# Patient Record
Sex: MALE | Race: BLACK OR AFRICAN AMERICAN | NOT HISPANIC OR LATINO | Employment: UNEMPLOYED | URBAN - METROPOLITAN AREA
[De-identification: names, ages, dates, MRNs, and addresses within clinical notes are randomized per-mention and may not be internally consistent; named-entity substitution may affect disease eponyms.]

---
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
## 2021-05-08 ENCOUNTER — IMMUNIZATIONS (OUTPATIENT)
Dept: FAMILY MEDICINE CLINIC | Facility: HOSPITAL | Age: 21
End: 2021-05-08

## 2021-05-08 DIAGNOSIS — Z23 ENCOUNTER FOR IMMUNIZATION: Primary | ICD-10-CM

## 2021-05-08 PROCEDURE — 0001A SARS-COV-2 / COVID-19 MRNA VACCINE (PFIZER-BIONTECH) 30 MCG: CPT

## 2021-05-08 PROCEDURE — 91300 SARS-COV-2 / COVID-19 MRNA VACCINE (PFIZER-BIONTECH) 30 MCG: CPT

## 2021-05-29 ENCOUNTER — APPOINTMENT (OUTPATIENT)
Dept: FAMILY MEDICINE CLINIC | Facility: HOSPITAL | Age: 21
End: 2021-05-29

## 2021-05-29 DIAGNOSIS — Z23 ENCOUNTER FOR IMMUNIZATION: Primary | ICD-10-CM

## 2021-05-29 PROCEDURE — 0002A SARS-COV-2 / COVID-19 MRNA VACCINE (PFIZER-BIONTECH) 30 MCG: CPT

## 2021-05-29 PROCEDURE — 91300 SARS-COV-2 / COVID-19 MRNA VACCINE (PFIZER-BIONTECH) 30 MCG: CPT

## 2021-06-01 ENCOUNTER — HOSPITAL ENCOUNTER (EMERGENCY)
Facility: HOSPITAL | Age: 21
Discharge: HOME | End: 2021-06-02
Attending: EMERGENCY MEDICINE | Admitting: EMERGENCY MEDICINE
Payer: COMMERCIAL

## 2021-06-01 DIAGNOSIS — F10.920 ALCOHOLIC INTOXICATION WITHOUT COMPLICATION (HCC): Primary | ICD-10-CM

## 2021-06-01 PROCEDURE — 99284 EMERGENCY DEPT VISIT MOD MDM: CPT

## 2021-06-01 PROCEDURE — 99284 EMERGENCY DEPT VISIT MOD MDM: CPT | Performed by: EMERGENCY MEDICINE

## 2021-06-02 VITALS
BODY MASS INDEX: 27.47 KG/M2 | HEIGHT: 67 IN | SYSTOLIC BLOOD PRESSURE: 111 MMHG | WEIGHT: 175 LBS | DIASTOLIC BLOOD PRESSURE: 54 MMHG | OXYGEN SATURATION: 95 % | HEART RATE: 65 BPM | TEMPERATURE: 97.6 F | RESPIRATION RATE: 16 BRPM

## 2021-06-02 LAB
ETHANOL SERPL-MCNC: 213 MG/DL (ref 0–3)
GLUCOSE SERPL-MCNC: 112 MG/DL (ref 65–140)
GLUCOSE SERPL-MCNC: 82 MG/DL (ref 65–140)

## 2021-06-02 PROCEDURE — 82077 ASSAY SPEC XCP UR&BREATH IA: CPT | Performed by: EMERGENCY MEDICINE

## 2021-06-02 PROCEDURE — 82948 REAGENT STRIP/BLOOD GLUCOSE: CPT

## 2021-06-02 PROCEDURE — 36415 COLL VENOUS BLD VENIPUNCTURE: CPT | Performed by: EMERGENCY MEDICINE

## 2021-06-02 PROCEDURE — 96372 THER/PROPH/DIAG INJ SC/IM: CPT

## 2021-06-02 RX ORDER — LANOLIN ALCOHOL/MO/W.PET/CERES
100 CREAM (GRAM) TOPICAL ONCE
Status: DISCONTINUED | OUTPATIENT
Start: 2021-06-02 | End: 2021-06-02 | Stop reason: HOSPADM

## 2021-06-02 RX ORDER — FOLIC ACID 1 MG/1
1 TABLET ORAL DAILY
Status: DISCONTINUED | OUTPATIENT
Start: 2021-06-02 | End: 2021-06-02 | Stop reason: HOSPADM

## 2021-06-02 RX ORDER — LORAZEPAM 2 MG/ML
2 INJECTION INTRAMUSCULAR ONCE
Status: COMPLETED | OUTPATIENT
Start: 2021-06-02 | End: 2021-06-02

## 2021-06-02 RX ORDER — ZIPRASIDONE MESYLATE 20 MG/ML
20 INJECTION, POWDER, LYOPHILIZED, FOR SOLUTION INTRAMUSCULAR ONCE
Status: COMPLETED | OUTPATIENT
Start: 2021-06-02 | End: 2021-06-02

## 2021-06-02 RX ADMIN — ZIPRASIDONE MESYLATE 20 MG: 20 INJECTION, POWDER, LYOPHILIZED, FOR SOLUTION INTRAMUSCULAR at 00:21

## 2021-06-02 RX ADMIN — LORAZEPAM 2 MG: 2 INJECTION INTRAMUSCULAR; INTRAVENOUS at 00:21

## 2021-06-02 NOTE — ED RE-EVALUATION NOTE
Patient reassessed  Patient somnolent and difficult to arouse  Not in any distress  Blood sugar 82 mg/dL  Patient had received Geodon and Ativan overnight  Will check blood ethanol level  Appropriate disposition at this point to be determined       Rosario Roger DO  06/02/21 1154

## 2021-06-02 NOTE — ED PROVIDER NOTES
History  Chief Complaint   Patient presents with    Alcohol Intoxication     pt found sleeping outside of [facility]  21 y/o male with hx of schizophrenia presents to the ED via EMS after being found intoxicated and sleeping outside of a local [employer]  The patient has no current symptoms or complaints  He reports that he drinks daily ("2 bottles of gin") and he declines any assistance for substance use rehabilitation  He notes that he has auditory hallucinations at baseline that are unchanged  No SI or HI  Prior to Admission Medications   Prescriptions Last Dose Informant Patient Reported? Taking?   paliperidone palmitate (Abad Waynevivienne) 234 MG/1 5ML im injection   Yes No   Sig: Inject 234 mg into a muscle every 28 days      Facility-Administered Medications: None       Past Medical History:   Diagnosis Date    Asthma     Hearing impaired     Legally blind     Schizophrenia ([facility] [address] )        Past Surgical History:   Procedure Laterality Date    HERNIA REPAIR      TEAR DUCT SURGERY Bilateral     TONSILLECTOMY         Family History   Problem Relation Age of Onset    Schizophrenia Mother     Mental illness Mother     Abnormal EKG Sister     Mental illness Sister      I have reviewed and agree with the history as documented  E-Cigarette/Vaping     E-Cigarette/Vaping Substances     Social History     Tobacco Use    Smoking status: Current Every Day Smoker     Packs/day: 0 20     Types: Cigarettes    Smokeless tobacco: Never Used   Substance Use Topics    Alcohol use: Never     Frequency: Never    Drug use: Never        Review of Systems   Constitutional: Negative for chills and fever  Respiratory: Negative for cough and shortness of breath  Cardiovascular: Negative for chest pain and palpitations  Gastrointestinal: Negative for abdominal pain, diarrhea, nausea and vomiting  Musculoskeletal: Negative for back pain and neck pain     Neurological: Negative for dizziness, light-headedness and headaches  Psychiatric/Behavioral: Positive for hallucinations  Negative for suicidal ideas  All other systems reviewed and are negative  Physical Exam  ED Triage Vitals [06/01/21 2321]   Temperature Pulse Respirations Blood Pressure SpO2   97 6 °F (36 4 °C) 71 18 107/70 98 %      Temp Source Heart Rate Source Patient Position - Orthostatic VS BP Location FiO2 (%)   Oral Monitor Lying Right arm --      Pain Score       No Pain             Orthostatic Vital Signs  Vitals:    06/01/21 2321 06/02/21 0616 06/02/21 0754   BP: 107/70 110/65 111/54   Pulse: 71 71 65   Patient Position - Orthostatic VS: Lying Lying        Physical Exam  Vitals signs and nursing note reviewed  Constitutional:       General: He is not in acute distress  Appearance: Normal appearance  He is normal weight  Comments: Appears intoxicated but otherwise in no acute distress  HENT:      Head: Normocephalic and atraumatic  Right Ear: External ear normal       Left Ear: External ear normal       Nose: Nose normal       Mouth/Throat:      Mouth: Mucous membranes are moist       Pharynx: Oropharynx is clear  No oropharyngeal exudate  Eyes:      Extraocular Movements: Extraocular movements intact  Conjunctiva/sclera: Conjunctivae normal       Pupils: Pupils are equal, round, and reactive to light  Neck:      Musculoskeletal: Normal range of motion and neck supple  No muscular tenderness  Cardiovascular:      Rate and Rhythm: Normal rate and regular rhythm  Pulses: Normal pulses  Heart sounds: Normal heart sounds  Pulmonary:      Effort: Pulmonary effort is normal  No respiratory distress  Breath sounds: Normal breath sounds  No wheezing or rales  Chest:      Chest wall: No tenderness  Abdominal:      General: Abdomen is flat  Bowel sounds are normal  There is no distension  Palpations: Abdomen is soft  Tenderness: There is no abdominal tenderness   There is no right CVA tenderness, left CVA tenderness or guarding  Musculoskeletal: Normal range of motion  General: No swelling or tenderness  Skin:     General: Skin is warm and dry  Capillary Refill: Capillary refill takes less than 2 seconds  Neurological:      Mental Status: He is alert  Comments: Appears intoxicated, but otherwise alert and oriented to person, place, and time  Moving all extremities  No focal deficits  Psychiatric:      Comments: Endorses hallucinations at baseline  No current SI or HI  ED Medications  Medications   sterile water injection **ADS Override Pull** (0 mL  Hold 6/2/21 0041)   ziprasidone (GEODON) IM injection 20 mg (20 mg Intramuscular Given 6/2/21 0021)   LORazepam (ATIVAN) injection 2 mg (2 mg Intramuscular Given 6/2/21 0021)       Diagnostic Studies  Results Reviewed     Procedure Component Value Units Date/Time    Ethanol [[id number]]  (Abnormal) Collected: 06/02/21 0918    Lab Status: Final result Specimen: Blood from Arm, Left Updated: 06/02/21 0945     Ethanol Lvl 213 mg/dL     Fingerstick Glucose (POCT) [[id number]]  (Normal) Collected: 06/02/21 0749    Lab Status: Final result Updated: 06/02/21 0750     POC Glucose 82 mg/dl     Fingerstick Glucose (POCT) [[id number]]  (Normal) Collected: 06/02/21 0039    Lab Status: Final result Updated: 06/02/21 0040     POC Glucose 112 mg/dl                  No orders to display         Procedures  Procedures      ED Course  ED Course as of Jun 08 2346   Wed Jun 02, 2021   0348 POC Glucose: 112                                       MDM  Number of Diagnoses or Management Options  Alcoholic intoxication without complication [facility]):   Diagnosis management comments: 23 y/o male with hx of schizophrenia presents to the ED via EMS after being found intoxicated and sleeping outside of a local [employer]  The patient has no current symptoms or complaints   He reports that he drinks daily ("2 bottles of gin") and he declines any assistance for substance use rehabilitation  He notes that he has auditory hallucinations at baseline that are unchanged  No SI or HI  On examination he is afebrile, VSS, intoxicated but otherwise in no acute distress  No focal deficits  Blood glucose normal  While in the ER the patient was unable to obtain a sober ride for discharge, and during his wait he became restless and difficult to reorient  Patient was given IM Ativan and Geodon  Care for the patient was signed out to Dr Frandy Nails at change of shift and he was discharged when clinically sober  Disposition  Final diagnoses:   Alcoholic intoxication without complication ([address] )     Time reflects when diagnosis was documented in both MDM as applicable and the Disposition within this note     Time User Action Codes Description Comment    6/2/2021  6:56 AM Raf Baldwin Add [Q45 [id number]] Alcoholic intoxication without complication [facility])       ED Disposition     ED Disposition Condition Date/Time Comment    Discharge Stable Wed Jun 2, 2021  6:56 AM Gokul March discharge to home/self care              Follow-up Information     Follow up With Specialties Details Why Contact Info Additional 94 [facility] Internal Medicine Call  As needed [address] 160 [facility] [address]  [facility] [address], [address] [address], [place], [place], [address] [phone]    [employer]  Call  As needed [address] [facility] Emergency Department Emergency Medicine Go to  If symptoms worsen [address]  [address] [facility] [address] Emergency Department, [address], [address]          Discharge Medication List as of 6/2/2021  6:57 AM      CONTINUE these medications which have NOT CHANGED    Details   paliperidone palmitate (INVEGA SUSTENNA) 234 MG/1 5ML im injection Inject 234 mg into a muscle every 28 days, Historical Med           No discharge procedures on file  PDMP Review     None           ED Provider  Attending physically available and evaluated Viviane Giordano I managed the patient along with the ED Attending      Electronically Signed by         Alberto Quinonez MD  06/08/21 8467

## 2021-06-02 NOTE — ED ATTENDING ATTESTATION
6/1/2021  Edin RODRÍGUEZ MD, saw and evaluated the patient  I have discussed the patient with the resident/non-physician practitioner and agree with the resident's/non-physician practitioner's findings, Plan of Care, and MDM as documented in the resident's/non-physician practitioner's note, except where noted  All available labs and Radiology studies were reviewed  I was present for key portions of any procedure(s) performed by the resident/non-physician practitioner and I was immediately available to provide assistance  At this point I agree with the current assessment done in the Emergency Department  I have conducted an independent evaluation of this patient a history and physical is as follows:    ED Course      Emergency Department Note- Jesenia Conte 22 y o  male MRN: [id number]    Unit/Bed#: Z4HA Encounter: [id number]    Jesenia Conte is a 22 y o  male who presents with   Chief Complaint   Patient presents with    Alcohol Intoxication     pt found sleeping outside of [facility]  History of Present Illness   HPI:  Jesenia Conte is a 22 y o  male who presents for evaluation of:  Acute alcohol intoxication  Patient was found sleeping outside [facility] and brought in for evaluation  Patient denies other intoxicant use today  Patient states that he drinks 2 bottles of gin per day  Patient denies SI, HI  Review of Systems   Unable to perform ROS: Mental status change (secondary to alcohol)   All other systems reviewed and are negative        Historical Information   Past Medical History:   Diagnosis Date    Asthma     Hearing impaired     Legally blind     Schizophrenia ([facility] [address] )      Past Surgical History:   Procedure Laterality Date    HERNIA REPAIR      TEAR DUCT SURGERY Bilateral     TONSILLECTOMY       Social History   Social History     Substance and Sexual Activity   Alcohol Use Never    Frequency: Never     Social History     Substance and Sexual Activity   Drug Use Never Social History     Tobacco Use   Smoking Status Current Every Day Smoker    Packs/day: 0 20    Types: Cigarettes   Smokeless Tobacco Never Used     Family History: non-contributory    Meds/Allergies   all medications and allergies reviewed  No Known Allergies    Objective   First Vitals:   Blood Pressure: 107/70 (21)  Pulse: 71 (21)  Temperature: 97 6 °F (36 4 °C) (21)  Temp Source: Oral (21)  Respirations: 18 (21)  Height: 5' 7" (170 2 cm) (21)  Weight - Scale: 79 4 kg (175 lb) (21)  SpO2: 98 % (21)    Current Vitals:   Blood Pressure: 107/70 (21)  Pulse: 71 (21)  Temperature: 97 6 °F (36 4 °C) (21)  Temp Source: Oral (21)  Respirations: 18 (21)  Height: 5' 7" (170 2 cm) (21)  Weight - Scale: 79 4 kg (175 lb) (21)  SpO2: 98 % (21)    No intake or output data in the 24 hours ending 21 0000    Invasive Devices     None                 Physical Exam  Vitals signs and nursing note reviewed  Constitutional:       General: He is not in acute distress  Appearance: He is normal weight  HENT:      Head: Normocephalic and atraumatic  Cardiovascular:      Rate and Rhythm: Normal rate and regular rhythm  Pulmonary:      Effort: Pulmonary effort is normal  No respiratory distress  Musculoskeletal: Normal range of motion  General: No tenderness  Skin:     General: Skin is warm and dry  Capillary Refill: Capillary refill takes less than 2 seconds  Neurological:      General: No focal deficit present  Mental Status: He is easily aroused  Motor: No weakness  Coordination: Coordination normal    Psychiatric:      Comments: [address], thought content, judgment impaired by alcohol intoxication           Medical Decision Makin   Acute alcohol intoxication: observe till sober    No results found for this or any previous visit (from the past 36 hour(s))  No orders to display         Portions of the record may have been created with voice recognition software  Occasional wrong word or "sound a like" substitutions may have occurred due to the inherent limitations of voice recognition software  Read the chart carefully and recognize, using context, where substitutions have occurred            Critical Care Time  Procedures

## 2021-06-02 NOTE — ED NOTES
Pt eating police citation paperwork  Pt instructed not to eat this paper and that he will need that later   Pt still continues to eat paper     Clover Wilkes RN  06/02/21 4609

## 2021-06-20 ENCOUNTER — HOSPITAL ENCOUNTER (EMERGENCY)
Facility: HOSPITAL | Age: 21
Discharge: HOME | End: 2021-06-20
Attending: EMERGENCY MEDICINE
Payer: COMMERCIAL

## 2021-06-20 ENCOUNTER — APPOINTMENT (EMERGENCY)
Dept: RADIOLOGY | Facility: HOSPITAL | Age: 21
End: 2021-06-20
Payer: COMMERCIAL

## 2021-06-20 VITALS
OXYGEN SATURATION: 96 % | SYSTOLIC BLOOD PRESSURE: 140 MMHG | RESPIRATION RATE: 18 BRPM | HEART RATE: 102 BPM | TEMPERATURE: 98.8 F | DIASTOLIC BLOOD PRESSURE: 68 MMHG
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
## 2021-06-20 DIAGNOSIS — S93.409A ANKLE SPRAIN: ICD-10-CM

## 2021-06-20 PROCEDURE — 99284 EMERGENCY DEPT VISIT MOD MDM: CPT | Performed by: EMERGENCY MEDICINE

## 2021-06-20 PROCEDURE — 70486 CT MAXILLOFACIAL W/O DYE: CPT

## 2021-06-20 PROCEDURE — 73610 X-RAY EXAM OF ANKLE: CPT

## 2021-06-20 PROCEDURE — 99284 EMERGENCY DEPT VISIT MOD MDM: CPT
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
## 2021-06-29 ENCOUNTER — HOSPITAL ENCOUNTER (OUTPATIENT)
Dept: RADIOLOGY | Facility: HOSPITAL | Age: 21
Discharge: HOME | End: 2021-06-29
Payer: COMMERCIAL

## 2021-06-29 VITALS — BODY MASS INDEX: 28.35 KG/M2 | WEIGHT: 180.6 LBS | HEIGHT: 67 IN

## 2021-06-29 DIAGNOSIS — M25.572 ACUTE LEFT ANKLE PAIN: ICD-10-CM

## 2021-06-29 DIAGNOSIS — S93.402A SPRAIN OF LEFT ANKLE, UNSPECIFIED LIGAMENT, INITIAL ENCOUNTER: Primary | ICD-10-CM

## 2021-06-29 PROCEDURE — 73610 X-RAY EXAM OF ANKLE: CPT

## 2021-06-29 PROCEDURE — 99203 OFFICE O/P NEW LOW 30 MIN: CPT | Performed by: PHYSICIAN ASSISTANT

## 2021-06-29 PROCEDURE — 3008F BODY MASS INDEX DOCD: CPT | Performed by: PHYSICIAN ASSISTANT
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
## 2021-06-29 NOTE — PATIENT INSTRUCTIONS
Ice Pack Application   WHAT YOU NEED TO KNOW:   Ice can be used to decrease swelling and pain after an injury or surgery  Common injuries that may benefit from ice therapy are sprains, strains, and bruises  The use of ice is most effective in the first 1 to 3 days after an injury  DISCHARGE INSTRUCTIONS:   How to apply ice:   · Fill a bag with crushed ice about half full  Remove the air from the bag before you close it  You can also use a bag of frozen vegetables  · Wrap the ice pack in a cloth to protect your skin from frostbite or other injury  · Put the ice over the injured area for 20 to 30 minutes or as long as directed  · Check your skin after about 30 seconds for color changes or blistering  Remove the ice if you notice skin changes or you feel burning or numbness in the area  · Throw the ice pack away after use  · Apply ice to your injured area 4 times each day or as directed  Ask your healthcare provider how many days you should apply ice  Contact your healthcare provider if:   · You see blisters, whitening of your skin, or a bluish color to your skin after using ice  · You feel burning or numbness when using ice  · You have questions about the use of ice packs  © 2017 [facility] [facility] Information is for End User's use only and may not be sold, redistributed or otherwise used for commercial purposes  All illustrations and images included in CareNotes® are the copyrighted property of [employer]  or Kendall Esparza  The above information is an  only  It is not intended as medical advice for individual conditions or treatments  Talk to your doctor, nurse or pharmacist before following any medical regimen to see if it is safe and effective for you  Safe Use of NSAIDs   WHAT YOU NEED TO KNOW:   NSAIDs are medicines that are used to decrease pain, swelling, and fever  NSAIDs are available with or without a doctor's order   NSAIDs that you can buy without a doctor's order include aspirin, ibuprofen, and naproxen  DISCHARGE INSTRUCTIONS:   Return to the emergency department if:   · You have swelling around your mouth or trouble breathing  · You are breathing fast or you have a fast heartbeat  · You have nausea, vomiting, or abdominal pain  · You have blood in your vomit or bowel movements  · You have a seizure  Contact your healthcare provider if:   · You have a headache or become confused  · You develop hearing loss or ringing in your ears  · You develop itching, a rash, or hives  · You have swelling around your lower legs, feet, ankles, and hands  · You do not know how much NSAIDs to give to your child  · You have questions or concerns about your condition or care  How to give NSAIDs to your child safely:   · Read the directions on the label  Find out if the medicine is right for your child's age and how much to give to your child  The dose for your child's weight or age should be listed  Do not  give your child more than the recommended amount  · Use the measuring tool that came with the medicine  Do not  use another measuring tool, such as a kitchen spoon  Other measuring tools do not provide the right amount of medicine  How to take NSAIDs safely:   · Read the directions on the label to learn how much medicine you should take and often to take it  Do not take more than the recommended amount  · Talk to your healthcare provider if you need take NSAIDs for more than 30 days  The longer you take NSAIDs, the higher your risk of side effects will be  You may need to take other medicines to decrease your risk of side effects such as stomach bleeding  · Do not take an over-the-counter NSAIDs with prescription NSAIDs  The combined amount of NSAIDs may be too high  · Tell your healthcare provider about other medicines you take  Some medicines can increase the risk of side effects from NSAIDs   Your healthcare provider will tell you if it is okay to take NSAIDs and how to take them  Who should not take NSAIDs:  Certain people should avoid or limit NSAIDs  Do not  give NSAIDs to children under 10months of age without direction from your child's doctor  Do not give aspirin to children under 25years of age  Your child could develop Reye syndrome if he takes aspirin  Reye syndrome can cause life-threatening brain and liver damage  Check your child's medicine labels for aspirin, salicylates, or oil of wintergreen  Talk to your healthcare provider before you take NSAIDs if any of the following apply to you:  · You have reflux disease, a peptic ulcer, H pylori infection, or bleeding in your stomach or intestines  · You have a bleeding disorder, or you take blood-thinning medicine  · You are allergic to aspirin or other NSAIDs  · You have liver or kidney or disease  · You have high blood pressure or heart disease  · You have 3 or more alcoholic drinks each day  · You are pregnant  What you need to know about an NSAID overdose:  Certain health problems can occur if you take too much NSAID medicine at one time or over time  Problems include nausea, vomiting, and abdominal pain  You may develop gastritis, peptic ulcers, and stomach bleeding  You may also develop fluid retention, heart problems, and kidney problems  NSAIDs can worsen high blood pressure  You may become confused, or you may have a headache, hearing loss, or hallucinations  An overdose of aspirin may also cause rapid breathing, a rapid heartbeat, or seizures  What to do if you think you or your child took too much NSAID medicine:  Call the [facility] at 1-463.584.9542 immediately  © 2017 [address] [employer]  Information is for End User's use only and may not be sold, redistributed or otherwise used for commercial purposes   All illustrations and images included in CareNotes® are the copyrighted property of SOLA HERMOSILLO Marcella  or Kendall Esparza  The above information is an  only  It is not intended as medical advice for individual conditions or treatments  Talk to your doctor, nurse or pharmacist before following any medical regimen to see if it is safe and effective for you

## 2021-07-13 ENCOUNTER — HOSPITAL ENCOUNTER (EMERGENCY)
Facility: HOSPITAL | Age: 21
Discharge: HOME | End: 2021-07-14
Attending: EMERGENCY MEDICINE | Admitting: EMERGENCY MEDICINE
Payer: COMMERCIAL

## 2021-07-13 VITALS
DIASTOLIC BLOOD PRESSURE: 80 MMHG | SYSTOLIC BLOOD PRESSURE: 143 MMHG | OXYGEN SATURATION: 100 % | RESPIRATION RATE: 17 BRPM | TEMPERATURE: 98.1 F | HEART RATE: 94 BPM

## 2021-07-13 DIAGNOSIS — F69 BEHAVIOR PROBLEM, ADULT: Primary | ICD-10-CM

## 2021-07-13 PROCEDURE — 99283 EMERGENCY DEPT VISIT LOW MDM: CPT

## 2021-07-13 PROCEDURE — 99282 EMERGENCY DEPT VISIT SF MDM: CPT | Performed by: EMERGENCY MEDICINE

## 2021-07-13 PROCEDURE — 82075 ASSAY OF BREATH ETHANOL: CPT | Performed by: EMERGENCY MEDICINE

## 2021-07-14 LAB
ETHANOL EXG-MCNC: 0 MG/DL
SARS-COV-2 RNA RESP QL NAA+PROBE: NEGATIVE

## 2021-07-14 PROCEDURE — U0003 INFECTIOUS AGENT DETECTION BY NUCLEIC ACID (DNA OR RNA); SEVERE ACUTE RESPIRATORY SYNDROME CORONAVIRUS 2 (SARS-COV-2) (CORONAVIRUS DISEASE [COVID-19]), AMPLIFIED PROBE TECHNIQUE, MAKING USE OF HIGH THROUGHPUT TECHNOLOGIES AS DESCRIBED BY CMS-2020-01-R: HCPCS | Performed by: EMERGENCY MEDICINE
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
## 2021-07-14 NOTE — ED NOTES
Attempted to call patient a andrés  pt requesting to walk home at this time        Jessica Mirza RN  07/14/21 1802

## 2021-07-14 NOTE — ED NOTES
Crisis worker called grandma to see if patient can return home and grandma said as long as he is calm that he could but she is unable to pick him up  CW called [facility] and left them a message that patient was in the ED and they need to follow up with him today         [facility] #[phone]

## 2021-07-14 NOTE — ED NOTES
Pt unable to provide urine sample at this time, will try again later      Veronica Taylor RN  07/14/21 7908
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
## 2021-07-16 ENCOUNTER — HOSPITAL ENCOUNTER (EMERGENCY)
Facility: HOSPITAL | Age: 21
Discharge: HOME | End: 2021-07-17
Attending: EMERGENCY MEDICINE
Payer: COMMERCIAL

## 2021-07-16 DIAGNOSIS — F10.929 ALCOHOL INTOXICATION (HCC): ICD-10-CM

## 2021-07-16 DIAGNOSIS — S00.83XA CONTUSION OF FACE, INITIAL ENCOUNTER: ICD-10-CM
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
## 2021-07-16 PROCEDURE — 99281 EMR DPT VST MAYX REQ PHY/QHP: CPT | Performed by: EMERGENCY MEDICINE

## 2021-07-16 PROCEDURE — 99284 EMERGENCY DEPT VISIT MOD MDM: CPT

## 2021-07-17 VITALS
OXYGEN SATURATION: 92 % | HEIGHT: 64 IN | HEART RATE: 83 BPM | SYSTOLIC BLOOD PRESSURE: 128 MMHG | BODY MASS INDEX: 25.61 KG/M2 | WEIGHT: 150 LBS | TEMPERATURE: 98.4 F | DIASTOLIC BLOOD PRESSURE: 63 MMHG | RESPIRATION RATE: 20 BRPM

## 2021-07-17 NOTE — DISCHARGE INSTRUCTIONS
Return to the emergency department if you experience worsening of symptoms including severe headache, vision changes, numbness or tingling
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
## 2021-08-07 ENCOUNTER — HOSPITAL ENCOUNTER (EMERGENCY)
Facility: HOSPITAL | Age: 21
Discharge: HOME | End: 2021-08-07
Attending: EMERGENCY MEDICINE | Admitting: EMERGENCY MEDICINE
Payer: COMMERCIAL

## 2021-08-07 VITALS
OXYGEN SATURATION: 94 % | RESPIRATION RATE: 18 BRPM | WEIGHT: 150 LBS | BODY MASS INDEX: 25.61 KG/M2 | SYSTOLIC BLOOD PRESSURE: 123 MMHG | HEIGHT: 64 IN | DIASTOLIC BLOOD PRESSURE: 58 MMHG | HEART RATE: 86 BPM | TEMPERATURE: 97.9 F

## 2021-08-07 DIAGNOSIS — F10.929 ALCOHOL INTOXICATION (HCC): Primary | ICD-10-CM

## 2021-08-07 PROCEDURE — 99282 EMERGENCY DEPT VISIT SF MDM: CPT | Performed by: EMERGENCY MEDICINE

## 2021-08-07 PROCEDURE — 99284 EMERGENCY DEPT VISIT MOD MDM: CPT

## 2021-08-07 NOTE — ED PROVIDER NOTES
History  Chief Complaint   Patient presents with    Alcohol Intoxication     pt found sleeping outside of the entrance of a hotel  Albania Rahman is a [place] man with PMH of schizophrenia, alcohol use, blind, hard of hearing who presents with acute alcohol intoxication  EMS found him asleep in a hotel  Pt reports he has been drinking tonight, 3 to 4 40 ounce beers, and was walking around, approximately two miles, before passing out in front of a hotel  Denies any history of trauma, or other symptoms of SOB, chest pain, neck pain, nausea, vomiting, abdominal pain, weakness in his extremities  He reports he feels well  He is able to state where he is and where his home address is  Denies any thoughts of wanting to hurt himself or others  No other symptoms, no other complaints at this time  Prior to Admission Medications   Prescriptions Last Dose Informant Patient Reported? Taking?   paliperidone palmitate (Shilpi Cain) 234 MG/1 5ML im injection   Yes No   Sig: Inject 234 mg into a muscle every 28 days      Facility-Administered Medications: None       Past Medical History:   Diagnosis Date    Asthma     Hearing impaired     Legally blind     Schizophrenia ([facility] [address] )        Past Surgical History:   Procedure Laterality Date    HERNIA REPAIR      TEAR DUCT SURGERY Bilateral     TONSILLECTOMY         Family History   Problem Relation Age of Onset    Schizophrenia Mother     Mental illness Mother     Abnormal EKG Sister     Mental illness Sister      I have reviewed and agree with the history as documented  E-Cigarette/Vaping     E-Cigarette/Vaping Substances     Social History     Tobacco Use    Smoking status: Current Every Day Smoker     Packs/day: 0 20     Types: Cigarettes    Smokeless tobacco: Never Used   Substance Use Topics    Alcohol use: Never    Drug use: Never        Review of Systems   Constitutional: Negative for fever  HENT: Negative for sore throat      Eyes: Negative for visual disturbance  Respiratory: Negative for shortness of breath  Cardiovascular: Negative for chest pain  Gastrointestinal: Negative for abdominal pain, constipation, diarrhea, nausea and vomiting  Genitourinary: Negative for difficulty urinating  Musculoskeletal: Negative for myalgias  Skin: Negative for rash  Neurological: Negative for headaches  All other systems reviewed and are negative  Physical Exam  ED Triage Vitals [08/07/21 0358]   Temperature Pulse Respirations Blood Pressure SpO2   97 9 °F (36 6 °C) 86 18 123/58 94 %      Temp Source Heart Rate Source Patient Position - Orthostatic VS BP Location FiO2 (%)   Oral Monitor Lying Right arm --      Pain Score       --             Orthostatic Vital Signs  Vitals:    08/07/21 0358   BP: 123/58   Pulse: 86   Patient Position - Orthostatic VS: Lying       Physical Exam  Constitutional:       Appearance: He is well-developed and normal weight  Comments: Appears intoxicated, slurring words   HENT:      Head: Normocephalic and atraumatic  Eyes:      Extraocular Movements: Extraocular movements intact  Conjunctiva/sclera:      Right eye: Right conjunctiva is injected  Left eye: Left conjunctiva is injected  Pupils: Pupils are equal, round, and reactive to light  Cardiovascular:      Rate and Rhythm: Normal rate and regular rhythm  Heart sounds: Normal heart sounds  Pulmonary:      Effort: Pulmonary effort is normal       Breath sounds: Normal breath sounds  Abdominal:      General: Abdomen is flat  Bowel sounds are normal       Palpations: Abdomen is soft  Musculoskeletal:         General: Normal range of motion  Cervical back: Normal range of motion and neck supple  Skin:     General: Skin is warm and dry  Neurological:      General: No focal deficit present  Mental Status: He is alert  Mental status is at baseline           ED Medications  Medications - No data to display    Diagnostic Studies  Results Reviewed     None                 No orders to display         Procedures  Procedures      ED Course  ED Course as of Aug 07 1656   Sat Aug 07, 2021   8309 Pt still not clinically sober  Will sign out to the day team for further observation  CASSIUS Hodgson is a [place] man who presents with acute alcohol intoxication  Vitals stable, physical exam remarkable for acute intoxication  No evidence of trauma or other medical problem that might warrant further workup  Plan will be to monitor pt until he is clinically sober or able to get a sober ride home  Pt remained stable throughout his ED course and slept for most of it  He left the ED before a final sobriety assessment could be obtained  Left after provider exam       Disposition  Final diagnoses:   Alcohol intoxication ([address] )     Time reflects when diagnosis was documented in both MDM as applicable and the Disposition within this note     Time User Action Codes Description Comment    8/7/2021  6:46 AM She Webster Add [F10 929] Alcohol intoxication [facility])       ED Disposition     ED Disposition Condition Date/Time Comment    Left from Room after Provider Exam  Sat Aug 7, 2021  2:52 PM       Follow-up Information    None         Discharge Medication List as of 8/7/2021  3:13 PM      CONTINUE these medications which have NOT CHANGED    Details   paliperidone palmitate (INVEGA SUSTENNA) 234 MG/1 5ML im injection Inject 234 mg into a muscle every 28 days, Historical Med           No discharge procedures on file  PDMP Review     None           ED Provider  Attending physically available and evaluated Michael Napoles I managed the patient along with the ED Attending      Electronically Signed by         Leeann Lares MD  08/07/21 6769
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
## 2021-08-14 ENCOUNTER — APPOINTMENT (EMERGENCY)
Dept: RADIOLOGY | Facility: HOSPITAL | Age: 21
End: 2021-08-14
Payer: COMMERCIAL

## 2021-08-14 ENCOUNTER — HOSPITAL ENCOUNTER (EMERGENCY)
Facility: HOSPITAL | Age: 21
Discharge: HOME | End: 2021-08-15
Attending: EMERGENCY MEDICINE | Admitting: EMERGENCY MEDICINE
Payer: COMMERCIAL

## 2021-08-14 DIAGNOSIS — F10.920 ALCOHOLIC INTOXICATION WITHOUT COMPLICATION (HCC): Primary | ICD-10-CM

## 2021-08-14 LAB
ANION GAP SERPL CALCULATED.3IONS-SCNC: 5 MMOL/L (ref 4–13)
BASOPHILS # BLD AUTO: 0.11 THOUSANDS/ΜL (ref 0–0.1)
BASOPHILS NFR BLD AUTO: 1 % (ref 0–1)
BUN SERPL-MCNC: 12 MG/DL (ref 5–25)
CALCIUM SERPL-MCNC: 8.9 MG/DL (ref 8.3–10.1)
CHLORIDE SERPL-SCNC: 109 MMOL/L (ref 100–108)
CO2 SERPL-SCNC: 25 MMOL/L (ref 21–32)
CREAT SERPL-MCNC: 1.21 MG/DL (ref 0.6–1.3)
EOSINOPHIL # BLD AUTO: 0.08 THOUSAND/ΜL (ref 0–0.61)
EOSINOPHIL NFR BLD AUTO: 1 % (ref 0–6)
ERYTHROCYTE [DISTWIDTH] IN BLOOD BY AUTOMATED COUNT: 13.9 % (ref 11.6–15.1)
GFR SERPL CREATININE-BSD FRML MDRD: 99 ML/MIN/1.73SQ M
GLUCOSE SERPL-MCNC: 106 MG/DL (ref 65–140)
GLUCOSE SERPL-MCNC: 114 MG/DL (ref 65–140)
HCT VFR BLD AUTO: 48.2 % (ref 36.5–49.3)
HGB BLD-MCNC: 16.5 G/DL (ref 12–17)
IMM GRANULOCYTES # BLD AUTO: 0.11 THOUSAND/UL (ref 0–0.2)
IMM GRANULOCYTES NFR BLD AUTO: 1 % (ref 0–2)
LYMPHOCYTES # BLD AUTO: 3.39 THOUSANDS/ΜL (ref 0.6–4.47)
LYMPHOCYTES NFR BLD AUTO: 38 % (ref 14–44)
MCH RBC QN AUTO: 32.8 PG (ref 26.8–34.3)
MCHC RBC AUTO-ENTMCNC: 34.2 G/DL (ref 31.4–37.4)
MCV RBC AUTO: 96 FL (ref 82–98)
MONOCYTES # BLD AUTO: 0.75 THOUSAND/ΜL (ref 0.17–1.22)
MONOCYTES NFR BLD AUTO: 8 % (ref 4–12)
NEUTROPHILS # BLD AUTO: 4.6 THOUSANDS/ΜL (ref 1.85–7.62)
NEUTS SEG NFR BLD AUTO: 51 % (ref 43–75)
NRBC BLD AUTO-RTO: 0 /100 WBCS
PLATELET # BLD AUTO: 227 THOUSANDS/UL (ref 149–390)
PMV BLD AUTO: 10.9 FL (ref 8.9–12.7)
POTASSIUM SERPL-SCNC: 3.6 MMOL/L (ref 3.5–5.3)
RBC # BLD AUTO: 5.03 MILLION/UL (ref 3.88–5.62)
SODIUM SERPL-SCNC: 139 MMOL/L (ref 136–145)
WBC # BLD AUTO: 9.04 THOUSAND/UL (ref 4.31–10.16)

## 2021-08-14 PROCEDURE — 36415 COLL VENOUS BLD VENIPUNCTURE: CPT | Performed by: EMERGENCY MEDICINE

## 2021-08-14 PROCEDURE — 99285 EMERGENCY DEPT VISIT HI MDM: CPT

## 2021-08-14 PROCEDURE — 80048 BASIC METABOLIC PNL TOTAL CA: CPT | Performed by: EMERGENCY MEDICINE

## 2021-08-14 PROCEDURE — 70450 CT HEAD/BRAIN W/O DYE: CPT

## 2021-08-14 PROCEDURE — 85025 COMPLETE CBC W/AUTO DIFF WBC: CPT | Performed by: EMERGENCY MEDICINE

## 2021-08-14 PROCEDURE — 99282 EMERGENCY DEPT VISIT SF MDM: CPT | Performed by: EMERGENCY MEDICINE

## 2021-08-14 PROCEDURE — 82948 REAGENT STRIP/BLOOD GLUCOSE: CPT

## 2021-08-14 PROCEDURE — G1004 CDSM NDSC: HCPCS

## 2021-08-15 VITALS
TEMPERATURE: 97.8 F | HEART RATE: 70 BPM | RESPIRATION RATE: 16 BRPM | HEIGHT: 64 IN | BODY MASS INDEX: 25.59 KG/M2 | DIASTOLIC BLOOD PRESSURE: 56 MMHG | WEIGHT: 149.91 LBS | OXYGEN SATURATION: 94 % | SYSTOLIC BLOOD PRESSURE: 114 MMHG

## 2021-08-15 NOTE — ED PROVIDER NOTES
History  Chief Complaint   Patient presents with    Alcohol Intoxication     pt brought in by EMS for alcohol intoxication pt found drunk at [facility]  50-year-old male history of schizophrenia and alcohol abuse, presenting due to alcohol intoxication  Patient was brought in by EMS after being found intoxicated at a local [facility]  Patient is currently inebriated is unable to provide much information  Does admit to drinking alcohol and denies other drug use  Denies any current pain at this time  Unable to obtain further HPI due to altered mental status  Prior to Admission Medications   Prescriptions Last Dose Informant Patient Reported? Taking?   paliperidone palmitate (Travis Grater) 234 MG/1 5ML im injection   Yes No   Sig: Inject 234 mg into a muscle every 28 days      Facility-Administered Medications: None       Past Medical History:   Diagnosis Date    Asthma     Hearing impaired     Legally blind     Schizophrenia ([facility] [address] )        Past Surgical History:   Procedure Laterality Date    HERNIA REPAIR      TEAR DUCT SURGERY Bilateral     TONSILLECTOMY         Family History   Problem Relation Age of Onset    Schizophrenia Mother     Mental illness Mother     Abnormal EKG Sister     Mental illness Sister      I have reviewed and agree with the history as documented      E-Cigarette/Vaping     E-Cigarette/Vaping Substances     Social History     Tobacco Use    Smoking status: Current Every Day Smoker     Packs/day: 0 20     Types: Cigarettes    Smokeless tobacco: Never Used   Substance Use Topics    Alcohol use: Never    Drug use: Never        Review of Systems   Unable to perform ROS: Mental status change       Physical Exam  ED Triage Vitals [08/14/21 2020]   Temperature Pulse Respirations Blood Pressure SpO2   97 8 °F (36 6 °C) 90 18 116/53 99 %      Temp Source Heart Rate Source Patient Position - Orthostatic VS BP Location FiO2 (%)   Oral Monitor Lying Right arm --      Pain Score       --             Orthostatic Vital Signs  Vitals:    08/15/21 0030 08/15/21 0130 08/15/21 0330 08/15/21 0430   BP: 117/58 121/58 111/54 114/56   Pulse: 74 78 76 70   Patient Position - Orthostatic VS: Lying Lying Lying Lying       Physical Exam  Vitals and nursing note reviewed  Constitutional:       Appearance: He is well-developed  HENT:      Head: Normocephalic and atraumatic  Eyes:      Conjunctiva/sclera: Conjunctivae normal    Cardiovascular:      Rate and Rhythm: Normal rate and regular rhythm  Heart sounds: No murmur heard  Pulmonary:      Effort: Pulmonary effort is normal  No respiratory distress  Breath sounds: Normal breath sounds  Abdominal:      General: There is no distension  Palpations: Abdomen is soft  Musculoskeletal:      Cervical back: Neck supple  Skin:     General: Skin is warm and dry  Neurological:      Mental Status: He is alert  Motor: Motor function is intact  Psychiatric:         Speech: Speech is slurred        Comments: Intoxicated  Oriented x 4         ED Medications  Medications - No data to display    Diagnostic Studies  Results Reviewed     Procedure Component Value Units Date/Time    Basic metabolic panel [[id number]]  (Abnormal) Collected: 08/14/21 2152    Lab Status: Final result Specimen: Blood from Arm, Left Updated: 08/14/21 2225     Sodium 139 mmol/L      Potassium 3 6 mmol/L      Chloride 109 mmol/L      CO2 25 mmol/L      ANION GAP 5 mmol/L      BUN 12 mg/dL      Creatinine 1 21 mg/dL      Glucose 106 mg/dL      Calcium 8 9 mg/dL      eGFR 99 ml/min/1 73sq m     Narrative:      [place] guidelines for Chronic Kidney Disease (CKD):     Stage 1 with normal or high GFR (GFR > 90 mL/min/1 73 square meters)    Stage 2 Mild CKD (GFR = 60-89 mL/min/1 73 square meters)    Stage 3A Moderate CKD (GFR = 45-59 mL/min/1 73 square meters)    Stage 3B Moderate CKD (GFR = 30-44 mL/min/1 73 square meters)   Stage 4 Severe CKD (GFR = 15-29 mL/min/1 73 square meters)    Stage 5 End Stage CKD (GFR <15 mL/min/1 73 square meters)  Note: GFR calculation is accurate only with a steady state creatinine    CBC and differential [[id number]]  (Abnormal) Collected: 08/14/21 2152    Lab Status: Final result Specimen: Blood from Arm, Left Updated: 08/14/21 2209     WBC 9 04 Thousand/uL      RBC 5 03 Million/uL      Hemoglobin 16 5 g/dL      Hematocrit 48 2 %      MCV 96 fL      MCH 32 8 pg      MCHC 34 2 g/dL      RDW 13 9 %      MPV 10 9 fL      Platelets 772 Thousands/uL      nRBC 0 /100 WBCs      Neutrophils Relative 51 %      Immat GRANS % 1 %      Lymphocytes Relative 38 %      Monocytes Relative 8 %      Eosinophils Relative 1 %      Basophils Relative 1 %      Neutrophils Absolute 4 60 Thousands/µL      Immature Grans Absolute 0 11 Thousand/uL      Lymphocytes Absolute 3 39 Thousands/µL      Monocytes Absolute 0 75 Thousand/µL      Eosinophils Absolute 0 08 Thousand/µL      Basophils Absolute 0 11 Thousands/µL     Fingerstick Glucose (POCT) [[id number]]  (Normal) Collected: 08/14/21 2025    Lab Status: Final result Updated: 08/14/21 2026     POC Glucose 114 mg/dl                  CT head wo contrast   Final Result by Ian Pereira MD (08/14 2249)      No acute intracranial abnormality  Workstation performed: [id number]               Procedures  Procedures      ED Course         CRAFFT      Most Recent Value   SBIRT (13-23 yo)   In order to provide better care to our patients, we are screening all of our patients for alcohol and drug use  Would it be okay to ask you these screening questions? No Filed at: 08/14/2021 2024                                    MDM  Number of Diagnoses or Management Options  Alcoholic intoxication without complication [facility])  Diagnosis management comments: Head CT ordered due to altered mental status and inability to obtain complete HPI from patient  No acute findings   Patient appears extremely intoxicated  Patient was signed at this time with plans to reevaluate once clinically sober  Per notes, patient was discharged in stable condition  Disposition  Final diagnoses:   Alcoholic intoxication without complication ([address] )     Time reflects when diagnosis was documented in both MDM as applicable and the Disposition within this note     Time User Action Codes Description Comment    8/15/2021  5:53 AM Gearldine Goltz Add [[id number] [id number]] Alcoholic intoxication without complication [facility])       ED Disposition     ED Disposition Condition Date/Time Comment    Discharge Stable Sun Aug 15, 2021  5:53 AM Gokul Forbes discharge to home/self care  Follow-up Information     Follow up With Specialties Details Why Shaw Sarkar MD Internal Medicine Call  As needed [id number]  [phone]            Discharge Medication List as of 8/15/2021  5:54 AM      CONTINUE these medications which have NOT CHANGED    Details   paliperidone palmitate (INVEGA SUSTENNA) 234 MG/1 5ML im injection Inject 234 mg into a muscle every 28 days, Historical Med           No discharge procedures on file  PDMP Review     None           ED Provider  Attending physically available and evaluated Marion Judd I managed the patient along with the ED Attending      Electronically Signed by         Caridad Parry DO  08/15/21 6031

## 2021-08-15 NOTE — DISCHARGE INSTRUCTIONS
You have been seen for alcohol intoxication  Return to the emergency department if you develop worsening confusion, headaches, weakness, abdominal pain or any other symptoms of concern  Please follow up with your PCP as needed by calling the number provided  [facility] has a 24-hour hotline for all human-services related emergencies  Please call [phone] for assistance and referrals if you are struggling with a mental health crisis, abuse, neglect, suicidal thoughts, evictions, substance abuse, food insecurity or any other concerns  [facility] has hotline numbers for all human-services related emergencies  These phone numbers are provided below    Crisis Intervention: [phone]  Drug & Alcohol :[phone]  Early Intervention: [phone]  Information and Referral: [address] [place] [address] Street: [address] [place] [address] St: [address]: [phone]

## 2021-08-15 NOTE — ED ATTENDING ATTESTATION
8/14/2021  I, Ki Day, DO, saw and evaluated the patient  I have discussed the patient with the resident/non-physician practitioner and agree with the resident's/non-physician practitioner's findings, Plan of Care, and MDM as documented in the resident's/non-physician practitioner's note, except where noted  All available labs and Radiology studies were reviewed  I was present for key portions of any procedure(s) performed by the resident/non-physician practitioner and I was immediately available to provide assistance  At this point I agree with the current assessment done in the Emergency Department  I have conducted an independent evaluation of this patient a history and physical is as follows:    Patient is a 16-year-old male, brought in by EMS, reportedly found outside [facility], reportedly intoxicated  Right now on my assessment at 8:35 p m  the patient tells me that he drank about 3-4 alcoholic drinks today and smoked, wall tell me what he smoked  He says he is drunk  He said he has not fallen down, has no pain anywhere, says he lives with his grandmother  No fever, no chills, no numbness or tingling    General:  Patient is well-appearing  Head:  Atraumatic  Eyes:  Conjunctiva pink, Extraocular muscle intact, no periorbital ecchymosis, PERRL  ENT:  Mucous membranes are moist, no dental malocclusion, no craniofacial instability, no Bain signs  Neck:  Supple, no tenderness or step-offs or deformities  Cardiac:  S1-S2, without murmurs, no chest wall tenderness  Lungs:  Clear to auscultation bilaterally  Abdomen:  Soft, nontender, normal bowel sounds, no CVA tenderness, no tympany, no rigidity, no guarding  Extremities:  No bony tenderness to the bilateral bilateral humeral heads, humerus, elbows, radius, ulna, hands, hips, femurs, knees, tibia, fibula, feet  No pain with passive range of motion at the bilateral shoulders, elbows, wrists, hips, knees, or ankles    Back: No midline cervical, thoracic, lumbar, sacral tenderness, deformities, or step-offs  Neurologic:  Awake, patient is slurring his speech, no facial droop, his oriented to person and place and time, when he goes to try and stand he is somewhat unsteady on his feet  His strength is 5/5 in the bilateral arms and legs  Normal sensation throughout the whole body  Slight discoordination with rapid alternating movements and finger-to-nose testing  Skin:  Pink warm and dry  Psychiatric:  Alert, pleasant, cooperative      ED Course     CT head wo contrast   Final Result      No acute intracranial abnormality  Workstation performed: [id number]             Labs Reviewed   BASIC METABOLIC PANEL - Abnormal       Result Value Ref Range Status    Sodium 139  136 - 145 mmol/L Final    Potassium 3 6  3 5 - 5 3 mmol/L Final    Chloride 109 (*) 100 - 108 mmol/L Final    CO2 25  21 - 32 mmol/L Final    ANION GAP 5  4 - 13 mmol/L Final    BUN 12  5 - 25 mg/dL Final    Creatinine 1 21  0 60 - 1 30 mg/dL Final    Comment: Standardized to IDMS reference method    Glucose 106  65 - 140 mg/dL Final    Comment: If the patient is fasting, the ADA then defines impaired fasting glucose as > 100 mg/dL and diabetes as > or equal to 123 mg/dL  Specimen collection should occur prior to Sulfasalazine administration due to the potential for falsely depressed results  Specimen collection should occur prior to Sulfapyridine administration due to the potential for falsely elevated results      Calcium 8 9  8 3 - 10 1 mg/dL Final    eGFR 99  ml/min/1 73sq m Final    Narrative:     [place] guidelines for Chronic Kidney Disease (CKD):     Stage 1 with normal or high GFR (GFR > 90 mL/min/1 73 square meters)    Stage 2 Mild CKD (GFR = 60-89 mL/min/1 73 square meters)    Stage 3A Moderate CKD (GFR = 45-59 mL/min/1 73 square meters)    Stage 3B Moderate CKD (GFR = 30-44 mL/min/1 73 square meters)    Stage 4 Severe CKD (GFR = 15-29 mL/min/1 73 square meters)    Stage 5 End Stage CKD (GFR <15 mL/min/1 73 square meters)  Note: GFR calculation is accurate only with a steady state creatinine   CBC AND DIFFERENTIAL - Abnormal    WBC 9 04  4 31 - 10 16 Thousand/uL Final    RBC 5 03  3 88 - 5 62 Million/uL Final    Hemoglobin 16 5  12 0 - 17 0 g/dL Final    Hematocrit 48 2  36 5 - 49 3 % Final    MCV 96  82 - 98 fL Final    MCH 32 8  26 8 - 34 3 pg Final    MCHC 34 2  31 4 - 37 4 g/dL Final    RDW 13 9  11 6 - 15 1 % Final    MPV 10 9  8 9 - 12 7 fL Final    Platelets 267  952 - 390 Thousands/uL Final    nRBC 0  /100 WBCs Final    Neutrophils Relative 51  43 - 75 % Final    Immat GRANS % 1  0 - 2 % Final    Lymphocytes Relative 38  14 - 44 % Final    Monocytes Relative 8  4 - 12 % Final    Eosinophils Relative 1  0 - 6 % Final    Basophils Relative 1  0 - 1 % Final    Neutrophils Absolute 4 60  1 85 - 7 62 Thousands/µL Final    Immature Grans Absolute 0 11  0 00 - 0 20 Thousand/uL Final    Lymphocytes Absolute 3 39  0 60 - 4 47 Thousands/µL Final    Monocytes Absolute 0 75  0 17 - 1 22 Thousand/µL Final    Eosinophils Absolute 0 08  0 00 - 0 61 Thousand/µL Final    Basophils Absolute 0 11 (*) 0 00 - 0 10 Thousands/µL Final   POCT GLUCOSE - Normal    POC Glucose 114  65 - 140 mg/dl Final       On reassessment the patient seemed to be little more alert but still had evidence of clinical intoxication, still somewhat unsteady  Initial workup is unremarkable, suspect his symptomatology is based on his alcohol use    Plan is to allow the patient to improve clinically in the ED and discharge either with family member or on his own once he is clinically sober, Signed out to Dr Melendez Ast Time  Procedures

## 2021-09-22 ENCOUNTER — HOSPITAL ENCOUNTER (EMERGENCY)
Facility: HOSPITAL | Age: 21
Discharge: HOME | End: 2021-09-22
Attending: EMERGENCY MEDICINE
Payer: COMMERCIAL

## 2021-09-22 VITALS
OXYGEN SATURATION: 98 % | HEART RATE: 90 BPM | WEIGHT: 150 LBS | RESPIRATION RATE: 18 BRPM | TEMPERATURE: 97.8 F | SYSTOLIC BLOOD PRESSURE: 129 MMHG | BODY MASS INDEX: 25.61 KG/M2 | DIASTOLIC BLOOD PRESSURE: 68 MMHG | HEIGHT: 64 IN

## 2021-09-22 DIAGNOSIS — F10.929 ALCOHOL INTOXICATION (HCC): Primary | ICD-10-CM

## 2021-09-22 LAB — ETHANOL EXG-MCNC: 0.12 MG/DL

## 2021-09-22 PROCEDURE — 99282 EMERGENCY DEPT VISIT SF MDM: CPT | Performed by: EMERGENCY MEDICINE

## 2021-09-22 PROCEDURE — 99284 EMERGENCY DEPT VISIT MOD MDM: CPT

## 2021-09-22 PROCEDURE — 82075 ASSAY OF BREATH ETHANOL: CPT

## 2021-09-22 NOTE — DISCHARGE INSTRUCTIONS
You were seen for alcohol intoxication  You were medically cleared to go home  You stop cut back on your drinking as it can lead to multiple medical problems

## 2021-09-22 NOTE — ED ATTENDING ATTESTATION
9/22/2021  IBill MD, saw and evaluated the patient  I have discussed the patient with the resident/non-physician practitioner and agree with the resident's/non-physician practitioner's findings, Plan of Care, and MDM as documented in the resident's/non-physician practitioner's note, except where noted  All available labs and Radiology studies were reviewed  I was present for key portions of any procedure(s) performed by the resident/non-physician practitioner and I was immediately available to provide assistance  At this point I agree with the current assessment done in the Emergency Department  I have conducted an independent evaluation of this patient a history and physical is as follows:    ED Course     Emergency Department Note- Gokul Olson 22 y o  male MRN: [id number]    Unit/Bed#: [facility] Encounter: [id number]    Jim De Luna is a 22 y o  male who presents with   Chief Complaint   Patient presents with    Alcohol Intoxication     pt brought in by EMS pt reports being intoxicated and found with vomit and urine on himself         History of Present Illness   HPI:  Jim De Luna is a 22 y o  male who presents for evaluation of:  Brought in for evaluation after found publicly intoxicated vomiting  Patient denies complaints in the emergency department  He Notes he was drinking earlier in the evening  He is unable to provide further history secondary to acute alcohol intoxication      Review of Systems   Unable to perform ROS: Mental status change (Secondary to acute alcohol intoxication)       Historical Information   Past Medical History:   Diagnosis Date    Asthma     Hearing impaired     Legally blind     Schizophrenia ([facility] [address] )      Past Surgical History:   Procedure Laterality Date    HERNIA REPAIR      TEAR DUCT SURGERY Bilateral     TONSILLECTOMY       Social History   Social History     Substance and Sexual Activity   Alcohol Use Never     Social History     Substance and Sexual Activity   Drug Use Never     Social History     Tobacco Use   Smoking Status Current Every Day Smoker    Packs/day: 0 20    Types: Cigarettes   Smokeless Tobacco Never Used     Family History:   Family History   Problem Relation Age of Onset    Schizophrenia Mother     Mental illness Mother     Abnormal EKG Sister     Mental illness Sister        Meds/Allergies   PTA meds:   Prior to Admission Medications   Prescriptions Last Dose Informant Patient Reported? Taking?   paliperidone palmitate (Devi Beckwith) 234 MG/1 5ML im injection   Yes No   Sig: Inject 234 mg into a muscle every 28 days      Facility-Administered Medications: None     No Known Allergies    Objective   First Vitals:   Blood Pressure: 143/75 (09/22/21 0051)  Pulse: 92 (09/22/21 0051)  Temperature: 97 8 °F (36 6 °C) (09/22/21 0051)  Temp Source: Oral (09/22/21 0051)  Respirations: 18 (09/22/21 0051)  Height: 5' 4" (162 6 cm) (09/22/21 0051)  Weight - Scale: 68 kg (150 lb) (09/22/21 0051)  SpO2: 94 % (09/22/21 0051)    Current Vitals:   Blood Pressure: 143/75 (09/22/21 0051)  Pulse: 92 (09/22/21 0051)  Temperature: 97 8 °F (36 6 °C) (09/22/21 0051)  Temp Source: Oral (09/22/21 0051)  Respirations: 18 (09/22/21 0051)  Height: 5' 4" (162 6 cm) (09/22/21 0051)  Weight - Scale: 68 kg (150 lb) (09/22/21 0051)  SpO2: 94 % (09/22/21 0051)    No intake or output data in the 24 hours ending 09/22/21 0322    Invasive Devices     None                 Physical Exam  Vitals and nursing note reviewed  Constitutional:       General: He is sleeping  Appearance: He is obese  HENT:      Head: Normocephalic and atraumatic  Right Ear: External ear normal       Left Ear: External ear normal       Nose: Nose normal       Mouth/Throat:      Mouth: Mucous membranes are moist       Pharynx: Oropharynx is clear  Eyes:      Conjunctiva/sclera: Conjunctivae normal       Pupils: Pupils are equal, round, and reactive to light     Cardiovascular:      Rate and Rhythm: Normal rate and regular rhythm  Pulmonary:      Effort: Pulmonary effort is normal  No respiratory distress  Abdominal:      General: Bowel sounds are normal       Palpations: Abdomen is soft  Musculoskeletal:         General: No tenderness  Normal range of motion  Skin:     General: Skin is warm  Capillary Refill: Capillary refill takes less than 2 seconds  Neurological:      General: No focal deficit present  Mental Status: He is easily aroused  Motor: No weakness  Coordination: Coordination normal    Psychiatric:      Comments: Decision making capacity, thought content, and judgment are impaired secondary to alcohol intoxication           Medical Decision Makin  Acute alcohol intoxication:  Observe until sober  No results found for this or any previous visit (from the past 36 hour(s))  No orders to display         Portions of the record may have been created with voice recognition software  Occasional wrong word or "sound a like" substitutions may have occurred due to the inherent limitations of voice recognition software  Read the chart carefully and recognize, using context, where substitutions have occurred          Critical Care Time  Procedures

## 2021-09-22 NOTE — ED PROVIDER NOTES
HPI:    Patient ID: Veronia Gowers is a 78year old female presents for a gynecological exam, Pap smear. HPI  Patient reports that she has not had a Pap smear in several years, she is menopausal for more than 15 years. Last mammogram 4 years ago.   Need History  Chief Complaint   Patient presents with    Alcohol Intoxication     pt brought in by EMS pt reports being intoxicated and found with vomit and urine on himself     HPI  81-year-old male presents via EMS for alcohol intoxication  Patient was drunk in public and vomited on himself was brought in the emergency department  Patient denies any pain, nausea, vomiting diarrhea abdominal pain headache, back pain, chest pain, shortness of breath, fever, chills  Patient wishes to sleep  Prior to Admission Medications   Prescriptions Last Dose Informant Patient Reported? Taking?   paliperidone palmitate (Trip Tee) 234 MG/1 5ML im injection   Yes No   Sig: Inject 234 mg into a muscle every 28 days      Facility-Administered Medications: None       Past Medical History:   Diagnosis Date    Asthma     Hearing impaired     Legally blind     Schizophrenia ([facility] [address] )        Past Surgical History:   Procedure Laterality Date    HERNIA REPAIR      TEAR DUCT SURGERY Bilateral     TONSILLECTOMY         Family History   Problem Relation Age of Onset    Schizophrenia Mother     Mental illness Mother     Abnormal EKG Sister     Mental illness Sister      I have reviewed and agree with the history as documented  E-Cigarette/Vaping     E-Cigarette/Vaping Substances     Social History     Tobacco Use    Smoking status: Current Every Day Smoker     Packs/day: 0 20     Types: Cigarettes    Smokeless tobacco: Never Used   Substance Use Topics    Alcohol use: Never    Drug use: Never        Review of Systems   Constitutional: Negative for chills and fever  HENT: Negative for congestion, ear pain, rhinorrhea and sore throat  Eyes: Negative for pain  Respiratory: Negative for apnea, cough, choking, chest tightness, shortness of breath, wheezing and stridor  Cardiovascular: Negative for chest pain, palpitations and leg swelling     Gastrointestinal: Negative for abdominal pain, constipation, diarrhea, nausea and vomiting  Genitourinary: Negative for hematuria  Musculoskeletal: Negative for arthralgias and back pain  Skin: Negative for rash and wound  Neurological: Negative for dizziness  Psychiatric/Behavioral: Negative for agitation and hallucinations  All other systems reviewed and are negative  Physical Exam  ED Triage Vitals [09/22/21 0051]   Temperature Pulse Respirations Blood Pressure SpO2   97 8 °F (36 6 °C) 92 18 143/75 94 %      Temp Source Heart Rate Source Patient Position - Orthostatic VS BP Location FiO2 (%)   Oral Monitor Lying Right arm --      Pain Score       --             Orthostatic Vital Signs  Vitals:    09/22/21 0051 09/22/21 0931 09/22/21 1117   BP: 143/75 115/61 129/68   Pulse: 92 (!) 108 90   Patient Position - Orthostatic VS: Lying Lying Lying       Physical Exam  Vitals reviewed  Constitutional:       General: He is not in acute distress  Appearance: He is well-developed  Comments: Somnolent inebriated   HENT:      Head: Normocephalic and atraumatic  Right Ear: External ear normal       Left Ear: External ear normal       Nose: Nose normal       Mouth/Throat:      Mouth: Mucous membranes are moist    Eyes:      Extraocular Movements: Extraocular movements intact  Conjunctiva/sclera: Conjunctivae normal       Pupils: Pupils are equal, round, and reactive to light  Cardiovascular:      Rate and Rhythm: Normal rate and regular rhythm  Heart sounds: Normal heart sounds  Pulmonary:      Effort: Pulmonary effort is normal  No respiratory distress  Breath sounds: Normal breath sounds  No wheezing or rales  Abdominal:      Palpations: Abdomen is soft  Tenderness: There is no abdominal tenderness  Musculoskeletal:         General: Normal range of motion  Cervical back: Normal range of motion and neck supple  No rigidity  Skin:     General: Skin is warm  Neurological:      General: No focal deficit present        Mental Status: He is and well-nourished. No distress. Eyes: EOM are normal. Pupils are equal, round, and reactive to light. No scleral icterus. Neck: Normal range of motion. Neck supple. No JVD present. No thyromegaly present.    Cardiovascular: Normal rate, regular rhythm oriented to person, place, and time  ED Medications  Medications - No data to display    Diagnostic Studies  Results Reviewed     Procedure Component Value Units Date/Time    POCT alcohol breath test [[id number]]  (Normal) Resulted: 09/22/21 1152    Lab Status: Final result Updated: 09/22/21 1152     EXTBreath Alcohol 0 120                 No orders to display         Procedures  Procedures      ED Course                                       MDM  Number of Diagnoses or Management Options  81-year-old male intoxicated with alcohol  Patient does not have any complaints and is sleeping comfortably with an intact airway in the emergency department  Patient is signed out today team with plan to discharge once clinically sober  Disposition  Final diagnoses:   Alcohol intoxication ([address] )     Time reflects when diagnosis was documented in both MDM as applicable and the Disposition within this note     Time User Action Codes Description Comment    9/22/2021  7:59 AM Blanca Spring Add [F10 929] Alcohol intoxication [facility])       ED Disposition     ED Disposition Condition Date/Time Comment    Discharge Stable Wed Sep 22, 2021  7:59 AM Gokul Abrams discharge to home/self care  Follow-up Information     Follow up With Specialties Details Why Anselmo Martínez MD Internal Medicine Schedule an appointment as soon as possible for a visit in 1 week  [address]  [phone]            Discharge Medication List as of 9/22/2021  9:24 AM      CONTINUE these medications which have NOT CHANGED    Details   paliperidone palmitate (INVEGA SUSTENNA) 234 MG/1 5ML im injection Inject 234 mg into a muscle every 28 days, Historical Med           No discharge procedures on file  PDMP Review     None           ED Provider  Attending physically available and evaluated Princess Richardson I managed the patient along with the ED Attending      Electronically Signed by         Louise Sequeira [address], DO  09/23/21 9586 [facility], DO  09/23/21 2261

## 2021-09-29 ENCOUNTER — APPOINTMENT (OUTPATIENT)
Dept: LAB | Facility: HOSPITAL | Age: 21
End: 2021-09-29
Payer: COMMERCIAL

## 2021-09-29 DIAGNOSIS — B18.2 HEPATITIS C CARRIER (HCC): ICD-10-CM

## 2021-09-29 DIAGNOSIS — F25.9 SCHIZOAFFECTIVE DISORDER, UNSPECIFIED TYPE (HCC): ICD-10-CM

## 2021-09-29 DIAGNOSIS — E66.9 LIFELONG OBESITY: ICD-10-CM

## 2021-09-29 DIAGNOSIS — K59.00 CONSTIPATION, UNSPECIFIED CONSTIPATION TYPE: ICD-10-CM

## 2021-09-29 LAB
ALBUMIN SERPL BCP-MCNC: 3.3 G/DL (ref 3.5–5)
ALP SERPL-CCNC: 74 U/L (ref 46–116)
ALT SERPL W P-5'-P-CCNC: 39 U/L (ref 12–78)
ANION GAP SERPL CALCULATED.3IONS-SCNC: 3 MMOL/L (ref 4–13)
AST SERPL W P-5'-P-CCNC: 40 U/L (ref 5–45)
BASOPHILS # BLD AUTO: 0.11 THOUSANDS/ΜL (ref 0–0.1)
BASOPHILS NFR BLD AUTO: 1 % (ref 0–1)
BILIRUB SERPL-MCNC: 0.49 MG/DL (ref 0.2–1)
BUN SERPL-MCNC: 11 MG/DL (ref 5–25)
CALCIUM ALBUM COR SERPL-MCNC: 9.6 MG/DL (ref 8.3–10.1)
CALCIUM SERPL-MCNC: 9 MG/DL (ref 8.3–10.1)
CHLORIDE SERPL-SCNC: 109 MMOL/L (ref 100–108)
CHOLEST SERPL-MCNC: 191 MG/DL (ref 50–200)
CO2 SERPL-SCNC: 27 MMOL/L (ref 21–32)
CREAT SERPL-MCNC: 1.22 MG/DL (ref 0.6–1.3)
EOSINOPHIL # BLD AUTO: 0.27 THOUSAND/ΜL (ref 0–0.61)
EOSINOPHIL NFR BLD AUTO: 3 % (ref 0–6)
ERYTHROCYTE [DISTWIDTH] IN BLOOD BY AUTOMATED COUNT: 13.9 % (ref 11.6–15.1)
EST. AVERAGE GLUCOSE BLD GHB EST-MCNC: 105 MG/DL
GFR SERPL CREATININE-BSD FRML MDRD: 98 ML/MIN/1.73SQ M
GLUCOSE P FAST SERPL-MCNC: 95 MG/DL (ref 65–99)
HBA1C MFR BLD: 5.3 %
HCT VFR BLD AUTO: 49.4 % (ref 36.5–49.3)
HDLC SERPL-MCNC: 50 MG/DL
HGB BLD-MCNC: 16.2 G/DL (ref 12–17)
IMM GRANULOCYTES # BLD AUTO: 0.11 THOUSAND/UL (ref 0–0.2)
IMM GRANULOCYTES NFR BLD AUTO: 1 % (ref 0–2)
LDLC SERPL CALC-MCNC: 115 MG/DL (ref 0–100)
LYMPHOCYTES # BLD AUTO: 3.33 THOUSANDS/ΜL (ref 0.6–4.47)
LYMPHOCYTES NFR BLD AUTO: 37 % (ref 14–44)
MCH RBC QN AUTO: 32.2 PG (ref 26.8–34.3)
MCHC RBC AUTO-ENTMCNC: 32.8 G/DL (ref 31.4–37.4)
MCV RBC AUTO: 98 FL (ref 82–98)
MONOCYTES # BLD AUTO: 0.89 THOUSAND/ΜL (ref 0.17–1.22)
MONOCYTES NFR BLD AUTO: 10 % (ref 4–12)
NEUTROPHILS # BLD AUTO: 4.4 THOUSANDS/ΜL (ref 1.85–7.62)
NEUTS SEG NFR BLD AUTO: 48 % (ref 43–75)
NONHDLC SERPL-MCNC: 141 MG/DL
NRBC BLD AUTO-RTO: 0 /100 WBCS
PLATELET # BLD AUTO: 235 THOUSANDS/UL (ref 149–390)
PMV BLD AUTO: 11.5 FL (ref 8.9–12.7)
POTASSIUM SERPL-SCNC: 4.1 MMOL/L (ref 3.5–5.3)
PROT SERPL-MCNC: 7.5 G/DL (ref 6.4–8.2)
RBC # BLD AUTO: 5.03 MILLION/UL (ref 3.88–5.62)
SODIUM SERPL-SCNC: 139 MMOL/L (ref 136–145)
T4 SERPL-MCNC: 9.3 UG/DL (ref 4.7–13.3)
TRIGL SERPL-MCNC: 128 MG/DL
TSH SERPL DL<=0.05 MIU/L-ACNC: 1.34 UIU/ML (ref 0.46–3.98)
WBC # BLD AUTO: 9.11 THOUSAND/UL (ref 4.31–10.16)

## 2021-09-29 PROCEDURE — 80061 LIPID PANEL: CPT

## 2021-09-29 PROCEDURE — 36415 COLL VENOUS BLD VENIPUNCTURE: CPT

## 2021-09-29 PROCEDURE — 85025 COMPLETE CBC W/AUTO DIFF WBC: CPT

## 2021-09-29 PROCEDURE — 84443 ASSAY THYROID STIM HORMONE: CPT

## 2021-09-29 PROCEDURE — 83036 HEMOGLOBIN GLYCOSYLATED A1C: CPT

## 2021-09-29 PROCEDURE — 84436 ASSAY OF TOTAL THYROXINE: CPT

## 2021-09-29 PROCEDURE — 80053 COMPREHEN METABOLIC PANEL: CPT

## 2021-10-01 ENCOUNTER — APPOINTMENT (OUTPATIENT)
Dept: LAB | Facility: HOSPITAL | Age: 21
End: 2021-10-01
Payer: COMMERCIAL

## 2021-10-12 ENCOUNTER — HOSPITAL ENCOUNTER (EMERGENCY)
Facility: HOSPITAL | Age: 21
Discharge: HOME | End: 2021-10-12
Attending: EMERGENCY MEDICINE
Payer: COMMERCIAL

## 2021-10-12 VITALS
TEMPERATURE: 98 F | SYSTOLIC BLOOD PRESSURE: 125 MMHG | HEART RATE: 125 BPM | DIASTOLIC BLOOD PRESSURE: 70 MMHG | OXYGEN SATURATION: 99 % | RESPIRATION RATE: 18 BRPM

## 2021-10-12 DIAGNOSIS — F10.929 ALCOHOL INTOXICATION (HCC): Primary | ICD-10-CM

## 2021-10-12 PROCEDURE — 96361 HYDRATE IV INFUSION ADD-ON: CPT

## 2021-10-12 PROCEDURE — 96374 THER/PROPH/DIAG INJ IV PUSH: CPT

## 2021-10-12 PROCEDURE — 99284 EMERGENCY DEPT VISIT MOD MDM: CPT

## 2021-10-12 PROCEDURE — 99284 EMERGENCY DEPT VISIT MOD MDM: CPT | Performed by: EMERGENCY MEDICINE

## 2021-10-12 RX ORDER — ONDANSETRON 2 MG/ML
4 INJECTION INTRAMUSCULAR; INTRAVENOUS ONCE
Status: COMPLETED | OUTPATIENT
Start: 2021-10-12 | End: 2021-10-12

## 2021-10-12 RX ADMIN — ONDANSETRON 4 MG: 2 INJECTION INTRAMUSCULAR; INTRAVENOUS at 19:45

## 2021-10-12 RX ADMIN — SODIUM CHLORIDE 1000 ML: 0.9 INJECTION, SOLUTION INTRAVENOUS at 19:45

## 2021-10-21 ENCOUNTER — HOSPITAL ENCOUNTER (EMERGENCY)
Facility: HOSPITAL | Age: 21
Discharge: HOME | End: 2021-10-21
Attending: EMERGENCY MEDICINE | Admitting: EMERGENCY MEDICINE
Payer: COMMERCIAL

## 2021-10-21 VITALS
HEART RATE: 97 BPM | RESPIRATION RATE: 20 BRPM | SYSTOLIC BLOOD PRESSURE: 135 MMHG | TEMPERATURE: 97.9 F | OXYGEN SATURATION: 100 % | DIASTOLIC BLOOD PRESSURE: 92 MMHG

## 2021-10-21 DIAGNOSIS — F19.10 POLYSUBSTANCE ABUSE (HCC): Primary | ICD-10-CM

## 2021-10-21 LAB
AMPHETAMINES SERPL QL SCN: NEGATIVE
BARBITURATES UR QL: NEGATIVE
BENZODIAZ UR QL: NEGATIVE
COCAINE UR QL: NEGATIVE
FLUAV RNA RESP QL NAA+PROBE: NEGATIVE
FLUBV RNA RESP QL NAA+PROBE: NEGATIVE
METHADONE UR QL: NEGATIVE
OPIATES UR QL SCN: NEGATIVE
OXYCODONE+OXYMORPHONE UR QL SCN: POSITIVE
PCP UR QL: NEGATIVE
RSV RNA RESP QL NAA+PROBE: NEGATIVE
SARS-COV-2 RNA RESP QL NAA+PROBE: NEGATIVE
THC UR QL: NEGATIVE

## 2021-10-21 PROCEDURE — 99284 EMERGENCY DEPT VISIT MOD MDM: CPT

## 2021-10-21 PROCEDURE — 80307 DRUG TEST PRSMV CHEM ANLYZR: CPT

## 2021-10-21 PROCEDURE — 0241U HB NFCT DS VIR RESP RNA 4 TRGT: CPT

## 2021-10-21 PROCEDURE — 99282 EMERGENCY DEPT VISIT SF MDM: CPT | Performed by: EMERGENCY MEDICINE

## 2021-11-14 ENCOUNTER — HOSPITAL ENCOUNTER (EMERGENCY)
Facility: HOSPITAL | Age: 21
Discharge: HOME | End: 2021-11-14
Attending: EMERGENCY MEDICINE
Payer: COMMERCIAL

## 2021-11-14 VITALS
TEMPERATURE: 98.8 F | HEART RATE: 99 BPM | OXYGEN SATURATION: 95 % | DIASTOLIC BLOOD PRESSURE: 61 MMHG | SYSTOLIC BLOOD PRESSURE: 133 MMHG | RESPIRATION RATE: 18 BRPM

## 2021-11-14 DIAGNOSIS — F10.929 ALCOHOL INTOXICATION (HCC): Primary | ICD-10-CM

## 2021-11-14 PROCEDURE — 99284 EMERGENCY DEPT VISIT MOD MDM: CPT

## 2021-11-14 PROCEDURE — 99282 EMERGENCY DEPT VISIT SF MDM: CPT | Performed by: EMERGENCY MEDICINE

## 2021-11-15 PROCEDURE — 99284 EMERGENCY DEPT VISIT MOD MDM: CPT

## 2021-11-16 ENCOUNTER — HOSPITAL ENCOUNTER (EMERGENCY)
Facility: HOSPITAL | Age: 21
Discharge: HOME | End: 2021-11-16
Attending: EMERGENCY MEDICINE | Admitting: EMERGENCY MEDICINE
Payer: COMMERCIAL

## 2021-11-16 VITALS
OXYGEN SATURATION: 94 % | WEIGHT: 150 LBS | HEIGHT: 64 IN | HEART RATE: 107 BPM | TEMPERATURE: 97.4 F | BODY MASS INDEX: 25.61 KG/M2 | RESPIRATION RATE: 18 BRPM | SYSTOLIC BLOOD PRESSURE: 121 MMHG | DIASTOLIC BLOOD PRESSURE: 53 MMHG

## 2021-11-16 DIAGNOSIS — R45.850 HOMICIDAL IDEATION: ICD-10-CM

## 2021-11-16 DIAGNOSIS — R44.1 VISUAL HALLUCINATIONS: ICD-10-CM

## 2021-11-16 DIAGNOSIS — R44.0 AUDITORY HALLUCINATIONS: Primary | ICD-10-CM

## 2021-11-16 LAB
AMPHETAMINES SERPL QL SCN: NEGATIVE
BARBITURATES UR QL: NEGATIVE
BENZODIAZ UR QL: NEGATIVE
COCAINE UR QL: NEGATIVE
ETHANOL EXG-MCNC: 0.05 MG/DL
FLUAV RNA RESP QL NAA+PROBE: NEGATIVE
FLUBV RNA RESP QL NAA+PROBE: NEGATIVE
METHADONE UR QL: NEGATIVE
OPIATES UR QL SCN: NEGATIVE
OXYCODONE+OXYMORPHONE UR QL SCN: NEGATIVE
PCP UR QL: NEGATIVE
RSV RNA RESP QL NAA+PROBE: NEGATIVE
SARS-COV-2 RNA RESP QL NAA+PROBE: NEGATIVE
THC UR QL: NEGATIVE

## 2021-11-16 PROCEDURE — 80307 DRUG TEST PRSMV CHEM ANLYZR: CPT | Performed by: EMERGENCY MEDICINE

## 2021-11-16 PROCEDURE — 99285 EMERGENCY DEPT VISIT HI MDM: CPT

## 2021-11-16 PROCEDURE — 0241U HB NFCT DS VIR RESP RNA 4 TRGT: CPT | Performed by: EMERGENCY MEDICINE

## 2021-11-16 PROCEDURE — 99285 EMERGENCY DEPT VISIT HI MDM: CPT | Performed by: EMERGENCY MEDICINE

## 2021-11-16 PROCEDURE — 82075 ASSAY OF BREATH ETHANOL: CPT | Performed by: EMERGENCY MEDICINE

## 2021-11-16 PROCEDURE — 99244 OFF/OP CNSLTJ NEW/EST MOD 40: CPT | Performed by: PSYCHIATRY & NEUROLOGY

## 2021-11-17 ENCOUNTER — HOSPITAL ENCOUNTER (EMERGENCY)
Facility: HOSPITAL | Age: 21
Discharge: NURSING, REHAB OR LONG-TERM CARE | End: 2021-11-18
Attending: EMERGENCY MEDICINE
Payer: COMMERCIAL

## 2021-11-17 DIAGNOSIS — F20.89 OTHER SCHIZOPHRENIA (HCC): Primary | ICD-10-CM

## 2021-11-17 LAB
AMPHETAMINES SERPL QL SCN: NEGATIVE
BARBITURATES UR QL: NEGATIVE
BENZODIAZ UR QL: NEGATIVE
COCAINE UR QL: NEGATIVE
ETHANOL EXG-MCNC: 0 MG/DL
FLUAV RNA RESP QL NAA+PROBE: NEGATIVE
FLUBV RNA RESP QL NAA+PROBE: NEGATIVE
METHADONE UR QL: NEGATIVE
OPIATES UR QL SCN: NEGATIVE
OXYCODONE+OXYMORPHONE UR QL SCN: NEGATIVE
PCP UR QL: NEGATIVE
RSV RNA RESP QL NAA+PROBE: NEGATIVE
SARS-COV-2 RNA RESP QL NAA+PROBE: NEGATIVE
THC UR QL: NEGATIVE

## 2021-11-17 PROCEDURE — 80307 DRUG TEST PRSMV CHEM ANLYZR: CPT | Performed by: EMERGENCY MEDICINE

## 2021-11-17 PROCEDURE — 82075 ASSAY OF BREATH ETHANOL: CPT | Performed by: EMERGENCY MEDICINE

## 2021-11-17 PROCEDURE — 0241U HB NFCT DS VIR RESP RNA 4 TRGT: CPT | Performed by: EMERGENCY MEDICINE

## 2021-11-17 PROCEDURE — 99285 EMERGENCY DEPT VISIT HI MDM: CPT | Performed by: EMERGENCY MEDICINE

## 2021-11-18 VITALS
RESPIRATION RATE: 18 BRPM | DIASTOLIC BLOOD PRESSURE: 60 MMHG | OXYGEN SATURATION: 97 % | SYSTOLIC BLOOD PRESSURE: 134 MMHG | HEART RATE: 87 BPM | TEMPERATURE: 97.8 F

## 2021-11-18 PROCEDURE — 99244 OFF/OP CNSLTJ NEW/EST MOD 40: CPT | Performed by: PSYCHIATRY & NEUROLOGY

## 2022-01-03 ENCOUNTER — HOSPITAL ENCOUNTER (EMERGENCY)
Facility: HOSPITAL | Age: 22
Discharge: HOME | End: 2022-01-04
Attending: EMERGENCY MEDICINE
Payer: COMMERCIAL

## 2022-01-03 VITALS
TEMPERATURE: 98.7 F | RESPIRATION RATE: 16 BRPM | OXYGEN SATURATION: 97 % | DIASTOLIC BLOOD PRESSURE: 92 MMHG | HEIGHT: 64 IN | HEART RATE: 98 BPM | SYSTOLIC BLOOD PRESSURE: 173 MMHG | WEIGHT: 150 LBS | BODY MASS INDEX: 25.61 KG/M2

## 2022-01-03 DIAGNOSIS — F20.9 SCHIZOPHRENIA (HCC): ICD-10-CM

## 2022-01-03 DIAGNOSIS — Z11.52 ENCOUNTER FOR SCREENING FOR COVID-19: Primary | ICD-10-CM

## 2022-01-03 PROCEDURE — 0241U HB NFCT DS VIR RESP RNA 4 TRGT: CPT | Performed by: EMERGENCY MEDICINE

## 2022-01-03 PROCEDURE — 99282 EMERGENCY DEPT VISIT SF MDM: CPT

## 2022-01-03 PROCEDURE — 99282 EMERGENCY DEPT VISIT SF MDM: CPT | Performed by: EMERGENCY MEDICINE

## 2022-01-03 NOTE — Clinical Note
Edna Yancey was seen and treated in our emergency department on 1/3/2022  Diagnosis:     Gokul    He may return on this date: If you have any questions or concerns, please don't hesitate to call        Christella Skiff, MD    ______________________________           _______________          _______________  Hospital Representative                              Date                                Time

## 2022-01-04 LAB
FLUAV RNA RESP QL NAA+PROBE: NEGATIVE
FLUBV RNA RESP QL NAA+PROBE: NEGATIVE
RSV RNA RESP QL NAA+PROBE: NEGATIVE
SARS-COV-2 RNA RESP QL NAA+PROBE: NEGATIVE

## 2022-01-04 NOTE — ED ATTENDING ATTESTATION
1/3/2022  IStanislav MD, saw and evaluated the patient  I have discussed the patient with the resident/non-physician practitioner and agree with the resident's/non-physician practitioner's findings, Plan of Care, and MDM as documented in the resident's/non-physician practitioner's note, except where noted  All available labs and Radiology studies were reviewed  I was present for key portions of any procedure(s) performed by the resident/non-physician practitioner and I was immediately available to provide assistance  At this point I agree with the current assessment done in the Emergency Department  I have conducted an independent evaluation of this patient a history and physical is as follows:    ED Course     Emergency Department Note- Gokul Womack 22 y o  male MRN: [id number]    Unit/Bed#: RW 03 Encounter: [id number]    Valentin Vincent is a 22 y o  male who presents with   Chief Complaint   Patient presents with    Homeless     Pt reports they were kicked out of his grandmoms house   Labs Only     Pt c o diarrhea and would like to be tested for COVID         History of Present Illness   HPI:  Valentin Vincent is a 22 y o  male who presents for evaluation of:  Diarrhea, COVID contact, and homeless after being kicked out of his grandmother's house  The patient states that he was kicked out of his grandmother's house earlier this evening  He feels that he was exposed to people with COVID in his home environment  He has been having some diarrhea; he has had 3 episodes today  There has been no hematochezia  He denies any associated nausea and vomiting  The patient was drinking alcohol earlier this evening  He denies any thoughts of self-harm or harming others  He denies other ingestions to harm himself  Review of Systems   Constitutional: Negative for chills and fever  HENT: Negative for congestion and rhinorrhea  Respiratory: Negative for cough and shortness of breath  Cardiovascular: Negative for chest pain and palpitations  Gastrointestinal: Negative for nausea and vomiting  Psychiatric/Behavioral: Negative for confusion and hallucinations  All other systems reviewed and are negative  Historical Information   Past Medical History:   Diagnosis Date    Asthma     Hearing impaired     Legally blind     Schizophrenia ([facility] [address]      Past Surgical History:   Procedure Laterality Date    HERNIA REPAIR      TEAR DUCT SURGERY Bilateral     TONSILLECTOMY       Social History   Social History     Substance and Sexual Activity   Alcohol Use Yes     Social History     Substance and Sexual Activity   Drug Use Yes    Types: Cocaine, Marijuana, Methamphetamines     Social History     Tobacco Use   Smoking Status Current Every Day Smoker    Packs/day: 0 20    Types: Cigarettes   Smokeless Tobacco Never Used     Family History:   Family History   Problem Relation Age of Onset    Schizophrenia Mother     Mental illness Mother     Abnormal EKG Sister     Mental illness Sister        Meds/Allergies   PTA meds:   Prior to Admission Medications   Prescriptions Last Dose Informant Patient Reported?  Taking?   paliperidone palmitate (Mardesamantha Fabián) 234 MG/1 5ML im injection   Yes No   Sig: Inject 234 mg into a muscle every 28 days      Facility-Administered Medications: None     No Known Allergies    Objective   First Vitals:   Blood Pressure: (!) 173/92 (01/03/22 2212)  Pulse: 98 (01/03/22 2212)  Temperature: 98 7 °F (37 1 °C) (01/03/22 2212)  Respirations: 16 (01/03/22 2212)  Height: 5' 4" (162 6 cm) (01/03/22 2212)  Weight - Scale: 68 kg (150 lb) (01/03/22 2212)  SpO2: 97 % (01/03/22 2212)    Current Vitals:   Blood Pressure: (!) 173/92 (01/03/22 2212)  Pulse: 98 (01/03/22 2212)  Temperature: 98 7 °F (37 1 °C) (01/03/22 2212)  Respirations: 16 (01/03/22 2212)  Height: 5' 4" (162 6 cm) (01/03/22 2212)  Weight - Scale: 68 kg (150 lb) (01/03/22 2212)  SpO2: 97 % (01/03/22 2212)    No intake or output data in the 24 hours ending 22 0137    Invasive Devices  Report    None                 Physical Exam  Vitals and nursing note reviewed  Constitutional:       General: He is not in acute distress  Appearance: Normal appearance  He is well-developed  HENT:      Head: Normocephalic and atraumatic  Right Ear: External ear normal       Left Ear: External ear normal       Nose: Nose normal       Mouth/Throat:      Pharynx: No oropharyngeal exudate  Eyes:      Conjunctiva/sclera: Conjunctivae normal       Pupils: Pupils are equal, round, and reactive to light  Cardiovascular:      Rate and Rhythm: Normal rate and regular rhythm  Pulmonary:      Effort: Pulmonary effort is normal  No respiratory distress  Abdominal:      General: Abdomen is flat  There is no distension  Musculoskeletal:         General: No deformity  Normal range of motion  Cervical back: Normal range of motion and neck supple  Skin:     General: Skin is warm and dry  Capillary Refill: Capillary refill takes less than 2 seconds  Neurological:      General: No focal deficit present  Mental Status: He is alert and oriented to person, place, and time  Mental status is at baseline  Coordination: Coordination normal    Psychiatric:         Mood and Affect: Mood normal          Behavior: Behavior normal          Thought Content: Thought content normal          Judgment: Judgment normal            Medical Decision Makin  COVID contact:  Screen for COVID  2  Homeless: And to give information about shelters  No results found for this or any previous visit (from the past 36 hour(s))  No orders to display         Portions of the record may have been created with voice recognition software  Occasional wrong word or "sound a like" substitutions may have occurred due to the inherent limitations of voice recognition software    Read the chart carefully and recognize, using context, where substitutions have occurred          Critical Care Time  Procedures

## 2022-01-04 NOTE — DISCHARGE INSTRUCTIONS
Patient Instructions: Today you were seen in the emergency department for viral symptoms, possibly Covid-19  We examined you and determined that you would be able to be discharged  You should take ibuprofen and tylenol as needed for your fever/pain every 5-6 hours  Make sure you're using the appropriate dose for your weight  You can use honey for cough (spoonful of honey) every few hours  You should follow up with your primary care doctor  Please return to the emergency department if your symptoms get worse, you can no longer eat or drink, you have worsening shortness of breath or you have any other symptoms we discussed today prior to discharge  If you do test positive for Covid-19, you can buy a pulse ox from [facility] or [facility] or local pharmacy and check your oxygen  If you're short of breath and it's <90%, you should return to the ED  Nice to meet you! Best of luck with everything!

## 2022-01-04 NOTE — ED PROVIDER NOTES
HPI: Patient is a 22 y o  male who presents with 2 days of diarrhea and nausea which the patient describes at mild The patient has had contact with people with similar symptoms  The patient has not taken any medication  Patient also initially saying that he is homeless  Discussed this with patient and he says that he is able to go back to his grandmother's house  Patient did endorse drinking some alcohol tonight  He is currently alert and oriented x3  No Known Allergies    Past Medical History:   Diagnosis Date    Asthma     Hearing impaired     Legally blind     Schizophrenia ([facility] [address] )       Past Surgical History:   Procedure Laterality Date    HERNIA REPAIR      TEAR DUCT SURGERY Bilateral     TONSILLECTOMY       Social History     Tobacco Use    Smoking status: Current Every Day Smoker     Packs/day: 0 20     Types: Cigarettes    Smokeless tobacco: Never Used   Substance Use Topics    Alcohol use: Yes    Drug use: Yes     Types: Cocaine, Marijuana, Methamphetamines       Nursing notes reviewed  Physical Exam:  ED Triage Vitals [01/03/22 2212]   Temperature Pulse Respirations Blood Pressure SpO2   98 7 °F (37 1 °C) 98 16 (!) 173/92 97 %      Temp src Heart Rate Source Patient Position - Orthostatic VS BP Location FiO2 (%)   -- Monitor -- -- --      Pain Score       No Pain           ROS: Positive for nausea, diarrhea, the remainder of a 10 organ system ROS was otherwise unremarkable    General: awake, alert, no acute distress    Head: normocephalic, atraumatic    Eyes: no scleral icterus  Ears: external ears normal, hearing grossly intact  Nose: external exam grossly normal, negative nasal discharge  Neck: symmetric, No JVD noted, trachea midline  Pulmonary: no respiratory distress, no tachypnea noted  Cardiovascular: appears well perfused  Abdomen: no distention noted  Musculoskeletal: no deformities noted, tone normal  Neuro: grossly non-focal  Psych: mood and affect appropriate    The patient is stable and has a history and physical exam consistent with a viral illness  COVID19 testing has been performed  I considered the patient's other medical conditions as applicable/noted above in my medical decision making  The patient is stable upon discharge  The plan is for supportive care at home  The patient (and any family present) verbalized understanding of the discharge instructions and warnings that would necessitate return to the Emergency Department  All questions were answered prior to discharge  Medications - No data to display  Final diagnoses:   Encounter for screening for COVID-19   Schizophrenia [facility])     Time reflects when diagnosis was documented in both MDM as applicable and the Disposition within this note     Time User Action Codes Description Comment    1/4/2022 12:00 AM Danyell Luis Add [Z11 52] Encounter for screening for COVID-19     1/4/2022 12:00 AM Minh Palmer Add [F20 9] Schizophrenia [facility])       ED Disposition     ED Disposition Condition Date/Time Comment    Discharge Stable Tue Jan 4, 2022 12:00 AM Gokul Lundy discharge to home/self care  Follow-up Information     Follow up With Specialties Details Why Rhona Downing MD Internal Medicine Call   [address]  [phone]          Discharge Medication List as of 1/4/2022 12:01 AM      CONTINUE these medications which have NOT CHANGED    Details   paliperidone palmitate (INVEGA SUSTENNA) 234 MG/1 5ML im injection Inject 234 mg into a muscle every 28 days, Historical Med           No discharge procedures on file      Electronically Signed by       Arlon Spatz, MD  01/04/22 1796

## 2022-01-21 ENCOUNTER — HOSPITAL ENCOUNTER (EMERGENCY)
Facility: HOSPITAL | Age: 22
End: 2022-01-22
Attending: EMERGENCY MEDICINE | Admitting: EMERGENCY MEDICINE
Payer: COMMERCIAL

## 2022-01-21 DIAGNOSIS — Z72.89 ALCOHOL USE: ICD-10-CM

## 2022-01-21 DIAGNOSIS — F20.9 SCHIZOPHRENIA (HCC): Primary | ICD-10-CM

## 2022-01-21 LAB
AMPHETAMINES SERPL QL SCN: NEGATIVE
BARBITURATES UR QL: NEGATIVE
BENZODIAZ UR QL: NEGATIVE
BILIRUB UR QL STRIP: NEGATIVE
CLARITY UR: CLEAR
COCAINE UR QL: NEGATIVE
COLOR UR: YELLOW
ETHANOL EXG-MCNC: 0.04 MG/DL
FLUAV RNA RESP QL NAA+PROBE: NEGATIVE
FLUBV RNA RESP QL NAA+PROBE: NEGATIVE
GLUCOSE UR STRIP-MCNC: NEGATIVE MG/DL
HGB UR QL STRIP.AUTO: NEGATIVE
KETONES UR STRIP-MCNC: NEGATIVE MG/DL
LEUKOCYTE ESTERASE UR QL STRIP: NEGATIVE
METHADONE UR QL: NEGATIVE
NITRITE UR QL STRIP: NEGATIVE
OPIATES UR QL SCN: NEGATIVE
OXYCODONE+OXYMORPHONE UR QL SCN: NEGATIVE
PCP UR QL: NEGATIVE
PH UR STRIP.AUTO: 6 [PH] (ref 4.5–8)
PROT UR STRIP-MCNC: NEGATIVE MG/DL
RSV RNA RESP QL NAA+PROBE: NEGATIVE
SARS-COV-2 RNA RESP QL NAA+PROBE: NEGATIVE
SP GR UR STRIP.AUTO: 1.01 (ref 1–1.03)
THC UR QL: NEGATIVE
UROBILINOGEN UR QL STRIP.AUTO: 0.2 E.U./DL

## 2022-01-21 PROCEDURE — 82075 ASSAY OF BREATH ETHANOL: CPT

## 2022-01-21 PROCEDURE — 99285 EMERGENCY DEPT VISIT HI MDM: CPT

## 2022-01-21 PROCEDURE — 99285 EMERGENCY DEPT VISIT HI MDM: CPT | Performed by: EMERGENCY MEDICINE

## 2022-01-21 PROCEDURE — 0241U HB NFCT DS VIR RESP RNA 4 TRGT: CPT

## 2022-01-21 PROCEDURE — 81003 URINALYSIS AUTO W/O SCOPE: CPT

## 2022-01-21 PROCEDURE — 80307 DRUG TEST PRSMV CHEM ANLYZR: CPT

## 2022-01-21 RX ADMIN — NICOTINE 7 MG: 7 PATCH, EXTENDED RELEASE TRANSDERMAL at 23:15

## 2022-01-22 VITALS
SYSTOLIC BLOOD PRESSURE: 109 MMHG | TEMPERATURE: 97.9 F | HEART RATE: 99 BPM | DIASTOLIC BLOOD PRESSURE: 64 MMHG | RESPIRATION RATE: 18 BRPM | OXYGEN SATURATION: 99 %

## 2022-01-22 NOTE — ED PROVIDER NOTES
History  Chief Complaint   Patient presents with    Psychiatric Evaluation     "dorys been drinking and i had 3-4 40oz beers and im schizophrenic and drinking makes it worse i want to go to the mental hospital  vocies are telling me to hurt myself and other people but no plan"     Patient is a 22year old male with PMHx asthma, schizophrenia presenting to the ED for psychiatric evaluation  Patient states that he drank 3-4 40oz beers today and when he drinks, he does not take his schizophrenia medication  Per chart review, patient receives Rolene Magalys every 28 days  States he is now hearing voices "telling me to hurt people and myself " No active plan  Patient does not desire to hurt others  He reports he has hurt others in the past when he hears voices  Patient is requesting admission to psych unit  He currently is not suicidal  Patient states he drinks alcohol daily, has never had withdrawal seizures  States he also smoked marijuana today; denies other illicit drug use  Patient states he went to inpatient rehab a few months ago, but states "I never really stopped drinking " States his schizophrenia medications are prescribed by psychiatrist at [facility]  Patient denies any somatic complaints, including chest pain, SOB, congestion, nausea, vomiting, diarrhea, urinary complaints, anxiety, tremors, visual hallucinations, back pain, difficulty ambulating, dizziness, visual changes  Prior to Admission Medications   Prescriptions Last Dose Informant Patient Reported?  Taking?   paliperidone palmitate (Rolene Magalys) 234 MG/1 5ML im injection   Yes No   Sig: Inject 234 mg into a muscle every 28 days      Facility-Administered Medications: None       Past Medical History:   Diagnosis Date    Asthma     Hearing impaired     Legally blind     Schizophrenia ([facility] [address] )        Past Surgical History:   Procedure Laterality Date    HERNIA REPAIR      TEAR DUCT SURGERY Bilateral     TONSILLECTOMY Family History   Problem Relation Age of Onset    Schizophrenia Mother     Mental illness Mother     Abnormal EKG Sister     Mental illness Sister      I have reviewed and agree with the history as documented  E-Cigarette/Vaping     E-Cigarette/Vaping Substances     Social History     Tobacco Use    Smoking status: Current Every Day Smoker     Packs/day: 0 20     Types: Cigarettes    Smokeless tobacco: Never Used   Substance Use Topics    Alcohol use: Yes    Drug use: Yes     Types: Cocaine, Marijuana, Methamphetamines        Review of Systems   Constitutional: Negative for chills and fever  HENT: Negative for congestion, ear pain, postnasal drip and sore throat  Eyes: Negative for pain and visual disturbance  Respiratory: Negative for cough and shortness of breath  Cardiovascular: Negative for chest pain and palpitations  Gastrointestinal: Negative for abdominal pain, diarrhea, nausea and vomiting  Genitourinary: Negative for dysuria, flank pain, frequency and hematuria  Musculoskeletal: Negative for arthralgias and back pain  Skin: Negative for color change and rash  Neurological: Negative for dizziness, seizures, syncope, weakness, light-headedness and headaches  Psychiatric/Behavioral: Positive for hallucinations (auditiory)  Negative for self-injury and suicidal ideas  All other systems reviewed and are negative  Physical Exam  ED Triage Vitals [01/21/22 1929]   Temperature Pulse Respirations Blood Pressure SpO2   97 9 °F (36 6 °C) 105 18 135/86 98 %      Temp Source Heart Rate Source Patient Position - Orthostatic VS BP Location FiO2 (%)   Tympanic Monitor Sitting Left arm --      Pain Score       --             Orthostatic Vital Signs  Vitals:    01/21/22 1929 01/21/22 2328 01/22/22 1042 01/22/22 1235   BP: 135/86 115/56 110/68 109/64   Pulse: 105 90 72 99   Patient Position - Orthostatic VS: Sitting          Physical Exam  Vitals and nursing note reviewed  Constitutional:       General: He is not in acute distress  Appearance: He is well-developed  He is not toxic-appearing  HENT:      Head: Normocephalic and atraumatic  Right Ear: External ear normal       Left Ear: External ear normal       Nose: Nose normal  No congestion  Mouth/Throat:      Mouth: Mucous membranes are moist       Pharynx: Oropharynx is clear  Eyes:      Extraocular Movements: Extraocular movements intact  Conjunctiva/sclera: Conjunctivae normal       Pupils: Pupils are equal, round, and reactive to light  Cardiovascular:      Rate and Rhythm: Regular rhythm  Tachycardia present  Pulses: Normal pulses  Heart sounds: Normal heart sounds  No murmur heard  Pulmonary:      Effort: Pulmonary effort is normal  No respiratory distress  Breath sounds: Normal breath sounds  No wheezing, rhonchi or rales  Abdominal:      General: Abdomen is flat  Bowel sounds are normal       Palpations: Abdomen is soft  Tenderness: There is no abdominal tenderness  Musculoskeletal:         General: No swelling or tenderness  Cervical back: Neck supple  No tenderness  Skin:     General: Skin is warm and dry  Capillary Refill: Capillary refill takes less than 2 seconds  Findings: No erythema or rash  Neurological:      General: No focal deficit present  Mental Status: He is alert and oriented to person, place, and time  Cranial Nerves: No cranial nerve deficit or dysarthria  Sensory: Sensation is intact  Motor: No weakness, tremor or seizure activity  Coordination: Finger-Nose-Finger Test normal       Gait: Gait is intact           ED Medications  Medications - No data to display    Diagnostic Studies  Results Reviewed     Procedure Component Value Units Date/Time    Rapid drug screen, urine [[id number]]  (Normal) Resulted: 01/21/22 2250    Lab Status: Final result Specimen: Urine Updated: 01/21/22 2250     Amph/Meth UR Negative Barbiturate Ur Negative     Benzodiazepine Urine Negative     Cocaine Urine Negative     Methadone Urine Negative     Opiate Urine Negative     PCP Ur Negative     THC Urine Negative     Oxycodone Urine Negative    Narrative:      FOR MEDICAL PURPOSES ONLY  IF CONFIRMATION NEEDED PLEASE CONTACT THE LAB WITHIN 5 DAYS  Drug Screen Cutoff Levels:  AMPHETAMINE/METHAMPHETAMINES  1000 ng/mL  BARBITURATES     200 ng/mL  BENZODIAZEPINES     200 ng/mL  COCAINE      300 ng/mL  METHADONE      300 ng/mL  OPIATES      300 ng/mL  PHENCYCLIDINE     25 ng/mL  THC       50 ng/mL  OXYCODONE      100 ng/mL    COVID/FLU/RSV - 2 hour TAT [[id number]]  (Normal) Collected: 01/21/22 2147    Lab Status: Final result Specimen: Nares from Nose Updated: 01/21/22 2236     SARS-CoV-2 Negative     INFLUENZA A PCR Negative     INFLUENZA B PCR Negative     RSV PCR Negative    Narrative:      FOR PEDIATRIC PATIENTS - copy/paste COVID Guidelines URL to browser: [email or web address]  [employer]    SARS-CoV-2 assay is a Nucleic Acid Amplification assay intended for the  qualitative detection of nucleic acid from SARS-CoV-2 in nasopharyngeal  swabs  Results are for the presumptive identification of SARS-CoV-2 RNA  Positive results are indicative of infection with SARS-CoV-2, the virus  causing COVID-19, but do not rule out bacterial infection or co-infection  with other viruses  Laboratories within the [place] and its  territories are required to report all positive results to the appropriate  public health authorities  Negative results do not preclude SARS-CoV-2  infection and should not be used as the sole basis for treatment or other  patient management decisions  Negative results must be combined with  clinical observations, patient history, and epidemiological information  This test has not been FDA cleared or approved      This test has been authorized by FDA under an Emergency Use Authorization  (EUA)  This test is only authorized for the duration of time the  declaration that circumstances exist justifying the authorization of the  emergency use of an in vitro diagnostic tests for detection of SARS-CoV-2  virus and/or diagnosis of COVID-19 infection under section 564(b)(1) of  the Act, 21 U  S C  801VPB-9(V)(4), unless the authorization is terminated  or revoked sooner  The test has been validated but independent review by FDA  and CLIA is pending  Test performed using [employer] GeneXpert: This RT-PCR assay targets N2,  a region unique to SARS-CoV-2  A conserved region in the E-gene was chosen  for pan-Sarbecovirus detection which includes SARS-CoV-2  POCT alcohol breath test [[id number]]  (Normal) Resulted: 01/21/22 2147    Lab Status: Final result Updated: 01/21/22 2147     EXTBreath Alcohol 0 037    Urine Macroscopic, POC [[id number]] Collected: 01/21/22 2136    Lab Status: Final result Specimen: Urine Updated: 01/21/22 2138     Color, UA Yellow     Clarity, UA Clear     pH, UA 6 0     Leukocytes, UA Negative     Nitrite, UA Negative     Protein, UA Negative mg/dl      Glucose, UA Negative mg/dl      Ketones, UA Negative mg/dl      Urobilinogen, UA 0 2 E U /dl      Bilirubin, UA Negative     Blood, UA Negative     Specific Gravity, UA 1 010    Narrative:      CLINITEK RESULT                 No orders to display         Procedures  Procedures      ED Course  ED Course as of 01/22/22 1613   Fri Jan 21, 2022 2130 COVID test ordered  Crisis ED worker contacted  2236 Patient signed Mobile with ED crisis worker  Medically cleared for psychiatric placement  1:1 observation ordered  Patient given Nicotine patch  Care turned over to Dr Perez Nunez pending psychiatric placement      MDM    Disposition  Final diagnoses:   Schizophrenia ([facility] [address] )   Alcohol use     Time reflects when diagnosis was documented in both MDM as applicable and the Disposition within this note     Time User Action Codes Description Comment    1/21/2022 10:44 PM Tabatha Reynoso Add [F20 9] Schizophrenia ([facility] [address] )     1/21/2022 10:44 PM Tabatha Reynoso Add [Z72 89] Alcohol use       ED Disposition     ED Disposition Condition Date/Time Comment    Transfer to [facility] Jan 22, 2022  5:26 AM Gokul Avila should be transferred out to [facility] and has been medically cleared          MD Documentation      Most Recent Value   Patient Condition The patient has been stabilized such that within reasonable medical probability, no material deterioration of the patient condition or the condition of the unborn child(chilo) is likely to result from the transfer   Reason for Transfer Level of Care needed not available at this facility   Benefits of Transfer Specialized equipment and/or services available at the receiving facility (Include comment)________________________, Continuity of care   Risks of Transfer Potential for delay in receiving treatment, Potential deterioration of medical condition, Loss of IV, Increased discomfort during transfer, Possible worsening of condition or death during transfer   Accepting Physician Dr Stan Alegre Name, [place], [place]    (Name & Tel number) [facility]   Transported by (Company and Unit #) [employer]   Sending MD Dr Joni Gross   Provider Certification General risk, such as traffic hazards, adverse weather conditions, rough terrain or turbulence, possible failure of equipment (including vehicle or aircraft), or consequences of actions of persons outside the control of the transport personnel, Unanticipated needs of medical equipment and personnel during transport, Risk of worsening condition, The patient is stable for psychiatric transfer because they are medically stable, and is protected from harming him/herself or others during transport      RN Documentation      Most [address] [facility] Name, [place], [place]  (Name & Tel number) [facility]   Transported by [employer] and Unit #) [facility]      Follow-up Information    None         Discharge Medication List as of 1/22/2022 12:57 PM      CONTINUE these medications which have NOT CHANGED    Details   paliperidone palmitate (Laurel Boykin) 234 MG/1 5ML im injection Inject 234 mg into a muscle every 28 days, Historical Med           No discharge procedures on file  PDMP Review     None           ED Provider  Attending physically available and evaluated Summer Kasi RODRÍGUEZ managed the patient along with the ED Attending      Electronically Signed by         Sujata Jones DO  01/22/22 6059

## 2022-01-22 NOTE — EMTALA/ACUTE CARE TRANSFER
[facility] [id number]  Dept: [phone]      VIFGMV TRANSFER CONSENT    NAME Gokul Veronica 2000                              MRN [id number]    I have been informed of my rights regarding examination, treatment, and transfer   by Dr Glenys Garcia DO    Benefits: Specialized equipment and/or services available at the receiving facility (Include comment)________________________,Continuity of care    Risks: Potential for delay in receiving treatment,Potential deterioration of medical condition,Loss of IV,Increased discomfort during transfer,Possible worsening of condition or death during transfer      Consent for Transfer:  I acknowledge that my medical condition has been evaluated and explained to me by the emergency department physician or other qualified medical person and/or my attending physician, who has recommended that I be transferred to the service of  Accepting Physician: Dr Aubrey Hood at [address] [place] [address] Name, [address] : Morris Lindseys  The above potential benefits of such transfer, the potential risks associated with such transfer, and the probable risks of not being transferred have been explained to me, and I fully understand them  The doctor has explained that, in my case, the benefits of transfer outweigh the risks  I agree to be transferred  I authorize the performance of emergency medical procedures and treatments upon me in both transit and upon arrival at the receiving facility  Additionally, I authorize the release of any and all medical records to the receiving facility and request they be transported with me, if possible  I understand that the safest mode of transportation during a medical emergency is an ambulance and that the Hospital advocates the use of this mode of transport   Risks of traveling to the receiving facility by car, including absence of medical control, life sustaining equipment, such as oxygen, and medical personnel has been explained to me and I fully understand them  (MAKAYLA CORRECT BOX BELOW)  [  ]  I consent to the stated transfer and to be transported by ambulance/helicopter  [  ]  I consent to the stated transfer, but refuse transportation by ambulance and accept full responsibility for my transportation by car  I understand the risks of non-ambulance transfers and I exonerate the Hospital and its staff from any deterioration in my condition that results from this refusal     X___________________________________________    DATE  01/22/22  TIME________  Signature of patient or legally responsible individual signing on patient behalf           RELATIONSHIP TO PATIENT_________________________          Provider Certification    NAME Gokul Veronica 2000                              MRN [id number]    A medical screening exam was performed on the above named patient  Based on the examination:    Condition Necessitating Transfer The primary encounter diagnosis was Schizophrenia ([facility] [address] )  A diagnosis of Alcohol use was also pertinent to this visit      Patient Condition: The patient has been stabilized such that within reasonable medical probability, no material deterioration of the patient condition or the condition of the unborn child(chilo) is likely to result from the transfer    Reason for Transfer: Level of Care needed not available at this facility    Transfer Requirements: [address] [place] [address]   · Space available and qualified personnel available for treatment as acknowledged by Marquis Dinh  · Agreed to accept transfer and to provide appropriate medical treatment as acknowledged by       Dr Aubrey Hood  · Appropriate medical records of the examination and treatment of the patient are provided at the time of transfer   [address], [address] _______  · Transfer will be performed by qualified personnel from [facility]  and appropriate transfer equipment as required, including the use of necessary and appropriate life support measures  Provider Certification: I have examined the patient and explained the following risks and benefits of being transferred/refusing transfer to the patient/family:  General risk, such as traffic hazards, adverse weather conditions, rough terrain or turbulence, possible failure of equipment (including vehicle or aircraft), or consequences of actions of persons outside the control of the transport personnel,Unanticipated needs of medical equipment and personnel during transport,Risk of worsening condition,The patient is stable for psychiatric transfer because they are medically stable, and is protected from harming him/herself or others during transport      Based on these reasonable risks and benefits to the patient and/or the unborn child(chilo), and based upon the information available at the time of the patients examination, I certify that the medical benefits reasonably to be expected from the provision of appropriate medical treatments at another medical facility outweigh the increasing risks, if any, to the individuals medical condition, and in the case of labor to the unborn child, from effecting the transfer      X____________________________________________ DATE 01/22/22        TIME_______      ORIGINAL - SEND TO MEDICAL RECORDS   COPY - SEND WITH PATIENT DURING TRANSFER

## 2022-01-22 NOTE — ED ATTENDING ATTESTATION
1/21/2022  IDenilson DO, saw and evaluated the patient  I have discussed the patient with the resident/non-physician practitioner and agree with the resident's/non-physician practitioner's findings, Plan of Care, and MDM as documented in the resident's/non-physician practitioner's note, except where noted  All available labs and Radiology studies were reviewed  I was present for key portions of any procedure(s) performed by the resident/non-physician practitioner and I was immediately available to provide assistance  At this point I agree with the current assessment done in the Emergency Department  I have conducted an independent evaluation of this patient a history and physical is as follows:    21-year-old male presents with depression, hearing voices that are telling him to hurt others  Patient states he is taking his medication sometimes  Admits to drinking alcohol  On exam-no acute distress, heart mildly tachycardic, no respiratory distress    Plan-COVID swab, bat, UDS, crisis to evaluate    ED Course         Critical Care Time  Procedures

## 2022-01-22 NOTE — ED NOTES
Insurance Authorization for admission:   Phone call placed to [facility]   Phone number: [phone]    Spoke to Juan M   4 days approved  Level of care: [facility] 201  Review on 01/25/2022 with Fernie Gagnon # Accepting facility to call upon admission for authorization number        EVS (Eligibility Verification System) called - [phone]    Automated system indicates: [facility] Recipient ID [id number]    [id number] Authorization for Transportation:    Not set up at this time

## 2022-01-22 NOTE — ED NOTES
Pt is a 22 y o  male who was brought to the ED due to worsening auditory hallucinations  Patient states that he has been experiencing auditory command hallucinations that tell him to hurt other people  He reports the voices tell him to "cause a lot of pain" and sometimes he does think about acting on them  Patient states that he came in tonight because he was afraid that he was going to hurt someone on the streets if he got angry  Patient denies acting on these thoughts in the past  Patient denies current suicidal ideations or voices telling him to hurt himself  He does have a history of suicidal thoughts and attempt via overdose  Patient states he last attempted while in a rehab facility a couple of months ago  Patient denies active outpatient treatment  He reports getting kicked out of therapy earlier this week due to not showing up for appointments  He does report having medications, however, does not take them consistently  He reports last taking his medications several days ago  Patient states he was last inpatient a long time ago, however, his chart indicates he was most recently hospitalized at [facility] in November 2021  Patient denies visual hallucinations  Patient does admit to alcohol use almost daily  He reports drinking 3-4 40's per day, and his last sober day was Tuesday  Patient denies history of withdrawal seizures or DT's  Patient denies changes in appetite, but does feel he has been sleeping more lately  Patient cannot identify any recent stressors or triggers, but adds that he "doesn't know what they are yet"  Patient continues to express concern with his thoughts, and does not want to act on them and hurt anyone  Patient requesting inpatient treatment and signed a 201      Chief Complaint   Patient presents with    Psychiatric Evaluation     "dorys been drinking and i had 3-4 40oz beers and im schizophrenic and drinking makes it worse i want to go to the mental hospital  vocies are telling me to hurt myself and other people but no plan"     Intake Assessment completed, Safety risk Assessment completed    ALEJANDRA Cifuentes  01/21/22    [id number]

## 2022-01-22 NOTE — ED NOTES
Pt continues to rest; no distress noted; was given food and water per request     Nilda Durham RN  01/22/22 Sachi Acosta RN  01/22/22 2046

## 2022-01-22 NOTE — ED NOTES
Pants, two shirts, sweat shirt, two jackets, sneakers, cell phone   All placed into three patients belongings bag and stored in zone 5 med room cabinet (B)      Foster Mejia  01/21/22 2104

## 2022-01-22 NOTE — ED NOTES
Pt belongings removed, labeled, and secured in Zone 5/6 med room; pt cooperative and resting     Jodi Campoverdeole, SHERITA  01/22/22 8834

## 2022-01-22 NOTE — ED NOTES
Pt resting, no signs or symptoms of distress  Vitals deffered due to pt sleeping  RN will continue to monitor        Joleen Davis RN  01/22/22 0223

## 2022-01-22 NOTE — ED PROVIDER NOTES
Bed Search     [facility] [place]- Per Sydnee Reyes, no beds  [facility], no beds  [facility]- Per Aroldo Mckeon, can review, chart faxed at this time

## 2022-01-22 NOTE — ED NOTES
Pt ambulated to BR, no complaints at this time  1:1 at bedside       Inge Dela Cruz, RN  01/22/22 0618

## 2022-01-22 NOTE — ED NOTES
Patient is accepted at [facility]   Patient is accepted by Dr Edy Modi per [facility] is arranged with [facility]  Transportation is scheduled for [id number]  Patient may go to the floor after 0800        Nurse report is not required for this facility

## 2022-01-22 NOTE — ED NOTES
Extra blanket provided; michelle crackers given upon request     Penny Whiting RN  01/21/22 0607 Infliximab Counseling:  I discussed with the patient the risks of infliximab including but not limited to myelosuppression, immunosuppression, autoimmune hepatitis, demyelinating diseases, lymphoma, and serious infections.  The patient understands that monitoring is required including a PPD at baseline and must alert us or the primary physician if symptoms of infection or other concerning signs are noted.

## 2022-01-22 NOTE — ED NOTES
Pt sleeping, no signs or symptoms of distress   RN will continue to monitor,      [place], RN  01/22/22 9927

## 2022-03-15 ENCOUNTER — TELEPHONE (OUTPATIENT)
Dept: INTERNAL MEDICINE CLINIC | Facility: CLINIC | Age: 22
End: 2022-03-15

## 2022-03-15 NOTE — TELEPHONE ENCOUNTER
Please remove the patient from Dr Cristi Teresa services  He is in the care of [facility] psychiatric for schizophrenia     Thank you

## 2022-03-18 ENCOUNTER — HOSPITAL ENCOUNTER (INPATIENT)
Facility: HOSPITAL | Age: 22
LOS: 4 days | End: 2022-03-22
Attending: EMERGENCY MEDICINE | Admitting: EMERGENCY MEDICINE
Payer: COMMERCIAL

## 2022-03-18 DIAGNOSIS — J45.20 MILD INTERMITTENT ASTHMA, UNSPECIFIED WHETHER COMPLICATED: ICD-10-CM

## 2022-03-18 DIAGNOSIS — F20.89 OTHER SCHIZOPHRENIA (HCC): ICD-10-CM

## 2022-03-18 DIAGNOSIS — F10.10 ALCOHOL ABUSE: ICD-10-CM

## 2022-03-18 DIAGNOSIS — F19.10 POLYSUBSTANCE ABUSE (HCC): ICD-10-CM

## 2022-03-18 DIAGNOSIS — F10.239 ALCOHOL WITHDRAWAL (HCC): Primary | ICD-10-CM

## 2022-03-18 DIAGNOSIS — F20.9 SCHIZOPHRENIA (HCC): ICD-10-CM

## 2022-03-18 PROBLEM — F10.939 ALCOHOL WITHDRAWAL SYNDROME WITH COMPLICATION (HCC): Status: ACTIVE | Noted: 2022-03-18

## 2022-03-18 PROBLEM — F10.20 ALCOHOL USE DISORDER, SEVERE, DEPENDENCE (HCC): Status: ACTIVE | Noted: 2022-03-18

## 2022-03-18 PROBLEM — F17.200 TOBACCO USE DISORDER: Status: ACTIVE | Noted: 2022-03-18

## 2022-03-18 LAB
ALBUMIN SERPL BCP-MCNC: 4.4 G/DL (ref 3–5.2)
ALP SERPL-CCNC: 65 U/L (ref 43–122)
ALT SERPL W P-5'-P-CCNC: 42 U/L
ANION GAP SERPL CALCULATED.3IONS-SCNC: 7 MMOL/L (ref 5–14)
APAP SERPL-MCNC: <10 UG/ML (ref 10–20)
AST SERPL W P-5'-P-CCNC: 59 U/L (ref 17–59)
BASOPHILS # BLD AUTO: 0.1 THOUSANDS/ΜL (ref 0–0.1)
BASOPHILS NFR BLD AUTO: 1 % (ref 0–1)
BILIRUB SERPL-MCNC: 0.61 MG/DL
BUN SERPL-MCNC: 17 MG/DL (ref 5–25)
CALCIUM SERPL-MCNC: 9.9 MG/DL (ref 8.4–10.2)
CHLORIDE SERPL-SCNC: 102 MMOL/L (ref 97–108)
CO2 SERPL-SCNC: 31 MMOL/L (ref 22–30)
CREAT SERPL-MCNC: 1.43 MG/DL (ref 0.7–1.5)
EOSINOPHIL # BLD AUTO: 0.2 THOUSAND/ΜL (ref 0–0.4)
EOSINOPHIL NFR BLD AUTO: 1 % (ref 0–6)
ERYTHROCYTE [DISTWIDTH] IN BLOOD BY AUTOMATED COUNT: 14 %
ETHANOL SERPL-MCNC: <10 MG/DL (ref 0–10)
FLUAV RNA RESP QL NAA+PROBE: NEGATIVE
FLUBV RNA RESP QL NAA+PROBE: NEGATIVE
GFR SERPL CREATININE-BSD FRML MDRD: 69 ML/MIN/1.73SQ M
GLUCOSE SERPL-MCNC: 82 MG/DL (ref 70–99)
HCT VFR BLD AUTO: 45.2 % (ref 41–53)
HGB BLD-MCNC: 15.8 G/DL (ref 13.5–17.5)
LYMPHOCYTES # BLD AUTO: 4 THOUSANDS/ΜL (ref 0.5–4)
LYMPHOCYTES NFR BLD AUTO: 34 % (ref 25–45)
MAGNESIUM SERPL-MCNC: 1.8 MG/DL (ref 1.6–2.3)
MCH RBC QN AUTO: 32.6 PG (ref 26–34)
MCHC RBC AUTO-ENTMCNC: 34.9 G/DL (ref 31–36)
MCV RBC AUTO: 94 FL (ref 80–100)
MONOCYTES # BLD AUTO: 1.1 THOUSAND/ΜL (ref 0.2–0.9)
MONOCYTES NFR BLD AUTO: 10 % (ref 1–10)
NEUTROPHILS # BLD AUTO: 6.5 THOUSANDS/ΜL (ref 1.8–7.8)
NEUTS SEG NFR BLD AUTO: 55 % (ref 45–65)
PLATELET # BLD AUTO: 272 THOUSANDS/UL (ref 150–450)
PMV BLD AUTO: 9.5 FL (ref 8.9–12.7)
POTASSIUM SERPL-SCNC: 4.7 MMOL/L (ref 3.6–5)
PROT SERPL-MCNC: 7.9 G/DL (ref 5.9–8.4)
RBC # BLD AUTO: 4.83 MILLION/UL (ref 4.5–5.9)
RSV RNA RESP QL NAA+PROBE: NEGATIVE
SALICYLATES SERPL-MCNC: <1 MG/DL (ref 10–30)
SARS-COV-2 RNA RESP QL NAA+PROBE: NEGATIVE
SODIUM SERPL-SCNC: 140 MMOL/L (ref 137–147)
WBC # BLD AUTO: 12 THOUSAND/UL (ref 4.5–11)

## 2022-03-18 PROCEDURE — 99291 CRITICAL CARE FIRST HOUR: CPT | Performed by: EMERGENCY MEDICINE

## 2022-03-18 PROCEDURE — 82077 ASSAY SPEC XCP UR&BREATH IA: CPT | Performed by: EMERGENCY MEDICINE

## 2022-03-18 PROCEDURE — 99284 EMERGENCY DEPT VISIT MOD MDM: CPT

## 2022-03-18 PROCEDURE — 80143 DRUG ASSAY ACETAMINOPHEN: CPT | Performed by: EMERGENCY MEDICINE

## 2022-03-18 PROCEDURE — 99285 EMERGENCY DEPT VISIT HI MDM: CPT | Performed by: EMERGENCY MEDICINE

## 2022-03-18 PROCEDURE — 80053 COMPREHEN METABOLIC PANEL: CPT | Performed by: EMERGENCY MEDICINE

## 2022-03-18 PROCEDURE — 36415 COLL VENOUS BLD VENIPUNCTURE: CPT | Performed by: EMERGENCY MEDICINE

## 2022-03-18 PROCEDURE — 0241U HB NFCT DS VIR RESP RNA 4 TRGT: CPT | Performed by: EMERGENCY MEDICINE

## 2022-03-18 PROCEDURE — 80179 DRUG ASSAY SALICYLATE: CPT | Performed by: EMERGENCY MEDICINE

## 2022-03-18 PROCEDURE — 83735 ASSAY OF MAGNESIUM: CPT | Performed by: EMERGENCY MEDICINE

## 2022-03-18 PROCEDURE — 93005 ELECTROCARDIOGRAM TRACING: CPT

## 2022-03-18 PROCEDURE — 85025 COMPLETE CBC W/AUTO DIFF WBC: CPT | Performed by: EMERGENCY MEDICINE

## 2022-03-18 PROCEDURE — HZ2ZZZZ DETOXIFICATION SERVICES FOR SUBSTANCE ABUSE TREATMENT: ICD-10-PCS | Performed by: EMERGENCY MEDICINE

## 2022-03-18 RX ORDER — LANOLIN ALCOHOL/MO/W.PET/CERES
100 CREAM (GRAM) TOPICAL DAILY
Status: DISCONTINUED | OUTPATIENT
Start: 2022-03-19 | End: 2022-03-22 | Stop reason: HOSPADM

## 2022-03-18 RX ORDER — TRAZODONE HYDROCHLORIDE 50 MG/1
50 TABLET ORAL
Status: DISCONTINUED | OUTPATIENT
Start: 2022-03-18 | End: 2022-03-22 | Stop reason: HOSPADM

## 2022-03-18 RX ORDER — RISPERIDONE 2 MG/1
2 TABLET, FILM COATED ORAL DAILY
COMMUNITY

## 2022-03-18 RX ORDER — RISPERIDONE 2 MG/1
2 TABLET, FILM COATED ORAL DAILY
Status: DISCONTINUED | OUTPATIENT
Start: 2022-03-19 | End: 2022-03-22 | Stop reason: HOSPADM

## 2022-03-18 RX ORDER — SODIUM CHLORIDE 9 MG/ML
100 INJECTION, SOLUTION INTRAVENOUS CONTINUOUS
Status: DISCONTINUED | OUTPATIENT
Start: 2022-03-18 | End: 2022-03-19

## 2022-03-18 RX ORDER — FOLIC ACID 1 MG/1
1 TABLET ORAL DAILY
Status: DISCONTINUED | OUTPATIENT
Start: 2022-03-19 | End: 2022-03-22 | Stop reason: HOSPADM

## 2022-03-18 RX ORDER — NICOTINE 21 MG/24HR
21 PATCH, TRANSDERMAL 24 HOURS TRANSDERMAL DAILY
Status: DISCONTINUED | OUTPATIENT
Start: 2022-03-19 | End: 2022-03-22 | Stop reason: HOSPADM

## 2022-03-18 RX ORDER — ONDANSETRON 2 MG/ML
4 INJECTION INTRAMUSCULAR; INTRAVENOUS EVERY 6 HOURS PRN
Status: DISCONTINUED | OUTPATIENT
Start: 2022-03-18 | End: 2022-03-22 | Stop reason: HOSPADM

## 2022-03-18 RX ORDER — ACETAMINOPHEN 325 MG/1
650 TABLET ORAL EVERY 6 HOURS PRN
Status: DISCONTINUED | OUTPATIENT
Start: 2022-03-18 | End: 2022-03-22 | Stop reason: HOSPADM

## 2022-03-18 RX ADMIN — SODIUM CHLORIDE 100 ML/HR: 0.9 INJECTION, SOLUTION INTRAVENOUS at 22:53

## 2022-03-19 PROBLEM — J45.909 ASTHMA: Status: ACTIVE | Noted: 2022-03-19

## 2022-03-19 PROBLEM — D72.829 LEUKOCYTOSIS: Status: ACTIVE | Noted: 2022-03-19

## 2022-03-19 LAB
ALBUMIN SERPL BCP-MCNC: 3.4 G/DL (ref 3–5.2)
ALP SERPL-CCNC: 73 U/L (ref 43–122)
ALT SERPL W P-5'-P-CCNC: 33 U/L
AMPHETAMINES SERPL QL SCN: NEGATIVE
ANION GAP SERPL CALCULATED.3IONS-SCNC: 6 MMOL/L (ref 5–14)
AST SERPL W P-5'-P-CCNC: 42 U/L (ref 17–59)
BARBITURATES UR QL: NEGATIVE
BENZODIAZ UR QL: NEGATIVE
BILIRUB SERPL-MCNC: 0.41 MG/DL
BUN SERPL-MCNC: 14 MG/DL (ref 5–25)
CALCIUM ALBUM COR SERPL-MCNC: 8.8 MG/DL (ref 8.3–10.1)
CALCIUM SERPL-MCNC: 8.3 MG/DL (ref 8.4–10.2)
CHLORIDE SERPL-SCNC: 107 MMOL/L (ref 97–108)
CO2 SERPL-SCNC: 24 MMOL/L (ref 22–30)
COCAINE UR QL: NEGATIVE
CREAT SERPL-MCNC: 1.17 MG/DL (ref 0.7–1.5)
EOSINOPHIL # BLD AUTO: 0.23 THOUSAND/UL (ref 0–0.4)
EOSINOPHIL NFR BLD MANUAL: 2 % (ref 0–6)
ERYTHROCYTE [DISTWIDTH] IN BLOOD BY AUTOMATED COUNT: 14.1 %
GFR SERPL CREATININE-BSD FRML MDRD: 88 ML/MIN/1.73SQ M
GLUCOSE SERPL-MCNC: 116 MG/DL (ref 70–99)
HCT VFR BLD AUTO: 42.8 % (ref 41–53)
HGB BLD-MCNC: 14.7 G/DL (ref 13.5–17.5)
LYMPHOCYTES # BLD AUTO: 4.87 THOUSAND/UL (ref 0.5–4)
LYMPHOCYTES # BLD AUTO: 42 % (ref 25–45)
MAGNESIUM SERPL-MCNC: 1.8 MG/DL (ref 1.6–2.3)
MCH RBC QN AUTO: 32.2 PG (ref 26–34)
MCHC RBC AUTO-ENTMCNC: 34.4 G/DL (ref 31–36)
MCV RBC AUTO: 94 FL (ref 80–100)
METHADONE UR QL: NEGATIVE
MONOCYTES # BLD AUTO: 0.58 THOUSAND/UL (ref 0.2–0.9)
MONOCYTES NFR BLD AUTO: 5 % (ref 1–10)
NEUTS SEG # BLD: 5.68 THOUSAND/UL (ref 1.8–7.8)
NEUTS SEG NFR BLD AUTO: 49 %
OPIATES UR QL SCN: NEGATIVE
OXYCODONE+OXYMORPHONE UR QL SCN: NEGATIVE
PCP UR QL: NEGATIVE
PLATELET # BLD AUTO: 267 THOUSANDS/UL (ref 150–450)
PLATELET BLD QL SMEAR: ADEQUATE
PMV BLD AUTO: 9.9 FL (ref 8.9–12.7)
POTASSIUM SERPL-SCNC: 3.8 MMOL/L (ref 3.6–5)
PROT SERPL-MCNC: 6.4 G/DL (ref 5.9–8.4)
RBC # BLD AUTO: 4.57 MILLION/UL (ref 4.5–5.9)
RBC MORPH BLD: NORMAL
SODIUM SERPL-SCNC: 137 MMOL/L (ref 137–147)
THC UR QL: NEGATIVE
TOTAL CELLS COUNTED SPEC: 100
VARIANT LYMPHS # BLD AUTO: 2 % (ref 0–0)
WBC # BLD AUTO: 11.6 THOUSAND/UL (ref 4.5–11)

## 2022-03-19 PROCEDURE — 99232 SBSQ HOSP IP/OBS MODERATE 35: CPT | Performed by: EMERGENCY MEDICINE

## 2022-03-19 PROCEDURE — 83735 ASSAY OF MAGNESIUM: CPT

## 2022-03-19 PROCEDURE — 80053 COMPREHEN METABOLIC PANEL: CPT

## 2022-03-19 PROCEDURE — 85027 COMPLETE CBC AUTOMATED: CPT

## 2022-03-19 PROCEDURE — 85007 BL SMEAR W/DIFF WBC COUNT: CPT

## 2022-03-19 PROCEDURE — 80307 DRUG TEST PRSMV CHEM ANLYZR: CPT | Performed by: EMERGENCY MEDICINE

## 2022-03-19 RX ORDER — OLANZAPINE 10 MG/1
10 TABLET, ORALLY DISINTEGRATING ORAL
Status: DISCONTINUED | OUTPATIENT
Start: 2022-03-19 | End: 2022-03-21

## 2022-03-19 RX ORDER — ALBUTEROL SULFATE 90 UG/1
2 AEROSOL, METERED RESPIRATORY (INHALATION) EVERY 4 HOURS PRN
Status: DISCONTINUED | OUTPATIENT
Start: 2022-03-19 | End: 2022-03-22 | Stop reason: HOSPADM

## 2022-03-19 RX ADMIN — OLANZAPINE 10 MG: 10 TABLET, ORALLY DISINTEGRATING ORAL at 21:11

## 2022-03-19 RX ADMIN — NICOTINE POLACRILEX 2 MG: 2 GUM, CHEWING BUCCAL at 10:22

## 2022-03-19 RX ADMIN — FOLIC ACID 1 MG: 1 TABLET ORAL at 10:22

## 2022-03-19 RX ADMIN — RISPERIDONE 2 MG: 2 TABLET ORAL at 10:22

## 2022-03-19 RX ADMIN — ENOXAPARIN SODIUM 40 MG: 100 INJECTION SUBCUTANEOUS at 10:22

## 2022-03-19 RX ADMIN — SODIUM CHLORIDE 100 ML/HR: 0.9 INJECTION, SOLUTION INTRAVENOUS at 08:28

## 2022-03-19 RX ADMIN — THIAMINE HCL TAB 100 MG 100 MG: 100 TAB at 10:22

## 2022-03-19 RX ADMIN — NICOTINE POLACRILEX 2 MG: 2 GUM, CHEWING BUCCAL at 21:11

## 2022-03-19 RX ADMIN — MULTIPLE VITAMINS W/ MINERALS TAB 1 TABLET: TAB ORAL at 10:22

## 2022-03-19 NOTE — ASSESSMENT & PLAN NOTE
· Improving: WBC 12 00 -> 11 60 since on admission  · No current signs/symptoms of infection, VSS  · Monitoring CBC, VS

## 2022-03-19 NOTE — ED PROVIDER NOTES
History  Chief Complaint   Patient presents with    Detox Evaluation     Pt came to ER for detox evaluation from ETOH (3 40oz per day), cocaine x1 bag per day, meth, and THC  Pt reports last using the day before yesterday  Patient is a 28-year-old male here with his  coming in for detox evaluation  Patient does have a history of schizophrenia, being legally blind ending appeared as well as asthma  He is on Zyprexa for his psychoses  Patient states that he does abuse cocaine about 1 bag per day as well as methamphetamines and marijuana  However he has been drinking heavily over the past 2 years  He states that he drinks every day   at bedside confirms this  He states he drinks approximately 3-4, 40 oz per day  He states that sometimes he will drink liquor and last drank a bottle of liquor 2 weeks ago  He has not been sober per patient in 2 years  He has never had DTs or seizures  He has never been in for rehab or detox  He is agreeable for rehab    Patient has no physical complaints at this time      History provided by:  Patient and caregiver   used: No    Drug / Alcohol Assessment  Severity:  Moderate  Onset quality:  Gradual  Timing:  Constant  Progression:  Worsening  Chronicity:  Chronic  Associated symptoms: no abdominal pain, no chest pain, no cough, no ear pain, no fever, no rash, no shortness of breath, no sore throat and no vomiting        Prior to Admission Medications   Prescriptions Last Dose Informant Patient Reported?  Taking?   paliperidone palmitate (Shaw Major) 234 MG/1 5ML im injection   Yes No   Sig: Inject 234 mg into a muscle every 28 days      Facility-Administered Medications: None       Past Medical History:   Diagnosis Date    Asthma     Hearing impaired     Legally blind     Schizophrenia ([facility] [address] )        Past Surgical History:   Procedure Laterality Date    HERNIA REPAIR      TEAR DUCT SURGERY Bilateral     TONSILLECTOMY         Family History   Problem Relation Age of Onset    Schizophrenia Mother     Mental illness Mother     Abnormal EKG Sister     Mental illness Sister      I have reviewed and agree with the history as documented  E-Cigarette/Vaping     E-Cigarette/Vaping Substances     Social History     Tobacco Use    Smoking status: Current Every Day Smoker     Packs/day: 0 20     Types: Cigarettes    Smokeless tobacco: Never Used   Substance Use Topics    Alcohol use: Yes    Drug use: Yes     Types: Cocaine, Marijuana, Methamphetamines       Review of Systems   Constitutional: Negative  Negative for chills and fever  HENT: Negative  Negative for ear pain and sore throat  Eyes: Negative  Negative for pain and visual disturbance  Respiratory: Negative  Negative for cough and shortness of breath  Cardiovascular: Negative  Negative for chest pain and palpitations  Gastrointestinal: Negative  Negative for abdominal pain and vomiting  Genitourinary: Negative  Negative for dysuria and hematuria  Musculoskeletal: Negative  Negative for arthralgias and back pain  Skin: Negative  Negative for color change and rash  Neurological: Negative  Negative for seizures and syncope  Hematological: Negative  Psychiatric/Behavioral: Negative  All other systems reviewed and are negative  Physical Exam  Physical Exam  Vitals and nursing note reviewed  Constitutional:       Appearance: He is well-developed  HENT:      Head: Normocephalic and atraumatic  Mouth/Throat:      Mouth: Mucous membranes are moist    Eyes:      Extraocular Movements: Extraocular movements intact  Conjunctiva/sclera: Conjunctivae normal       Pupils: Pupils are equal, round, and reactive to light  Cardiovascular:      Rate and Rhythm: Normal rate and regular rhythm  Pulses:           Radial pulses are 2+ on the right side and 2+ on the left side        Heart sounds: No murmur heard       Pulmonary:      Effort: Pulmonary effort is normal  No respiratory distress  Breath sounds: Normal breath sounds  Abdominal:      Palpations: Abdomen is soft  Tenderness: There is no abdominal tenderness  Musculoskeletal:      Cervical back: Neck supple  Skin:     General: Skin is warm and dry  Capillary Refill: Capillary refill takes less than 2 seconds  Neurological:      General: No focal deficit present  Mental Status: He is alert and oriented to person, place, and time  Psychiatric:         Mood and Affect: Mood normal          Behavior: Behavior normal          Thought Content: Thought content normal          Judgment: Judgment normal          Vital Signs  ED Triage Vitals [03/18/22 2033]   Temperature Pulse Respirations Blood Pressure SpO2   98 9 °F (37 2 °C) 98 16 145/77 96 %      Temp Source Heart Rate Source Patient Position - Orthostatic VS BP Location FiO2 (%)   Oral Monitor -- -- --      Pain Score       --           Vitals:    03/18/22 2033   BP: 145/77   Pulse: 98         Visual Acuity      ED Medications  Medications - No data to display    Diagnostic Studies  Results Reviewed     Procedure Component Value Units Date/Time    COVID/FLU/RSV - 2 hour TAT [[id number]]  (Normal) Collected: 03/18/22 2047    Lab Status: Final result Specimen: Nares from Nose Updated: 03/18/22 2134     SARS-CoV-2 Negative     INFLUENZA A PCR Negative     INFLUENZA B PCR Negative     RSV PCR Negative    Narrative:      FOR PEDIATRIC PATIENTS - copy/paste COVID Guidelines URL to browser: [email or web address]  [email or web address]    SARS-CoV-2 assay is a Nucleic Acid Amplification assay intended for the  qualitative detection of nucleic acid from SARS-CoV-2 in nasopharyngeal  swabs  Results are for the presumptive identification of SARS-CoV-2 RNA      Positive results are indicative of infection with SARS-CoV-2, the virus  causing COVID-19, but do not rule out bacterial infection or co-infection  with other viruses  Laboratories within the [place] and its  territories are required to report all positive results to the appropriate  public health authorities  Negative results do not preclude SARS-CoV-2  infection and should not be used as the sole basis for treatment or other  patient management decisions  Negative results must be combined with  clinical observations, patient history, and epidemiological information  This test has not been FDA cleared or approved  This test has been authorized by FDA under an Emergency Use Authorization  (EUA)  This test is only authorized for the duration of time the  declaration that circumstances exist justifying the authorization of the  emergency use of an in vitro diagnostic tests for detection of SARS-CoV-2  virus and/or diagnosis of COVID-19 infection under section 564(b)(1) of  the Act, 21 U  S C  951AJZ-3(Y)(1), unless the authorization is terminated  or revoked sooner  The test has been validated but independent review by FDA  and CLIA is pending  Test performed using [employer] GeneXpert: This RT-PCR assay targets N2,  a region unique to SARS-CoV-2  A conserved region in the E-gene was chosen  for pan-Sarbecovirus detection which includes SARS-CoV-2  Ethanol [[id number]]  (Normal) Collected: 03/18/22 2047    Lab Status: Final result Specimen: Blood from Arm, Right Updated: 03/18/22 2111     Ethanol Lvl <10 mg/dL     Acetaminophen level-If concentration is detectable, please discuss with medical  on call   [[id number]]  (Abnormal) Collected: 03/18/22 2047    Lab Status: Final result Specimen: Blood from Arm, Right Updated: 03/18/22 2111     Acetaminophen Level <10 ug/mL     Magnesium [[id number]]  (Normal) Collected: 03/18/22 2047    Lab Status: Final result Specimen: Blood from Arm, Right Updated: 03/18/22 2111     Magnesium 1 8 mg/dL     Salicylate level [[id number]]  (Abnormal) Collected: 03/18/22 2047 Lab Status: Final result Specimen: Blood from Arm, Right Updated: 11/65/00 1331     Salicylate Lvl <9 6 mg/dL     Comprehensive metabolic panel [[id number]]  (Abnormal) Collected: 03/18/22 2047    Lab Status: Final result Specimen: Blood from Arm, Right Updated: 03/18/22 2110     Sodium 140 mmol/L      Potassium 4 7 mmol/L      Chloride 102 mmol/L      CO2 31 mmol/L      ANION GAP 7 mmol/L      BUN 17 mg/dL      Creatinine 1 43 mg/dL      Glucose 82 mg/dL      Calcium 9 9 mg/dL      AST 59 U/L      ALT 42 U/L      Alkaline Phosphatase 65 U/L      Total Protein 7 9 g/dL      Albumin 4 4 g/dL      Total Bilirubin 0 61 mg/dL      eGFR 69 ml/min/1 73sq m     Narrative:      [place] guidelines for Chronic Kidney Disease (CKD):     Stage 1 with normal or high GFR (GFR > 90 mL/min/1 73 square meters)    Stage 2 Mild CKD (GFR = 60-89 mL/min/1 73 square meters)    Stage 3A Moderate CKD (GFR = 45-59 mL/min/1 73 square meters)    Stage 3B Moderate CKD (GFR = 30-44 mL/min/1 73 square meters)    Stage 4 Severe CKD (GFR = 15-29 mL/min/1 73 square meters)    Stage 5 End Stage CKD (GFR <15 mL/min/1 73 square meters)  Note: GFR calculation is accurate only with a steady state creatinine    CBC and differential [[id number]]  (Abnormal) Collected: 03/18/22 2047    Lab Status: Final result Specimen: Blood from Arm, Right Updated: 03/18/22 2058     WBC 12 00 Thousand/uL      RBC 4 83 Million/uL      Hemoglobin 15 8 g/dL      Hematocrit 45 2 %      MCV 94 fL      MCH 32 6 pg      MCHC 34 9 g/dL      RDW 14 0 %      MPV 9 5 fL      Platelets 386 Thousands/uL      Neutrophils Relative 55 %      Lymphocytes Relative 34 %      Monocytes Relative 10 %      Eosinophils Relative 1 %      Basophils Relative 1 %      Neutrophils Absolute 6 50 Thousands/µL      Lymphocytes Absolute 4 00 Thousands/µL      Monocytes Absolute 1 10 Thousand/µL      Eosinophils Absolute 0 20 Thousand/µL      Basophils Absolute 0 10 Thousands/µL     Rapid drug screen, urine [[id number]]     Lab Status: No result Specimen: Urine     UA (URINE) with reflex to Scope [[id number]]     Lab Status: No result Specimen: Urine                  No orders to display              Procedures  Procedures         ED Course  ED Course as of 03/18/22 2136   Monique Hernandez Mar 18, 2022   2040 Patient is a 28-year-old male here with his  coming in today for detox from alcohol  He does also use methamphetamines, cocaine and marrow on a  Discussed with him that these 3 we do not admit for detox but can assist with alcohol  He has never had DTs or seizures  Will start medical workup and discussed with detox      Portions of the record may have been created with voice recognition software  Occasional wrong word or "sound a like" substitutions may have occurred due to the inherent limitations of voice recognition software  Read the chart carefully and recognize, using context, where substitutions have occurred  2059 In review of chart, patient does have history of schizophrenia and alcohol abuse  He seen multiple times at [place] emergency department                               SBIRT 22yo+      Most Recent Value   SBIRT (23 yo +)    In order to provide better care to our patients, we are screening all of our patients for alcohol and drug use  Would it be okay to ask you these screening questions? Unable to answer at this time Filed at: 03/18/2022 2052                    MDM  Number of Diagnoses or Management Options  Diagnosis management comments:     EKG INTERPRETATION 2105  RHYTHM:  Normal sinus rhythm at 73 beats per minute  AXIS:  Normal axis  INTERVALS:  TX interval measured 136 milliseconds  QRS COMPLEX:  QRS measured at 88 milliseconds  ST SEGMENT:  Nonspecific ST segment changes  Diffuse ST elevation consistent with early repolarization  QT INTERVAL:  QTC measured at 427 millisecond  COMPARED WITH PRIOR   Toño Zelaya   Interpretation by Deirdre Chavez DO Amount and/or Complexity of Data Reviewed  Clinical lab tests: ordered and reviewed  Tests in the medicine section of CPT®: ordered and reviewed  Obtain history from someone other than the patient: yes ( at bedside)  Review and summarize past medical records: yes        Disposition  Final diagnoses:   Alcohol withdrawal ([facility] )   Alcohol abuse   Polysubstance abuse ([facility] )   Schizophrenia ([facility] )     Time reflects when diagnosis was documented in both MDM as applicable and the Disposition within this note     Time User Action Codes Description Comment    3/18/2022  9:29 PM Arvid Norwood Add [F10 239] [facility] withdrawal ([facility] )     3/18/2022  9:29 PM Arvid Norwood Add [F10 10] Alcohol abuse     3/18/2022  9:29 PM Teresa Burdick Add [F19 10] Polysubstance abuse ([facility] )     3/18/2022  9:29 PM Del Burdick Add [F20 9] Schizophrenia [facility])       ED Disposition     ED Disposition Condition Date/Time Comment    Admit Stable Fri Mar 18, 2022  9:28 PM Case was discussed with Cheri Ji and the patient's admission status was agreed to be Admission Status: inpatient status to the service of Dr David Eisenberg   Follow-up Information    None         Patient's Medications   Discharge Prescriptions    No medications on file       No discharge procedures on file      PDMP Review     None          ED Provider  Electronically Signed by           Gregoria Worley DO  03/18/22 3333

## 2022-03-19 NOTE — ASSESSMENT & PLAN NOTE
· Pt endorses polysubstance abuse with cocaine, methamphetamine, and marijuana  · Reports occasional use of these substances, approximately once a month  · States he uses 1 bag of cocaine and 2 rocks of methamphetamine when he partakes in these substances  · Last use was about 1 week ago  · UDS  NEGATIVE   · Encourage cessation

## 2022-03-19 NOTE — ASSESSMENT & PLAN NOTE
· Pt with a h/o asthma  · No acute exacerbation  · John Paul diffuse inspiratory wheezes present on exam  · Continue monitoring  · Albuterol inhaler prn

## 2022-03-19 NOTE — ASSESSMENT & PLAN NOTE
· Pt reports he is legally blind  · Per chart review, history of visual impairment from early life, had multiple eye surgeries as a child, and able to see up close but not 20/20 vision  · Denies any new visual changes

## 2022-03-19 NOTE — NURSING NOTE
Received patient for admission to detox, via ED  Patient is a 22yr old male, admitted for h/o Etoh abuse d/o  Patient states he drinks about 3, 40 oz of beer daily with last drink today  Patient also admitted to using cocaine, meth, and thc 2 days ago  Patient has a PMH of being legally blind, hearing loss,( patient does have hearing aides that were left at home), schizophrenia,and asthma  Patient has a rt forearm 20g placed in the ED  Patient has NSS infusing @ 100ml per hr  Patient started on sews protocol, however has no complaints of withdrawal, pain or discomfort at this time  Patient oriented to room 515, call bell within reach   Patient on monitor in NSR   ,

## 2022-03-19 NOTE — ED NOTES
Pt  requesting phone call with updates: Yocasta Hill; cell [phone]; office [address] [facility], [address] [place] [address]  03/18/22 5920

## 2022-03-19 NOTE — ASSESSMENT & PLAN NOTE
· Pt with a history of chronic alcohol use  · Reports drinking 3 40-oz beers daily  Per the ED, pt will also occasionally drink liquor, last drank a bottle of liquor 2 weeks ago  · Last drink was at 12 PM yesterday (3/17/22)  · Denies h/o withdrawal seizures, but does endorse h/o 1 seizure that occurred 3 months ago  Pt reports he was outside with a family member when he fell to the ground and began convulsing  Pt denies any decrease in alcohol use prior to this seizure    · Endorses h/o inpatient rehabilitation, denies previous OP rehab  · Consult case management  · Pt expresses interest in entering IP rehab after discharge

## 2022-03-19 NOTE — ASSESSMENT & PLAN NOTE
· Pt with a history of chronic alcohol use  · Reports drinking 3 40-oz beers daily  Per the ED, pt will also occasionally drink liquor, last drank a bottle of liquor 2 weeks ago  · Last drink was at 12 PM yesterday (3/17/22)  · Denies h/o withdrawal seizures, but does endorse h/o 1 seizure that occurred 3 months ago  Pt reports he was outside with a family member when he fell to the ground and began convulsing  Pt denies any decrease in alcohol use prior to this seizure  · Endorses h/o inpatient rehabilitation, denies previous OP rehab  · Consult case management  · Pt will be discharged to IP rehab

## 2022-03-19 NOTE — ASSESSMENT & PLAN NOTE
· Pt with a h/o schizophrenia  · Reports he is on monthly [place] injections which he gets on the 28th of each month as well as Risperdal 2 mg PO daily  · Pt states he has also taken Lexapro in the past (dose unknown) but it does not help his mood  · Pending psych evaluation   · Continue home Risperdal and Zyprexa HS

## 2022-03-19 NOTE — ASSESSMENT & PLAN NOTE
· Mild leukocytosis with WBC 12 00 on admission  · No current signs/symptoms of infection, VSS  · Continue monitoring CBC, VS

## 2022-03-19 NOTE — DISCHARGE INSTR - OTHER ORDERS
Drug and Alcohol Resources in Psychiatric hospital    If you have health insurance, including medical assistance, there should be a phone number on your insurance card that you can call to find out how to access services  The card may say, For Elias Padilla Tiffanyfabián or For Drug and Alcohol Services or For Substance Abuse Services call the number provided  [facility]  [place] [address], [facility], [address]   [phone]  A  is available Monday through Friday from 8:00 am to 5:00 pm to provide you with assistance on accessing services for substance abuse  If you do not have health insurance and are in financial need, this office may also help you get the funding for the services that are necessary  [facility] provides funding to support three Recovery Centers in Psychiatric hospital  These centers offer a safe, sober environment to those in recovery  A variety of programming including 12-Step Meetings, [place]et[place], Life Skills Workshops, etc  is offered at each location  [address] [facility], [address] [place] [address]  [phone]  www  cleanslatebangor  org [address] on Main  [facility], [address] [facility]  [phone]  ilfgbb-km-ztsp    [facility] [address], [address] [facility]  [phone]   [address] [place] [address]  [phone]  www  [facility], [address] [place] [address]  [phone]  [facility]  Confidential free help, from public health agencies, to find substance use treatment and information  [phone]    Link for Zoom Codes for Virtual 12 step Meetings  Maylin khan uk  aspx  Alcoholics Anonymous  [facility]  [phone]    http://www thompson com/  Narcotics Anonymous  [phone]  https://SOLOMO365/    Mental health resources in [facility]    [facility], [address]  Phone: [phone]    Crisis Intervention number: 9-532-790-024-356-7370    Prevent suicide [place]: In Crisis?  Call    7-086-188-IUAH (7193)    National Suicide Prevention Lifeline: 6-447-201-672.289.3190    Angela Davenport 98:   [facility] [facility], [address] [place] [address] [address]  [address] [place] [address], [address] [facility]  [address]  [address], [address]  Anirudh [address]  [address], [address]  [place], 0 [facility]  [phone]

## 2022-03-19 NOTE — ASSESSMENT & PLAN NOTE
· Pt currently smokes 1 PPD of cigarettes  · Offer NRT with 21 mg/24 hr patch  · Pt also requesting nicotine gum - order placed for Nicorette Q2H PRN  · Encourage cessation

## 2022-03-19 NOTE — ASSESSMENT & PLAN NOTE
· Pt endorses polysubstance abuse with cocaine, methamphetamine, and marijuana  · Reports occasional use of these substances, approximately once a month  · States he uses 1 bag of cocaine and 2 rocks of methamphetamine when he partakes in these substances  · Last use was about 1 week ago  · Encourage cessation

## 2022-03-19 NOTE — ASSESSMENT & PLAN NOTE
· Pt currently smokes 1 PPD of cigarettes  · Offer NRT with 21 mg/24 hr patch  · Pt also requesting nicotine gum - received Nicorette Q2H PRN  · Encourage cessation

## 2022-03-19 NOTE — ASSESSMENT & PLAN NOTE
· Pt with a h/o schizophrenia  · Reports he is on monthly [place] injections which he gets on the 28th of each month as well as Risperdal 2 mg PO daily  · Denies current AVH  · Continue home Risperdal

## 2022-03-19 NOTE — ASSESSMENT & PLAN NOTE
· Pt with a h/o bilateral hearing loss  · Per chart review, has had hearing impairment since childhood and hearing loss in R > L ear  · Left his hearing aids at home  · Follows with [facility]

## 2022-03-19 NOTE — ASSESSMENT & PLAN NOTE
· Pt with a history of chronic alcohol use, reports drinking 3 40-oz beers daily x2 years  · Currently denies any signs/symptoms of alcohol withdrawal  · Discontinue SEWS protocol  · Patient has not developed evidence of alcohol withdrawal throughout this hospitalization, has not required any phenobarbital  · Received PO thiamine, folic acid, and MVI supplementation  · Patient is medically cleared for discharge to rehab

## 2022-03-19 NOTE — PROGRESS NOTES
PROGRESS NOTE  DEPARTMENT OF MEDICAL TOXICOLOGY  LEVEL 4 MEDICAL DETOX UNIT  Viola Lyons 25 y o  male MRN: [id number]  Unit/Bed#: 5T DETOX 515-01 Encounter: [id number]      Reason for Admission/Principal Problem: Alcohol use disorder with risk for withdrawal    Rounding Provider: Yovani Freedman MD  Attending Provider: Yovani Freedman MD   3/18/2022  8:32 PM         * Alcohol withdrawal syndrome with complication ([facility] )  Assessment & Plan  · Pt with a history of chronic alcohol use, reports drinking 3 40-oz beers daily x2 years  · Currently denies any signs/symptoms of alcohol withdrawal  · Initiate SEWS protocol  · Continue to monitor for development of evidence of alcohol withdrawal at this time  · PO thiamine, folic acid, and MVI supplementation    Alcohol use disorder, severe, dependence ([facility] )  Assessment & Plan  · Pt with a history of chronic alcohol use  · Reports drinking 3 40-oz beers daily  Per the ED, pt will also occasionally drink liquor, last drank a bottle of liquor 2 weeks ago  · Last drink was at 12 PM yesterday (3/17/22)  · Denies h/o withdrawal seizures, but does endorse h/o 1 seizure that occurred 3 months ago  Pt reports he was outside with a family member when he fell to the ground and began convulsing  Pt denies any decrease in alcohol use prior to this seizure    · Endorses h/o inpatient rehabilitation, denies previous OP rehab  · Consult case management  · Pt expresses interest in entering IP rehab after discharge      Asthma  Assessment & Plan  · No acute exacerbation  · Albuterol inhaler PRN    Leukocytosis  Assessment & Plan  · Mild leukocytosis with WBC 12 00 on admission  · No current signs/symptoms of infection, VSS  · Continue monitoring CBC, VS    Tobacco use disorder  Assessment & Plan  · Pt currently smokes 1 PPD of cigarettes  · Offer NRT with 21 mg/24 hr patch  · Pt also requesting nicotine gum - order placed for Nicorette Q2H PRN  · Encourage cessation    Polysubstance abuse [facility])  Assessment & Plan  · Pt endorses polysubstance abuse with cocaine, methamphetamine, and marijuana  · Reports occasional use of these substances, approximately once a month  · States he uses 1 bag of cocaine and 2 rocks of methamphetamine when he partakes in these substances  · Last use was about 1 week ago  · Encourage cessation    Hearing loss  Assessment & Plan  · Pt with a h/o bilateral hearing loss  · Per chart review, has had hearing impairment since childhood and hearing loss in R > L ear  · Left his hearing aids at home  · Follows with [facility]    Legally blind  Assessment & Plan  · Pt reports he is legally blind  · Per chart review, history of visual impairment from early life, had multiple eye surgeries as a child, and able to see up close but not 20/20 vision  · Denies any new visual changes    Other schizophrenia ([address] )  Assessment & Plan  · Pt with a h/o schizophrenia  · Reports he is on monthly [place] injections which he gets on the 28th of each month as well as Risperdal 2 mg PO daily  · Denies current AVH  · Continue home Risperdal        VTE Pharmacologic Prophylaxis:   Pharmacologic: Enoxaparin (Lovenox)  Mechanical VTE Prophylaxis in Place: yes    Code Status: Level 1 - Full Code    Patient Centered Rounds: I have performed bedside rounds with nursing staff today  Discussions with Specialists or Other Care Team Provider: Case management     Education and Discussions with Family / Patient: Discussed with patient    Time Spent for Care: 30 minutes  More than 50% of total time spent on counseling and coordination of care as described above      Current Length of Stay: 1 day(s)    Current Patient Status: Inpatient     Certification Statement: The patient will continue to require additional inpatient hospital stay due to Alcohol use disorder with risk of withdrawal Discharge Plan: Plan discharge to inpatient rehab, likely tomorrow            Subjective:   Patient overall feeling well this morning with no acute complaints  Tolerating PO  Objective:     Clinical Opiate Withdrawal Scale  Pulse: 87    SEWS Total Score: 0 (3/19/2022 11:00 AM)        Last 24 Hours Medication List:   Current Facility-Administered Medications   Medication Dose Route Frequency Provider Last Rate    acetaminophen  650 mg Oral Q6H PRN Etelvina Hernandez PA-C      albuterol  2 puff Inhalation Q4H PRN Thelbert Loser, PA-KALEE      enoxaparin  40 mg Subcutaneous Daily Aida Lopez [place], [place]      folic acid  1 mg Oral Daily Etelvinazhou Hernandez [place]      multivitamin-minerals  1 tablet Oral Daily Etelvinazhou Pagan Emm[place], [place]      nicotine  21 mg Transdermal Daily Etelvina Ej Emmons, [place]      nicotine polacrilex  2 mg Oral Q2H PRN Thelbert Levarr, PA-KALEE      ondansetron  4 mg Intravenous Q6H PRN Thelbert Loser, PA-KALEE      risperiDONE  2 mg Oral Daily Etelvina Ej Emmons, [place]      sodium chloride  100 mL/hr Intravenous Continuous Thelbert Loser, PA-C 100 mL/hr (22 0828)    thiamine  100 mg Oral Daily Etelvinazhou Pagan [facility], [place]      traZODone  50 mg Oral HS PRN Etelvina Hernandez PA-C           Vitals:   Temp (24hrs), Av 3 °F (36 8 °C), Min:97 8 °F (36 6 °C), Max:98 9 °F (37 2 °C)    Temp:  [97 8 °F (36 6 °C)-98 9 °F (37 2 °C)] 97 8 °F (36 6 °C)  HR:  [57-98] 87  Resp:  [16-18] 18  BP: (129-145)/(75-88) 130/76  SpO2:  [95 %-100 %] 100 %  Body mass index is 32 58 kg/m²  Input and Output Summary (last 24 hours): Intake/Output Summary (Last 24 hours) at 3/19/2022 1324  Last data filed at 3/19/2022 3014  Gross per 24 hour   Intake 1000 ml   Output --   Net 1000 ml       Physical Exam:   Physical Exam  Vitals reviewed  Constitutional:       General: He is not in acute distress  Appearance: Normal appearance  HENT:      Head: Normocephalic and atraumatic        Mouth/Throat:      Mouth: Mucous membranes are moist       Pharynx: Oropharynx is clear  Eyes:      Conjunctiva/sclera: Conjunctivae normal       Pupils: Pupils are equal, round, and reactive to light  Cardiovascular:      Rate and Rhythm: Normal rate and regular rhythm  Heart sounds: No murmur heard  No friction rub  No gallop  Pulmonary:      Effort: Pulmonary effort is normal  No respiratory distress  Breath sounds: Normal breath sounds  Abdominal:      General: There is no distension  Palpations: Abdomen is soft  Tenderness: There is no abdominal tenderness  Musculoskeletal:         General: No swelling  Cervical back: Neck supple  Right lower leg: No edema  Left lower leg: No edema  Skin:     General: Skin is warm and dry  Neurological:      General: No focal deficit present  Mental Status: He is alert and oriented to person, place, and time  Comments: No tremor   Psychiatric:         Mood and Affect: Mood normal          Behavior: Behavior normal          Thought Content: Thought content normal          Additional Data:     Labs:   Results from last 7 days   Lab Units 03/19/22 0429 03/18/22 2047 03/18/22 2047   WBC Thousand/uL 11 60*   < > 12 00*   HEMOGLOBIN g/dL 14 7   < > 15 8   HEMATOCRIT % 42 8   < > 45 2   PLATELETS Thousands/uL 267   < > 272   NEUTROS PCT %  --   --  55   LYMPHS PCT %  --   --  34   LYMPHO PCT % 42  --   --    MONOS PCT %  --   --  10   MONO PCT % 5  --   --    EOS PCT % 2  --  1    < > = values in this interval not displayed  Results from last 7 days   Lab Units 03/19/22 0429   SODIUM mmol/L 137   POTASSIUM mmol/L 3 8   CHLORIDE mmol/L 107   CO2 mmol/L 24   BUN mg/dL 14   CREATININE mg/dL 1 17   ANION GAP mmol/L 6   CALCIUM mg/dL 8 3*   ALBUMIN g/dL 3 4   TOTAL BILIRUBIN mg/dL 0 41   ALK PHOS U/L 73   ALT U/L 33   AST U/L 42   GLUCOSE RANDOM mg/dL 116*                          * I Have Reviewed All Lab Data Listed Above  * Additional Pertinent Lab Tests Reviewed:  All Labs For Current Hospital Admission Reviewed      Imaging Studies: I have personally reviewed pertinent reports  Recent Cultures (last 7 days): Today, Patient Was Seen By: Lorenzo Trevino MD    ** Please Note: Dictation voice to text software may have been used in the creation of this document   **

## 2022-03-19 NOTE — ASSESSMENT & PLAN NOTE
· Pt with a history of chronic alcohol use, reports drinking 3 40-oz beers daily x2 years  · Currently denies any signs/symptoms of alcohol withdrawal  · Serum alcohol level <10 in the ED  · Initiate SEWS protocol  · Continue to monitor for development of evidence of alcohol withdrawal at this time  · PO thiamine, folic acid, and MVI supplementation

## 2022-03-19 NOTE — PROGRESS NOTES
22 1501   Referral Data   Referral Source Patient   Referral Name Self   Referral Reason Drug/Alcohol [facility] [address]   Readmission Root Cause   30 Day Readmission No   Patient Information   Mental Status Alert   Primary Caregiver Self   Accompanied by/Relationship , [address] Immediate family   Legal Information   Legal Issues Pt denies   Health Care Proxy Appointed No   Activities of Daily Living Prior to Admission   Functional Status Independent   Assistive Device No device needed   Living Arrangement House;Lives with someone   Ambulation Independent   Access to Firearms   Access to Firearms No  (pt denies)   Income Information   Income Source SSI/SSD   [employer]   Means of Transport to Appts:   (walks)     Pt is a 25year old male who presented at [facility] with his  from [facility] requesting detox from alcohol  Pt's name, , address, phone number were verified by case management  Pt was informed of case management role and the purpose of completion of intake with case management  Pt was pleasant and cooperative throughout intake; pt reports polysubstance use as well as alcohol use however, pt may be a poor historian as his grandmother and  stated that pt does not use illicit substances and his UDS was negative for all substances  Pt reports last inpatient substance use treatment was in  at [facility]  Pt denies any history of dt's/withdrawal seizures  See Substance use addendum/detail for more information  AUDIT: No score  PAWSS: 1  UDS (+) for: Negative  Ethanol level in ED: No score     Pt has a diagnosis of schizophrenia; pt reports that he is connected with outpatient mental health services at [facility] where he sees a psychiatrist and has a   Pt reports that he is medication compliant   Pt denies SI/HI; pt denies [address] however, pt's nurse reported that she heard pt talking to himself  CM relayed this information to medical team  Pt denies access to firearms  Pt has asthma, hearing loss, and is legally blind  Pt's PCP is Dr Joey Xavier at [address] [place] [address] Internal Medicine  Pt verified his insurance as [employer] and preferred pharmacy [facility] [address] in [place]  Pt denies any current legal issues  Pt reports that he is on disability due to his diagnosis of schizophrenia  Pt reports that he completed high school  Pt reports that he walks as his main mode of transportation  Pt denies any  service  Pt reports that he lives with his family in a house and can return upon discharge  Pt signed MITALI for his grandmother, Inés De Santiago and she was contacted and informed of pt's admission  Pt's grandmother stated that she believes pt is fabricating his drug use in order to prevent him from being accepted to inpatient substance use treatment  Pt and CM completed relapse prevention plan  Pt and CM signed plan and pt received a copy of this plan  CM's clinical impression is that pt is ambivalent about his alcohol use and the negative consequences of his use  Pt indicates a desire to enter inpatient rehab at this time  Pt signed an MITALI for [employer]  CM spoke with [employer] who stated that they are pending acceptance:     "we had him reviewed for our facilities, but he was referred to a higher level of care related to history of seizures of unknown etiology (last seizure - 3 months ago), schizophrenia and passive SI (last time was yesterday), and visual hallucinations (homicidal visions) and auditory hallucinations (voices tell him to harm others) - last hallucination was 03/17/22 " CM relayed this information to medical team who placed psychiatry consult  Pt presents in the contemplation stage of change

## 2022-03-19 NOTE — H&P
[facility]  H&P- Gokul Shepherd 2000, 25 y o  male MRN: [id number]  Unit/Bed#: 5T DETOX 515-01 Encounter: [id number]  Primary Care Provider: Maritza Burk MD   Date and time admitted to hospital: 3/18/2022  8:32 PM  [address]   LEVEL 4 MEDICAL DETOX UNIT  Moriah Pinto 25 y o  male MRN: [id number]  Unit/Bed#: 5T DETOX 515-01 Encounter: [id number]      Reason for Admission/Principal Problem: Ethanol withdrawal, Ethanol use disorder  Admitting Provider: Michael Thomas PA-C  Attending Provider: Nan Patel*   3/18/2022  8:32 PM        * Alcohol withdrawal syndrome with complication ([facility] )  Assessment & Plan  · Pt with a history of chronic alcohol use, reports drinking 3 40-oz beers daily x2 years  · Currently denies any signs/symptoms of alcohol withdrawal  · Serum alcohol level <10 in the ED  · Initiate SEWS protocol  · Continue to monitor for development of evidence of alcohol withdrawal at this time  · PO thiamine, folic acid, and MVI supplementation    Alcohol use disorder, severe, dependence ([facility] )  Assessment & Plan  · Pt with a history of chronic alcohol use  · Reports drinking 3 40-oz beers daily  Per the ED, pt will also occasionally drink liquor, last drank a bottle of liquor 2 weeks ago  · Last drink was at 12 PM yesterday (3/17/22)  · Denies h/o withdrawal seizures, but does endorse h/o 1 seizure that occurred 3 months ago  Pt reports he was outside with a family member when he fell to the ground and began convulsing  Pt denies any decrease in alcohol use prior to this seizure    · Endorses h/o inpatient rehabilitation, denies previous OP rehab  · Consult case management  · Pt expresses interest in entering IP rehab after discharge      Polysubstance abuse ([facility] )  Assessment & Plan  · Pt endorses polysubstance abuse with cocaine, methamphetamine, and marijuana  · Reports occasional use of these substances, approximately once a month  · States he uses 1 bag of cocaine and 2 rocks of methamphetamine when he partakes in these substances  · Last use was about 1 week ago  · Encourage cessation    Other schizophrenia ([address] )  Assessment & Plan  · Pt with a h/o schizophrenia  · Reports he is on monthly [place] injections which he gets on the 28th of each month as well as Risperdal 2 mg PO daily  · Denies current AVH  · Continue home Risperdal    Leukocytosis  Assessment & Plan  · Mild leukocytosis with WBC 12 00 on admission  · No current signs/symptoms of infection, VSS  · Continue monitoring CBC, VS    Asthma  Assessment & Plan  · Pt with a h/o asthma  · No acute exacerbation  · John Paul diffuse inspiratory wheezes present on exam  · Continue monitoring  · Albuterol inhaler prn    Tobacco use disorder  Assessment & Plan  · Pt currently smokes 1 PPD of cigarettes  · Offer NRT with 21 mg/24 hr patch  · Pt also requesting nicotine gum - order placed for Nicorette Q2H PRN  · Encourage cessation    Hearing loss  Assessment & Plan  · Pt with a h/o bilateral hearing loss  · Per chart review, has had hearing impairment since childhood and hearing loss in R > L ear  · Left his hearing aids at home  · Follows with [facility]    Legally blind  Assessment & Plan  · Pt reports he is legally blind  · Per chart review, history of visual impairment from early life, had multiple eye surgeries as a child, and able to see up close but not 20/20 vision  · Denies any new visual changes               VTE Prophylaxis: Enoxaparin (Lovenox)  / sequential compression device   Code Status: Full code      Anticipated Length of Stay:  Patient will be admitted on an Inpatient basis with an anticipated length of stay of  At least 2  midnights     Justification for Hospital Stay: alcohol withdrawal, alcohol use disorder, polysubstance abuse    For any questions or concerns, please Tiger Text the advanced practitioner in the role of [facility]-DETOX-AP On Call      This patient qualifies for Level IV medically managed intensive inpatient services under the criteria set by the American Society of Addiction Medicine, including dimensions 1-3  The patient is in withdrawal (or is intoxicated with high risk of withdrawal), with severe and unstable medical and/or psychiatric (dual diagnosis) problems, requiring requires 24-hour medical and nursing care and the full resources of a [address] patient to medical detox unit and continue supportive care and stabilization of acute ethanol withdrawal per medical toxicology/detox treatment pathway  Monitor ethanol withdrawal severity via the Severity of Ethanol Withdrawal Scale (SEWS) Q4 hours and then hourly if/when SEWS > 6  Treat withdrawal per pathway and reassess Q30-60 minutes  Mild SEWS Score 1-6  Administer medications* (IV or PO; PO preferred):   If initial SEWS score: diazepam 10mg PO/IV x 1 AND phenobarbital 65 mg PO/IV x 1   If repeat SEWS score 1-6: phenobarbital 65 mg PO/IV q1 hour x 5 doses maximum   Reassessment:    SEWS q1 hour after each dose until SEWS 0 x 2 hours   VS q1 hours (until SEWS 0, then q4 hours)   Notify provider for bedside evaluation if 5-dose maximum is reached, RASS of -3 to -5, or SEWS score escalates to moderate or severe  Moderate SEWS Score 7-12  Administer medications* (IV):   If initial SEWS score: diazepam 10mg IV x 1 AND phenobarbital 260 mg IV x 1   If repeat SEWS score 7-12 or score escalated from mild: phenobarbital 130 mg IV q30 minutes x 5 doses maximum   Reassessment:   SEWS q30 minutes after each dose until SEWS < 7 (then hourly until SEWS 0 x 2 hours)   VS q30 minutes until SEWS < 7 (then hourly until SEWS 0, then q4 hours)   Notify provider for bedside evaluation if 5-dose maximum is reached, RASS of -3 to -5, or SEWS score escalates to severe     Severe SEWS Score ? 13  Administer medications* (IV):   If initial SEWS score: Diazepam 10 mg IV x 1 AND phenobarbital 650 mg IV piggyback x 1 over 15-30 minutes   If repeat SEWS score ? 13 or score escalated from mild or moderate: phenobarbital 130 mg IV q30 minutes x 5 doses maximum   Reassessment:   SEWS q30 minutes after each dose until SEWS < 7 (then hourly until SEWS 0 x 2 hours)    VS q30 minutes until SEWS < 7 (then hourly until SEWS 0, then q4 hours)   Notify provider for bedside evaluation if 5-dose maximum is reached or RASS of -3 to -5   *Hold medications and notify provider if CNS depression, respirations < 10/min, or RASS of -3 to -5  Medications to be administered adjunctively if more than 2 grams of phenobarbital is needed for stabilization of withdrawal; require attending approval     Dexmedetomidine infusion 0 1-1mcg/kg/hr IV infusion, titratable to reduced agitation (Goal: RASS -2)   Ketamine   o Acute agitated delirium: 1-2 mg/kg IV or 4-5 mg/kg IM  o Refractory withdrawal: 0 1-1mg/kg/hr IV infusion, titratable to reduced agitation (Goal: RASS -2)    Further evaluation, screening and treatment:  Evaluate complete metabolic panel, transaminases, INR, and lipase  Assess hepatic ultrasound for any sign of alcoholic liver disease or cirrhosis, and ultimately refer for further hepatic evaluation and care as/if indicated  Additional medications for ethanol associated malnutrition: Thiamine 100 mg IV daily, increase to 500 mg TID for signs/symptoms of Wernicke's Encephalopathy or Wernicke Korsakoff Syndrome   Folic acid 1 mg IV daily   Multivitamin PO daily      Will offer first monthly injection of Naltrexone 380 mg IM, once patient is stabilized, as it has been shown to assist in decreasing cravings for ethanol  Evaluate and treat for coexisting substance use, such as opioids and nicotine   Discuss risk factors for infectious disease, such as history of intravenous drug abuse, and offer hepatitis and HIV screening if indicated  Case management consultation to assist with coordination of subsequent treatment after discharge  Hx and PE limited by: none    HPI: Massimo Siddiqi is a 25y o  year old male with a history of AUD, polysubstance abuse, schizophrenia, asthma, heather hearing loss, and legal blindness who presents seeking detoxification from alcohol and multiple substances, namely cocaine, methamphetamine, and marijuana  Pt reports he has been drinking 3 40-oz beers daily for the past 2 years  He did not report drinking any other types of alcohol upon arrival to the unit; however, in the ED, he also endorsed occasional liquor use, stating he last drank a bottle of liquor 2 weeks ago  He reports his last drink was at 12 PM yesterday  He denies any current symptoms of alcohol withdrawal, including anxiety, N/V, diaphoresis, tremors, or AVH  He states that his last period of sobriety was about 3 months ago and lasted for a few weeks  He denies any previous history of withdrawal or alcohol withdrawal seizures  However, pt does state that he had a seizure about 3 months ago  Pt reports he was outside with a family member when he fell to the ground and began convulsing  He denies any decrease in alcohol use prior to this seizure  Patient also endorses polysubstance use with cocaine, marijuana, and methamphetamine  He states that he uses these substances about once monthly and that he will use 2 rocks of methamphetamine and a bag of cocaine  His last use of these substances was approximately 1 week ago  He denies any fevers, chills, CP, SOB, palpitations, abdominal pain, diarrhea, myalgias/arthralgias, gait ataxia, H/A, dizziness, parasthesias, or weakness  Pt endorses a h/o schizophrenia, hearing loss, and being legally blind  He reports receiving [place] injections monthly for his schizophrenia as well as taking Risperdal daily   Patient reports that he is also prescribed Lexapro daily but "doesn't like taking it" and does not know his dosage  Preferred alcoholic beverage(s): beer, liquor on occasion  Quantity and frequency of alcohol intake: 3 40-oz beers daily  Use of any ethanol substitutes (toxic alcohols): no  Date/Time of last alcohol intake: yesterday at 12 PM  Current signs and symptoms of ethanol withdrawal: no    SEWS Total Score: 0 (3/18/2022 11:00 PM)          Ethanol Withdrawal History  Previous ethanol withdrawal? no  Prior inpatient treatment for ethanol withdrawal? No; pt reports a history of inpatient rehabilitation for alcohol use but denied any history of detox or rehab in the ED  Prior outpatient treatment for ethanol withdrawal? no  History of seizures with prior ethanol withdrawal? no  Prior treatment with naltrexone (Vivitrol)? no  Current treatment with naltrexone (Vivitrol)? no  Other current treatment for ethanol use disorder? no  Co-existing substance use? Yes; cocaine, methamphetamine, and marijuana    Review of PDMP: no     Social History     Substance and Sexual Activity   Alcohol Use Yes     Social History     Substance and Sexual Activity   Drug Use Yes    Types: Cocaine, Marijuana, Methamphetamines     Social History     Tobacco Use   Smoking Status Current Every Day Smoker    Packs/day: 1 00    Years: 3 00    Pack years: 3 00    Types: Cigarettes   Smokeless Tobacco Never Used       Review of Systems   Constitutional: Negative for chills, diaphoresis and fever  HENT: Negative for congestion, rhinorrhea and sore throat  Eyes: Negative for visual disturbance  Legally blind   Respiratory: Negative for cough and shortness of breath  Cardiovascular: Negative for chest pain and palpitations  Gastrointestinal: Negative for abdominal pain, diarrhea, nausea and vomiting  Genitourinary: Negative for decreased urine volume and difficulty urinating  Musculoskeletal: Negative for arthralgias, gait problem and myalgias     Neurological: Positive for seizures (1 seizure 3 months ago, unrelated to alcohol w/d)  Negative for dizziness, tremors, weakness, numbness and headaches  Psychiatric/Behavioral: Negative for dysphoric mood and hallucinations  The patient is not nervous/anxious  Historical Information   Past Medical History:   Diagnosis Date    Asthma     Hearing impaired     Legally blind     Schizophrenia ([facility] )      Past Surgical History:   Procedure Laterality Date    HERNIA REPAIR      TEAR DUCT SURGERY Bilateral     TONSILLECTOMY       Family History   Problem Relation Age of Onset    Schizophrenia Mother     Mental illness Mother     Abnormal EKG Sister     Mental illness Sister      Social History   Marital Status: Single   Occupation: unemployed  Patient Pre-hospital Living Situation: lives with grandmother, uncles, and great-grandmother  Patient Pre-hospital Level of Mobility: independent  Patient Pre-hospital Diet Restrictions: none    No Known Allergies    Prior to Admission medications    Medication Sig Start Date End Date Taking?  Authorizing Provider   paliperidone palmitate (INVEGA SUSTENNA) 234 MG/1 5ML im injection Inject 234 mg into a muscle every 28 days   Yes Historical Provider, MD   risperiDONE (RisperDAL) 2 mg tablet Take 2 mg by mouth daily   Yes Historical Provider, MD       Current Facility-Administered Medications   Medication Dose Route Frequency    acetaminophen (TYLENOL) tablet 650 mg  650 mg Oral Q6H PRN    albuterol (PROVENTIL HFA,VENTOLIN HFA) inhaler 2 puff  2 puff Inhalation Q4H PRN    enoxaparin (LOVENOX) subcutaneous injection 40 mg  40 mg Subcutaneous Daily    folic acid (FOLVITE) tablet 1 mg  1 mg Oral Daily    multivitamin-minerals (CENTRUM) tablet 1 tablet  1 tablet Oral Daily    nicotine (NICODERM CQ) 21 mg/24 hr TD 24 hr patch 21 mg  21 mg Transdermal Daily    nicotine polacrilex (NICORETTE) gum 2 mg  2 mg Oral Q2H PRN    ondansetron (ZOFRAN) injection 4 mg  4 mg Intravenous Q6H PRN    risperiDONE (RisperDAL) tablet 2 mg  2 mg Oral Daily    sodium chloride 0 9 % infusion  100 mL/hr Intravenous Continuous    thiamine tablet 100 mg  100 mg Oral Daily    traZODone (DESYREL) tablet 50 mg  50 mg Oral HS PRN       Continuous Infusions:sodium chloride, 100 mL/hr, Last Rate: 100 mL/hr (03/18/22 2253)             Objective     No intake or output data in the 24 hours ending 03/19/22 0128    Invasive Devices:   Peripheral IV 03/18/22 Right Forearm (Active)   Site Assessment WDL 03/19/22 0109   Dressing Type Transparent;Securing device 03/18/22 2046   Line Status Blood return noted 03/19/22 0109   Dressing Status Clean;Dry; Intact 03/18/22 2046       Vitals   Vitals:    03/18/22 2033 03/18/22 2206 03/18/22 2300   BP: 145/77 145/75 129/88   TempSrc: Oral Temporal Temporal   Pulse: 98 78 69   Resp: 16 18 18   Temp: 98 9 °F (37 2 °C) 98 °F (36 7 °C) 98 3 °F (36 8 °C)       Physical Exam  Constitutional:       General: He is not in acute distress  Appearance: He is obese  He is not ill-appearing or diaphoretic  HENT:      Head: Normocephalic and atraumatic  Nose: Nose normal       Mouth/Throat:      Mouth: Mucous membranes are moist       Dentition: Abnormal dentition (see below)  Pharynx: No oropharyngeal exudate or posterior oropharyngeal erythema  Eyes:      General: Scleral icterus present  Extraocular Movements: Extraocular movements intact  Pupils: Pupils are equal, round, and reactive to light  Cardiovascular:      Rate and Rhythm: Normal rate and regular rhythm  Heart sounds: Normal heart sounds  No murmur heard  No friction rub  No gallop  Pulmonary:      Effort: Pulmonary effort is normal  No respiratory distress  Breath sounds: Wheezing (heather diffuse coarse inspiratory wheezes) present  No rhonchi or rales  Abdominal:      General: Bowel sounds are normal  There is no distension  Palpations: Abdomen is soft  Tenderness: There is no abdominal tenderness  Musculoskeletal:         General: No swelling  Cervical back: Neck supple  Right lower leg: No edema  Left lower leg: No edema  Skin:     General: Skin is warm and dry  Neurological:      General: No focal deficit present  Mental Status: He is alert and oriented to person, place, and time  Cranial Nerves: No dysarthria or facial asymmetry  Motor: No tremor  Coordination: Finger-Nose-Finger Test normal    Psychiatric:         Attention and Perception: Attention normal  He does not perceive auditory or visual hallucinations  Mood and Affect: Affect is flat  Speech: Speech normal          Behavior: Behavior is cooperative  Data:    EKG, Pathology, and Other Studies: I have personally reviewed pertinent reports  EKG: NSR at 73 bpm  Nonspecific ST segment changes    Diffuse ST elevation consistent with early repolarization  - Malachi Briones DO (ED attending)    Lab Results:  CBC ETOH     Lab Results   Component Value Date    WBC 12 00 (H) 03/18/2022    RBC 4 83 03/18/2022    HGB 15 8 03/18/2022    HCT 45 2 03/18/2022    MCV 94 03/18/2022    MCH 32 6 03/18/2022    MCHC 34 9 03/18/2022    RDW 14 0 03/18/2022     03/18/2022    MPV 9 5 03/18/2022      No results found for: LACTICACID   CMP UA         Component Value Date/Time    K 4 7 03/18/2022 2047     03/18/2022 2047    CO2 31 (H) 03/18/2022 2047    BUN 17 03/18/2022 2047    CREATININE 1 43 03/18/2022 2047         Component Value Date/Time    CALCIUM 9 9 03/18/2022 2047    ALKPHOS 65 03/18/2022 2047    AST 59 03/18/2022 2047    ALT 42 03/18/2022 2047      Lab Results   Component Value Date    CLARITYU Clear 01/21/2022    COLORU Yellow 01/21/2022    SPECGRAV 1 010 01/21/2022    PHUR 6 0 01/21/2022    GLUCOSEU Negative 01/21/2022    KETONESU Negative 01/21/2022    BLOODU Negative 01/21/2022    PROTEIN UA Negative 01/21/2022    NITRITE Negative 01/21/2022    BILIRUBINUR Negative 01/21/2022 UROBILINOGEN 0 2 01/21/2022    LEUKOCYTESUR Negative 01/21/2022        Liver Function Test: ASA     Lab Results   Component Value Date    TBILI 0 61 03/18/2022    ALKPHOS 65 03/18/2022    AST 59 03/18/2022    ALT 42 03/18/2022    TP 7 9 03/18/2022    ALB 4 4 03/18/2022      Lab Results   Component Value Date    SALICYLATE <8 1 (L) 88/01/1058      Troponin APAP     No results found for: TROPONINI   Lab Results   Component Value Date    ACTMNPHEN <10 (L) 03/18/2022      VBG HCG     No results found for: PHVEN, EEM8JZF, PO2VEN, SYD1EFB, BEVEN, O5LWVRPGZ, Q8TEAHK   No results found for: HCGQUANT   ABG Urine Drug Screen     No results found for: PHART, BLV2OIG, PO2ART, GVP7YLW, BEART, J1XGJOWCL, O2HGB, SOURC, KIMBERLY, VTAC, ACRATE, INSPIREDAIR, PEEP   Lab Results   Component Value Date    AMPMETHUR Negative 01/21/2022    BARBTUR Negative 01/21/2022    BDZUR Negative 01/21/2022    COCAINEUR Negative 01/21/2022    METHADONEUR Negative 01/21/2022    OPIATEUR Negative 01/21/2022    PCPUR Negative 01/21/2022    THCUR Negative 01/21/2022    OXYCODONEUR Negative 01/21/2022      Lactate INR     No results found for: LACTICACID   No results found for: INR   PTT Protime     No results found for: PTT     No results found for: PROTIME           Imaging Studies: not applicable      Counseling / Coordination of Care  Total floor / unit time spent today 50 minutes  Greater than 50% of total time was spent with the patient and / or family counseling and / or coordination of care         Minutes of critical care time 28  -Critical care time was exclusive of separately billable procedures and teaching time    -Critical care was necessary to treat or prevent imminent or life-threatening deterioration of the following condition: CNS failure/compromise, toxidrome (ethanol withdrawal),  toxidrome and withdrawal  -Critical care time was spent personally by me on the following activities as well as the above as per the course and rest of chart: obtaining history from patient/surrogate, development of a treatment plan, discussions with referring provider(s), evaluation of patient's response to the treatment, examination of the patient, performing treatments and interventions, re-evaluation of the patient's condition, review of old charts, ordering/interpreting laboratory studies, ordering/interpreting of radiographic studies  ** Please Note: This note has been constructed using a voice recognition system   **

## 2022-03-20 PROCEDURE — 99232 SBSQ HOSP IP/OBS MODERATE 35: CPT

## 2022-03-20 RX ORDER — HYDROXYZINE 50 MG/1
50 TABLET, FILM COATED ORAL EVERY 6 HOURS PRN
Status: DISCONTINUED | OUTPATIENT
Start: 2022-03-20 | End: 2022-03-22 | Stop reason: HOSPADM

## 2022-03-20 RX ADMIN — RISPERIDONE 2 MG: 2 TABLET ORAL at 13:16

## 2022-03-20 RX ADMIN — FOLIC ACID 1 MG: 1 TABLET ORAL at 13:16

## 2022-03-20 RX ADMIN — NICOTINE POLACRILEX 2 MG: 2 GUM, CHEWING BUCCAL at 19:04

## 2022-03-20 RX ADMIN — TRAZODONE HYDROCHLORIDE 50 MG: 50 TABLET ORAL at 22:01

## 2022-03-20 RX ADMIN — OLANZAPINE 10 MG: 10 TABLET, ORALLY DISINTEGRATING ORAL at 21:33

## 2022-03-20 RX ADMIN — HYDROXYZINE HYDROCHLORIDE 50 MG: 25 TABLET ORAL at 22:01

## 2022-03-20 RX ADMIN — MULTIPLE VITAMINS W/ MINERALS TAB 1 TABLET: TAB ORAL at 13:16

## 2022-03-20 RX ADMIN — THIAMINE HCL TAB 100 MG 100 MG: 100 TAB at 13:16

## 2022-03-20 NOTE — PLAN OF CARE
Problem: Potential for Falls  Goal: Patient will remain free of falls  Description: INTERVENTIONS:  - Educate patient/family on patient safety including physical limitations  - Instruct patient to call for assistance with activity   - Consult OT/PT to assist with strengthening/mobility   - Keep Call bell within reach  - Keep bed low and locked with side rails adjusted as appropriate  - Keep care items and personal belongings within reach  - Initiate and maintain comfort rounds  - Make Fall Risk Sign visible to staff  -- Apply yellow socks and bracelet for high fall risk patients  - Consider moving patient to room near nurses station  Outcome: Progressing     Problem: SAFETY ADULT  Goal: Patient will remain free of falls  Description: INTERVENTIONS:  - Educate patient/family on patient safety including physical limitations  - Instruct patient to call for assistance with activity   - Consult OT/PT to assist with strengthening/mobility   - Keep Call bell within reach  - Keep bed low and locked with side rails adjusted as appropriate  - Keep care items and personal belongings within reach  - Initiate and maintain comfort rounds  - Make Fall Risk Sign visible to staff  - Apply yellow socks and bracelet for high fall risk patients  - Consider moving patient to room near nurses station  Outcome: Progressing  Goal: Maintain or return to baseline ADL function  Description: INTERVENTIONS:  -  Assess patient's ability to carry out ADLs; assess patient's baseline for ADL function and identify physical deficits which impact ability to perform ADLs (bathing, care of mouth/teeth, toileting, grooming, dressing, etc )  - Assess/evaluate cause of self-care deficits   - Assess range of motion  - Assess patient's mobility; develop plan if impaired  - Assess patient's need for assistive devices and provide as appropriate  - Encourage maximum independence but intervene and supervise when necessary  - Involve family in performance of ADLs  - Assess for home care needs following discharge   - Consider OT consult to assist with ADL evaluation and planning for discharge  - Provide patient education as appropriate  Outcome: Progressing  Goal: Maintains/Returns to pre admission functional level  Description: INTERVENTIONS:  - Per form BMAT or MOVE assessment daily    - Set and communicate daily mobility goal to care team and patient/family/caregiver     - Collaborate with rehabilitation services on mobility goals if consulted  - Out of bed for toileting  - Record patient progress and toleration of activity level   Outcome: Progressing

## 2022-03-20 NOTE — PROGRESS NOTES
Progress Note - Medical Toxicology    Dacia Fletcher 25 y o  male MRN: [id number]  Unit/Bed#: 5T DETOX 511-01 Encounter: [id number]  MEDICAL DETOX UNIT, LEVEL 4  Department of Medical Toxicology  Reason for Admission/Principal Problem: alcohol withdrawal, alcohol use disorder   Rounding Provider: Etta Currie PA-C, Jose Guadalupe Su MD         * Alcohol withdrawal syndrome with complication [facility])  Assessment & Plan  · Pt with a history of chronic alcohol use, reports drinking 3 40-oz beers daily x2 years  · Currently denies any signs/symptoms of alcohol withdrawal  · Discontinue SEWS protocol  · Patient has not developed evidence of alcohol withdrawal throughout this hospitalization, has not required any phenobarbital  · Received PO thiamine, folic acid, and MVI supplementation  · Patient is medically cleared for discharge to rehab    Alcohol use disorder, severe, dependence ([facility] [address] )  Assessment & Plan  · Pt with a history of chronic alcohol use  · Reports drinking 3 40-oz beers daily  Per the ED, pt will also occasionally drink liquor, last drank a bottle of liquor 2 weeks ago  · Last drink was at 12 PM yesterday (3/17/22)  · Denies h/o withdrawal seizures, but does endorse h/o 1 seizure that occurred 3 months ago  Pt reports he was outside with a family member when he fell to the ground and began convulsing  Pt denies any decrease in alcohol use prior to this seizure  · Endorses h/o inpatient rehabilitation, denies previous OP rehab  · Consult case management  · Pt will be discharged to IP rehab        Asthma  Assessment & Plan  · No acute exacerbation  · Albuterol inhaler PRN    Leukocytosis  Assessment & Plan  · Improving: WBC 12 00 -> 11 60 since on admission  · No current signs/symptoms of infection, VSS  · Monitoring CBC, VS    Tobacco use disorder  Assessment & Plan  · Pt currently smokes 1 PPD of cigarettes  · Offer NRT with 21 mg/24 hr patch  · Pt also requesting nicotine gum - received Nicorette Q2H PRN  · Encourage cessation    Polysubstance abuse ([facility] [address] )  Assessment & Plan  · Pt endorses polysubstance abuse with cocaine, methamphetamine, and marijuana  · Reports occasional use of these substances, approximately once a month  · States he uses 1 bag of cocaine and 2 rocks of methamphetamine when he partakes in these substances  · Last use was about 1 week ago  · UDS  NEGATIVE   · Encourage cessation    Hearing loss  Assessment & Plan  · Pt with a h/o bilateral hearing loss  · Per chart review, has had hearing impairment since childhood and hearing loss in R > L ear  · Left his hearing aids at home  · Follows with [facility]    Legally blind  Assessment & Plan  · Pt reports he is legally blind  · Per chart review, history of visual impairment from early life, had multiple eye surgeries as a child, and able to see up close but not 20/20 vision  · Denies any new visual changes    Other schizophrenia ([facility] [address] )  Assessment & Plan  · Pt with a h/o schizophrenia  · Reports he is on monthly [place] injections which he gets on the 28th of each month as well as Risperdal 2 mg PO daily  · Pt states he has also taken Lexapro in the past (dose unknown) but it does not help his mood  · Pending psych evaluation   · Continue home Risperdal and Zyprexa HS             VTE Pharmacologic Prophylaxis:   Pharmacologic: Enoxaparin (Lovenox)  Mechanical VTE Prophylaxis in Place: no    Code Status: Level 1 - Full Code    Patient Centered Rounds: I have performed bedside rounds with nursing staff today  Discussions with Specialists or Other Care Team Provider: Psychiatry, Case Management   Education and Discussions with Family / Patient: I personally did not discuss this patient with family    Time Spent for Care: 30 minutes  More than 50% of total time spent on counseling and coordination of care as described above  Current Length of Stay: 2 day(s)    Current Patient Status: Inpatient     Certification Statement:  The patient will continue to require additional inpatient hospital stay due to pending psychiatric evaluation Discharge Plan: Anticipated discharge to inpatient rehab once patient is cleared by psychiatry       Subjective:   Patient offers no new complaints  Denies any active symptoms of withdrawal at this time  SEWS protocol is discontinued  Patient reports non-compliance with at home psychiatric medication  He is aware that he will be evaluated by psychiatry prior to discharge  Objective:     Clinical Opiate Withdrawal Scale  Pulse: 55    SEWS Total Score: 0 (3/19/2022 11:00 AM)        Last 24 Hours Medication List:   Current Facility-Administered Medications   Medication Dose Route Frequency Provider Last Rate    acetaminophen  650 mg Oral Q6H PRN [facility]      albuterol  2 puff Inhalation Q4H PRN [facility]      enoxaparin  40 mg Subcutaneous Daily [place]      folic acid  1 mg Oral Daily [place]      multivitamin-minerals  1 tablet Oral Daily [place]      nicotine  21 mg Transdermal Daily [place]      nicotine polacrilex  2 mg Oral Q2H PRN Allan Chen PA-C      OLANZapine  10 mg Oral HS Janet García MD      ondansetron  4 mg Intravenous Q6H PRN [facility]      risperiDONE  2 mg Oral Daily [place]      thiamine  100 mg Oral Daily [place]      traZODone  50 mg Oral HS PRN [facility]           Vitals:   Temp (24hrs), Av 4 °F (36 9 °C), Min:98 °F (36 7 °C), Max:99 1 °F (37 3 °C)    Temp:  [98 °F (36 7 °C)-99 1 °F (37 3 °C)] 99 1 °F (37 3 °C)  HR:  [55-59] 55  Resp:  [16-18] 18  BP: (119-134)/(64-73) 134/68  SpO2:  [98 %-99 %] 99 %  Body mass index is 32 58 kg/m²  Input and Output Summary (last 24 hours):     Intake/Output Summary (Last 24 hours) at 3/20/2022 8313  Last data filed at 3/19/2022 1729  Gross per 24 hour   Intake 480 ml   Output --   Net 480 ml       Physical Exam:   Physical Exam  Constitutional:       Appearance: He is not ill-appearing or diaphoretic  Cardiovascular:      Rate and Rhythm: Normal rate and regular rhythm  Heart sounds: Normal heart sounds  No murmur heard  Abdominal:      General: Bowel sounds are normal  There is no distension  Palpations: Abdomen is soft  Tenderness: There is no abdominal tenderness  Skin:     General: Skin is warm  Neurological:      Mental Status: He is oriented to person, place, and time  Psychiatric:         Mood and Affect: Mood is anxious  Additional Data:     Labs: keep all most recent labs as listed on admission templates   Results from last 7 days   Lab Units 03/19/22  0429 03/18/22 2047 03/18/22 2047   WBC Thousand/uL 11 60*   < > 12 00*   HEMOGLOBIN g/dL 14 7   < > 15 8   HEMATOCRIT % 42 8   < > 45 2   PLATELETS Thousands/uL 267   < > 272   NEUTROS PCT %  --   --  55   LYMPHS PCT %  --   --  34   LYMPHO PCT % 42  --   --    MONOS PCT %  --   --  10   MONO PCT % 5  --   --    EOS PCT % 2  --  1    < > = values in this interval not displayed  Results from last 7 days   Lab Units 03/19/22  0429   SODIUM mmol/L 137   POTASSIUM mmol/L 3 8   CHLORIDE mmol/L 107   CO2 mmol/L 24   BUN mg/dL 14   CREATININE mg/dL 1 17   ANION GAP mmol/L 6   CALCIUM mg/dL 8 3*   ALBUMIN g/dL 3 4   TOTAL BILIRUBIN mg/dL 0 41   ALK PHOS U/L 73   ALT U/L 33   AST U/L 42   GLUCOSE RANDOM mg/dL 116*                              * I Have Reviewed All Lab Data Listed Above  * Additional Pertinent Lab Tests Reviewed: Jenifer 66 Admission Reviewed      Imaging Studies: I have personally reviewed pertinent reports  Recent Cultures (last 7 days):           Today, Patient Was Seen By: Umu Lazar PA-C    ** Please Note: Dictation voice to text software may have been used in the creation of this document   **

## 2022-03-20 NOTE — DISCHARGE SUMMARY
[facility]  Discharge- Dacia Fletcher 2000, 25 y o  male MRN: [id number]  Unit/Bed#: 5T DETOX 511-01 Encounter: [id number]  Primary Care Provider: Georgette Orosco MD   Date and time admitted to hospital: 3/18/2022  8:32 PM    [facility], LEVEL 4  Department of Medical Toxicology  Reason for Admission/Principal Problem: alcohol withdrawal, alcohol use disorder  Admitting provider: JONO Barker MD   3/18/2022  8:32 PM       Discharging Physician / Practitioner: Etta Currie PA-C  PCP: Georgette Orosco MD  Admission Date:   Admission Orders (From admission, onward)     Ordered        03/18/22 2129  INPATIENT ADMISSION  Once                      Discharge Date: 03/22/22    Medical Problems             Resolved Problems  Date Reviewed: 6/29/2021          Resolved    Leukocytosis 3/22/2022     Resolved by  Etta Currie PA-C                * Alcohol withdrawal syndrome with complication [facility])  Assessment & Plan  · Pt with a history of chronic alcohol use, reports drinking 3 40-oz beers daily x2 years  · Currently denies any signs/symptoms of alcohol withdrawal  · Discontinue SEWS protocol  · Patient has not developed evidence of alcohol withdrawal throughout this hospitalization, has not required any phenobarbital  · Received PO thiamine, folic acid, and MVI supplementation  · Patient is medically cleared for inpatient psych hospitalization     Alcohol use disorder, severe, dependence ([facility] [address] )  Assessment & Plan  · Pt with a history of chronic alcohol use  · Reports drinking 3 40-oz beers daily  Per the ED, pt will also occasionally drink liquor, last drank a bottle of liquor 2 weeks ago  · Last drink was at 12 PM yesterday (3/17/22)  · Denies h/o withdrawal seizures, but does endorse h/o 1 seizure that occurred 3 months ago  Pt reports he was outside with a family member when he fell to the ground and began convulsing   Pt denies any decrease in alcohol use prior to this seizure  · Endorses h/o inpatient rehabilitation, denies previous OP rehab  · Consult case management  · Pt will be discharged to [facility] Inpatient Psych Hospitalization on a 201 commitment       Asthma  Assessment & Plan  · No acute exacerbation  · Albuterol inhaler PRN    Hearing loss  Assessment & Plan  · Pt with a h/o bilateral hearing loss  · Per chart review, has had hearing impairment since childhood and hearing loss in R > L ear  · Follows with [facility]     Other schizophrenia ([facility] [address] )  Assessment & Plan  · Pt with a h/o schizophrenia  · Reports he is on monthly [place] injections which he gets on the 28th of each month as well as Risperdal 2 mg PO daily  · Pt states he has also taken Lexapro in the past (dose unknown) but it does not help his mood  · Psych Consult complete  · Patient is endorsing visual and auditory hallucinations and appears to be internally responding to stimuli  Admits to Homicidal ideations with no intent towards any one specific   At the time patient is agreeable to 201 inpatient psych admission  · Medication Adjustment as per psychiatry   · Patient is medically stable for inpatient psych admission         Consultations During Hospital Stay:  · Case management  · Psychiatry    Procedures Performed:   · none    Significant Findings / Test Results:   · Leukocytosis - improved, no acute signs/sx of infection    Incidental Findings:   · none     Test Results Pending at Discharge (will require follow up):   · none     Outpatient Tests Requested:  · none    Complications:  none    Reason for Admission: alcohol withdrawal, alcohol use disorder    Hospital Course:     Stacy Bundy is a 25 y o  male patient with a history of alcohol use disorder, polysubstance abuse, schizophrenia, tobacco use disorder, b/l hearing loss, and is legally blind who originally presented to the hospital on 3/18/2022 seeking detoxification from alcohol, cocaine, methamphetamine, and marijuana  Patient reported drinking 3 40-oz beers daily for the past 2 years  He denied any symptoms of alcohol withdrawal upon initial presentation  [facility] and he did not develop any evidence of alcohol withdrawal, and he thus did not require any phenobarbital  Patient had a leukocytosis on CBC on admission, which improved from 12 -> 11 6 throughout his hospitalization  Patient did not experience any signs/symptoms of acute infection during this admission  He was evaluated by psychiatry with the purpose of being cleared for rehab placement and was found to be not safe for discharge d/t violent AVH, and patient signed a 201 for psychiatric admission  Per psychiatry recommendations, monthly Cyndi Small and risperidone 2 mg daily were continued, and they added risperdal 0 5 mg ODT Q4H PRN up to 3 doses daily  Patient will be discharged to inpatient psychiatry  Please see above list of diagnoses and related plan for additional information  Condition at Discharge: good     Discharge Day Visit / Exam:     Subjective:  Patient was seen and examined at bedside  Reports to having a "Good Day"  states improvement with recent psychiatric medication adjustments  Continues to deny any physical complaints of chest pain, abdominal pain, headache, or shortness of breath,    Vitals: Blood Pressure: 136/75 (03/22/22 0703)  Pulse: 60 (03/22/22 0703)  Temperature: 98 1 °F (36 7 °C) (03/22/22 0703)  Temp Source: Temporal (03/22/22 0703)  Respirations: 18 (03/22/22 0703)  Height: 5' 4" (162 6 cm) (03/18/22 2206)  Weight - Scale: 86 1 kg (189 lb 12 8 oz) (03/18/22 2206)  SpO2: 97 % (03/22/22 0703)  Exam:   Physical Exam  Constitutional:       General: He is not in acute distress  Appearance: He is not ill-appearing  Eyes:      Pupils: Pupils are equal, round, and reactive to light  Cardiovascular:      Rate and Rhythm: Normal rate and regular rhythm        Heart sounds: No murmur heard       Abdominal:      General: Abdomen is flat  Bowel sounds are normal  There is no distension  Palpations: Abdomen is soft  Tenderness: There is no abdominal tenderness  Musculoskeletal:         General: Normal range of motion  Skin:     General: Skin is warm and dry  Neurological:      Mental Status: He is alert and oriented to person, place, and time  Psychiatric:         Attention and Perception: He perceives auditory and visual hallucinations  Behavior: Behavior is cooperative  Discussion with Family: I personally did not discuss this patient with his family     Discharge instructions/Information to patient and family:   See after visit summary for information provided to patient and family  Provisions for Follow-Up Care:  See after visit summary for information related to follow-up care and any pertinent home health orders  Disposition:     Inpatient Psychiatry at [facility] to [facility]:   · Not Applicable to this Patient - Not Applicable to this Patient    Planned Readmission: none     Discharge Statement:  I spent 35 minutes discharging the patient  This time was spent on the day of discharge  I had direct contact with the patient on the day of discharge  Greater than 50% of the total time was spent examining patient, answering all patient questions, arranging and discussing plan of care with patient as well as directly providing post-discharge instructions  Additional time then spent on discharge activities  Discharge Medications:  See after visit summary for reconciled discharge medications provided to patient and family        ** Please Note: This note has been constructed using a voice recognition system **

## 2022-03-20 NOTE — UTILIZATION REVIEW
Initial Clinical Review    Admission: Date/Time/Statement:   Admission Orders (From admission, onward)     Ordered        03/18/22 2129  INPATIENT ADMISSION  Once                      Orders Placed This Encounter   Procedures    INPATIENT ADMISSION     Standing Status:   Standing     Number of Occurrences:   1     Order Specific Question:   Level of Care     Answer:   Med Surg [16]     Order Specific Question:   Estimated length of stay     Answer:   More than 2 Midnights     Order Specific Question:   Certification     Answer:   I certify that inpatient services are medically necessary for this patient for a duration of greater than two midnights  See H&P and MD Progress Notes for additional information about the patient's course of treatment  ED Arrival Information     Expected Arrival Acuity    - 3/18/2022 20:20 Urgent         Means of arrival Escorted by Service Admission type    [facility] Toxicology Urgent         Arrival complaint    Detox (multiple drugs)        Chief Complaint   Patient presents with    Detox Evaluation     Pt came to ER for detox evaluation from ETOH (3 40oz per day), cocaine x1 bag per day, meth, and THC  Pt reports last using the day before yesterday  Initial Presentation: 25year old male to the ED from home with complaints of wanting detox from ETOH, cocaine, meth, THC  Admitted to inpatient detox for  Alcohol withdrawal syndrome, alcohol use disorder, polysubstance abuse  H/O schizophrenia, legally blind  Has been drinking heavily for the past 2 years  Start on SEWs protocol  Wbcs elevated  No signs of infection  H/O asthma with wheezing breath sounds  Start IV thiamine, folic acid, MVI  Prescribed lexapro but does not take it  SEWs score currently 0  Date: 3/19  Day 2:   Denies w/d seizures, but had 1 seizure about 3 months prior  H/O IP rehab  Consult case management  SEW score 0   COntinue with monitoring for WD    Behavioral consult:Referral sent to inpatient rehab  ED Triage Vitals   Temperature Pulse Respirations Blood Pressure SpO2   03/18/22 2033 03/18/22 2033 03/18/22 2033 03/18/22 2033 03/18/22 2033   98 9 °F (37 2 °C) 98 16 145/77 96 %      Temp Source Heart Rate Source Patient Position - Orthostatic VS BP Location FiO2 (%)   03/18/22 2033 03/18/22 2033 03/19/22 0709 03/18/22 2206 --   Oral Monitor Lying Left arm       Pain Score       03/18/22 2206       No Pain          Wt Readings from Last 1 Encounters:   03/18/22 86 1 kg (189 lb 12 8 oz)     Additional Vital Signs:   Time Temp Pulse Resp BP MAP (mmHg) SpO2 O2 Device Patient Position - Orthostatic VS   03/19/22 2015 98 °F (36 7 °C) 59 16 131/73 92 98 % None (Room air) Lying   03/19/22 1500 97 9 °F (36 6 °C) 55 16 129/66 -- 100 % None (Room air) Lying   03/19/22 1100 -- 87 -- -- -- -- -- --   03/19/22 0709 97 8 °F (36 6 °C) 62 18 130/76 -- 100 % None (Room air) Lying   03/19/22 0300 -- 57 -- -- -- 95 % None (Room air) --   03/18/22 2300 98 3 °F (36 8 °C) 69 18 129/88 -- 98 % None (Room air) --   03/18/22 2206 98 °F (36 7 °C) 78 18 145/75 -- 99 % None (Room air) --   03/18/22 2033 98 9 °F (37 2 °C) 98 16 145/77 -- 96 % None (Room air) --         [address] Name 03/19/22 1100 03/19/22 0700 03/19/22 0300 03/18/22 2300 03/18/22 2210   Severity of Ethanol Withdrawal Scale (SEWS)   ANXIETY: Do you feel that something bad is about to happen to you right now? 0  -LL 0  -LL 0  -GD 0  -GD 0  -GD   NAUSEA and DRY HEAVES or VOMITING? 0  -LL 0  -LL 0  -GD 0  -GD 0  -GD   SWEATING: (includes moist palms, sweating now)? Score 0 or 2 0  -LL 0  -LL 0  -GD 0  -GD 0  -GD   TREMOR: with arms extended eyes closed? 0  -LL 0  -LL 0  -GD 0  -GD 0  -GD   AGITATION: Fidgety, restless, pacing?  0  -LL 0  -LL 0  -GD 0  -GD 0  -GD   DISORIENTATION: 0  -LL 0  -LL 0  -GD 0  -GD 0  -GD   HALLUCINATIONS: 0  -LL 0  -LL 0  -GD 0  -GD 0  -GD   VITAL SIGNS: ANY (Pulse >817, Diastolic BP >47, Temp >13 3) 0  -LL 0  -LL 0  -GD 0  -GD 0  -GD   SEWS Total Score 0  -LL 0  -LL 0  -GD 0  -GD 0  -GD   Parsons Agitation Sedation Scale (RASS)   Parsons Agitation Sedation Scale (RASS) -1  -LL -1  -LL 0  -GD 0  -GD 0  -GD       Pertinent Labs/Diagnostic Test Results:   No orders to display     Results from last 7 days   Lab Units 03/18/22 2047   SARS-COV-2  Negative     Results from last 7 days   Lab Units 03/19/22  0429 03/18/22 2047   WBC Thousand/uL 11 60* 12 00*   HEMOGLOBIN g/dL 14 7 15 8   HEMATOCRIT % 42 8 45 2   PLATELETS Thousands/uL 267 272   NEUTROS ABS Thousands/µL  --  6 50   TOTAL NEUT ABS Thousand/uL 5 68  --          Results from last 7 days   Lab Units 03/19/22 0429 03/18/22 2047   SODIUM mmol/L 137 140   POTASSIUM mmol/L 3 8 4 7   CHLORIDE mmol/L 107 102   CO2 mmol/L 24 31*   ANION GAP mmol/L 6 7   BUN mg/dL 14 17   CREATININE mg/dL 1 17 1 43   EGFR ml/min/1 73sq m 88 69   CALCIUM mg/dL 8 3* 9 9   MAGNESIUM mg/dL 1 8 1 8     Results from last 7 days   Lab Units 03/19/22 0429 03/18/22 2047   AST U/L 42 59   ALT U/L 33 42   ALK PHOS U/L 73 65   TOTAL PROTEIN g/dL 6 4 7 9   ALBUMIN g/dL 3 4 4 4   TOTAL BILIRUBIN mg/dL 0 41 0 61         Results from last 7 days   Lab Units 03/19/22 0429 03/18/22 2047   GLUCOSE RANDOM mg/dL 116* 82       Results from last 7 days   Lab Units 03/18/22 2047   INFLUENZA A PCR  Negative   INFLUENZA B PCR  Negative   RSV PCR  Negative         Results from last 7 days   Lab Units 03/19/22  0434   AMPH/METH  Negative   BARBITURATE UR  Negative   BENZODIAZEPINE UR  Negative   COCAINE UR  Negative   METHADONE URINE  Negative   OPIATE UR  Negative   PCP UR  Negative   THC UR  Negative     Results from last 7 days   Lab Units 03/18/22 2047   ETHANOL LVL mg/dL <10   ACETAMINOPHEN LVL ug/mL <45*   SALICYLATE LVL mg/dL <5 9*       Results from last 7 days   Lab Units 03/19/22  0429   TOTAL COUNTED  100             Past Medical History:   Diagnosis Date    Asthma     Hearing impaired     Legally blind     Schizophrenia ([place] [address] )          Admitting Diagnosis: Alcohol withdrawal ([place] [address] ) [F10 239]  Alcohol abuse [F10 10]  Schizophrenia ([place] [address] ) [F20 9]  Polysubstance abuse ([place] [address] ) [F19 10]  Admitted to substance misuse detoxification center [Z78 9]  Age/Sex: 25 y o  male  Admission Orders:  SCD  Up with assist  REgular diet  Scheduled Medications:  enoxaparin, 40 mg, Subcutaneous, Daily  folic acid, 1 mg, Oral, Daily  multivitamin-minerals, 1 tablet, Oral, Daily  nicotine, 21 mg, Transdermal, Daily  OLANZapine, 10 mg, Oral, HS  risperiDONE, 2 mg, Oral, Daily  thiamine, 100 mg, Oral, Daily      Continuous IV Infusions:     PRN Meds:  acetaminophen, 650 mg, Oral, Q6H PRN  albuterol, 2 puff, Inhalation, Q4H PRN  nicotine polacrilex, 2 mg, Oral, Q2H PRN  ondansetron, 4 mg, Intravenous, Q6H PRN  traZODone, 50 mg, Oral, HS PRN        IP CONSULT TO CASE MANAGEMENT  IP CONSULT TO PSYCHIATRY    Network Utilization Review Department  ATTENTION: Please call with any questions or concerns to [phone] and carefully listen to the prompts so that you are directed to the right person  All voicemails are confidential   Karen King all requests for admission clinical reviews, approved or denied determinations and any other requests to dedicated fax number below belonging to the campus where the patient is receiving treatment   List of dedicated fax numbers for the Facilities:  [address] DENIALS (Administrative/Medical Necessity) [phone]   [address] (Maternity/NICU/Pediatrics) [phone]   [address] [phone]   [address]  [address] [place] [id number] [facility] [phone] [address] [facility] [address] [facility] [phone]

## 2022-03-21 LAB
ATRIAL RATE: 73 BPM
P AXIS: 67 DEGREES
PR INTERVAL: 136 MS
QRS AXIS: 70 DEGREES
QRSD INTERVAL: 88 MS
QT INTERVAL: 388 MS
QTC INTERVAL: 427 MS
T WAVE AXIS: 53 DEGREES
VENTRICULAR RATE: 73 BPM

## 2022-03-21 PROCEDURE — 93010 ELECTROCARDIOGRAM REPORT: CPT

## 2022-03-21 PROCEDURE — 99232 SBSQ HOSP IP/OBS MODERATE 35: CPT | Performed by: EMERGENCY MEDICINE

## 2022-03-21 PROCEDURE — 99255 IP/OBS CONSLTJ NEW/EST HI 80: CPT | Performed by: STUDENT IN AN ORGANIZED HEALTH CARE EDUCATION/TRAINING PROGRAM

## 2022-03-21 RX ORDER — BENZTROPINE MESYLATE 0.5 MG/1
1 TABLET ORAL 2 TIMES DAILY
Status: DISCONTINUED | OUTPATIENT
Start: 2022-03-21 | End: 2022-03-22 | Stop reason: HOSPADM

## 2022-03-21 RX ORDER — FLUTICASONE PROPIONATE 50 MCG
1 SPRAY, SUSPENSION (ML) NASAL DAILY
Status: DISCONTINUED | OUTPATIENT
Start: 2022-03-21 | End: 2022-03-22 | Stop reason: HOSPADM

## 2022-03-21 RX ORDER — RISPERIDONE 0.5 MG/1
0.5 TABLET, ORALLY DISINTEGRATING ORAL
Status: DISCONTINUED | OUTPATIENT
Start: 2022-03-21 | End: 2022-03-22 | Stop reason: HOSPADM

## 2022-03-21 RX ADMIN — THIAMINE HCL TAB 100 MG 100 MG: 100 TAB at 08:24

## 2022-03-21 RX ADMIN — FOLIC ACID 1 MG: 1 TABLET ORAL at 08:24

## 2022-03-21 RX ADMIN — RISPERIDONE 0.5 MG: 0.5 TABLET, ORALLY DISINTEGRATING ORAL at 16:04

## 2022-03-21 RX ADMIN — RISPERIDONE 0.5 MG: 0.5 TABLET, ORALLY DISINTEGRATING ORAL at 20:34

## 2022-03-21 RX ADMIN — BENZTROPINE MESYLATE 1 MG: 0.5 TABLET ORAL at 17:19

## 2022-03-21 RX ADMIN — HYDROXYZINE HYDROCHLORIDE 50 MG: 25 TABLET ORAL at 17:20

## 2022-03-21 RX ADMIN — RISPERIDONE 2 MG: 2 TABLET ORAL at 08:24

## 2022-03-21 RX ADMIN — NICOTINE 21 MG: 21 PATCH, EXTENDED RELEASE TRANSDERMAL at 08:24

## 2022-03-21 RX ADMIN — MULTIPLE VITAMINS W/ MINERALS TAB 1 TABLET: TAB ORAL at 08:24

## 2022-03-21 RX ADMIN — TRAZODONE HYDROCHLORIDE 50 MG: 50 TABLET ORAL at 22:02

## 2022-03-21 RX ADMIN — FLUTICASONE PROPIONATE 1 SPRAY: 50 SPRAY, METERED NASAL at 17:20

## 2022-03-21 NOTE — ASSESSMENT & PLAN NOTE
· Pt with a h/o schizophrenia  · Reports he is on monthly Aristeo Chatters injections which he gets on the 28th of each month as well as Risperdal 2 mg PO daily  · Pt states he has also taken Lexapro in the past (dose unknown) but it does not help his mood  · Psych Consult complete  · Patient is endorsing visual and auditory hallucinations and appears to be internally responding to stimuli  Admits to Homicidal ideations with no intent towards any one specific   At the time patient is agreeable to 201 inpatient psych admission  · Medication Adjustment as per psychiatry   · Patient is medically stable for inpatient psych admission

## 2022-03-21 NOTE — ASSESSMENT & PLAN NOTE
· Pt with a history of chronic alcohol use, reports drinking 3 40-oz beers daily x2 years  · Currently denies any signs/symptoms of alcohol withdrawal  · Discontinue SEWS protocol  · Patient has not developed evidence of alcohol withdrawal throughout this hospitalization, has not required any phenobarbital  · Received PO thiamine, folic acid, and MVI supplementation  · Patient is medically cleared for inpatient psych hospitalization

## 2022-03-21 NOTE — CASE MANAGEMENT
Cm consulted with psych PA who indicated pt would be signing a 201 for further treatment due to HI  Cm provided this 201  Cm then received a call from [address] [place] [address], pt's community cm at [address] [place] [address]  Cm provided an update and Tia requested to visit with pt  Cm consulted with nurse manager, Tabitha Taylor, who indicated that this would be acceptable as long as pt was in agreement  Cm met with pt and pt stated he was in agreement with a visit from [address] [place] [address]
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
## 2022-03-21 NOTE — PLAN OF CARE
Problem: Potential for Falls  Goal: Patient will remain free of falls  Description: INTERVENTIONS:  - Educate patient/family on patient safety including physical limitations  - Instruct patient to call for assistance with activity   - Consult OT/PT to assist with strengthening/mobility   - Keep Call bell within reach  - Keep bed low and locked with side rails adjusted as appropriate  - Keep care items and personal belongings within reach  - Initiate and maintain comfort rounds  - Make Fall Risk Sign visible to staff  - Apply yellow socks and bracelet for high fall risk patients  - Consider moving patient to room near nurses station  Outcome: Progressing     Problem: SAFETY ADULT  Goal: Patient will remain free of falls  Description: INTERVENTIONS:  - Educate patient/family on patient safety including physical limitations  - Instruct patient to call for assistance with activity   - Consult OT/PT to assist with strengthening/mobility   - Keep Call bell within reach  - Keep bed low and locked with side rails adjusted as appropriate  - Keep care items and personal belongings within reach  - Initiate and maintain comfort rounds  - Make Fall Risk Sign visible to staff  - Apply yellow socks and bracelet for high fall risk patients  - Consider moving patient to room near nurses station  Outcome: Progressing  Goal: Maintain or return to baseline ADL function  Description: INTERVENTIONS:  -  Assess patient's ability to carry out ADLs; assess patient's baseline for ADL function and identify physical deficits which impact ability to perform ADLs (bathing, care of mouth/teeth, toileting, grooming, dressing, etc )  - Assess/evaluate cause of self-care deficits   - Assess range of motion  - Assess patient's mobility; develop plan if impaired  - Assess patient's need for assistive devices and provide as appropriate  - Encourage maximum independence but intervene and supervise when necessary  - Involve family in performance of ADLs  - Assess for home care needs following discharge   - Consider OT consult to assist with ADL evaluation and planning for discharge  - Provide patient education as appropriate  Outcome: Progressing  Goal: Maintains/Returns to pre admission functional level  Description: INTERVENTIONS:  - Perform BMAT or MOVE assessment daily    - Set and communicate daily mobility goal to care team and patient/family/caregiver     - Collaborate with rehabilitation services on mobility goals if consulted  - Out of bed for toileting  - Record patient progress and toleration of activity level   Outcome: Progressing     Problem: DISCHARGE PLANNING  Goal: Discharge to home or other facility with appropriate resources  Description: INTERVENTIONS:  - Identify barriers to discharge w/patient and caregiver  - Arrange for needed discharge resources and transportation as appropriate  - Identify discharge learning needs (meds, wound care, etc )  - Arrange for interpretive services to assist at discharge as needed  - Refer to Case Management Department for coordinating discharge planning if the patient needs post-hospital services based on physician/advanced practitioner order or complex needs related to functional status, cognitive ability, or social support system  Outcome: Progressing

## 2022-03-21 NOTE — UTILIZATION REVIEW
Inpatient Admission Authorization Request   NOTIFICATION OF INPATIENT ADMISSION/INPATIENT AUTHORIZATION REQUEST   SERVICING FACILITY:   [address] [place] [address]  [address] , [address]  Tax ID: [id number]  NPI: [id number]  Place of Service: Inpatient [facility]  [address]  Place of Service Code: 24     ATTENDING PROVIDER:  Attending Name and NPI#: Anastacia Amezcua Md [[id number]]  Address: [address] , [address]  Phone: [phone]     UTILIZATION REVIEW CONTACT:  Dallas Vinson, Utilization   Network Utilization Review Department  Phone: [phone]  Fax [phone]  Email: [email or web address]     PHYSICIAN ADVISORY SERVICES:  FOR [id number] REVIEW - MEDICAL NECESSITY DENIAL  Phone: [phone]  Fax: [phone]  Email: [email or web address]     TYPE OF REQUEST:  Inpatient Status     ADMISSION INFORMATION:  ADMISSION DATE/TIME: 3/18/22  9:29 PM  PATIENT DIAGNOSIS CODE/DESCRIPTION:  Alcohol withdrawal ([facility] [address] ) [F10 239]  Alcohol abuse [F10 10]  Schizophrenia ([facility] [address] ) [F20 9]  Polysubstance abuse ([facility] [address] ) [F19 10]  Admitted to substance misuse detoxification center [Z78 9]  DISCHARGE DATE/TIME: No discharge date for patient encounter  IMPORTANT INFORMATION:  Please contact the [facility] directly with any questions or concerns regarding this request  Department voicemails are confidential     Send requests for admission clinical reviews, concurrent reviews, approvals, and administrative denials due to lack of clinical to fax [phone]

## 2022-03-21 NOTE — PLAN OF CARE
Problem: Potential for Falls  Goal: Patient will remain free of falls  Description: INTERVENTIONS:  - Educate patient/family on patient safety including physical limitations  - Instruct patient to call for assistance with activity   - Consult OT/PT to assist with strengthening/mobility   - Keep Call bell within reach  - Keep bed low and locked with side rails adjusted as appropriate  - Keep care items and personal belongings within reach  - Initiate and maintain comfort rounds  - Make Fall Risk Sign visible to staff  - Offer Toileting every 2 Hours, in advance of need  - Apply yellow socks and bracelet for high fall risk patients  - Consider moving patient to room near nurses station  Outcome: Progressing     Problem: SAFETY ADULT  Goal: Patient will remain free of falls  Description: INTERVENTIONS:  - Educate patient/family on patient safety including physical limitations  - Instruct patient to call for assistance with activity   - Consult OT/PT to assist with strengthening/mobility   - Keep Call bell within reach  - Keep bed low and locked with side rails adjusted as appropriate  - Keep care items and personal belongings within reach  - Initiate and maintain comfort rounds  - Make Fall Risk Sign visible to staff  - Offer Toileting every 2 Hours, in advance of need  - Apply yellow socks and bracelet for high fall risk patients  - Consider moving patient to room near nurses station  Outcome: Progressing  Goal: Maintain or return to baseline ADL function  Description: INTERVENTIONS:  -  Assess patient's ability to carry out ADLs; assess patient's baseline for ADL function and identify physical deficits which impact ability to perform ADLs (bathing, care of mouth/teeth, toileting, grooming, dressing, etc )  - Assess/evaluate cause of self-care deficits   - Assess range of motion  - Assess patient's mobility; develop plan if impaired  - Assess patient's need for assistive devices and provide as appropriate  - Encourage maximum independence but intervene and supervise when necessary  - Involve family in performance of ADLs  - Assess for home care needs following discharge   - Consider OT consult to assist with ADL evaluation and planning for discharge  - Provide patient education as appropriate  Outcome: Progressing  Goal: Maintains/Returns to pre admission functional level  Description: INTERVENTIONS:  - Perform BMAT or MOVE assessment daily    - Set and communicate daily mobility goal to care team and patient/family/caregiver  - Collaborate with rehabilitation services on mobility goals if consulted  - Perform Range of Motion 3 times a day  - Reposition patient every 2 hours    - Dangle patient 3 times a day  - Stand patient 3 times a day  - Ambulate patient 3 times a day  - Out of bed to chair 3 times a day   - Out of bed for meals 3 times a day  - Out of bed for toileting  - Record patient progress and toleration of activity level   Outcome: Progressing     Problem: DISCHARGE PLANNING  Goal: Discharge to home or other facility with appropriate resources  Description: INTERVENTIONS:  - Identify barriers to discharge w/patient and caregiver  - Arrange for needed discharge resources and transportation as appropriate  - Identify discharge learning needs (meds, wound care, etc )  - Arrange for interpretive services to assist at discharge as needed  - Refer to Case Management Department for coordinating discharge planning if the patient needs post-hospital services based on physician/advanced practitioner order or complex needs related to functional status, cognitive ability, or social support system  Outcome: Progressing

## 2022-03-21 NOTE — PROGRESS NOTES
Progress Note - Medical Toxicology    Cesia Mar 25 y o  male MRN: [id number]  Unit/Bed#: 5T DETOX 511-01 Encounter: [id number]  MEDICAL DETOX UNIT, LEVEL 4  Department of Medical Toxicology  Reason for Admission/Principal Problem: alcohol withdrawal, alcohol use disorder   Rounding Provider: Shari Monroe PA-C, Yan Obando MD         * Alcohol withdrawal syndrome with complication [facility])  Assessment & Plan  · Pt with a history of chronic alcohol use, reports drinking 3 40-oz beers daily x2 years  · Currently denies any signs/symptoms of alcohol withdrawal  · Discontinue SEWS protocol  · Patient has not developed evidence of alcohol withdrawal throughout this hospitalization, has not required any phenobarbital  · Received PO thiamine, folic acid, and MVI supplementation  · Patient is medically cleared for inpatient psych hospitalization     Alcohol use disorder, severe, dependence ([facility] [address] )  Assessment & Plan  · Pt with a history of chronic alcohol use  · Reports drinking 3 40-oz beers daily  Per the ED, pt will also occasionally drink liquor, last drank a bottle of liquor 2 weeks ago  · Last drink was at 12 PM yesterday (3/17/22)  · Denies h/o withdrawal seizures, but does endorse h/o 1 seizure that occurred 3 months ago  Pt reports he was outside with a family member when he fell to the ground and began convulsing  Pt denies any decrease in alcohol use prior to this seizure  · Endorses h/o inpatient rehabilitation, denies previous OP rehab  · Consult case management  · Pt will be discharged to IP rehab        Asthma  Assessment & Plan  · No acute exacerbation  · Albuterol inhaler PRN    Leukocytosis  Assessment & Plan  · Improving: WBC 12 00 -> 11 60 since on admission  · No current signs/symptoms of infection, VSS  · Monitoring CBC, VS    Tobacco use disorder  Assessment & Plan  · Pt currently smokes 1 PPD of cigarettes  · Offer NRT with 21 mg/24 hr patch  · Pt also requesting nicotine gum - received Nicorette Q2H PRN  · Encourage cessation    Polysubstance abuse ([facility] [address] )  Assessment & Plan  · Pt endorses polysubstance abuse with cocaine, methamphetamine, and marijuana  · Reports occasional use of these substances, approximately once a month  · States he uses 1 bag of cocaine and 2 rocks of methamphetamine when he partakes in these substances  · Last use was about 1 week ago  · UDS  NEGATIVE   · Encourage cessation    Hearing loss  Assessment & Plan  · Pt with a h/o bilateral hearing loss  · Per chart review, has had hearing impairment since childhood and hearing loss in R > L ear  · Left his hearing aids at home  · Follows with [facility]     Legally blind  Assessment & Plan  · Pt reports he is legally blind  · Per chart review, history of visual impairment from early life, had multiple eye surgeries as a child, and able to see up close but not 20/20 vision  · Denies any new visual changes    Other schizophrenia ([facility] [address] )  Assessment & Plan  · Pt with a h/o schizophrenia  · Reports he is on monthly [place] injections which he gets on the 28th of each month as well as Risperdal 2 mg PO daily  · Pt states he has also taken Lexapro in the past (dose unknown) but it does not help his mood  · Psych Consult complete  · Patient is endorsing visual and auditory hallucinations and appears to be internally responding to stimuli  Admits to Homicidal ideations with no intent towards any one specific  At the time patient is agreeable to 201 inpatient psych admission  · Medication Adjustment as per psychiatry   · Patient is medically stable for inpatient psych admission               VTE Pharmacologic Prophylaxis:   Pharmacologic: Enoxaparin (Lovenox)  Mechanical VTE Prophylaxis in Place: yes    Code Status: Level 1 - Full Code    Patient Centered Rounds: I have performed bedside rounds with nursing staff today      Discussions with Specialists or Other Care Team Provider: Psychiatry, Case Management    Education and Discussions with Family / Patient: I personally did not discuss this patient with family    Time Spent for Care: 30 minutes  More than 50% of total time spent on counseling and coordination of care as described above  Current Length of Stay: 3 day(s)    Current Patient Status: Inpatient     Certification Statement: The patient will continue to require additional inpatient hospital stay due to pending psychiatric placement  Discharge Plan: Discharge to inpatient psych once placement is obtained       Subjective:   Patient was seen and examined  Denies any acute overnight events  States that he is having a "good-day"  Patient continues to respond to internal stimuli  Patient denies any chest pain, abdominal pain, or shortness of breath       Objective:     Clinical Opiate Withdrawal Scale  Pulse: (!) 52    SEWS Total Score: 0 (3/19/2022 11:00 AM)        Last 24 Hours Medication List:   Current Facility-Administered Medications   Medication Dose Route Frequency Provider Last Rate    acetaminophen  650 mg Oral Q6H PRN ALEXANDER Pressley-KALEE      albuterol  2 puff Inhalation Q4H PRN Etelvinazhou Sahni PA-C      benztropine  1 mg Oral BID [facility]ung      enoxaparin  40 mg Subcutaneous Daily Etelvinazhou Fall First, PA-KALEE      fluticasone  1 spray Each Nare Daily [place], [place]      folic acid  1 mg Oral Daily Etelvinazhou Fall FirstJONO      hydrOXYzine HCL  50 mg Oral Q6H PRN Etelvinazhou Fall FirstJONO      multivitamin-minerals  1 tablet Oral Daily Etelvina [place], [place]      nicotine  21 mg Transdermal Daily Etelvina [place], [place]      nicotine polacrilex  2 mg Oral Q2H PRN Manny Emanuel PA-C      ondansetron  4 mg Intravenous Q6H PRN Manny Emanuel PA-C      risperiDONE  0 5 mg Oral Q4H PRN Max 3/day [facility] JONO Chung      risperiDONE  2 mg Oral Daily Etelvinazhou Sahni PA-C      thiamine  100 mg Oral Daily Etelvina Presley Henley PA-C      traZODone  50 mg Oral HS PRN Etelvina Presley Henley PA-C           Vitals:   Temp (24hrs), Av 8 °F (37 1 °C), Min:98 6 °F (37 °C), Max:98 9 °F (37 2 °C)    Temp:  [98 6 °F (37 °C)-98 9 °F (37 2 °C)] 98 6 °F (37 °C)  HR:  [52-60] 52  Resp:  [18] 18  BP: (109-128)/(61-72) 109/61  SpO2:  [97 %-99 %] 97 %  Body mass index is 32 58 kg/m²  Input and Output Summary (last 24 hours): Intake/Output Summary (Last 24 hours) at 3/21/2022 1634  Last data filed at 3/21/2022 0700  Gross per 24 hour   Intake 298 ml   Output --   Net 298 ml       Physical Exam:   Physical Exam  Constitutional:       Appearance: He is not diaphoretic  Eyes:      Pupils: Pupils are equal, round, and reactive to light  Cardiovascular:      Rate and Rhythm: Normal rate and regular rhythm  Heart sounds: No murmur heard  Pulmonary:      Effort: No respiratory distress  Breath sounds: Normal breath sounds  No wheezing  Abdominal:      General: There is no distension  Palpations: Abdomen is soft  Neurological:      Mental Status: He is oriented to person, place, and time  Psychiatric:         Mood and Affect: Mood is anxious  Additional Data:     Labs: keep all most recent labs as listed on admission templates   Results from last 7 days   Lab Units 22   WBC Thousand/uL 11 60*   < > 12 00*   HEMOGLOBIN g/dL 14 7   < > 15 8   HEMATOCRIT % 42 8   < > 45 2   PLATELETS Thousands/uL 267   < > 272   NEUTROS PCT %  --   --  55   LYMPHS PCT %  --   --  34   LYMPHO PCT % 42  --   --    MONOS PCT %  --   --  10   MONO PCT % 5  --   --    EOS PCT % 2  --  1    < > = values in this interval not displayed        Results from last 7 days   Lab Units 22  0429   SODIUM mmol/L 137   POTASSIUM mmol/L 3 8   CHLORIDE mmol/L 107   CO2 mmol/L 24   BUN mg/dL 14   CREATININE mg/dL 1 17   ANION GAP mmol/L 6   CALCIUM mg/dL 8 3*   ALBUMIN g/dL 3 4   TOTAL BILIRUBIN mg/dL 0 41   ALK PHOS U/L 73   ALT U/L 33   AST U/L 42   GLUCOSE RANDOM mg/dL 116*                              * I Have Reviewed All Lab Data Listed Above  * Additional Pertinent Lab Tests Reviewed: Jenifer 66 Admission Reviewed      Imaging Studies: I have personally reviewed pertinent reports  Recent Cultures (last 7 days): Today, Patient Was Seen By: Mariella Reilly PA-C    ** Please Note: Dictation voice to text software may have been used in the creation of this document   **

## 2022-03-21 NOTE — CASE MANAGEMENT
Pt community cm, Tia from [facility] arrived on the unit  Pt and [address] [place] [address] meeting in conference room

## 2022-03-22 VITALS
TEMPERATURE: 98.1 F | OXYGEN SATURATION: 97 % | RESPIRATION RATE: 18 BRPM | SYSTOLIC BLOOD PRESSURE: 136 MMHG | WEIGHT: 189.8 LBS | HEIGHT: 64 IN | HEART RATE: 60 BPM | BODY MASS INDEX: 32.4 KG/M2 | DIASTOLIC BLOOD PRESSURE: 75 MMHG

## 2022-03-22 PROBLEM — D72.829 LEUKOCYTOSIS: Status: RESOLVED | Noted: 2022-03-19 | Resolved: 2022-03-22

## 2022-03-22 PROCEDURE — 99239 HOSP IP/OBS DSCHRG MGMT >30: CPT

## 2022-03-22 RX ORDER — FLUTICASONE PROPIONATE 50 MCG
1 SPRAY, SUSPENSION (ML) NASAL DAILY
Refills: 0
Start: 2022-03-23

## 2022-03-22 RX ORDER — ALBUTEROL SULFATE 90 UG/1
2 AEROSOL, METERED RESPIRATORY (INHALATION) EVERY 4 HOURS PRN
Qty: 8 G | Refills: 0 | Status: SHIPPED | OUTPATIENT
Start: 2022-03-22

## 2022-03-22 RX ORDER — RISPERIDONE 0.5 MG/1
0.5 TABLET, ORALLY DISINTEGRATING ORAL DAILY
Refills: 0
Start: 2022-03-22 | End: 2022-06-30

## 2022-03-22 RX ADMIN — HYDROXYZINE HYDROCHLORIDE 50 MG: 25 TABLET ORAL at 01:20

## 2022-03-22 RX ADMIN — NICOTINE 21 MG: 21 PATCH, EXTENDED RELEASE TRANSDERMAL at 08:03

## 2022-03-22 RX ADMIN — FOLIC ACID 1 MG: 1 TABLET ORAL at 08:01

## 2022-03-22 RX ADMIN — RISPERIDONE 0.5 MG: 0.5 TABLET, ORALLY DISINTEGRATING ORAL at 02:56

## 2022-03-22 RX ADMIN — RISPERIDONE 2 MG: 2 TABLET ORAL at 08:01

## 2022-03-22 RX ADMIN — BENZTROPINE MESYLATE 1 MG: 0.5 TABLET ORAL at 08:02

## 2022-03-22 RX ADMIN — MULTIPLE VITAMINS W/ MINERALS TAB 1 TABLET: TAB ORAL at 08:01

## 2022-03-22 RX ADMIN — THIAMINE HCL TAB 100 MG 100 MG: 100 TAB at 08:01

## 2022-03-22 NOTE — ASSESSMENT & PLAN NOTE
· Pt with a history of chronic alcohol use  · Reports drinking 3 40-oz beers daily  Per the ED, pt will also occasionally drink liquor, last drank a bottle of liquor 2 weeks ago  · Last drink was at 12 PM yesterday (3/17/22)  · Denies h/o withdrawal seizures, but does endorse h/o 1 seizure that occurred 3 months ago  Pt reports he was outside with a family member when he fell to the ground and began convulsing  Pt denies any decrease in alcohol use prior to this seizure    · Endorses h/o inpatient rehabilitation, denies previous OP rehab  · Consult case management  · Pt will be discharged to [facility], Inpatient Psych Hospitalization on a 201 commitment

## 2022-03-22 NOTE — CASE MANAGEMENT
Cm informed by [facility] crisis intake/referral that most likely no bed available today  Cm contacted [facility] clinic and referral was faxed for dual admission

## 2022-03-22 NOTE — ASSESSMENT & PLAN NOTE
· Pt with a h/o schizophrenia  · Reports he is on monthly [place] injections which he gets on the 28th of each month as well as Risperdal 2 mg PO daily  · Pt states he has also taken Lexapro in the past (dose unknown) but it does not help his mood  · Psych Consult complete  · Patient is endorsing visual and auditory hallucinations and appears to be internally responding to stimuli  Admits to Homicidal ideations with no intent towards any one specific   At the time patient is agreeable to 201 inpatient psych admission  · Medication Adjustment as per psychiatry   · Patient is medically stable for inpatient psych admission

## 2022-03-22 NOTE — CASE MANAGEMENT
Case Management Discharge Planning Note    Patient name Belle Ocampo  Location 5T DETOX 511/5T DETOX 51* MRN [id number]  : 2000 Date 3/22/2022       Current Admission Date: 3/18/2022  Current Admission Diagnosis:Alcohol withdrawal syndrome with complication [facility])   Patient Active Problem List    Diagnosis Date Noted    Asthma 2022    Alcohol withdrawal syndrome with complication ([facility] )     Alcohol use disorder, severe, dependence ([facility] ) 2022    Polysubstance abuse ([facility] ) 2022    Tobacco use disorder 2022    Other schizophrenia ([place] [address] ) 2019    Legally blind 2019    Hearing loss 2019      LOS (days): 4  Geometric Mean LOS (GMLOS) (days):   Days to GMLOS:     OBJECTIVE:  Risk of Unplanned Readmission Score: 24         Current admission status: Inpatient   [facility] Pharmacy:   [id number] [address] [facility]  [address] [address]  Phone: [phone] Fax: [phone]    Primary Care Provider: Duran Locke MD    Primary Insurance: Gertrudis Primrose  Secondary Insurance:     DISCHARGE DETAILS: Cm contacted by Ed at [facility] clinic indicating that pt had been accepted for dual unit  Cm contacted pt's grandmother, Radha Brower, and pt's ACT , Trent Putnam, that pt would be transferred to this inpatient [facility] treatment today  CM contacted [facility] and completed demographics with Maycol Pérez and clinical review with Tianna Petit  Pt was approved for 3 days with last covered day 3/24/2022 for inpatient dual behavioral health treatment  Cm then contacted Ed at [facility] and arranged transportation  Cm then contacted ED at [facility] and informed of the prior auth and transportation details  Pt will be discharged today with [facility] transport at 1600hr to [facility] behavioral health on a voluntary commitment for continued dual diagnosis treatment      Discharge planning discussed with[de-identified] patient                      Contacts  Patient Contacts: Raina Klinefelter- ACT   Relationship to Patient[de-identified] Treatment Provider  Contact Method: Phone  Phone Number: [phone]  Reason/Outcome: Continuity of [address]              Other Referral/Resources/Interventions Provided:  Referrals Provided[de-identified] [facility] Burgos Hotline,Psychiatrist (inpatient substance use treatment)  Post Acute Placement to[de-identified] Inpatient Behavioral Health    Would you like to participate in our [address] service program?  : No - Declined          Transport at Discharge :  Other (Comment) (CTS)  Dispatcher Contacted: Yes  Number/Name of Dispatcher: ED [facility]  Transported by [employer] and Unit #): [facility]           Transfer Mode: Self  Accompanied by: Alone           Family notified[de-identified] Yury Wesley (grandmother)

## 2022-03-22 NOTE — ASSESSMENT & PLAN NOTE
· Pt with a h/o bilateral hearing loss  · Per chart review, has had hearing impairment since childhood and hearing loss in R > L ear  · Follows with [facility]

## 2022-03-22 NOTE — DISCHARGE INSTRUCTIONS
Abuse of Alcohol   WHAT YOU NEED TO KNOW:   Alcohol abuse means you drink more than the recommended daily or weekly limits  You may be drinking alcohol regularly or drinking large amounts in a short period of time (binge drinking)  You continue to drink even when it causes legal, work, or relationship problems  DISCHARGE INSTRUCTIONS:   Call your local emergency number (911 in the [address]), or have someone call if:   · You have sudden chest pain or trouble breathing  · You want to harm yourself or others  · You have a seizure  Seek care immediately if:   · You cannot stop vomiting or you vomit blood  Call your doctor if:   · You have hallucinations (you see or hear things that are not real)  · You have questions or concerns about your condition or care  Medicines:   · Vitamin supplements  may be given to treat low vitamin levels  Alcohol can make it hard for your body to absorb enough vitamins such as B1  Vitamin supplements may also be given to prevent alcohol-related brain damage  · Take your medicine as directed  Contact your healthcare provider if you think your medicine is not helping or if you have side effects  Tell him or her if you are allergic to any medicine  Keep a list of the medicines, vitamins, and herbs you take  Include the amounts, and when and why you take them  Bring the list or the pill bottles to follow-up visits  Carry your medicine list with you in case of an emergency  Health problems alcohol abuse can cause:   · Cancer in your liver, pancreas, stomach, colon, kidney, or breast    · Stroke or a heart attack    · Liver, kidney, or lung disease    · Blackouts, memory loss, brain damage, or dementia    · Diabetes, immune system problems, or thiamine (vitamin B1) deficiency    · Problems for you and your baby if you drink while pregnant    Recommended alcohol limits:  One drink is defined as 12 oz of beer, 5 oz of wine, or 1 5 oz of liquor such as whiskey    · Men 21 to 59 years  should limit alcohol to 2 drinks a day  Do not have more than 4 drinks in 1 day or more than 14 in 1 week  · All women, and men 72 or older  should limit alcohol to 1 drink in a day  Do not have more than 3 drinks in 1 day or more than 7 in 1 week  Do not drink alcohol if you are pregnant  Manage alcohol use:   · Work with healthcare providers on goals to drink less  This can help prevent health problems  For example, you may start by planning your weekly alcohol use  It will be easier to have fewer drinks if you plan ahead  · Have food when you drink alcohol  Food will prevent alcohol from getting into your system too quickly  Eat before you have your first alcohol drink  · Time your drinks carefully  Have no more than 1 drink in an hour  Have a liquid such as water, coffee, or a soft drink between alcohol drinks  · Do not drive if you have had alcohol  Plan ahead so you have a safe ride home  Make sure someone who has not been drinking can help you get home safely  Plan to use a taxi or other ride service, if needed  · Do not drink alcohol if you are taking medicine  Alcohol is dangerous when you combine it with certain medicines, such as acetaminophen or blood pressure medicine  Talk to your healthcare provider about all the medicines you currently take  Follow up with your doctor as directed:  Write down your questions so you remember to ask them during your visits  For support and more information:   · Alcoholics Anonymous  Web Address: http://www mcnally info/    · Substance Abuse and [address] , [address] [place]  Web Address: https://[employer]elt[employer]/    © [address] [facility] 2022 Information is for End User's use only and may not be sold, redistributed or otherwise used for commercial purposes   All illustrations and images included in CareNotes® are the copyrighted property of A D A M , Inc  or Vinayak Adame  The above information is an  only  It is not intended as medical advice for individual conditions or treatments  Talk to your doctor, nurse or pharmacist before following any medical regimen to see if it is safe and effective for you

## 2022-03-23 NOTE — UTILIZATION REVIEW
Notification of Discharge   This is a Notification of Discharge from our facility [address]  Please be advised that this patient has been discharge from our facility  Below you will find the admission and discharge date and time including the patients disposition  UTILIZATION REVIEW CONTACT:  Ze Shaikh MA  Utilization   Network Utilization Review Department  Phone: [phone] x carefully listen to the prompts  All voicemails are confidential   Email: [email or web address]     PHYSICIAN ADVISORY SERVICES:  FOR [id number] REVIEW - MEDICAL NECESSITY DENIAL  Phone: [phone]  Fax: [phone]  Email: [email or web address]     PRESENTATION DATE: 3/18/2022  8:32 PM  OBERVATION ADMISSION DATE:   INPATIENT ADMISSION DATE: 3/18/22  9:29 PM   DISCHARGE DATE: 3/22/2022  4:22 PM  DISPOSITION: Non [facility]/Distinct Psych Non [facility]/Distinct Psych      IMPORTANT INFORMATION:  Send all requests for admission clinical reviews, approved or denied determinations and any other requests to dedicated fax number below belonging to the campus where the patient is receiving treatment   List of dedicated fax numbers:  [address] DENIALS (Administrative/Medical Necessity) [phone]   [address]  (Maternity/NICU/Pediatrics) [phone]   Redd Batres [phone]   [address] [facility] [phone]   [address] [place] [address] [phone]   [address] [phone]   [facility]  [phone]   [address] [facility]  [address] [facility] [address] [facility] [phone]

## 2022-06-08 NOTE — TELEPHONE ENCOUNTER
06/08/22 12:15 PM     Thank you for your request  Your request has been received, reviewed, and the patient chart updated  The PCP has successfully been removed with a patient attribution note  This message will now be completed      Thank you  Jose M Butler

## 2022-06-16 ENCOUNTER — APPOINTMENT (OUTPATIENT)
Dept: LAB | Facility: HOSPITAL | Age: 22
End: 2022-06-16
Payer: COMMERCIAL

## 2022-06-16 ENCOUNTER — TRANSCRIBE ORDERS (OUTPATIENT)
Dept: LAB | Facility: HOSPITAL | Age: 22
End: 2022-06-16

## 2022-06-16 DIAGNOSIS — J30.2 SEASONAL ALLERGIC REACTION: ICD-10-CM

## 2022-06-16 DIAGNOSIS — Z71.89 OTHER PARENT-CHILD PROBLEMS: ICD-10-CM

## 2022-06-16 DIAGNOSIS — Z62.820 OTHER PARENT-CHILD PROBLEMS: ICD-10-CM

## 2022-06-16 DIAGNOSIS — F20.9 SUBCHRONIC SCHIZOPHRENIA (HCC): ICD-10-CM

## 2022-06-16 DIAGNOSIS — E56.9 MULTIPLE VITAMIN DEFICIENCY DISEASE: ICD-10-CM

## 2022-06-16 DIAGNOSIS — Z79.899 ENCOUNTER FOR LONG-TERM (CURRENT) USE OF OTHER MEDICATIONS: ICD-10-CM

## 2022-06-16 DIAGNOSIS — Z79.899 ENCOUNTER FOR LONG-TERM (CURRENT) USE OF OTHER MEDICATIONS: Primary | ICD-10-CM

## 2022-06-16 LAB
ALBUMIN SERPL BCP-MCNC: 4 G/DL (ref 3.5–5)
ALP SERPL-CCNC: 74 U/L (ref 46–116)
ALT SERPL W P-5'-P-CCNC: 47 U/L (ref 12–78)
ANION GAP SERPL CALCULATED.3IONS-SCNC: 0 MMOL/L (ref 4–13)
AST SERPL W P-5'-P-CCNC: 52 U/L (ref 5–45)
BASOPHILS # BLD AUTO: 0.1 THOUSANDS/ΜL (ref 0–0.1)
BASOPHILS NFR BLD AUTO: 1 % (ref 0–1)
BILIRUB SERPL-MCNC: 1.27 MG/DL (ref 0.2–1)
BUN SERPL-MCNC: 13 MG/DL (ref 5–25)
CALCIUM SERPL-MCNC: 9.6 MG/DL (ref 8.3–10.1)
CHLORIDE SERPL-SCNC: 104 MMOL/L (ref 100–108)
CHOLEST SERPL-MCNC: 179 MG/DL
CO2 SERPL-SCNC: 33 MMOL/L (ref 21–32)
CREAT SERPL-MCNC: 1.55 MG/DL (ref 0.6–1.3)
EOSINOPHIL # BLD AUTO: 0.23 THOUSAND/ΜL (ref 0–0.61)
EOSINOPHIL NFR BLD AUTO: 3 % (ref 0–6)
ERYTHROCYTE [DISTWIDTH] IN BLOOD BY AUTOMATED COUNT: 14.9 % (ref 11.6–15.1)
EST. AVERAGE GLUCOSE BLD GHB EST-MCNC: 103 MG/DL
GFR SERPL CREATININE-BSD FRML MDRD: 63 ML/MIN/1.73SQ M
GLUCOSE P FAST SERPL-MCNC: 96 MG/DL (ref 65–99)
HBA1C MFR BLD: 5.2 %
HCT VFR BLD AUTO: 54.5 % (ref 36.5–49.3)
HDLC SERPL-MCNC: 64 MG/DL
HGB BLD-MCNC: 18 G/DL (ref 12–17)
IMM GRANULOCYTES # BLD AUTO: 0.07 THOUSAND/UL (ref 0–0.2)
IMM GRANULOCYTES NFR BLD AUTO: 1 % (ref 0–2)
LDLC SERPL CALC-MCNC: 93 MG/DL (ref 0–100)
LYMPHOCYTES # BLD AUTO: 3.75 THOUSANDS/ΜL (ref 0.6–4.47)
LYMPHOCYTES NFR BLD AUTO: 44 % (ref 14–44)
MCH RBC QN AUTO: 32.2 PG (ref 26.8–34.3)
MCHC RBC AUTO-ENTMCNC: 33 G/DL (ref 31.4–37.4)
MCV RBC AUTO: 98 FL (ref 82–98)
MONOCYTES # BLD AUTO: 0.95 THOUSAND/ΜL (ref 0.17–1.22)
MONOCYTES NFR BLD AUTO: 11 % (ref 4–12)
NEUTROPHILS # BLD AUTO: 3.38 THOUSANDS/ΜL (ref 1.85–7.62)
NEUTS SEG NFR BLD AUTO: 40 % (ref 43–75)
NONHDLC SERPL-MCNC: 115 MG/DL
NRBC BLD AUTO-RTO: 0 /100 WBCS
PLATELET # BLD AUTO: 255 THOUSANDS/UL (ref 149–390)
PMV BLD AUTO: 11.3 FL (ref 8.9–12.7)
POTASSIUM SERPL-SCNC: 4.3 MMOL/L (ref 3.5–5.3)
PROT SERPL-MCNC: 8.5 G/DL (ref 6.4–8.2)
RBC # BLD AUTO: 5.59 MILLION/UL (ref 3.88–5.62)
SODIUM SERPL-SCNC: 137 MMOL/L (ref 136–145)
T4 FREE SERPL-MCNC: 1.04 NG/DL (ref 0.76–1.46)
TRIGL SERPL-MCNC: 109 MG/DL
TSH SERPL DL<=0.05 MIU/L-ACNC: 1.37 UIU/ML (ref 0.45–4.5)
WBC # BLD AUTO: 8.48 THOUSAND/UL (ref 4.31–10.16)

## 2022-06-16 PROCEDURE — 80053 COMPREHEN METABOLIC PANEL: CPT

## 2022-06-16 PROCEDURE — 36415 COLL VENOUS BLD VENIPUNCTURE: CPT

## 2022-06-16 PROCEDURE — 80061 LIPID PANEL: CPT

## 2022-06-16 PROCEDURE — 84443 ASSAY THYROID STIM HORMONE: CPT

## 2022-06-16 PROCEDURE — 83036 HEMOGLOBIN GLYCOSYLATED A1C: CPT

## 2022-06-16 PROCEDURE — 84439 ASSAY OF FREE THYROXINE: CPT

## 2022-06-16 PROCEDURE — 85025 COMPLETE CBC W/AUTO DIFF WBC: CPT

## 2022-06-25 ENCOUNTER — HOSPITAL ENCOUNTER (EMERGENCY)
Facility: HOSPITAL | Age: 22
End: 2022-06-26
Attending: EMERGENCY MEDICINE | Admitting: EMERGENCY MEDICINE
Payer: COMMERCIAL

## 2022-06-25 DIAGNOSIS — F19.10 POLYSUBSTANCE ABUSE (HCC): ICD-10-CM

## 2022-06-25 DIAGNOSIS — F20.89 OTHER SCHIZOPHRENIA (HCC): ICD-10-CM

## 2022-06-25 DIAGNOSIS — F10.10 ALCOHOL ABUSE: Primary | ICD-10-CM

## 2022-06-25 PROCEDURE — 99284 EMERGENCY DEPT VISIT MOD MDM: CPT | Performed by: EMERGENCY MEDICINE

## 2022-06-25 PROCEDURE — 99284 EMERGENCY DEPT VISIT MOD MDM: CPT

## 2022-06-26 ENCOUNTER — APPOINTMENT (INPATIENT)
Dept: RADIOLOGY | Facility: HOSPITAL | Age: 22
End: 2022-06-26
Payer: COMMERCIAL

## 2022-06-26 ENCOUNTER — HOSPITAL ENCOUNTER (INPATIENT)
Facility: HOSPITAL | Age: 22
LOS: 4 days | End: 2022-06-30
Attending: EMERGENCY MEDICINE | Admitting: EMERGENCY MEDICINE
Payer: COMMERCIAL

## 2022-06-26 VITALS
SYSTOLIC BLOOD PRESSURE: 104 MMHG | RESPIRATION RATE: 16 BRPM | OXYGEN SATURATION: 93 % | DIASTOLIC BLOOD PRESSURE: 53 MMHG | HEART RATE: 72 BPM | TEMPERATURE: 98.1 F

## 2022-06-26 DIAGNOSIS — F20.89 OTHER SCHIZOPHRENIA (HCC): ICD-10-CM

## 2022-06-26 DIAGNOSIS — F10.939 ALCOHOL WITHDRAWAL SYNDROME WITH COMPLICATION (HCC): Primary | ICD-10-CM

## 2022-06-26 DIAGNOSIS — F10.20 ALCOHOL USE DISORDER, SEVERE, DEPENDENCE (HCC): ICD-10-CM

## 2022-06-26 LAB
ALBUMIN SERPL BCP-MCNC: 3.9 G/DL (ref 3–5.2)
ALP SERPL-CCNC: 60 U/L (ref 43–122)
ALT SERPL W P-5'-P-CCNC: 30 U/L
AMPHETAMINES SERPL QL SCN: NEGATIVE
ANION GAP SERPL CALCULATED.3IONS-SCNC: 10 MMOL/L (ref 5–14)
AST SERPL W P-5'-P-CCNC: 42 U/L (ref 17–59)
ATRIAL RATE: 61 BPM
BARBITURATES UR QL: NEGATIVE
BASOPHILS # BLD AUTO: 0.09 THOUSANDS/ΜL (ref 0–0.1)
BASOPHILS NFR BLD AUTO: 1 % (ref 0–1)
BENZODIAZ UR QL: NEGATIVE
BILIRUB SERPL-MCNC: 0.25 MG/DL
BUN SERPL-MCNC: 8 MG/DL (ref 5–25)
CALCIUM SERPL-MCNC: 8.8 MG/DL (ref 8.4–10.2)
CHLORIDE SERPL-SCNC: 108 MMOL/L (ref 97–108)
CO2 SERPL-SCNC: 23 MMOL/L (ref 22–30)
COCAINE UR QL: NEGATIVE
CREAT SERPL-MCNC: 1.28 MG/DL (ref 0.7–1.5)
EOSINOPHIL # BLD AUTO: 0.2 THOUSAND/ΜL (ref 0–0.61)
EOSINOPHIL NFR BLD AUTO: 2 % (ref 0–6)
ERYTHROCYTE [DISTWIDTH] IN BLOOD BY AUTOMATED COUNT: 14.1 % (ref 11.6–15.1)
ETHANOL EXG-MCNC: ABNORMAL MG/DL
FLUAV RNA RESP QL NAA+PROBE: NEGATIVE
FLUBV RNA RESP QL NAA+PROBE: NEGATIVE
GFR SERPL CREATININE-BSD FRML MDRD: 79 ML/MIN/1.73SQ M
GLUCOSE SERPL-MCNC: 102 MG/DL (ref 70–99)
HCT VFR BLD AUTO: 47.3 % (ref 36.5–49.3)
HGB BLD-MCNC: 16 G/DL (ref 12–17)
IMM GRANULOCYTES # BLD AUTO: 0.05 THOUSAND/UL (ref 0–0.2)
IMM GRANULOCYTES NFR BLD AUTO: 0 % (ref 0–2)
LYMPHOCYTES # BLD AUTO: 5.4 THOUSANDS/ΜL (ref 0.6–4.47)
LYMPHOCYTES NFR BLD AUTO: 48 % (ref 14–44)
MAGNESIUM SERPL-MCNC: 1.8 MG/DL (ref 1.6–2.3)
MCH RBC QN AUTO: 32.5 PG (ref 26.8–34.3)
MCHC RBC AUTO-ENTMCNC: 33.8 G/DL (ref 31.4–37.4)
MCV RBC AUTO: 96 FL (ref 82–98)
METHADONE UR QL: NEGATIVE
MONOCYTES # BLD AUTO: 0.79 THOUSAND/ΜL (ref 0.17–1.22)
MONOCYTES NFR BLD AUTO: 7 % (ref 4–12)
NEUTROPHILS # BLD AUTO: 4.78 THOUSANDS/ΜL (ref 1.85–7.62)
NEUTS SEG NFR BLD AUTO: 42 % (ref 43–75)
NRBC BLD AUTO-RTO: 0 /100 WBCS
OPIATES UR QL SCN: NEGATIVE
OXYCODONE+OXYMORPHONE UR QL SCN: NEGATIVE
P AXIS: 41 DEGREES
PCP UR QL: NEGATIVE
PLATELET # BLD AUTO: 266 THOUSANDS/UL (ref 149–390)
PMV BLD AUTO: 11.4 FL (ref 8.9–12.7)
POTASSIUM SERPL-SCNC: 3.2 MMOL/L (ref 3.6–5)
PR INTERVAL: 170 MS
PROT SERPL-MCNC: 7.1 G/DL (ref 5.9–8.4)
QRS AXIS: 66 DEGREES
QRSD INTERVAL: 84 MS
QT INTERVAL: 440 MS
QTC INTERVAL: 442 MS
RBC # BLD AUTO: 4.93 MILLION/UL (ref 3.88–5.62)
RSV RNA RESP QL NAA+PROBE: NEGATIVE
SARS-COV-2 RNA RESP QL NAA+PROBE: NEGATIVE
SODIUM SERPL-SCNC: 141 MMOL/L (ref 137–147)
T WAVE AXIS: 62 DEGREES
THC UR QL: NEGATIVE
VENTRICULAR RATE: 61 BPM
WBC # BLD AUTO: 11.31 THOUSAND/UL (ref 4.31–10.16)

## 2022-06-26 PROCEDURE — 80053 COMPREHEN METABOLIC PANEL: CPT | Performed by: PHYSICIAN ASSISTANT

## 2022-06-26 PROCEDURE — 36415 COLL VENOUS BLD VENIPUNCTURE: CPT | Performed by: EMERGENCY MEDICINE

## 2022-06-26 PROCEDURE — 80307 DRUG TEST PRSMV CHEM ANLYZR: CPT | Performed by: EMERGENCY MEDICINE

## 2022-06-26 PROCEDURE — HZ2ZZZZ DETOXIFICATION SERVICES FOR SUBSTANCE ABUSE TREATMENT: ICD-10-PCS | Performed by: EMERGENCY MEDICINE

## 2022-06-26 PROCEDURE — 93005 ELECTROCARDIOGRAM TRACING: CPT

## 2022-06-26 PROCEDURE — 93010 ELECTROCARDIOGRAM REPORT: CPT | Performed by: INTERNAL MEDICINE

## 2022-06-26 PROCEDURE — 85025 COMPLETE CBC W/AUTO DIFF WBC: CPT | Performed by: EMERGENCY MEDICINE

## 2022-06-26 PROCEDURE — 71046 X-RAY EXAM CHEST 2 VIEWS: CPT

## 2022-06-26 PROCEDURE — 83735 ASSAY OF MAGNESIUM: CPT | Performed by: PHYSICIAN ASSISTANT

## 2022-06-26 PROCEDURE — 99291 CRITICAL CARE FIRST HOUR: CPT | Performed by: EMERGENCY MEDICINE

## 2022-06-26 PROCEDURE — 0241U HB NFCT DS VIR RESP RNA 4 TRGT: CPT | Performed by: EMERGENCY MEDICINE

## 2022-06-26 PROCEDURE — 82075 ASSAY OF BREATH ETHANOL: CPT | Performed by: EMERGENCY MEDICINE

## 2022-06-26 RX ORDER — ACETAMINOPHEN 325 MG/1
650 TABLET ORAL EVERY 6 HOURS PRN
Status: DISCONTINUED | OUTPATIENT
Start: 2022-06-26 | End: 2022-06-30 | Stop reason: HOSPADM

## 2022-06-26 RX ORDER — NICOTINE 21 MG/24HR
1 PATCH, TRANSDERMAL 24 HOURS TRANSDERMAL DAILY
Status: DISCONTINUED | OUTPATIENT
Start: 2022-06-26 | End: 2022-06-30 | Stop reason: HOSPADM

## 2022-06-26 RX ORDER — RISPERIDONE 2 MG/1
2 TABLET, FILM COATED ORAL DAILY
Status: DISCONTINUED | OUTPATIENT
Start: 2022-06-26 | End: 2022-06-30 | Stop reason: HOSPADM

## 2022-06-26 RX ORDER — ALBUTEROL SULFATE 90 UG/1
2 AEROSOL, METERED RESPIRATORY (INHALATION) EVERY 4 HOURS PRN
Status: DISCONTINUED | OUTPATIENT
Start: 2022-06-26 | End: 2022-06-30 | Stop reason: HOSPADM

## 2022-06-26 RX ORDER — SODIUM CHLORIDE 9 MG/ML
125 INJECTION, SOLUTION INTRAVENOUS CONTINUOUS
Status: DISCONTINUED | OUTPATIENT
Start: 2022-06-26 | End: 2022-06-27

## 2022-06-26 RX ORDER — POTASSIUM CHLORIDE 14.9 MG/ML
20 INJECTION INTRAVENOUS ONCE
Status: COMPLETED | OUTPATIENT
Start: 2022-06-26 | End: 2022-06-26

## 2022-06-26 RX ORDER — FLUTICASONE PROPIONATE 50 MCG
1 SPRAY, SUSPENSION (ML) NASAL DAILY
Status: DISCONTINUED | OUTPATIENT
Start: 2022-06-26 | End: 2022-06-30 | Stop reason: HOSPADM

## 2022-06-26 RX ORDER — ENOXAPARIN SODIUM 100 MG/ML
40 INJECTION SUBCUTANEOUS DAILY
Status: DISCONTINUED | OUTPATIENT
Start: 2022-06-26 | End: 2022-06-30 | Stop reason: HOSPADM

## 2022-06-26 RX ORDER — NICOTINE 21 MG/24HR
14 PATCH, TRANSDERMAL 24 HOURS TRANSDERMAL ONCE
Status: DISCONTINUED | OUTPATIENT
Start: 2022-06-26 | End: 2022-06-26 | Stop reason: HOSPADM

## 2022-06-26 RX ORDER — ONDANSETRON 2 MG/ML
4 INJECTION INTRAMUSCULAR; INTRAVENOUS EVERY 6 HOURS PRN
Status: DISCONTINUED | OUTPATIENT
Start: 2022-06-26 | End: 2022-06-30 | Stop reason: HOSPADM

## 2022-06-26 RX ORDER — POTASSIUM CHLORIDE 20 MEQ/1
40 TABLET, EXTENDED RELEASE ORAL ONCE
Status: COMPLETED | OUTPATIENT
Start: 2022-06-26 | End: 2022-06-26

## 2022-06-26 RX ORDER — TRAZODONE HYDROCHLORIDE 50 MG/1
50 TABLET ORAL
Status: DISCONTINUED | OUTPATIENT
Start: 2022-06-26 | End: 2022-06-30 | Stop reason: HOSPADM

## 2022-06-26 RX ADMIN — SODIUM CHLORIDE 125 ML/HR: 0.9 INJECTION, SOLUTION INTRAVENOUS at 12:48

## 2022-06-26 RX ADMIN — RISPERIDONE 2 MG: 2 TABLET ORAL at 11:05

## 2022-06-26 RX ADMIN — POTASSIUM CHLORIDE 40 MEQ: 20 TABLET, EXTENDED RELEASE ORAL at 11:05

## 2022-06-26 RX ADMIN — FOLIC ACID: 5 INJECTION, SOLUTION INTRAMUSCULAR; INTRAVENOUS; SUBCUTANEOUS at 11:07

## 2022-06-26 RX ADMIN — FLUTICASONE PROPIONATE 1 SPRAY: 50 SPRAY, METERED NASAL at 11:07

## 2022-06-26 RX ADMIN — ENOXAPARIN SODIUM 40 MG: 100 INJECTION SUBCUTANEOUS at 11:07

## 2022-06-26 RX ADMIN — POTASSIUM CHLORIDE 20 MEQ: 14.9 INJECTION, SOLUTION INTRAVENOUS at 08:00

## 2022-06-26 RX ADMIN — NICOTINE 14 MG: 14 PATCH, EXTENDED RELEASE TRANSDERMAL at 00:44

## 2022-06-26 RX ADMIN — MULTIPLE VITAMINS W/ MINERALS TAB 1 TABLET: TAB ORAL at 11:05

## 2022-06-26 RX ADMIN — SODIUM CHLORIDE 125 ML/HR: 0.9 INJECTION, SOLUTION INTRAVENOUS at 21:25

## 2022-06-26 RX ADMIN — SODIUM CHLORIDE 125 ML/HR: 0.9 INJECTION, SOLUTION INTRAVENOUS at 04:09

## 2022-06-26 RX ADMIN — NICOTINE 1 PATCH: 21 PATCH, EXTENDED RELEASE TRANSDERMAL at 11:05

## 2022-06-26 NOTE — ASSESSMENT & PLAN NOTE
· Pt with a reported history of chronic alcohol use, no h/o withdrawal seizures  · Breath alcohol level 0 083 (6/26/2022 0022)  · SEWS protocol with symptom-triggered phenobarbital followed for medically-assisted alcohol withdrawal  · Mild withdrawal- did not require any phenobarbital   · Currently denies symptoms of acute withdrawal- appears objectively stable (VSS, no tremors)  · Withdrawal resolved

## 2022-06-26 NOTE — PLAN OF CARE
Problem: PAIN - ADULT  Goal: Verbalizes/displays adequate comfort level or baseline comfort level  Description: Interventions:  - Encourage patient to monitor pain and request assistance  - Assess pain using appropriate pain scale  - Administer analgesics based on type and severity of pain and evaluate response  - Implement non-pharmacological measures as appropriate and evaluate response  - Consider cultural and social influences on pain and pain management  - Notify physician/advanced practitioner if interventions unsuccessful or patient reports new pain  Outcome: Progressing     Problem: SAFETY ADULT  Goal: Patient will remain free of falls  Description: INTERVENTIONS:  - Educate patient/family on patient safety including physical limitations  - Instruct patient to call for assistance with activity   - Consult OT/PT to assist with strengthening/mobility   - Keep Call bell within reach  - Keep bed low and locked with side rails adjusted as appropriate  - Keep care items and personal belongings within reach  - Initiate and maintain comfort rounds  - Make Fall Risk Sign visible to staff    - Apply yellow socks and bracelet for high fall risk patients  - Consider moving patient to room near nurses station  Outcome: Progressing  Goal: Maintain or return to baseline ADL function  Description: INTERVENTIONS:  -  Assess patient's ability to carry out ADLs; assess patient's baseline for ADL function and identify physical deficits which impact ability to perform ADLs (bathing, care of mouth/teeth, toileting, grooming, dressing, etc )  - Assess/evaluate cause of self-care deficits   - Assess range of motion  - Assess patient's mobility; develop plan if impaired  - Assess patient's need for assistive devices and provide as appropriate  - Encourage maximum independence but intervene and supervise when necessary  - Involve family in performance of ADLs  - Assess for home care needs following discharge   - Consider OT consult to assist with ADL evaluation and planning for discharge  - Provide patient education as appropriate  Outcome: Progressing

## 2022-06-26 NOTE — NURSING NOTE
Pt arrived with no personal belongings  Pt states that he had "a whole bunch of stuff" with him at [facility]  RN called [facility] to track down belongings  RN spoke with Atiya Akhtar RN at [facility] and he states that they do have his belongings, Marimar Strickland left them behind  Atiya Akhtar states that he did contact [facility] to transport pt's belongings, but they do not have availability to do that at this time  Atiya Akhtar states that there is no  service at this time  This information will be given in shift handoff report to oncoming RN

## 2022-06-26 NOTE — ED NOTES
Patient aware urine sample is needed and specimen cup provided      Gina Campbell, SHERITA  06/26/22 1243

## 2022-06-26 NOTE — PROGRESS NOTES
22 1437   Referral Data   Referral Source Patient   Referral Name Self   Referral Reason Drug/Alcohol [facility]   Readmission Root Cause   30 Day Readmission No   Patient Information   Mental Status Alert   Primary Caregiver Self   Accompanied by/Relationship Self   Support System Immediate family   Legal Information   Legal Issues Pt denies   Activities of Daily Living Prior to Admission   Functional Status Independent   Assistive Device No device needed   Living Arrangement House;Lives with someone   Ambulation Independent   Access to Firearms   Access to Firearms No  (pt denies)   [address] SSI/SSD  ($800/month)   Scarecrow Visual Effects of Transport to Appts: [employer]     Pt is a 25year old male who presented at [facility] ED requesting detox from alcohol, cocaine, and marijuana; pt was previously admitted to detox unit in 2022 and was discharged to [facility]  Pt's name, , address, phone number were verified by case management  Pt was informed of case management role and the purpose of completion of intake with case management  Pt was pleasant and cooperative throughout intake; pt reports polysubstance use as well as alcohol use  Pt reports that he is drinking 3 40oz drinks daily since leaving inpatient behavioral health unit at [facility] in 2022  Pt reports first use at age 17 and last use on 2022 of 3 40oz beers  Pt reports longest period of abstinence from alcohol is 1 month  Pt reports that he smokes "one blunt" of cocaine daily; pt reports first use at age 26 and last use 2022  Pt reports that he smokes "one blunt" of marijuana daily  Pt reports first use at age 17 and last use 2022  Pt denies any other illicit substance use  Pt denies any current withdrawal symptoms; pt denies any history of dt's/withdrawal seizures   Pt reports various inpatient and outpatient substance use treatment in the past  Pt denies any current engagement in outpatient substance use treatment  Pt reports family history of addiction but did not specify who       AUDIT: 40  PAWSS: 6  UDS (+) for: Negative  Ethanol level in ED: 0 083      Pt has a diagnosis of schizophrenia; pt reports that he is connected with outpatient mental health services at [facility] where he sees a psychiatrist and has a    Pt declined to sign MITALI for his ACT team  Pt reports that he is medication compliant  Pt denies SI; pt reports HI towards his grandmother and mother  Pt reports plan to use his fist/elbows to "hurt them"  Pt reports AH/VH; pt states "the voices are telling me to hurt and kill and do violent things"  CM relayed this to medical team  Pt denies access to firearms; denies history of abuse  Pt has asthma, hearing loss, and is legally blind  Pt's PCP is Dr Dee Ellis at [address]  Pt declined to sign MITALI for PCP  Pt reports that he is unsure what insurance provider he is covered under but pt verified his behavioral health coverage as [facility] and signed MITALI; pt verified preferred pharmacy [facility] [address] [facility]       Pt denies any current legal issues  Pt reports that he is on disability due to his diagnosis of schizophrenia; pt states that he receives $800/month  Pt reports that he completed high school  Pt reports that he walks as his main mode of transportation  Pt denies any  service  Pt reports that he lives with his family in a house and can return upon discharge  Pt declined to sign ROIs for supportive contacts; he states "I don't want no one to know that I'm here"       Pt and CM completed relapse prevention plan   Pt and CM signed plan and pt received a copy of this plan  CM's clinical impression is that pt may benefit from entering dual diagnosis treatment in order to address psychiatric needs as well as polysubstance use  Pt indicates a desire to enter inpatient rehab upon discharge; pt signed MITALI for [employer] and [employer]  Pt presents in the contemplation stage of change

## 2022-06-26 NOTE — DISCHARGE INSTR - OTHER ORDERS
Drug and Alcohol Resources in [facility]    If you have health insurance, including medical assistance, there should be a phone number on your insurance card that you can call to find out how to access services  The card may say, For Elias Padilla Tiffanyfabián or For Drug and Alcohol Services or For Substance Abuse Services call the number provided  [facility]  [facility], [facility], [address]   [phone]  A  is available Monday through Friday from 8:00 am to 5:00 pm to provide you with assistance on accessing services for substance abuse  If you do not have health insurance and are in financial need, this office may also help you get the funding for the services that are necessary  [facility] provides funding to support three Recovery Centers in [facility]  These centers offer a safe, sober environment to those in recovery  A variety of programming including 12-Step Meetings, Reece Siegel, Life Skills Workshops, etc  is offered at each location  [address] [place] [address]  [phone]  www  cleanslatebangor  org [address] on [address]  Gaetano Sharp, [address] [facility]  [phone]  zxfvdk-mw-lpzj  Pravin  [address]dust[address] [facility], [facility]  [phone]   [address] [place] [address]  [phone]  www  [facility], [address] [place] [address]  [phone]  [facility] [place] [address]  Confidential free help, from public health agencies, to find substance use treatment and information  [phone]    Link for Zoom Codes for Virtual 12 step Meetings  Maylin khan uk  aspx  Alcoholics Anonymous  [facility]  [phone]    http://www thompson com/  Narcotics Anonymous  902.460.5497  https://[employer]/    Mental health resources in [facility]    [address] [facility], [address]  Phone: [phone]    Crisis Intervention number: 3-945-223-966-350-6019    Prevent suicide PA: In Crisis?  Call    0-161-080-YLOK (9711)    National Suicide Prevention Lifeline: 2-301.246.1152    Angela Davenport 98:   [facility], [address] [place] [address] [address]  [address] [place] [address], [facility]  [address], [address]  [address]  [address] [facility] , [address] [facility], [address]  Anirudh [address]  [address], [address] [facility] [address]  [facility], [address] [phone]

## 2022-06-26 NOTE — NURSING NOTE
Belongings received from [facility] and inventoried  Unable to have pt sign as he is sleeping hard at this time and does not wish to be woken up
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
## 2022-06-26 NOTE — ED ATTENDING ATTESTATION
6/25/2022  I saw and evaluated the patient  I have discussed the patient with the resident physician and agree with the resident's findings, assessment and plan as documented in the resident physician's note, unless otherwise documented below  All available laboratory and imaging studies were reviewed by myself  I was present for key portions of any procedure(s) performed by the resident and I was immediately available to provide assistance  I agree with the current assessment done in the Emergency Department  I have conducted an independent evaluation of this patient  Emergency Department Note- Nelsonpipe Villanueva Course 25 y o  male MRN: [id number]    Unit/Bed#: ED 27 Encounter: [id number]    Chief Complaint   Patient presents with    Detox Evaluation     Pt presents ambulatory requesting rehab for alcohol, cocaine and marijuana abuse  Denies SI, reports HI       Anastasio Severance is a 25 y o  male with PMH of schizophrenia, asthma, legally blind, presenting with request for assistance with substance abuse as well as with homicidal ideation  Patient reports that he has been using marijuana, cocaine, as well as alcohol  He drinks 3-440 oz beers per day  Last drink was a rum and Coke before coming to the emergency department  He reports that he wants help with his alcohol use  He has no history of alcohol withdrawal such as seizures  He does have visual and auditory hallucinations all the time  No thoughts of hurting himself, however, he has had thoughts of harm against his family members  No chest pain  Reports modest cough, but denies needing to use his inhaler  No nausea or vomiting      REVIEW OF SYSTEMS    Constitutional: denies fevers  HENT: no rhinorrhea, no sore throat  Cardiac: no chest pain  Respiratory:  Cough, no shortness of breath  GI: no abdominal pain, nausea, vomiting, or diarrhea  : no burning with urination  Heme/Onc: no easy bruising  Endocrine: no diabetes  Neuro: no focal weakness or numbness, no headaches    Ten systems reviewed and negative unless otherwise noted in HPI and above    PAST MEDICAL HISTORY  Past Medical History:   Diagnosis Date    Asthma     Hearing impaired     Legally blind     Schizophrenia ([facility] [address] )        SURGICAL HISTORY  Past Surgical History:   Procedure Laterality Date    HERNIA REPAIR      TEAR DUCT SURGERY Bilateral     TONSILLECTOMY         FAMILY HISTORY  Family History   Problem Relation Age of Onset    Schizophrenia Mother     Mental illness Mother     Abnormal EKG Sister     Mental illness Sister         CURRENT MEDICATIONS  No current facility-administered medications on file prior to encounter       Current Outpatient Medications on File Prior to Encounter   Medication Sig    albuterol (PROVENTIL HFA,VENTOLIN HFA) 90 mcg/act inhaler Inhale 2 puffs every 4 (four) hours as needed for wheezing or shortness of breath    fluticasone (FLONASE) 50 mcg/act nasal spray 1 spray into each nostril daily    paliperidone palmitate (INVEGA SUSTENNA) 234 MG/1 5ML im injection Inject 234 mg into a muscle every 28 days    risperiDONE (RisperDAL M-TAB) 0 5 mg disintegrating tablet Take 1 tablet (0 5 mg total) by mouth daily    risperiDONE (RisperDAL) 2 mg tablet Take 2 mg by mouth daily       ALLERGIES  No Known Allergies    SOCIAL HISTORY  Social History     Socioeconomic History    Marital status: Single     Spouse name: None    Number of children: None    Years of education: None    Highest education level: None   Occupational History    None   Tobacco Use    Smoking status: Current Every Day Smoker     Packs/day: 1 00     Years: 3 00     Pack years: 3 00     Types: Cigarettes    Smokeless tobacco: Never Used   Substance and Sexual Activity    Alcohol use: Yes     Comment: "a lot"    Drug use: Yes     Types: Cocaine, Marijuana, Methamphetamines    Sexual activity: None   Other Topics Concern    None   Social History Narrative    None     Social Determinants of Health     Financial Resource Strain: Not on file   Food Insecurity: Not on file   Transportation Needs: Not on file   Physical Activity: Not on file   Stress: Not on file   Social Connections: Not on file   Intimate Partner Violence: Not on file   Housing Stability: Not on file       PHYSICAL EXAM  /75   Pulse 94   Temp 98 1 °F (36 7 °C) (Temporal)   Resp 18   SpO2 95%   Vital signs and nursing notes reviewed    CONSTITUTIONAL: male appearing stated age resting in bed, in no acute distress  HEENT: atraumatic, normocephalic  Sclera anicteric, conjunctiva are not injected  Syndrome faces  Moist oral mucosa  CARDIOVASCULAR/CHEST: RRR, no M/R/G    PULMONARY:  Frequent cough  Breathing comfortably on RA  Breath sounds are equal and clear to auscultation  ABDOMEN: non-distended  BS present, normoactive  Non-tender  MSK: moves all extremities, no deformities, no peripheral edema, no calf asymmetry  NEURO: Awake, alert, and oriented x 3  Face symmetric  Moves all extremities spontaneously  No focal neurologic deficits  SKIN: Warm, appears well-perfused  MENTAL STATUS: Normal affect, pleasant, personable, denies SI  Answers questions appropriately  DIAGNOSTIC STUDIES  Results Reviewed     Procedure Component Value Units Date/Time    Rapid drug screen, urine [[id number]]  (Normal) Collected: 06/26/22 0044    Lab Status: Final result Specimen: Urine, Other Updated: 06/26/22 0230     Amph/Meth UR Negative     Barbiturate Ur Negative     Benzodiazepine Urine Negative     Cocaine Urine Negative     Methadone Urine Negative     Opiate Urine Negative     PCP Ur Negative     THC Urine Negative     Oxycodone Urine Negative    Narrative:      FOR MEDICAL PURPOSES ONLY  IF CONFIRMATION NEEDED PLEASE CONTACT THE LAB WITHIN 5 DAYS      Drug Screen Cutoff Levels:  AMPHETAMINE/METHAMPHETAMINES  1000 ng/mL  BARBITURATES     200 ng/mL  BENZODIAZEPINES     200 ng/mL  COCAINE      300 ng/mL  METHADONE      300 ng/mL  OPIATES      300 ng/mL  PHENCYCLIDINE     25 ng/mL  THC       50 ng/mL  OXYCODONE      100 ng/mL    COVID/FLU/RSV - 2 hour TAT [[id number]]  (Normal) Collected: 06/26/22 0044    Lab Status: Final result Specimen: Nares from Nasopharyngeal Swab Updated: 06/26/22 0152     SARS-CoV-2 Negative     INFLUENZA A PCR Negative     INFLUENZA B PCR Negative     RSV PCR Negative    Narrative:      FOR PEDIATRIC PATIENTS - copy/paste COVID Guidelines URL to browser: [email or web address]  [employer]    SARS-CoV-2 assay is a Nucleic Acid Amplification assay intended for the  qualitative detection of nucleic acid from SARS-CoV-2 in nasopharyngeal  swabs  Results are for the presumptive identification of SARS-CoV-2 RNA  Positive results are indicative of infection with SARS-CoV-2, the virus  causing COVID-19, but do not rule out bacterial infection or co-infection  with other viruses  Laboratories within the [place] and its  territories are required to report all positive results to the appropriate  public health authorities  Negative results do not preclude SARS-CoV-2  infection and should not be used as the sole basis for treatment or other  patient management decisions  Negative results must be combined with  clinical observations, patient history, and epidemiological information  This test has not been FDA cleared or approved  This test has been authorized by FDA under an Emergency Use Authorization  (EUA)  This test is only authorized for the duration of time the  declaration that circumstances exist justifying the authorization of the  emergency use of an in vitro diagnostic tests for detection of SARS-CoV-2  virus and/or diagnosis of COVID-19 infection under section 564(b)(1) of  the Act, 21 U  S C  052WMR-9(P)(1), unless the authorization is terminated  or revoked sooner   The test has been validated but independent review by FDA  and CLIA is pending  Test performed using [employer] GeneXpert: This RT-PCR assay targets N2,  a region unique to SARS-CoV-2  A conserved region in the E-gene was chosen  for pan-Sarbecovirus detection which includes SARS-CoV-2  CBC and differential [[id number]]  (Abnormal) Collected: 06/26/22 0112    Lab Status: Final result Specimen: Blood from Arm, Right Updated: 06/26/22 0122     WBC 11 31 Thousand/uL      RBC 4 93 Million/uL      Hemoglobin 16 0 g/dL      Hematocrit 47 3 %      MCV 96 fL      MCH 32 5 pg      MCHC 33 8 g/dL      RDW 14 1 %      MPV 11 4 fL      Platelets 637 Thousands/uL      nRBC 0 /100 WBCs      Neutrophils Relative 42 %      Immat GRANS % 0 %      Lymphocytes Relative 48 %      Monocytes Relative 7 %      Eosinophils Relative 2 %      Basophils Relative 1 %      Neutrophils Absolute 4 78 Thousands/µL      Immature Grans Absolute 0 05 Thousand/uL      Lymphocytes Absolute 5 40 Thousands/µL      Monocytes Absolute 0 79 Thousand/µL      Eosinophils Absolute 0 20 Thousand/µL      Basophils Absolute 0 09 Thousands/µL     POCT alcohol breath test [[id number]]  (Abnormal) Resulted: 06/26/22 0022    Lab Status: Final result Updated: 06/26/22 0022     EXTBreath Alcohol   083%          No orders to display       PROCEDURES  Procedures            ED COURSE  Medications - No data to display    71-year-old male presenting with request for help with substance abuse, particularly alcohol abuse, as well as with homicidal thoughts, with request to sign in to the hospital   Vital signs reviewed, afebrile and within normal limits  Medical record reviewed, patient had a similar admission in March, when he was initially admitted to our Medical T[facility]ology detox unit  Case discussed with [facility] detox and patient is accepted for admission there  Patient's labs today reveal negative UDS, negative COVID-19, influenza, and RSV, and CBC that is with mild leukocytosis but otherwise unremarkable    At present, patient's point of care alcohol is 0 83  Patient is agreeable to transfer to the detox unit  He will likely require psychiatric hospitalization after that given expressed thoughts of harm against family members who reside with him at home      CLINICAL IMPRESSION  Final diagnoses:   Alcohol abuse   [facility]   Other schizophrenia ([facility] )       Discharge Medication List as of 6/26/2022  2:29 AM      CONTINUE these medications which have NOT CHANGED    Details   albuterol (PROVENTIL HFA,VENTOLIN HFA) 90 mcg/act inhaler Inhale 2 puffs every 4 (four) hours as needed for wheezing or shortness of breath, Starting Tue 3/22/2022, Normal      fluticasone (FLONASE) 50 mcg/act nasal spray 1 spray into each nostril daily, Starting Wed 3/23/2022, No Print      paliperidone palmitate (INVEGA) 234 MG/1 5ML im injection Inject 234 mg into a muscle every 28 days, Historical Med      risperiDONE (RisperDAL M-TAB) 0 5 mg disintegrating tablet Take 1 tablet (0 5 mg total) by mouth daily, Starting Tue 3/22/2022, No Print      risperiDONE (RisperDAL) 2 mg tablet Take 2 mg by mouth daily, Historical Med

## 2022-06-26 NOTE — ASSESSMENT & PLAN NOTE
· Endorses chronic h/o alcohol use with daily consumption of 3 40-oz beer for the past 2 yrs  · Was recently discharged from [facility] detox facility 3 months ago and sent to [facility] for 1 month  · Reports relapsed immediately upon discharge from [address] [place] [address] last alcohol consumption was around 11am 06/25/2022  · Breath alcohol level 0 083 (6/26/2022 0022)  · Currently denies any Alcohol withdrawal symptoms  · Withdrawal managed as above   ·  consulted for disposition planning  · Will continue Thiamine, Folic and MVI supplementation

## 2022-06-26 NOTE — ED NOTES
YVONNE to transport patient to [address] [place] [address] at 0230, report to be called to [phone]       Annmarie Do RN  06/26/22 0200

## 2022-06-26 NOTE — ASSESSMENT & PLAN NOTE
· Pt with a h/o bilateral hearing loss  · Per chart review, has had hearing impairment since childhood and hearing loss in R > L ear  · Follows with [facility] - recommend ongoing outpatient follow-up

## 2022-06-26 NOTE — EMTALA/ACUTE CARE TRANSFER
[address] [facility] [address]  Dept: [phone]      [id number] TRANSFER CONSENT    NAME Gokul Huang Human 2000                              MRN [id number]    I have been informed of my rights regarding examination, treatment, and transfer   by Dr Brooke Ramirez MD    Benefits: Specialized equipment and/or services available at the receiving facility (Include comment)________________________    Risks: Increased discomfort during transfer      Transfer Request   I acknowledge that my medical condition has been evaluated and explained to me by the emergency department physician or other qualified medical person and/or my attending physician who has recommended and offered to me further medical examination and treatment  I understand the Hospital's obligation with respect to the treatment and stabilization of my emergency medical condition  I nevertheless request to be transferred  I release the Hospital, the doctor, and any other persons caring for me from all responsibility or liability for any injury or ill effects that may result from my transfer and agree to accept all responsibility for the consequences of my choice to transfer, rather than receive stabilizing treatment at the Hospital  I understand that because the transfer is my request, my insurance may not provide reimbursement for the services  The Hospital will assist and direct me and my family in how to make arrangements for transfer, but the hospital is not liable for any fees charged by the transport service  In spite of this understanding, I refuse to consent to further medical examination and treatment which has been offered to me, and request transfer to  Annette Guajardo Name, [address] : JACEK GARRISON  [facility] CARE [place]  I authorize the performance of emergency medical procedures and treatments upon me in both transit and upon arrival at the receiving facility  Additionally, I authorize the release of any and all medical records to the receiving facility and request they be transported with me, if possible  I authorize the performance of emergency medical procedures and treatments upon me in both transit and upon arrival at the receiving facility  Additionally, I authorize the release of any and all medical records to the receiving facility and request they be transported with me, if possible  I understand that the safest mode of transportation during a medical emergency is an ambulance and that the Hospital advocates the use of this mode of transport  Risks of traveling to the receiving facility by car, including absence of medical control, life sustaining equipment, such as oxygen, and medical personnel has been explained to me and I fully understand them  (MAKAYLA CORRECT BOX BELOW)  [  ]  I consent to the stated transfer and to be transported by ambulance/helicopter  [  ]  I consent to the stated transfer, but refuse transportation by ambulance and accept full responsibility for my transportation by car  I understand the risks of non-ambulance transfers and I exonerate the Hospital and its staff from any deterioration in my condition that results from this refusal     X___________________________________________    DATE  06/26/22  TIME________  Signature of patient or legally responsible individual signing on patient behalf           RELATIONSHIP TO PATIENT_________________________          Provider Certification    NAME Gokul Saunders [facility] 2000                              MRN [id number]    A medical screening exam was performed on the above named patient  Based on the examination:    Condition Necessitating Transfer The primary encounter diagnosis was Alcohol abuse  A diagnosis of Polysubstance abuse ([facility] [address] ) was also pertinent to this visit      Patient Condition: The patient has been stabilized such that within reasonable medical probability, no material deterioration of the patient condition or the condition of the unborn child(chilo) is likely to result from the transfer    Reason for Transfer: Level of Care needed not available at this facility    Transfer Requirements: Facility [facility]   · Space available and qualified personnel available for treatment as acknowledged by    · Agreed to accept transfer and to provide appropriate medical treatment as acknowledged by       Katiuska Hoffman  · Appropriate medical records of the examination and treatment of the patient are provided at the time of transfer   [address] [facility], [address] _______  · Transfer will be performed by qualified personnel from    and appropriate transfer equipment as required, including the use of necessary and appropriate life support measures  Provider Certification: I have examined the patient and explained the following risks and benefits of being transferred/refusing transfer to the patient/family:  General risk, such as traffic hazards, adverse weather conditions, rough terrain or turbulence, possible failure of equipment (including vehicle or aircraft), or consequences of actions of persons outside the control of the transport personnel      Based on these reasonable risks and benefits to the patient and/or the unborn child(chilo), and based upon the information available at the time of the patients examination, I certify that the medical benefits reasonably to be expected from the provision of appropriate medical treatments at another medical facility outweigh the increasing risks, if any, to the individuals medical condition, and in the case of labor to the unborn child, from effecting the transfer      X____________________________________________ DATE 06/26/22        TIME_______      ORIGINAL - SEND TO MEDICAL RECORDS   COPY - SEND WITH PATIENT DURING TRANSFER

## 2022-06-26 NOTE — QUICK NOTE
Informed by CM that patient disclosed to her homicidal ideations toward his mother and grandmother along with command AHs and VHs ("violent")  Patient denies SI, thoughts of self-harm, and able to contract for safety in hospital setting  Will initiate continual 1:1 observation for safety

## 2022-06-26 NOTE — H&P
HISTORY & PHYSICAL EXAM  DEPARTMENT OF MEDICAL TOXICOLOGY  LEVEL 4 MEDICAL DETOX UNIT  Gokul Jesus 25 y o  male MRN: [id number]  Unit/Bed#: [address] [place] [address] [id number] Encounter: [id number]      Reason for Admission/Principal Problem: Ethanol withdrawal, Ethanol use disorder  Admitting Provider: Reji Arrington PA-C  Attending Provider: Arlene Zhao DO   6/26/2022  2:46 AM        Alcohol withdrawal syndrome with complication ([place] [address] )  Assessment & Plan  · Pt with a history of chronic alcohol use, reports drinking 3-4 40-oz beers daily x 2 years  · Currently denies any signs/symptoms of alcohol withdrawal  · Serum alcohol level <10 in the ED  · Initiate SEWS protocol for alcohol withdrawal  ? Continue to monitor for development of alcohol withdrawal symptoms  ?  Pt currently sleeping comfortably in no distress        Alcohol use disorder, severe, dependence (HCC)  Assessment & Plan  · Chronic h/o alcohol use disorder with daily consumption of 3-4 40 oz beer for the past 2 yrs  · Was recently discharged from [facility] detox facility 3 months ago and sent to [facility] for 1 months  · Admits relapsed immediately upon discharge from [address] [place] [address] last alcohol consumption was around 11am 06/25/2022  · Breath alcohol level 0 083  · Currently denies any Alcohol withdrawal symptoms  · Interested in inpatient rehabilitation  ·  consulted for disposition planning  · Will continue Thiamine, Folic and MVI supplementation    Other schizophrenia ([facility] )  Assessment & Plan  · Pt with a h/o schizophrenia with persistent visual/auditory hallucination and HI but no SI  · Admits HI towards family members; stable and consistent since last admission 3 months ago  · Reports he is on monthly [place] injections which he gets on the 28th of each month as well as Risperdal 2 mg po daily  · Unknown last dose of Invega; follows up with [address] [place] [address]  · Will continue Risperdal 2 mg Po daily  · Psych consult placed and awaiting recommendation      Asthma  Assessment & Plan  · Stable with no exacerbation  · Exam positive for expiratory wheezing, stable since last admission  · CXR ordered: No acute findings; COVID negative  · Will continue prn Albuterol and Flonase  Tobacco use disorder  Assessment & Plan  · Pt currently smokes 1-2 PPD of cigarettes  · Offer NRT with 21 mg/24 hr patch  · Encourage cessation      Legally blind  Assessment & Plan  · Chronic; stable  · Able to function independently; will continue to monitor    Hearing loss  Assessment & Plan  · Pt with a h/o bilateral hearing loss  · Per chart review, has had hearing impairment since childhood and hearing loss in R > L ear  · Follows with [facility]          VTE Prophylaxis: Enoxaparin (Lovenox)  / sequential compression device   Code Status: Full      Anticipated Length of Stay:  Patient will be admitted on an Inpatient basis with an anticipated length of stay of  2  midnights  Justification for Hospital Stay: Alcohol withdrawal symptoms    For any questions or concerns, please Tiger Text the advanced practitioner in the role of [facility]-AP On Call      This patient qualifies for Level IV medically managed intensive inpatient services under the criteria set by the American Society of Addiction Medicine, including dimensions 1-3  The patient is in withdrawal (or is intoxicated with high risk of withdrawal), with severe and unstable medical and/or psychiatric (dual diagnosis) problems, requiring requires 24-hour medical and nursing care and the full resources of a [address] patient to medical detox unit and continue supportive care and stabilization of acute ethanol withdrawal per medical toxicology/detox treatment pathway  Monitor ethanol withdrawal severity via the Severity of Ethanol Withdrawal Scale (SEWS) Q4 hours and then hourly if/when SEWS > 6   Treat withdrawal per pathway and reassess Q30-60 minutes  Mild SEWS Score 1-6  Administer medications* (IV or PO; PO preferred):   If initial SEWS score: diazepam 10mg PO/IV x 1 AND phenobarbital 65 mg PO/IV x 1   If repeat SEWS score 1-6: phenobarbital 65 mg PO/IV q1 hour x 5 doses maximum   Reassessment:    SEWS q1 hour after each dose until SEWS 0 x 2 hours   VS q1 hours (until SEWS 0, then q4 hours)   Notify provider for bedside evaluation if 5-dose maximum is reached, RASS of -3 to -5, or SEWS score escalates to moderate or severe  Moderate SEWS Score 7-12  Administer medications* (IV):   If initial SEWS score: diazepam 10mg IV x 1 AND phenobarbital 260 mg IV x 1   If repeat SEWS score 7-12 or score escalated from mild: phenobarbital 130 mg IV q30 minutes x 5 doses maximum   Reassessment:   SEWS q30 minutes after each dose until SEWS < 7 (then hourly until SEWS 0 x 2 hours)   VS q30 minutes until SEWS < 7 (then hourly until SEWS 0, then q4 hours)   Notify provider for bedside evaluation if 5-dose maximum is reached, RASS of -3 to -5, or SEWS score escalates to severe  Severe SEWS Score ? 13  Administer medications* (IV):   If initial SEWS score: Diazepam 10 mg IV x 1 AND phenobarbital 650 mg IV piggyback x 1 over 15-30 minutes   If repeat SEWS score ? 13 or score escalated from mild or moderate: phenobarbital 130 mg IV q30 minutes x 5 doses maximum   Reassessment:   SEWS q30 minutes after each dose until SEWS < 7 (then hourly until SEWS 0 x 2 hours)    VS q30 minutes until SEWS < 7 (then hourly until SEWS 0, then q4 hours)   Notify provider for bedside evaluation if 5-dose maximum is reached or RASS of -3 to -5   *Hold medications and notify provider if CNS depression, respirations < 10/min, or RASS of -3 to -5           Medications to be administered adjunctively if more than 2 grams of phenobarbital is needed for stabilization of withdrawal; require attending approval     Dexmedetomidine infusion 0 1-1mcg/kg/hr IV infusion, titratable to reduced agitation (Goal: RASS -2)   Ketamine   o Acute agitated delirium: 1-2 mg/kg IV or 4-5 mg/kg IM  o Refractory withdrawal: 0 1-1mg/kg/hr IV infusion, titratable to reduced agitation (Goal: RASS -2)    Further evaluation, screening and treatment:  Evaluate complete metabolic panel, transaminases, INR, and lipase  Assess hepatic ultrasound for any sign of alcoholic liver disease or cirrhosis, and ultimately refer for further hepatic evaluation and care as/if indicated  Additional medications for ethanol associated malnutrition: Thiamine 100 mg IV daily, increase to 500 mg TID for signs/symptoms of Wernicke's Encephalopathy or Wernicke Korsakoff Syndrome   Folic acid 1 mg IV daily   Multivitamin PO daily      Will offer first monthly injection of Naltrexone 380 mg IM, once patient is stabilized, as it has been shown to assist in decreasing cravings for ethanol  Evaluate and treat for coexisting substance use, such as opioids and nicotine  Discuss risk factors for infectious disease, such as history of intravenous drug abuse, and offer hepatitis and HIV screening if indicated  Case management consultation to assist with coordination of subsequent treatment after discharge  Hx and PE limited by: None    HPI: Jose C Payne is a 25y o  year old male  with PMH of schizophrenia, asthma, alcohol use disorder, legally blind with hearing loss who initially presented to [address] [place] [address] requesting assistance with substance abuse as well as with homicidal ideation  Patient reports that he has been using marijuana, cocaine, as well as alcohol  He drinks 3-440 oz beers per day for the last 2 yrs; last drank around 11am 06/25/2022  Last drink was a rum and Coke before coming to the emergency department  Last cocaine and marijuana use was several days ago but no recently  He reports that he wants help with his alcohol use   Pt was recently discharged from [facility] detox facility to inpatient rehab at [facility] 3 months ago before being discharged 1 months later  Pt admits relapsed immediately upon discharge  Upon arrival to the detox facility, patient continues to deny any alcohol withdrawal symptoms including seizures, nausea, abdominal pain or any other compliant on review of systems  No prior h/o withdrawal seizures or DT  Continues to endorse visual and auditory hallucinations which is persistent and chronic with thoughts of harming his family members  Denies any concrete plans  Preferred alcoholic beverage(s): Beer  Quantity and frequency of alcohol intake: 3-4 40 oz  Use of any ethanol substitutes (toxic alcohols): no  Date/Time of last alcohol intake: 11 am 06/25/2022  Current signs and symptoms of ethanol withdrawal: other none    SEWS Total Score: 0 (6/26/2022  3:10 AM)    Ethanol Withdrawal History  Previous ethanol withdrawal? yes  Prior inpatient treatment for ethanol withdrawal? yes  Prior outpatient treatment for ethanol withdrawal? yes  History of seizures with prior ethanol withdrawal? no  Prior treatment with naltrexone (Vivitrol)? no  Current treatment with naltrexone (Vivitrol)? no  Other current treatment for ethanol use disorder? no  Co-existing substance use? yes    Review of PDMP: no     Social History     Substance and Sexual Activity   Alcohol Use Yes    Comment: (3) 40 oz  beers per day     Social History     Substance and Sexual Activity   Drug Use Yes    Types: Cocaine, Marijuana, Methamphetamines     Social History     Tobacco Use   Smoking Status Current Every Day Smoker    Packs/day: 1 00    Years: 3 00    Pack years: 3 00    Types: Cigarettes   Smokeless Tobacco Never Used       Review of Systems   Constitutional: Negative for chills and fever  HENT: Negative for ear pain and sore throat  Eyes: Negative for pain and visual disturbance  Respiratory: Positive for wheezing   Negative for cough, chest tightness and shortness of breath  Cardiovascular: Negative for chest pain, palpitations and leg swelling  Gastrointestinal: Negative for abdominal pain and vomiting  Genitourinary: Negative for dysuria and hematuria  Musculoskeletal: Negative for arthralgias and back pain  Skin: Negative for color change and rash  Neurological: Negative for seizures and syncope  Psychiatric/Behavioral: Positive for hallucinations  Negative for agitation, behavioral problems, self-injury, sleep disturbance and suicidal ideas  The patient is not nervous/anxious  All other systems reviewed and are negative  Historical Information   Past Medical History:   Diagnosis Date    Asthma     Hearing impaired     Legally blind     Schizophrenia ([facility] )      Past Surgical History:   Procedure Laterality Date    HERNIA REPAIR      TEAR DUCT SURGERY Bilateral     TONSILLECTOMY       Family History   Problem Relation Age of Onset    Schizophrenia Mother     Mental illness Mother     Abnormal EKG Sister     Mental illness Sister      Social History   Marital Status: Single   Occupation: unemployed  Patient Pre-hospital Living Situation: Family  Patient Pre-hospital Level of Mobility: independent mobility  Patient Pre-hospital Diet Restrictions: none    No Known Allergies    Prior to Admission medications    Medication Sig Start Date End Date Taking?  Authorizing Provider   albuterol (PROVENTIL HFA,VENTOLIN HFA) 90 mcg/act inhaler Inhale 2 puffs every 4 (four) hours as needed for wheezing or shortness of breath 3/22/22   Eugenia Kang PA-C   fluticasone [facility]) 50 mcg/act nasal spray 1 spray into each nostril daily 3/23/22   Eugenia Kang PA-C   paliperidone palmitate (INVEGA) 234 MG/1 5ML im injection Inject 234 mg into a muscle every 28 days    Historical Provider, MD   risperiDONE (RisperDAL M-TAB) 0 5 mg disintegrating tablet Take 1 tablet (0 5 mg total) by mouth daily 3/22/22   Eugenia Kang PA-C   risperiDONE (RisperDAL) 2 mg tablet Take 2 mg by mouth daily    Historical Provider, MD       Current Facility-Administered Medications   Medication Dose Route Frequency    acetaminophen (TYLENOL) tablet 650 mg  650 mg Oral Q6H PRN    albuterol (PROVENTIL HFA,VENTOLIN HFA) inhaler 2 puff  2 puff Inhalation Q4H PRN    enoxaparin (LOVENOX) subcutaneous injection 40 mg  40 mg Subcutaneous Daily    fluticasone (FLONASE) 50 mcg/act nasal spray 1 spray  1 spray Each Nare Daily    folic acid 1 mg, thiamine (VITAMIN B1) 100 mg in sodium chloride 0 9 % 100 mL IV piggyback   Intravenous Daily    multivitamin-minerals (CENTRUM) tablet 1 tablet  1 tablet Oral Daily    nicotine (NICODERM CQ) 21 mg/24 hr TD 24 hr patch 1 patch  1 patch Transdermal Daily    ondansetron (ZOFRAN) injection 4 mg  4 mg Intravenous Q6H PRN    risperiDONE (RisperDAL) tablet 2 mg  2 mg Oral Daily    sodium chloride 0 9 % infusion  125 mL/hr Intravenous Continuous    traZODone (DESYREL) tablet 50 mg  50 mg Oral HS PRN       Continuous Infusions:sodium chloride, 125 mL/hr, Last Rate: 125 mL/hr (06/26/22 0409)             Objective     No intake or output data in the 24 hours ending 06/26/22 0512    Invasive Devices:   Peripheral IV 06/26/22 Right Antecubital (Active)   Site Assessment WDL; Clean;Dry; Intact 06/26/22 0314   Dressing Type Transparent 06/26/22 0314   Line Status Flushed;Saline locked 06/26/22 0314   Dressing Status Clean;Dry; Intact 06/26/22 0314       Vitals   Vitals:    06/26/22 0256   BP: 107/58   TempSrc: Temporal   Pulse: 61   Resp: 16   Patient Position - Orthostatic VS: Lying   Temp: 97 7 °F (36 5 °C)       Physical Exam  Constitutional:       General: He is not in acute distress  Appearance: Normal appearance  He is not ill-appearing, toxic-appearing or diaphoretic  HENT:      Head: Normocephalic and atraumatic  Nose: Nose normal  No congestion or rhinorrhea        Mouth/Throat:      Pharynx: No oropharyngeal exudate or posterior oropharyngeal erythema  Eyes:      General: No scleral icterus  Right eye: No discharge  Extraocular Movements: Extraocular movements intact  Conjunctiva/sclera: Conjunctivae normal       Pupils: Pupils are equal, round, and reactive to light  Neck:      Vascular: No carotid bruit  Cardiovascular:      Rate and Rhythm: Normal rate and regular rhythm  Heart sounds: No murmur heard  Pulmonary:      Effort: Pulmonary effort is normal  No respiratory distress  Breath sounds: No stridor  Wheezing present  No rhonchi  Abdominal:      General: Abdomen is flat  There is no distension  Tenderness: There is no abdominal tenderness  Musculoskeletal:         General: No swelling, tenderness, deformity or signs of injury  Normal range of motion  Cervical back: Normal range of motion and neck supple  No rigidity or tenderness  Right lower leg: No edema  Left lower leg: No edema  Lymphadenopathy:      Cervical: No cervical adenopathy  Skin:     Capillary Refill: Capillary refill takes less than 2 seconds  Coloration: Skin is not jaundiced  Findings: No bruising  Neurological:      Mental Status: He is oriented to person, place, and time  GCS: GCS eye subscore is 4  GCS verbal subscore is 5  GCS motor subscore is 6  Sensory: Sensation is intact  Motor: Motor function is intact  Coordination: Coordination is intact  Gait: Gait is intact  Psychiatric:         Attention and Perception: Attention normal          Mood and Affect: Mood normal          Speech: Speech normal          Thought Content: Thought content includes homicidal ideation  Thought content does not include suicidal ideation  Thought content does not include homicidal or suicidal plan  Data:    EKG, Pathology, and Other Studies: I have personally reviewed pertinent reports  EKG: (06/26/2022; 0409) NSR, nonspecific ST and T wave abnormality, Vent  Rate 61 bpm, SC interval 170 ms, QRS duration 84 ms  QT//442 ms;  No Ischemic changes    Lab Results:  CBC ETOH     Lab Results   Component Value Date    WBC 11 31 (H) 06/26/2022    RBC 4 93 06/26/2022    HGB 16 0 06/26/2022    HCT 47 3 06/26/2022    MCV 96 06/26/2022    MCH 32 5 06/26/2022    MCHC 33 8 06/26/2022    RDW 14 1 06/26/2022     06/26/2022    MPV 11 4 06/26/2022      No results found for: LACTICACID   CMP UA         Component Value Date/Time    K 4 3 06/16/2022 0856     06/16/2022 0856    CO2 33 (H) 06/16/2022 0856    BUN 13 06/16/2022 0856    CREATININE 1 55 (H) 06/16/2022 0856         Component Value Date/Time    CALCIUM 9 6 06/16/2022 0856    ALKPHOS 74 06/16/2022 0856    AST 52 (H) 06/16/2022 0856    ALT 47 06/16/2022 0856      Lab Results   Component Value Date    CLARITYU Clear 01/21/2022    COLORU Yellow 01/21/2022    SPECGRAV 1 010 01/21/2022    PHUR 6 0 01/21/2022    GLUCOSEU Negative 01/21/2022    KETONESU Negative 01/21/2022    BLOODU Negative 01/21/2022    PROTEIN UA Negative 01/21/2022    NITRITE Negative 01/21/2022    BILIRUBINUR Negative 01/21/2022    UROBILINOGEN 0 2 01/21/2022    LEUKOCYTESUR Negative 01/21/2022        Liver Function Test: ASA     Lab Results   Component Value Date    TBILI 1 27 (H) 06/16/2022    ALKPHOS 74 06/16/2022    AST 52 (H) 06/16/2022    ALT 47 06/16/2022    TP 8 5 (H) 06/16/2022    ALB 4 0 06/16/2022      Lab Results   Component Value Date    SALICYLATE <6 7 (L) 64/57/0653      Troponin APAP     No results found for: TROPONINI   Lab Results   Component Value Date    ACTMNPHEN <10 (L) 03/18/2022      VBG HCG     No results found for: PHVEN, YXW2FQQ, PO2VEN, QVE0BWM, BEVEN, I0HOAIBXS, K3XIRQZ   No results found for: HCGQUANT   ABG Urine Drug Screen     No results found for: PHART, ILI7SEU, PO2ART, YPT4PLB, BEART, O3QOOZMBN, O2HGB, SOURC, KIMBERLY, VTAC, ACRATE, INSPIREDAIR, PEEP   Lab Results   Component Value Date    AMPMETHUR Negative 06/26/2022 BARBTUR Negative 06/26/2022    BDZUR Negative 06/26/2022    COCAINEUR Negative 06/26/2022    METHADONEUR Negative 06/26/2022    OPIATEUR Negative 06/26/2022    PCPUR Negative 06/26/2022    THCUR Negative 06/26/2022    OXYCODONEUR Negative 06/26/2022      Lactate INR     No results found for: LACTICACID   No results found for: INR   PTT Protime     No results found for: PTT     No results found for: PROTIME           Imaging Studies: I have personally reviewed pertinent reports  Counseling / Coordination of Care  Total floor / unit time spent today 35 minutes  Greater than 50% of total time was spent with the patient and / or family counseling and / or coordination of care  Minutes of critical care time 28  -Critical care time was exclusive of separately billable procedures and teaching time    -Critical care was necessary to treat or prevent imminent or life-threatening deterioration of the following condition: CNS failure/compromise, toxidrome (ethanol withdrawal),  withdrawal  -Critical care time was spent personally by me on the following activities as well as the above as per the course and rest of chart: obtaining history from patient/surrogate, development of a treatment plan, discussions with referring provider(s), evaluation of patient's response to the treatment, examination of the patient, performing treatments and interventions, re-evaluation of the patient's condition, review of old charts, ordering/interpreting laboratory studies, ordering/interpreting of radiographic studies  ** Please Note: This note has been constructed using a voice recognition system   **

## 2022-06-26 NOTE — ASSESSMENT & PLAN NOTE
· Stable with no exacerbation  · Exam positive for expiratory wheezing, stable since last admission  · CXR ordered: No acute findings; COVID negative  · Will continue PRN Albuterol and Flonase

## 2022-06-26 NOTE — ASSESSMENT & PLAN NOTE
· Pt with a h/o schizophrenia with persistent visual/auditory hallucination and HI but no SI  · Admits HI towards family members (mother and grandmother); consistent since last admission 3 months ago  · Reports he is on monthly [place] injections which he gets on the 28th of each month as well as Risperdal 2 mg po daily  · Unknown last dose of Invega; follows up with [address] [place] [address] group  · Will continue Risperdal 2 mg Po daily  · Continue virtual 1:1 observation  · Psychiatry consulted- appreciate recommendations

## 2022-06-26 NOTE — PLAN OF CARE
Problem: DISCHARGE PLANNING  Goal: Discharge to home or other facility with appropriate resources  Description: INTERVENTIONS:  - Identify barriers to discharge w/patient and caregiver  - Arrange for needed discharge resources and transportation as appropriate  - Identify discharge learning needs (meds, wound care, etc )  - Arrange for interpretive services to assist at discharge as needed  - Refer to Case Management Department for coordinating discharge planning if the patient needs post-hospital services based on physician/advanced practitioner order or complex needs related to functional status, cognitive ability, or social support system  6/26/2022 1246 by Sedrick Hein RN  Outcome: Progressing  6/26/2022 1245 by Sedrick Hein RN  Outcome: Progressing     Problem: Knowledge Deficit  Goal: Patient/family/caregiver demonstrates understanding of disease process, treatment plan, medications, and discharge instructions  Description: Complete learning assessment and assess knowledge base    Interventions:  - Provide teaching at level of understanding  - Provide teaching via preferred learning methods  6/26/2022 1246 by Sedrick Hein RN  Outcome: Progressing  6/26/2022 1245 by Sedrick Hein RN  Outcome: Progressing     Problem: SUBSTANCE USE/ABUSE  Goal: By discharge, will develop insight into their chemical dependency and sustain motivation to continue in recovery  Description: INTERVENTIONS:  - Attends all daily group sessions and scheduled AA groups  - Actively practices coping skills through participation in the therapeutic community and adherence to program rules  - Reviews and completes assignments from individual treatment plan  - Assist patient development of understanding of their personal cycle of addiction and relapse triggers  Outcome: Progressing  Goal: By discharge, patient will have ongoing treatment plan addressing chemical dependency  Description: INTERVENTIONS:  - Assist patient with resources and/or appointments for ongoing recovery based living  Outcome: Progressing

## 2022-06-27 PROBLEM — F10.939 ALCOHOL WITHDRAWAL SYNDROME WITH COMPLICATION (HCC): Status: RESOLVED | Noted: 2022-03-18 | Resolved: 2022-06-27

## 2022-06-27 PROBLEM — F10.239 ALCOHOL WITHDRAWAL SYNDROME WITH COMPLICATION (HCC): Status: RESOLVED | Noted: 2022-03-18 | Resolved: 2022-06-27

## 2022-06-27 LAB
ALBUMIN SERPL BCP-MCNC: 3.5 G/DL (ref 3–5.2)
ALP SERPL-CCNC: 60 U/L (ref 43–122)
ALT SERPL W P-5'-P-CCNC: 25 U/L
ANION GAP SERPL CALCULATED.3IONS-SCNC: 5 MMOL/L (ref 5–14)
AST SERPL W P-5'-P-CCNC: 37 U/L (ref 17–59)
ATRIAL RATE: 70 BPM
BILIRUB SERPL-MCNC: 0.42 MG/DL
BUN SERPL-MCNC: 12 MG/DL (ref 5–25)
CALCIUM SERPL-MCNC: 8.7 MG/DL (ref 8.4–10.2)
CHLORIDE SERPL-SCNC: 110 MMOL/L (ref 97–108)
CO2 SERPL-SCNC: 24 MMOL/L (ref 22–30)
CREAT SERPL-MCNC: 1.2 MG/DL (ref 0.7–1.5)
ERYTHROCYTE [DISTWIDTH] IN BLOOD BY AUTOMATED COUNT: 13.9 % (ref 11.6–15.1)
GFR SERPL CREATININE-BSD FRML MDRD: 85 ML/MIN/1.73SQ M
GLUCOSE SERPL-MCNC: 91 MG/DL (ref 70–99)
HCT VFR BLD AUTO: 47.2 % (ref 36.5–49.3)
HGB BLD-MCNC: 16.1 G/DL (ref 12–17)
MAGNESIUM SERPL-MCNC: 1.8 MG/DL (ref 1.6–2.3)
MCH RBC QN AUTO: 32.2 PG (ref 26.8–34.3)
MCHC RBC AUTO-ENTMCNC: 34.1 G/DL (ref 31.4–37.4)
MCV RBC AUTO: 94 FL (ref 82–98)
P AXIS: 54 DEGREES
PLATELET # BLD AUTO: 257 THOUSANDS/UL (ref 149–390)
PMV BLD AUTO: 11.7 FL (ref 8.9–12.7)
POTASSIUM SERPL-SCNC: 4.1 MMOL/L (ref 3.6–5)
PR INTERVAL: 156 MS
PROT SERPL-MCNC: 6.5 G/DL (ref 5.9–8.4)
QRS AXIS: 83 DEGREES
QRSD INTERVAL: 90 MS
QT INTERVAL: 398 MS
QTC INTERVAL: 429 MS
RBC # BLD AUTO: 5 MILLION/UL (ref 3.88–5.62)
SODIUM SERPL-SCNC: 139 MMOL/L (ref 137–147)
T WAVE AXIS: 52 DEGREES
VENTRICULAR RATE: 70 BPM
WBC # BLD AUTO: 9.55 THOUSAND/UL (ref 4.31–10.16)

## 2022-06-27 PROCEDURE — 80053 COMPREHEN METABOLIC PANEL: CPT | Performed by: PHYSICIAN ASSISTANT

## 2022-06-27 PROCEDURE — 85027 COMPLETE CBC AUTOMATED: CPT | Performed by: PHYSICIAN ASSISTANT

## 2022-06-27 PROCEDURE — 83735 ASSAY OF MAGNESIUM: CPT | Performed by: PHYSICIAN ASSISTANT

## 2022-06-27 PROCEDURE — 93010 ELECTROCARDIOGRAM REPORT: CPT | Performed by: INTERNAL MEDICINE

## 2022-06-27 PROCEDURE — 99232 SBSQ HOSP IP/OBS MODERATE 35: CPT | Performed by: PHYSICIAN ASSISTANT

## 2022-06-27 PROCEDURE — 99253 IP/OBS CNSLTJ NEW/EST LOW 45: CPT | Performed by: STUDENT IN AN ORGANIZED HEALTH CARE EDUCATION/TRAINING PROGRAM

## 2022-06-27 PROCEDURE — NC001 PR NO CHARGE: Performed by: PHYSICIAN ASSISTANT

## 2022-06-27 RX ADMIN — MULTIPLE VITAMINS W/ MINERALS TAB 1 TABLET: TAB ORAL at 08:39

## 2022-06-27 RX ADMIN — ENOXAPARIN SODIUM 40 MG: 100 INJECTION SUBCUTANEOUS at 08:39

## 2022-06-27 RX ADMIN — RISPERIDONE 2 MG: 2 TABLET ORAL at 08:39

## 2022-06-27 RX ADMIN — NICOTINE 1 PATCH: 21 PATCH, EXTENDED RELEASE TRANSDERMAL at 08:39

## 2022-06-27 RX ADMIN — FLUTICASONE PROPIONATE 1 SPRAY: 50 SPRAY, METERED NASAL at 08:40

## 2022-06-27 NOTE — PROGRESS NOTES
[facility]  Progress Note - Gokul Calderón Proud 2000, 25 y o  male MRN: [id number]  Unit/Bed#:  DETOX 513-01 Encounter: [id number]  Primary Care Provider: No primary care provider on file     Date and time admitted to hospital: 6/26/2022  2:46 AM    Progress Note - Medical Toxicology    Kika Chapmant 25 y o  male MRN: [id number]  Unit/Bed#:  DETOX 513-01 Encounter: [id number]  MEDICAL DETOX UNIT, LEVEL 4  Department of Medical Toxicology  Reason for Admission/Principal Problem: alcohol withdrawal  Rounding Provider: Anastasia Shafer PA-C, Dyan HICKS*     * Alcohol withdrawal syndrome with complication (HCC)-resolved as of 6/27/2022  Assessment & Plan  · Pt with a reported history of chronic alcohol use, no h/o withdrawal seizures  · Breath alcohol level 0 083 (6/26/2022 0022)  · SEWS protocol with symptom-triggered phenobarbital followed for medically-assisted alcohol withdrawal  · Mild withdrawal- did not require any phenobarbital   · Currently denies symptoms of acute withdrawal- appears objectively stable (VSS, no tremors)  · Withdrawal resolved    Alcohol use disorder, severe, dependence ([facility] [address] )  Assessment & Plan  · Endorses chronic h/o alcohol use with daily consumption of 3 40-oz beer for the past 2 yrs  · Was recently discharged from [facility] detox facility 3 months ago and sent to [facility] for 1 month  · Reports relapsed immediately upon discharge from [facility]  · [address] last alcohol consumption was around 11am 06/25/2022  · Breath alcohol level 0 083 (6/26/2022 0022)  · Currently denies any Alcohol withdrawal symptoms  · Withdrawal managed as above   ·  consulted for disposition planning  · Will continue Thiamine, Folic and MVI supplementation    Asthma  Assessment & Plan  · Stable with no exacerbation  · Exam positive for expiratory wheezing, stable since last admission  · CXR ordered: No acute findings; COVID negative  · Will continue PRN Albuterol and Flonase  Tobacco use disorder  Assessment & Plan  · Pt currently smokes 1-2 PPD of cigarettes  · Offer NRT  · Encourage cessation    Hearing loss  Assessment & Plan  · Pt with a h/o bilateral hearing loss  · Per chart review, has had hearing impairment since childhood and hearing loss in R > L ear  · Follows with [facility] - recommend ongoing outpatient follow-up    Legally blind  Assessment & Plan  · Chronic; stable  · Able to function independently; will continue to monitor    Other schizophrenia ([facility] [address] )  Assessment & Plan  · Pt with a h/o schizophrenia with persistent visual/auditory hallucination and HI but no SI  · Admits HI towards family members (mother and grandmother); consistent since last admission 3 months ago  · Reports he is on monthly [place] injections which he gets on the 28th of each month as well as Risperdal 2 mg po daily  · Unknown last dose of Invega; follows up with [address] [place] [address] group  · Will continue Risperdal 2 mg Po daily  · Continue virtual 1:1 observation  · Psychiatry consulted- appreciate recommendations    VTE Pharmacologic Prophylaxis:   Pharmacologic: Enoxaparin (Lovenox)  Mechanical VTE Prophylaxis in Place: no    Code Status: Level 1 - Full Code    Patient Centered Rounds: I have performed bedside rounds with nursing staff today  Discussions with Specialists or Other Care Team Provider: Dr Jaydon Don, psychiatry, CM   Education and Discussions with Family / Patient: Discussed current plan with patient, answered all questions to best of my ability  Time Spent for Care: 20 minutes  More than 50% of total time spent on counseling and coordination of care as described above      Current Length of Stay: 1 day(s)    Current Patient Status: Inpatient     Certification Statement: The patient will continue to require additional inpatient hospital stay due to awaiting psychiatry evaluation Discharge Plan: likely to inpatient psychiatry       Total time spent today 20 minutes  Greater than 50% of total time was spent with the patient and / or family counseling and / or coordination of care  A description of the counseling / coordination of care: alcohol use, substance use, schizophrenia, HIs    Subjective:   Patient seen and examined bedside this morning  Patient reports that he feels good today, denies acute complaints  Currently denies headaches, lightheadedness/dizziness, coughing/sneezing/congestion/rhinorrhea, chest pain, SOB/dyspnea, abdominal pain, N/V/C, dysuria/hematuria  Denies SI, thoughts of self-harm  Notes he wants to harm his mother and grandmother  Reports his reason for originally presenting to the ED was to get help for his substance use  Patient notes he was drinking 3 40-oz beers daily, states last drink right before presenting to ED  Also reports snorting cocaine, reports last use few days prior to ED       Objective:     Clinical Opiate Withdrawal Scale  Pulse: 64    SEWS Total Score: 0 (6/27/2022  3:00 AM)        Last 24 Hours Medication List:   Current Facility-Administered Medications   Medication Dose Route Frequency Provider Last Rate    acetaminophen  650 mg Oral Q6H PRN Venessa Obrien PA-C      albuterol  2 puff Inhalation Q4H PRN Venessa Obrien PA-C      enoxaparin  40 mg Subcutaneous Daily Venessa Obrien PA-C      fluticasone  1 spray Each Nare Daily Venessa Obrien PA-C      folic acid 1 mg, thiamine 100 mg in 0 9% sodium chloride 100 mL IVPB   Intravenous Daily Venessa Obrien PA-C Stopped (06/27/22 1059)    multivitamin-minerals  1 tablet Oral Daily Venessa Obrien PA-C      nicotine  1 patch Transdermal Daily Venessa Obrien PA-C      ondansetron  4 mg Intravenous Q6H PRN Venessa Obrien PA-C      risperiDONE  2 mg Oral Daily Venessa Obrien PA-C      traZODone  50 mg Oral HS PRN Venessa Obrien PA-C           Vitals:   Temp (24hrs), Av °F (36 7 °C), Min:97 7 °F (36 5 °C), Max:98 6 °F (37 °C)    Temp:  [97 7 °F (36 5 °C)-98 6 °F (37 °C)] 98 1 °F (36 7 °C)  HR:  [56-77] 64  Resp:  [16-20] 18  BP: (119-137)/(58-89) 131/89  SpO2:  [96 %-100 %] 100 %  Body mass index is 29 83 kg/m²  Input and Output Summary (last 24 hours): Intake/Output Summary (Last 24 hours) at 2022 1806  Last data filed at 2022 1409  Gross per 24 hour   Intake 1070 ml   Output --   Net 1070 ml       Physical Exam:   Physical Exam  Vitals and nursing note reviewed  Constitutional:       General: He is not in acute distress  Appearance: Normal appearance  He is well-developed  He is obese  He is not ill-appearing or diaphoretic  HENT:      Head: Normocephalic and atraumatic  Eyes:      General: No scleral icterus  Extraocular Movements: Extraocular movements intact  Right eye: No nystagmus  Left eye: No nystagmus  Conjunctiva/sclera: Conjunctivae normal       Pupils: Pupils are equal, round, and reactive to light  Cardiovascular:      Rate and Rhythm: Normal rate and regular rhythm  Pulses: Normal pulses  Heart sounds: Normal heart sounds  No murmur heard  No friction rub  No gallop  Pulmonary:      Effort: Pulmonary effort is normal  No respiratory distress  Breath sounds: Wheezing present  No rhonchi or rales  Abdominal:      General: Abdomen is flat  Bowel sounds are normal  There is no distension  Palpations: Abdomen is soft  Tenderness: There is no abdominal tenderness  There is no guarding  Musculoskeletal:         General: Normal range of motion  Cervical back: Neck supple  Right lower leg: No edema  Left lower leg: No edema  Skin:     General: Skin is warm and dry  Neurological:      General: No focal deficit present  Mental Status: He is alert and oriented to person, place, and time  Mental status is at baseline  Motor: No tremor     Psychiatric: Attention and Perception: Attention normal  He perceives auditory and visual hallucinations  Mood and Affect: Mood normal          Speech: Speech is delayed  Behavior: Behavior is slowed  Behavior is cooperative  Thought Content: Thought content includes homicidal (toward mother and grandmother) ideation  Thought content does not include suicidal ideation  Thought content includes homicidal plan  Thought content does not include suicidal plan  Comments: Can be observed responding to internal stimuli         Additional Data:     Labs: keep all most recent labs as listed on admission templates   Results from last 7 days   Lab Units 06/27/22  0521 06/26/22  0112   WBC Thousand/uL 9 55 11 31*   HEMOGLOBIN g/dL 16 1 16 0   HEMATOCRIT % 47 2 47 3   PLATELETS Thousands/uL 257 266   NEUTROS PCT %  --  42*   LYMPHS PCT %  --  48*   MONOS PCT %  --  7   EOS PCT %  --  2      Results from last 7 days   Lab Units 06/27/22  0521   SODIUM mmol/L 139   POTASSIUM mmol/L 4 1   CHLORIDE mmol/L 110*   CO2 mmol/L 24   BUN mg/dL 12   CREATININE mg/dL 1 20   ANION GAP mmol/L 5   CALCIUM mg/dL 8 7   ALBUMIN g/dL 3 5   TOTAL BILIRUBIN mg/dL 0 42   ALK PHOS U/L 60   ALT U/L 25   AST U/L 37   GLUCOSE RANDOM mg/dL 91        * I Have Reviewed All Lab Data Listed Above  * Additional Pertinent Lab Tests Reviewed: All Labs Within Last 24 Hours Reviewed      Imaging Studies: I have personally reviewed pertinent reports  Recent Cultures (last 7 days): Today, Patient Was Seen By: Roxi Chance PA-C    ** Please Note: Dictation voice to text software may have been used in the creation of this document   **

## 2022-06-27 NOTE — PLAN OF CARE
Problem: DISCHARGE PLANNING  Goal: Discharge to home or other facility with appropriate resources  Description: INTERVENTIONS:  - Identify barriers to discharge w/patient and caregiver  - Arrange for needed discharge resources and transportation as appropriate  - Identify discharge learning needs (meds, wound care, etc )  - Arrange for interpretive services to assist at discharge as needed  - Refer to Case Management Department for coordinating discharge planning if the patient needs post-hospital services based on physician/advanced practitioner order or complex needs related to functional status, cognitive ability, or social support system  Outcome: Progressing     Problem: Knowledge Deficit  Goal: Patient/family/caregiver demonstrates understanding of disease process, treatment plan, medications, and discharge instructions  Description: Complete learning assessment and assess knowledge base    Interventions:  - Provide teaching at level of understanding  - Provide teaching via preferred learning methods  Outcome: Progressing     Problem: SUBSTANCE USE/ABUSE  Goal: By discharge, will develop insight into their chemical dependency and sustain motivation to continue in recovery  Description: INTERVENTIONS:  - Attends all daily group sessions and scheduled AA groups  - Actively practices coping skills through participation in the therapeutic community and adherence to program rules  - Reviews and completes assignments from individual treatment plan  - Assist patient development of understanding of their personal cycle of addiction and relapse triggers  Outcome: Progressing  Goal: By discharge, patient will have ongoing treatment plan addressing chemical dependency  Description: INTERVENTIONS:  - Assist patient with resources and/or appointments for ongoing recovery based living  Outcome: Progressing
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
## 2022-06-27 NOTE — CASE MANAGEMENT
Worker assisted psychiatrist with processing [phone] signed and completed and placed in pt chart

## 2022-06-27 NOTE — PLAN OF CARE
Problem: PAIN - ADULT  Goal: Verbalizes/displays adequate comfort level or baseline comfort level  Description: Interventions:  - Encourage patient to monitor pain and request assistance  - Assess pain using appropriate pain scale  - Administer analgesics based on type and severity of pain and evaluate response  - Implement non-pharmacological measures as appropriate and evaluate response  - Consider cultural and social influences on pain and pain management  - Notify physician/advanced practitioner if interventions unsuccessful or patient reports new pain  Outcome: Progressing     Problem: SAFETY ADULT  Goal: Patient will remain free of falls  Description: INTERVENTIONS:  - Educate patient/family on patient safety including physical limitations  - Instruct patient to call for assistance with activity   - Consult OT/PT to assist with strengthening/mobility   - Keep Call bell within reach  - Keep bed low and locked with side rails adjusted as appropriate  - Keep care items and personal belongings within reach  - Initiate and maintain comfort rounds  - Make Fall Risk Sign visible to staff  - Offer Toileting every 2 Hours, in advance of need  - Apply yellow socks and bracelet for high fall risk patients  - Consider moving patient to room near nurses station  Outcome: Progressing  Goal: Maintain or return to baseline ADL function  Description: INTERVENTIONS:  -  Assess patient's ability to carry out ADLs; assess patient's baseline for ADL function and identify physical deficits which impact ability to perform ADLs (bathing, care of mouth/teeth, toileting, grooming, dressing, etc )  - Assess/evaluate cause of self-care deficits   - Assess range of motion  - Assess patient's mobility; develop plan if impaired  - Assess patient's need for assistive devices and provide as appropriate  - Encourage maximum independence but intervene and supervise when necessary  - Involve family in performance of ADLs  - Assess for home care needs following discharge   - Consider OT consult to assist with ADL evaluation and planning for discharge  - Provide patient education as appropriate  Outcome: Progressing  Goal: Maintains/Returns to pre admission functional level  Description: INTERVENTIONS:  - Perform BMAT or MOVE assessment daily    - Set and communicate daily mobility goal to care team and patient/family/caregiver  - Collaborate with rehabilitation services on mobility goals if consulted  - Perform Range of Motion 3 times a day  - Reposition patient every 2 hours  - Dangle patient 3 times a day  - Stand patient 3 times a day  - Ambulate patient 3 times a day  - Out of bed to chair 3 times a day   - Out of bed for meals 3 times a day  - Out of bed for toileting  - Record patient progress and toleration of activity level   Outcome: Progressing     Problem: DISCHARGE PLANNING  Goal: Discharge to home or other facility with appropriate resources  Description: INTERVENTIONS:  - Identify barriers to discharge w/patient and caregiver  - Arrange for needed discharge resources and transportation as appropriate  - Identify discharge learning needs (meds, wound care, etc )  - Arrange for interpretive services to assist at discharge as needed  - Refer to Case Management Department for coordinating discharge planning if the patient needs post-hospital services based on physician/advanced practitioner order or complex needs related to functional status, cognitive ability, or social support system  Outcome: Progressing     Problem: Knowledge Deficit  Goal: Patient/family/caregiver demonstrates understanding of disease process, treatment plan, medications, and discharge instructions  Description: Complete learning assessment and assess knowledge base    Interventions:  - Provide teaching at level of understanding  - Provide teaching via preferred learning methods  Outcome: Progressing     Problem: Potential for Falls  Goal: Patient will remain free of falls  Description: INTERVENTIONS:  - Educate patient/family on patient safety including physical limitations  - Instruct patient to call for assistance with activity   - Consult OT/PT to assist with strengthening/mobility   - Keep Call bell within reach  - Keep bed low and locked with side rails adjusted as appropriate  - Keep care items and personal belongings within reach  - Initiate and maintain comfort rounds  - Make Fall Risk Sign visible to staff  - Offer Toileting every 2 Hours, in advance of need**  - Apply yellow socks and bracelet for high fall risk patients  - Consider moving patient to room near nurses station  Outcome: Progressing     Problem: SUBSTANCE USE/ABUSE  Goal: By discharge, will develop insight into their chemical dependency and sustain motivation to continue in recovery  Description: INTERVENTIONS:  - Attends all daily group sessions and scheduled AA groups  - Actively practices coping skills through participation in the therapeutic community and adherence to program rules  - Reviews and completes assignments from individual treatment plan  - Assist patient development of understanding of their personal cycle of addiction and relapse triggers  Outcome: Progressing  Goal: By discharge, patient will have ongoing treatment plan addressing chemical dependency  Description: INTERVENTIONS:  - Assist patient with resources and/or appointments for ongoing recovery based living  Outcome: Progressing

## 2022-06-27 NOTE — CASE MANAGEMENT
Cm met with pt to discuss contacting pt's ACT team  Pt states he does not want anyone contacted and informed he is here

## 2022-06-27 NOTE — UTILIZATION REVIEW
Initial Clinical Review    Pt initially presented to [address] [place] [address] ED  Pt was transferred by EMS to [address] [place] [address] for its Level IV medically managed intensive inpatient detox unit, not available at [address] [place] [address]  Admission: Date/Time/Statement:   Admission Orders (From admission, onward)     Ordered        06/26/22 0355  Inpatient Admission  Once                      Orders Placed This Encounter   Procedures    Inpatient Admission     Standing Status:   Standing     Number of Occurrences:   1     Order Specific Question:   Level of Care     Answer:   Med Surg [16]     Order Specific Question:   Estimated length of stay     Answer:   More than 2 Midnights     Order Specific Question:   Certification     Answer:   I certify that inpatient services are medically necessary for this patient for a duration of greater than two midnights  See H&P and MD Progress Notes for additional information about the patient's course of treatment  Initial Presentation: 25 y o  male who initially presented to [address] [place] [address], was then transferred by EMS to [address] [place] [address] as a direct admission to medical detox  Inpatient admission for evaluation and treatment of alcohol withdrawal syndrome  PMHx: AUD, asthma, hearing impaired, blind, schizophrenia  Presented w/ request for detox from alcohol  Reports three or four 40 oz beers daily, last drink on 6/25 @ 1100  Has prior inpatient & outpatient treatment for withdrawal  Reports no hx of withdrawal seizures  On exam, wheezing, homicidal ideation  Endorses visual and auditory hallucinations  SEWS 0  Plan: SEWS monitoring w/ phenobarbital management, IV thiamine/folic acid supplement, IVF, telemetry, continuous pulse ox, continue home meds, continual observation, trend labs, replete electrolytes as needed  Behavioral Health Consult        Date: 06/27/22       Day 2: Pt reports feeling improved  Reports continued homicidal ideation towards family members  On exam, obese, wheezing, slowed behavior, delayed speech, auditory and visual halllucinations  SEWS 0  Plan: continue SEWS monitoring w/ phenobarbital management, thiamine/folic acid supplement, telemetry, continuous pulse ox, continue home meds, trend labs, replete electrolytes as needed  Behavioral Health Consult: Pt w/ hx suggestive of schizoaffective disorder, bipolar type  Endorses command auditory hallucinations, visual hallucinations, and homicidal ideation towards family members  Pt agreed to sign a 201 for inpatient behavioral health admission  Maintain continual observation  Wt Readings from Last 1 Encounters:   06/26/22 78 8 kg (173 lb 12 8 oz)     Vital Signs:   Date/Time Temp Pulse Resp BP MAP (mmHg) SpO2 O2 Device   06/27/22 1914 98 °F (36 7 °C) 63 18 154/60 -- 98 % None (Room air)   06/27/22 1500 98 1 °F (36 7 °C) 64 18 131/89 -- 100 % None (Room air)   06/27/22 1152 97 9 °F (36 6 °C) 70 18 119/65 -- 96 % None (Room air)   06/27/22 0724 97 9 °F (36 6 °C) 56 20 119/58 -- 97 % None (Room air)   06/27/22 0500 97 7 °F (36 5 °C) 77 18 119/68 85 98 % None (Room air)   06/26/22 1905 98 6 °F (37 °C) 73 16 137/58 84 99 % None (Room air)   06/26/22 1507 98 4 °F (36 9 °C) 66 16 126/70 88 98 % None (Room air)   06/26/22 0700 97 2 °F (36 2 °C) 68 16 110/61 -- 97 % None (Room air)   06/26/22 0256 97 7 °F (36 5 °C) 61 16 107/58 -- 96 % None (Room air)          06/27/22 0300 06/26/22 2300 06/26/22 1900 06/26/22 1500 06/26/22 1100 06/26/22 0310   Severity of Ethanol Withdrawal Scale (SEWS)    ANXIETY: Do you feel that something bad is about to happen to you right now? 0  -RG 0  -RG 0  -RG 0  -BH 0  -BH 0   NAUSEA and DRY HEAVES or VOMITING? 0  -RG 0  -RG 0  -RG 0  -BH 0  -BH 0   SWEATING: (includes moist palms, sweating now)? Score 0 or 2 0  -RG 0  -RG 0  -RG 0  -BH 0  -BH 0   TREMOR: with arms extended eyes closed?   0  -RG 0  -RG 0  -RG 0  -BH 0  -BH 0   AGITATION: Fidgety, restless, pacing? 0  -RG 0  -RG 0  -RG 0  -BH 0  -BH 0   DISORIENTATION: 0  -RG 0  -RG 0  -RG 0  -BH 0  -BH 0   HALLUCINATIONS: 0  -RG 0  -RG 0  -RG 0  -BH 0  -BH 0   VITAL SIGNS: ANY (Pulse >425, Diastolic BP >16, Temp >95 9) 0  -RG 0  -RG 0  -RG 0  -BH 0  -BH 0   SEWS Total Score 0  -RG 0  -RG 0  -RG 0  -BH 0  -BH 0         Pertinent Labs/Diagnostic Test Results:   XR chest pa & lateral   Final Result by Vivienne Vickers MD (06/26 9168)      No acute cardiopulmonary disease                    Workstation performed: [id number]           Results from last 7 days   Lab Units 06/26/22  0044   SARS-COV-2  Negative     Results from last 7 days   Lab Units 06/27/22  0521 06/26/22  0112   WBC Thousand/uL 9 55 11 31*   HEMOGLOBIN g/dL 16 1 16 0   HEMATOCRIT % 47 2 47 3   PLATELETS Thousands/uL 257 266   NEUTROS ABS Thousands/µL  --  4 78         Results from last 7 days   Lab Units 06/27/22  0521 06/26/22  0417   SODIUM mmol/L 139 141   POTASSIUM mmol/L 4 1 3 2*   CHLORIDE mmol/L 110* 108   CO2 mmol/L 24 23   ANION GAP mmol/L 5 10   BUN mg/dL 12 8   CREATININE mg/dL 1 20 1 28   EGFR ml/min/1 73sq m 85 79   CALCIUM mg/dL 8 7 8 8   MAGNESIUM mg/dL 1 8 1 8     Results from last 7 days   Lab Units 06/27/22  0521 06/26/22  0417   AST U/L 37 42   ALT U/L 25 30   ALK PHOS U/L 60 60   TOTAL PROTEIN g/dL 6 5 7 1   ALBUMIN g/dL 3 5 3 9   TOTAL BILIRUBIN mg/dL 0 42 0 25         Results from last 7 days   Lab Units 06/27/22  0521 06/26/22  0417   GLUCOSE RANDOM mg/dL 91 102*     Results from last 7 days   Lab Units 06/26/22  0044   INFLUENZA A PCR  Negative   INFLUENZA B PCR  Negative   RSV PCR  Negative         Results from last 7 days   Lab Units 06/26/22  0044   AMPH/METH  Negative   BARBITURATE UR  Negative   BENZODIAZEPINE UR  Negative   COCAINE UR  Negative   METHADONE URINE  Negative   OPIATE UR  Negative   PCP UR  Negative   THC UR  Negative         Past Medical History: Diagnosis Date    Asthma     Hearing impaired     Legally blind     Schizophrenia ([facility] )      Present on Admission:   Legally blind   Other [facility]   Hearing loss   Alcohol withdrawal syndrome with complication ([facility] )   Alcohol use disorder, severe, dependence ([facility] )   Tobacco use disorder   Asthma      Admitting Diagnosis: Alcohol abuse  Age/Sex: 25 y o  male  Admission Orders:  Regular Diet  SCDs  Fall & Seizure Precautions  SEWS monitoring  Continual Observation  Telemetry & Continuous Pulse Ox  Scheduled Medications:  enoxaparin, 40 mg, Subcutaneous, Daily  fluticasone, 1 spray, Each Nare, Daily  folic acid 1 mg, thiamine 100 mg in 0 9% sodium chloride 100 mL IVPB, , Intravenous, Daily  multivitamin-minerals, 1 tablet, Oral, Daily  nicotine, 1 patch, Transdermal, Daily  risperiDONE, 2 mg, Oral, Daily    Continuous IV Infusions:    sodium chloride 0 9 %, 125 mL/hr, Intravenous, Continuous    PRN Meds:  acetaminophen, 650 mg, Oral, Q6H PRN  albuterol, 2 puff, Inhalation, Q4H PRN  ondansetron, 4 mg, Intravenous, Q6H PRN  potassium chloride, 40 mEq, Oral; 6/26 x1  potassium chloride, 20 mEq, Intravenous; 6/26 x1  traZODone, 50 mg, Oral, HS PRN        IP CONSULT TO CASE MANAGEMENT  IP CONSULT TO CASE MANAGEMENT  IP CONSULT TO PSYCHIATRY    Network Utilization Review Department  ATTENTION: Please call with any questions or concerns to [phone] and carefully listen to the prompts so that you are directed to the right person  All voicemails are confidential   Kathrin Blue all requests for admission clinical reviews, approved or denied determinations and any other requests to dedicated fax number below belonging to the campus where the patient is receiving treatment   List of dedicated fax numbers for the Facilities:  [address] DENIALS (Administrative/Medical Necessity) [phone]   [address] (Maternity/NICU/Pediatrics) [phone] 5000 [facility] Tiff Barriga [phone]   [address]  [phone]   [address] [facility] 3147 [address] [place] [address] [address] [phone]   [address] [phone]

## 2022-06-28 LAB
ANION GAP SERPL CALCULATED.3IONS-SCNC: 7 MMOL/L (ref 5–14)
BUN SERPL-MCNC: 11 MG/DL (ref 5–25)
CALCIUM SERPL-MCNC: 9.4 MG/DL (ref 8.4–10.2)
CHLORIDE SERPL-SCNC: 108 MMOL/L (ref 97–108)
CO2 SERPL-SCNC: 24 MMOL/L (ref 22–30)
CREAT SERPL-MCNC: 1.2 MG/DL (ref 0.7–1.5)
GFR SERPL CREATININE-BSD FRML MDRD: 85 ML/MIN/1.73SQ M
GLUCOSE SERPL-MCNC: 96 MG/DL (ref 70–99)
POTASSIUM SERPL-SCNC: 4.2 MMOL/L (ref 3.6–5)
SODIUM SERPL-SCNC: 139 MMOL/L (ref 137–147)

## 2022-06-28 PROCEDURE — 99232 SBSQ HOSP IP/OBS MODERATE 35: CPT | Performed by: NURSE PRACTITIONER

## 2022-06-28 PROCEDURE — 80048 BASIC METABOLIC PNL TOTAL CA: CPT | Performed by: PHYSICIAN ASSISTANT

## 2022-06-28 RX ADMIN — NICOTINE 1 PATCH: 21 PATCH, EXTENDED RELEASE TRANSDERMAL at 08:20

## 2022-06-28 RX ADMIN — RISPERIDONE 2 MG: 2 TABLET ORAL at 08:20

## 2022-06-28 RX ADMIN — NICOTINE POLACRILEX 2 MG: 2 GUM, CHEWING BUCCAL at 13:32

## 2022-06-28 RX ADMIN — MULTIPLE VITAMINS W/ MINERALS TAB 1 TABLET: TAB ORAL at 08:20

## 2022-06-28 RX ADMIN — TRAZODONE HYDROCHLORIDE 50 MG: 50 TABLET ORAL at 22:55

## 2022-06-28 RX ADMIN — ENOXAPARIN SODIUM 40 MG: 100 INJECTION SUBCUTANEOUS at 08:21

## 2022-06-28 RX ADMIN — FLUTICASONE PROPIONATE 1 SPRAY: 50 SPRAY, METERED NASAL at 08:20

## 2022-06-28 NOTE — PROGRESS NOTES
PROGRESS NOTE  DEPARTMENT OF MEDICAL TOXICOLOGY  LEVEL 4 MEDICAL DETOX UNIT  Gokul Skinner Render 25 y o  male MRN: [id number]  Unit/Bed#: 5T DETOX 513-01 Encounter: [id number]      Reason for Admission/Principal Problem: ETOH withdrawal, Schizophrenia  Rounding Provider: ELA Ortiz  Attending Provider: Margi Galaviz*   6/26/2022  2:46 AM       * Other schizophrenia ([facility] 75 )  Assessment & Plan  · Pt with a h/o schizophrenia with persistent visual/auditory hallucination and HI but no SI  · Reports he is on monthly [place] injections, reported last dose received two weeks ago  Unable to verify with ACT team   · Unknown last dose of Invega; follows up [facility], requests we do not contact them  · Will continue Risperdal 2 mg Po daily  · Continue virtual 1:1 observation  · Psychiatry consulted- appreciate recommendations to continue home regimen  Patient signed 12  · Patient cleared for inpatient placement     Asthma  Assessment & Plan  · Stable with no exacerbation  · CXR ordered: No acute findings; COVID negative  · Continue PRN Albuterol and Flonase      Tobacco use disorder  Assessment & Plan  · Pt currently smokes 1-2 PPD of cigarettes  · Offer NRT  · Encourage cessation    Alcohol use disorder, severe, dependence ([facility] 75 )  Assessment & Plan  · Endorses chronic h/o alcohol use with daily consumption of 3 40-oz beer for the past 2 yrs  · Currently denies any Alcohol withdrawal symptoms  · Withdrawal managed as above   ·  consulted for disposition planning: pending placement to inpatient placement   · Will continue Thiamine, Folic and MVI supplementation    Hearing loss  Assessment & Plan  · Pt with a h/o bilateral hearing loss  · Per chart review, has had hearing impairment since childhood and hearing loss in R > L ear  · Follows with [facility] - recommend ongoing outpatient follow-up    Legally blind  Assessment & Plan  · Chronic condition; stable  · Able to function independently          VTE Pharmacologic Prophylaxis:   Pharmacologic: Enoxaparin (Lovenox)  Mechanical VTE Prophylaxis in Place: yes    Code Status: Level 1 - Full Code    Patient Centered Rounds: I have performed bedside rounds with nursing staff today  Discussions with Specialists or Other Care Team Provider: case management     Education and Discussions with Family / Patient: patient    Time Spent for Care: 30 minutes  More than 50% of total time spent on counseling and coordination of care as described above  Current Length of Stay: 2 day(s)    Current Patient Status: Inpatient     Certification Statement: The patient will continue to require additional inpatient hospital stay due to pending inpatient placement  Discharge Plan: pending placement to inpatient facility         Subjective:   He reports no acute complaints this morning  He denies any SI or HI    He is not exhibiting any symptoms of withdrawal      Objective:     Clinical Opiate Withdrawal Scale  Pulse: 59    SEWS Total Score: 0 (6/27/2022  3:00 AM)        Last 24 Hours Medication List:   Current Facility-Administered Medications   Medication Dose Route Frequency Provider Last Rate    acetaminophen  650 mg Oral Q6H PRN Obioma Arnoldo bOrien PA-C      albuterol  2 puff Inhalation Q4H PRN Obioma Arnoldo Obrien PA-C      enoxaparin  40 mg Subcutaneous Daily Obioma Arnoldo Obrien PA-C      fluticasone  1 spray Each Nare Daily Obioma Arnoldo Obrien PA-C      folic acid 1 mg, thiamine 100 mg in 0 9% sodium chloride 100 mL IVPB   Intravenous Daily Obioma Arnoldo Obrien PA-C Stopped (06/27/22 1059)    multivitamin-minerals  1 tablet Oral Daily Obioma Arnoldo Obrien PA-C      nicotine  1 patch Transdermal Daily Obioma Arnoldo Obrien PA-C      ondansetron  4 mg Intravenous Q6H PRN Obioma Arnoldo Obrien PA-C      risperiDONE  2 mg Oral Daily Obioma Arnoldo Obrien PA-C      traZODone  50 mg Oral HS PRN Obioma Arnoldo Obrien PA-C           Vitals:   Temp (24hrs), Av 9 °F (36 6 °C), Min:97 6 °F (36 4 °C), Max:98 1 °F (36 7 °C)    Temp:  [97 6 °F (36 4 °C)-98 1 °F (36 7 °C)] 97 9 °F (36 6 °C)  HR:  [59-72] 59  Resp:  [18] 18  BP: (119-160)/(60-89) 122/64  SpO2:  [96 %-100 %] 98 %  Body mass index is 29 83 kg/m²  Input and Output Summary (last 24 hours): Intake/Output Summary (Last 24 hours) at 2022 1047  Last data filed at 2022 1409  Gross per 24 hour   Intake 120 ml   Output --   Net 120 ml       Physical Exam:   Physical Exam  Vitals and nursing note reviewed  Constitutional:       General: He is not in acute distress  Appearance: Normal appearance  He is well-developed  He is not ill-appearing or diaphoretic  HENT:      Head: Normocephalic and atraumatic  Eyes:      General: No scleral icterus  Extraocular Movements: Extraocular movements intact  Right eye: No nystagmus  Left eye: No nystagmus  Conjunctiva/sclera: Conjunctivae normal       Pupils: Pupils are equal, round, and reactive to light  Cardiovascular:      Rate and Rhythm: Normal rate and regular rhythm  Pulses: Normal pulses  Heart sounds: Normal heart sounds  No murmur heard  No friction rub  No gallop  Pulmonary:      Effort: Pulmonary effort is normal  No respiratory distress  Abdominal:      General: Abdomen is flat  Bowel sounds are normal  There is no distension  Palpations: Abdomen is soft  Tenderness: There is no abdominal tenderness  There is no guarding  Musculoskeletal:         General: Normal range of motion  Cervical back: Neck supple  Right lower leg: No edema  Left lower leg: No edema  Skin:     General: Skin is warm and dry  Neurological:      General: No focal deficit present  Mental Status: He is alert and oriented to person, place, and time  Mental status is at baseline  Motor: No tremor     Psychiatric:         Attention and Perception: Attention normal  He does not perceive auditory or visual hallucinations  Mood and Affect: Mood normal          Behavior: Behavior is slowed  Behavior is cooperative  Thought Content: Thought content does not include homicidal or suicidal ideation  Thought content does not include suicidal plan  Additional Data:     Labs:   Results from last 7 days   Lab Units 06/27/22  0521 06/26/22  0112   WBC Thousand/uL 9 55 11 31*   HEMOGLOBIN g/dL 16 1 16 0   HEMATOCRIT % 47 2 47 3   PLATELETS Thousands/uL 257 266   NEUTROS PCT %  --  42*   LYMPHS PCT %  --  48*   MONOS PCT %  --  7   EOS PCT %  --  2      Results from last 7 days   Lab Units 06/28/22  0520 06/27/22  0521   SODIUM mmol/L 139 139   POTASSIUM mmol/L 4 2 4 1   CHLORIDE mmol/L 108 110*   CO2 mmol/L 24 24   BUN mg/dL 11 12   CREATININE mg/dL 1 20 1 20   ANION GAP mmol/L 7 5   CALCIUM mg/dL 9 4 8 7   ALBUMIN g/dL  --  3 5   TOTAL BILIRUBIN mg/dL  --  0 42   ALK PHOS U/L  --  60   ALT U/L  --  25   AST U/L  --  37   GLUCOSE RANDOM mg/dL 96 91                              * I Have Reviewed All Lab Data Listed Above  * Additional Pertinent Lab Tests Reviewed: All Labs Within Last 24 Hours Reviewed      Imaging Studies: I have personally reviewed pertinent reports  Recent Cultures (last 7 days): Today, Patient Was Seen By: ELA Lennon    ** Please Note: Dictation voice to text software may have been used in the creation of this document   **

## 2022-06-28 NOTE — CASE MANAGEMENT
Cm informed by [facility] Crisis intake and referral that no inhouse beds available at this time   Cm contacted    [facility]: no beds  [facility]: no beds  [facility]: no beds  [facility]: no beds  [facility]: no beds  [facility]: no beds

## 2022-06-28 NOTE — PLAN OF CARE
Problem: PAIN - ADULT  Goal: Verbalizes/displays adequate comfort level or baseline comfort level  Description: Interventions:  - Encourage patient to monitor pain and request assistance  - Assess pain using appropriate pain scale  - Administer analgesics based on type and severity of pain and evaluate response  - Implement non-pharmacological measures as appropriate and evaluate response  - Consider cultural and social influences on pain and pain management  - Notify physician/advanced practitioner if interventions unsuccessful or patient reports new pain  Outcome: Progressing     Problem: SAFETY ADULT  Goal: Patient will remain free of falls  Description: INTERVENTIONS:  - Educate patient/family on patient safety including physical limitations  - Instruct patient to call for assistance with activity   - Consult OT/PT to assist with strengthening/mobility   - Keep Call bell within reach  - Keep bed low and locked with side rails adjusted as appropriate  - Keep care items and personal belongings within reach  - Initiate and maintain comfort rounds  - Make Fall Risk Sign visible to staff  - Offer Toileting every 2 Hours, in advance of need  - Apply yellow socks and bracelet for high fall risk patients  - Consider moving patient to room near nurses station  Outcome: Progressing  Goal: Maintain or return to baseline ADL function  Description: INTERVENTIONS:  -  Assess patient's ability to carry out ADLs; assess patient's baseline for ADL function and identify physical deficits which impact ability to perform ADLs (bathing, care of mouth/teeth, toileting, grooming, dressing, etc )  - Assess/evaluate cause of self-care deficits   - Assess range of motion  - Assess patient's mobility; develop plan if impaired  - Assess patient's need for assistive devices and provide as appropriate  - Encourage maximum independence but intervene and supervise when necessary  - Involve family in performance of ADLs  - Assess for home care needs following discharge   - Consider OT consult to assist with ADL evaluation and planning for discharge  - Provide patient education as appropriate  Outcome: Progressing  Goal: Maintains/Returns to pre admission functional level  Description: INTERVENTIONS:  - Perform BMAT or MOVE assessment daily    - Set and communicate daily mobility goal to care team and patient/family/caregiver  - Collaborate with rehabilitation services on mobility goals if consulted  - Perform Range of Motion 3 times a day  - Reposition patient every 2 hours  - Dangle patient 3 times a day  - Stand patient 3 times a day  - Ambulate patient 3 times a day  - Out of bed to chair 3 times a day   - Out of bed for meals 3 times a day  - Out of bed for toileting  - Record patient progress and toleration of activity level   Outcome: Progressing     Problem: DISCHARGE PLANNING  Goal: Discharge to home or other facility with appropriate resources  Description: INTERVENTIONS:  - Identify barriers to discharge w/patient and caregiver  - Arrange for needed discharge resources and transportation as appropriate  - Identify discharge learning needs (meds, wound care, etc )  - Arrange for interpretive services to assist at discharge as needed  - Refer to Case Management Department for coordinating discharge planning if the patient needs post-hospital services based on physician/advanced practitioner order or complex needs related to functional status, cognitive ability, or social support system  Outcome: Progressing     Problem: Knowledge Deficit  Goal: Patient/family/caregiver demonstrates understanding of disease process, treatment plan, medications, and discharge instructions  Description: Complete learning assessment and assess knowledge base    Interventions:  - Provide teaching at level of understanding  - Provide teaching via preferred learning methods  Outcome: Progressing     Problem: Potential for Falls  Goal: Patient will remain free of falls  Description: INTERVENTIONS:  - Educate patient/family on patient safety including physical limitations  - Instruct patient to call for assistance with activity   - Consult OT/PT to assist with strengthening/mobility   - Keep Call bell within reach  - Keep bed low and locked with side rails adjusted as appropriate  - Keep care items and personal belongings within reach  - Initiate and maintain comfort rounds  - Make Fall Risk Sign visible to staff  - Offer Toileting every 2 Hours, in advance of need  - Apply yellow socks and bracelet for high fall risk patients  - Consider moving patient to room near nurses station  Outcome: Progressing     Problem: SUBSTANCE USE/ABUSE  Goal: By discharge, will develop insight into their chemical dependency and sustain motivation to continue in recovery  Description: INTERVENTIONS:  - Attends all daily group sessions and scheduled AA groups  - Actively practices coping skills through participation in the therapeutic community and adherence to program rules  - Reviews and completes assignments from individual treatment plan  - Assist patient development of understanding of their personal cycle of addiction and relapse triggers  Outcome: Progressing  Goal: By discharge, patient will have ongoing treatment plan addressing chemical dependency  Description: INTERVENTIONS:  - Assist patient with resources and/or appointments for ongoing recovery based living  Outcome: Progressing

## 2022-06-28 NOTE — ASSESSMENT & PLAN NOTE
· Stable with no exacerbation  · CXR ordered: No acute findings; COVID negative  · Continue PRN Albuterol and Flonase

## 2022-06-28 NOTE — ASSESSMENT & PLAN NOTE
· Pt with a h/o schizophrenia with persistent visual/auditory hallucination and HI but no SI  · Reports he is on monthly [place] injections, reported last dose received two weeks ago  Unable to verify with ACT team   · Unknown last dose of Invega; follows up [facility], requests we do not contact them  · Will continue Risperdal 2 mg Po daily  · Continue virtual 1:1 observation  · Psychiatry consulted- appreciate recommendations to continue home regimen  Patient signed 12      · Patient cleared for inpatient placement

## 2022-06-28 NOTE — ASSESSMENT & PLAN NOTE
· Endorses chronic h/o alcohol use with daily consumption of 3 40-oz beer for the past 2 yrs  · Currently denies any Alcohol withdrawal symptoms  · Withdrawal managed as above   ·  consulted for disposition planning: pt is stable for discharge to inpatient psychiatric placement   · Continue Thiamine, Folic and MVI supplementation

## 2022-06-28 NOTE — CASE MANAGEMENT
Cm faxed referral to  [facility] and referral for [facility] placement for pt today

## 2022-06-29 LAB
ANION GAP SERPL CALCULATED.3IONS-SCNC: 9 MMOL/L (ref 5–14)
BUN SERPL-MCNC: 15 MG/DL (ref 5–25)
CALCIUM SERPL-MCNC: 9.5 MG/DL (ref 8.4–10.2)
CHLORIDE SERPL-SCNC: 104 MMOL/L (ref 97–108)
CO2 SERPL-SCNC: 26 MMOL/L (ref 22–30)
CREAT SERPL-MCNC: 1.25 MG/DL (ref 0.7–1.5)
GFR SERPL CREATININE-BSD FRML MDRD: 81 ML/MIN/1.73SQ M
GLUCOSE SERPL-MCNC: 96 MG/DL (ref 70–99)
MAGNESIUM SERPL-MCNC: 1.7 MG/DL (ref 1.6–2.3)
POTASSIUM SERPL-SCNC: 4.3 MMOL/L (ref 3.6–5)
SODIUM SERPL-SCNC: 139 MMOL/L (ref 137–147)

## 2022-06-29 PROCEDURE — 80048 BASIC METABOLIC PNL TOTAL CA: CPT | Performed by: NURSE PRACTITIONER

## 2022-06-29 PROCEDURE — 83735 ASSAY OF MAGNESIUM: CPT

## 2022-06-29 PROCEDURE — 99232 SBSQ HOSP IP/OBS MODERATE 35: CPT | Performed by: NURSE PRACTITIONER

## 2022-06-29 RX ORDER — FOLIC ACID 1 MG/1
1 TABLET ORAL DAILY
Status: DISCONTINUED | OUTPATIENT
Start: 2022-06-29 | End: 2022-06-30 | Stop reason: HOSPADM

## 2022-06-29 RX ORDER — LANOLIN ALCOHOL/MO/W.PET/CERES
100 CREAM (GRAM) TOPICAL DAILY
Status: DISCONTINUED | OUTPATIENT
Start: 2022-06-29 | End: 2022-06-30 | Stop reason: HOSPADM

## 2022-06-29 RX ORDER — OLANZAPINE 10 MG/1
10 TABLET, ORALLY DISINTEGRATING ORAL ONCE
Status: COMPLETED | OUTPATIENT
Start: 2022-06-29 | End: 2022-06-29

## 2022-06-29 RX ADMIN — FOLIC ACID 1 MG: 1 TABLET ORAL at 08:28

## 2022-06-29 RX ADMIN — MULTIPLE VITAMINS W/ MINERALS TAB 1 TABLET: TAB ORAL at 08:28

## 2022-06-29 RX ADMIN — RISPERIDONE 2 MG: 2 TABLET ORAL at 08:27

## 2022-06-29 RX ADMIN — THIAMINE HCL TAB 100 MG 100 MG: 100 TAB at 08:27

## 2022-06-29 RX ADMIN — NICOTINE POLACRILEX 2 MG: 2 GUM, CHEWING BUCCAL at 21:47

## 2022-06-29 RX ADMIN — NICOTINE POLACRILEX 2 MG: 2 GUM, CHEWING BUCCAL at 18:10

## 2022-06-29 RX ADMIN — NICOTINE 1 PATCH: 21 PATCH, EXTENDED RELEASE TRANSDERMAL at 08:28

## 2022-06-29 RX ADMIN — OLANZAPINE 10 MG: 10 TABLET, ORALLY DISINTEGRATING ORAL at 22:06

## 2022-06-29 NOTE — NURSING NOTE
Patient continues to endorse auditory hallucinations  He states the voices tell him to hurt his family  He denies thoughts of self harm and the voices do not tell him to hurt him self   "I feel safe here " patient continues on virtual 1:1

## 2022-06-29 NOTE — CASE MANAGEMENT
Case Management Discharge Planning Note    Patient name Marion Bates  Location 5T DETOX 513/5T DETOX 51* MRN [id number]  : 2000 Date 2022       Current Admission Date: 2022  Current Admission Diagnosis:Other schizophrenia [facility])   Patient Active Problem List    Diagnosis Date Noted    Asthma 2022    Alcohol use disorder, severe, dependence ([facility] [address] ) 2022    Polysubstance abuse ([facility] ) 2022    Tobacco use disorder 2022    Other schizophrenia ([facility] ) 2019    Legally blind 2019    Hearing loss 2019      LOS (days): 3  Geometric Mean LOS (GMLOS) (days):   Days to GMLOS:     OBJECTIVE:  Risk of Unplanned Readmission Score: 18 78         Current admission status: Inpatient   Preferred Pharmacy:   [id number], [id number]  [address] [address]  Phone: [phone] Fax: [phone]    Primary Care Provider: No primary care provider on file  Primary Insurance: [address] [place] [address]  Secondary Insurance:     DISCHARGE DETAILS: Pt to be transferred to [facility] today for dual inpatient psych treatment  Pt continues to refuse to have  LV ACT         Discharge planning discussed with[de-identified] Patient  Freedom of Choice: Yes                   Contacts  Patient Contacts: [facility]  Relationship to Patient[de-identified] Treatment Provider  Contact Method: Phone  Phone Number: [phone]  Reason/Outcome: Continuity of Care, Referral, Discharge Planning              Other Referral/Resources/Interventions Provided:  Post Acute Placement to[de-identified] Inpatient Behavioral Health [facility])

## 2022-06-29 NOTE — ASSESSMENT & PLAN NOTE
· Pt with a h/o schizophrenia with persistent visual/auditory hallucination and HI but no SI  · Reports he is on monthly [place] injections, reported last dose received two weeks ago  Unable to verify with ACT team   · Unknown last dose of Invega; follows up [facility], requests we do not contact them  · Will continue Risperdal 2 mg Po daily  · Continue virtual 1:1 observation  · Psychiatry consulted- appreciate recommendations to continue home regimen  Patient signed 12      · Patient cleared for inpatient placement

## 2022-06-29 NOTE — DISCHARGE SUMMARY
[facility]  Discharge- Brian Grace 2000, 25 y o  male MRN: [id number]  Unit/Bed#: 5T DETOX 513-01 Encounter: [id number]  Primary Care Provider: No primary care provider on file  Date and time admitted to hospital: 6/26/2022  2:46 AM  MEDICAL DETOX UNIT, LEVEL 4  Department of Medical Toxicology  Reason for Admission/Principal Problem: alcohol withdrawal, alcohol use disorder, schizophrenia  Admitting provider: Jane Villatoro PA-C  [address] [place] [address]*   6/26/2022  2:46 AM       Discharging Physician / Practitioner: ELA Mancia  PCP: No primary care provider on file  Admission Date:   Admission Orders (From admission, onward)     Ordered        06/26/22 0355  Inpatient Admission  Once                      Discharge Date: 6/30/2022    Medical Problems             Resolved Problems  Date Reviewed: 6/26/2022          Resolved    Alcohol withdrawal syndrome with complication ([facility] [address] ) 6/10/1382     Resolved by  ELA Mancia                * Other schizophrenia [facility])  Assessment & Plan  · Pt with a h/o schizophrenia with persistent visual/auditory hallucination and HI but no SI  · Reports he is on monthly Pranay Persons injections, reported last dose received two weeks ago  Unable to verify with ACT team   · Unknown last dose of Invega; follows up [facility], requests we do not contact them  · Will continue Risperdal 2 mg Po daily  · Continue virtual 1:1 observation  · Psychiatry consulted- appreciate recommendations to continue home regimen  Patient signed 12      · Patient cleared for inpatient placement     Alcohol withdrawal syndrome with complication (HCC)-resolved as of 6/27/2022  Assessment & Plan  · Pt with a reported history of chronic alcohol use, no h/o withdrawal seizures  · Breath alcohol level 0 083 (6/26/2022 0022)  · SEWS protocol with symptom-triggered phenobarbital followed for medically-assisted alcohol withdrawal  · Mild withdrawal- did not require any phenobarbital   · Currently denies symptoms of acute withdrawal- appears objectively stable (VSS, no tremors)  · Withdrawal resolved    Alcohol use disorder, severe, dependence (HCC)  Assessment & Plan  · Endorses chronic h/o alcohol use with daily consumption of 3 40-oz beer for the past 2 yrs  · Currently denies any Alcohol withdrawal symptoms  · Withdrawal managed as above   ·  consulted for disposition planning: pt is stable for discharge to inpatient psychiatric placement   · Continue Thiamine, Folic and MVI supplementation    Asthma  Assessment & Plan  · Stable with no exacerbation  · CXR ordered: No acute findings; COVID negative  · Continue PRN Albuterol and Flonase  Tobacco use disorder  Assessment & Plan  · Pt currently smokes 1-2 PPD of cigarettes  · Offer NRT  · Encourage cessation    Hearing loss  Assessment & Plan  · Pt with a h/o bilateral hearing loss  · Per chart review, has had hearing impairment since childhood and hearing loss in R > L ear  · Follows with [facility] - recommend ongoing outpatient follow-up    Legally blind  Assessment & Plan  · Chronic condition; stable  · Able to function independently        Consultations During Hospital Stay:  · Psychiatry  · Case management    Procedures Performed:   · None    Significant Findings / Test Results:   · Hypokalemia - resolved with supplementation  · Leukocytosis - resolved, likely reactive  · CXR - no acute cardiopulmonary disease    Incidental Findings:   · None     Test Results Pending at Discharge (will require follow up):    · None     Outpatient Tests / Follow-up Requested:  · Recommend follow-up with PCP  · Recommend continued follow-up with [facility]    Complications:  None    Reason for Admission: alcohol withdrawal, alcohol use disorder, schizophrenia    Hospital Course:     Artie Claros is a 25 y o  male patient with a PMH of AUD, polysubstance abuse, schizophrenia, asthma, tobacco use disorder, chronic legal blindness and bilateral hearing loss who originally presented to the hospital on 6/26/2022 due to alcohol withdrawal  Patient initially presented to [facility] ED requesting detox/rehab for alcohol, cocaine, and marijuana use and was subsequently transferred to the [facility] medical detox unit for medical management of alcohol withdrawal  He was admitted under SEWS protocol, exhibited very mild alcohol withdrawal sx, and did not require any phenobarbital  He has a h/o schizophrenia and endorsed visual and command auditory hallucinations with +HI towards hurting his family members, which are chronic and unchanged from previous detox admissions  Patient was maintained on virtual 1:1 continuous observation and continued on his Risperdal 2 mg PO daily  Pt is also on monthly Invega Sustenna injections and reported his last dose was 2 weeks ago but pt did not give permission to call his ACT team so treatment team was unable to verify this  Patient initially presented with mild hypokalemia (K 3 2) and leukocytosis on admission which resolved by the next day with IVFs and potassium supplementation  Psychiatry was consulted during hospitalization and recommended continuing patient's home medications and pt was agreeable to signing a 201 for inpatient psychiatric hospitalization after discharge from the medical detox unit  Case management was also consulted during this admission for assistance in coordinating aftercare resources, including processing patient's 201 for voluntary psych admission  Patient currently without symptoms or objective evidence of alcohol withdrawal; he is medically cleared for discharge to inpatient psychiatry  Please see above list of diagnoses and related plan for additional information  Condition at Discharge: good     Discharge Day Visit / Exam:     Subjective:  He reports no acute complaints this morning  He denies any SI or HI  Vitals: Blood Pressure: 165/89 (06/28/22 1904)  Pulse: 89 (06/28/22 1904)  Temperature: 97 9 °F (36 6 °C) (06/28/22 1904)  Temp Source: Temporal (06/28/22 1904)  Respirations: 18 (06/28/22 1904)  Height: 5' 4" (162 6 cm) (06/26/22 0256)  Weight - Scale: 78 8 kg (173 lb 12 8 oz) (06/26/22 0256)  SpO2: 98 % (06/28/22 1904)  Exam:   Physical Exam  Vitals and nursing note reviewed  Constitutional:       General: He is not in acute distress  Appearance: Normal appearance  He is well-developed  He is not ill-appearing or diaphoretic  HENT:      Head: Normocephalic and atraumatic  Eyes:      General: No scleral icterus  Extraocular Movements: Extraocular movements intact  Right eye: No nystagmus  Left eye: No nystagmus  Conjunctiva/sclera: Conjunctivae normal       Pupils: Pupils are equal, round, and reactive to light  Cardiovascular:      Rate and Rhythm: Normal rate and regular rhythm  Pulses: Normal pulses  Heart sounds: Normal heart sounds  No murmur heard  No friction rub  No gallop  Pulmonary:      Effort: Pulmonary effort is normal  No respiratory distress  Abdominal:      General: Abdomen is flat  Bowel sounds are normal  There is no distension  Palpations: Abdomen is soft  Tenderness: There is no abdominal tenderness  There is no guarding  Musculoskeletal:         General: Normal range of motion  Cervical back: Neck supple  Right lower leg: No edema  Left lower leg: No edema  Skin:     General: Skin is warm and dry  Neurological:      General: No focal deficit present  Mental Status: He is alert and oriented to person, place, and time  Mental status is at baseline  Motor: No tremor  Psychiatric:         Attention and Perception: Attention normal  He does not perceive auditory or visual hallucinations  Mood and Affect: Mood normal          Behavior: Behavior is slowed  Behavior is cooperative           Thought Content: Thought content does not include homicidal or suicidal ideation  Thought content does not include suicidal plan  Discussion with Family:  I personally did not discuss this case with the patient's family  Discussed discharge plan with the patient and answered all questions the best of my ability  Discharge instructions/Information to patient and family:   See after visit summary for information provided to patient and family  Provisions for Follow-Up Care:  See after visit summary for information related to follow-up care and any pertinent home health orders  Disposition:     Inpatient Psychiatry at [facility] to [facility]:   · Not Applicable to this Patient - Not Applicable to this Patient    Planned Readmission: inpatient psychiatry at [facility]     Discharge Statement:  I spent 35 minutes discharging the patient  This time was spent on the day of discharge  I had direct contact with the patient on the day of discharge  Greater than 50% of the total time was spent examining patient, answering all patient questions, arranging and discussing plan of care with patient as well as directly providing post-discharge instructions  Additional time then spent on discharge activities  Discharge Medications:  See after visit summary for reconciled discharge medications provided to patient and family        ** Please Note: This note has been constructed using a voice recognition system **

## 2022-06-29 NOTE — NURSING NOTE
RN notified by CM over phone that patient will not be picked up tonight but instead has a pickup time for tomorrow at 1030am  RN completed all paperwork for discharge already  Folder with all paperwork in discharge folder location  Patient notified  Patient already given belongings to change  RN deemed appropriate for patient at this time to stay in his street clothes and ready for discharge tomorrow  Virtual 1:1 remains in place

## 2022-06-29 NOTE — PLAN OF CARE
Problem: PAIN - ADULT  Goal: Verbalizes/displays adequate comfort level or baseline comfort level  Description: Interventions:  - Encourage patient to monitor pain and request assistance  - Assess pain using appropriate pain scale  - Administer analgesics based on type and severity of pain and evaluate response  - Implement non-pharmacological measures as appropriate and evaluate response  - Consider cultural and social influences on pain and pain management  - Notify physician/advanced practitioner if interventions unsuccessful or patient reports new pain  Outcome: Progressing     Problem: SAFETY ADULT  Goal: Patient will remain free of falls  Description: INTERVENTIONS:  - Educate patient/family on patient safety including physical limitations  - Instruct patient to call for assistance with activity   - Consult OT/PT to assist with strengthening/mobility   - Keep Call bell within reach  - Keep bed low and locked with side rails adjusted as appropriate  - Keep care items and personal belongings within reach  - Initiate and maintain comfort rounds  - Make Fall Risk Sign visible to staff  - Offer Toileting every 2 Hours, in advance of need  - Apply yellow socks and bracelet for high fall risk patients  - Consider moving patient to room near nurses station  Outcome: Progressing  Goal: Maintain or return to baseline ADL function  Description: INTERVENTIONS:  -  Assess patient's ability to carry out ADLs; assess patient's baseline for ADL function and identify physical deficits which impact ability to perform ADLs (bathing, care of mouth/teeth, toileting, grooming, dressing, etc )  - Assess/evaluate cause of self-care deficits   - Assess range of motion  - Assess patient's mobility; develop plan if impaired  - Assess patient's need for assistive devices and provide as appropriate  - Encourage maximum independence but intervene and supervise when necessary  - Involve family in performance of ADLs  - Assess for home care needs following discharge   - Consider OT consult to assist with ADL evaluation and planning for discharge  - Provide patient education as appropriate  Outcome: Progressing  Goal: Maintains/Returns to pre admission functional level  Description: INTERVENTIONS:  - Perform BMAT or MOVE assessment daily    - Set and communicate daily mobility goal to care team and patient/family/caregiver  - Collaborate with rehabilitation services on mobility goals if consulted  - Perform Range of Motion 10 times a day  - Reposition patient every 2 hours  - Dangle patient 5 times a day  - Stand patient 5 times a day  - Ambulate patient 5 times a day  - Out of bed to chair 3 times a day   - Out of bed for meals 3 times a day  - Out of bed for toileting  - Record patient progress and toleration of activity level   Outcome: Progressing     Problem: DISCHARGE PLANNING  Goal: Discharge to home or other facility with appropriate resources  Description: INTERVENTIONS:  - Identify barriers to discharge w/patient and caregiver  - Arrange for needed discharge resources and transportation as appropriate  - Identify discharge learning needs (meds, wound care, etc )  - Arrange for interpretive services to assist at discharge as needed  - Refer to Case Management Department for coordinating discharge planning if the patient needs post-hospital services based on physician/advanced practitioner order or complex needs related to functional status, cognitive ability, or social support system  Outcome: Progressing     Problem: Knowledge Deficit  Goal: Patient/family/caregiver demonstrates understanding of disease process, treatment plan, medications, and discharge instructions  Description: Complete learning assessment and assess knowledge base    Interventions:  - Provide teaching at level of understanding  - Provide teaching via preferred learning methods  Outcome: Progressing     Problem: Potential for Falls  Goal: Patient will remain free of falls  Description: INTERVENTIONS:  - Educate patient/family on patient safety including physical limitations  - Instruct patient to call for assistance with activity   - Consult OT/PT to assist with strengthening/mobility   - Keep Call bell within reach  - Keep bed low and locked with side rails adjusted as appropriate  - Keep care items and personal belongings within reach  - Initiate and maintain comfort rounds  - Make Fall Risk Sign visible to staff  - Offer Toileting every 2 Hours, in advance of need  - Apply yellow socks and bracelet for high fall risk patients  - Consider moving patient to room near nurses station  Outcome: Progressing     Problem: SUBSTANCE USE/ABUSE  Goal: By discharge, will develop insight into their chemical dependency and sustain motivation to continue in recovery  Description: INTERVENTIONS:  - Attends all daily group sessions and scheduled AA groups  - Actively practices coping skills through participation in the therapeutic community and adherence to program rules  - Reviews and completes assignments from individual treatment plan  - Assist patient development of understanding of their personal cycle of addiction and relapse triggers  Outcome: Progressing  Goal: By discharge, patient will have ongoing treatment plan addressing chemical dependency  Description: INTERVENTIONS:  - Assist patient with resources and/or appointments for ongoing recovery based living  Outcome: Progressing

## 2022-06-29 NOTE — CASE MANAGEMENT
Cm contacted [facility] and informed that referral could be sent with dual BHU placement  Cm faxed referral to [phone]

## 2022-06-29 NOTE — CASE MANAGEMENT
Cm reached out to [facility] Crisis intake and referral to inform pt continues to require a BHU placement  Cm then contacted     [facility]: no bed  [facility]: willing to accept referral, as per Ashok Silveira fax to [phone]   Fax completed

## 2022-06-29 NOTE — CASE MANAGEMENT
Pt was accepted at [place] [address] for dual treatment  Worker completed [facility]-cert with [facility], [address] 3 days

## 2022-06-29 NOTE — PROGRESS NOTES
PROGRESS NOTE  DEPARTMENT OF MEDICAL TOXICOLOGY  LEVEL 4 MEDICAL DETOX UNIT  Gokul Bridges 25 y o  male MRN: [id number]  Unit/Bed#: 5T DETOX 513-01 Encounter: [id number]      Reason for Admission/Principal Problem: Homicidal ideation, schizophrenia   Rounding Provider: ELA Hill  Attending Provider: Janet Steinberg DO   6/26/2022  2:46 AM       * Other schizophrenia ([facility] [address] )  Assessment & Plan  · Pt with a h/o schizophrenia with persistent visual/auditory hallucination and HI but no SI  · Reports he is on monthly [place] injections, reported last dose received two weeks ago  Unable to verify with ACT team   · Unknown last dose of Invega; follows up [facility], requests we do not contact them  · Will continue Risperdal 2 mg Po daily  · Continue virtual 1:1 observation  · Psychiatry consulted- appreciate recommendations to continue home regimen  Patient signed 12  · Patient cleared for inpatient placement     Asthma  Assessment & Plan  · Stable with no exacerbation  · CXR ordered: No acute findings; COVID negative  · Continue PRN Albuterol and Flonase      Tobacco use disorder  Assessment & Plan  · Pt currently smokes 1-2 PPD of cigarettes  · Offer NRT  · Encourage cessation    Alcohol use disorder, severe[facility] ([facility] )  Assessment & Plan  · Endorses chronic h/o alcohol use with daily consumption of 3 40-oz beer for the past 2 yrs  · Currently denies any Alcohol withdrawal symptoms  · Withdrawal managed as above   ·  consulted for disposition planning: pt is stable for discharge to inpatient psychiatric placement   · Continue Thiamine, Folic and MVI supplementation    Hearing loss  Assessment & Plan  · Pt with a h/o bilateral hearing loss  · Per chart review, has had hearing impairment since childhood and hearing loss in R > L ear  · Follows with [facility] - recommend ongoing outpatient follow-up    Legally blind  Assessment & Plan  · Chronic condition; stable  · Able to function independently      VTE Pharmacologic Prophylaxis:   Pharmacologic: Enoxaparin (Lovenox)  Mechanical VTE Prophylaxis in Place: yes    Code Status: Level 1 - Full Code    Patient Centered Rounds: I have performed bedside rounds with nursing staff today  Discussions with Specialists or Other Care Team Provider: case management      Education and Discussions with Family / Patient: patient    Time Spent for Care: 30 minutes  More than 50% of total time spent on counseling and coordination of care as described above  Current Length of Stay: 3 day(s)    Current Patient Status: Inpatient     Certification Statement: The patient will continue to require additional inpatient hospital stay due to pending inpatient transportation Discharge Plan: pending in-patient bed transportation         Subjective:   He reports no acute complaints this morning  He denies any SI or HI        Objective:     Clinical Opiate Withdrawal Scale  Pulse: 92    SEWS Total Score: 0 (6/27/2022  3:00 AM)        Last 24 Hours Medication List:   Current Facility-Administered Medications   Medication Dose Route Frequency Provider Last Rate    acetaminophen  650 mg Oral Q6H PRN Obnancy Obrien PA-C      albuterol  2 puff Inhalation Q4H PRN Obnancy Obrien PA-C      enoxaparin  40 mg Subcutaneous Daily Obioma Arnoldo Obrien PA-C      fluticasone  1 spray Each Nare Daily Obanncy Obrien PA-C      folic acid  1 mg Oral Daily Melissa JoELA sauceda      multivitamin-minerals  1 tablet Oral Daily Obnancy Obrien PA-C      nicotine  1 patch Transdermal Daily Venessa Obrien PA-C      nicotine polacrilex  2 mg Oral Q2H PRN ELA Horn      ondansetron  4 mg Intravenous Q6H PRN Obioma Arnoldo Obrien PA-C      risperiDONE  2 mg Oral Daily Venessa Obrien PA-C      thiamine  100 mg Oral Daily Melissa JoELA      traZODone  50 mg Oral HS PRN Alvaro Obrien PA-C           Vitals:   Temp (24hrs), Av 9 °F (36 6 °C), Min:97 9 °F (36 6 °C), Max:97 9 °F (36 6 °C)    Temp:  [97 9 °F (36 6 °C)] 97 9 °F (36 6 °C)  HR:  [89-95] 92  Resp:  [18] 18  BP: (112-165)/(54-89) 112/68  SpO2:  [96 %-98 %] 96 %  Body mass index is 29 83 kg/m²  Input and Output Summary (last 24 hours):No intake or output data in the 24 hours ending 22 1723    Physical Exam:   Physical Exam  Vitals and nursing note reviewed  Constitutional:       General: He is not in acute distress  Appearance: Normal appearance  He is well-developed  He is not ill-appearing or diaphoretic  HENT:      Head: Normocephalic and atraumatic  Eyes:      General: No scleral icterus  Extraocular Movements: Extraocular movements intact  Right eye: No nystagmus  Left eye: No nystagmus  Conjunctiva/sclera: Conjunctivae normal       Pupils: Pupils are equal, round, and reactive to light  Cardiovascular:      Rate and Rhythm: Normal rate and regular rhythm  Pulses: Normal pulses  Heart sounds: Normal heart sounds  No murmur heard  No friction rub  No gallop  Pulmonary:      Effort: Pulmonary effort is normal  No respiratory distress  Abdominal:      General: Abdomen is flat  Bowel sounds are normal  There is no distension  Palpations: Abdomen is soft  Tenderness: There is no abdominal tenderness  There is no guarding  Musculoskeletal:         General: Normal range of motion  Cervical back: Neck supple  Right lower leg: No edema  Left lower leg: No edema  Skin:     General: Skin is warm and dry  Neurological:      General: No focal deficit present  Mental Status: He is alert and oriented to person, place, and time  Mental status is at baseline  Motor: No tremor  Psychiatric:         Attention and Perception: Attention normal  He does not perceive auditory or visual hallucinations           Mood and Affect: Mood normal          Behavior: Behavior is slowed  Behavior is cooperative  Thought Content: Thought content does not include homicidal or suicidal ideation  Thought content does not include suicidal plan  Additional Data:     Labs:   Results from last 7 days   Lab Units 06/27/22  0521 06/26/22  0112   WBC Thousand/uL 9 55 11 31*   HEMOGLOBIN g/dL 16 1 16 0   HEMATOCRIT % 47 2 47 3   PLATELETS Thousands/uL 257 266   NEUTROS PCT %  --  42*   LYMPHS PCT %  --  48*   MONOS PCT %  --  7   EOS PCT %  --  2      Results from last 7 days   Lab Units 06/29/22  0607 06/28/22  0520 06/27/22  0521   SODIUM mmol/L 139   < > 139   POTASSIUM mmol/L 4 3   < > 4 1   CHLORIDE mmol/L 104   < > 110*   CO2 mmol/L 26   < > 24   BUN mg/dL 15   < > 12   CREATININE mg/dL 1 25   < > 1 20   ANION GAP mmol/L 9   < > 5   CALCIUM mg/dL 9 5   < > 8 7   ALBUMIN g/dL  --   --  3 5   TOTAL BILIRUBIN mg/dL  --   --  0 42   ALK PHOS U/L  --   --  60   ALT U/L  --   --  25   AST U/L  --   --  37   GLUCOSE RANDOM mg/dL 96   < > 91    < > = values in this interval not displayed  * I Have Reviewed All Lab Data Listed Above  * Additional Pertinent Lab Tests Reviewed: All Labs Within Last 24 Hours Reviewed      Imaging Studies: I have personally reviewed pertinent reports  Recent Cultures (last 7 days): Today, Patient Was Seen By: ELA Herrera    ** Please Note: Dictation voice to text software may have been used in the creation of this document   **

## 2022-06-29 NOTE — CASE MANAGEMENT
Cm contacted [facility] and was informed by Keshav Garrido that there was no bed available at this time  Cm informed by [facility] Crisis intake and referral no bed available at this time

## 2022-06-30 VITALS
OXYGEN SATURATION: 99 % | DIASTOLIC BLOOD PRESSURE: 64 MMHG | BODY MASS INDEX: 29.67 KG/M2 | WEIGHT: 173.8 LBS | SYSTOLIC BLOOD PRESSURE: 107 MMHG | HEIGHT: 64 IN | RESPIRATION RATE: 16 BRPM | TEMPERATURE: 97.7 F | HEART RATE: 53 BPM

## 2022-06-30 PROCEDURE — 99239 HOSP IP/OBS DSCHRG MGMT >30: CPT | Performed by: NURSE PRACTITIONER

## 2022-06-30 RX ADMIN — RISPERIDONE 2 MG: 2 TABLET ORAL at 08:17

## 2022-06-30 RX ADMIN — FLUTICASONE PROPIONATE 1 SPRAY: 50 SPRAY, METERED NASAL at 08:25

## 2022-06-30 RX ADMIN — NICOTINE 1 PATCH: 21 PATCH, EXTENDED RELEASE TRANSDERMAL at 08:18

## 2022-06-30 RX ADMIN — THIAMINE HCL TAB 100 MG 100 MG: 100 TAB at 08:17

## 2022-06-30 RX ADMIN — FOLIC ACID 1 MG: 1 TABLET ORAL at 08:17

## 2022-06-30 RX ADMIN — MULTIPLE VITAMINS W/ MINERALS TAB 1 TABLET: TAB ORAL at 08:17

## 2022-06-30 RX ADMIN — TRAZODONE HYDROCHLORIDE 50 MG: 50 TABLET ORAL at 00:39

## 2022-06-30 NOTE — CASE MANAGEMENT
Cm contacted [place] and provided information on prior auth   Rafael Puckett at [facility] received this information and stated that pt continues to be expected today with a  time of 1030am

## 2022-06-30 NOTE — NURSING NOTE
Patient picked up by [facility] successfully  Transport team given all necessary paperwork including 201  Virtual 1:1 discontinued  Patient already changed into belongings  Patient discharged complete

## 2022-07-01 NOTE — UTILIZATION REVIEW
Notification of Discharge   This is a Notification of Discharge from our facility [address]  Please be advised that this patient has been discharge from our facility  Below you will find the admission and discharge date and time including the patients disposition  UTILIZATION REVIEW CONTACT:  Harry Lomax MA  Utilization   Network Utilization Review Department  Phone: [phone] x carefully listen to the prompts  All voicemails are confidential   Email: [email or web address]     PHYSICIAN ADVISORY SERVICES:  FOR [facility]MFW-OS-XE[facility] REVIEW - MEDICAL NECESSITY DENIAL  Phone: [phone]  Fax: [phone]  Email: [email or web address]     PRESENTATION DATE: 6/26/2022  2:46 AM  OBERVATION ADMISSION DATE:   INPATIENT ADMISSION DATE: 6/26/22  2:46 AM   DISCHARGE DATE: 6/30/2022 10:45 AM  DISPOSITION: Home/Self Care Home/Self Care      IMPORTANT INFORMATION:  Send all requests for admission clinical reviews, approved or denied determinations and any other requests to dedicated fax number below belonging to the campus where the patient is receiving treatment   List of dedicated fax numbers:  [address] DENIALS (Administrative/Medical Necessity) [phone]   [address] (Maternity/NICU/Pediatrics) [phone]   [facility] [phone]   [address] [facility] [phone]   [address] [place] [address] [phone]   [address] [facility] [address] [place] [address],[address] [place] [address] [phone]   [facility]  [phone]   [address] [facility]  [address] [facility] [address] [facility] [phone]

## 2022-08-06 ENCOUNTER — HOSPITAL ENCOUNTER (EMERGENCY)
Facility: HOSPITAL | Age: 22
Discharge: HOME | End: 2022-08-06
Attending: EMERGENCY MEDICINE
Payer: COMMERCIAL

## 2022-08-06 VITALS
HEART RATE: 103 BPM | SYSTOLIC BLOOD PRESSURE: 139 MMHG | RESPIRATION RATE: 20 BRPM | TEMPERATURE: 97.3 F | OXYGEN SATURATION: 96 % | DIASTOLIC BLOOD PRESSURE: 78 MMHG

## 2022-08-06 DIAGNOSIS — F12.10 MARIJUANA ABUSE: ICD-10-CM

## 2022-08-06 DIAGNOSIS — F10.10 ALCOHOL ABUSE: Primary | ICD-10-CM

## 2022-08-06 PROCEDURE — 99284 EMERGENCY DEPT VISIT MOD MDM: CPT

## 2022-08-06 PROCEDURE — 99282 EMERGENCY DEPT VISIT SF MDM: CPT | Performed by: EMERGENCY MEDICINE

## 2022-08-06 NOTE — ED PROVIDER NOTES
History  Chief Complaint   Patient presents with    Detox Evaluation     Pt presents ambulatory requesting rehab for marijuana and alcohol  Last drank yesterday     Patient is a 26 y/o M with PMH schizophrenia, substance abuse presenting for evaluation for alcohol and marijuana rehab  Patient states he has been drinking two forty's daily for past few weeks  Also has been smoking marijuana daily  Denies other drug use  Last drink was yesterday morning  Denies history of alcohol withdrawal or withdrawal seizures  Has been following with psychiatrist, last visit a month ago, denies SI/HI, AH/VH  States he is due for his invega injection  Denies fevers, chills, CP, SOB, nausea, diarrhea, dysuria, hematuria  Reports occasional NBNB emesis after drinking  Prior to Admission Medications   Prescriptions Last Dose Informant Patient Reported? Taking? albuterol (PROVENTIL HFA,VENTOLIN HFA) 90 mcg/act inhaler   No No   Sig: Inhale 2 puffs every 4 (four) hours as needed for wheezing or shortness of breath   fluticasone (FLONASE) 50 mcg/act nasal spray   No No   Si spray into each nostril daily   paliperidone palmitate (INVEGA) 234 MG/1 5ML im injection   Yes No   Sig: Inject 234 mg into a muscle every 28 days   risperiDONE (RisperDAL) 2 mg tablet  Self Yes No   Sig: Take 2 mg by mouth daily      Facility-Administered Medications: None       Past Medical History:   Diagnosis Date    Asthma     Hearing impaired     Legally blind     Schizophrenia ([facility] [address] )        Past Surgical History:   Procedure Laterality Date    HERNIA REPAIR      TEAR DUCT SURGERY Bilateral     TONSILLECTOMY         Family History   Problem Relation Age of Onset    Schizophrenia Mother     Mental illness Mother     Abnormal EKG Sister     Mental illness Sister      I have reviewed and agree with the history as documented      E-Cigarette/Vaping    E-Cigarette Use Never User      E-Cigarette/Vaping Substances     Social History Tobacco Use    Smoking status: Current Every Day Smoker     Packs/day: 1 00     Years: 3 00     Pack years: 3 00     Types: Cigarettes    Smokeless tobacco: Never Used   Vaping Use    Vaping Use: Never used   Substance Use Topics    Alcohol use: Yes     Comment: (3) 40 oz  beers per day    Drug use: Yes     Types: Cocaine, Marijuana, Methamphetamines        Review of Systems   Constitutional: Negative for chills and fever  HENT: Negative for ear pain and sore throat  Eyes: Negative for pain and visual disturbance  Respiratory: Negative for cough and shortness of breath  Cardiovascular: Negative for chest pain and palpitations  Gastrointestinal: Negative for abdominal pain and vomiting  Genitourinary: Negative for dysuria and hematuria  Musculoskeletal: Negative for arthralgias and back pain  Skin: Negative for color change and rash  Neurological: Negative for seizures and syncope  All other systems reviewed and are negative  Physical Exam  ED Triage Vitals   Temperature Pulse Respirations Blood Pressure SpO2   08/06/22 1435 08/06/22 1435 08/06/22 1435 08/06/22 1436 08/06/22 1435   (!) 97 3 °F (36 3 °C) 103 20 139/78 96 %      Temp Source Heart Rate Source Patient Position - Orthostatic VS BP Location FiO2 (%)   08/06/22 1435 08/06/22 1435 -- -- --   Temporal Monitor         Pain Score       --                    Orthostatic Vital Signs  Vitals:    08/06/22 1435 08/06/22 1436   BP:  139/78   Pulse: 103        Physical Exam  Vitals and nursing note reviewed  Constitutional:       General: He is not in acute distress  Appearance: He is well-developed  He is not toxic-appearing  HENT:      Head: Normocephalic and atraumatic  Right Ear: External ear normal       Left Ear: External ear normal       Nose: Nose normal       Mouth/Throat:      Pharynx: Oropharynx is clear  No oropharyngeal exudate or posterior oropharyngeal erythema     Eyes:      Extraocular Movements: Extraocular movements intact  Conjunctiva/sclera: Conjunctivae normal       Pupils: Pupils are equal, round, and reactive to light  Cardiovascular:      Rate and Rhythm: Normal rate and regular rhythm  Pulses: Normal pulses  Heart sounds: Normal heart sounds  No murmur heard  No friction rub  No gallop  Pulmonary:      Effort: Pulmonary effort is normal  No respiratory distress  Breath sounds: Normal breath sounds  No wheezing, rhonchi or rales  Abdominal:      General: Abdomen is flat  Palpations: Abdomen is soft  Tenderness: There is no abdominal tenderness  There is no guarding or rebound  Musculoskeletal:         General: Normal range of motion  Cervical back: Normal range of motion  No rigidity  Right lower leg: No edema  Left lower leg: No edema  Skin:     General: Skin is warm and dry  Capillary Refill: Capillary refill takes less than 2 seconds  Neurological:      General: No focal deficit present  Mental Status: He is alert  Psychiatric:         Mood and Affect: Mood normal          ED Medications  Medications - No data to display    Diagnostic Studies  Results Reviewed     None                 No orders to display         Procedures  Procedures      ED Course  ED Course as of 08/07/22 0313   Sat Aug 06, 2022   1706 Multiple attempts to reach crisis unsuccessful so far   1827 HOST will contact                                       MDM  Number of Diagnoses or Management Options  Alcohol abuse  Marijuana abuse  Diagnosis management comments: 26 y/o M presenting for evaluation for etoh and marijuana rehab  Pt with benign exam  Will reach out to crisis for HOST eval  No crisis in house, [facility] was able to assist in setting up HOST eval for pt  HOST is searching for rehab bed for pt, able to dc as pt with active phone number and not requiring admission  Pt understands HOST will call him when bed available   Discharged with return precautions  Disposition  Final diagnoses:   Alcohol abuse   Marijuana abuse     Time reflects when diagnosis was documented in both MDM as applicable and the Disposition within this note     Time User Action Codes Description Comment    8/6/2022  7:30 PM Timothy Tsang Add [F10 10] Alcohol abuse     8/6/2022  7:30 PM Timothy Tsang Add [F12 10] Marijuana abuse       ED Disposition     ED Disposition   Discharge    Condition   Stable    Date/Time   Sat Aug 6, 2022  7:35 PM    Comment   Gokul Womack discharge to home/self care  Follow-up Information     Follow up With Specialties Details Why Contact Info Additional [address] Emergency Department Emergency Medicine  As needed [address] [phone]  [address] [facility] Emergency Department, [address] [facility], [place], [place], [address]          Discharge Medication List as of 8/6/2022  7:40 PM      CONTINUE these medications which have NOT CHANGED    Details   albuterol (PROVENTIL HFA,VENTOLIN HFA) 90 mcg/act inhaler Inhale 2 puffs every 4 (four) hours as needed for wheezing or shortness of breath, Starting Tue 3/22/2022, Normal      fluticasone (FLONASE) 50 mcg/act nasal spray 1 spray into each nostril daily, Starting Wed 3/23/2022, No Print      paliperidone palmitate (INVEGA) 234 MG/1 5ML im injection Inject 234 mg into a muscle every 28 days, Historical Med      risperiDONE (RisperDAL) 2 mg tablet Take 2 mg by mouth daily, Historical Med           No discharge procedures on file  PDMP Review     None           ED Provider  Attending physically available and evaluated Valentin Vincent I managed the patient along with the ED Attending      Electronically Signed by         Allison Boss MD  08/07/22 8065

## 2022-08-06 NOTE — ED NOTES
Pt on the phone with Host     Vielka Vazquez RN  08/06/22 [facility], RN  08/06/22 0702

## 2022-08-06 NOTE — ED ATTENDING ATTESTATION
Final Diagnosis:  1  Alcohol abuse    2  Marijuana abuse           I, Vivian Hunt MD, saw and evaluated the patient  All available labs and X-rays were ordered by me or the resident and have been reviewed by myself  I discussed the patient with the resident / non-physician and agree with the resident's / non-physician practitioner's findings and plan as documented in the resident's / non-physician practicitioner's note, except where noted  At this point, I agree with the current assessment done in the ED  I was present during key portions of all procedures performed unless otherwise stated  Chief Complaint   Patient presents with    Detox Evaluation     Pt presents ambulatory requesting rehab for marijuana and alcohol  Last drank yesterday     This is a 25 y o  male presenting for evaluation of marijuana/ETOH rehab  Drinks daily (40s beer) + marijuana (continuous use)  Wants to stop  No hx of withdrawal seizures  Hx of rehab in the past without success  Sees a psychiatrist   No SI/HI/VH/AH  PMH:   has a past medical history of Asthma, Hearing impaired, Legally blind, and Schizophrenia ([facility] [address] )  PSH:   has a past surgical history that includes Tear duct surgery (Bilateral); Hernia repair; and Tonsillectomy  Social:  Social History     Substance and Sexual Activity   Alcohol Use Yes    Comment: (3) 40 oz  beers per day     Social History     Tobacco Use   Smoking Status Current Every Day Smoker    Packs/day: 1 00    Years: 3 00    Pack years: 3 00    Types: Cigarettes   Smokeless Tobacco Never Used     Social History     Substance and Sexual Activity   Drug Use Yes    Types: Cocaine, Marijuana, Methamphetamines     PE:  Vitals:    08/06/22 1435 08/06/22 1436   BP:  139/78   Pulse: 103    Resp: 20    Temp: (!) 97 3 °F (36 3 °C)    TempSrc: Temporal    SpO2: 96%    General: VS reviewed  Appears in NAD  awake, alert  Well-nourished, well-developed  Appears stated age     Speaking normally in full sentences  Head: Normocephalic, atraumatic  Eyes: EOM-I  No diplopia  No hyphema  No subconjunctival hemorrhages  Symmetrical lids  ENT: Atraumatic external nose and ears  MMM  No malocclusion  No stridor  Normal phonation  No drooling  Normal swallowing  Neck: No JVD  CV: No pallor noted  Lungs:   No tachypnea  No respiratory distress  MSK:   FROM spontaneously  Skin: Dry, intact  Neuro: Awake, alert, GCS15, CN II-XII grossly intact  Motor grossly intact  Psychiatric/Behavioral: Appropriate mood and affect   Exam: deferred  A:  - rehab  P:  - HOST referral    - 13 point ROS was performed and all are normal unless stated in the history above  - Nursing note reviewed  Vitals reviewed  - Orders placed by myself and/or advanced practitioner / resident     - Previous chart was reviewed  - No language barrier    - History obtained from patient  - There are no limitations to the history obtained  - Critical care time: Not applicable for this patient  Code Status: Prior  Advance Directive and Living Will:      Power of :    POLST:      Medications - No data to display  No orders to display     Orders Placed This Encounter   Procedures    Consult to ED Crisis for warm handoff referral     Labs Reviewed - No data to display  Time reflects when diagnosis was documented in both MDM as applicable and the Disposition within this note       Time User Action Codes Description Comment    8/6/2022  7:30 PM Chayo Horn Add [F10 10] Alcohol abuse     8/6/2022  7:30 PM Chayo Horn Add [F12 10] Marijuana abuse           ED Disposition       ED Disposition   Discharge    Condition   Stable    Date/Time   Sat Aug 6, 2022  7:35 PM    Comment   Gokul Yuan discharge to home/self care                     Follow-up Information       Follow up With Specialties Details Why [address] [facility] Emergency Department Emergency Medicine  As needed [address] Emergency [address], [address], [place], [place], [address]          Discharge Medication List as of 2022  7:40 PM        CONTINUE these medications which have NOT CHANGED    Details   albuterol (PROVENTIL HFA,VENTOLIN HFA) 90 mcg/act inhaler Inhale 2 puffs every 4 (four) hours as needed for wheezing or shortness of breath, Starting Tue 3/22/2022, Normal      fluticasone (FLONASE) 50 mcg/act nasal spray 1 spray into each nostril daily, Starting Wed 3/23/2022, No Print      paliperidone palmitate (INVEGA) 234 MG/1 5ML im injection Inject 234 mg into a muscle every 28 days, Historical Med      risperiDONE (RisperDAL) 2 mg tablet Take 2 mg by mouth daily, Historical Med           No discharge procedures on file  Prior to Admission Medications   Prescriptions Last Dose Informant Patient Reported? Taking? albuterol (PROVENTIL HFA,VENTOLIN HFA) 90 mcg/act inhaler   No No   Sig: Inhale 2 puffs every 4 (four) hours as needed for wheezing or shortness of breath   fluticasone (FLONASE) 50 mcg/act nasal spray   No No   Si spray into each nostril daily   paliperidone palmitate (INVEGA) 234 MG/1 5ML im injection   Yes No   Sig: Inject 234 mg into a muscle every 28 days   risperiDONE (RisperDAL) 2 mg tablet  Self Yes No   Sig: Take 2 mg by mouth daily      Facility-Administered Medications: None       Portions of the record may have been created with voice recognition software  Occasional wrong word or "sound a like" substitutions may have occurred due to the inherent limitations of voice recognition software  Read the chart carefully and recognize, using context, where substitutions have occurred      Electronically signed by:  Rajwinder Guo

## 2022-08-06 NOTE — ED NOTES
Referral made to Host - spoke with [facility] and facesheet faxed     HOST: [phone]

## 2022-08-06 NOTE — DISCHARGE INSTRUCTIONS
Follow up with the HOST referral and resources they provided for you  They are searching for a rehab bed for you and will call you when it is available  If you develop new or worsening symptoms, please return to the Emergency Department for further evaluation

## 2022-09-19 ENCOUNTER — APPOINTMENT (OUTPATIENT)
Dept: RADIOLOGY | Facility: HOSPITAL | Age: 22
End: 2022-09-19
Payer: COMMERCIAL

## 2022-09-19 ENCOUNTER — HOSPITAL ENCOUNTER (OUTPATIENT)
Facility: HOSPITAL | Age: 22
Setting detail: OBSERVATION
Discharge: HOME | End: 2022-09-20
Attending: EMERGENCY MEDICINE | Admitting: INTERNAL MEDICINE
Payer: COMMERCIAL

## 2022-09-19 DIAGNOSIS — F10.939 ALCOHOL WITHDRAWAL (HCC): Primary | ICD-10-CM

## 2022-09-19 DIAGNOSIS — F20.89 OTHER SCHIZOPHRENIA (HCC): ICD-10-CM

## 2022-09-19 DIAGNOSIS — R45.851 SUICIDAL IDEATION: ICD-10-CM

## 2022-09-19 DIAGNOSIS — F20.9 SCHIZOPHRENIA (HCC): ICD-10-CM

## 2022-09-19 DIAGNOSIS — F10.20 ALCOHOL USE DISORDER, SEVERE, DEPENDENCE (HCC): ICD-10-CM

## 2022-09-19 DIAGNOSIS — R07.9 CHEST PAIN, UNSPECIFIED TYPE: ICD-10-CM

## 2022-09-19 LAB
2HR DELTA HS TROPONIN: -1 NG/L
ALBUMIN SERPL BCP-MCNC: 3.7 G/DL (ref 3.5–5)
ALP SERPL-CCNC: 53 U/L (ref 46–116)
ALT SERPL W P-5'-P-CCNC: 43 U/L (ref 12–78)
AMPHETAMINES SERPL QL SCN: NEGATIVE
ANION GAP SERPL CALCULATED.3IONS-SCNC: 6 MMOL/L (ref 4–13)
AST SERPL W P-5'-P-CCNC: 67 U/L (ref 5–45)
ATRIAL RATE: 71 BPM
BARBITURATES UR QL: NEGATIVE
BASOPHILS # BLD AUTO: 0.08 THOUSANDS/ΜL (ref 0–0.1)
BASOPHILS NFR BLD AUTO: 1 % (ref 0–1)
BENZODIAZ UR QL: NEGATIVE
BILIRUB SERPL-MCNC: 0.93 MG/DL (ref 0.2–1)
BUN SERPL-MCNC: 14 MG/DL (ref 5–25)
CALCIUM SERPL-MCNC: 9.7 MG/DL (ref 8.3–10.1)
CARDIAC TROPONIN I PNL SERPL HS: 5 NG/L
CARDIAC TROPONIN I PNL SERPL HS: 6 NG/L
CHLORIDE SERPL-SCNC: 104 MMOL/L (ref 96–108)
CO2 SERPL-SCNC: 27 MMOL/L (ref 21–32)
COCAINE UR QL: NEGATIVE
CREAT SERPL-MCNC: 1.49 MG/DL (ref 0.6–1.3)
EOSINOPHIL # BLD AUTO: 0.22 THOUSAND/ΜL (ref 0–0.61)
EOSINOPHIL NFR BLD AUTO: 2 % (ref 0–6)
ERYTHROCYTE [DISTWIDTH] IN BLOOD BY AUTOMATED COUNT: 13.3 % (ref 11.6–15.1)
ETHANOL EXG-MCNC: 0.01 MG/DL
FLUAV RNA RESP QL NAA+PROBE: NEGATIVE
FLUBV RNA RESP QL NAA+PROBE: NEGATIVE
GFR SERPL CREATININE-BSD FRML MDRD: 66 ML/MIN/1.73SQ M
GLUCOSE SERPL-MCNC: 78 MG/DL (ref 65–140)
HCT VFR BLD AUTO: 48.1 % (ref 36.5–49.3)
HGB BLD-MCNC: 15.9 G/DL (ref 12–17)
IMM GRANULOCYTES # BLD AUTO: 0.04 THOUSAND/UL (ref 0–0.2)
IMM GRANULOCYTES NFR BLD AUTO: 0 % (ref 0–2)
LYMPHOCYTES # BLD AUTO: 3.48 THOUSANDS/ΜL (ref 0.6–4.47)
LYMPHOCYTES NFR BLD AUTO: 36 % (ref 14–44)
MCH RBC QN AUTO: 31.7 PG (ref 26.8–34.3)
MCHC RBC AUTO-ENTMCNC: 33.1 G/DL (ref 31.4–37.4)
MCV RBC AUTO: 96 FL (ref 82–98)
METHADONE UR QL: NEGATIVE
MONOCYTES # BLD AUTO: 0.91 THOUSAND/ΜL (ref 0.17–1.22)
MONOCYTES NFR BLD AUTO: 9 % (ref 4–12)
NEUTROPHILS # BLD AUTO: 4.99 THOUSANDS/ΜL (ref 1.85–7.62)
NEUTS SEG NFR BLD AUTO: 52 % (ref 43–75)
NRBC BLD AUTO-RTO: 0 /100 WBCS
OPIATES UR QL SCN: NEGATIVE
OXYCODONE+OXYMORPHONE UR QL SCN: NEGATIVE
P AXIS: 67 DEGREES
PCP UR QL: NEGATIVE
PLATELET # BLD AUTO: 261 THOUSANDS/UL (ref 149–390)
PMV BLD AUTO: 10.9 FL (ref 8.9–12.7)
POTASSIUM SERPL-SCNC: 4.2 MMOL/L (ref 3.5–5.3)
PR INTERVAL: 142 MS
PROT SERPL-MCNC: 8 G/DL (ref 6.4–8.4)
QRS AXIS: 93 DEGREES
QRSD INTERVAL: 82 MS
QT INTERVAL: 378 MS
QTC INTERVAL: 410 MS
RBC # BLD AUTO: 5.02 MILLION/UL (ref 3.88–5.62)
RSV RNA RESP QL NAA+PROBE: NEGATIVE
SARS-COV-2 RNA RESP QL NAA+PROBE: NEGATIVE
SODIUM SERPL-SCNC: 137 MMOL/L (ref 135–147)
T WAVE AXIS: 45 DEGREES
THC UR QL: NEGATIVE
VENTRICULAR RATE: 71 BPM
WBC # BLD AUTO: 9.72 THOUSAND/UL (ref 4.31–10.16)

## 2022-09-19 PROCEDURE — 85025 COMPLETE CBC W/AUTO DIFF WBC: CPT

## 2022-09-19 PROCEDURE — 80053 COMPREHEN METABOLIC PANEL: CPT

## 2022-09-19 PROCEDURE — 36415 COLL VENOUS BLD VENIPUNCTURE: CPT

## 2022-09-19 PROCEDURE — 71046 X-RAY EXAM CHEST 2 VIEWS: CPT

## 2022-09-19 PROCEDURE — 93005 ELECTROCARDIOGRAM TRACING: CPT

## 2022-09-19 PROCEDURE — 0241U HB NFCT DS VIR RESP RNA 4 TRGT: CPT

## 2022-09-19 PROCEDURE — 82075 ASSAY OF BREATH ETHANOL: CPT

## 2022-09-19 PROCEDURE — 84484 ASSAY OF TROPONIN QUANT: CPT

## 2022-09-19 PROCEDURE — 99220 PR INITIAL OBSERVATION CARE/DAY 70 MINUTES: CPT | Performed by: INTERNAL MEDICINE

## 2022-09-19 PROCEDURE — 99285 EMERGENCY DEPT VISIT HI MDM: CPT | Performed by: EMERGENCY MEDICINE

## 2022-09-19 PROCEDURE — 96360 HYDRATION IV INFUSION INIT: CPT

## 2022-09-19 PROCEDURE — 93010 ELECTROCARDIOGRAM REPORT: CPT | Performed by: INTERNAL MEDICINE

## 2022-09-19 PROCEDURE — 99285 EMERGENCY DEPT VISIT HI MDM: CPT

## 2022-09-19 PROCEDURE — 80307 DRUG TEST PRSMV CHEM ANLYZR: CPT

## 2022-09-19 RX ORDER — LANOLIN ALCOHOL/MO/W.PET/CERES
100 CREAM (GRAM) TOPICAL DAILY
Status: DISCONTINUED | OUTPATIENT
Start: 2022-09-20 | End: 2022-09-20 | Stop reason: HOSPADM

## 2022-09-19 RX ORDER — NICOTINE 21 MG/24HR
1 PATCH, TRANSDERMAL 24 HOURS TRANSDERMAL DAILY
Status: DISCONTINUED | OUTPATIENT
Start: 2022-09-19 | End: 2022-09-20 | Stop reason: HOSPADM

## 2022-09-19 RX ORDER — RISPERIDONE 1 MG/1
2 TABLET, FILM COATED ORAL DAILY
Status: DISCONTINUED | OUTPATIENT
Start: 2022-09-20 | End: 2022-09-20 | Stop reason: HOSPADM

## 2022-09-19 RX ORDER — FOLIC ACID 1 MG/1
1 TABLET ORAL DAILY
Status: DISCONTINUED | OUTPATIENT
Start: 2022-09-20 | End: 2022-09-20 | Stop reason: HOSPADM

## 2022-09-19 RX ORDER — CALCIUM CARBONATE 200(500)MG
1000 TABLET,CHEWABLE ORAL DAILY PRN
Status: DISCONTINUED | OUTPATIENT
Start: 2022-09-19 | End: 2022-09-20 | Stop reason: HOSPADM

## 2022-09-19 RX ORDER — ALBUTEROL SULFATE 90 UG/1
2 AEROSOL, METERED RESPIRATORY (INHALATION) EVERY 4 HOURS PRN
Status: DISCONTINUED | OUTPATIENT
Start: 2022-09-19 | End: 2022-09-20 | Stop reason: HOSPADM

## 2022-09-19 RX ORDER — ONDANSETRON 2 MG/ML
4 INJECTION INTRAMUSCULAR; INTRAVENOUS EVERY 6 HOURS PRN
Status: DISCONTINUED | OUTPATIENT
Start: 2022-09-19 | End: 2022-09-20 | Stop reason: HOSPADM

## 2022-09-19 RX ADMIN — SODIUM CHLORIDE 1000 ML: 0.9 INJECTION, SOLUTION INTRAVENOUS at 19:20

## 2022-09-19 NOTE — ED PROVIDER NOTES
History  Chief Complaint   Patient presents with    Recreational Drug Use    Alcohol Intoxication     Pt smoked grandmas fabian this morning, and took psych meds and then a large can of beer and is now freaking out and thinks the fabian was meth  States he wants in patient help at a mental hospital        Patient is a 56-year-old male with past medical history of schizophrenia who presents the ED for behavioral health  Patient reports he was in his grandmother's room when he found a fabian that was just long enough to light and smoke, he then took his normal daily psychiatric medications, and then went outside to drink a 40 oz beer as he normally does  Patient reports he then had a dark figure hovering over him telling him to hurt himself and others  He reports SI and HI, with thoughts of hurting anyone who comes at him and threatens him  With patient reports suicidal ideation since he was 17 and that it changes with alterations to his psychiatric medications  He reports thoughts of hurting himself have worsened slightly in the past several weeks  He reports history of chest pain that began at rest 3 days ago  He states he came to the ED due to wanting a mental health placement and help with worsening thoughts and hearing voices  He denies any plan  He denies any other complaints or concerns at this time  Prior to Admission Medications   Prescriptions Last Dose Informant Patient Reported? Taking?    albuterol (PROVENTIL HFA,VENTOLIN HFA) 90 mcg/act inhaler   No No   Sig: Inhale 2 puffs every 4 (four) hours as needed for wheezing or shortness of breath   fluticasone (FLONASE) 50 mcg/act nasal spray   No No   Si spray into each nostril daily   paliperidone palmitate (INVEGA) 234 MG/1 5ML im injection   Yes No   Sig: Inject 234 mg into a muscle every 28 days   risperiDONE (RisperDAL) 2 mg tablet  Self Yes No   Sig: Take 2 mg by mouth daily      Facility-Administered Medications: None       Past Medical History:   Diagnosis Date    Asthma     Hearing impaired     Legally blind     Schizophrenia ([facility] [address] )        Past Surgical History:   Procedure Laterality Date    HERNIA REPAIR      TEAR DUCT SURGERY Bilateral     TONSILLECTOMY         Family History   Problem Relation Age of Onset    Schizophrenia Mother     Mental illness Mother     Abnormal EKG Sister     Mental illness Sister      I have reviewed and agree with the history as documented  E-Cigarette/Vaping    E-Cigarette Use Never User      E-Cigarette/Vaping Substances     Social History     Tobacco Use    Smoking status: Current Every Day Smoker     Packs/day: 1 00     Years: 3 00     Pack years: 3 00     Types: Cigarettes    Smokeless tobacco: Never Used   Vaping Use    Vaping Use: Never used   Substance Use Topics    Alcohol use: Yes     Comment: (3) 40 oz  beers per day    Drug use: Yes     Types: Cocaine, Marijuana, Methamphetamines        Review of Systems   Constitutional: Negative for chills and fever  HENT: Negative for ear pain and sore throat  Eyes: Negative for pain and visual disturbance  Respiratory: Negative for cough and shortness of breath  Cardiovascular: Positive for chest pain  Negative for palpitations  Gastrointestinal: Negative for abdominal pain and vomiting  Genitourinary: Negative for dysuria and hematuria  Musculoskeletal: Negative for arthralgias and back pain  Skin: Negative for color change and rash  Neurological: Negative for seizures and syncope  Psychiatric/Behavioral: Positive for hallucinations and suicidal ideas  All other systems reviewed and are negative        Physical Exam  ED Triage Vitals   Temperature Pulse Respirations Blood Pressure SpO2   09/19/22 1448 09/19/22 1448 09/19/22 1448 09/19/22 1448 09/19/22 1448   98 9 °F (37 2 °C) 87 20 122/61 92 %      Temp src Heart Rate Source Patient Position - Orthostatic VS BP Location FiO2 (%)   -- 09/19/22 1937 09/19/22 1937 -- -- Monitor Lying        Pain Score       09/19/22 1448       No Pain             Orthostatic Vital Signs  Vitals:    09/19/22 1448 09/19/22 1937   BP: 122/61 116/60   Pulse: 87 86   Patient Position - Orthostatic VS:  Lying       Physical Exam  Vitals and nursing note reviewed  Constitutional:       General: He is not in acute distress  Appearance: He is well-developed  He is not diaphoretic  HENT:      Head: Normocephalic and atraumatic  Nose: Nose normal  No congestion or rhinorrhea  Mouth/Throat:      Mouth: Mucous membranes are moist       Pharynx: Oropharynx is clear  No oropharyngeal exudate or posterior oropharyngeal erythema  Eyes:      General:         Right eye: No discharge  Left eye: No discharge  Extraocular Movements: Extraocular movements intact  Conjunctiva/sclera: Conjunctivae normal    Cardiovascular:      Rate and Rhythm: Normal rate and regular rhythm  Pulses: Normal pulses  Heart sounds: Normal heart sounds  No murmur heard  Pulmonary:      Effort: Pulmonary effort is normal  No respiratory distress  Breath sounds: Normal breath sounds  Chest:      Chest wall: No tenderness  Abdominal:      Palpations: Abdomen is soft  Tenderness: There is no abdominal tenderness  There is no guarding or rebound  Musculoskeletal:         General: Normal range of motion  Cervical back: Normal range of motion and neck supple  Right lower leg: No edema  Left lower leg: No edema  Skin:     General: Skin is warm and dry  Capillary Refill: Capillary refill takes less than 2 seconds  Neurological:      Mental Status: He is alert and oriented to person, place, and time  Gait: Gait normal    Psychiatric:         Attention and Perception: He perceives auditory and visual hallucinations  Mood and Affect: Mood normal          Behavior: Behavior normal          Thought Content:  Thought content includes homicidal and suicidal ideation  Thought content does not include homicidal or suicidal plan  ED Medications  Medications   sodium chloride 0 9 % bolus 1,000 mL (1,000 mL Intravenous New Bag 9/19/22 1920)       Diagnostic Studies  Results Reviewed     Procedure Component Value Units Date/Time    HS Troponin I 2hr [[id number]]  (Normal) Collected: 09/19/22 1929    Lab Status: Final result Specimen: Blood from Arm, Left Updated: 09/19/22 2016     hs TnI 2hr 5 ng/L      Delta 2hr hsTnI -1 ng/L     HS Troponin I 4hr [[id number]]     Lab Status: No result Specimen: Blood     COVID/FLU/RSV [[id number]]  (Normal) Collected: 09/19/22 1716    Lab Status: Final result Specimen: Nares from Nose Updated: 09/19/22 1803     SARS-CoV-2 Negative     INFLUENZA A PCR Negative     INFLUENZA B PCR Negative     RSV PCR Negative    Narrative:      FOR PEDIATRIC PATIENTS - copy/paste COVID Guidelines URL to browser: [email or web address]  [email or web address]    SARS-CoV-2 assay is a Nucleic Acid Amplification assay intended for the  qualitative detection of nucleic acid from SARS-CoV-2 in nasopharyngeal  swabs  Results are for the presumptive identification of SARS-CoV-2 RNA  Positive results are indicative of infection with SARS-CoV-2, the virus  causing COVID-19, but do not rule out bacterial infection or co-infection  with other viruses  Laboratories within the [place] and its  territories are required to report all positive results to the appropriate  public health authorities  Negative results do not preclude SARS-CoV-2  infection and should not be used as the sole basis for treatment or other  patient management decisions  Negative results must be combined with  clinical observations, patient history, and epidemiological information  This test has not been FDA cleared or approved  This test has been authorized by FDA under an Emergency Use Authorization  (EUA)   This test is only authorized for the duration of time the  declaration that circumstances exist justifying the authorization of the  emergency use of an in vitro diagnostic tests for detection of SARS-CoV-2  virus and/or diagnosis of COVID-19 infection under section 564(b)(1) of  the Act, 21 U  S C  077JDK-5(U)(2), unless the authorization is terminated  or revoked sooner  The test has been validated but independent review by FDA  and CLIA is pending  Test performed using ebridge GeneXpert: This RT-PCR assay targets N2,  a region unique to SARS-CoV-2  A conserved region in the E-gene was chosen  for pan-Sarbecovirus detection which includes SARS-CoV-2  According to CMS-2020-01-R, this platform meets the definition of high-throughput technology      HS Troponin 0hr (reflex protocol) [[id number]]  (Normal) Collected: 09/19/22 1655    Lab Status: Final result Specimen: Blood from Arm, Right Updated: 09/19/22 1739     hs TnI 0hr 6 ng/L     Comprehensive metabolic panel [[id number]]  (Abnormal) Collected: 09/19/22 1655    Lab Status: Final result Specimen: Blood from Arm, Right Updated: 09/19/22 1732     Sodium 137 mmol/L      Potassium 4 2 mmol/L      Chloride 104 mmol/L      CO2 27 mmol/L      ANION GAP 6 mmol/L      BUN 14 mg/dL      Creatinine 1 49 mg/dL      Glucose 78 mg/dL      Calcium 9 7 mg/dL      AST 67 U/L      ALT 43 U/L      Alkaline Phosphatase 53 U/L      Total Protein 8 0 g/dL      Albumin 3 7 g/dL      Total Bilirubin 0 93 mg/dL      eGFR 66 ml/min/1 73sq m     Narrative:      Mateusz guidelines for Chronic Kidney Disease (CKD):     Stage 1 with normal or high GFR (GFR > 90 mL/min/1 73 square meters)    Stage 2 Mild CKD (GFR = 60-89 mL/min/1 73 square meters)    Stage 3A Moderate CKD (GFR = 45-59 mL/min/1 73 square meters)    Stage 3B Moderate CKD (GFR = 30-44 mL/min/1 73 square meters)    Stage 4 Severe CKD (GFR = 15-29 mL/min/1 73 square meters)    Stage 5 End Stage CKD (GFR <15 mL/min/1 73 square meters)  Note: GFR calculation is accurate only with a steady state creatinine    CBC and differential [[id number]] Collected: 09/19/22 1655    Lab Status: Final result Specimen: Blood from Arm, Right Updated: 09/19/22 1709     WBC 9 72 Thousand/uL      RBC 5 02 Million/uL      Hemoglobin 15 9 g/dL      Hematocrit 48 1 %      MCV 96 fL      MCH 31 7 pg      MCHC 33 1 g/dL      RDW 13 3 %      MPV 10 9 fL      Platelets 553 Thousands/uL      nRBC 0 /100 WBCs      Neutrophils Relative 52 %      Immat GRANS % 0 %      Lymphocytes Relative 36 %      Monocytes Relative 9 %      Eosinophils Relative 2 %      Basophils Relative 1 %      Neutrophils Absolute 4 99 Thousands/µL      Immature Grans Absolute 0 04 Thousand/uL      Lymphocytes Absolute 3 48 Thousands/µL      Monocytes Absolute 0 91 Thousand/µL      Eosinophils Absolute 0 22 Thousand/µL      Basophils Absolute 0 08 Thousands/µL     Rapid drug screen, urine [[id number]]  (Normal) Collected: 09/19/22 1621    Lab Status: Final result Specimen: Urine, Clean Catch Updated: 09/19/22 1654     Amph/Meth UR Negative     Barbiturate Ur Negative     Benzodiazepine Urine Negative     Cocaine Urine Negative     Methadone Urine Negative     Opiate Urine Negative     PCP Ur Negative     THC Urine Negative     Oxycodone Urine Negative    Narrative:      FOR MEDICAL PURPOSES ONLY  IF CONFIRMATION NEEDED PLEASE CONTACT THE LAB WITHIN 5 DAYS  Drug Screen Cutoff Levels:  AMPHETAMINE/METHAMPHETAMINES  1000 ng/mL  BARBITURATES     200 ng/mL  BENZODIAZEPINES     200 ng/mL  COCAINE      300 ng/mL  METHADONE      300 ng/mL  OPIATES      300 ng/mL  PHENCYCLIDINE     25 ng/mL  THC       50 ng/mL  OXYCODONE      100 ng/mL    POCT alcohol breath test [[id number]]  (Normal) Resulted: 09/19/22 1619    Lab Status: Final result Updated: 09/19/22 1619     EXTBreath Alcohol 0 009                 XR chest 2 views   Final Result by Emelia Crystal MD (09/19 1652)      No acute cardiopulmonary disease  Workstation performed: [id number]               Procedures  Procedures      ED Course  ED Course as of 09/19/22 2030   Mon Sep 19, 2022   1707 XR chest 2 views  Noted, negative   1752 HS Troponin 0hr (reflex protocol)  Repeat 2 hour delta   1752 CBC and differential  No acute abnormalities   1752 Rapid drug screen, urine   1752 POCT alcohol breath test   1807 Comprehensive metabolic panel(!)  Mild creatinine elevation noted  Normal saline 1 L bolus order  1848 Consult placed to ED crisis  Spoke with Dar Blankenship who reports patient has inconsistent story, sees ACT who is on their way and will arrive in approximately 20 minutes to evaluate patient and give recommendation  Patient was evaluated by ACT today with no concerns and was reported to be at baseline  1909 Patient evaluated by ACT who reports patient is at baseline and does not need inpatient psychiatric admission at this time  1930 Upon reevaluation, patient reports resolution of chest pain  Reports persistent SI, denies plan  Currently requesting detox placement for alcohol abuse  Denies any new or worsening symptoms  2013 [facility] with [facility] who report they are full, recommend admission on CIWA protocol with tox consult  HEART Risk Score    Flowsheet Row Most Recent Value   Heart Score Risk Calculator    History 0 Filed at: 09/19/2022 2003   ECG 0 Filed at: 09/19/2022 2003   Age 0 Filed at: 09/19/2022 2003   Risk Factors 1 Filed at: 09/19/2022 2003   Troponin 0 Filed at: 09/19/2022 2003   HEART Score 1 Filed at: 09/19/2022 2003                                MDM  Number of Diagnoses or Management Options  Diagnosis management comments: Patient is a 28-year-old male with past medical history of schizophrenia who presents the ED for behavioral health  CBC, CMP, troponin, EKG, chest x-ray, BAT, UDS ordered  See ED course for additional details  Case discussed with Dr Tona Cordova who will admit patient for observation  Patient is agreeable with plan  Disposition  Final diagnoses:   Alcohol withdrawal ([facility] )   Suicidal ideation   Schizophrenia ([place] [address] )   Chest pain, unspecified type     Time reflects when diagnosis was documented in both MDM as applicable and the Disposition within this note     Time User Action Codes Description Comment    9/19/2022  8:28 PM Amari Obregon Add [F10 239] Alcohol withdrawal ([place] [address] )     9/19/2022  8:28 PM DePope, Eric Wilkins Suicidal ideation     9/19/2022  8:28 PM DePPamela javed [F20 9] Schizophrenia ([place] [address] )     9/19/2022  8:28 PM DePopeCyndee Add [R07 9] Chest pain, unspecified type       ED Disposition     ED Disposition   Admit    Condition   Stable    Date/Time   Mon Sep 19, 2022  8:28 PM    Comment   Case was discussed with JOSELITO and the patient's admission status was agreed to be Admission Status: observation status to the service of Dr Joselin Morales MD Documentation    Chely Dockery Most Recent Value   Sending MD Dr Edyta Arellano    None         Patient's Medications   Discharge Prescriptions    No medications on file     No discharge procedures on file  PDMP Review     None           ED Provider  Attending physically available and evaluated Latoya Ethan RODRÍGUEZ managed the patient along with the ED Attending      Electronically Signed by         Allison Green DO  09/20/22 0344

## 2022-09-19 NOTE — ED NOTES
Shirts, shirts, sneakers, cigarettes, matches all placed in one clear plastic patient belongings bag and stored in zone 5 med room cabinet (B)     Sean Mejia  09/19/22 0655

## 2022-09-19 NOTE — ED NOTES
Libby Deal, from [address] [facility], arrived in ED to meet with patient  Libby Deal expressed that patient was actually upset with his grandmother from last night because he wanted to go out and drink but she wouldn't let him  ACT met with patient earlier today and expressed that he did not have any concerns and appeared to be doing well  However, patient's grandmother has let ACT know that patient is not always compliant with his medications  Libby Deal told patient that there was no reason for an inpatient mental health admission at this time and that he would be able to meet with the doctor sometime this week  Patient agreed that he would be okay to return home  Sonal aware that doctor will reach out to [address] [place] [address] detox to see if he could be admitted, to which ACT is agreeable to  If patient is discharged, Libby Deal will be contacted and ensure staff is out to see patient tomorrow       ALEJANDRA Penaloza  09/19/22 1932

## 2022-09-19 NOTE — ED NOTES
Pt in NAD; 1:1 continued     Genita Chamber, RN  09/19/22 7160 Yes - the patient is able to be screened

## 2022-09-19 NOTE — Clinical Note
Case was discussed with JOSELITO and the patient's admission status was agreed to be Admission Status: observation status to the service of Dr Joey Wade

## 2022-09-19 NOTE — ED NOTES
Pt is a 25 y o  male who was brought to the ED due to homicidal ideations and hallucinations  Patient states that today he was upset and stressed so smoked marijuana, took his psychiatric medications, and drank a 40oz of beer  Patient reports being angry after his grandmother gave him attitude  Patient reports that he has been feeling good overall but sometimes has suicidal ideations without a plan  Patient states that he doesn't have these thoughts often and has no previous suicide attempts  Patient expressed homicidal ideations towards "everybody" but denies any intent or previous harm to others  Patient expressed having visual hallucinations of men and women hurting people and auditory hallucinations of screaming  When asked directly about command hallucinations, patient relates that he hears them telling him to hurt himself as often as he has suicidal ideations  Patient denies any issues with sleep and appetite  Patient has multiple inpatient admissions in the past, but is unable to recall when he last was admitted  Per patient's chart, he was inpatient at [facility] in January 2022  Patient states he is active with [facility] ACT, but asked they not be contacted  Patient was informed that it was necessary for them to be informed, and he was then okay with it  Patient also reports that he has been trying to get into outpatient through [place] to help with his substance use  Patient states he drank one 40 oz beer today, but prior to believes it has been several weeks since he last consumed alcohol  Patient informed that ACT would be contacted to determine most appropriate level of care  Chief Complaint   Patient presents with    Recreational Drug Use    Alcohol Intoxication     Pt smoked grandmas fabian this morning, and took psych meds and then a large can of beer and is now freaking out and thinks the fabian was meth   States he wants in patient help at a mental hospital        Intake Assessment completed, Safety risk Assessment completed    [facility] Sandra [place]  09/19/22 1929

## 2022-09-19 NOTE — ED ATTENDING ATTESTATION
9/19/2022  I, Maegan Herrera MD, saw and evaluated the patient  I have discussed the patient with the resident/non-physician practitioner and agree with the resident's/non-physician practitioner's findings, Plan of Care, and MDM as documented in the resident's/non-physician practitioner's note, except where noted  All available labs and Radiology studies were reviewed  I was present for key portions of any procedure(s) performed by the resident/non-physician practitioner and I was immediately available to provide assistance  At this point I agree with the current assessment done in the Emergency Department  I have conducted an independent evaluation of this patient a history and physical is as follows:    36-year-old male with history of schizophrenia as well as substance abuse who presents to the emergency department after smoking a joint at home earlier today  Patient states that he felt weird after smoking and thinks that it potentially was meth and not actually marijuana  He also then drink a beer  He states that he drinks every day  Denies any withdrawal seizures  Patient states that he has hallucinations of a dark cloud following over him telling him to do things including hurt himself as well as others  Does admit to occasional suicidal thoughts but no overt plan to harm himself or others  He had stated to resident physician that he had some chest pain a few days ago but now denies any chest pain  No shortness of breath  No headache lightheadedness or dizziness  No nausea no vomiting  Been eating and drinking normally  No fevers no chills cough  As exam patient is nontoxic in no acute distress  Vital signs stable  HEENT is normocephalic and atraumatic with moist mucous membranes and clear sclera conjunctiva  Neck is supple  Heart regular rate  No murmurs  Lungs clear no wheezing rhonchi rales  Abdomen soft non tender nondistended  No edema  Was no marks of self-harm    Intact distal pulses and capillary refill less than 2 seconds  Awake alert oriented appropriate  Assessment and plan 78-year-old male with history of schizophrenia and substance abuse  Initially stated he had some hallucinations with a cloud telling him to harm himself and others  Does also admit desiring help for detox from substance use  ED Course    He met with both crisis as well as his ICM act worker who feel that he is safe for discharge  Patient states that he is fine to go home and does not have any thoughts of self-harm on their exam however still continues to note that he has thoughts of self-harm with no clear plan  Also desires detox  Will contact detox unit  May require medical admission with tox consult        Critical Care Time  Procedures

## 2022-09-19 NOTE — ED NOTES
Call placed to [id number] [address], spoke with on- Richa Vincent, who states that she should be in to see patient in about 20-25 minutes  Richa Vincent states that patient got upset with his grandmother last night because she wouldn't allow him to leave the house and use drugs  Richa Vincent expressed that patient chronically uses substances which impact his mental health and leads patient to seek inpatient treatment  Richa Vincent confirms that she spoke at length with patient earlier today but was not aware he decided to come to the ER       ALEJANDRA Gonsalves  09/19/22    [id number]

## 2022-09-19 NOTE — ED NOTES
[facility]  Pt sleeping w/o distress        Hernan Moody  09/19/22 1907

## 2022-09-20 VITALS
OXYGEN SATURATION: 97 % | HEART RATE: 72 BPM | SYSTOLIC BLOOD PRESSURE: 127 MMHG | TEMPERATURE: 98.9 F | RESPIRATION RATE: 18 BRPM | DIASTOLIC BLOOD PRESSURE: 68 MMHG

## 2022-09-20 PROBLEM — R45.851 SUICIDAL IDEATION: Status: ACTIVE | Noted: 2022-09-20

## 2022-09-20 LAB
4HR DELTA HS TROPONIN: -2 NG/L
ALBUMIN SERPL BCP-MCNC: 3 G/DL (ref 3.5–5)
ALP SERPL-CCNC: 52 U/L (ref 46–116)
ALT SERPL W P-5'-P-CCNC: 39 U/L (ref 12–78)
ANION GAP SERPL CALCULATED.3IONS-SCNC: 4 MMOL/L (ref 4–13)
AST SERPL W P-5'-P-CCNC: 66 U/L (ref 5–45)
BILIRUB SERPL-MCNC: 0.83 MG/DL (ref 0.2–1)
BUN SERPL-MCNC: 15 MG/DL (ref 5–25)
CALCIUM ALBUM COR SERPL-MCNC: 9.2 MG/DL (ref 8.3–10.1)
CALCIUM SERPL-MCNC: 8.4 MG/DL (ref 8.3–10.1)
CARDIAC TROPONIN I PNL SERPL HS: 4 NG/L
CHLORIDE SERPL-SCNC: 109 MMOL/L (ref 96–108)
CO2 SERPL-SCNC: 26 MMOL/L (ref 21–32)
CREAT SERPL-MCNC: 1.37 MG/DL (ref 0.6–1.3)
GFR SERPL CREATININE-BSD FRML MDRD: 73 ML/MIN/1.73SQ M
GLUCOSE P FAST SERPL-MCNC: 103 MG/DL (ref 65–99)
GLUCOSE SERPL-MCNC: 103 MG/DL (ref 65–140)
POTASSIUM SERPL-SCNC: 4.7 MMOL/L (ref 3.5–5.3)
PROT SERPL-MCNC: 7.2 G/DL (ref 6.4–8.4)
SODIUM SERPL-SCNC: 139 MMOL/L (ref 135–147)

## 2022-09-20 PROCEDURE — 99217 PR OBSERVATION CARE DISCHARGE MANAGEMENT: CPT | Performed by: INTERNAL MEDICINE

## 2022-09-20 PROCEDURE — 80053 COMPREHEN METABOLIC PANEL: CPT | Performed by: INTERNAL MEDICINE

## 2022-09-20 PROCEDURE — 99245 OFF/OP CONSLTJ NEW/EST HI 55: CPT | Performed by: PSYCHIATRY & NEUROLOGY

## 2022-09-20 PROCEDURE — 84484 ASSAY OF TROPONIN QUANT: CPT

## 2022-09-20 PROCEDURE — 36415 COLL VENOUS BLD VENIPUNCTURE: CPT | Performed by: INTERNAL MEDICINE

## 2022-09-20 RX ADMIN — MULTIPLE VITAMINS W/ MINERALS TAB 1 TABLET: TAB ORAL at 10:25

## 2022-09-20 RX ADMIN — THIAMINE HCL TAB 100 MG 100 MG: 100 TAB at 10:25

## 2022-09-20 RX ADMIN — FOLIC ACID 1 MG: 1 TABLET ORAL at 10:25

## 2022-09-20 NOTE — ASSESSMENT & PLAN NOTE
Reports drinking 3 40oz beers daily and occasionally has withdrawal symptoms  · No need for tox unit per my d/w   · Ok to dc home with ACT CM informed by our CM

## 2022-09-20 NOTE — ASSESSMENT & PLAN NOTE
Reported hallucinations and SI on admision  · Psych cleared for dc  · 1:1 discontinued by them   · continue home psych needs and outpatient f/u

## 2022-09-20 NOTE — ASSESSMENT & PLAN NOTE
· Reports that he spoked a hand rolled cigar with unknown contents and felt altered  He presented to the ED and tox screen was negative   Possibly just tobacco   · Monitor for stability  · Check CMP in AM  · Consult CM for HOST referral

## 2022-09-20 NOTE — ASSESSMENT & PLAN NOTE
Presented with suicidal ideations/homicidal ideations and hallucinations  · Reportedly would like detox unit admission however d/w tox this AM, doesn't need toxicology admission   Recommend outpatient ACT team and discharge  · Psych cleared, 1:1 was discontinued by them

## 2022-09-20 NOTE — ASSESSMENT & PLAN NOTE
· Reports drinking 3 40oz beers daily and occasionally has withdrawal symptoms  · Place on CIWA protocol  ·  detox unit is currently full  · Consult toxicology  · Possible discharge to [facility] detox unit when a bed is open

## 2022-09-20 NOTE — ASSESSMENT & PLAN NOTE
Reports that he spoked a hand rolled cigar with unknown contents and felt altered  He presented to the ED and tox screen was negative   Possibly just tobacco   · Monitor for stability  · CM for HOST/outpatient services

## 2022-09-20 NOTE — ED NOTES
Pt appears to be resting  No distress noted  Respirations unlabored and chest expansion symmetrical  Will continue to monitor  1:1 continued        Tete Rod RN  09/20/22 8288

## 2022-09-20 NOTE — UTILIZATION REVIEW
Initial Clinical Review    Admission: Date/Time/Statement:   Admission Orders (From admission, onward)     Ordered        09/19/22 2029  Place in Observation  Once                      Orders Placed This Encounter   Procedures    Place in Observation     Standing Status:   Standing     Number of Occurrences:   1     Order Specific Question:   Level of Care     Answer:   Med Surg [16]     ED Arrival Information     Expected   -    Arrival   9/19/2022 14:34    Acuity   Emergent            Means of arrival   Ambulance    Escorted by   [facility]    Service   Hospitalist    Admission type   Emergency            Arrival complaint   Schizophrenia           Chief Complaint   Patient presents with    Recreational Drug Use    Alcohol Intoxication     Pt smoked grandmas fabian this morning, and took psych meds and then a large can of beer and is now freaking out and thinks the fabian was meth  States he wants in patient help at a [facility] hospital          Initial Presentation: 25 y o  male with Pmhx of schizophrenia, alcohol abuse, legally blind, asthma who presents to ED by EMS with altered mental status  He states that he smoked a hand rolled cigar with an unknown substance and felt altered afterwards  He presented to the ED for eval  He reports auditory hallucinations and suicidal ideation  He reports drinking 3 40oz bottles of beer daily and feels withdrawal symptoms occasionally  He would like assistance with quitting alcohol  His tox screen was negative but d/t risk of alcohol withdrawal he was referred to the [address] [place] [address] detox unit, but there are no bed available  On exam pt is blind, reports auditory hallucination  Cr 1 49, AST 67  CXR with no acute findings  ADMIT to M/S/TELE UNIT with POLYSUBSTANCE ABUSE, ALCOHOL ABUSE -- 1:1 watch for pt safety  Consult psych  Continue riperidone  Consult CM for d/c plan  CIWA scores for etoh w/d symptoms  Consult toxicology       Date: 9/20   Day 2:     ED Triage Vitals Temperature Pulse Respirations Blood Pressure SpO2   09/19/22 1448 09/19/22 1448 09/19/22 1448 09/19/22 1448 09/19/22 1448   98 9 °F (37 2 °C) 87 20 122/61 92 %      Temp src Heart Rate Source Patient Position - Orthostatic VS BP Location FiO2 (%)   -- 09/19/22 1937 09/19/22 1937 09/20/22 0014 --    Monitor Lying Right arm       Pain Score       09/19/22 1448       No Pain          Wt Readings from Last 1 Encounters:   01/03/22 68 kg (150 lb)     Additional Vital Signs:   Date/Time Temp Pulse Resp BP SpO2 O2 Device Patient Position - Orthostatic VS   09/20/22 0500 -- 66 -- 138/59 -- -- --   09/20/22 0300 -- -- 16 -- 98 % None (Room air) Lying   09/20/22 0100 -- 66 -- 138/59 -- -- --   09/20/22 0014 -- 66 16 138/59 95 % None (Room air) Lying   09/19/22 2100 -- 64 -- 91/55 -- -- --   09/19/22 1937 -- 86 20 116/60 94 % None (Room air) Lying   09/19/22 1448 98 9 °F (37 2 °C) 87 20 122/61 92 % None (Room air) --       Pertinent Labs/Diagnostic Test Results:   XR chest 2 views   Final Result by Jacquelyn Whittaker MD (09/19 1652)      No acute cardiopulmonary disease          Results from last 7 days   Lab Units 09/19/22  1716   SARS-COV-2  Negative     Results from last 7 days   Lab Units 09/19/22  1655   WBC Thousand/uL 9 72   HEMOGLOBIN g/dL 15 9   HEMATOCRIT % 48 1   PLATELETS Thousands/uL 261   NEUTROS ABS Thousands/µL 4 99     Results from last 7 days   Lab Units 09/20/22  0457 09/19/22  1655   SODIUM mmol/L 139 137   POTASSIUM mmol/L 4 7 4 2   CHLORIDE mmol/L 109* 104   CO2 mmol/L 26 27   ANION GAP mmol/L 4 6   BUN mg/dL 15 14   CREATININE mg/dL 1 37* 1 49*   EGFR ml/min/1 73sq m 73 66   CALCIUM mg/dL 8 4 9 7     Results from last 7 days   Lab Units 09/20/22  0457 09/19/22  1655   AST U/L 66* 67*   ALT U/L 39 43   ALK PHOS U/L 52 53   TOTAL PROTEIN g/dL 7 2 8 0   ALBUMIN g/dL 3 0* 3 7   TOTAL BILIRUBIN mg/dL 0 83 0 93     Results from last 7 days   Lab Units 09/20/22  0457 09/19/22  1655   GLUCOSE RANDOM mg/dL 103 78 Results from last 7 days   Lab Units 09/19/22  1929 09/19/22  1655   HS TNI 0HR ng/L  --  6   HS TNI 2HR ng/L 5  --    HSTNI D2 ng/L -1  --      Results from last 7 days   Lab Units 09/19/22  1716   INFLUENZA A PCR  Negative   INFLUENZA B PCR  Negative   RSV PCR  Negative     Results from last 7 days   Lab Units 09/19/22  1621   AMPH/METH  Negative   BARBITURATE UR  Negative   BENZODIAZEPINE UR  Negative   COCAINE UR  Negative   METHADONE URINE  Negative   OPIATE UR  Negative   PCP UR  Negative   THC UR  Negative     ED Treatment:   Medication Administration from 09/19/2022 1433 to 09/20/2022 0818       Date/Time Order Dose Route Action     09/19/2022 1920 sodium chloride 0 9 % bolus 1,000 mL 1,000 mL Intravenous New Bag     Past Medical History:   Diagnosis Date    Asthma     Hearing impaired     Legally blind     Schizophrenia ([facility] )      Present on Admission:   Alcohol use disorder, severe, dependence ([facility] )   Asthma   Legally blind   Other schizophrenia ([facility] )   Polysubstance abuse ([facility] )   Tobacco use disorder      Admitting Diagnosis: Schizophrenia ([facility] ) [F20 9]  Age/Sex: 25 y o  male  Admission Orders:  Scheduled Medications:  folic acid, 1 mg, Oral, Daily  multivitamin-minerals, 1 tablet, Oral, Daily  nicotine, 1 patch, Transdermal, Daily  risperiDONE, 2 mg, Oral, Daily  thiamine, 100 mg, Oral, Daily    PRN Meds:  albuterol, 2 puff, Inhalation, Q4H PRN  calcium carbonate, 1,000 mg, Oral, Daily PRN  ondansetron, 4 mg, Intravenous, Q6H PRN        IP CONSULT TO ED CRISIS WORKER  IP CONSULT TO PSYCHIATRY  IP CONSULT TO TOXICOLOGY    Network Utilization Review Department  ATTENTION: Please call with any questions or concerns to [phone] and carefully listen to the prompts so that you are directed to the right person   All voicemails are confidential   Radha De Los Santos all requests for admission clinical reviews, approved or denied determinations and any other requests to dedicated fax number below belonging to the campus where the patient is receiving treatment   List of dedicated fax numbers for the Facilities:  [address] DENIALS (Administrative/Medical Necessity) [phone]   [address]  (Maternity/NICU/Pediatrics) [phone] [address]  [address] [place] [address] [id number] [address] [facility] [place] [address] [phone]

## 2022-09-20 NOTE — DISCHARGE SUMMARY
[facility]  Discharge- Marion Bates 2000, 25 y o  male MRN: [id number]  Unit/Bed#: Belinda Mims Encounter: [id number]  Primary Care Provider: No primary care provider on file  Date and time admitted to hospital: 9/19/2022  2:34 PM    * Suicidal ideation  Assessment & Plan  Presented with suicidal ideations/homicidal ideations and hallucinations  · Reportedly would like detox unit admission however d/w tox this AM, doesn't need toxicology admission  Recommend outpatient ACT team and discharge  · Psych cleared, 1:1 was discontinued by them     Polysubstance abuse [facility])  Assessment & Plan  Reports that he spoked a hand rolled cigar with unknown contents and felt altered  He presented to the ED and tox screen was negative  Possibly just tobacco   · Monitor for stability  · CM for HOST/outpatient services     Alcohol use disorder, severe, dependence ([facility] )  Assessment & Plan  Reports drinking 3 40oz beers daily and occasionally has withdrawal symptoms  · No need for tox unit per my d/w   · Ok to [facility] home with ACT CM informed by our CM     Asthma  Assessment & Plan  Stable at this time  · PRN albuterol    Tobacco use disorder  Assessment & Plan  · Counseled on tobacco use      Legally blind  Assessment & Plan  · noted    Other schizophrenia ([facility] )  Assessment & Plan  Reported hallucinations and SI on admision  · Psych cleared for dc  · 1:1 discontinued by them   · continue home psych needs and outpatient f/u        Medical Problems             Resolved Problems  Date Reviewed: 9/20/2022   None               Discharging Physician / Practitioner: Shimon Mckeon PA-C  PCP: No primary care provider on file    Admission Date:   Admission Orders (From admission, onward)     Ordered        09/19/22 2029  Place in Observation  Once                      Discharge Date: 09/20/22    Consultations During Hospital Stay:  · Psych      Procedures Performed:   · none    Significant Findings / Test Results:   · none    Incidental Findings:   · none     Test Results Pending at Discharge (will require follow up):   · none     Outpatient Tests Requested:  · F/u ACT team  · F/u PCP    Complications:  none    Reason for Admission: SI/hallucinations/HI/AMS    Hospital Course:   Marina Lance is a 25 y o  male patient with PMHx as above who originally presented to the hospital on 9/19/2022 due to above  Cleared by psych for dc  Spoke with tox, does not need tox unit  Recommend dc with HOST  Patient refused to talk to ACT team prior to discharge, CM made attempt  Please see above list of diagnoses and related plan for additional information  Condition at Discharge: stable    Discharge Day Visit / Exam:   Subjective:  Feels fine  No issues reported  Wants to go home  Vitals: Blood Pressure: 127/68 (09/20/22 0900)  Pulse: 72 (09/20/22 0900)  Temperature: 98 9 °F (37 2 °C) (09/19/22 1448)  Respirations: 18 (09/20/22 0900)  SpO2: 97 % (09/20/22 0900)  Exam:   Physical Exam  Vitals and nursing note reviewed  Constitutional:       General: He is not in acute distress  Cardiovascular:      Rate and Rhythm: Normal rate and regular rhythm  Pulmonary:      Effort: No respiratory distress  Abdominal:      General: There is no distension  Musculoskeletal:      Right lower leg: No edema  Left lower leg: No edema  Neurological:      Mental Status: He is oriented to person, place, and time  Psychiatric:         Mood and Affect: Mood normal        Discussion with Family: Patient declined call to   Discharge instructions/Information to patient and family:   See after visit summary for information provided to patient and family  Provisions for Follow-Up Care:  See after visit summary for information related to follow-up care and any pertinent home health orders         Disposition:   Home    Planned Readmission: no     Discharge Statement:  I spent 34 minutes discharging the patient  This time was spent on the day of discharge  I had direct contact with the patient on the day of discharge  Greater than 50% of the total time was spent examining patient, answering all patient questions, arranging and discussing plan of care with patient as well as directly providing post-discharge instructions  Additional time then spent on discharge activities  Discharge Medications:  See after visit summary for reconciled discharge medications provided to patient and/or family        **Please Note: This note may have been constructed using a voice recognition system**

## 2022-09-20 NOTE — CASE MANAGEMENT
Case Management Discharge Planning Note    Patient name Latoya Long  Location Z6HB/Z6HB MRN [id number]  : 2000 Date 2022       Current Admission Date: 2022  Current Admission Diagnosis:Suicidal ideation   Patient Active Problem List    Diagnosis Date Noted    Suicidal ideation 2022    Asthma 2022    [facility], [facility] 2022    Polysubstance abuse ([facility] ) 2022    Tobacco use disorder 2022    Other schizophrenia ([facility] ) 2019    Legally blind 2019    Hearing loss 2019      LOS (days): 0  Geometric Mean LOS (GMLOS) (days):   Days to GMLOS:     OBJECTIVE:            Current admission status: Observation   Preferred Pharmacy:   [id number] - [address] [facility]  [address] [place] [address]  Phone: [phone] Fax: [phone]    Primary Care Provider: No primary care provider on file  Primary Insurance: [employer]  Secondary Insurance:     DISCHARGE DETAILS:           Other Referral/Resources/Interventions Provided:  Interventions: ACT, Declines resources  Referral Comments: CM consuled by ED and admitting team   Pt was initially admitted waiting for a bed at [address] [place] [address] however pt was determined to not need admission to [address] [place] [address] detox  CM spoke to pt's ACT team CM, Lawrence Abarca, who saw pt in ED last night and she states that pt's plan at that time was to return home if not placed for detox/rehab  CM attempted to speak with pt about referral to HOST however pt was walking out of ED and stated that he did not want to stay for HOST referral or to speak with ACT  Pt ambulated out of ED without difficulty         Treatment Team Recommendation: Home  Discharge Destination Plan[de-identified] Home  Transport at Discharge : Self

## 2022-09-20 NOTE — ED NOTES
Pt  Requesting food at this time  Lunchbox brought  Pt  Penny Lynne and grateful  1:1 continued        Tamela Amaro RN  09/20/22 0012

## 2022-09-20 NOTE — ASSESSMENT & PLAN NOTE
· Reports hallucinations and SI  · Continual observation  · Consult psychiatry  · Continue risperidone  · Unknown when his last dose of invega shot was
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
## 2022-09-20 NOTE — H&P
[facility]  H&P- Ximena Post 2000, 25 y o  male MRN: [id number]  Unit/Bed#: Z6HB Encounter: [id number]  Primary Care Provider: No primary care provider on file  Date and time admitted to hospital: 9/19/2022  2:34 PM    * Polysubstance abuse [facility])  Assessment & Plan  · Reports that he spoked a hand rolled cigar with unknown contents and felt altered  He presented to the ED and tox screen was negative  Possibly just tobacco   · Monitor for stability  · Check CMP in AM  · Consult CM for HOST referral    Alcohol use disorder, severe, dependence ([facility] [address] )  Assessment & Plan  · Reports drinking 3 40oz beers daily and occasionally has withdrawal symptoms  · Place on CIWA protocol  ·  detox unit is currently full  · Consult toxicology  · Possible discharge to [facility] detox unit when a bed is open    Other schizophrenia ([facility] [address] )  Assessment & Plan  · Reports hallucinations and SI  · Continual observation  · Consult psychiatry  · Continue risperidone  · Unknown when his last dose of invega shot was    Asthma  Assessment & Plan  · Stable at this time  · PRN albuterol    Tobacco use disorder  Assessment & Plan  · Counseled on tobacco use  · 21mg nicotine patch    Legally blind  Assessment & Plan  noted      VTE Pharmacologic Prophylaxis:   Low Risk (Score 0-2) - Encourage Ambulation  Code Status: Level 1 - Full Code   Discussion with family: Patient declined call to   Anticipated Length of Stay: Patient will be admitted on an observation basis with an anticipated length of stay of less than 2 midnights secondary to reacreational drug use  Total Time for Visit, including Counseling / Coordination of Care: 45 minutes Greater than 50% of this total time spent on direct patient counseling and coordination of care      Chief Complaint: multiple, see below    History of Present Illness:  Ximena Post is a 25 y o  male with a PMH of schizophrenia who presents with altered mental status  He states that he smoked a hand rolled cigar with an unknown substance and felt altered afterwards  He presented to the ED for evaluation  He reports auditory hallucinations and suicidal ideation  He reports drinking 3 40oz bottles of beer daily and feels withdrawal symptoms occasionally  He would like assistance with quitting alcohol  His tox screen was negative but due to risk of alcohol withdrawal he was referred to the [address] [place] [address] detox unit  Due to no beds being available he was referred to [facility] for admission  Review of Systems:  Review of Systems   All other systems reviewed and are negative  Past Medical and Surgical History:   Past Medical History:   Diagnosis Date    Asthma     Hearing impaired     Legally blind     Schizophrenia ([facility] [address] )        Past Surgical History:   Procedure Laterality Date    HERNIA REPAIR      TEAR DUCT SURGERY Bilateral     TONSILLECTOMY         Meds/Allergies:  Prior to Admission medications    Medication Sig Start Date End Date Taking? Authorizing Provider   albuterol (PROVENTIL HFA,VENTOLIN HFA) 90 mcg/act inhaler Inhale 2 puffs every 4 (four) hours as needed for wheezing or shortness of breath 3/22/22   Euliclayton Franco PA-C   fluticasone [facility]) 50 mcg/act nasal spray 1 spray into each nostril daily 3/23/22   Misael Franco PA-C   paliperidone palmitate (INVEGA) 234 MG/1 5ML im injection Inject 234 mg into a muscle every 28 days    Historical Provider, MD   risperiDONE (RisperDAL) 2 mg tablet Take 2 mg by mouth daily    Historical Provider, MD     I have reviewed home medications using recent Epic encounter      Allergies: No Known Allergies    Social History:  Marital Status: Single   Occupation: none  Patient Pre-hospital Living Situation: Home  Patient Pre-hospital Level of Mobility: walks  Patient Pre-hospital Diet Restrictions: none  Substance Use History:   Social History     Substance and Sexual Activity   Alcohol Use Yes    Comment: (3) 40 oz  beers per day     Social History     Tobacco Use   Smoking Status Current Every Day Smoker    Packs/day: 1 00    Years: 3 00    Pack years: 3 00    Types: Cigarettes   Smokeless Tobacco Never Used     Social History     Substance and Sexual Activity   Drug Use Yes    Types: Cocaine, Marijuana, Methamphetamines       Family History:  Family History   Problem Relation Age of Onset    Schizophrenia Mother     Mental illness Mother     Abnormal EKG Sister     Mental illness Sister        Physical Exam:     Vitals:   Blood Pressure: 116/60 (09/19/22 1937)  Pulse: 86 (09/19/22 1937)  Temperature: 98 9 °F (37 2 °C) (09/19/22 1448)  Respirations: 20 (09/19/22 1937)  SpO2: 94 % (09/19/22 1937)    Physical Exam  Constitutional:       General: He is not in acute distress  Appearance: Normal appearance  He is not toxic-appearing  HENT:      Head: Normocephalic and atraumatic  Nose: Nose normal       Mouth/Throat:      Mouth: Mucous membranes are moist       Pharynx: Oropharynx is clear  Eyes:      Comments: blind   Cardiovascular:      Rate and Rhythm: Normal rate and regular rhythm  Pulmonary:      Effort: Pulmonary effort is normal       Breath sounds: No wheezing or rales  Skin:     General: Skin is warm and dry  Neurological:      General: No focal deficit present  Mental Status: He is alert and oriented to person, place, and time     Psychiatric:         Mood and Affect: Mood normal          Behavior: Behavior normal       Comments: Reports auditory hallucination         Additional Data:     Lab Results:  Results from last 7 days   Lab Units 09/19/22  1655   WBC Thousand/uL 9 72   HEMOGLOBIN g/dL 15 9   HEMATOCRIT % 48 1   PLATELETS Thousands/uL 261   NEUTROS PCT % 52   LYMPHS PCT % 36   MONOS PCT % 9   EOS PCT % 2     Results from last 7 days   Lab Units 09/19/22  1655   SODIUM mmol/L 137   POTASSIUM mmol/L 4 2   CHLORIDE mmol/L 104   CO2 mmol/L 27   BUN mg/dL 14   CREATININE mg/dL 1 49*   ANION GAP mmol/L 6   CALCIUM mg/dL 9 7   ALBUMIN g/dL 3 7   TOTAL BILIRUBIN mg/dL 0 93   ALK PHOS U/L 53   ALT U/L 43   AST U/L 67*   GLUCOSE RANDOM mg/dL 78                       Imaging: No pertinent imaging reviewed  XR chest 2 views   Final Result by Kinjal Hernandez MD (09/19 1652)      No acute cardiopulmonary disease  Workstation performed: [id number]             EKG and Other Studies Reviewed on Admission:   · EKG: No EKG obtained  ** Please Note: This note has been constructed using a voice recognition system   **

## 2022-10-09 ENCOUNTER — APPOINTMENT (EMERGENCY)
Dept: RADIOLOGY | Facility: HOSPITAL | Age: 22
End: 2022-10-09
Payer: COMMERCIAL

## 2022-10-09 ENCOUNTER — HOSPITAL ENCOUNTER (EMERGENCY)
Facility: HOSPITAL | Age: 22
Discharge: HOME | End: 2022-10-10
Attending: EMERGENCY MEDICINE
Payer: COMMERCIAL

## 2022-10-09 DIAGNOSIS — F10.929 ALCOHOL INTOXICATION (HCC): Primary | ICD-10-CM

## 2022-10-09 LAB
ALBUMIN SERPL BCP-MCNC: 4 G/DL (ref 3.5–5)
ALP SERPL-CCNC: 69 U/L (ref 46–116)
ALT SERPL W P-5'-P-CCNC: 35 U/L (ref 12–78)
AMMONIA PLAS-SCNC: 22 UMOL/L (ref 11–35)
AMPHETAMINES SERPL QL SCN: NEGATIVE
ANION GAP SERPL CALCULATED.3IONS-SCNC: 10 MMOL/L (ref 4–13)
APAP SERPL-MCNC: <2 UG/ML (ref 10–20)
AST SERPL W P-5'-P-CCNC: 34 U/L (ref 5–45)
BARBITURATES UR QL: NEGATIVE
BASE EX.OXY STD BLDV CALC-SCNC: 78.9 % (ref 60–80)
BASE EXCESS BLDV CALC-SCNC: -4.7 MMOL/L
BASOPHILS # BLD AUTO: 0.06 THOUSANDS/ÂΜL (ref 0–0.1)
BASOPHILS NFR BLD AUTO: 1 % (ref 0–1)
BENZODIAZ UR QL: NEGATIVE
BILIRUB SERPL-MCNC: 0.54 MG/DL (ref 0.2–1)
BILIRUB UR QL STRIP: NEGATIVE
BUN SERPL-MCNC: 11 MG/DL (ref 5–25)
CALCIUM SERPL-MCNC: 9 MG/DL (ref 8.3–10.1)
CHLORIDE SERPL-SCNC: 110 MMOL/L (ref 96–108)
CLARITY UR: CLEAR
CO2 SERPL-SCNC: 23 MMOL/L (ref 21–32)
COCAINE UR QL: NEGATIVE
COLOR UR: COLORLESS
CREAT SERPL-MCNC: 1.22 MG/DL (ref 0.6–1.3)
EOSINOPHIL # BLD AUTO: 0.03 THOUSAND/ÂΜL (ref 0–0.61)
EOSINOPHIL NFR BLD AUTO: 0 % (ref 0–6)
ERYTHROCYTE [DISTWIDTH] IN BLOOD BY AUTOMATED COUNT: 13.3 % (ref 11.6–15.1)
ETHANOL SERPL-MCNC: 352 MG/DL (ref 0–3)
GFR SERPL CREATININE-BSD FRML MDRD: 84 ML/MIN/1.73SQ M
GLUCOSE SERPL-MCNC: 103 MG/DL (ref 65–140)
GLUCOSE SERPL-MCNC: 85 MG/DL (ref 65–140)
GLUCOSE SERPL-MCNC: 97 MG/DL (ref 65–140)
GLUCOSE UR STRIP-MCNC: NEGATIVE MG/DL
HCO3 BLDV-SCNC: 23.7 MMOL/L (ref 24–30)
HCT VFR BLD AUTO: 51.6 % (ref 36.5–49.3)
HGB BLD-MCNC: 17.2 G/DL (ref 12–17)
HGB UR QL STRIP.AUTO: NEGATIVE
IMM GRANULOCYTES # BLD AUTO: 0.05 THOUSAND/UL (ref 0–0.2)
IMM GRANULOCYTES NFR BLD AUTO: 0 % (ref 0–2)
KETONES UR STRIP-MCNC: NEGATIVE MG/DL
LEUKOCYTE ESTERASE UR QL STRIP: NEGATIVE
LYMPHOCYTES # BLD AUTO: 2.34 THOUSANDS/ÂΜL (ref 0.6–4.47)
LYMPHOCYTES NFR BLD AUTO: 20 % (ref 14–44)
MCH RBC QN AUTO: 32.1 PG (ref 26.8–34.3)
MCHC RBC AUTO-ENTMCNC: 33.3 G/DL (ref 31.4–37.4)
MCV RBC AUTO: 96 FL (ref 82–98)
METHADONE UR QL: NEGATIVE
MONOCYTES # BLD AUTO: 0.52 THOUSAND/ÂΜL (ref 0.17–1.22)
MONOCYTES NFR BLD AUTO: 5 % (ref 4–12)
NEUTROPHILS # BLD AUTO: 8.63 THOUSANDS/ÂΜL (ref 1.85–7.62)
NEUTS SEG NFR BLD AUTO: 74 % (ref 43–75)
NITRITE UR QL STRIP: NEGATIVE
NRBC BLD AUTO-RTO: 0 /100 WBCS
O2 CT BLDV-SCNC: 20.1 ML/DL
OPIATES UR QL SCN: NEGATIVE
OXYCODONE+OXYMORPHONE UR QL SCN: NEGATIVE
PCO2 BLDV: 55.7 MM HG (ref 42–50)
PCP UR QL: NEGATIVE
PH BLDV: 7.25 [PH] (ref 7.3–7.4)
PH UR STRIP.AUTO: 5 [PH]
PLATELET # BLD AUTO: 237 THOUSANDS/UL (ref 149–390)
PMV BLD AUTO: 10.9 FL (ref 8.9–12.7)
PO2 BLDV: 53.8 MM HG (ref 35–45)
POTASSIUM SERPL-SCNC: 4.6 MMOL/L (ref 3.5–5.3)
PROT SERPL-MCNC: 8.2 G/DL (ref 6.4–8.4)
PROT UR STRIP-MCNC: NEGATIVE MG/DL
RBC # BLD AUTO: 5.36 MILLION/UL (ref 3.88–5.62)
SALICYLATES SERPL-MCNC: <3 MG/DL (ref 3–20)
SODIUM SERPL-SCNC: 143 MMOL/L (ref 135–147)
SP GR UR STRIP.AUTO: 1.01 (ref 1–1.03)
T4 FREE SERPL-MCNC: 1.16 NG/DL (ref 0.76–1.46)
THC UR QL: NEGATIVE
TSH SERPL DL<=0.05 MIU/L-ACNC: 0.45 UIU/ML (ref 0.45–4.5)
UROBILINOGEN UR STRIP-ACNC: <2 MG/DL
WBC # BLD AUTO: 11.63 THOUSAND/UL (ref 4.31–10.16)

## 2022-10-09 PROCEDURE — 84439 ASSAY OF FREE THYROXINE: CPT | Performed by: STUDENT IN AN ORGANIZED HEALTH CARE EDUCATION/TRAINING PROGRAM

## 2022-10-09 PROCEDURE — 93005 ELECTROCARDIOGRAM TRACING: CPT

## 2022-10-09 PROCEDURE — 82805 BLOOD GASES W/O2 SATURATION: CPT | Performed by: STUDENT IN AN ORGANIZED HEALTH CARE EDUCATION/TRAINING PROGRAM

## 2022-10-09 PROCEDURE — 82077 ASSAY SPEC XCP UR&BREATH IA: CPT | Performed by: STUDENT IN AN ORGANIZED HEALTH CARE EDUCATION/TRAINING PROGRAM

## 2022-10-09 PROCEDURE — 80053 COMPREHEN METABOLIC PANEL: CPT | Performed by: STUDENT IN AN ORGANIZED HEALTH CARE EDUCATION/TRAINING PROGRAM

## 2022-10-09 PROCEDURE — 81003 URINALYSIS AUTO W/O SCOPE: CPT | Performed by: STUDENT IN AN ORGANIZED HEALTH CARE EDUCATION/TRAINING PROGRAM

## 2022-10-09 PROCEDURE — 80143 DRUG ASSAY ACETAMINOPHEN: CPT | Performed by: STUDENT IN AN ORGANIZED HEALTH CARE EDUCATION/TRAINING PROGRAM

## 2022-10-09 PROCEDURE — 96361 HYDRATE IV INFUSION ADD-ON: CPT

## 2022-10-09 PROCEDURE — 70450 CT HEAD/BRAIN W/O DYE: CPT

## 2022-10-09 PROCEDURE — 82948 REAGENT STRIP/BLOOD GLUCOSE: CPT

## 2022-10-09 PROCEDURE — 99285 EMERGENCY DEPT VISIT HI MDM: CPT

## 2022-10-09 PROCEDURE — 84443 ASSAY THYROID STIM HORMONE: CPT | Performed by: STUDENT IN AN ORGANIZED HEALTH CARE EDUCATION/TRAINING PROGRAM

## 2022-10-09 PROCEDURE — 36415 COLL VENOUS BLD VENIPUNCTURE: CPT | Performed by: STUDENT IN AN ORGANIZED HEALTH CARE EDUCATION/TRAINING PROGRAM

## 2022-10-09 PROCEDURE — 82140 ASSAY OF AMMONIA: CPT | Performed by: STUDENT IN AN ORGANIZED HEALTH CARE EDUCATION/TRAINING PROGRAM

## 2022-10-09 PROCEDURE — 80179 DRUG ASSAY SALICYLATE: CPT | Performed by: STUDENT IN AN ORGANIZED HEALTH CARE EDUCATION/TRAINING PROGRAM

## 2022-10-09 PROCEDURE — 85025 COMPLETE CBC W/AUTO DIFF WBC: CPT | Performed by: STUDENT IN AN ORGANIZED HEALTH CARE EDUCATION/TRAINING PROGRAM

## 2022-10-09 PROCEDURE — G1004 CDSM NDSC: HCPCS

## 2022-10-09 PROCEDURE — 96360 HYDRATION IV INFUSION INIT: CPT

## 2022-10-09 PROCEDURE — 80307 DRUG TEST PRSMV CHEM ANLYZR: CPT | Performed by: STUDENT IN AN ORGANIZED HEALTH CARE EDUCATION/TRAINING PROGRAM

## 2022-10-09 PROCEDURE — 99285 EMERGENCY DEPT VISIT HI MDM: CPT | Performed by: EMERGENCY MEDICINE

## 2022-10-09 RX ADMIN — SODIUM CHLORIDE 1000 ML: 0.9 INJECTION, SOLUTION INTRAVENOUS at 16:13

## 2022-10-09 NOTE — ED PROVIDER NOTES
History  Chief Complaint   Patient presents with   • Alcohol Intoxication     Patient reported to have been found in the streets intoxicated       Patient is a 30-year-old male, past medical history of schizophrenia, asthma, legally blind, and alcohol abuse, who presents to the emergency department for alcohol intoxication  Per EMS, patient was found sleeping on the side of the street  Vitals were stable EN route  Currently, patient is very intoxicated  He is protecting his airway  He is esponsive to painful stimuli  Further history limited secondary to alcohol intoxication  History provided by:  EMS personnel  History limited by:  Mental status change   used: No    Alcohol Intoxication  Severity:  Severe  Onset quality:  Unable to specify  Timing:  Unable to specify  Chronicity:  Recurrent  Suspected agents:  Alcohol      Prior to Admission Medications   Prescriptions Last Dose Informant Patient Reported? Taking?    albuterol (PROVENTIL HFA,VENTOLIN HFA) 90 mcg/act inhaler   No No   Sig: Inhale 2 puffs every 4 (four) hours as needed for wheezing or shortness of breath   fluticasone (FLONASE) 50 mcg/act nasal spray   No No   Si spray into each nostril daily   paliperidone palmitate (INVEGA) 234 MG/1 5ML im injection   Yes No   Sig: Inject 234 mg into a muscle every 28 days   risperiDONE (RisperDAL) 2 mg tablet  Self Yes No   Sig: Take 2 mg by mouth daily      Facility-Administered Medications: None       Past Medical History:   Diagnosis Date   • Asthma    • Hearing impaired    • Legally blind    • Schizophrenia ([facility] [address] )        Past Surgical History:   Procedure Laterality Date   • HERNIA REPAIR     • TEAR DUCT SURGERY Bilateral    • TONSILLECTOMY         Family History   Problem Relation Age of Onset   • Schizophrenia Mother    • Mental illness Mother    • Abnormal EKG Sister    • Mental illness Sister      I have reviewed and agree with the history as documented  E-Cigarette/Vaping   • E-Cigarette Use Never User      E-Cigarette/Vaping Substances     Social History     Tobacco Use   • Smoking status: Current Every Day Smoker     Packs/day: 1 00     Years: 3 00     Pack years: 3 00     Types: Cigarettes   • Smokeless tobacco: Never Used   Vaping Use   • Vaping Use: Never used   Substance Use Topics   • Alcohol use: Yes     Comment: (3) 40 oz  beers per day   • Drug use: Yes     Types: Cocaine, Marijuana, Methamphetamines        Review of Systems   Unable to perform ROS: Mental status change       Physical Exam  ED Triage Vitals   Temperature Pulse Respirations Blood Pressure SpO2   10/09/22 1557 10/09/22 1552 10/09/22 1552 10/09/22 1552 10/09/22 1552   (!) 96 6 °F (35 9 °C) 79 15 115/67 100 %      Temp Source Heart Rate Source Patient Position - Orthostatic VS BP Location FiO2 (%)   10/09/22 1557 10/09/22 1552 10/09/22 1552 10/09/22 1552 --   Axillary Monitor Sitting Right arm       Pain Score       --                    Orthostatic Vital Signs  Vitals:    10/10/22 0247 10/10/22 0445 10/10/22 0600 10/10/22 0720   BP: 101/52 95/54 128/79 102/51   Pulse: 72 69 104 84   Patient Position - Orthostatic VS: Lying Lying Lying Lying       Physical Exam  Vitals and nursing note reviewed  Constitutional:       General: He is sleeping  Appearance: He is well-developed  He is not diaphoretic  Comments: Sleeping  No external signs of trauma  HENT:      Head: Normocephalic and atraumatic  Right Ear: External ear normal       Left Ear: External ear normal       Nose: Nose normal    Eyes:      General: Lids are normal  No scleral icterus  Pupils: Pupils are equal, round, and reactive to light  Pupils are equal    Cardiovascular:      Rate and Rhythm: Normal rate and regular rhythm  Heart sounds: Normal heart sounds  No murmur heard  No friction rub  No gallop  Pulmonary:      Effort: Pulmonary effort is normal  No respiratory distress  Breath sounds: Normal breath sounds  No wheezing or rales  Abdominal:      General: Abdomen is flat  There is no distension  Genitourinary:     Comments: deferred   Musculoskeletal:         General: No deformity  Right lower leg: No edema  Left lower leg: No edema  Skin:     General: Skin is warm and dry  Neurological:      GCS: GCS eye subscore is 1  GCS verbal subscore is 2  GCS motor subscore is 5  Comments: Further neurologic exam limited secondary to alcohol intoxication         ED Medications  Medications   sodium chloride 0 9 % bolus 1,000 mL (0 mL Intravenous Stopped 10/9/22 1813)       Diagnostic Studies  Results Reviewed     Procedure Component Value Units Date/Time    T4, free [[id number]]  (Normal) Collected: 10/09/22 1854    Lab Status: Final result Specimen: Blood from Arm, Left Updated: 10/09/22 1953     Free T4 1 16 ng/dL     Rapid drug screen, urine [[id number]]  (Normal) Collected: 10/09/22 1913    Lab Status: Final result Specimen: Urine, Catheter Updated: 10/09/22 1943     Amph/Meth UR Negative     Barbiturate Ur Negative     Benzodiazepine Urine Negative     Cocaine Urine Negative     Methadone Urine Negative     Opiate Urine Negative     PCP Ur Negative     THC Urine Negative     Oxycodone Urine Negative    Narrative:      FOR MEDICAL PURPOSES ONLY  IF CONFIRMATION NEEDED PLEASE CONTACT THE LAB WITHIN 5 DAYS      Drug Screen Cutoff Levels:  AMPHETAMINE/METHAMPHETAMINES  1000 ng/mL  BARBITURATES     200 ng/mL  BENZODIAZEPINES     200 ng/mL  COCAINE      300 ng/mL  METHADONE      300 ng/mL  OPIATES      300 ng/mL  PHENCYCLIDINE     25 ng/mL  THC       50 ng/mL  OXYCODONE      100 ng/mL    TSH, 3rd generation with Free T4 reflex [[id number]]  (Abnormal) Collected: 10/09/22 1854    Lab Status: Final result Specimen: Blood from Arm, Left Updated: 10/09/22 1930     TSH 3RD GENERATON 0 449 uIU/mL     Narrative:      Patients undergoing fluorescein dye angiography may retain small amounts of fluorescein in the body for 48-72 hours post procedure  Samples containing fluorescein can produce falsely depressed TSH values  If the patient had this procedure,a specimen should be resubmitted post fluorescein clearance  Comprehensive metabolic panel [[id number]]  (Abnormal) Collected: 10/09/22 1854    Lab Status: Final result Specimen: Blood from Arm, Left Updated: 10/09/22 1927     Sodium 143 mmol/L      Potassium 4 6 mmol/L      Chloride 110 mmol/L      CO2 23 mmol/L      ANION GAP 10 mmol/L      BUN 11 mg/dL      Creatinine 1 22 mg/dL      Glucose 97 mg/dL      Calcium 9 0 mg/dL      AST 34 U/L      ALT 35 U/L      Alkaline Phosphatase 69 U/L      Total Protein 8 2 g/dL      Albumin 4 0 g/dL      Total Bilirubin 0 54 mg/dL      eGFR 84 ml/min/1 73sq m     Narrative:      [place] guidelines for Chronic Kidney Disease (CKD):   •  Stage 1 with normal or high GFR (GFR > 90 mL/min/1 73 square meters)  •  Stage 2 Mild CKD (GFR = 60-89 mL/min/1 73 square meters)  •  Stage 3A Moderate CKD (GFR = 45-59 mL/min/1 73 square meters)  •  Stage 3B Moderate CKD (GFR = 30-44 mL/min/1 73 square meters)  •  Stage 4 Severe CKD (GFR = 15-29 mL/min/1 73 square meters)  •  Stage 5 End Stage CKD (GFR <15 mL/min/1 73 square meters)  Note: GFR calculation is accurate only with a steady state creatinine    Ethanol [[id number]]  (Abnormal) Collected: 10/09/22 1854    Lab Status: Final result Specimen: Blood from Arm, Left Updated: 10/09/22 1927     Ethanol Lvl 241 mg/dL     Salicylate level [[id number]]  (Abnormal) Collected: 10/09/22 1854    Lab Status: Final result Specimen: Blood from Arm, Left Updated: 77/66/17 2892     Salicylate Lvl <3 mg/dL     Acetaminophen level-If concentration is detectable, please discuss with medical  on call   [[id number]]  (Abnormal) Collected: 10/09/22 1854    Lab Status: Final result Specimen: Blood from Arm, Left Updated: 10/09/22 1927     Acetaminophen Level <2 ug/mL     UA w Reflex to Microscopic w Reflex to Culture [[id number]] Collected: 10/09/22 1913    Lab Status: Final result Specimen: Urine, Straight Cath Updated: 10/09/22 1924     Color, UA Colorless     Clarity, UA Clear     Specific Gravity, UA 1 007     pH, UA 5 0     Leukocytes, UA Negative     Nitrite, UA Negative     Protein, UA Negative mg/dl      Glucose, UA Negative mg/dl      Ketones, UA Negative mg/dl      Urobilinogen, UA <2 0 mg/dl      Bilirubin, UA Negative     Occult Blood, UA Negative    Ammonia [[id number]]  (Normal) Collected: 10/09/22 1854    Lab Status: Final result Specimen: Blood from Arm, Left Updated: 10/09/22 1919     Ammonia 22 umol/L     Blood gas, venous [[id number]]  (Abnormal) Collected: 10/09/22 1854    Lab Status: Final result Specimen: Blood from Arm, Left Updated: 10/09/22 1905     pH, Micah 7 246     pCO2, Micah 55 7 mm Hg      pO2, Micah 53 8 mm Hg      HCO3, Micah 23 7 mmol/L      Base Excess, Micah -4 7 mmol/L      O2 Content, Micah 20 1 ml/dL      O2 HGB, VENOUS 78 9 %     CBC and differential [[id number]]  (Abnormal) Collected: 10/09/22 1854    Lab Status: Final result Specimen: Blood from Arm, Left Updated: 10/09/22 1905     WBC 11 63 Thousand/uL      RBC 5 36 Million/uL      Hemoglobin 17 2 g/dL      Hematocrit 51 6 %      MCV 96 fL      MCH 32 1 pg      MCHC 33 3 g/dL      RDW 13 3 %      MPV 10 9 fL      Platelets 526 Thousands/uL      nRBC 0 /100 WBCs      Neutrophils Relative 74 %      Immat GRANS % 0 %      Lymphocytes Relative 20 %      Monocytes Relative 5 %      Eosinophils Relative 0 %      Basophils Relative 1 %      Neutrophils Absolute 8 63 Thousands/µL      Immature Grans Absolute 0 05 Thousand/uL      Lymphocytes Absolute 2 34 Thousands/µL      Monocytes Absolute 0 52 Thousand/µL      Eosinophils Absolute 0 03 Thousand/µL      Basophils Absolute 0 06 Thousands/µL     Fingerstick Glucose (POCT) [[id number]]  (Normal) Collected: 10/09/22 1850    Lab Status: Final result Updated: 10/09/22 1859     POC Glucose 85 mg/dl     Fingerstick Glucose (POCT) [[id number]]  (Normal) Collected: 10/09/22 1544    Lab Status: Final result Updated: 10/09/22 1545     POC Glucose 103 mg/dl                  CT head without contrast   Final Result by Viviane Little MD (10/09 2247)      No acute intracranial abnormality  Workstation performed: [id number]         CT head without contrast   Final Result by Sang Otto DO (10/09 1709)      No acute intracranial abnormality  Workstation performed: [id number]               Procedures  Procedures      ED Course  ED Course as of 10/11/22 1704   Sun Oct 09, 2022   1550 Patient desaturated to 70's on RA  Brought to room and initially placed on non-rebreather, have de-escalated to midflow  Patient responds to painful stimuli, intermittently opens eyes  Will continue to monitor  If patient desaturates again, and/or neuro exam significantly changes, will proceed with intubation  [id number] Patient re-evaluated  Tolerated CT scan without difficulty  Will continue to monitor  1711 CT head without contrast  No acute intracranial abnormality  [id number] Notified by nurse that patient is significantly tachycardic  Intermittently sitting up, then laying down  Unclear etiology  Will get labs   1904 Patient no longer tachy  Labs pending   1906 pH, Micah(!): 7 246   1906 pCO2, Micah(!): 55 7   1906 WBC(!): 11 63   1906 Hemoglobin(!): 17 2   1920 Ammonia: 22   1925 Nitrite, UA: Negative   1925 Leukocytes, UA: Negative   1945 Rapid drug screen, urine   2016 Patient re-evaluated  Is starting to respond to questions (with 1-2 word answers)  Will continue to monitor   2139 Notified by nurse that patient attempted to get out of bed and fell forward into cart in room, striking his head  Will obtain repeat CT of head  2248 CT head without contrast  No acute intracranial abnormality  Mon Oct 10, 2022   0100 Patient signed out to Dr Gilford Gasman  Plan to keep in ED until sober  MDM  Number of Diagnoses or Management Options  Alcohol intoxication ([facility] )  Diagnosis management comments: Patient is a 25 y o  male who presents to the ED for alcohol intoxication  Vitals stable  No obvious external signs of trauma  Clinical impression is alcohol intoxication  Low suspicion for intracranial bleeding as a result of trauma, but due to limited neuro exam and the fact patient was found on ground, will obtain CT of the head  Plan: CTH, IVF, frequent reassessment                  Portions of the record may have been created with voice recognition software  Occasional wrong word or "sound a like" substitutions may have occurred due to the inherent limitations of voice recognition software  Read the chart carefully and recognize, using context, where substitutions have occurred  Amount and/or Complexity of Data Reviewed  Clinical lab tests: ordered  Tests in the radiology section of CPT®: ordered  Tests in the medicine section of CPT®: ordered  Independent visualization of images, tracings, or specimens: yes    Risk of Complications, Morbidity, and/or Mortality  Presenting problems: high  Diagnostic procedures: moderate  Management options: low    Patient Progress  Patient progress: stable      Disposition  Final diagnoses:   Alcohol intoxication ([facility] )     Time reflects when diagnosis was documented in both MDM as applicable and the Disposition within this note     Time User Action Codes Description Comment    10/9/2022  8:40 PM Sunday Shepherd Add [G51 691] Alcohol intoxication [facility])       ED Disposition     ED Disposition   Discharge    Condition   Stable    Date/Time   Mon Oct 10, 2022 11:59 AM    Comment   Gokul Chapin Reasons discharge to home/self care                 Follow-up Information    None         Discharge Medication List as of 10/10/2022 12:00 PM      CONTINUE these medications which have NOT CHANGED    Details albuterol (PROVENTIL HFA,VENTOLIN HFA) 90 mcg/act inhaler Inhale 2 puffs every 4 (four) hours as needed for wheezing or shortness of breath, Starting Tue 3/22/2022, Normal      fluticasone (FLONASE) 50 mcg/act nasal spray 1 spray into each nostril daily, Starting Wed 3/23/2022, No Print      paliperidone palmitate (INVEGA) 234 MG/1 5ML im injection Inject 234 mg into a muscle every 28 days, Historical Med      risperiDONE (RisperDAL) 2 mg tablet Take 2 mg by mouth daily, Historical Med           No discharge procedures on file  PDMP Review     None           ED Provider  Attending physically available and evaluated Latoya Long I managed the patient along with the ED Attending      Electronically Signed by         Ami Carson DO  10/11/22 6717

## 2022-10-09 NOTE — ED ATTENDING ATTESTATION
Williams Reynoso MD, saw and evaluated the patient  I have discussed the patient with the resident and agree with the resident's findings, Plan of Care, and MDM as documented in the resident's note, except where noted  All available labs and Radiology studies were reviewed  I was present for key portions of any procedure(s) performed by the resident and I was immediately available to provide assistance  At this point I agree with the current assessment done in the Emergency Department  I have conducted an independent evaluation of this patient a history and physical is as follows:    26 yo male with a history of schizophrenia, polysubstance abuse, and asthma brought to the ED for evaluation of acute intoxication  EMS reportedly found the patient lying unresponsive in an alley  No external signs of trauma  (+) History of frequent ED presentations for similar issues  ROS: per resident physician note    Gen: (+) heavily intoxicated, nonverbal  HEENT: PERRL, EOMI, (+) dysconjugate gaze, TMs clear  Neck: supple, no midline cervical tenderness  CV: RRR  Lungs: CTA B/L  Abdomen: soft, NT/ND  Ext: no swelling or deformity  Neuro: Humphrey, limited exam secondary to acute intoxication  Skin: no rash    ED Course  The patient is obviously heavily intoxicated but protecting his airway  Physical exam is most consistent with an alcohol toxidrome  No indication for emergent intubation at this time  No external signs of trauma but the patient was found down on the street  Will check CT head  Otherwise plan to monitor closely in the ED until clinically sober then reassess        Critical Care Time  Procedures

## 2022-10-09 NOTE — ED NOTES
Patient with periods of tachycardia in the 150s, patient still not completely responding to commands  Would occasionally respond to pain  Dr Blanchard at bedside, labs ordered         Venkatesh Piedra RN  10/09/22 3229

## 2022-10-10 VITALS
DIASTOLIC BLOOD PRESSURE: 85 MMHG | OXYGEN SATURATION: 95 % | SYSTOLIC BLOOD PRESSURE: 123 MMHG | HEART RATE: 108 BPM | RESPIRATION RATE: 18 BRPM | TEMPERATURE: 97.6 F | HEIGHT: 64 IN | BODY MASS INDEX: 29.83 KG/M2

## 2022-10-10 VITALS
SYSTOLIC BLOOD PRESSURE: 102 MMHG | OXYGEN SATURATION: 97 % | TEMPERATURE: 96.6 F | HEART RATE: 84 BPM | RESPIRATION RATE: 16 BRPM | DIASTOLIC BLOOD PRESSURE: 51 MMHG

## 2022-10-10 LAB
ATRIAL RATE: 138 BPM
P AXIS: 61 DEGREES
PR INTERVAL: 124 MS
QRS AXIS: 110 DEGREES
QRSD INTERVAL: 74 MS
QT INTERVAL: 292 MS
QTC INTERVAL: 442 MS
T WAVE AXIS: 40 DEGREES
VENTRICULAR RATE: 138 BPM

## 2022-10-10 PROCEDURE — 99283 EMERGENCY DEPT VISIT LOW MDM: CPT

## 2022-10-10 PROCEDURE — 93010 ELECTROCARDIOGRAM REPORT: CPT | Performed by: INTERNAL MEDICINE

## 2022-10-10 PROCEDURE — NC001 PR NO CHARGE: Performed by: EMERGENCY MEDICINE

## 2022-10-10 NOTE — ED NOTES
RN stimulated pt to wake up - pt states he was "drinking vodka last night" but does not remember how he got to the ER, pt currently sitting up in bed, food and drink provided to pt at bedside     Renetta Fermin RN  10/10/22 0600

## 2022-10-10 NOTE — ED NOTES
Pt asleep in bed, no complaints or distress at this time     Devyn Lorenzo [facility]  10/10/22 9558

## 2022-10-10 NOTE — ED NOTES
Patient offered a Lyft for transportation home  Patient declined at this time       Marquis Espinoza, RN  10/10/22 0573

## 2022-10-10 NOTE — ED PROVIDER NOTES
Emergency Department Sign Out Note        Sign out and transfer of care from Dr Alexandro Figueroa  See Separate Emergency Department note  The patient, Edna Yancey, was evaluated by the previous provider for alcohol intoxication  ED Course / Workup Pending (followup): Patient observed overnight for alcohol intoxication  On re-evaluation patient feels back to baseline  Patient answering questions appropriately, ambulating without difficulty, clinically sober for discharge  ED Course as of 10/10/22 1753   Mon Oct 10, 2022   0717 SO: EtOH, discharge when sober     Procedures  MDM        Disposition  Final diagnoses:   Alcohol intoxication ([address] )     Time reflects when diagnosis was documented in both MDM as applicable and the Disposition within this note     Time User Action Codes Description Comment    10/9/2022  8:40 PM Pedro Philippe Add [F10 929] Alcohol intoxication [facility])       ED Disposition     ED Disposition   Discharge    Condition   Stable    Date/Time   Mon Oct 10, 2022 11:59 AM    Comment   Gokul Vieira Plaster discharge to home/self care  Follow-up Information    None       Discharge Medication List as of 10/10/2022 12:00 PM      CONTINUE these medications which have NOT CHANGED    Details   albuterol (PROVENTIL HFA,VENTOLIN HFA) 90 mcg/act inhaler Inhale 2 puffs every 4 (four) hours as needed for wheezing or shortness of breath, Starting Tue 3/22/2022, Normal      fluticasone (FLONASE) 50 mcg/act nasal spray 1 spray into each nostril daily, Starting Wed 3/23/2022, No Print      paliperidone palmitate (INVEGA) 234 MG/1 5ML im injection Inject 234 mg into a muscle every 28 days, Historical Med      risperiDONE (RisperDAL) 2 mg tablet Take 2 mg by mouth daily, Historical Med           No discharge procedures on file         ED Provider  Electronically Signed by     Breezy Espino MD  10/10/22 5813

## 2022-10-10 NOTE — ED NOTES
Pt found in room standing next to bed, bent at the hips, pt redirected into bed by tech and RN, no complaints or distress at this time     Tsering Anderson, SHERITA  10/09/22 2109

## 2022-10-11 ENCOUNTER — HOSPITAL ENCOUNTER (EMERGENCY)
Facility: HOSPITAL | Age: 22
Discharge: HOME | End: 2022-10-11
Attending: EMERGENCY MEDICINE
Payer: COMMERCIAL

## 2022-10-11 DIAGNOSIS — R51.9 HEADACHE: Primary | ICD-10-CM

## 2022-10-11 PROCEDURE — 99282 EMERGENCY DEPT VISIT SF MDM: CPT | Performed by: EMERGENCY MEDICINE

## 2022-10-11 RX ORDER — ACETAMINOPHEN 325 MG/1
975 TABLET ORAL ONCE
Status: COMPLETED | OUTPATIENT
Start: 2022-10-11 | End: 2022-10-11

## 2022-10-11 RX ORDER — IBUPROFEN 400 MG/1
400 TABLET ORAL ONCE
Status: COMPLETED | OUTPATIENT
Start: 2022-10-11 | End: 2022-10-11

## 2022-10-11 RX ADMIN — IBUPROFEN 400 MG: 400 TABLET, FILM COATED ORAL at 05:01

## 2022-10-11 RX ADMIN — ACETAMINOPHEN 975 MG: 325 TABLET, FILM COATED ORAL at 05:01

## 2022-10-11 NOTE — ED ATTENDING ATTESTATION
10/10/2022  INayely MD, saw and evaluated the patient  I have discussed the patient with the resident/non-physician practitioner and agree with the resident's/non-physician practitioner's findings, Plan of Care, and MDM as documented in the resident's/non-physician practitioner's note, except where noted  All available labs and Radiology studies were reviewed  I was present for key portions of any procedure(s) performed by the resident/non-physician practitioner and I was immediately available to provide assistance  At this point I agree with the current assessment done in the Emergency Department  I have conducted an independent evaluation of this patient a history and physical is as follows:    ED Course  ED Course as of 10/11/22 0556   Tue Oct 11, 2022   0450 Per resident H&P 26 YO M presents for HA; patient was here yesterday for alcohol intoxication O: Male sleeping; no distress I/P symptomatic treatment     Emergency Department Note- Juan M Decker 25 y o  male MRN: [id number]    Unit/Bed#: P3XW Encounter: [id number]    Juan M Decker is a 25 y o  male who presents with   Chief Complaint   Patient presents with   • Headache     Patient reports that he is having a headache from drinking yesterday  Patient also wants his psych meds adjusted  History of Present Illness   HPI:  Juan M Decker is a 25 y o  male who presents for evaluation of:  Bilateral frontal headache  The patient denies any anticoagulant use or antiplatelet medication use  He was here earlier today for drinking large quantities of alcohol  Patient denies any intent to harm himself or others  Patient denies ingestions prior to arrival     Review of Systems   Constitutional: Negative for chills and fever  HENT: Negative for congestion and rhinorrhea  Respiratory: Negative for cough and shortness of breath  Cardiovascular: Negative for chest pain and palpitations     Gastrointestinal: Negative for nausea and vomiting  Musculoskeletal: Negative for back pain and neck pain  Neurological: Negative for light-headedness and headaches  All other systems reviewed and are negative  Historical Information   Past Medical History:   Diagnosis Date   • Asthma    • Hearing impaired    • Legally blind    • Schizophrenia ([facility] [address] )      Past Surgical History:   Procedure Laterality Date   • HERNIA REPAIR     • TEAR DUCT SURGERY Bilateral    • TONSILLECTOMY       Social History   Social History     Substance and Sexual Activity   Alcohol Use Yes    Comment: (3) 40 oz  beers per day     Social History     Substance and Sexual Activity   Drug Use Yes   • Types: Cocaine, Marijuana, Methamphetamines     Social History     Tobacco Use   Smoking Status Current Every Day Smoker   • Packs/day: 1 00   • Years: 3    • Pack years: 3    • Types: Cigarettes   Smokeless Tobacco Never Used     Family History:   Family History   Problem Relation Age of Onset   • Schizophrenia Mother    • Mental illness Mother    • Abnormal EKG Sister    • Mental illness Sister        Meds/Allergies   PTA meds:   Prior to Admission Medications   Prescriptions Last Dose Informant Patient Reported? Taking?    albuterol (PROVENTIL HFA,VENTOLIN HFA) 90 mcg/act inhaler   No No   Sig: Inhale 2 puffs every 4 (four) hours as needed for wheezing or shortness of breath   fluticasone (FLONASE) 50 mcg/act nasal spray   No No   Si spray into each nostril daily   paliperidone palmitate (INVEGA) 234 MG/1 5ML im injection   Yes No   Sig: Inject 234 mg into a muscle every 28 days   risperiDONE (RisperDAL) 2 mg tablet  Self Yes No   Sig: Take 2 mg by mouth daily      Facility-Administered Medications: None     No Known Allergies    Objective   First Vitals:   Blood Pressure: 123/85 (10/10/22 2243)  Pulse: (!) 108 (10/10/22 2243)  Temperature: 97 6 °F (36 4 °C) (10/10/22 2243)  Temp Source: Oral (10/10/22 2243)  Respirations: 18 (10/10/22 2243)  Height: 5' 4" (162 6 cm) (10/10/22 2243)  SpO2: 95 % (10/10/22 2243)    Current Vitals:   Blood Pressure: 123/85 (10/10/22 2243)  Pulse: (!) 108 (10/10/22 2243)  Temperature: 97 6 °F (36 4 °C) (10/10/22 2243)  Temp Source: Oral (10/10/22 2243)  Respirations: 18 (10/10/22 2243)  Height: 5' 4" (162 6 cm) (10/10/22 2243)  SpO2: 95 % (10/10/22 2243)    No intake or output data in the 24 hours ending 10/11/22 0556    Invasive Devices  Report    None                 Physical Exam  Vitals and nursing note reviewed  Constitutional:       General: He is not in acute distress  Appearance: Normal appearance  He is well-developed  HENT:      Head: Normocephalic and atraumatic  Right Ear: External ear normal       Left Ear: External ear normal       Nose: Nose normal       Mouth/Throat:      Pharynx: No oropharyngeal exudate  Eyes:      Conjunctiva/sclera: Conjunctivae normal       Pupils: Pupils are equal, round, and reactive to light  Cardiovascular:      Rate and Rhythm: Normal rate and regular rhythm  Pulmonary:      Effort: Pulmonary effort is normal  No respiratory distress  Abdominal:      General: Abdomen is flat  There is no distension  Palpations: Abdomen is soft  Musculoskeletal:         General: No deformity  Normal range of motion  Cervical back: Normal range of motion and neck supple  Skin:     General: Skin is warm and dry  Capillary Refill: Capillary refill takes less than 2 seconds  Neurological:      General: No focal deficit present  Mental Status: He is alert and oriented to person, place, and time  Mental status is at baseline  Coordination: Coordination normal    Psychiatric:         Mood and Affect: Mood normal          Behavior: Behavior normal          Thought Content: Thought content normal          Judgment: Judgment normal            Medical Decision Makin   Nonspecific headache:  Symptomatic treatment    Recent Results (from the past 36 hour(s))   ECG 12 lead Collection Time: 10/09/22  6:44 PM   Result Value Ref Range    Ventricular Rate 138 BPM    Atrial Rate 138 BPM    PA Interval 124 ms    QRSD Interval 74 ms    QT Interval 292 ms    QTC Interval 442 ms    P Axis 61 degrees    QRS Axis 110 degrees    T Wave Axis 40 degrees   Fingerstick Glucose (POCT)    Collection Time: 10/09/22  6:50 PM   Result Value Ref Range    POC Glucose 85 65 - 140 mg/dl   CBC and differential    Collection Time: 10/09/22  6:54 PM   Result Value Ref Range    WBC 11 63 (H) 4 31 - 10 16 Thousand/uL    RBC 5 36 3 88 - 5 62 Million/uL    Hemoglobin 17 2 (H) 12 0 - 17 0 g/dL    Hematocrit 51 6 (H) 36 5 - 49 3 %    MCV 96 82 - 98 fL    MCH 32 1 26 8 - 34 3 pg    MCHC 33 3 31 4 - 37 4 g/dL    RDW 13 3 11 6 - 15 1 %    MPV 10 9 8 9 - 12 7 fL    Platelets 784 566 - 961 Thousands/uL    nRBC 0 /100 WBCs    Neutrophils Relative 74 43 - 75 %    Immat GRANS % 0 0 - 2 %    Lymphocytes Relative 20 14 - 44 %    Monocytes Relative 5 4 - 12 %    Eosinophils Relative 0 0 - 6 %    Basophils Relative 1 0 - 1 %    Neutrophils Absolute 8 63 (H) 1 85 - 7 62 Thousands/µL    Immature Grans Absolute 0 05 0 00 - 0 20 Thousand/uL    Lymphocytes Absolute 2 34 0 60 - 4 47 Thousands/µL    Monocytes Absolute 0 52 0 17 - 1 22 Thousand/µL    Eosinophils Absolute 0 03 0 00 - 0 61 Thousand/µL    Basophils Absolute 0 06 0 00 - 0 10 Thousands/µL   Comprehensive metabolic panel    Collection Time: 10/09/22  6:54 PM   Result Value Ref Range    Sodium 143 135 - 147 mmol/L    Potassium 4 6 3 5 - 5 3 mmol/L    Chloride 110 (H) 96 - 108 mmol/L    CO2 23 21 - 32 mmol/L    ANION GAP 10 4 - 13 mmol/L    BUN 11 5 - 25 mg/dL    Creatinine 1 22 0 60 - 1 30 mg/dL    Glucose 97 65 - 140 mg/dL    Calcium 9 0 8 3 - 10 1 mg/dL    AST 34 5 - 45 U/L    ALT 35 12 - 78 U/L    Alkaline Phosphatase 69 46 - 116 U/L    Total Protein 8 2 6 4 - 8 4 g/dL    Albumin 4 0 3 5 - 5 0 g/dL    Total Bilirubin 0 54 0 20 - 1 00 mg/dL    eGFR 84 ml/min/1 73sq m   TSH, 3rd generation with Free T4 reflex    Collection Time: 10/09/22  6:54 PM   Result Value Ref Range    TSH 3RD GENERATON 0 449 (L) 0 450 - 4 500 uIU/mL   Ammonia    Collection Time: 10/09/22  6:54 PM   Result Value Ref Range    Ammonia 22 11 - 35 umol/L   Blood gas, venous    Collection Time: 10/09/22  6:54 PM   Result Value Ref Range    pH, Micah 7 246 (L) 7 300 - 7 400    pCO2, Micah 55 7 (H) 42 0 - 50 0 mm Hg    pO2, Micah 53 8 (H) 35 0 - 45 0 mm Hg    HCO3, Micah 23 7 (L) 24 - 30 mmol/L    Base Excess, Micah -4 7 mmol/L    O2 Content, Micah 20 1 ml/dL    O2 HGB, VENOUS 78 9 60 0 - 80 0 %   Ethanol    Collection Time: 10/09/22  6:54 PM   Result Value Ref Range    Ethanol Lvl 352 (H) 0 - 3 mg/dL   Salicylate level    Collection Time: 10/09/22  6:54 PM   Result Value Ref Range    Salicylate Lvl <3 (L) 3 - 20 mg/dL   Acetaminophen level-If concentration is detectable, please discuss with medical  on call      Collection Time: 10/09/22  6:54 PM   Result Value Ref Range    Acetaminophen Level <2 (L) 10 - 20 ug/mL   T4, free    Collection Time: 10/09/22  6:54 PM   Result Value Ref Range    Free T4 1 16 0 76 - 1 46 ng/dL   Rapid drug screen, urine    Collection Time: 10/09/22  7:13 PM   Result Value Ref Range    Amph/Meth UR Negative Negative    Barbiturate Ur Negative Negative    Benzodiazepine Urine Negative Negative    Cocaine Urine Negative Negative    Methadone Urine Negative Negative    Opiate Urine Negative Negative    PCP Ur Negative Negative    THC Urine Negative Negative    Oxycodone Urine Negative Negative   UA w Reflex to Microscopic w Reflex to Culture    Collection Time: 10/09/22  7:13 PM    Specimen: Urine, Straight Cath   Result Value Ref Range    Color, UA Colorless     Clarity, UA Clear     Specific Gravity, UA 1 007 1 003 - 1 030    pH, UA 5 0 4 5, 5 0, 5 5, 6 0, 6 5, 7 0, 7 5, 8 0    Leukocytes, UA Negative Negative    Nitrite, UA Negative Negative    Protein, UA Negative Negative mg/dl    Glucose, UA Negative Negative mg/dl    Ketones, UA Negative Negative mg/dl    Urobilinogen, UA <2 0 <2 0 mg/dl mg/dl    Bilirubin, UA Negative Negative    Occult Blood, UA Negative Negative     No orders to display         Portions of the record may have been created with voice recognition software  Occasional wrong word or "sound a like" substitutions may have occurred due to the inherent limitations of voice recognition software  Read the chart carefully and recognize, using context, where substitutions have occurred          Critical Care Time  Procedures

## 2022-10-11 NOTE — ED PROVIDER NOTES
History  Chief Complaint   Patient presents with   • Headache     Patient reports that he is having a headache from drinking yesterday  Patient also wants his psych meds adjusted  HPI  31-year-old male with a history of alcohol abuse schizophrenia presents with headache  Patient was discharged from the hospital emergency department yesterday and was here for alcohol intoxication  Patient returns today with a complaint headache  Patient denies any nausea, vomiting, lateralizing weakness, fevers, chills, chest pain, shortness of breath  Patient states that his headache is intermittent in nature has not tried any medication for headache  Sleepy makes the headache better being woken up makes it  worse  Prior to Admission Medications   Prescriptions Last Dose Informant Patient Reported? Taking? albuterol (PROVENTIL HFA,VENTOLIN HFA) 90 mcg/act inhaler   No No   Sig: Inhale 2 puffs every 4 (four) hours as needed for wheezing or shortness of breath   fluticasone (FLONASE) 50 mcg/act nasal spray   No No   Si spray into each nostril daily   paliperidone palmitate (INVEGA) 234 MG/1 5ML im injection   Yes No   Sig: Inject 234 mg into a muscle every 28 days   risperiDONE (RisperDAL) 2 mg tablet  Self Yes No   Sig: Take 2 mg by mouth daily      Facility-Administered Medications: None       Past Medical History:   Diagnosis Date   • Asthma    • Hearing impaired    • Legally blind    • Schizophrenia ([facility] [address] )        Past Surgical History:   Procedure Laterality Date   • HERNIA REPAIR     • TEAR DUCT SURGERY Bilateral    • TONSILLECTOMY         Family History   Problem Relation Age of Onset   • Schizophrenia Mother    • Mental illness Mother    • Abnormal EKG Sister    • Mental illness Sister      I have reviewed and agree with the history as documented      E-Cigarette/Vaping   • E-Cigarette Use Never User      E-Cigarette/Vaping Substances     Social History     Tobacco Use   • Smoking status: Current Every Day Smoker Packs/day: 1 00     Years: 3 00     Pack years: 3 00     Types: Cigarettes   • Smokeless tobacco: Never Used   Vaping Use   • Vaping Use: Never used   Substance Use Topics   • Alcohol use: Yes     Comment: (3) 40 oz  beers per day   • Drug use: Yes     Types: Cocaine, Marijuana, Methamphetamines        Review of Systems   Constitutional: Negative for chills and fever  HENT: Negative for congestion, ear pain, rhinorrhea and sore throat  Eyes: Negative for pain  Respiratory: Negative for apnea, cough, choking, chest tightness, shortness of breath, wheezing and stridor  Cardiovascular: Negative for chest pain, palpitations and leg swelling  Gastrointestinal: Negative for abdominal pain, constipation, diarrhea, nausea and vomiting  Genitourinary: Negative for hematuria  Musculoskeletal: Negative for arthralgias and back pain  Skin: Negative for rash and wound  Neurological: Positive for headaches  Negative for dizziness  Psychiatric/Behavioral: Negative for agitation and hallucinations  All other systems reviewed and are negative  Physical Exam  ED Triage Vitals [10/10/22 2243]   Temperature Pulse Respirations Blood Pressure SpO2   97 6 °F (36 4 °C) (!) 108 18 123/85 95 %      Temp Source Heart Rate Source Patient Position - Orthostatic VS BP Location FiO2 (%)   Oral Monitor Sitting Left arm --      Pain Score       7             Orthostatic Vital Signs  Vitals:    10/10/22 2243   BP: 123/85   Pulse: (!) 108   Patient Position - Orthostatic VS: Sitting       Physical Exam  Vitals reviewed  Constitutional:       General: He is not in acute distress  Appearance: He is well-developed  HENT:      Head: Normocephalic and atraumatic  Right Ear: External ear normal       Left Ear: External ear normal       Nose: Nose normal  No congestion or rhinorrhea        Mouth/Throat:      Mouth: Mucous membranes are moist       Pharynx: No oropharyngeal exudate or posterior oropharyngeal erythema  Eyes:      General:         Right eye: No discharge  Left eye: No discharge  Extraocular Movements: Extraocular movements intact  Conjunctiva/sclera: Conjunctivae normal       Pupils: Pupils are equal, round, and reactive to light  Cardiovascular:      Rate and Rhythm: Normal rate and regular rhythm  Pulses: Normal pulses  Heart sounds: Normal heart sounds  Pulmonary:      Effort: Pulmonary effort is normal  No respiratory distress  Breath sounds: Normal breath sounds  No wheezing or rales  Abdominal:      Palpations: Abdomen is soft  Tenderness: There is no abdominal tenderness  Musculoskeletal:         General: No tenderness  Normal range of motion  Cervical back: Normal range of motion and neck supple  No rigidity  Skin:     General: Skin is warm  Findings: No erythema or rash  Neurological:      General: No focal deficit present  Mental Status: He is alert and oriented to person, place, and time  Cranial Nerves: No cranial nerve deficit  Sensory: No sensory deficit  Motor: No weakness  Coordination: Coordination normal    Psychiatric:         Mood and Affect: Mood normal          ED Medications  Medications   acetaminophen (TYLENOL) tablet 975 mg (975 mg Oral Given 10/11/22 0501)   ibuprofen (MOTRIN) tablet 400 mg (400 mg Oral Given 10/11/22 0501)       Diagnostic Studies  Results Reviewed     None                 No orders to display         Procedures  Procedures      ED Course                             SBIRT 22yo+    Flowsheet Row Most Recent Value   SBIRT (23 yo +)    In order to provide better care to our patients, we are screening all of our patients for alcohol and drug use  Would it be okay to ask you these screening questions? No Filed at: 10/11/2022 0020                MDM  Number of Diagnoses or Management Options  45-year-old male with a history of alcohol abuse schizophrenia presents with headache  Headache  Not complicated  Patient likely has headache after being intoxicated yesterday  Normal neurological exam patient is sleeping comfortably  Discharge    Disposition  Final diagnoses:   Headache     Time reflects when diagnosis was documented in both MDM as applicable and the Disposition within this note     Time User Action Codes Description Comment    10/11/2022  5:02 AM Liyah Behzad Add [R51 9] Headache       ED Disposition     ED Disposition   Discharge    Condition   Stable    Date/Time   Tue Oct 11, 2022  5:02 AM    Comment   Gokul Padron discharge to home/self care  Follow-up Information     Follow up With Specialties Details Why Contact Info Additional [address] Emergency Department Emergency Medicine Go to  If symptoms worsen or if you have additional concerns [address] [phone] [address] [facility] Emergency Department, [address], [place], [place], [address] [facility]              Patient's Medications   Discharge Prescriptions    No medications on file     No discharge procedures on file  PDMP Review     None           ED Provider  Attending physically available and evaluated Abad Mccracken I managed the patient along with the ED Attending      Electronically Signed by         Shubham Franco DO  10/11/22 6801

## 2022-10-16 ENCOUNTER — HOSPITAL ENCOUNTER (EMERGENCY)
Facility: HOSPITAL | Age: 22
Discharge: HOME | End: 2022-10-17
Attending: EMERGENCY MEDICINE
Payer: COMMERCIAL

## 2022-10-16 DIAGNOSIS — Z86.59 HISTORY OF COMMAND HALLUCINATIONS: Primary | ICD-10-CM

## 2022-10-16 DIAGNOSIS — F29 PSYCHOSES (HCC): ICD-10-CM

## 2022-10-16 DIAGNOSIS — R45.89 THREATENING TO OTHERS: ICD-10-CM

## 2022-10-16 PROCEDURE — 82075 ASSAY OF BREATH ETHANOL: CPT

## 2022-10-16 PROCEDURE — 0241U HB NFCT DS VIR RESP RNA 4 TRGT: CPT

## 2022-10-16 PROCEDURE — 99284 EMERGENCY DEPT VISIT MOD MDM: CPT

## 2022-10-16 PROCEDURE — 80307 DRUG TEST PRSMV CHEM ANLYZR: CPT

## 2022-10-16 PROCEDURE — 99285 EMERGENCY DEPT VISIT HI MDM: CPT | Performed by: EMERGENCY MEDICINE

## 2022-10-16 RX ORDER — LORAZEPAM 1 MG/1
1 TABLET ORAL ONCE
Status: COMPLETED | OUTPATIENT
Start: 2022-10-16 | End: 2022-10-16

## 2022-10-16 RX ORDER — NICOTINE 21 MG/24HR
14 PATCH, TRANSDERMAL 24 HOURS TRANSDERMAL ONCE
Status: DISCONTINUED | OUTPATIENT
Start: 2022-10-16 | End: 2022-10-17 | Stop reason: HOSPADM

## 2022-10-16 RX ORDER — RISPERIDONE 1 MG/1
2 TABLET, FILM COATED ORAL ONCE
Status: COMPLETED | OUTPATIENT
Start: 2022-10-16 | End: 2022-10-16

## 2022-10-16 RX ORDER — BENZTROPINE MESYLATE 1 MG/1
1 TABLET ORAL 2 TIMES DAILY
Status: DISCONTINUED | OUTPATIENT
Start: 2022-10-16 | End: 2022-10-17 | Stop reason: HOSPADM

## 2022-10-16 RX ADMIN — LORAZEPAM 1 MG: 1 TABLET ORAL at 19:41

## 2022-10-16 RX ADMIN — NICOTINE 14 MG: 14 PATCH, EXTENDED RELEASE TRANSDERMAL at 17:29

## 2022-10-16 RX ADMIN — BENZTROPINE MESYLATE 1 MG: 1 TABLET ORAL at 17:29

## 2022-10-16 RX ADMIN — RISPERIDONE 2 MG: 1 TABLET ORAL at 17:42
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
## 2022-10-16 NOTE — Clinical Note
Marion Bates was seen and treated in our emergency department on 10/16/2022  Diagnosis:     Dyphier    He may return on this date: 10/19/2022         If you have any questions or concerns, please don't hesitate to call        Haylee Mathis MD    ______________________________           _______________          _______________  Hospital Representative                              Date                                Time
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
## 2022-10-17 VITALS
SYSTOLIC BLOOD PRESSURE: 118 MMHG | TEMPERATURE: 98.7 F | BODY MASS INDEX: 29.7 KG/M2 | OXYGEN SATURATION: 97 % | RESPIRATION RATE: 18 BRPM | HEART RATE: 60 BPM | WEIGHT: 173 LBS | DIASTOLIC BLOOD PRESSURE: 57 MMHG

## 2022-10-17 PROCEDURE — 99242 OFF/OP CONSLTJ NEW/EST SF 20: CPT | Performed by: GENERAL PRACTICE

## 2022-10-17 RX ADMIN — BENZTROPINE MESYLATE 1 MG: 1 TABLET ORAL at 08:28

## 2022-10-17 NOTE — ED NOTES
Waiting for Psychiatry to speak to Pt through tablet provided        Erika Nichole  10/17/22 [address] [place] [address]  10/17/22 6636
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
## 2022-10-17 NOTE — ED NOTES
Spoke with pt who currently denies suicidal or homicidal ideations as well as hallucinations  Pt is comfortable with d/c home  Spoke with [facility] worker Donnie Wade [phone] who stated pt is at baseline  She is due to see pt today for a med drop  Donnie Wade stated their psychiatrist will meet with pt this week as well  Spoke with telepsych Dr Zunilda Alvarado who is in agreement with the d/c plan

## 2022-10-17 NOTE — ED NOTES
This writer discussed the patients current presentation and recommended discharge plan with Dr Silver  They agree with the patient being discharged at this time with referrals and/or information about [facility]  The patient was Instructed to follow up with their psychiatrist    The patient was provided with referral information for:   [facility]  This writer and the patient completed a safety plan  The patient was provided with a copy of their safety plan with encouragement to utilize the plan following discharge  In addition, the patient was instructed to call local [facility], other crisis services, [facility] or to go to the nearest ER immediately if their situation changes at any time  This writer discussed discharge plans with the patient, who agrees with and understands the discharge plans           SAFETY PLAN  Warning Signs (thoughts, images, mood, behavior, situations) of a potential crisis: thoughts of suicide or homicide, depression, hallucinations      Coping Skills (what can I do to take my mind off the problem, or to keep myself safe): take a walk, draw, read a book      Outside Support (who can I reach out to for support and help): contact RAYMOND Morales, your psychiatrist, [facility] Suicide Prevention Hotline:  [address]: [facility]: [address] [phone] - Crisis   [phone] - Peer Support Talk Line (1-9pm daily)  [phone] - Teen Support Talk Line (1-9pm daily)  [address] [facility] [phone] - [address] [facility]

## 2022-10-17 NOTE — DISCHARGE INSTRUCTIONS
Thank you for coming to the ED for your care  Please continue to follow up with your ACT team as planned this week  Please return to the ED if you develop any further thoughts of hurting yourself or others
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
## 2023-05-13 ENCOUNTER — HOSPITAL ENCOUNTER (EMERGENCY)
Facility: HOSPITAL | Age: 23
Discharge: HOME | End: 2023-05-14
Attending: EMERGENCY MEDICINE | Admitting: EMERGENCY MEDICINE

## 2023-05-13 DIAGNOSIS — F10.929 ALCOHOL INTOXICATION (HCC): Primary | ICD-10-CM

## 2023-05-13 DIAGNOSIS — F19.10 POLYSUBSTANCE ABUSE (HCC): ICD-10-CM

## 2023-05-13 LAB — ETHANOL EXG-MCNC: 0.19 MG/DL

## 2023-05-14 VITALS
SYSTOLIC BLOOD PRESSURE: 122 MMHG | RESPIRATION RATE: 16 BRPM | OXYGEN SATURATION: 96 % | HEART RATE: 76 BPM | TEMPERATURE: 97.6 F | DIASTOLIC BLOOD PRESSURE: 74 MMHG

## 2023-05-14 LAB
ALBUMIN SERPL BCP-MCNC: 3.4 G/DL (ref 3.5–5)
ALP SERPL-CCNC: 66 U/L (ref 46–116)
ALT SERPL W P-5'-P-CCNC: 36 U/L (ref 12–78)
ANION GAP SERPL CALCULATED.3IONS-SCNC: 3 MMOL/L (ref 4–13)
AST SERPL W P-5'-P-CCNC: 44 U/L (ref 5–45)
BASOPHILS # BLD AUTO: 0.07 THOUSANDS/ÂΜL (ref 0–0.1)
BASOPHILS NFR BLD AUTO: 1 % (ref 0–1)
BILIRUB SERPL-MCNC: 0.32 MG/DL (ref 0.2–1)
BUN SERPL-MCNC: 10 MG/DL (ref 5–25)
CALCIUM ALBUM COR SERPL-MCNC: 8.9 MG/DL (ref 8.3–10.1)
CALCIUM SERPL-MCNC: 8.4 MG/DL (ref 8.3–10.1)
CHLORIDE SERPL-SCNC: 109 MMOL/L (ref 96–108)
CO2 SERPL-SCNC: 26 MMOL/L (ref 21–32)
CREAT SERPL-MCNC: 1 MG/DL (ref 0.6–1.3)
EOSINOPHIL # BLD AUTO: 0.18 THOUSAND/ÂΜL (ref 0–0.61)
EOSINOPHIL NFR BLD AUTO: 2 % (ref 0–6)
ERYTHROCYTE [DISTWIDTH] IN BLOOD BY AUTOMATED COUNT: 13.8 % (ref 11.6–15.1)
GFR SERPL CREATININE-BSD FRML MDRD: 106 ML/MIN/1.73SQ M
GLUCOSE SERPL-MCNC: 99 MG/DL (ref 65–140)
HCT VFR BLD AUTO: 46.4 % (ref 36.5–49.3)
HGB BLD-MCNC: 15.5 G/DL (ref 12–17)
IMM GRANULOCYTES # BLD AUTO: 0.04 THOUSAND/UL (ref 0–0.2)
IMM GRANULOCYTES NFR BLD AUTO: 0 % (ref 0–2)
LYMPHOCYTES # BLD AUTO: 4.09 THOUSANDS/ÂΜL (ref 0.6–4.47)
LYMPHOCYTES NFR BLD AUTO: 43 % (ref 14–44)
MAGNESIUM SERPL-MCNC: 2.3 MG/DL (ref 1.6–2.6)
MCH RBC QN AUTO: 31.3 PG (ref 26.8–34.3)
MCHC RBC AUTO-ENTMCNC: 33.4 G/DL (ref 31.4–37.4)
MCV RBC AUTO: 94 FL (ref 82–98)
MONOCYTES # BLD AUTO: 0.76 THOUSAND/ÂΜL (ref 0.17–1.22)
MONOCYTES NFR BLD AUTO: 8 % (ref 4–12)
NEUTROPHILS # BLD AUTO: 4.29 THOUSANDS/ÂΜL (ref 1.85–7.62)
NEUTS SEG NFR BLD AUTO: 46 % (ref 43–75)
NRBC BLD AUTO-RTO: 0 /100 WBCS
PLATELET # BLD AUTO: 275 THOUSANDS/UL (ref 149–390)
PMV BLD AUTO: 11 FL (ref 8.9–12.7)
POTASSIUM SERPL-SCNC: 3.6 MMOL/L (ref 3.5–5.3)
PROT SERPL-MCNC: 7.1 G/DL (ref 6.4–8.4)
RBC # BLD AUTO: 4.95 MILLION/UL (ref 3.88–5.62)
SODIUM SERPL-SCNC: 138 MMOL/L (ref 135–147)
WBC # BLD AUTO: 9.43 THOUSAND/UL (ref 4.31–10.16)

## 2023-05-14 NOTE — ED ATTENDING ATTESTATION
"Final Diagnoses:     1  Alcohol intoxication ([facility] [address] )    2  Polysubstance abuse [facility])      ED Course as of 05/14/23 0656   Sat May 13, 2023   2331 EXTBreath Alcohol: 0 189   Sun May 14, 2023   0762 Sleeping comfortably  Will re-evaluate when clinically sober  Alphonso Fuentes MD, saw and evaluated the patient  All available labs and X-rays were ordered by me or the resident and have been reviewed by myself  I discussed the patient with the resident / non-physician and agree with the resident's / non-physician practitioner's findings and plan as documented in the resident's / non-physician practicitioner's note, except where noted  At this point, I agree with the current assessment done in the ED  I was present during key portions of all procedures performed unless otherwise stated  Nursing Triage:     Chief Complaint   Patient presents with   • Alcohol Intoxication     Pt wants rehab  Too intoxicated to speak in triage   • Psychiatric Evaluation     Pt states HI towards other people  Would \"use fists or knife or gun\"       HPI:   This is a 26 y o  male presenting for evaluation of alcohol and cocaine abuse, wants detox  Homicidal ideation, wants to stab people  Nothing specific  Intoxicated  ASSESSMENT + PLAN:   Discuss with CRISIS  Alcohol level  Physical:   General: VS reviewed  Appears in NAD  awake, alert  Well-nourished, well-developed  Appears stated age  Slurred speech  Head: Normocephalic, atraumatic  Eyes: EOM-I  No diplopia  No hyphema  No subconjunctival hemorrhages  Symmetrical lids  ENT: Atraumatic external nose and ears  MMM  No malocclusion  No stridor  Normal phonation  No drooling  Normal swallowing  Neck: No JVD  CV: No pallor noted  Lungs:   No tachypnea  No respiratory distress  MSK:   FROM spontaneously  Skin: Dry, intact  Neuro: Awake, alert, GCS15, CN II-XII grossly intact  Motor grossly intact    Psychiatric/Behavioral: interacting normally; " appropriate mood/affect   Exam: deferred    Vitals:    05/13/23 2241 05/14/23 0030 05/14/23 0200 05/14/23 0500   BP: 109/57 107/67 100/55 122/74   BP Location:  Left arm Left arm Left arm   Pulse: 98 (!) 116 76 76   Resp: 18 21 19 16   Temp: 97 6 °F (36 4 °C)      TempSrc: Oral      SpO2: 98% 97% 93% 96%     - There are no obvious limitations to social determinants of care  - Nursing note reviewed  - Vitals reviewed  - Orders placed by myself and/or advanced practitioner / resident     - Previous chart was reviewed  - No language barrier    - History obtained from patient  - There are no limitations to the history obtained:     Past Medical:    has a past medical history of Asthma, Hearing impaired, Legally blind, and Schizophrenia ([facility] [address] )  Past Surgical:    has a past surgical history that includes Tear duct surgery (Bilateral); Hernia repair; and Tonsillectomy  Social:     Social History     Substance and Sexual Activity   Alcohol Use Yes    Comment: (3) 40 oz  beers per day     Social History     Tobacco Use   Smoking Status Every Day   • Packs/day: 1 00   • Years: 3 00   • Pack years: 3 00   • Types: Cigarettes   Smokeless Tobacco Never     Social History     Substance and Sexual Activity   Drug Use Yes   • Types: Cocaine, Marijuana, Methamphetamines    Comment: patient denies drug use today       Echo:   No results found for this or any previous visit  No results found for this or any previous visit  Cath:    No results found for this or any previous visit        Code Status: Prior  Advance Directive and Living Will:      Power of :    POLST:    Medications - No data to display  No orders to display     Orders Placed This Encounter   Procedures   • Rapid drug screen, urine   • CBC and differential   • Comprehensive metabolic panel   • Magnesium   • 1:1 Continual Observation   • POCT alcohol breath test   • ECG 12 lead     Labs Reviewed   COMPREHENSIVE METABOLIC PANEL - Abnormal Result Value Ref Range Status    Sodium 138  135 - 147 mmol/L Final    Potassium 3 6  3 5 - 5 3 mmol/L Final    Chloride 109 (*) 96 - 108 mmol/L Final    CO2 26  21 - 32 mmol/L Final    ANION GAP 3 (*) 4 - 13 mmol/L Final    BUN 10  5 - 25 mg/dL Final    Creatinine 1 00  0 60 - 1 30 mg/dL Final    Comment: Standardized to IDMS reference method    Glucose 99  65 - 140 mg/dL Final    Comment: If the patient is fasting, the ADA then defines impaired fasting glucose as > 100 mg/dL and diabetes as > or equal to 123 mg/dL  Specimen collection should occur prior to Sulfasalazine administration due to the potential for falsely depressed results  Specimen collection should occur prior to Sulfapyridine administration due to the potential for falsely elevated results  Calcium 8 4  8 3 - 10 1 mg/dL Final    Corrected Calcium 8 9  8 3 - 10 1 mg/dL Final    AST 44  5 - 45 U/L Final    Comment: Specimen collection should occur prior to Sulfasalazine administration due to the potential for falsely depressed results  ALT 36  12 - 78 U/L Final    Comment: Specimen collection should occur prior to Sulfasalazine and/or Sulfapyridine administration due to the potential for falsely depressed results  Alkaline Phosphatase 66  46 - 116 U/L Final    Total Protein 7 1  6 4 - 8 4 g/dL Final    Albumin 3 4 (*) 3 5 - 5 0 g/dL Final    Total Bilirubin 0 32  0 20 - 1 00 mg/dL Final    Comment: Use of this assay is not recommended for patients undergoing treatment with eltrombopag due to the potential for falsely elevated results      eGFR 106  ml/min/1 73sq m Final    Narrative:     [place] guidelines for Chronic Kidney Disease (CKD):   •  Stage 1 with normal or high GFR (GFR > 90 mL/min/1 73 square meters)  •  Stage 2 Mild CKD (GFR = 60-89 mL/min/1 73 square meters)  •  Stage 3A Moderate CKD (GFR = 45-59 mL/min/1 73 square meters)  •  Stage 3B Moderate CKD (GFR = 30-44 mL/min/1 73 square meters)  • Stage 4 Severe CKD (GFR = 15-29 mL/min/1 73 square meters)  •  Stage 5 End Stage CKD (GFR <15 mL/min/1 73 square meters)  Note: GFR calculation is accurate only with a steady state creatinine   MAGNESIUM - Normal    Magnesium 2 3  1 6 - 2 6 mg/dL Final   POCT ALCOHOL BREATH TEST - Normal    EXTBreath Alcohol 0 189   Final   CBC AND DIFFERENTIAL    WBC 9 43  4 31 - 10 16 Thousand/uL Final    RBC 4 95  3 88 - 5 62 Million/uL Final    Hemoglobin 15 5  12 0 - 17 0 g/dL Final    Hematocrit 46 4  36 5 - 49 3 % Final    MCV 94  82 - 98 fL Final    MCH 31 3  26 8 - 34 3 pg Final    MCHC 33 4  31 4 - 37 4 g/dL Final    RDW 13 8  11 6 - 15 1 % Final    MPV 11 0  8 9 - 12 7 fL Final    Platelets 434  540 - 390 Thousands/uL Final    nRBC 0  /100 WBCs Final    Neutrophils Relative 46  43 - 75 % Final    Immat GRANS % 0  0 - 2 % Final    Lymphocytes Relative 43  14 - 44 % Final    Monocytes Relative 8  4 - 12 % Final    Eosinophils Relative 2  0 - 6 % Final    Basophils Relative 1  0 - 1 % Final    Neutrophils Absolute 4 29  1 85 - 7 62 Thousands/µL Final    Immature Grans Absolute 0 04  0 00 - 0 20 Thousand/uL Final    Lymphocytes Absolute 4 09  0 60 - 4 47 Thousands/µL Final    Monocytes Absolute 0 76  0 17 - 1 22 Thousand/µL Final    Eosinophils Absolute 0 18  0 00 - 0 61 Thousand/µL Final    Basophils Absolute 0 07  0 00 - 0 10 Thousands/µL Final   RAPID DRUG SCREEN, URINE     Time reflects when diagnosis was documented in both MDM as applicable and the Disposition within this note     Time User Action Codes Description Comment    5/14/2023  6:55 AM Ernestina OrlandoCarlos Add [F10 929] Alcohol intoxication ([facility] [address] )     5/14/2023  6:56 AM Ernestina OrlandoCarlos Add [F19 10] Polysubstance abuse [facility])       ED Disposition     None      Follow-up Information    None       Patient's Medications   Discharge Prescriptions    No medications on file     No discharge procedures on file    Prior to Admission Medications   Prescriptions Last Dose "Informant Patient Reported? Taking? albuterol (PROVENTIL HFA,VENTOLIN HFA) 90 mcg/act inhaler   No No   Sig: Inhale 2 puffs every 4 (four) hours as needed for wheezing or shortness of breath   fluticasone (FLONASE) 50 mcg/act nasal spray   No No   Si spray into each nostril daily   paliperidone palmitate (INVEGA) 234 MG/1 5ML im injection   Yes No   Sig: Inject 234 mg into a muscle every 28 days   risperiDONE (RisperDAL) 2 mg tablet   Yes No   Sig: Take 2 mg by mouth daily      Facility-Administered Medications: None                        Portions of the record may have been created with voice recognition software  Occasional wrong word or \"sound a like\" substitutions may have occurred due to the inherent limitations of voice recognition software  Read the chart carefully and recognize, using context, where substitutions have occurred      Electronically signed by:  Valeri Baker    "

## 2023-05-14 NOTE — DISCHARGE INSTRUCTIONS
Please keep your phone on so our HOST coordinators may contact you about rehab placement  You can return to the ED at any time for further treatment or care  Continue to monitor symptoms at home  Please follow up with your primary care provider  Please return to the Emergency Department if you experience worsening of your current symptoms or any other concerning symptoms

## 2023-05-14 NOTE — ED CARE HANDOFF
Emergency Department Sign Out Note        Sign out and transfer of care from Dr Jacqueline Wild  See Separate Emergency Department note  The patient, Jose M Amin, was evaluated by the previous provider for etoh intoxication, detox placement, ? HI remarks  Workup Completed:  CBC, CMP, Mag, POC EtOH    ED Course / Workup Pending (followup):  UDS, Crisis/Detox placement                                  ED Course as of 05/14/23 1617   Sun May 14, 2023   0654 SO: hx schizophrenia/etoh use, intoxicated request detox, cocaine two weeks ago and drinking a lot, making vague homicidal general threats, reach out to crisis after BAT <100, follow up on detox   0710 BAT  093, will reach out to Crisis for eval   0726 Cleared by crisis, patient requesting detox placement at this time   0810 Patient declined by [facility] detox, reached out to Crisis who will contact Drug and Alcohol Intake provider to work him up for rehab placement  Patient's 1:1 continual observation discontinued  2589 Per RN/HOST, Patient has a bed on hold at [facility] until 12pm today  Phone screening to be completed prior to approval    1150 Patient spoke with HOST on the phone, no information or update given  Crisis will reach out for recommendations/placement  1301 Per crisis, patient was denied at [facility], HOST has Paul Hall reviewing his case for possible placement  [id number] Patient states he would like to leave at this time as he would like to take a walk outside and does not want to wait any longer  Patient is now declining detox or rehab placement  Denies any SI/HI  Denies any new or worsening complaints or concerns  Patient evaluated multiple times throughout stay  Resting comfortably with no new or worsening complaints or concerns at this time  Stable for discharge  Advised to keep phone on for possible HOST placement  Given return precautions verbally and in discharge instructions  All questions answered   Patient expressed verbal understanding and is agreeable with plan for discharge with outpatient follow up  Disposition  Final diagnoses:   Alcohol intoxication ([facility] [address] )   Polysubstance abuse ([facility] [address] )     Time reflects when diagnosis was documented in both MDM as applicable and the Disposition within this note     Time User Action Codes Description Comment    5/14/2023  6:55 AM Rambo Patterson Add [F10 929] Alcohol intoxication ([facility] [address] )     5/14/2023  6:56 AM Rambo Patterson Add [F19 10] Polysubstance abuse [facility])       ED Disposition     ED Disposition   Discharge    Condition   Stable    Date/Time   Sun May 14, 2023  3:07 PM    Comment   Gokul Garcia discharge to home/self care                 Follow-up Information     Follow up With Specialties Details Why Contact Info Additional [address]  [facility]   [address] [facility] [phone]  [address], [address] Emergency Department Emergency Medicine Go to  If symptoms worsen [phone]  [address] Emergency Department, [address] [place] [address], [address]        Discharge Medication List as of 5/14/2023  3:08 PM      CONTINUE these medications which have NOT CHANGED    Details   albuterol (PROVENTIL HFA,VENTOLIN HFA) 90 mcg/act inhaler Inhale 2 puffs every 4 (four) hours as needed for wheezing or shortness of breath, Starting Tue 3/22/2022, Normal      fluticasone (FLONASE) 50 mcg/act nasal spray 1 spray into each nostril daily, Starting Wed 3/23/2022, No Print      paliperidone palmitate (INVEGA) 234 MG/1 5ML im injection Inject 234 mg into a muscle every 28 days, Historical Med      risperiDONE (RisperDAL) 2 mg tablet Take 2 mg by mouth daily, Historical Med No discharge procedures on file         ED Provider  Electronically Signed by     Sharee Arevalo DO  05/14/23 7099

## 2023-05-14 NOTE — ED NOTES
Patient pacing the hallway stating he needs to leave  Patient appears to be talking to someone who is not present, laughing inappropriately   Resident aware      Lisa Cottrell RN  05/14/23 774 [facility]  05/14/23 9257

## 2023-05-14 NOTE — ED NOTES
Kaya from [facility], called to speak to patient at this time        Aristides Cain RN  05/14/23 2730

## 2023-05-14 NOTE — ED NOTES
"Crisis Intervention Specialist (CIS) spoke with patient (Pt) who denied suicidal and homicidal ideation  Pt stated that he would like detox then rehab  CIS asked Pt if he is committed to treatment this time and Pt stated \"yes  \"  Vanessa [facility] [address] know Pt wants detox then rehab    Will be referred to [facility] detox program   "

## 2023-05-14 NOTE — ED NOTES
Patient provided with a box lunch      Sharla Oakes RN  05/14/23 6478 Progress Notes by Estella Weeks RN, BSN at 04/02/18 11:00 AM     Author:  Estella Weeks RN, BSN Service:  (none) Author Type:  Registered Nurse     Filed:  04/30/18 09:32 AM Encounter Date:  4/2/2018 Status:  Signed     :  Estella Weeks RN, BSN (Registered Nurse)            Pre-Authorization request started for[TS1.1T] MRI breasts online with [employer] ([employer])[TS1.1M]  In process, medical review.  Please allow 2-4 business days.[TS1.1T]        Revision History        User Key Date/Time User Provider Type Action    > TS1.1 04/30/18 09:32 AM Estella Weeks RN, BSN Registered Nurse Sign    M - Manual, T - Template

## 2023-05-14 NOTE — ED NOTES
CIS called HOST and spoke to Johnnie who stated that Pt was denied at [facility]  She stated that [facility] 30 is reviewing

## 2023-05-14 NOTE — ED PROVIDER NOTES
"History  Chief Complaint   Patient presents with   • Alcohol Intoxication     Pt wants rehab  Too intoxicated to speak in triage   • Psychiatric Evaluation     Pt states HI towards other people  Would \"use fists or knife or gun\"     Patient is a 78-year-old male past medical history schizophrenia, alcohol use, crack cocaine use that presents to the emergency department intoxicated wishing to go to rehab, also endorses HI towards people no one in particular  States that he has dark thoughts and in his mind  He has no specific plan to act on this  States that he last used crack cocaine x2 days ago  Unable to assess how much or how often patient drinks and what time his last drink was  History is limited secondary to patient's intoxication  Prior to Admission Medications   Prescriptions Last Dose Informant Patient Reported? Taking? albuterol (PROVENTIL HFA,VENTOLIN HFA) 90 mcg/act inhaler   No No   Sig: Inhale 2 puffs every 4 (four) hours as needed for wheezing or shortness of breath   fluticasone (FLONASE) 50 mcg/act nasal spray   No No   Si spray into each nostril daily   paliperidone palmitate (INVEGA) 234 MG/1 5ML im injection   Yes No   Sig: Inject 234 mg into a muscle every 28 days   risperiDONE (RisperDAL) 2 mg tablet   Yes No   Sig: Take 2 mg by mouth daily      Facility-Administered Medications: None       Past Medical History:   Diagnosis Date   • Asthma    • Hearing impaired    • Legally blind    • Schizophrenia ([facility] [address] )        Past Surgical History:   Procedure Laterality Date   • HERNIA REPAIR     • TEAR DUCT SURGERY Bilateral    • TONSILLECTOMY         Family History   Problem Relation Age of Onset   • Schizophrenia Mother    • Mental illness Mother    • Abnormal EKG Sister    • Mental illness Sister      I have reviewed and agree with the history as documented      E-Cigarette/Vaping   • E-Cigarette Use Never User      E-Cigarette/Vaping Substances     Social History     Tobacco Use   • " Smoking status: Every Day     Packs/day: 1 00     Years: 3 00     Pack years: 3 00     Types: Cigarettes   • Smokeless tobacco: Never   Vaping Use   • Vaping Use: Never used   Substance Use Topics   • Alcohol use: Yes     Comment: (3) 40 oz  beers per day   • Drug use: Yes     Types: Cocaine, Marijuana, Methamphetamines     Comment: patient denies drug use today        Review of Systems   Unable to perform ROS: Other (Intoxication)       Physical Exam  ED Triage Vitals   Temperature Pulse Respirations Blood Pressure SpO2   05/13/23 2241 05/13/23 2241 05/13/23 2241 05/13/23 2241 05/13/23 2241   97 6 °F (36 4 °C) 98 18 109/57 98 %      Temp Source Heart Rate Source Patient Position - Orthostatic VS BP Location FiO2 (%)   05/13/23 2241 05/13/23 2241 05/14/23 0030 05/14/23 0030 --   Oral Monitor Sitting Left arm       Pain Score       --                    Orthostatic Vital Signs  Vitals:    05/13/23 2241 05/14/23 0030 05/14/23 0200 05/14/23 0500   BP: 109/57 107/67 100/55 122/74   Pulse: 98 (!) 116 76 76   Patient Position - Orthostatic VS:  Sitting Sitting Sitting       Physical Exam  Vitals and nursing note reviewed  Constitutional:       General: He is sleeping  He is not in acute distress  Appearance: Normal appearance  He is normal weight  He is not ill-appearing  Comments: Patient is intoxicated   HENT:      Head: Normocephalic and atraumatic  Mouth/Throat:      Mouth: Mucous membranes are moist    Eyes:      Extraocular Movements: Extraocular movements intact  Cardiovascular:      Rate and Rhythm: Normal rate and regular rhythm  Pulmonary:      Effort: Pulmonary effort is normal       Breath sounds: Normal breath sounds  Abdominal:      Palpations: Abdomen is soft  Tenderness: There is no abdominal tenderness  Musculoskeletal:         General: Normal range of motion  Cervical back: Normal range of motion  Skin:     General: Skin is warm and dry     Neurological:      General: No focal deficit present  Mental Status: He is oriented to person, place, and time and easily aroused           ED Medications  Medications - No data to display    Diagnostic Studies  Results Reviewed     Procedure Component Value Units Date/Time    Comprehensive metabolic panel [[id number]]  (Abnormal) Collected: 05/14/23 0347    Lab Status: Final result Specimen: Blood from Hand, Left Updated: 05/14/23 0435     Sodium 138 mmol/L      Potassium 3 6 mmol/L      Chloride 109 mmol/L      CO2 26 mmol/L      ANION GAP 3 mmol/L      BUN 10 mg/dL      Creatinine 1 00 mg/dL      Glucose 99 mg/dL      Calcium 8 4 mg/dL      Corrected Calcium 8 9 mg/dL      AST 44 U/L      ALT 36 U/L      Alkaline Phosphatase 66 U/L      Total Protein 7 1 g/dL      Albumin 3 4 g/dL      Total Bilirubin 0 32 mg/dL      eGFR 106 ml/min/1 73sq m     Narrative:      [place] guidelines for Chronic Kidney Disease (CKD):   •  Stage 1 with normal or high GFR (GFR > 90 mL/min/1 73 square meters)  •  Stage 2 Mild CKD (GFR = 60-89 mL/min/1 73 square meters)  •  Stage 3A Moderate CKD (GFR = 45-59 mL/min/1 73 square meters)  •  Stage 3B Moderate CKD (GFR = 30-44 mL/min/1 73 square meters)  •  Stage 4 Severe CKD (GFR = 15-29 mL/min/1 73 square meters)  •  Stage 5 End Stage CKD (GFR <15 mL/min/1 73 square meters)  Note: GFR calculation is accurate only with a steady state creatinine    Magnesium [[id number]]  (Normal) Collected: 05/14/23 0347    Lab Status: Final result Specimen: Blood from Hand, Left Updated: 05/14/23 0435     Magnesium 2 3 mg/dL     CBC and differential [[id number]] Collected: 05/14/23 0347    Lab Status: Final result Specimen: Blood from Hand, Left Updated: 05/14/23 0413     WBC 9 43 Thousand/uL      RBC 4 95 Million/uL      Hemoglobin 15 5 g/dL      Hematocrit 46 4 %      MCV 94 fL      MCH 31 3 pg      MCHC 33 4 g/dL      RDW 13 8 %      MPV 11 0 fL      Platelets 390 Thousands/uL      nRBC 0 /100 WBCs Neutrophils Relative 46 %      Immat GRANS % 0 %      Lymphocytes Relative 43 %      Monocytes Relative 8 %      Eosinophils Relative 2 %      Basophils Relative 1 %      Neutrophils Absolute 4 29 Thousands/µL      Immature Grans Absolute 0 04 Thousand/uL      Lymphocytes Absolute 4 09 Thousands/µL      Monocytes Absolute 0 76 Thousand/µL      Eosinophils Absolute 0 18 Thousand/µL      Basophils Absolute 0 07 Thousands/µL     POCT alcohol breath test [[id number]]  (Normal) Resulted: 05/13/23 2327    Lab Status: Final result Updated: 05/13/23 2327     EXTBreath Alcohol 0 189    Rapid drug screen, urine [[id number]]     Lab Status: No result Specimen: Urine                  No orders to display         Procedures  Procedures      ED Course  ED Course as of 05/14/23 0811   Sat May 13, 2023   2334 EXTBreath Alcohol: 0 189  Reassess at 430am should be under 100 by then, crisis to see     Ana Lilia May 14, 2023   0242 D/w detox ap - will check labs, assess if pt still wishes detox/rehab once clinically sober   0424 Repeat   Will redraw at 5:30 AM   030 70 25 13 Pt 108 on repeat BAT - will repeat again at 6:15am                                       Medical Decision Making  Patient is a 43-year-old male past medical history schizophrenia, EtOH use, polysubstance use the presents intoxicated for evaluation  Wishing to go to detox/rehab  Also endorsing nonspecific HI  History is limited secondary to intoxication  Will check a BAT and UDS, wait for patient to become clinically sober so that he can be evaluated by crisis  Discussed with [facility]Y on-call regarding detox for patient  He is not ready to be evaluated by crisis or transfer initiated at this time however they recommend CBC CMP magnesium to be checked in the event that patient does wish to proceed with detox/rehab when he castillo up  We will draw these      Lab work unremarkable no leukocytosis anemia electrolyte abnormalities or derangements    See ED course, patient was BAT'ed multiple times however did not reach the threshold of under 100 to be seen and evaluated by crisis  He remained hemodynamically stable throughout the night  Patient was signed out in stable condition pending crisis evaluation and final dispo  Amount and/or Complexity of Data Reviewed  Labs: ordered  Decision-making details documented in ED Course  Disposition  Final diagnoses:   Alcohol intoxication ([facility] [address] )   Polysubstance abuse ([facility] [address] )     Time reflects when diagnosis was documented in both MDM as applicable and the Disposition within this note     Time User Action Codes Description Comment    5/14/2023  6:55 AM Dmitri Varma Add [F10 929] Alcohol intoxication ([facility] [address] )     5/14/2023  6:56 AM Dmitri Varma Add [F19 10] Polysubstance abuse [facility])       ED Disposition     None      Follow-up Information    None         Patient's Medications   Discharge Prescriptions    No medications on file     No discharge procedures on file  PDMP Review     None           ED Provider  Attending physically available and evaluated Carmensteph Millanestela RODRÍGUEZ managed the patient along with the ED Attending      Electronically Signed by         Dulce Gardner DO  05/14/23 6639

## 2023-05-14 NOTE — ED NOTES
Called and left a message for [facility] in regards to the plan for ED Gloria [address] [place] [address]  05/14/23 1859

## 2023-05-14 NOTE — ED NOTES
I spoke to Enma Desir from [facility]  Patient has a bed on hold at [facility] until 12pm today  Phone screening to be completed @ [phone] Option 2, Option 1        Adelita Nguyen RN  05/14/23 8732

## 2023-05-15 ENCOUNTER — HOSPITAL ENCOUNTER (EMERGENCY)
Facility: HOSPITAL | Age: 23
Discharge: HOME | End: 2023-05-15
Attending: EMERGENCY MEDICINE

## 2023-05-15 VITALS
HEART RATE: 71 BPM | DIASTOLIC BLOOD PRESSURE: 67 MMHG | TEMPERATURE: 98.2 F | SYSTOLIC BLOOD PRESSURE: 129 MMHG | OXYGEN SATURATION: 98 % | RESPIRATION RATE: 18 BRPM

## 2023-05-15 DIAGNOSIS — F99 PSYCHIATRIC COMPLAINT: Primary | ICD-10-CM

## 2023-05-15 LAB
ATRIAL RATE: 72 BPM
ETHANOL EXG-MCNC: 0 MG/DL
P AXIS: 75 DEGREES
PR INTERVAL: 150 MS
QRS AXIS: 102 DEGREES
QRSD INTERVAL: 104 MS
QT INTERVAL: 382 MS
QTC INTERVAL: 418 MS
T WAVE AXIS: 68 DEGREES
VENTRICULAR RATE: 72 BPM

## 2023-05-15 RX ORDER — OLANZAPINE 10 MG/1
10 TABLET, ORALLY DISINTEGRATING ORAL ONCE
Status: COMPLETED | OUTPATIENT
Start: 2023-05-15 | End: 2023-05-15

## 2023-05-15 RX ADMIN — OLANZAPINE 10 MG: 10 TABLET, ORALLY DISINTEGRATING ORAL at 20:15
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
## 2023-05-16 ENCOUNTER — HOSPITAL ENCOUNTER (EMERGENCY)
Facility: HOSPITAL | Age: 23
Discharge: HOME | End: 2023-05-17
Attending: EMERGENCY MEDICINE

## 2023-05-16 DIAGNOSIS — F10.10 ALCOHOL ABUSE: Primary | ICD-10-CM

## 2023-05-16 DIAGNOSIS — F20.89 OTHER SCHIZOPHRENIA (HCC): ICD-10-CM

## 2023-05-16 DIAGNOSIS — R45.850 HOMICIDAL IDEATION: ICD-10-CM

## 2023-05-16 LAB
AMPHETAMINES SERPL QL SCN: NEGATIVE
BARBITURATES UR QL: NEGATIVE
BENZODIAZ UR QL: NEGATIVE
COCAINE UR QL: NEGATIVE
ETHANOL EXG-MCNC: 0 MG/DL
METHADONE UR QL: NEGATIVE
OPIATES UR QL SCN: NEGATIVE
OXYCODONE+OXYMORPHONE UR QL SCN: NEGATIVE
PCP UR QL: NEGATIVE
THC UR QL: NEGATIVE

## 2023-05-16 RX ORDER — OXYCODONE HYDROCHLORIDE 5 MG/1
5 TABLET ORAL ONCE
Status: DISCONTINUED | OUTPATIENT
Start: 2023-05-16 | End: 2023-05-16

## 2023-05-16 NOTE — DISCHARGE INSTRUCTIONS
You were seen in the emergency department today for hallucinations  Follow up with [facility]  Return to the emergency department for any new or concerning symptoms including thoughts of hurting yourself or anyone else  Thank you for choosing [facility] for your care today

## 2023-05-16 NOTE — ED CARE HANDOFF
This CRS spoke to [facility] this morning (and again this afternoon) who requested a copy of the psych evaluation from the 13th  This writer sent the clinicals to [facility] and they are asking for clearance so they can find placement for the pt
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
## 2023-05-16 NOTE — ED ATTENDING ATTESTATION
"Final Diagnoses:     1  Alcohol abuse           I, Huy Talavera MD, saw and evaluated the patient  All available labs and X-rays were ordered by me or the resident and have been reviewed by myself  I discussed the patient with the resident / non-physician and agree with the resident's / non-physician practitioner's findings and plan as documented in the resident's / non-physician practicitioner's note, except where noted  At this point, I agree with the current assessment done in the ED  I was present during key portions of all procedures performed unless otherwise stated  Nursing Triage:     Chief Complaint   Patient presents with   • Detox Evaluation     Pt states, [facility] doctor sent me here for rehab and detox  \" Pt seen in department twice in the last week for same concern  HPI:   This is a 26 y o  male presenting for evaluation of \"need for rehab and detox  \" Also states that he is out of all of his meds  I saw him a couple days ago; at that time he stated that he had used alcohol and cocaine; when he was sober, he was offered admission/psychiatric resources but refused it and went home  He then returned then ext day (yesterday) and was evaluated again  He decided to go home, again  HOST had been contacted but he left prior to them being able to do anything  ASSESSMENT + PLAN:   CRISIS/HOST  Patient seen multiple times in the past and does this characteristic behavior of presenting intoxicated, then when achieving sobriety, voluntarily will leave and refuse care and again return when intoxicated  Will again offer resources  Physical:   General: VS reviewed  Appears sleepy / intoxicated  awake, alert  Well-nourished, well-developed  Appears stated age  Speaking normally in full sentences  Head: Normocephalic, atraumatic  Eyes: EOM-I  No diplopia  No hyphema  No subconjunctival hemorrhages  Symmetrical lids  ENT: Atraumatic external nose and ears      MMM  No " malocclusion  No stridor  Normal phonation  No drooling  Normal swallowing  Neck: No JVD  CV: No pallor noted  Lungs:   No tachypnea  No respiratory distress  Abd: soft nt nd no rebound/guarding  MSK:   FROM spontaneously  Skin: Dry, intact  Neuro: Awake, alert, GCS15, CN II-XII grossly intact  Motor grossly intact  Psychiatric/Behavioral: interacting normally; appropriate mood/affect   Exam: deferred    Vitals:    05/16/23 1402   BP: 122/58   BP Location: Left arm   Pulse: 104   Resp: 18   Temp: 98 2 °F (36 8 °C)   TempSrc: Temporal   SpO2: 96%     - There are no obvious limitations to social determinants of care  - Nursing note reviewed  - Vitals reviewed  - Orders placed by myself and/or advanced practitioner / resident     - Previous chart was reviewed  - No language barrier    - History obtained from patient  - There are no limitations to the history obtained:     Past Medical:    has a past medical history of Asthma, Hearing impaired, Legally blind, and Schizophrenia ([facility] [address] )  Past Surgical:    has a past surgical history that includes Tear duct surgery (Bilateral); Hernia repair; and Tonsillectomy  Social:     Social History     Substance and Sexual Activity   Alcohol Use Yes    Comment: (3) 40 oz  beers per day     Social History     Tobacco Use   Smoking Status Every Day   • Packs/day: 1 00   • Years: 3 00   • Pack years: 3 00   • Types: Cigarettes   Smokeless Tobacco Never     Social History     Substance and Sexual Activity   Drug Use Yes   • Types: Cocaine, Marijuana, Methamphetamines    Comment: patient denies drug use today       Echo:   No results found for this or any previous visit  No results found for this or any previous visit  Cath:    No results found for this or any previous visit        Code Status: Prior  Advance Directive and Living Will:      Power of :    POLST:    Medications - No data to display  No orders to display     Orders Placed This "Encounter   Procedures   • Rapid drug screen, urine   • Diet Regular; Finger Foods   • Nursing communication Prepare room for behavioral health patient   • Consult to ED Warm Handoff Referral   • POCT alcohol breath test     Labs Reviewed   POCT ALCOHOL BREATH TEST - Normal       Result Value Ref Range Status    EXTBreath Alcohol 0 000   Final   RAPID DRUG SCREEN, URINE     Time reflects when diagnosis was documented in both MDM as applicable and the Disposition within this note     Time User Action Codes Description Comment    2023  4:06 PM Kelly Pabon Add [F10 10] Alcohol abuse       ED Disposition     ED Disposition   Transfer to [address] [place] [address]   --    Date/Time   Tue May 16, 2023  4:06 PM    Comment   Gokul Hickman should be transferred out to HOST and has been medically cleared  Follow-up Information    None       Patient's Medications   Discharge Prescriptions    No medications on file     No discharge procedures on file  Prior to Admission Medications   Prescriptions Last Dose Informant Patient Reported? Taking? albuterol (PROVENTIL HFA,VENTOLIN HFA) 90 mcg/act inhaler   No No   Sig: Inhale 2 puffs every 4 (four) hours as needed for wheezing or shortness of breath   fluticasone (FLONASE) 50 mcg/act nasal spray   No No   Si spray into each nostril daily   paliperidone palmitate (INVEGA) 234 MG/1 5ML im injection   Yes No   Sig: Inject 234 mg into a muscle every 28 days   risperiDONE (RisperDAL) 2 mg tablet   Yes No   Sig: Take 2 mg by mouth daily      Facility-Administered Medications: None                        Portions of the record may have been created with voice recognition software  Occasional wrong word or \"sound a like\" substitutions may have occurred due to the inherent limitations of voice recognition software  Read the chart carefully and recognize, using context, where substitutions have occurred      Electronically signed by:  Tristen Zhou    "

## 2023-05-16 NOTE — ED PROVIDER NOTES
"History  Chief Complaint   Patient presents with   • Detox Evaluation     Pt states, \"My doctor sent me here for rehab and detox  \" Pt seen in department twice in the last week for same concern  HPI    81-year-old male presenting to the ED for detox evaluation  His past medical history is significant for schizophrenia, alcohol abuse, cocaine abuse  Patient states that he has been to the hospital multiple times in the past week for detox  He received a phone call from host earlier today and was told to go to the ED for further evaluation of detox  States that he uses alcohol and cocaine  Has not used cocaine in \"a while\"  Typically drinks 2 40s of beer a day but has not for a couple days  Requesting detox  Patient also states that he has not been on any of his psychiatric medications because he \"ran out\"  Otherwise denies SI, HI  Denies any physical complaints  Denies tremors, anxiousness  Prior to Admission Medications   Prescriptions Last Dose Informant Patient Reported? Taking?    albuterol (PROVENTIL HFA,VENTOLIN HFA) 90 mcg/act inhaler   No No   Sig: Inhale 2 puffs every 4 (four) hours as needed for wheezing or shortness of breath   fluticasone (FLONASE) 50 mcg/act nasal spray   No No   Si spray into each nostril daily   paliperidone palmitate (INVEGA) 234 MG/1 5ML im injection   Yes No   Sig: Inject 234 mg into a muscle every 28 days   risperiDONE (RisperDAL) 2 mg tablet   Yes No   Sig: Take 2 mg by mouth daily      Facility-Administered Medications: None       Past Medical History:   Diagnosis Date   • Asthma    • Hearing impaired    • Legally blind    • Schizophrenia ([facility] [address] )        Past Surgical History:   Procedure Laterality Date   • HERNIA REPAIR     • TEAR DUCT SURGERY Bilateral    • TONSILLECTOMY         Family History   Problem Relation Age of Onset   • Schizophrenia Mother    • Mental illness Mother    • Abnormal EKG Sister    • Mental illness Sister      I have reviewed and agree " with the history as documented  E-Cigarette/Vaping   • E-Cigarette Use Never User      E-Cigarette/Vaping Substances     Social History     Tobacco Use   • Smoking status: Every Day     Packs/day: 1 00     Years: 3 00     Pack years: 3 00     Types: Cigarettes   • Smokeless tobacco: Never   Vaping Use   • Vaping Use: Never used   Substance Use Topics   • Alcohol use: Yes     Comment: (3) 40 oz  beers per day   • Drug use: Yes     Types: Cocaine, Marijuana, Methamphetamines     Comment: patient denies drug use today        Review of Systems   Constitutional: Negative for chills and fever  HENT: Negative for sore throat  Respiratory: Negative for cough and shortness of breath  Cardiovascular: Negative for chest pain, palpitations and leg swelling  Gastrointestinal: Negative for abdominal pain, diarrhea, nausea and vomiting  Genitourinary: Negative for dysuria and frequency  Musculoskeletal: Negative for myalgias  Skin: Negative for rash and wound  Neurological: Negative for dizziness, light-headedness and headaches  Psychiatric/Behavioral: Negative for self-injury and suicidal ideas  The patient is not nervous/anxious  All other systems reviewed and are negative  Physical Exam  ED Triage Vitals [05/16/23 1402]   Temperature Pulse Respirations Blood Pressure SpO2   98 2 °F (36 8 °C) 104 18 122/58 96 %      Temp Source Heart Rate Source Patient Position - Orthostatic VS BP Location FiO2 (%)   Temporal Monitor Sitting Left arm --      Pain Score       No Pain             Orthostatic Vital Signs  Vitals:    05/16/23 1402 05/17/23 0851 05/17/23 1307   BP: 122/58 139/58 129/85   Pulse: 104 66 73   Patient Position - Orthostatic VS: Sitting  Sitting       Physical Exam  Vitals and nursing note reviewed  Constitutional:       General: He is not in acute distress  Appearance: Normal appearance  He is not ill-appearing or toxic-appearing        Comments: Disheveled   HENT:      Head: Normocephalic and atraumatic  Right Ear: External ear normal       Left Ear: External ear normal       Nose: Nose normal       Mouth/Throat:      Mouth: Mucous membranes are moist       Pharynx: Oropharynx is clear  Eyes:      General: No scleral icterus  Extraocular Movements: Extraocular movements intact  Cardiovascular:      Rate and Rhythm: Normal rate and regular rhythm  Pulses: Normal pulses  Pulmonary:      Effort: Pulmonary effort is normal  No respiratory distress  Breath sounds: Normal breath sounds  Abdominal:      Palpations: Abdomen is soft  Tenderness: There is no abdominal tenderness  Musculoskeletal:         General: Normal range of motion  Cervical back: Normal range of motion and neck supple  Skin:     General: Skin is warm  Capillary Refill: Capillary refill takes less than 2 seconds  Neurological:      General: No focal deficit present  Mental Status: He is alert and oriented to person, place, and time  ED Medications  Medications   risperiDONE (RisperDAL) tablet 2 mg (2 mg Oral Given 5/17/23 1308)       Diagnostic Studies  Results Reviewed     Procedure Component Value Units Date/Time    Rapid drug screen, urine [[id number]]  (Normal) Collected: 05/16/23 1524    Lab Status: Final result Specimen: Urine, Clean Catch Updated: 05/16/23 1917     Amph/Meth UR Negative     Barbiturate Ur Negative     Benzodiazepine Urine Negative     Cocaine Urine Negative     Methadone Urine Negative     Opiate Urine Negative     PCP Ur Negative     THC Urine Negative     Oxycodone Urine Negative    Narrative:      FOR MEDICAL PURPOSES ONLY  IF CONFIRMATION NEEDED PLEASE CONTACT THE LAB WITHIN 5 DAYS      Drug Screen Cutoff Levels:  AMPHETAMINE/METHAMPHETAMINES  1000 ng/mL  BARBITURATES     200 ng/mL  BENZODIAZEPINES     200 ng/mL  COCAINE      300 ng/mL  METHADONE      300 ng/mL  OPIATES      300 ng/mL  PHENCYCLIDINE     25 ng/mL  THC       50 ng/mL  OXYCODONE      100 ng/mL    POCT alcohol breath test [[id number]]  (Normal) Resulted: 05/16/23 1524    Lab Status: Final result Updated: 05/16/23 1524     EXTBreath Alcohol 0 000                 No orders to display         Procedures  Procedures      ED Course  ED Course as of 05/17/23 1519   Tue May 16, 2023   1533 EXTBreath Alcohol: 0 000                             SBIRT 20yo+    Flowsheet Row Most Recent Value   Initial Alcohol Screen: US AUDIT-C     1  How often do you have a drink containing alcohol? 6 Filed at: 05/16/2023 1403   2  How many drinks containing alcohol do you have on a typical day you are drinking? 6 Filed at: 05/16/2023 1403   3a  Male UNDER 65: How often do you have five or more drinks on one occasion? 6 Filed at: 05/16/2023 1403   Audit-C Score 18 Filed at: 05/16/2023 1403                Medical Decision Making  26-year-old male presenting for detox from alcohol and cocaine  He does not currently exhibit any signs of alcohol withdrawal and does not appear to be intoxicated  Will check UDS, point-of-care breath alcohol, HOST consult  Point-of-care breath alcohol was negative  UDS negative  Per nursing, HOST spoke with the patient, and will speak to crisis then call the patient back  Patient was signed out to ED resident prior to final disposition  Tentative plan: Pending host evaluation for detox  Otherwise should the patient change his mind about detox he would be permitted to leave the emergency department as he is not suicidal or homicidal     Amount and/or Complexity of Data Reviewed  Labs: ordered  Decision-making details documented in ED Course  Risk  Prescription drug management              Disposition  Final diagnoses:   Alcohol abuse   HI   Other schizophrenia ([facility] [address] )     Time reflects when diagnosis was documented in both MDM as applicable and the Disposition within this note     Time User Action Codes Description Comment    5/16/2023  4:06 PM Josue Fleming Add [F10 10] Alcohol abuse     5/17/2023 11:00 AM Edna Deutsch Homicidal ideation     5/17/2023 12:00 PM Edwin Katz [F20 89] Other schizophrenia ([facility] [address] )     5/17/2023  3:14 PM Chris Moll Modify [R45 850] schizo     5/17/2023  3:14 PM Chris Moll Modify [R45 850] schizo HI    5/17/2023  3:14 PM Chris Moll Modify [R45 850] HI HI    5/17/2023  3:15 PM Loy, [address] HI       ED Disposition     ED Disposition   Discharge    Condition   Stable    Date/Time   Wed May 17, 2023  3:13 PM    Comment   Gokul Patel discharge to home/self care  Follow-up Information    None         Patient's Medications   Discharge Prescriptions    RISPERIDONE (RISPERDAL M-TAB) 2 MG DISINTEGRATING TABLET    Take 1 tablet (2 mg total) by mouth daily at bedtime       Start Date: 5/17/2023 End Date: 6/16/2023       Order Dose: 2 mg       Quantity: 30 tablet    Refills: 0     No discharge procedures on file  PDMP Review     None           ED Provider  Attending physically available and evaluated Cindi Kelley I managed the patient along with the ED Attending      Electronically Signed by         Edward Jose DO  05/17/23 1353

## 2023-05-17 ENCOUNTER — HOSPITAL ENCOUNTER (EMERGENCY)
Facility: HOSPITAL | Age: 23
End: 2023-05-18
Attending: EMERGENCY MEDICINE

## 2023-05-17 VITALS
RESPIRATION RATE: 17 BRPM | TEMPERATURE: 98.2 F | HEART RATE: 73 BPM | DIASTOLIC BLOOD PRESSURE: 85 MMHG | OXYGEN SATURATION: 98 % | SYSTOLIC BLOOD PRESSURE: 129 MMHG

## 2023-05-17 DIAGNOSIS — F20.89 OTHER SCHIZOPHRENIA (HCC): ICD-10-CM

## 2023-05-17 DIAGNOSIS — R44.3 HALLUCINATIONS: Primary | ICD-10-CM

## 2023-05-17 PROBLEM — R45.850 HOMICIDAL IDEATION: Status: ACTIVE | Noted: 2023-05-17

## 2023-05-17 LAB — ETHANOL EXG-MCNC: 0 MG/DL

## 2023-05-17 RX ORDER — HYDROXYZINE HYDROCHLORIDE 25 MG/1
25 TABLET, FILM COATED ORAL
Status: CANCELLED | OUTPATIENT
Start: 2023-05-17

## 2023-05-17 RX ORDER — LORAZEPAM 2 MG/ML
2 INJECTION INTRAMUSCULAR EVERY 6 HOURS PRN
Status: CANCELLED | OUTPATIENT
Start: 2023-05-17

## 2023-05-17 RX ORDER — RISPERIDONE 1 MG/1
2 TABLET ORAL
Status: DISCONTINUED | OUTPATIENT
Start: 2023-05-17 | End: 2023-05-18 | Stop reason: HOSPADM

## 2023-05-17 RX ORDER — HYDROXYZINE HYDROCHLORIDE 25 MG/1
50 TABLET, FILM COATED ORAL
Status: CANCELLED | OUTPATIENT
Start: 2023-05-17

## 2023-05-17 RX ORDER — ALBUTEROL SULFATE 90 UG/1
2 AEROSOL, METERED RESPIRATORY (INHALATION) EVERY 4 HOURS PRN
Status: CANCELLED | OUTPATIENT
Start: 2023-05-17

## 2023-05-17 RX ORDER — HALOPERIDOL 1 MG/1
1 TABLET ORAL EVERY 6 HOURS PRN
Status: CANCELLED | OUTPATIENT
Start: 2023-05-17

## 2023-05-17 RX ORDER — RISPERIDONE 2 MG/1
2 TABLET, ORALLY DISINTEGRATING ORAL
Qty: 30 TABLET | Refills: 0 | Status: ON HOLD | OUTPATIENT
Start: 2023-05-17 | End: 2023-06-16

## 2023-05-17 RX ORDER — DIPHENHYDRAMINE HYDROCHLORIDE 50 MG/ML
50 INJECTION INTRAMUSCULAR; INTRAVENOUS EVERY 6 HOURS PRN
Status: CANCELLED | OUTPATIENT
Start: 2023-05-17

## 2023-05-17 RX ORDER — HALOPERIDOL 5 MG/ML
5 INJECTION INTRAMUSCULAR
Status: CANCELLED | OUTPATIENT
Start: 2023-05-17

## 2023-05-17 RX ORDER — RISPERIDONE 1 MG/1
2 TABLET ORAL ONCE
Status: COMPLETED | OUTPATIENT
Start: 2023-05-17 | End: 2023-05-17

## 2023-05-17 RX ORDER — MAGNESIUM HYDROXIDE/ALUMINUM HYDROXICE/SIMETHICONE 120; 1200; 1200 MG/30ML; MG/30ML; MG/30ML
30 SUSPENSION ORAL EVERY 4 HOURS PRN
Status: CANCELLED | OUTPATIENT
Start: 2023-05-17

## 2023-05-17 RX ORDER — LORAZEPAM 2 MG/ML
1 INJECTION INTRAMUSCULAR
Status: CANCELLED | OUTPATIENT
Start: 2023-05-17

## 2023-05-17 RX ORDER — HALOPERIDOL 5 MG/ML
2.5 INJECTION INTRAMUSCULAR
Status: CANCELLED | OUTPATIENT
Start: 2023-05-17

## 2023-05-17 RX ORDER — HYDROXYZINE HYDROCHLORIDE 25 MG/1
100 TABLET, FILM COATED ORAL
Status: CANCELLED | OUTPATIENT
Start: 2023-05-17

## 2023-05-17 RX ORDER — ACETAMINOPHEN 325 MG/1
650 TABLET ORAL EVERY 6 HOURS PRN
Status: CANCELLED | OUTPATIENT
Start: 2023-05-17

## 2023-05-17 RX ORDER — FLUTICASONE PROPIONATE 50 MCG
1 SPRAY, SUSPENSION (ML) NASAL DAILY
Status: CANCELLED | OUTPATIENT
Start: 2023-05-18

## 2023-05-17 RX ORDER — HALOPERIDOL 5 MG/1
5 TABLET ORAL
Status: CANCELLED | OUTPATIENT
Start: 2023-05-17

## 2023-05-17 RX ORDER — ACETAMINOPHEN 325 MG/1
975 TABLET ORAL EVERY 6 HOURS PRN
Status: CANCELLED | OUTPATIENT
Start: 2023-05-17

## 2023-05-17 RX ORDER — BENZTROPINE MESYLATE 1 MG/ML
0.5 INJECTION INTRAMUSCULAR; INTRAVENOUS
Status: CANCELLED | OUTPATIENT
Start: 2023-05-17

## 2023-05-17 RX ORDER — LANOLIN ALCOHOL/MO/W.PET/CERES
3 CREAM (GRAM) TOPICAL
Status: CANCELLED | OUTPATIENT
Start: 2023-05-17

## 2023-05-17 RX ORDER — LORAZEPAM 2 MG/ML
2 INJECTION INTRAMUSCULAR
Status: CANCELLED | OUTPATIENT
Start: 2023-05-17

## 2023-05-17 RX ORDER — RISPERIDONE 1 MG/1
2 TABLET, ORALLY DISINTEGRATING ORAL
Status: CANCELLED | OUTPATIENT
Start: 2023-05-17

## 2023-05-17 RX ORDER — ACETAMINOPHEN 325 MG/1
650 TABLET ORAL EVERY 4 HOURS PRN
Status: CANCELLED | OUTPATIENT
Start: 2023-05-17

## 2023-05-17 RX ORDER — BISACODYL 10 MG
10 SUPPOSITORY, RECTAL RECTAL DAILY PRN
Status: CANCELLED | OUTPATIENT
Start: 2023-05-17

## 2023-05-17 RX ORDER — BENZTROPINE MESYLATE 1 MG/1
1 TABLET ORAL
Status: CANCELLED | OUTPATIENT
Start: 2023-05-17

## 2023-05-17 RX ORDER — TRAZODONE HYDROCHLORIDE 50 MG/1
50 TABLET ORAL
Status: CANCELLED | OUTPATIENT
Start: 2023-05-17

## 2023-05-17 RX ORDER — AMOXICILLIN 250 MG
1 CAPSULE ORAL DAILY PRN
Status: CANCELLED | OUTPATIENT
Start: 2023-05-17

## 2023-05-17 RX ORDER — POLYETHYLENE GLYCOL 3350 17 G/17G
17 POWDER, FOR SOLUTION ORAL DAILY PRN
Status: CANCELLED | OUTPATIENT
Start: 2023-05-17

## 2023-05-17 RX ORDER — OLANZAPINE 10 MG/1
10 TABLET, ORALLY DISINTEGRATING ORAL ONCE
Status: COMPLETED | OUTPATIENT
Start: 2023-05-17 | End: 2023-05-17

## 2023-05-17 RX ORDER — BENZTROPINE MESYLATE 1 MG/ML
1 INJECTION INTRAMUSCULAR; INTRAVENOUS
Status: CANCELLED | OUTPATIENT
Start: 2023-05-17

## 2023-05-17 RX ADMIN — OLANZAPINE 10 MG: 10 TABLET, ORALLY DISINTEGRATING ORAL at 19:56

## 2023-05-17 RX ADMIN — RISPERIDONE 2 MG: 1 TABLET ORAL at 13:08
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
## 2023-05-17 NOTE — LETTER
[address] [facility] [address]  Dept: [phone]      ZFGJCA TRANSFER CONSENT    NAME Gokul Henderson 2000                              MRN [id number]    I have been informed of my rights regarding examination, treatment, and transfer   by Dr Lisa Crespo,*    Benefits: Specialized equipment and/or services available at the receiving facility (Include comment)________________________    Risks: Potential for delay in receiving treatment      Transfer Request   I acknowledge that my medical condition has been evaluated and explained to me by the emergency department physician or other qualified medical person and/or my attending physician who has recommended and offered to me further medical examination and treatment  I understand the Hospital's obligation with respect to the treatment and stabilization of my emergency medical condition  I nevertheless request to be transferred  I release the Hospital, the doctor, and any other persons caring for me from all responsibility or liability for any injury or ill effects that may result from my transfer and agree to accept all responsibility for the consequences of my choice to transfer, rather than receive stabilizing treatment at the Hospital  I understand that because the transfer is my request, my insurance may not provide reimbursement for the services  The Hospital will assist and direct me and my family in how to make arrangements for transfer, but the hospital is not liable for any fees charged by the transport service  In spite of this understanding, I refuse to consent to further medical examination and treatment which has been offered to me, and request transfer to  Annette  Name, [address] : [employer], [facility]   I authorize the performance of emergency medical procedures and treatments upon me in both transit and upon arrival at the receiving facility  Additionally, I authorize the release of any and all medical records to the receiving facility and request they be transported with me, if possible  I authorize the performance of emergency medical procedures and treatments upon me in both transit and upon arrival at the receiving facility  Additionally, I authorize the release of any and all medical records to the receiving facility and request they be transported with me, if possible  I understand that the safest mode of transportation during a medical emergency is an ambulance and that the Hospital advocates the use of this mode of transport  Risks of traveling to the receiving facility by car, including absence of medical control, life sustaining equipment, such as oxygen, and medical personnel has been explained to me and I fully understand them  (MAKAYLA CORRECT BOX BELOW)  [ x ]  I consent to the stated transfer and to be transported by ambulance/helicopter  [  ]  I consent to the stated transfer, but refuse transportation by ambulance and accept full responsibility for my transportation by car  I understand the risks of non-ambulance transfers and I exonerate the Hospital and its staff from any deterioration in my condition that results from this refusal     X___________________________________________    DATE  05/18/23  TIME________  Signature of patient or legally responsible individual signing on patient behalf           RELATIONSHIP TO PATIENT_________________________    Provider Certification    NAME Gokul Small [facility] 2000                              MRN [id number]    A medical screening exam was performed on the above named patient  Based on the examination:    Condition Necessitating Transfer The primary encounter diagnosis was Hallucinations  A diagnosis of Other schizophrenia ([facility] [address] ) was also pertinent to this visit      Patient Condition: The patient has been stabilized such that within reasonable medical probability, no material deterioration of the patient condition or the condition of the unborn child(chilo) is likely to result from the transfer    Reason for Transfer: Level of Care needed not available at this facility    Transfer Requirements: [address]    · Space available and qualified personnel available for treatment as acknowledged by Dre   · Agreed to accept transfer and to provide appropriate medical treatment as acknowledged by       Jose Flores  · Appropriate medical records of the examination and treatment of the patient are provided at the time of transfer   [address] [facility], [address] __SS_____  · Transfer will be performed by qualified personnel from [address] [place] [address]  and appropriate transfer equipment as required, including the use of necessary and appropriate life support measures  Provider Certification: I have examined the patient and explained the following risks and benefits of being transferred/refusing transfer to the patient/family:  General risk, such as traffic hazards, adverse weather conditions, rough terrain or turbulence, possible failure of equipment (including vehicle or aircraft), or consequences of actions of persons outside the control of the transport personnel      Based on these reasonable risks and benefits to the patient and/or the unborn child(chilo), and based upon the information available at the time of the patient’s examination, I certify that the medical benefits reasonably to be expected from the provision of appropriate medical treatments at another medical facility outweigh the increasing risks, if any, to the individual’s medical condition, and in the case of labor to the unborn child, from effecting the transfer      X____________________________________________ DATE 05/18/23        TIME_______      ORIGINAL - SEND TO MEDICAL RECORDS   COPY - SEND WITH PATIENT DURING TRANSFER

## 2023-05-17 NOTE — LETTER
"Section I - General Information    Name of Patient: Abimael Obando                 : 2000    Medicare #:____________________  Transport Date: 23 (PCS is valid for round trips on this date and for all repetitive trips in the 60-day range as noted below )  Origin: [address]                                                         Destination:__[facility]______________  Is the pt's stay covered under Medicare Part A (PPS/DRG)     (_) YES  (_) NO  Closest appropriate facility? (x_) YES  (_) NO  If no, why is transport to more distant facility required?________________________  If hosp-hosp transfer, describe services needed at 2nd facility not available at 1st facility? Behavioral Health______________________  If hospice pt, is this transport related to pt's terminal illness? (_) YES (_) NO Describe____________________________________    Section II - Medical Necessity Questionnaire  Ambulance transportation is medically necessary only if other means of transport are contraindicated or would be potentially harmful to the patient  To meet this requirement, the patient must either be \"bed confined\" or suffer from a condition such that transport by means other than ambulance is contraindicated by the patient's condition   The following questions must be answered by the medical professional signing below for this form to be valid:    1)  Describe the MEDICAL CONDITION (physical and/or mental) of this patient AT [address] [place] [address] that requires the patient to be transported in an ambulance and why transport by other means is contraindicated by the patient's condition:_Behavioral Health_____________________________________________________________________________________    2) Is the patient \"bed confined\" as defined below?     (_) YES  (x) NO  To be \"be confined\" the patient must satisfy all three of the following " conditions: (1) unable to get up from bed without Assistance; AND (2) unable to ambulate; AND (3) unable to sit in a chair or wheelchair  3) Can this patient safely be transported by car or wheelchair van (i e , seated during transport without a medical attendant or monitoring)? (x) YES  (_) NO    4) In addition to completing questions 1-3 above, please check any of the following conditions that apply*:  *Note: supporting documentation for any boxes checked must be maintained in the patient's medical records  (_)Contractures   (_)Non-Healed Fractures  (_)Patient is confused (_)Patient is comatose (_)Moderate/severe pain on movement (_x)Danger to self/others  (_)IV meds/fluids required (_)Patient is combative(_)Need or possible need for restraints (_)DVT requires elevation of lower extremity  (_)Medical attendant required (_)Requires oxygen-unable to self administer (_)Special handling/isolation/infection control precautions required (_)Unable to tolerate seated position for time needed to transport (_)Hemodynamic monitoring required en route (_)Unable to sit in a chair or wheelchair due to decubitus ulcers or other wounds (_)Cardiac monitoring required en route (_)Morbid obesity requires additional personnel/equipment to safely handle patient (_)Orthopedic device (backboard, halo, pins, traction, brace, wedge, etc,) requiring special handling during transport (_)Other(specify)_______________________________________________    Section III - Signature of Physician or Healthcare Professional    I certify that the above information is accurate based on my evaluation of this patient, and that the medical necessity provisions of 42  40(e)(1) are met, requiring that this patient be transported by ambulance  I understand this information will be used by the Centers for Medicare and Medicaid Services (CMS) to support the determination of medical necessity for ambulance services   I represent that I am the beneficiary’s attending physician; or an employee of the beneficiary’s attending physician, or the hospital or facility where the beneficiary is being treated and from which the beneficiary is being transported; that I have personal knowledge of the beneficiary’s condition at the time of transport; and that I meet all Medicare regulations and applicable State licensure laws for the credential indicated  (_) If this box is checked, I also certify that the patient is physically or mentally incapable of signing the ambulance service's claim and that the institution with which I am affiliated has furnished care, services, or assistance to the patient  My signature below is made on behalf of the patient pursuant to 42 CFR §424 36(b)(4)  In accordance with 42 CFR §424 37, the specific reason(s) that the patient is physically or mentally incapable of signing the claim form is as follows: _________________________________________________________________________________________________________      Signature of Physician* or Healthcare Professional______________________________________________________________  Signature Date 05/18/23 (For scheduled repetitive transports, this form is not valid for transports performed more than 60 days after this date)    Printed Name & Credentials of Physician or Healthcare Professional (MD, DO, RN, etc )________________________________  *Form must be signed by patient's attending physician for scheduled, repetitive transports   For non-repetitive, unscheduled ambulance transports, if unable to obtain the signature of the attending physician, any of the following may sign (choose appropriate option below)  (_) Physician Assistant   (_)  Clinical Nurse Specialist    (_)  Licensed Practical Nurse    (_)    (_)  Nurse Practitioner     (_)  Registered Nurse                (_)                         (_) Discharge Planner

## 2023-05-17 NOTE — DISCHARGE INSTRUCTIONS
Larisa Acevedojocelyn was seen and evaluated today in the emergency department over your concern of alcohol withdrawal   The workup that we performed showed no alcohol withdrawal   You have been evaluated by psychiatry and you are not homicidal or suicidal   Please return to the emergency department if you experience SI, HI, AVH or any other signs and symptoms that may be concerning to you  Please follow-up with your primary care doctor within 1 day  All questions were answered prior to discharge  Thank you for choosing [facility] for your care

## 2023-05-18 ENCOUNTER — HOSPITAL ENCOUNTER (INPATIENT)
Facility: HOSPITAL | Age: 23
DRG: 750 | End: 2023-05-18
Attending: STUDENT IN AN ORGANIZED HEALTH CARE EDUCATION/TRAINING PROGRAM | Admitting: PSYCHIATRY & NEUROLOGY
Payer: COMMERCIAL

## 2023-05-18 VITALS
DIASTOLIC BLOOD PRESSURE: 78 MMHG | HEART RATE: 79 BPM | RESPIRATION RATE: 18 BRPM | OXYGEN SATURATION: 98 % | SYSTOLIC BLOOD PRESSURE: 124 MMHG | TEMPERATURE: 97.7 F

## 2023-05-18 DIAGNOSIS — K59.00 CONSTIPATION, UNSPECIFIED CONSTIPATION TYPE: ICD-10-CM

## 2023-05-18 DIAGNOSIS — J45.20 MILD INTERMITTENT ASTHMA, UNSPECIFIED WHETHER COMPLICATED: ICD-10-CM

## 2023-05-18 DIAGNOSIS — F25.9 SCHIZOAFFECTIVE DISORDER, UNSPECIFIED TYPE (HCC): ICD-10-CM

## 2023-05-18 DIAGNOSIS — Z00.8 MEDICAL CLEARANCE FOR PSYCHIATRIC ADMISSION: ICD-10-CM

## 2023-05-18 DIAGNOSIS — R44.3 HALLUCINATIONS: ICD-10-CM

## 2023-05-18 DIAGNOSIS — N48.30 PROLONGED ERECTION: ICD-10-CM

## 2023-05-18 DIAGNOSIS — I10 HYPERTENSION, UNSPECIFIED TYPE: ICD-10-CM

## 2023-05-18 DIAGNOSIS — N48.30 PRIAPISM: ICD-10-CM

## 2023-05-18 DIAGNOSIS — K11.7 SIALORRHEA: Primary | ICD-10-CM

## 2023-05-18 DIAGNOSIS — L60.0 ONYCHOCRYPTOSIS: ICD-10-CM

## 2023-05-18 DIAGNOSIS — R68.2 DRY MOUTH: ICD-10-CM

## 2023-05-18 DIAGNOSIS — T73.0XXA: ICD-10-CM

## 2023-05-18 LAB
AMPHETAMINES SERPL QL SCN: NEGATIVE
ATRIAL RATE: 61 BPM
BARBITURATES UR QL: NEGATIVE
BENZODIAZ UR QL: NEGATIVE
COCAINE UR QL: NEGATIVE
METHADONE UR QL: NEGATIVE
OPIATES UR QL SCN: NEGATIVE
OXYCODONE+OXYMORPHONE UR QL SCN: NEGATIVE
P AXIS: 71 DEGREES
PCP UR QL: NEGATIVE
PR INTERVAL: 140 MS
QRS AXIS: 66 DEGREES
QRSD INTERVAL: 88 MS
QT INTERVAL: 404 MS
QTC INTERVAL: 406 MS
T WAVE AXIS: 60 DEGREES
THC UR QL: NEGATIVE
VENTRICULAR RATE: 61 BPM

## 2023-05-18 PROCEDURE — 93010 ELECTROCARDIOGRAM REPORT: CPT | Performed by: INTERNAL MEDICINE

## 2023-05-18 PROCEDURE — 93005 ELECTROCARDIOGRAM TRACING: CPT

## 2023-05-18 PROCEDURE — 99253 IP/OBS CNSLTJ NEW/EST LOW 45: CPT | Performed by: PHYSICIAN ASSISTANT

## 2023-05-18 RX ORDER — HALOPERIDOL 1 MG/1
1 TABLET ORAL EVERY 6 HOURS PRN
Status: DISCONTINUED | OUTPATIENT
Start: 2023-05-18 | End: 2023-05-23

## 2023-05-18 RX ORDER — ACETAMINOPHEN 325 MG/1
650 TABLET ORAL EVERY 6 HOURS PRN
Status: DISCONTINUED | OUTPATIENT
Start: 2023-05-18 | End: 2023-10-19 | Stop reason: HOSPADM

## 2023-05-18 RX ORDER — BISACODYL 10 MG
10 SUPPOSITORY, RECTAL RECTAL DAILY PRN
Status: DISCONTINUED | OUTPATIENT
Start: 2023-05-18 | End: 2023-05-18

## 2023-05-18 RX ORDER — LORAZEPAM 2 MG/ML
2 INJECTION INTRAMUSCULAR EVERY 6 HOURS PRN
Status: DISCONTINUED | OUTPATIENT
Start: 2023-05-18 | End: 2023-06-22

## 2023-05-18 RX ORDER — HYDROXYZINE 50 MG/1
50 TABLET, FILM COATED ORAL
Status: DISCONTINUED | OUTPATIENT
Start: 2023-05-18 | End: 2023-10-19 | Stop reason: HOSPADM

## 2023-05-18 RX ORDER — LANOLIN ALCOHOL/MO/W.PET/CERES
3 CREAM (GRAM) TOPICAL
Status: DISCONTINUED | OUTPATIENT
Start: 2023-05-18 | End: 2023-06-21

## 2023-05-18 RX ORDER — ACETAMINOPHEN 325 MG/1
975 TABLET ORAL EVERY 6 HOURS PRN
Status: DISCONTINUED | OUTPATIENT
Start: 2023-05-18 | End: 2023-10-19 | Stop reason: HOSPADM

## 2023-05-18 RX ORDER — LORAZEPAM 2 MG/ML
1 INJECTION INTRAMUSCULAR
Status: DISCONTINUED | OUTPATIENT
Start: 2023-05-18 | End: 2023-05-23

## 2023-05-18 RX ORDER — MAGNESIUM HYDROXIDE/ALUMINUM HYDROXICE/SIMETHICONE 120; 1200; 1200 MG/30ML; MG/30ML; MG/30ML
30 SUSPENSION ORAL EVERY 4 HOURS PRN
Status: DISCONTINUED | OUTPATIENT
Start: 2023-05-18 | End: 2023-10-19 | Stop reason: HOSPADM

## 2023-05-18 RX ORDER — HALOPERIDOL 5 MG/1
5 TABLET ORAL
Status: DISCONTINUED | OUTPATIENT
Start: 2023-05-18 | End: 2023-05-23

## 2023-05-18 RX ORDER — BENZTROPINE MESYLATE 1 MG/1
1 TABLET ORAL
Status: DISCONTINUED | OUTPATIENT
Start: 2023-05-18 | End: 2023-10-19 | Stop reason: HOSPADM

## 2023-05-18 RX ORDER — POLYETHYLENE GLYCOL 3350 17 G/17G
17 POWDER, FOR SOLUTION ORAL DAILY PRN
Status: DISCONTINUED | OUTPATIENT
Start: 2023-05-18 | End: 2023-10-19 | Stop reason: HOSPADM

## 2023-05-18 RX ORDER — BENZTROPINE MESYLATE 1 MG/ML
1 INJECTION INTRAMUSCULAR; INTRAVENOUS
Status: DISCONTINUED | OUTPATIENT
Start: 2023-05-18 | End: 2023-05-23

## 2023-05-18 RX ORDER — BISACODYL 10 MG
10 SUPPOSITORY, RECTAL RECTAL DAILY PRN
Status: DISCONTINUED | OUTPATIENT
Start: 2023-05-18 | End: 2023-05-23

## 2023-05-18 RX ORDER — ACETAMINOPHEN 325 MG/1
650 TABLET ORAL EVERY 4 HOURS PRN
Status: DISCONTINUED | OUTPATIENT
Start: 2023-05-18 | End: 2023-10-19 | Stop reason: HOSPADM

## 2023-05-18 RX ORDER — HYDROXYZINE 50 MG/1
100 TABLET, FILM COATED ORAL
Status: DISCONTINUED | OUTPATIENT
Start: 2023-05-18 | End: 2023-06-22

## 2023-05-18 RX ORDER — AMOXICILLIN 250 MG
1 CAPSULE ORAL DAILY PRN
Status: DISCONTINUED | OUTPATIENT
Start: 2023-05-18 | End: 2023-10-19 | Stop reason: HOSPADM

## 2023-05-18 RX ORDER — BENZTROPINE MESYLATE 1 MG/ML
0.5 INJECTION INTRAMUSCULAR; INTRAVENOUS
Status: DISCONTINUED | OUTPATIENT
Start: 2023-05-18 | End: 2023-05-23

## 2023-05-18 RX ORDER — HALOPERIDOL 5 MG/ML
5 INJECTION INTRAMUSCULAR
Status: DISCONTINUED | OUTPATIENT
Start: 2023-05-18 | End: 2023-05-23

## 2023-05-18 RX ORDER — LORAZEPAM 2 MG/ML
2 INJECTION INTRAMUSCULAR
Status: DISCONTINUED | OUTPATIENT
Start: 2023-05-18 | End: 2023-05-23

## 2023-05-18 RX ORDER — DIPHENHYDRAMINE HYDROCHLORIDE 50 MG/ML
50 INJECTION INTRAMUSCULAR; INTRAVENOUS EVERY 6 HOURS PRN
Status: DISCONTINUED | OUTPATIENT
Start: 2023-05-18 | End: 2023-06-22

## 2023-05-18 RX ORDER — HYDROXYZINE HYDROCHLORIDE 25 MG/1
25 TABLET, FILM COATED ORAL
Status: DISCONTINUED | OUTPATIENT
Start: 2023-05-18 | End: 2023-10-19 | Stop reason: HOSPADM

## 2023-05-18 RX ORDER — HALOPERIDOL 5 MG/ML
2.5 INJECTION INTRAMUSCULAR
Status: DISCONTINUED | OUTPATIENT
Start: 2023-05-18 | End: 2023-05-23

## 2023-05-18 RX ORDER — TRAZODONE HYDROCHLORIDE 50 MG/1
50 TABLET ORAL
Status: DISCONTINUED | OUTPATIENT
Start: 2023-05-18 | End: 2023-05-26

## 2023-05-18 RX ADMIN — Medication 3 MG: at 21:53

## 2023-05-18 RX ADMIN — BENZTROPINE MESYLATE 1 MG: 1 TABLET ORAL at 21:55

## 2023-05-18 RX ADMIN — HALOPERIDOL 5 MG: 5 TABLET ORAL at 21:55

## 2023-05-18 RX ADMIN — NICOTINE POLACRILEX 4 MG: 4 GUM, CHEWING BUCCAL at 18:24

## 2023-05-18 RX ADMIN — HALOPERIDOL 5 MG: 5 TABLET ORAL at 15:57

## 2023-05-18 RX ADMIN — NICOTINE POLACRILEX 4 MG: 4 GUM, CHEWING BUCCAL at 22:53

## 2023-05-18 RX ADMIN — HYDROXYZINE HYDROCHLORIDE 100 MG: 50 TABLET, FILM COATED ORAL at 15:57

## 2023-05-18 NOTE — ED NOTES
Patient's neighbor, Sunita Seed: [phone]      Brandon Shah, \[facility]\""  05/17/23    9905

## 2023-05-18 NOTE — ED NOTES
Insurance Authorization for admission:   Phone call placed to [employer]  Phone number: [phone]  Spoke to Alfred      4 days approved  Level of care: Acute Inpt  (201)  Review on TBD  Authorization # to be obtained by accepting facility upon arrival         EVS (Eligibility Verification System) called - [phone]  Automated system indicates: Active with [employer] (ID# [id number])    Insurance Authorization for Transportation:    Not required for CTS transport       ALEJANDRA Gutierres  05/17/23    5844
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
## 2023-05-18 NOTE — ED ATTENDING ATTESTATION
5/17/2023  IIsaias DO, saw and evaluated the patient  I have discussed the patient with the resident/non-physician practitioner and agree with the resident's/non-physician practitioner's findings, Plan of Care, and MDM as documented in the resident's/non-physician practitioner's note, except where noted  All available labs and Radiology studies were reviewed  I was present for key portions of any procedure(s) performed by the resident/non-physician practitioner and I was immediately available to provide assistance  At this point I agree with the current assessment done in the Emergency Department  I have conducted an independent evaluation of this patient a history and physical is as follows:    78-year-old male presents for evaluation of erratic behavior in the setting of schizophrenia  Patient has been seen multiple times in the ER for reported alcohol use disorder requesting detox however it seems that the patient himself has not been drinking and that it was his imaginary friend Tay Ledbetter who is drinking  Patient was here earlier for the same and was determined not to be suicidal or homicidal   He wanted to leave and so he was discharged home  He returns with his neighbor who is also suffering from schizophrenia who found the patient wandering around outside yelling at neighbors  In the ER patient is calm and cooperative  At times he seems to be talking to an imaginary friend  He does not want any medications for his symptoms at this time  Will reach out to crisis to evaluate for inpatient psychiatric treatment          ED Course         Critical Care Time  Procedures

## 2023-05-18 NOTE — EMTALA/ACUTE CARE TRANSFER
[address] [facility] [address]  Dept: [phone]      IZKRGN TRANSFER CONSENT    NAME Gokul Reynolds 2000                              MRN [id number]    I have been informed of my rights regarding examination, treatment, and transfer   by Dr Flakito Florian,*    Benefits: Specialized equipment and/or services available at the receiving facility (Include comment)________________________    Risks: Potential for delay in receiving treatment      Consent for Transfer:  I acknowledge that my medical condition has been evaluated and explained to me by the emergency department physician or other qualified medical person and/or my attending physician, who has recommended that I be transferred to the service of  Accepting Physician: Elton Hurt at [address] [place] [address] Rd Name, [address] : [address] [facility] [address], [facility]  The above potential benefits of such transfer, the potential risks associated with such transfer, and the probable risks of not being transferred have been explained to me, and I fully understand them  The doctor has explained that, in my case, the benefits of transfer outweigh the risks  I agree to be transferred  I authorize the performance of emergency medical procedures and treatments upon me in both transit and upon arrival at the receiving facility  Additionally, I authorize the release of any and all medical records to the receiving facility and request they be transported with me, if possible  I understand that the safest mode of transportation during a medical emergency is an ambulance and that the Hospital advocates the use of this mode of transport  Risks of traveling to the receiving facility by car, including absence of medical control, life sustaining equipment, such as oxygen, and medical personnel has been explained to me and I fully understand them      (MAKAYLA BARTH BOX BELOW)  [  ]  I consent to the stated transfer and to be transported by ambulance/helicopter  [  ]  I consent to the stated transfer, but refuse transportation by ambulance and accept full responsibility for my transportation by car  I understand the risks of non-ambulance transfers and I exonerate the Hospital and its staff from any deterioration in my condition that results from this refusal     X___________________________________________    DATE  05/18/23  TIME________  Signature of patient or legally responsible individual signing on patient behalf           RELATIONSHIP TO PATIENT_________________________          Provider Certification    NAME Gokul Montero [place]th 2000                              MRN [id number]    A medical screening exam was performed on the above named patient  Based on the examination:    Condition Necessitating Transfer The primary encounter diagnosis was Hallucinations  A diagnosis of Other [facility] was also pertinent to this visit      Patient Condition: The patient has been stabilized such that within reasonable medical probability, no material deterioration of the patient condition or the condition of the unborn child(chilo) is likely to result from the transfer    Reason for Transfer: Level of Care needed not available at this facility    Transfer Requirements: [address]    · Space available and qualified personnel available for treatment as acknowledged by [employer]   · Agreed to accept transfer and to provide appropriate medical treatment as acknowledged by       Debora Andino  · Appropriate medical records of the examination and treatment of the patient are provided at the time of transfer   [address], [address] _______  · Transfer will be performed by qualified personnel from [address] [place] [address]  and appropriate transfer equipment as required, including the use of necessary and appropriate life support measures  Provider Certification: I have examined the patient and explained the following risks and benefits of being transferred/refusing transfer to the patient/family:  General risk, such as traffic hazards, adverse weather conditions, rough terrain or turbulence, possible failure of equipment (including vehicle or aircraft), or consequences of actions of persons outside the control of the transport personnel      Based on these reasonable risks and benefits to the patient and/or the unborn child(chilo), and based upon the information available at the time of the patient’s examination, I certify that the medical benefits reasonably to be expected from the provision of appropriate medical treatments at another medical facility outweigh the increasing risks, if any, to the individual’s medical condition, and in the case of labor to the unborn child, from effecting the transfer      X____________________________________________ DATE 05/18/23        TIME_______      ORIGINAL - SEND TO MEDICAL RECORDS   COPY - SEND WITH PATIENT DURING TRANSFER

## 2023-05-18 NOTE — ED PROVIDER NOTES
History  Chief Complaint   Patient presents with   • Psychiatric Evaluation     Pt's neighbor brought pt in for episodes of schizophrenia; has been off medications for 1 week and they cannot get medications from grandmother; neighbor states that she found him outside wandering, yelling at neighbors, and that he has been sleeping outside on the porch because his grandmother will not let him back inside     Jose G Mariscal is a 20-year-old young man with medical history significant for schizophrenia presenting with abnormal behavior, auditory hallucinations  He was staying with his grandmother, however his grandmother is also schizophrenic and is not allowing him back in the house at this time  He can get his medications at this time, and has not established with providers in the area  He recently moved from [place]  His auditory hallucinations tell him to hurt himself, but he does not listen to them  No homicidal ideation  Prior to Admission Medications   Prescriptions Last Dose Informant Patient Reported? Taking?    albuterol (PROVENTIL HFA,VENTOLIN HFA) 90 mcg/act inhaler   No No   Sig: Inhale 2 puffs every 4 (four) hours as needed for wheezing or shortness of breath   fluticasone (FLONASE) 50 mcg/act nasal spray   No No   Si spray into each nostril daily   paliperidone palmitate (INVEGA) 234 MG/1 5ML im injection   Yes No   Sig: Inject 234 mg into a muscle every 28 days   risperiDONE (RisperDAL M-TAB) 2 mg disintegrating tablet   No No   Sig: Take 1 tablet (2 mg total) by mouth daily at bedtime   risperiDONE (RisperDAL) 2 mg tablet   Yes No   Sig: Take 2 mg by mouth daily      Facility-Administered Medications: None       Past Medical History:   Diagnosis Date   • Asthma    • Hearing impaired    • Legally blind    • Schizophrenia [facility])        Past Surgical History:   Procedure Laterality Date   • HERNIA REPAIR     • TEAR DUCT SURGERY Bilateral    • TONSILLECTOMY         Family History Problem Relation Age of Onset   • Schizophrenia Mother    • Mental illness Mother    • Abnormal EKG Sister    • Mental illness Sister      I have reviewed and agree with the history as documented  E-Cigarette/Vaping   • E-Cigarette Use Never User      E-Cigarette/Vaping Substances     Social History     Tobacco Use   • Smoking status: Every Day     Packs/day: 1 00     Years: 3 00     Pack years: 3 00     Types: Cigarettes   • Smokeless tobacco: Never   Vaping Use   • Vaping Use: Never used   Substance Use Topics   • Alcohol use: Yes     Comment: (3) 40 oz  beers per day   • Drug use: Yes     Types: Cocaine, Marijuana, Methamphetamines     Comment: patient denies drug use today        Review of Systems   All other systems reviewed and are negative  Physical Exam  ED Triage Vitals   Temperature Pulse Respirations Blood Pressure SpO2   05/17/23 1843 05/17/23 1847 05/17/23 1846 05/17/23 1846 05/17/23 1846   97 7 °F (36 5 °C) 79 18 137/64 97 %      Temp Source Heart Rate Source Patient Position - Orthostatic VS BP Location FiO2 (%)   05/17/23 1843 -- -- -- --   Temporal          Pain Score       --                    Orthostatic Vital Signs  Vitals:    05/17/23 1846 05/17/23 1847   BP: 137/64    Pulse:  79       Physical Exam  Vitals and nursing note reviewed  Constitutional:       General: He is not in acute distress  Appearance: He is well-developed  HENT:      Head: Normocephalic and atraumatic  Right Ear: External ear normal       Left Ear: External ear normal       Nose: Nose normal       Mouth/Throat:      Mouth: Mucous membranes are moist    Eyes:      Conjunctiva/sclera: Conjunctivae normal    Cardiovascular:      Rate and Rhythm: Normal rate  Heart sounds: No murmur heard  Pulmonary:      Effort: Pulmonary effort is normal  No respiratory distress  Breath sounds: Normal breath sounds  Abdominal:      General: There is no distension  Palpations: Abdomen is soft  Tenderness: There is no abdominal tenderness  Musculoskeletal:         General: No deformity  Cervical back: Normal range of motion  Skin:     General: Skin is warm and dry  Neurological:      General: No focal deficit present  Mental Status: He is alert and oriented to person, place, and time  Psychiatric:         Attention and Perception: Attention normal  He is attentive  He perceives auditory hallucinations  Mood and Affect: Mood normal  Affect is labile  Speech: Speech normal  Speech is not rapid and pressured or delayed  Behavior: Behavior is agitated  Behavior is cooperative  Thought Content: Thought content normal  Thought content does not include homicidal or suicidal ideation  Thought content does not include homicidal or suicidal plan  ED Medications  Medications   risperiDONE (RisperDAL) tablet 2 mg (has no administration in time range)   OLANZapine (ZyPREXA ZYDIS) dispersible tablet 10 mg (10 mg Oral Given 5/17/23 1956)       Diagnostic Studies  Results Reviewed     Procedure Component Value Units Date/Time    Rapid drug screen, urine [[id number]] Collected: 05/17/23 2256    Lab Status: In process Specimen: Urine, Clean Catch Updated: 05/17/23 2301    POCT alcohol breath test [[id number]]  (Normal) Resulted: 05/17/23 2042    Lab Status: Final result Updated: 05/17/23 2042     EXTBreath Alcohol 0 00                 No orders to display         Procedures  Procedures      ED Course                                       Medical Decision Making  27 yo young man presenting with hallucinations  Appears to be decompensated schizophrenia  I believe patient meets inpatient criteria at this time given multiple visits in the past few days, neighbor presented with him because she is so concerned, and he is not established care in the area and has no medications at home  Given oral Zyprexa for agitation/hallucinations  Offered 201, signed  Medically cleared  Placement pending  Patient ordered 2 mg Risperdal qHS per psychiatry recommendations from this morning  Amount and/or Complexity of Data Reviewed  Labs: ordered  Risk  Prescription drug management  Decision regarding hospitalization  Disposition  Final diagnoses:   Hallucinations   Other schizophrenia ([address] )     Time reflects when diagnosis was documented in both MDM as applicable and the Disposition within this note     Time User Action Codes Description Comment    5/17/2023  8:52 PM Lisa Rist Add [R44 3] Hallucinations     5/17/2023  8:52 PM Lisa Rist Add [F20 89] Other schizophrenia [facility])       ED Disposition     ED Disposition   Transfer to [address] [place] [address]   --    Date/Time   Wed May 17, 2023  8:52 PM    Comment   --         MD Documentation    Tonia Armijo Most Recent Value   Sending MD Dr Swathi Venegas    None         Patient's Medications   Discharge Prescriptions    No medications on file     No discharge procedures on file  PDMP Review     None           ED Provider  Attending physically available and evaluated Roseanne Fuad RODRÍGUEZ managed the patient along with the ED Attending      Electronically Signed by         Sonido Cardona MD  05/17/23 9397

## 2023-05-18 NOTE — ED NOTES
201 faxed to [facility] for review at [facility] [place] once UDS results       ALEJANDRA Ny  05/17/23    2743

## 2023-05-18 NOTE — ED NOTES
"Pt is a 26 y o  male who was brought to the ED with his neighbor due to worsening mental health symptoms  This is patient's 4th encounter in the ED in the past 3 days  Patient's neighor, Miss Amberly Huber, states that patient has been off his medications and he needs to stay in the hospital to get back on them  Patient was previously staying with his grandmother, but she won't let him back in the house or have access to his medications  Miss Amberly Huber states that patient has been sleeping on the street  Today patient was in her home and started talking all over the place, cursing, stomping, and punching the air  Miss Amberly Huber states that she can tell patient is frustrated and very anxious  She has been in contact with [facility] and [facility] to get patient help without success  Per [facility], they attempted to place patient in a dual rehab facility, however, they were told patient has to be back on his medications before they could consider him for placement  Patient reports that he returned to the area a week ago after being in [place] for 4-5 months  Patient states he was using alcohol, crack cocaine, and weed while in [place] but hasn't used in a week  Patient had intended to return to his grandmother's, but after they got into an arguement she told him he couldn't return  Patient reports daily suicidal ideations without a plan  He reports that when he thinks about trauma from his past, he starts to have generalized homicidal ideations, not towards anyone specifically  Patient reports auditory hallucinations that tell him what to do, when to do it, and how to do it  He also reports visual hallucinations of dark images and both male and female shadows that \"don't look happy, and look like they want to hurt someone\"  Patient has a history of multiple inpatient admissions, but can not recall the most recent  Patient is not currently active in outpatient treatment   He has not had his psychiatric medications for " about a week  Patient denies issues with appetite, but states he hasn't slept much while sleeping outside  Patient denies any legal issues  Throughout assessment, patient is unable to remain still and is pacing along the bedside  Patient appears very anxious and is requesting inpatient mental health treatment at this time  Patient would like to go to rehab from the inpatient setting, and understands he has to be on his medications before that can happen  Patient is agreeable to sign a 201       Chief Complaint   Patient presents with   • Psychiatric Evaluation     Pt's neighbor brought pt in for episodes of schizophrenia; has been off medications for 1 week and they cannot get medications from grandmother; neighbor states that she found him outside wandering, yelling at neighbors, and that he has been sleeping outside on the porch because his grandmother will not let him back inside     Intake Assessment completed, Safety risk Assessment completed    ALEJANDRA Melvin  05/17/23    [id number]

## 2023-05-18 NOTE — ASSESSMENT & PLAN NOTE
· VSS. No admission labs available for review. Patient is medically cleared for admission to the [facility] for treatment of the underlying psychiatric illness  JOSELITO will sign off.  Please call with questions or concerns

## 2023-05-18 NOTE — ED NOTES
Patient is accepted at [facility]  Patient is accepted by  Dr Kerri Lau is arranged with TBD   Transportation is scheduled for 5/18/2023  Patient may go to the floor after 1300        Nurse report is to be called to [phone] prior to patient transfer

## 2023-05-18 NOTE — CONSULTS
[facility]  Consult  Name: Clifton Dietz 26 y.o. male I MRN: [id number]  Unit/Bed#: [facility] 350-02 I Date of Admission: 5/18/2023   Date of Service: 5/18/2023 I Hospital Day: 0    Inpatient consult for Medical Clearance for [id number] [address] patient  Consult performed by: Amena Krueger PA-C  Consult ordered by: Sergio Agarwal MD          Assessment/Plan   Medical clearance for psychiatric admission  Assessment & Plan  · VSS. No admission labs available for review. Patient is medically cleared for admission to the [facility] for treatment of the underlying psychiatric illness  SLIM will sign off. Please call with questions or concerns    Asthma  Assessment & Plan  · No acute exacerbation  · Continue albuterol PRN    Hearing loss  Assessment & Plan  · Offer supportive care    Legally blind  Assessment & Plan  · Offer supportive care           Counseling / Coordination of Care Time: 30 minutes. Greater than 50% of total time spent on patient counseling and coordination of care. Collaboration of Care: Were Recommendations Directly Discussed with Primary Treatment Team? - No     History of Present Illness:    Clifton Dietz is a 26 y.o. male who is originally admitted to the psychiatry service due to psychosis. We are consulted for medical clearance for admission to [address] [place] [address] and treatment of underlying psychiatric illness. This patient has a past medical history significant for substance use disorder, asthma. He is legally blind and hearing impaired. Per chart review he initially presented to the ED for abnormal behavior and auditory hallucinations. It is reported that he was off of his psychiatric medications for 1 week. On evaluation patient denies any physical complaints such as headache, dizziness, chest pain, shortness of breath, nausea/vomiting/diarrhea at this time. Review of Systems:    Review of Systems   Constitutional: Negative for chills, diaphoresis and fever. HENT: Negative for congestion, rhinorrhea, sinus pressure, sinus pain and sore throat. Eyes: Negative for pain and visual disturbance. Respiratory: Negative for cough, shortness of breath and wheezing. Cardiovascular: Negative for chest pain and palpitations. Gastrointestinal: Negative for abdominal distention, abdominal pain, constipation, diarrhea, nausea and vomiting. Genitourinary: Negative for difficulty urinating, dysuria, frequency, hematuria and urgency. Musculoskeletal: Negative for arthralgias, back pain and myalgias. Skin: Negative for rash. Neurological: Negative for dizziness, weakness, light-headedness and headaches. All other systems reviewed and are negative.       Past Medical and Surgical History:     Past Medical History:   Diagnosis Date   • Asthma    • Hearing impaired    • Legally blind    • Schizophrenia ([address])        Past Surgical History:   Procedure Laterality Date   • HERNIA REPAIR     • TEAR DUCT SURGERY Bilateral    • TONSILLECTOMY         Meds/Allergies:    all medications and allergies reviewed    Allergies: No Known Allergies    Social History:     Marital Status: Single    Substance Use History:   Social History     Substance and Sexual Activity   Alcohol Use Yes    Comment: (3) 40 oz. beers per day     Social History     Tobacco Use   Smoking Status Every Day   • Packs/day: 1.00   • Years: 3.00   • Pack years: 3.00   • Types: Cigarettes   Smokeless Tobacco Never     Social History     Substance and Sexual Activity   Drug Use Yes   • Types: Cocaine, Marijuana, Methamphetamines    Comment: patient denies drug use today       Family History:    non-contributory    Physical Exam:     Vitals:   Blood Pressure: 139/64 (05/18/23 1559)  Pulse: 85 (05/18/23 1559)  Temperature: 98 °F (36.7 °C) (05/18/23 1559)  Temp Source: Temporal (05/18/23 1559)  Respirations: 16 (05/18/23 1559)  Height: 5' 3" (160 cm) (05/18/23 1450)  Weight - Scale: 70.2 kg (154 lb 12.8 oz) (05/18/23 1450)  SpO2: 100 % (05/18/23 1559)    Physical Exam  Constitutional:       General: He is not in acute distress. Appearance: Normal appearance. He is not ill-appearing, toxic-appearing or diaphoretic. HENT:      Head: Normocephalic and atraumatic. Nose: Nose normal. No congestion or rhinorrhea. Mouth/Throat:      Mouth: Mucous membranes are moist.   Eyes:      General: No scleral icterus. Extraocular Movements: Extraocular movements intact. Cardiovascular:      Rate and Rhythm: Normal rate and regular rhythm. Heart sounds: Normal heart sounds. No murmur heard. Pulmonary:      Effort: Pulmonary effort is normal. No respiratory distress. Breath sounds: Normal breath sounds. No wheezing. Abdominal:      General: Abdomen is flat. Bowel sounds are normal. There is no distension. Palpations: Abdomen is soft. Tenderness: There is no abdominal tenderness. Musculoskeletal:      Right lower leg: No edema. Left lower leg: No edema. Skin:     General: Skin is warm and dry. Coloration: Skin is not jaundiced. Neurological:      General: No focal deficit present. Mental Status: He is alert and oriented to person, place, and time. Additional Data:     Lab Results: I have personally reviewed pertinent reports.       Results from last 7 days   Lab Units 05/14/23  0347   WBC Thousand/uL 9.43   HEMOGLOBIN g/dL 15.5   HEMATOCRIT % 46.4   PLATELETS Thousands/uL 275   NEUTROS PCT % 46   LYMPHS PCT % 43   MONOS PCT % 8   EOS PCT % 2     Results from last 7 days   Lab Units 05/14/23  0347   SODIUM mmol/L 138   POTASSIUM mmol/L 3.6   CHLORIDE mmol/L 109*   CO2 mmol/L 26   BUN mg/dL 10   CREATININE mg/dL 1.00   ANION GAP mmol/L 3*   CALCIUM mg/dL 8.4   ALBUMIN g/dL 3.4*   TOTAL BILIRUBIN mg/dL 0.32   ALK PHOS U/L 66   ALT U/L 36   AST U/L 44   GLUCOSE RANDOM mg/dL 99             Lab Results   Component Value Date/Time    HGBA1C 5.2 06/16/2022 08:56 AM    HGBA1C 5.3 09/29/2021 11:14 AM    HGBA1C 6.2 (H) 08/18/2020 11:27 AM           EKG, Pathology, and Other Studies Reviewed on Admission:   · EKG on 5/13/2023 shows normal sinus rhythm, heart rate 72, QTc 418    ** Please Note: This note has been constructed using a voice recognition system.  **

## 2023-05-18 NOTE — NURSING NOTE
Pt arrived to unit with only a jacket. Per charting from [facility] ED he came in with pants, shirt and footwear. Charge nurse contacted via tiger text and they stated there was nothing left there by him.

## 2023-05-18 NOTE — PLAN OF CARE
Problem: Ineffective Coping  Goal: Participates in unit activities  Description: Interventions:  - Provide therapeutic environment   - Provide required programming   - Redirect inappropriate behaviors   Outcome: Not Progressing     Problem: SELF HARM/SUICIDALITY  Goal: Will have no self-injury during hospital stay  Description: INTERVENTIONS:  - Q 15 MINUTES: Routine safety checks  - Q WAKING SHIFT & PRN: Assess risk to determine if routine checks are adequate to maintain patient safety  - Encourage patient to participate actively in care by formulating a plan to combat response to suicidal ideation, identify supports and resources  Outcome: Not Progressing     Problem: ANXIETY  Goal: Will report anxiety at manageable levels  Description: INTERVENTIONS:  - Administer medication as ordered  - Teach and encourage coping skills  - Provide emotional support  - Assess patient/family for anxiety and ability to cope  Outcome: Not Progressing  Goal: By discharge: Patient will verbalize 2 strategies to deal with anxiety  Description: Interventions:  - Identify any obvious source/trigger to anxiety  - Staff will assist patient in applying identified coping technique/skills  - Encourage attendance of scheduled groups and activities  Outcome: Not Progressing     Problem: SUBSTANCE USE/ABUSE  Goal: Will have no detox symptoms and will verbalize plan for changing substance-related behavior  Description: INTERVENTIONS:  - Monitor physical status and assess for symptoms of withdrawal  - Administer medication as ordered  - Provide emotional support with 1 on 1 interaction with staff  - Encourage recovery focused program/ addiction education  - Assess for verbalization of changing behaviors related to substance abuse  - Initiate consults and referrals as appropriate (Case Management, Spiritual Care, etc.)  Outcome: Not Progressing  Goal: By discharge, will develop insight into their chemical dependency and sustain motivation to continue in recovery  Description: INTERVENTIONS:  - Attends all daily group sessions and scheduled AA groups  - Actively practices coping skills through participation in the therapeutic community and adherence to program rules  - Reviews and completes assignments from individual treatment plan  - Assist patient development of understanding of their personal cycle of addiction and relapse triggers  Outcome: Not Progressing  Goal: By discharge, patient will have ongoing treatment plan addressing chemical dependency  Description: INTERVENTIONS:  - Assist patient with resources and/or appointments for ongoing recovery based living  Outcome: Not Progressing     Problem: DISCHARGE PLANNING  Goal: Discharge to home or other facility with appropriate resources  Description: INTERVENTIONS:  - Identify barriers to discharge w/patient and caregiver  - Arrange for needed discharge resources and transportation as appropriate  - Identify discharge learning needs (meds, wound care, etc.)  - Arrange for interpretive services to assist at discharge as needed  - Refer to Case Management Department for coordinating discharge planning if the patient needs post-hospital services based on physician/advanced practitioner order or complex needs related to functional status, cognitive ability, or social support system  Outcome: Not Progressing

## 2023-05-19 PROBLEM — F29 PSYCHOSIS (HCC): Status: ACTIVE | Noted: 2019-05-31

## 2023-05-19 LAB
25(OH)D3 SERPL-MCNC: 52.8 NG/ML (ref 30–100)
ALBUMIN SERPL BCP-MCNC: 3.9 G/DL (ref 3.5–5)
ALP SERPL-CCNC: 53 U/L (ref 34–104)
ALT SERPL W P-5'-P-CCNC: 21 U/L (ref 7–52)
ANION GAP SERPL CALCULATED.3IONS-SCNC: 6 MMOL/L (ref 4–13)
AST SERPL W P-5'-P-CCNC: 23 U/L (ref 13–39)
BACTERIA UR QL AUTO: ABNORMAL /HPF
BASOPHILS # BLD AUTO: 0.08 THOUSANDS/ÂΜL (ref 0–0.1)
BASOPHILS NFR BLD AUTO: 1 % (ref 0–1)
BILIRUB SERPL-MCNC: 0.49 MG/DL (ref 0.2–1)
BILIRUB UR QL STRIP: NEGATIVE
BUN SERPL-MCNC: 18 MG/DL (ref 5–25)
CALCIUM SERPL-MCNC: 9.3 MG/DL (ref 8.4–10.2)
CHLORIDE SERPL-SCNC: 106 MMOL/L (ref 96–108)
CHOLEST SERPL-MCNC: 140 MG/DL
CLARITY UR: CLEAR
CO2 SERPL-SCNC: 27 MMOL/L (ref 21–32)
COLOR UR: YELLOW
CREAT SERPL-MCNC: 1.15 MG/DL (ref 0.6–1.3)
EOSINOPHIL # BLD AUTO: 0.29 THOUSAND/ÂΜL (ref 0–0.61)
EOSINOPHIL NFR BLD AUTO: 4 % (ref 0–6)
ERYTHROCYTE [DISTWIDTH] IN BLOOD BY AUTOMATED COUNT: 13 % (ref 11.6–15.1)
FOLATE SERPL-MCNC: 9.9 NG/ML
GFR SERPL CREATININE-BSD FRML MDRD: 89 ML/MIN/1.73SQ M
GLUCOSE P FAST SERPL-MCNC: 89 MG/DL (ref 65–99)
GLUCOSE SERPL-MCNC: 89 MG/DL (ref 65–140)
GLUCOSE UR STRIP-MCNC: NEGATIVE MG/DL
HCT VFR BLD AUTO: 48.1 % (ref 36.5–49.3)
HDLC SERPL-MCNC: 59 MG/DL
HGB BLD-MCNC: 15.9 G/DL (ref 12–17)
HGB UR QL STRIP.AUTO: NEGATIVE
IMM GRANULOCYTES # BLD AUTO: 0.02 THOUSAND/UL (ref 0–0.2)
IMM GRANULOCYTES NFR BLD AUTO: 0 % (ref 0–2)
KETONES UR STRIP-MCNC: NEGATIVE MG/DL
LDLC SERPL CALC-MCNC: 60 MG/DL (ref 0–100)
LEUKOCYTE ESTERASE UR QL STRIP: NEGATIVE
LYMPHOCYTES # BLD AUTO: 4.01 THOUSANDS/ÂΜL (ref 0.6–4.47)
LYMPHOCYTES NFR BLD AUTO: 48 % (ref 14–44)
MCH RBC QN AUTO: 31.2 PG (ref 26.8–34.3)
MCHC RBC AUTO-ENTMCNC: 33.1 G/DL (ref 31.4–37.4)
MCV RBC AUTO: 94 FL (ref 82–98)
MONOCYTES # BLD AUTO: 0.85 THOUSAND/ÂΜL (ref 0.17–1.22)
MONOCYTES NFR BLD AUTO: 10 % (ref 4–12)
NEUTROPHILS # BLD AUTO: 3.04 THOUSANDS/ÂΜL (ref 1.85–7.62)
NEUTS SEG NFR BLD AUTO: 37 % (ref 43–75)
NITRITE UR QL STRIP: NEGATIVE
NON-SQ EPI CELLS URNS QL MICRO: ABNORMAL /HPF
NONHDLC SERPL-MCNC: 81 MG/DL
NRBC BLD AUTO-RTO: 0 /100 WBCS
PH UR STRIP.AUTO: 6.5 [PH]
PLATELET # BLD AUTO: 255 THOUSANDS/UL (ref 149–390)
PMV BLD AUTO: 11.1 FL (ref 8.9–12.7)
POTASSIUM SERPL-SCNC: 4.1 MMOL/L (ref 3.5–5.3)
PROT SERPL-MCNC: 6.9 G/DL (ref 6.4–8.4)
PROT UR STRIP-MCNC: NEGATIVE MG/DL
RBC # BLD AUTO: 5.1 MILLION/UL (ref 3.88–5.62)
RBC #/AREA URNS AUTO: ABNORMAL /HPF
SODIUM SERPL-SCNC: 139 MMOL/L (ref 135–147)
SP GR UR STRIP.AUTO: 1.01 (ref 1–1.04)
TRIGL SERPL-MCNC: 106 MG/DL
TSH SERPL DL<=0.05 MIU/L-ACNC: 1.84 UIU/ML (ref 0.45–4.5)
UROBILINOGEN UA: NEGATIVE MG/DL
VIT B12 SERPL-MCNC: 347 PG/ML (ref 180–914)
WBC # BLD AUTO: 8.29 THOUSAND/UL (ref 4.31–10.16)
WBC #/AREA URNS AUTO: ABNORMAL /HPF

## 2023-05-19 PROCEDURE — 82607 VITAMIN B-12: CPT | Performed by: PSYCHIATRY & NEUROLOGY

## 2023-05-19 PROCEDURE — 82306 VITAMIN D 25 HYDROXY: CPT | Performed by: PSYCHIATRY & NEUROLOGY

## 2023-05-19 PROCEDURE — 99223 1ST HOSP IP/OBS HIGH 75: CPT | Performed by: PSYCHIATRY & NEUROLOGY

## 2023-05-19 PROCEDURE — 84443 ASSAY THYROID STIM HORMONE: CPT | Performed by: PSYCHIATRY & NEUROLOGY

## 2023-05-19 PROCEDURE — 85025 COMPLETE CBC W/AUTO DIFF WBC: CPT | Performed by: PSYCHIATRY & NEUROLOGY

## 2023-05-19 PROCEDURE — 82746 ASSAY OF FOLIC ACID SERUM: CPT | Performed by: PSYCHIATRY & NEUROLOGY

## 2023-05-19 PROCEDURE — 80053 COMPREHEN METABOLIC PANEL: CPT | Performed by: PSYCHIATRY & NEUROLOGY

## 2023-05-19 PROCEDURE — 81001 URINALYSIS AUTO W/SCOPE: CPT | Performed by: PSYCHIATRY & NEUROLOGY

## 2023-05-19 PROCEDURE — 80061 LIPID PANEL: CPT | Performed by: PSYCHIATRY & NEUROLOGY

## 2023-05-19 RX ORDER — RISPERIDONE 2 MG/1
2 TABLET ORAL 2 TIMES DAILY
Status: DISCONTINUED | OUTPATIENT
Start: 2023-05-19 | End: 2023-05-22

## 2023-05-19 RX ADMIN — RISPERIDONE 2 MG: 2 TABLET ORAL at 17:11

## 2023-05-19 RX ADMIN — TRAZODONE HYDROCHLORIDE 50 MG: 50 TABLET ORAL at 00:28

## 2023-05-19 RX ADMIN — HALOPERIDOL 5 MG: 5 TABLET ORAL at 16:29

## 2023-05-19 RX ADMIN — TRAZODONE HYDROCHLORIDE 50 MG: 50 TABLET ORAL at 22:24

## 2023-05-19 RX ADMIN — NICOTINE POLACRILEX 4 MG: 4 GUM, CHEWING BUCCAL at 14:41

## 2023-05-19 RX ADMIN — HALOPERIDOL 5 MG: 5 TABLET ORAL at 22:24

## 2023-05-19 RX ADMIN — RISPERIDONE 2 MG: 2 TABLET ORAL at 12:21

## 2023-05-19 RX ADMIN — LORAZEPAM 2 MG: 2 INJECTION INTRAMUSCULAR; INTRAVENOUS at 18:36

## 2023-05-19 NOTE — SOCIAL WORK
Patient Intake   Living Arrangement Pt was living with grandmother until she recently kicked him out   Can patient return home No   Address to discharge to [facility]   Patient's Telephone Number Pt does not currently have a phone   Patient's e-mail Address [email or web address]   Access to firearms Denies   Type of work Unemployed, no income   School grade/year 11th grade   Marital Status/Children Single,    [address] [facility]   Admission Status    Status of admission [address] of Residence [place]   Patient History   Stressor/Trigger Medication non-compliance x1 week. Recently kicked out of grandmother's house, bizarre behavior, homelessness, AH of voices telling him to hurt himself. Treatment History [facility] - 6/26/22-6/30/22  [facility] - 3/18/22-3/22/22  [facility] - 1/21/2022  [facility] - 11/17/2021  [facility] - 1/13/2021-02/05/2021  [facility] - 05/14/2020-05/26/2020   Current psychiatrist/therapist None   Suicide Attempts Denies   Family History of Mental Health Mom - Schizophrenia/Bipolar   ACT/ICM None. Pt has a history with [facility] but stopped 6 months ago when he moved to [address] [place] [address] Ave Denies   Substance Abuse UDS: negative  Audit Score: 41  Nicotine/Tobacco:1PPD  Pt states he was using "cocaine, crack and weed". Pt's UDS was negative and pt states "I used a few days ago". Pt unable to identify amounts used. ETOH: First use age 17, last use "a few days before I came in". Pt reports drinking 3 "40's" daily and a pint of liquor such as vodka rum or whiskey. Pt states he is interested in rehab upon discharge. Trauma/Losses Denies any trauma history.    Psychosocial loss - [employer] - 6months ago, Uncle Rock - 1 year ago       Releases of Information  None
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
## 2023-05-19 NOTE — PROGRESS NOTES
05/19/23 0855   Team Meeting   Meeting Type Daily Rounds   Team Members Present   Team Members Present Physician;Nurse;   Physician Team Member [id number] [facility] Member Dev   Patient/Family Present   Patient Present No   Patient's Family Present No   Readmit score 25. Pt new 201 admission due to homelessness. Pt not on meds for the last week. Pt reports CAH and VH of shadow figures. Pt denies all upon admission. Pt reports d/a. Pt later seen responding to internal stim PRN haldol and atarax-effective. PRN trazodone for sleep-effective.

## 2023-05-19 NOTE — NURSING NOTE
Patient was out for breakfast.  Otherwise, this writer has attempted twice to speak with the patient and both times he was sleeping.

## 2023-05-19 NOTE — NURSING NOTE
Patient was compliant with meds/meals. He denied SI/HI and visual hallucinations but endorses auditory hallucinations. Patient seemed guarded at time of assessment.

## 2023-05-19 NOTE — PROGRESS NOTES
05/19/23 4931   Team Meeting   Meeting Type Tx Team Meeting   Initial Conference Date 05/19/23   Team Members Present   Team Members Present Physician;Nurse;   Physician Team Member [id number] [facility] Team Member [facility] Management Team Member Wendie   Patient/Family Present   Patient Present No   Patient's Family Present No     Tx plan was reviewed and discussed with Pt. Pt was encouraged to attend groups. Medication was discussed with Pt. Pt signed tx plan.

## 2023-05-19 NOTE — TREATMENT PLAN
TREATMENT PLAN REVIEW - [address] 26 y.o. 2000 male MRN: [id number]    [address] [facility] 300 [facility] Room / Bed: [place] [address]/[facility] 699-52 Encounter: [id number]        Admit Date/Time:  5/18/2023  2:45 PM    Treatment Team: Attending Provider: Candy Shell MD; Patient Care Assistant: Sunday Strange; Registered Nurse: Areli Armstrong RN; Patient Care Technician: Edith George; : Dustin Keller; Licensed Practical Nurse: Windy Lo LPN; Patient Care Technician: Gerome Lefort    Diagnosis: Principal Problem:    Psychosis ([address]) ro Schizophrenia vs Schizoaffective  Active Problems:    Legally blind    Hearing loss    Asthma    Medical clearance for psychiatric admission      Patient Strengths/Assets: ability for insight, adapts well, cooperative, communication skills, compliant with medication, good past treatment response, motivation for treatment/growth, patient is on a voluntary commitment      Patient Barriers/Limitations: chronic mental illness, family conflict, financial instability, homeless, limited education, poor self-care, substance abuse    Short Term Goals: decrease in depressive symptoms, decrease in paranoid thoughts, decrease in psychotic symptoms, decrease in suicidal thoughts, control of withdrawal symptoms, ability to stay safe on the unit, ability to stay free from restraints    Long Term Goals: improvement in depression, free of suicidal thoughts, free of homicidal thoughts, resolution of psychotic symptoms, improved impulse control, improved insight, no agitation on the unit, no aggressive behavior on the unit, acceptance of need for psychiatric follow up after discharge, adequate self care, appropriate interaction with peers, stable living arrangements upon discharge, establishment of family support upon discharge    Progress Towards Goals: starting psychiatric medications as prescribed, improving    Recommended Treatment: medication management, patient medication education, group therapy, milieu therapy, continued Behavioral Health psychiatric evaluation/assessment process     Treatment Frequency: daily medication monitoring, group and milieu therapy daily, monitoring through interdisciplinary rounds, monitoring through weekly patient care conferences    Expected Discharge Date: 10 days - 5/29/2023    Discharge Plan: referrals as indicated    Treatment Plan Created/Updated By: Bonnie Montiel DO

## 2023-05-19 NOTE — NURSING NOTE
PT observed responding aloud, verbalizing using cause words, appears restlessness irritable and pacing the hallways. PT approach writer requesting for PRN for severe agitation. PRN  Haldol 5mg given at 1629. Will continue to monitor at this time.

## 2023-05-19 NOTE — NURSING NOTE
Pt is bright, social, has a good sense of humor. Pt appears mildly confused and disorganized at times. Pt gives inconsistent and often repetitive answers to assessment questions, denying SI/HI/AH/VH then RTIS, swatting at the air and muttering, "get away from me!" during conversation with nurse. Pt asked to elaborate and said, "no no not you." Pt given PRN haldol 5mg PO and cogentin 1mg PO @ 21:55; effective. Pt requesting not to be woken up for over-night CIWAs. Denies current withdrawal SS, scores this evening have both been 2.

## 2023-05-19 NOTE — NURSING NOTE
Patient was given 2 mg of ativan PRN IM right deltoid for anxiety related to his agitation. Patient had come storming out of his room slamming the door. He came to the nurses station asking for medication for agitation and anxiety. Ativan was effective. Patient is now resting in his room.

## 2023-05-20 PROCEDURE — 99232 SBSQ HOSP IP/OBS MODERATE 35: CPT | Performed by: HOSPITALIST

## 2023-05-20 RX ADMIN — NICOTINE POLACRILEX 4 MG: 4 GUM, CHEWING BUCCAL at 23:05

## 2023-05-20 RX ADMIN — NICOTINE POLACRILEX 4 MG: 4 GUM, CHEWING BUCCAL at 18:21

## 2023-05-20 RX ADMIN — RISPERIDONE 2 MG: 2 TABLET ORAL at 09:16

## 2023-05-20 RX ADMIN — RISPERIDONE 2 MG: 2 TABLET ORAL at 17:03

## 2023-05-20 RX ADMIN — HALOPERIDOL 5 MG: 5 TABLET ORAL at 22:38

## 2023-05-20 RX ADMIN — Medication 3 MG: at 21:07

## 2023-05-20 RX ADMIN — TRAZODONE HYDROCHLORIDE 50 MG: 50 TABLET ORAL at 22:37

## 2023-05-20 NOTE — NURSING NOTE
Patient agitated  "I feel like my voices are coming to life they were trying to pull me back."   Patient was offered and accepted Haldol 5mgs po prn and Trazadone 50mgs po prn   Patient has otherwise been in his room this evening following the IM Ativan prn he received at [facility].

## 2023-05-20 NOTE — NURSING NOTE
Patient has been out on the unit responding to internal stimuli. Walking in the halls, smiling inappropriately, laughing, and talking to himself.

## 2023-05-20 NOTE — NURSING NOTE
Patient was assessed in his room. He was medication compliant, guarded, with an irritable edge. He reports feeling "fine."  He denied A/V hallucinations and denied SI/HI. Patient ate his breakfast last and then returned to his room.

## 2023-05-20 NOTE — PROGRESS NOTES
Progress Note - Behavioral Health   Gokul Andersen 26 y.o. male MRN: [id number]  Unit/Bed#: [facility] 350-02 Encounter: [id number]    Assessment/Plan   Principal Problem:    Psychosis ([address]) ro Schizophrenia vs Schizoaffective  Active Problems:    Legally blind    Hearing loss    Asthma    Medical clearance for psychiatric admission      Recommended Treatment:   No medication changes at this time  Continue Risperdal 2 mg twice daily for psychosis  Continue melatonin 3 mg at bedtime for insomnia    Continue with group therapy, milieu therapy and occupational therapy. Continue frequent safety checks and vitals per unit protocol. Case discussed with treatment team.  Risks, benefits and possible side effects of Medications: Risks, benefits, and possible side effects of medications have been explained to the patient, who verbalizes understanding    ------------------------------------------------------------    Subjective:     Per nursing report, on the inpatient psychiatric unit Gokul has made limited progress. Following initial history and physical yesterday, Gokul was noted to be visible on the unit, responding to internal stimuli, talking and mumbling to himself. He appeared restless and irritable and was pacing the hallways. He received a as needed of Haldol 5 mg for agitation yesterday at around 4:30 PM, which was effective. He had a subsequent episode of agitation in the evening around 6:30 PM and received 2 mg of Ativan IM at that time and subsequently received Haldol 5 mg and trazodone 50 mg in the evening at around 10:30 PM.    On interview today, Gokul is noted to be drowsy and restless. He is seen tossing and turning in his bed, muttering and mumbling to himself "show it to me". He is noted to be delayed and scant in speech and is noted to be with poverty of thought. At the time of interview, Gokul reports that he feels "fine". He reports that he continues to experience hallucinations.   He reports he has been seeing visual hallucinations of shadows and has been experiencing auditory hallucinations of multiple voices telling him to do things (but not to harm himself) as well as auditory hallucinations of music. Gokul has limited cooperation with the interview and dozed off multiple times throughout the interview. He consented for safety on the inpatient unit and denied suicidal ideation and homicidal ideation. Following this interview he was seen walking the halls, smiling inappropriately, and talking to himself. Progress Toward Goals: slow improvement    Psychiatric Review of Systems:  Behavior over the last 24 hours: improved  Sleep: improving, slept well last night  Appetite: normal compared to baseline  Medication side effects: none verbalized  ROS: Complete review of systems is negative except as noted above. Vital signs in last 24 hours:  Temp:  [97.5 °F (36.4 °C)-98 °F (36.7 °C)] 97.5 °F (36.4 °C)  HR:  [] 103  Resp:  [16] 16  BP: ()/(57-77) 99/70    Mental Status Exam:  Appearance:  drowsy, disheveled, dressed in hospital attire, looks older than stated age, bearded, limited eye contact   Behavior:  limited cooperativity, erratic behaviors   Motor: restless and fidgety and normal gait and balance   Speech:  delayed initiation, soft and scant   Mood:  "fine"   Affect:  irritable   Thought Process:  disorganized, poverty of thought   Thought Content: paranoid ideation   Perceptual disturbances:  He reports he has been seeing visual hallucinations of shadows and has been experiencing auditory hallucinations of multiple voices telling him to do things (but not to harm himself) as well as auditory hallucinations of music. He appears to be responding to internal stimuli at this time.    Risk Potential: No active suicidal ideation, No active homicidal ideation   Cognition: oriented to self and situation, attention span appeared shorter than expected for age and cognition not formally tested   Insight:  Limited   Judgment: Limited     Current Medications:  Current Facility-Administered Medications   Medication Dose Route Frequency Provider Last Rate   • acetaminophen  650 mg Oral Q6H PRN Redd Zepeda MD     • acetaminophen  650 mg Oral Q4H PRN Redd Zepeda MD     • acetaminophen  975 mg Oral Q6H PRN Redd Zepeda MD     • aluminum-magnesium hydroxide-simethicone  30 mL Oral Q4H PRN Redd Zepeda MD     • haloperidol lactate  2.5 mg Intramuscular Q4H PRN Max 4/day Redd Zepeda MD      And   • LORazepam  1 mg Intramuscular Q4H PRN Max 4/day Redd Zepeda MD      And   • benztropine  0.5 mg Intramuscular Q4H PRN Max 4/day Redd Zepeda MD     • haloperidol lactate  5 mg Intramuscular Q4H PRN Max 4/day Redd Zepeda MD      And   • LORazepam  2 mg Intramuscular Q4H PRN Max 4/day Redd Zepeda MD      And   • benztropine  1 mg Intramuscular Q4H PRN Max 4/day Redd Zepeda MD     • benztropine  1 mg Oral Q4H PRN Max 6/day Redd Zepeda MD     • bisacodyl  10 mg Rectal Daily PRN Redd Zepeda MD     • hydrOXYzine HCL  50 mg Oral Q6H PRN Max 4/day Redd Zepeda MD      Or   • diphenhydrAMINE  50 mg Intramuscular Q6H PRN Redd Zepeda MD     • haloperidol  1 mg Oral Q6H PRN Redd Zepeda MD     • haloperidol  2.5 mg Oral Q4H PRN Max 4/day Redd Zepeda MD     • haloperidol  5 mg Oral Q4H PRN Max 4/day Redd Zepeda MD     • hydrOXYzine HCL  100 mg Oral Q6H PRN Max 4/day Redd Zepeda MD      Or   • LORazepam  2 mg Intramuscular Q6H PRN Redd Zepeda MD     • hydrOXYzine HCL  25 mg Oral Q6H PRN Max 4/day Redd Zepeda MD     • melatonin  3 mg Oral HS Redd Zepeda MD     • nicotine polacrilex  4 mg Oral Q2H PRN Redd Zepeda MD     • polyethylene glycol  17 g Oral Daily PRN Redd Zepeda MD     • risperiDONE  2 mg Oral BID Bonnie Montiel DO     • senna-docusate sodium  1 tablet Oral Daily PRN Hedy TERRAZAS Brittany Loyola MD     • traZODone  50 mg Oral HS PRN Fariba Conte MD         Behavioral Health Medications: all current active meds have been reviewed. Changes as in plan section above. Laboratory results:  I have personally reviewed all pertinent laboratory/tests results.   Recent Results (from the past 48 hour(s))   CBC and differential    Collection Time: 05/19/23  6:32 AM   Result Value Ref Range    WBC 8.29 4.31 - 10.16 Thousand/uL    RBC 5.10 3.88 - 5.62 Million/uL    Hemoglobin 15.9 12.0 - 17.0 g/dL    Hematocrit 48.1 36.5 - 49.3 %    MCV 94 82 - 98 fL    MCH 31.2 26.8 - 34.3 pg    MCHC 33.1 31.4 - 37.4 g/dL    RDW 13.0 11.6 - 15.1 %    MPV 11.1 8.9 - 12.7 fL    Platelets 227 276 - 138 Thousands/uL    nRBC 0 /100 WBCs    Neutrophils Relative 37 (L) 43 - 75 %    Immat GRANS % 0 0 - 2 %    Lymphocytes Relative 48 (H) 14 - 44 %    Monocytes Relative 10 4 - 12 %    Eosinophils Relative 4 0 - 6 %    Basophils Relative 1 0 - 1 %    Neutrophils Absolute 3.04 1.85 - 7.62 Thousands/µL    Immature Grans Absolute 0.02 0.00 - 0.20 Thousand/uL    Lymphocytes Absolute 4.01 0.60 - 4.47 Thousands/µL    Monocytes Absolute 0.85 0.17 - 1.22 Thousand/µL    Eosinophils Absolute 0.29 0.00 - 0.61 Thousand/µL    Basophils Absolute 0.08 0.00 - 0.10 Thousands/µL   Comprehensive metabolic panel    Collection Time: 05/19/23  6:32 AM   Result Value Ref Range    Sodium 139 135 - 147 mmol/L    Potassium 4.1 3.5 - 5.3 mmol/L    Chloride 106 96 - 108 mmol/L    CO2 27 21 - 32 mmol/L    ANION GAP 6 4 - 13 mmol/L    BUN 18 5 - 25 mg/dL    Creatinine 1.15 0.60 - 1.30 mg/dL    Glucose 89 65 - 140 mg/dL    Glucose, Fasting 89 65 - 99 mg/dL    Calcium 9.3 8.4 - 10.2 mg/dL    AST 23 13 - 39 U/L    ALT 21 7 - 52 U/L    Alkaline Phosphatase 53 34 - 104 U/L    Total Protein 6.9 6.4 - 8.4 g/dL    Albumin 3.9 3.5 - 5.0 g/dL    Total Bilirubin 0.49 0.20 - 1.00 mg/dL    eGFR 89 ml/min/1.73sq m   Folate    Collection Time: 05/19/23  6:32 AM   Result Value Ref Range    Folate 9.9 >5.9 ng/mL   Lipid panel    Collection Time: 05/19/23  6:32 AM   Result Value Ref Range    Cholesterol 140 See Comment mg/dL    Triglycerides 106 See Comment mg/dL    HDL, Direct 59 >=40 mg/dL    LDL Calculated 60 0 - 100 mg/dL    Non-HDL-Chol (CHOL-HDL) 81 mg/dl   TSH, 3rd generation with Free T4 reflex    Collection Time: 05/19/23  6:32 AM   Result Value Ref Range    TSH 3RD GENERATON 1.841 0.450 - 4.500 uIU/mL   Vitamin B12    Collection Time: 05/19/23  6:32 AM   Result Value Ref Range    Vitamin B-12 347 180 - 914 pg/mL   Vitamin D 25 hydroxy    Collection Time: 05/19/23  6:32 AM   Result Value Ref Range    Vit D, 25-Hydroxy 52.8 30.0 - 100.0 ng/mL   Urinalysis    Collection Time: 05/19/23 11:47 AM   Result Value Ref Range    Clarity, UA Clear Clear, Other    Color, UA Yellow Straw, Yellow, Pale Yellow    Specific Gravity, UA 1.015 1.003 - 1.040    pH, UA 6.5 4.5, 5.0, 5.5, 6.0, 6.5, 7.0, 7.5, 8.0    Glucose, UA Negative Negative mg/dl    Ketones, UA Negative Negative mg/dl    Occult Blood, UA Negative Negative    Protein, UA Negative Negative mg/dl    Nitrite, UA Negative Negative    Bilirubin, UA Negative Negative    Leukocytes, UA Negative Negative    WBC, UA 0-1 (A) None Seen, 2-4, 5-60 /hpf    RBC, UA None Seen None Seen, 2-4 /hpf    Bacteria, UA None Seen None Seen, Occasional /hpf    Epithelial Cells Occasional None Seen, Occasional /hpf    UROBILINOGEN UA Negative 1.0, Negative mg/dL        Viky Hayward MD

## 2023-05-21 PROCEDURE — 99232 SBSQ HOSP IP/OBS MODERATE 35: CPT | Performed by: HOSPITALIST

## 2023-05-21 RX ADMIN — NICOTINE POLACRILEX 4 MG: 4 GUM, CHEWING BUCCAL at 15:57

## 2023-05-21 RX ADMIN — HALOPERIDOL 2.5 MG: 1 TABLET ORAL at 21:32

## 2023-05-21 RX ADMIN — HYDROXYZINE HYDROCHLORIDE 100 MG: 50 TABLET, FILM COATED ORAL at 16:48

## 2023-05-21 RX ADMIN — NICOTINE POLACRILEX 4 MG: 4 GUM, CHEWING BUCCAL at 20:06

## 2023-05-21 RX ADMIN — Medication 3 MG: at 21:14

## 2023-05-21 RX ADMIN — BENZTROPINE MESYLATE 1 MG: 1 TABLET ORAL at 21:32

## 2023-05-21 RX ADMIN — BENZTROPINE MESYLATE 1 MG: 1 TABLET ORAL at 16:48

## 2023-05-21 RX ADMIN — RISPERIDONE 2 MG: 2 TABLET ORAL at 08:51

## 2023-05-21 RX ADMIN — HYDROXYZINE HYDROCHLORIDE 50 MG: 50 TABLET, FILM COATED ORAL at 15:57

## 2023-05-21 RX ADMIN — HALOPERIDOL 5 MG: 5 TABLET ORAL at 16:48
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
## 2023-05-21 NOTE — PROGRESS NOTES
[facility] Group Note     05/21/23 1300   Activity/Group Checklist   Group Life Skills  (Teamwork and Communication)   Attendance Attended   Attendance Duration (min) 31-45  (in and out of group)   Interactions Interacted appropriately   Affect/Mood Appropriate;Bright   Goals Achieved Discussed coping strategies; Able to listen to others; Able to engage in interactions; Able to reflect/comment on own behavior;Able to recieve feedback; Able to give feedback to another

## 2023-05-21 NOTE — NURSING NOTE
Patient out on the unit talking and laughing to himself. He was air boxing saying "come on bro."  He is responding to visual and auditory hallucinations.

## 2023-05-21 NOTE — NURSING NOTE
Patient came to the nurses station stating "Can I get an [place] shot for the voices. I never got the [place] shot."  Offered patient haldol and he agreed. Patient given 5 mg PO PRN haldol, 1 mg cogentin, and 100 atarax. Patient was able to recognize the voices were bothering him and asked for medication.

## 2023-05-21 NOTE — NURSING NOTE
Medication was effective (haldol, atarax, cogentin). Patient is no longer jumping and responding to internal stimuli.

## 2023-05-21 NOTE — NURSING NOTE
Patient is sitting in the patient lounge writing on a note pad and he is actively hallucinating - shouting in a deep and aggressive voice "leave me alone."  Patient was offered medication to help with the hallucinations he was experiencing and Trazadone to help him get off to sleep. He was willing to accept them and was given Haldol 5mgs po and Trazadone 50mgs po prn.

## 2023-05-21 NOTE — NURSING NOTE
Patient refused breakfast stating he was "not hungry."  He was medication compliant. Patient has remained in bed and was uninterested in answering assessment questions. He pulled the blanket over his head and turned his back.

## 2023-05-21 NOTE — PLAN OF CARE
Problem: SELF HARM/SUICIDALITY  Goal: Will have no self-injury during hospital stay  Description: INTERVENTIONS:  - Q 15 MINUTES: Routine safety checks  - Q WAKING SHIFT & PRN: Assess risk to determine if routine checks are adequate to maintain patient safety  - Encourage patient to participate actively in care by formulating a plan to combat response to suicidal ideation, identify supports and resources  Outcome: Progressing     Problem: DISCHARGE PLANNING  Goal: Discharge to home or other facility with appropriate resources  Description: INTERVENTIONS:  - Identify barriers to discharge w/patient and caregiver  - Arrange for needed discharge resources and transportation as appropriate  - Identify discharge learning needs (meds, wound care, etc.)  - Arrange for interpretive services to assist at discharge as needed  - Refer to Case Management Department for coordinating discharge planning if the patient needs post-hospital services based on physician/advanced practitioner order or complex needs related to functional status, cognitive ability, or social support system  Outcome: Progressing     Problem: Ineffective Coping  Goal: Participates in unit activities  Description: Interventions:  - Provide therapeutic environment   - Provide required programming   - Redirect inappropriate behaviors   Outcome: Not Progressing     Problem: ANXIETY  Goal: Will report anxiety at manageable levels  Description: INTERVENTIONS:  - Administer medication as ordered  - Teach and encourage coping skills  - Provide emotional support  - Assess patient/family for anxiety and ability to cope  Outcome: Not Progressing  Goal: By discharge: Patient will verbalize 2 strategies to deal with anxiety  Description: Interventions:  - Identify any obvious source/trigger to anxiety  - Staff will assist patient in applying identified coping technique/skills  - Encourage attendance of scheduled groups and activities  Outcome: Not Progressing Problem: SUBSTANCE USE/ABUSE  Goal: Will have no detox symptoms and will verbalize plan for changing substance-related behavior  Description: INTERVENTIONS:  - Monitor physical status and assess for symptoms of withdrawal  - Administer medication as ordered  - Provide emotional support with 1 on 1 interaction with staff  - Encourage recovery focused program/ addiction education  - Assess for verbalization of changing behaviors related to substance abuse  - Initiate consults and referrals as appropriate (Case Management, Spiritual Care, etc.)  Outcome: Not Progressing  Goal: By discharge, will develop insight into their chemical dependency and sustain motivation to continue in recovery  Description: INTERVENTIONS:  - Attends all daily group sessions and scheduled AA groups  - Actively practices coping skills through participation in the therapeutic community and adherence to program rules  - Reviews and completes assignments from individual treatment plan  - Assist patient development of understanding of their personal cycle of addiction and relapse triggers  Outcome: Not Progressing  Goal: By discharge, patient will have ongoing treatment plan addressing chemical dependency  Description: INTERVENTIONS:  - Assist patient with resources and/or appointments for ongoing recovery based living  Outcome: Not Progressing

## 2023-05-22 PROCEDURE — 99232 SBSQ HOSP IP/OBS MODERATE 35: CPT | Performed by: PSYCHIATRY & NEUROLOGY

## 2023-05-22 RX ADMIN — TRAZODONE HYDROCHLORIDE 50 MG: 50 TABLET ORAL at 23:37

## 2023-05-22 RX ADMIN — RISPERIDONE 2 MG: 2 TABLET ORAL at 09:22

## 2023-05-22 RX ADMIN — HYDROXYZINE HYDROCHLORIDE 100 MG: 50 TABLET, FILM COATED ORAL at 14:52

## 2023-05-22 RX ADMIN — HALOPERIDOL 5 MG: 5 TABLET ORAL at 20:34

## 2023-05-22 RX ADMIN — HYDROXYZINE HYDROCHLORIDE 50 MG: 50 TABLET, FILM COATED ORAL at 20:35

## 2023-05-22 RX ADMIN — RISPERIDONE 3 MG: 2 TABLET ORAL at 16:34

## 2023-05-22 RX ADMIN — Medication 3 MG: at 21:41

## 2023-05-22 RX ADMIN — HALOPERIDOL 5 MG: 5 TABLET ORAL at 14:52

## 2023-05-22 NOTE — PROGRESS NOTES
[facility] Group Note     05/22/23 1400   Activity/Group Checklist   Group Life Skills  (Finding the [employer] - Questioning [employer] Thoughts)   Attendance Attended   Attendance Duration (min) 16-30  (in and out of group)   Interactions Did not interact   Affect/Mood Calm   Goals Achieved Able to listen to others  (received resources/benefited from social presence of group)

## 2023-05-22 NOTE — NURSING NOTE
Pt resting in bed, up for medications. Declined to eat breakfast but was provided with a meal when he got up. Visible occasionally but largely withdrawn to his room. Denies SI/HI but reports AH at this time. Encouraged to attend groups. Denies any unmet needs or complaints at this time.

## 2023-05-22 NOTE — NURSING NOTE
PRN atarax 100 mg and haldol 5 mg @ 1452 for severe anxiety and agitation. Pt observed in the milieu yelling at the wall "leave me the hell alone you're annoying me." and swatting at the air. When reassessed pt stated it was effective. @ 1610 pt again reported loud auditory hallucinations, contacted  who ordered his 3mg of risperdal to be given early. Pharmacy contacted, medication given at [phone].

## 2023-05-22 NOTE — PROGRESS NOTES
05/22/23 0901   Team Meeting   Meeting Type Daily Rounds   Team Members Present   Team Members Present Physician;Nurse;   Physician Team Member [id number] Wendie   Patient/Family Present   Patient Present No   Patient's Family Present No   Pt smiling inappropriately, laughing to himself. Shouting in a deep, aggressive voice to leave him alone, offered PRN and received PRN Trazodone and Haldol. Refusing breakfast on Sunday. Med compliant. Punching in the air, responding to internal stimuli. Pt received PRN PO Haldol, cogentin and atarax. Which was effective. Pt's Risperdal was held d/t PRN administration. PO PRN Haldol and cogentin for moderate agitation.

## 2023-05-22 NOTE — NURSING NOTE
Patient was visible in the hallway during the evening shift. During evening medication pass patient was in their room responding. Patient reported auditory hallucinations of a man and woman and visual hallucinations of someone in their family. Patient received PO haldol 2.5 mg at 2132 for moderate agitation as well as cogentin 1 mg for rigidity. Upon reassessment one hour later at 2232 patient was in their bed sleeping, effective. Compliant with evening medications. Denies any unmet needs.

## 2023-05-22 NOTE — SOCIAL WORK
[facility] received phone call from HOST requesting information on d/c date. [facility] advised no date has been set for pt as medication is still being adjusted. [facility] advised inpatient team will complete bed search when pt is ready for d/c.  HOST will take pt off of active bed search list.

## 2023-05-22 NOTE — PROGRESS NOTES
Progress Note - Behavioral Health   Gokul Gutierrez 26 y.o. male MRN: [id number]  Unit/Bed#: U 350-02 Encounter: [id number]    Assessment/Plan   Principal Problem:    Psychosis ([address]) ro Schizophrenia vs Schizoaffective  Active Problems:    Legally blind    Hearing loss    Asthma    Medical clearance for psychiatric admission      Recommended Treatment:     1. Will make the following medication changes:  2. Continue with current medications:  a. Increase Risperdal to 3 mg twice daily for psychosis with intention to transition to [facility]. b. Melatonin 3 mg at bedtime for insomnia. 3. Continue with group therapy, milieu therapy and occupational therapy. 4. Continue frequent safety checks and vitals per unit protocol. 5. Continue with SLIM medical management as indicated  6. Continue coordinating with case management regarding disposition    Legal Status: 201  Disposition: To be determined, coordinating with case management    Case discussed with treatment team.  Risks, benefits and possible side effects of Medications: Risks, benefits, and possible side effects of medications have been explained to the patient, who verbalizes understanding    ------------------------------------------------------------    Subjective: Patient's chart was reviewed, and patient's progress and plan was discussed with treatment team. Per nursing report, Gokul has been complaining of auditory hallucinations and requesting Invega shot, but receiving Haldol 5 Cogentin 1 Atarax 100 which was effective and then later receiving Haldol 2.5 as needed for auditory hallucinations which was effective and was ultimately cooperative on the unit and compliant with medications. Last night patient was documented to have slept throughout the night. Gokul was evaluated this morning for continuity of care. On examination, Gokul is disorganized, requesting KEENAN/IM medications, blunted, and generally unkempt.  He states his mood is "good." He reports sleeping "good," and his energy level today is "good." His appetite has been "good." He denies any adverse effects from medications. His goals for today are to receive more medication to help with his AH. Today, Gokul is yenifer for safety on the unit. He denies suicidal ideation or homicidal ideation but endorses CAH to harm himself and others, stating "If you tell him you're gonna die," and stating he is having visual hallucinations but refuses to elaborate. Of note, he is internally stimulated and even responding intermittently with thought blocking on exam.     VS: Reviewed, within normal limits    Progress Toward Goals: Slow improvement    Psychiatric Review of Systems:  Behavior over the last 24 hours: unchanged  Sleep: improving  Appetite: adequate  Medication side effects: none verbalized  Medical ROS: Complete review of systems is negative except as noted above.     Vital signs in last 24 hours:  Temp:  [98.1 °F (36.7 °C)-99.4 °F (37.4 °C)] 99.4 °F (37.4 °C)  HR:  [61-83] 83  Resp:  [16] 16  BP: (115-128)/(53-71) 115/53    Mental Status Exam:    Appearance:  alert, minimal eye contact, appears stated age, appears older than stated age, hospital attire dressed, disheveled, bearded and  [place], overtly male   Behavior:  limited cooperativity, guarded and laying in bed   Speech:  delayed initiation, clear, normal volume and coherent   Mood:  "good"   Affect:  blunted   Thought Process:  disorganized, tangential   Associations: concrete associations   Thought Content:  paranoid ideation, negative thoughts   Perceptual Disturbances: auditory hallucinations CAH to harm self, visual hallucinations refuses to elaborate on the content of VH and appears to be responding to internal stimuli   Risk Potential: Suicidal ideation - None at present, would talk to staff if not feeling safe on the unit  Homicidal ideation - None at present  Potential for aggression - Yes, due to acute psychosis   Sensorium:  oriented to person, place and time/date   Memory:  recent and remote memory grossly intact   Consciousness:  alert and awake   Attention/Concentration: attention span and concentration appear shorter than expected for age   Insight:  limited   Judgment: limited   Gait/Station: normal gait/station   Motor Activity: no abnormal movements     Current Medications:  Current Facility-Administered Medications   Medication Dose Route Frequency Provider Last Rate   • acetaminophen  650 mg Oral Q6H PRN Mack Rivas MD     • acetaminophen  650 mg Oral Q4H PRN Mack Rivas MD     • acetaminophen  975 mg Oral Q6H PRN Mack Rivas MD     • aluminum-magnesium hydroxide-simethicone  30 mL Oral Q4H PRN Mack Rivas MD     • haloperidol lactate  2.5 mg Intramuscular Q4H PRN Max 4/day Mack Rivas MD      And   • LORazepam  1 mg Intramuscular Q4H PRN Max 4/day Mack Rivas MD      And   • benztropine  0.5 mg Intramuscular Q4H PRN Max 4/day Mack Rivas MD     • haloperidol lactate  5 mg Intramuscular Q4H PRN Max 4/day Mack Rivas MD      And   • LORazepam  2 mg Intramuscular Q4H PRN Max 4/day Mack Rivas MD      And   • benztropine  1 mg Intramuscular Q4H PRN Max 4/day Mack Rivas MD     • benztropine  1 mg Oral Q4H PRN Max 6/day Mack Rivas MD     • bisacodyl  10 mg Rectal Daily PRN Mack Rivas MD     • hydrOXYzine HCL  50 mg Oral Q6H PRN Max 4/day Mack Rivas MD      Or   • diphenhydrAMINE  50 mg Intramuscular Q6H PRN Mack Rivas MD     • haloperidol  1 mg Oral Q6H PRN Mack Rivas MD     • haloperidol  2.5 mg Oral Q4H PRN Max 4/day Mack Rivas MD     • haloperidol  5 mg Oral Q4H PRN Max 4/day Mack Rivas MD     • hydrOXYzine HCL  100 mg Oral Q6H PRN Max 4/day Mack Rivas MD      Or   • LORazepam  2 mg Intramuscular Q6H PRN Mack Rivas MD     • hydrOXYzine HCL  25 mg Oral Q6H PRN Max 4/day Hedy TERRAZAS Olena Brown MD     • melatonin  3 mg Oral HS Vicki Sauceda MD     • nicotine polacrilex  4 mg Oral Q2H PRN Vicki Sauceda MD     • polyethylene glycol  17 g Oral Daily PRN Vicki Sauceda MD     • risperiDONE  3 mg Oral BID Charlee Dodge DO     • senna-docusate sodium  1 tablet Oral Daily PRN Vicki Sauceda MD     • traZODone  50 mg Oral HS PRN Vicki Sauceda MD         Behavioral Health Medications: all current active meds have been reviewed. Changes as in plan section above. Laboratory results:  I have personally reviewed all pertinent laboratory/tests results. No results found for this or any previous visit (from the past 48 hour(s)). This note has been constructed using a voice recognition system. There may be translation, syntax, or grammatical errors. If you have any questions, please contact the dictating author.     Charlee Dodge DO  Psychiatry Residency, PGY-1

## 2023-05-22 NOTE — PLAN OF CARE
Problem: Ineffective Coping  Goal: Participates in unit activities  Description: Interventions:  - Provide therapeutic environment   - Provide required programming   - Redirect inappropriate behaviors   Outcome: Progressing     Problem: SELF HARM/SUICIDALITY  Goal: Will have no self-injury during hospital stay  Description: INTERVENTIONS:  - Q 15 MINUTES: Routine safety checks  - Q WAKING SHIFT & PRN: Assess risk to determine if routine checks are adequate to maintain patient safety  - Encourage patient to participate actively in care by formulating a plan to combat response to suicidal ideation, identify supports and resources  Outcome: Progressing     Problem: ANXIETY  Goal: Will report anxiety at manageable levels  Description: INTERVENTIONS:  - Administer medication as ordered  - Teach and encourage coping skills  - Provide emotional support  - Assess patient/family for anxiety and ability to cope  Outcome: Progressing  Goal: By discharge: Patient will verbalize 2 strategies to deal with anxiety  Description: Interventions:  - Identify any obvious source/trigger to anxiety  - Staff will assist patient in applying identified coping technique/skills  - Encourage attendance of scheduled groups and activities  Outcome: Progressing     Problem: SUBSTANCE USE/ABUSE  Goal: Will have no detox symptoms and will verbalize plan for changing substance-related behavior  Description: INTERVENTIONS:  - Monitor physical status and assess for symptoms of withdrawal  - Administer medication as ordered  - Provide emotional support with 1 on 1 interaction with staff  - Encourage recovery focused program/ addiction education  - Assess for verbalization of changing behaviors related to substance abuse  - Initiate consults and referrals as appropriate (Case Management, Spiritual Care, etc.)  Outcome: Progressing  Goal: By discharge, will develop insight into their chemical dependency and sustain motivation to continue in recovery  Description: INTERVENTIONS:  - Attends all daily group sessions and scheduled AA groups  - Actively practices coping skills through participation in the therapeutic community and adherence to program rules  - Reviews and completes assignments from individual treatment plan  - Assist patient development of understanding of their personal cycle of addiction and relapse triggers  Outcome: Progressing  Goal: By discharge, patient will have ongoing treatment plan addressing chemical dependency  Description: INTERVENTIONS:  - Assist patient with resources and/or appointments for ongoing recovery based living  Outcome: Progressing     Problem: DISCHARGE PLANNING  Goal: Discharge to home or other facility with appropriate resources  Description: INTERVENTIONS:  - Identify barriers to discharge w/patient and caregiver  - Arrange for needed discharge resources and transportation as appropriate  - Identify discharge learning needs (meds, wound care, etc.)  - Arrange for interpretive services to assist at discharge as needed  - Refer to Case Management Department for coordinating discharge planning if the patient needs post-hospital services based on physician/advanced practitioner order or complex needs related to functional status, cognitive ability, or social support system  Outcome: Progressing

## 2023-05-23 PROCEDURE — 99232 SBSQ HOSP IP/OBS MODERATE 35: CPT | Performed by: PSYCHIATRY & NEUROLOGY

## 2023-05-23 RX ORDER — HALOPERIDOL 5 MG/ML
2.5 INJECTION INTRAMUSCULAR
Status: DISCONTINUED | OUTPATIENT
Start: 2023-05-23 | End: 2023-05-26

## 2023-05-23 RX ORDER — HALOPERIDOL 5 MG/ML
5 INJECTION INTRAMUSCULAR
Status: DISCONTINUED | OUTPATIENT
Start: 2023-05-23 | End: 2023-05-26

## 2023-05-23 RX ORDER — HALOPERIDOL 1 MG/1
2 TABLET ORAL
Status: DISCONTINUED | OUTPATIENT
Start: 2023-05-23 | End: 2023-05-26

## 2023-05-23 RX ORDER — BENZTROPINE MESYLATE 1 MG/ML
0.5 INJECTION INTRAMUSCULAR; INTRAVENOUS
Status: DISCONTINUED | OUTPATIENT
Start: 2023-05-23 | End: 2023-05-26

## 2023-05-23 RX ORDER — FLUTICASONE PROPIONATE 50 MCG
1 SPRAY, SUSPENSION (ML) NASAL DAILY
Status: DISCONTINUED | OUTPATIENT
Start: 2023-05-23 | End: 2023-07-30

## 2023-05-23 RX ORDER — ALBUTEROL SULFATE 90 UG/1
2 AEROSOL, METERED RESPIRATORY (INHALATION) EVERY 4 HOURS PRN
Status: DISCONTINUED | OUTPATIENT
Start: 2023-05-23 | End: 2023-10-19 | Stop reason: HOSPADM

## 2023-05-23 RX ORDER — LORAZEPAM 2 MG/ML
2 INJECTION INTRAMUSCULAR
Status: DISCONTINUED | OUTPATIENT
Start: 2023-05-23 | End: 2023-05-26

## 2023-05-23 RX ORDER — HALOPERIDOL 5 MG/1
5 TABLET ORAL
Status: DISCONTINUED | OUTPATIENT
Start: 2023-05-23 | End: 2023-05-26

## 2023-05-23 RX ORDER — RISPERIDONE 2 MG/1
2 TABLET, ORALLY DISINTEGRATING ORAL
Status: DISCONTINUED | OUTPATIENT
Start: 2023-05-23 | End: 2023-05-23

## 2023-05-23 RX ORDER — BENZTROPINE MESYLATE 1 MG/ML
1 INJECTION INTRAMUSCULAR; INTRAVENOUS
Status: DISCONTINUED | OUTPATIENT
Start: 2023-05-23 | End: 2023-05-26

## 2023-05-23 RX ORDER — LORAZEPAM 2 MG/ML
1 INJECTION INTRAMUSCULAR
Status: DISCONTINUED | OUTPATIENT
Start: 2023-05-23 | End: 2023-05-26

## 2023-05-23 RX ADMIN — TRAZODONE HYDROCHLORIDE 50 MG: 50 TABLET ORAL at 21:00

## 2023-05-23 RX ADMIN — Medication 3 MG: at 21:00

## 2023-05-23 RX ADMIN — HALOPERIDOL 2.5 MG: 1 TABLET ORAL at 10:14

## 2023-05-23 RX ADMIN — RISPERIDONE 3 MG: 2 TABLET ORAL at 09:01

## 2023-05-23 RX ADMIN — FLUTICASONE PROPIONATE 1 SPRAY: 50 SPRAY, METERED NASAL at 15:35

## 2023-05-23 RX ADMIN — RISPERIDONE 3 MG: 2 TABLET ORAL at 15:35

## 2023-05-23 RX ADMIN — HYDROXYZINE HYDROCHLORIDE 50 MG: 50 TABLET, FILM COATED ORAL at 18:56

## 2023-05-23 RX ADMIN — HALOPERIDOL 5 MG: 5 TABLET ORAL at 18:56

## 2023-05-23 NOTE — NURSING NOTE
Patient came out of room, requesting PRN medications for anxiety and voices. Patient said "I can't sleep because of it." PRN trazodone was available for sleep. This writer encouraged patient to take PRN trazodone 50mg as medication for anxiety was not due for administration. Patient agree and PRN trazodone was administered. Will monitor for effectiveness.

## 2023-05-23 NOTE — PLAN OF CARE
Problem: Ineffective Coping  Goal: Participates in unit activities  Description: Interventions:  - Provide therapeutic environment   - Provide required programming   - Redirect inappropriate behaviors   5/23/2023 1230 by Christopher Cote RN  Outcome: Progressing  5/23/2023 1229 by Christopher Cote RN  Outcome: Progressing     Problem: SELF HARM/SUICIDALITY  Goal: Will have no self-injury during hospital stay  Description: INTERVENTIONS:  - Q 15 MINUTES: Routine safety checks  - Q WAKING SHIFT & PRN: Assess risk to determine if routine checks are adequate to maintain patient safety  - Encourage patient to participate actively in care by formulating a plan to combat response to suicidal ideation, identify supports and resources  5/23/2023 1230 by Christopher Cote RN  Outcome: Progressing  5/23/2023 1229 by Christopher Cote RN  Outcome: Progressing     Problem: ANXIETY  Goal: Will report anxiety at manageable levels  Description: INTERVENTIONS:  - Administer medication as ordered  - Teach and encourage coping skills  - Provide emotional support  - Assess patient/family for anxiety and ability to cope  5/23/2023 1230 by Christopher Cote RN  Outcome: Progressing  5/23/2023 1229 by Christopher Cote RN  Outcome: Progressing  Goal: By discharge: Patient will verbalize 2 strategies to deal with anxiety  Description: Interventions:  - Identify any obvious source/trigger to anxiety  - Staff will assist patient in applying identified coping technique/skills  - Encourage attendance of scheduled groups and activities  5/23/2023 1230 by Christopher Cote RN  Outcome: Progressing  5/23/2023 1229 by Christopher Cote RN  Outcome: Progressing     Problem: SUBSTANCE USE/ABUSE  Goal: Will have no detox symptoms and will verbalize plan for changing substance-related behavior  Description: INTERVENTIONS:  - Monitor physical status and assess for symptoms of withdrawal  - Administer medication as ordered  - Provide emotional support with 1 on 1 interaction with staff  - Encourage recovery focused program/ addiction education  - Assess for verbalization of changing behaviors related to substance abuse  - Initiate consults and referrals as appropriate (Case Management, Spiritual Care, etc.)  5/23/2023 1230 by Shravan Flores RN  Outcome: Progressing  5/23/2023 1229 by Shravan Flores RN  Outcome: Progressing  Goal: By discharge, will develop insight into their chemical dependency and sustain motivation to continue in recovery  Description: INTERVENTIONS:  - Attends all daily group sessions and scheduled AA groups  - Actively practices coping skills through participation in the therapeutic community and adherence to program rules  - Reviews and completes assignments from individual treatment plan  - Assist patient development of understanding of their personal cycle of addiction and relapse triggers  5/23/2023 1230 by Shravan Flores RN  Outcome: Progressing  5/23/2023 1229 by Shravan Flores RN  Outcome: Progressing  Goal: By discharge, patient will have ongoing treatment plan addressing chemical dependency  Description: INTERVENTIONS:  - Assist patient with resources and/or appointments for ongoing recovery based living  5/23/2023 1230 by Shravan Flores RN  Outcome: Progressing  5/23/2023 1229 by Shravan Flores RN  Outcome: Progressing     Problem: DISCHARGE PLANNING  Goal: Discharge to home or other facility with appropriate resources  Description: INTERVENTIONS:  - Identify barriers to discharge w/patient and caregiver  - Arrange for needed discharge resources and transportation as appropriate  - Identify discharge learning needs (meds, wound care, etc.)  - Arrange for interpretive services to assist at discharge as needed  - Refer to Case Management Department for coordinating discharge planning if the patient needs post-hospital services based on physician/advanced practitioner order or complex needs related to functional status, cognitive ability, or social support system  5/23/2023 1230 by Irasema Warren RN  Outcome: Progressing  5/23/2023 1229 by Irasema Warren RN  Outcome: Progressing

## 2023-05-23 NOTE — NURSING NOTE
PRN trazodone 50mg was effective. Patient had no further complaints and appears to be resting in his room.

## 2023-05-23 NOTE — PROGRESS NOTES
05/23/23 0845   Team Meeting   Meeting Type Daily Rounds   Team Members Present   Team Members Present Physician;Nurse;   Physician Team Member [id number] Member Dev   Patient/Family Present   Patient Present No   Patient's Family Present No   PRN atarax and haldol last night for anxiety and agitation. Pt was yelling at the wall and swatting at the air. PRNs-effective. Pt later reported loud AH. Pt received scheduled meds. PRN haldol given later at night due to responding to internal stim and agitation-effective. PRN atarax for anxiety-effective. Pt with poor sleep. PRN trazodone for sleep-effective. DC tbd.

## 2023-05-23 NOTE — NURSING NOTE
The pt was pacing the anthony and responding to internal stim, he was yelling and swiping at the  air periodically. Upon approach the pt was calm, cooperative and pleasant. The pt denied pain, he denied depression and anxiety as well. The pt admitted to auditory and visual hallucinations, he said the voices are talking to him regarding how bad he used to treat people and occasionally taunting him, he also  said he see black shadows and often swipe at them. The pt received Haldol 2.5 mg po at 1010 for his moderate agitation, it had minimal effect. The pt was pleasant and cooperative when this writer sat to speak with him late am.   The pt's appetite is good.

## 2023-05-23 NOTE — PROGRESS NOTES
Progress Note - Behavioral Health   Gokul Schmidt [place] 26 y.o. male MRN: [id number]  Unit/Bed#: U 350-02 Encounter: [id number]    Assessment/Plan   Principal Problem:    Psychosis ([address]) ro Schizophrenia vs Schizoaffective  Active Problems:    Legally blind    Hearing loss    Asthma    Medical clearance for psychiatric admission      Recommended Treatment:     1. Will make the following medication changes:  2. Continue with current medications:  a. Risperdal to 3 mg twice daily for psychosis with intention to transition to R[place]i [place]. i. Patient has been receiving as needed Haldol with good effect, consider cross titration to haloperidol if patient should fail max dose Risperdal 8 mg daily  b. Melatonin 3 mg at bedtime for insomnia. c. Increase as needed Haldol order set from antipsychotic naïve set to previous antipsychotic use  3. Continue with group therapy, milieu therapy and occupational therapy. 4. Continue frequent safety checks and vitals per unit protocol. 5. Continue with [facility] medical management as indicated  6. Continue coordinating with case management regarding disposition    Legal Status: 201  Disposition: To be determined, coordinating with case management    Case discussed with treatment team.  Risks, benefits and possible side effects of Medications: Risks, benefits, and possible side effects of medications have been explained to the patient, who verbalizes understanding    ------------------------------------------------------------    Subjective: Patient's chart was reviewed, and patient's progress and plan was discussed with treatment team. Per nursing report, Gokul has been complaining of auditory hallucinations and requesting as needed medication, receiving as needed Atarax 100 mg and as needed trazodone 50 mg for severe agitation/anxiety and insomnia respectively and was ultimately cooperative on the unit and compliant with medications.  Last night patient was documented to have slept throughout the night. Gokul was evaluated this morning for continuity of care. On examination, Gokul is disorganized, requesting KEENAN/IM medications, blunted, and generally unkempt. He states his mood is "happy."  Despite obvious mood incongruency. He is intermittently responding throughout the interview, shouting with Yazdanism references out of context. He reports sleeping "perfect" and his energy level today is "perfect." His appetite has been "good." He denies any adverse effects from medications. His goals for today are to receive more medication to help with his AH. Today, Gokul is yenifer for safety on the unit. He denies suicidal ideation or homicidal ideation but endorses auditory hallucinations of multiple voices that are distressing to him, saying derogatory things to him and telling him to kill himself, he states he sees shadow people following him, specifically noting a man in a baseball. Despite the unpleasant nature of the patient's perceptual disturbances he is pleasant and calm on approach, he was advised to ask nursing for as needed medication for his agitation and hallucinations to which he was amenable. VS: Reviewed, within normal limits    Progress Toward Goals: Slow improvement    Psychiatric Review of Systems:  Behavior over the last 24 hours: unchanged  Sleep: improving  Appetite: adequate  Medication side effects: none verbalized  Medical ROS: Complete review of systems is negative except as noted above.     Vital signs in last 24 hours:  Temp:  [98.2 °F (36.8 °C)] 98.2 °F (36.8 °C)  HR:  [60] 60  Resp:  [16] 16  BP: (115)/(78) 115/78    Mental Status Exam:    Appearance:  alert, minimal eye contact, appears stated age, appears older than stated age, hospital attire dressed, disheveled, bearded and  [place], overtly male   Behavior:  limited cooperativity, guarded and laying in bed   Speech:  delayed initiation, clear, normal volume and coherent   Mood: "Happy"   Affect:  blunted, mood incongruent   Thought Process:  disorganized, tangential   Associations: concrete associations   Thought Content:  paranoid ideation, Taoist preoccupation   Perceptual Disturbances: auditory hallucinations CAH to harm self, visual hallucinations Shadow figure and a baseball cap and appears to be responding to internal stimuli   Risk Potential: Suicidal ideation - None at present, would talk to staff if not feeling safe on the unit  Homicidal ideation - None at present  Potential for aggression - Yes, due to acute psychosis   Sensorium:  oriented to person, place and time/date   Memory:  recent and remote memory grossly intact   Consciousness:  alert and awake   Attention/Concentration: attention span and concentration appear shorter than expected for age   Insight:  limited   Judgment: limited   Gait/Station: normal gait/station   Motor Activity: no abnormal movements     Current Medications:  Current Facility-Administered Medications   Medication Dose Route Frequency Provider Last Rate   • acetaminophen  650 mg Oral Q6H PRN Caitie Glasgow MD     • acetaminophen  650 mg Oral Q4H PRN Caitie Glasgow MD     • acetaminophen  975 mg Oral Q6H PRN Caitie Glasgow MD     • albuterol  2 puff Inhalation Q4H PRN Caitie Glasgow MD     • aluminum-magnesium hydroxide-simethicone  30 mL Oral Q4H PRN Caitie Glasgow MD     • haloperidol lactate  2.5 mg Intramuscular Q6H PRN Max 4/day Xavier Hammed, DO      And   • LORazepam  1 mg Intramuscular Q6H PRN Max 4/day Xavier Hammed, DO      And   • benztropine  0.5 mg Intramuscular Q6H PRN Max 4/day Xavier Hammed, DO     • haloperidol lactate  5 mg Intramuscular Q4H PRN Max 4/day Xavier Hammed, DO      And   • LORazepam  2 mg Intramuscular Q4H PRN Max 4/day Xavier Hammed, DO      And   • benztropine  1 mg Intramuscular Q4H PRN Max 4/day Xavier Hammed, DO     • benztropine  1 mg Oral Q4H PRN Max 6/day Caitie Glasgow MD     • hydrOXYzine HCL  50 mg Oral Q6H PRN Max 4/day Caitie Glasgow MD      Or   • diphenhydrAMINE  50 mg Intramuscular Q6H PRN Caitie Glasgow MD     • fluticasone  1 spray Nasal Daily Caitie Glasgow MD     • haloperidol  2 mg Oral Q4H PRN Max 6/day Xavier Camacho DO     • haloperidol  5 mg Oral Q6H PRN Max 4/day Xavier Camacho DO     • haloperidol  5 mg Oral Q4H PRN Max 4/day Allison Brock, DO     • hydrOXYzine HCL  100 mg Oral Q6H PRN Max 4/day Caitie Glasgow MD      Or   • LORazepam  2 mg Intramuscular Q6H PRN Caitie Glasgow MD     • hydrOXYzine HCL  25 mg Oral Q6H PRN Max 4/day Caitie Glasgow MD     • melatonin  3 mg Oral HS Caitie Glasgow MD     • nicotine polacrilex  4 mg Oral Q2H PRN Caitie Glasgow MD     • polyethylene glycol  17 g Oral Daily PRN Caitie Glasgow MD     • risperiDONE  3 mg Oral BID Xavier Camacho DO     • senna-docusate sodium  1 tablet Oral Daily PRN Caitie Glasgow MD     • traZODone  50 mg Oral HS PRN Caitie Glasgow MD         Behavioral Health Medications: all current active meds have been reviewed. Changes as in plan section above. Laboratory results:  I have personally reviewed all pertinent laboratory/tests results. No results found for this or any previous visit (from the past 48 hour(s)). This note has been constructed using a voice recognition system. There may be translation, syntax, or grammatical errors. If you have any questions, please contact the dictating author.     Xavier Camacho DO  Psychiatry Residency, PGY-1

## 2023-05-23 NOTE — NURSING NOTE
Patient is responding in milieu and patient is getting increasingly agitated with responses. This writer offered headphones, patient declined. Patient came back to nurse's station and requested medication for agitation. Based behaviors observed, PRN haldol 5mg for severe agitation was administered along with atarax 50mg for moderate anxiety caused by the voices. Will monitor for effectiveness.

## 2023-05-23 NOTE — NURSING NOTE
Patient is in and out of the community. Patient is pleasant on approach. Patient responds to internal stimuli. At times, patient appears agitated, but when questions, Patient states, "I'm okay." PRN haldol 5mg was administered for severe agitation at 2034. Patient requested PRN medication stating, "I need it. It causing me more anxiety at night." PRN atarax 50mg was administered at 2034 for moderate anxiety. Patient reported both PRN were effective. Patient noticeably calmer and observed watching TV in dayroom. Patient was compliant with scheduled medications. Staff to maintain continuous rounding for safety and support.

## 2023-05-23 NOTE — PROGRESS NOTES
[facility] Group Note     05/23/23 1419   Activity/Group Checklist   Group Personal control  (Mindfulness Techniques - 5 Senses Grounding and Progressive Muscle Relaxation)   Attendance Attended   Attendance Duration (min) 16-30  (in and out of group)   Interactions Disorganized interaction  (completed activity and did share with group appropriately, but was responding to internal stimuli)   Affect/Mood Calm  (Incongruent at times)   Goals Achieved Discussed coping strategies; Able to listen to others; Able to engage in interactions; Able to reflect/comment on own behavior;Able to recieve feedback; Able to give feedback to another

## 2023-05-24 PROCEDURE — 99232 SBSQ HOSP IP/OBS MODERATE 35: CPT | Performed by: PSYCHIATRY & NEUROLOGY

## 2023-05-24 RX ORDER — RISPERIDONE 2 MG/1
4 TABLET ORAL 2 TIMES DAILY
Status: DISCONTINUED | OUTPATIENT
Start: 2023-05-24 | End: 2023-05-26

## 2023-05-24 RX ADMIN — HYDROXYZINE HYDROCHLORIDE 25 MG: 25 TABLET ORAL at 15:09

## 2023-05-24 RX ADMIN — NICOTINE POLACRILEX 4 MG: 4 GUM, CHEWING BUCCAL at 15:09

## 2023-05-24 RX ADMIN — RISPERIDONE 4 MG: 2 TABLET ORAL at 15:59

## 2023-05-24 RX ADMIN — Medication 3 MG: at 21:58

## 2023-05-24 RX ADMIN — HALOPERIDOL 5 MG: 5 TABLET ORAL at 18:18

## 2023-05-24 RX ADMIN — RISPERIDONE 3 MG: 2 TABLET ORAL at 09:55

## 2023-05-24 NOTE — PROGRESS NOTES
05/24/23 0909   Team Meeting   Meeting Type Daily Rounds   Team Members Present   Team Members Present Physician;Nurse;   Physician Team Member [id number] [facility] Team Member [facility] Management Team Member Wendie   Patient/Family Present   Patient Present No   Patient's Family Present No     Pt appears sexually preoccupied, responding to internal stimuli in the hallway. Pt given PRN Haldol which was minimally effective. Cross-taper from Risperdal to Haldol discussed. Discharge to be determined.

## 2023-05-24 NOTE — CONSULTS
Good appettie written in progress notes, consult received for pt requesting double portions, however, patient refusing meals on occasion, refused both breakfast and lunch today. Had a 3 lb weight gain since admission.  Do not recommend double portions at this time

## 2023-05-24 NOTE — NURSING NOTE
Pt slept through breakfast, compliant with am meds, denying HI/SI/AH/VH. Pt remains in bed with sheets pulled over head at start of this note. Pt reports no pain, denies anxiety and depression. NO unmet needs expressed.

## 2023-05-24 NOTE — NURSING NOTE
Pt awake, pacing in his room, overheard talking in the dark just prior to this speaker entering the room, at which point the patient stopped speaking out loud. When asked if he was still hearing voices pt responded "no they're good"  Pt also denied being agitated. When asked if the medication he received was helping him the pt said "yes sir".

## 2023-05-24 NOTE — NURSING NOTE
Pt overheard laughing and carrying on a one-conversation in his room while alone. Pt denying AH/VH but also appears to be responding to internal stimuli while on the milieu,  Also chuckling to himself.   Pt does report PRN ATARAX given a 1509 to be effective at reducing anxiety

## 2023-05-24 NOTE — PLAN OF CARE
Problem: ANXIETY  Goal: Will report anxiety at manageable levels  Description: INTERVENTIONS:  - Administer medication as ordered  - Teach and encourage coping skills  - Provide emotional support  - Assess patient/family for anxiety and ability to cope  Outcome: Progressing  Goal: By discharge: Patient will verbalize 2 strategies to deal with anxiety  Description: Interventions:  - Identify any obvious source/trigger to anxiety  - Staff will assist patient in applying identified coping technique/skills  - Encourage attendance of scheduled groups and activities  Outcome: Progressing     Problem: SUBSTANCE USE/ABUSE  Goal: Will have no detox symptoms and will verbalize plan for changing substance-related behavior  Description: INTERVENTIONS:  - Monitor physical status and assess for symptoms of withdrawal  - Administer medication as ordered  - Provide emotional support with 1 on 1 interaction with staff  - Encourage recovery focused program/ addiction education  - Assess for verbalization of changing behaviors related to substance abuse  - Initiate consults and referrals as appropriate (Case Management, Spiritual Care, etc.)  Outcome: Progressing  Goal: By discharge, will develop insight into their chemical dependency and sustain motivation to continue in recovery  Description: INTERVENTIONS:  - Attends all daily group sessions and scheduled AA groups  - Actively practices coping skills through participation in the therapeutic community and adherence to program rules  - Reviews and completes assignments from individual treatment plan  - Assist patient development of understanding of their personal cycle of addiction and relapse triggers  Outcome: Progressing  Goal: By discharge, patient will have ongoing treatment plan addressing chemical dependency  Description: INTERVENTIONS:  - Assist patient with resources and/or appointments for ongoing recovery based living  Outcome: Progressing

## 2023-05-24 NOTE — NURSING NOTE
Pt observed responding on the milieu, mostly laughing and chattering by himself. At one point pt was observed with his forehead against the glass of the nurses station, admitted his was hearing voices when staff asked him what was wrong. Pt told this writer "I'm hearing those voices again,  Telling me stuff about my mother and about my family,  Things they know I don't want to hear". Pt also stated " sometimes they're ok but now they're making me agiated."   Pt was given PRN HALDOL 5mg PO for severe agitation at 1818 and returned to his room to lay down.

## 2023-05-24 NOTE — PROGRESS NOTES
Progress Note - Behavioral Health   Gokul Cr 26 y.o. male MRN: [id number]  Unit/Bed#: [facility] 350-02 Encounter: [id number]    Assessment/Plan   Principal Problem:    Psychosis ([address]) ro Schizophrenia vs Schizoaffective  Active Problems:    Legally blind    Hearing loss    Asthma    Medical clearance for psychiatric admission      Recommended Treatment:   Increase Risperdal to 4 mg BID for psychosis  Continue melatonin 3 mg nightly for insomnia    Continue with group therapy, milieu therapy and occupational therapy. Continue frequent safety checks and vitals per unit protocol. Case discussed with treatment team.  Risks, benefits and possible side effects of Medications: Risks, benefits, and possible side effects of medications have been explained to the patient, who verbalizes understanding    Current Legal Status: 201  Disposition: TBD  ------------------------------------------------------------    Subjective: Per nursing report, Gokul has been RIS, pacing the anthony, yelling but calm and cooperative on approach. Patient denied anxiety/depression. He endorsed AVH and shared the voices stated how bad he used to treat people and would want him occasionally. He also stated he was seeing black shadows and would sleep at them. Later in the night, patient was noted to be walking around the unit, socializing but continued to appear internally stimulated. He is compliant with scheduled medications. Patient received 25 mg po atarax at 1509 as well as Haldol 5 mg and atarax 50 mg po at 1856 for moderate anxiety. Patient also received trazodone 50 mg po at 2100. Per nursing, patient slept through breakfast this morning. Today, Gokul was seen and evaluated alongside attending physician. On evaluation, patient is blunted, less overtly sexually preoccupied but continues to appear internally preoccupied and distracted during interview. He reports feeling "okay" this morning.  Patient states he had an adequate appetite for breakfast this morning, reporting having pancakes. Patient reports his energy levels are "okay" and "stable". Patient states he slept an estimated 6-8 hours overnight with 1-2 awakenings overnight. Patient states doing "pretty good" on his current medication regimen. He denies medication side effects at this time. He states his goals for today are to go to a music group. Patient reports he has been attending groups and has attended the Bible and music groups so far. He continues to endorse AH but states the voices are "better". He states they are more positive in nature and are telling him he needs to get his "life right". Discussed treatment plan and increasing Risperdal to 4 mg twice daily, to which patient was agreeable. He denies SI/HI/VH at this time. Progress Toward Goals: slow improvement    Psychiatric Review of Systems:  Behavior over the last 24 hours: improving slowly  Sleep: reports an estimated 6-8 hours overnight with 1-2 awakenings  Appetite: adequate  Medication side effects: none verbalized  ROS: Complete review of systems is negative except as noted above.     Vital signs in last 24 hours:   Temp:  [98.7 °F (37.1 °C)] 98.7 °F (37.1 °C)  HR:  [76] 76  Resp:  [16] 16  BP: (106)/(53) 106/53    Mental Status Exam:  Appearance:  Overtly appearing AA male, in no acute distress, slightly drowsy, intermittent eye contact, appears stated age, casually dressed in black shirt and jeans, marginal grooming/hygiene, poor dentition and disheveled   Behavior:  calm, cooperative and sitting comfortably   Motor: no abnormal movements and normal gait and balance   Speech:  delayed initiation, clear, normal rate, normal volume and coherent   Mood:  "okay"   Affect:  blunted   Thought Process:  less overtly sexually preoccupied, decreased rate of thoughts   Thought Content: no verbalized delusions or overt paranoia   Perceptual disturbances: auditory hallucinations that are more positive in nature compared to yesterday; states the thoughts are telling him he needs to get his "life right" and does not appear to be responding to internal stimuli at this time; does appear internally preoccupied and distracted   Risk Potential: No active or passive suicidal or homicidal ideation was verbalized during interview   Cognition: oriented to self and situation, appears to be of average intelligence and cognition not formally tested   Insight:  Limited   Judgment: Limited     Current Medications:  Current Facility-Administered Medications   Medication Dose Route Frequency Provider Last Rate   • acetaminophen  650 mg Oral Q6H PRN Bam Newton MD     • acetaminophen  650 mg Oral Q4H PRN Bam Newton MD     • acetaminophen  975 mg Oral Q6H PRN Bam Newton MD     • albuterol  2 puff Inhalation Q4H PRN Bam Newton MD     • aluminum-magnesium hydroxide-simethicone  30 mL Oral Q4H PRN Bam Newton MD     • haloperidol lactate  2.5 mg Intramuscular Q6H PRN Max 4/day Veronica Esposito DO      And   • LORazepam  1 mg Intramuscular Q6H PRN Max 4/day Veronica Esposito DO      And   • benztropine  0.5 mg Intramuscular Q6H PRN Max 4/day Veronica Esposito DO     • haloperidol lactate  5 mg Intramuscular Q4H PRN Max 4/day Veronica Esposito DO      And   • LORazepam  2 mg Intramuscular Q4H PRN Max 4/day Veronica Esposito DO      And   • benztropine  1 mg Intramuscular Q4H PRN Max 4/day Veronica Esposito DO     • benztropine  1 mg Oral Q4H PRN Max 6/day Bam Newton MD     • hydrOXYzine HCL  50 mg Oral Q6H PRN Max 4/day Bam Newton MD      Or   • diphenhydrAMINE  50 mg Intramuscular Q6H PRN Bam Newton MD     • fluticasone  1 spray Nasal Daily Bam Newton MD     • haloperidol  2 mg Oral Q4H PRN Max 6/day Veronica Esposito DO     • haloperidol  5 mg Oral Q6H PRN Max 4/day Veronica Esposito DO     • haloperidol  5 mg Oral Q4H PRN Max 4/day Driss Brock DO     • hydrOXYzine HCL  100 mg Oral Q6H PRN Max 4/day Ness Teresa MD      Or   • LORazepam  2 mg Intramuscular Q6H PRN Ness Teresa MD     • hydrOXYzine HCL  25 mg Oral Q6H PRN Max 4/day Ness Teresa MD     • melatonin  3 mg Oral HS Ness Teresa MD     • nicotine polacrilex  4 mg Oral Q2H PRN Ness Teresa MD     • polyethylene glycol  17 g Oral Daily PRN Ness Teresa MD     • risperiDONE  4 mg Oral BID Alisia Huynh DO     • senna-docusate sodium  1 tablet Oral Daily PRN Ness Teresa MD     • traZODone  50 mg Oral HS PRN Ness Teresa MD         Behavioral Health Medications: all current active meds have been reviewed. Changes as in plan section above. Laboratory results:  I have personally reviewed all pertinent laboratory/tests results. No results found for this or any previous visit (from the past 48 hour(s)).      Alisia Huynh DO

## 2023-05-24 NOTE — NURSING NOTE
Pt out of bed and approached this writer asking for "something for anxiety"  Pt state "it comes and goes,  butnow I'm anxious". Pt was given PRN ATARAX 25mg PO for mild anxiety at 1509.

## 2023-05-24 NOTE — NURSING NOTE
Pt visible throughout evening, walking around unit and socializing. Pt continues to appear internally stimulated, however denies SI/HI/AH/VH. Pt is medication adherent. Denies needs currently.

## 2023-05-25 PROBLEM — H61.23 BILATERAL IMPACTED CERUMEN: Status: ACTIVE | Noted: 2023-05-25

## 2023-05-25 PROCEDURE — 99232 SBSQ HOSP IP/OBS MODERATE 35: CPT | Performed by: PSYCHIATRY & NEUROLOGY

## 2023-05-25 PROCEDURE — 99231 SBSQ HOSP IP/OBS SF/LOW 25: CPT | Performed by: PHYSICIAN ASSISTANT

## 2023-05-25 RX ADMIN — RISPERIDONE 4 MG: 2 TABLET ORAL at 08:02

## 2023-05-25 RX ADMIN — LORAZEPAM 2 MG: 2 INJECTION INTRAMUSCULAR; INTRAVENOUS at 16:20

## 2023-05-25 RX ADMIN — Medication 3 MG: at 21:10

## 2023-05-25 RX ADMIN — BENZTROPINE MESYLATE 1 MG: 1 INJECTION INTRAMUSCULAR; INTRAVENOUS at 16:20

## 2023-05-25 RX ADMIN — CARBAMIDE PEROXIDE 6.5% 5 DROP: 6.5 LIQUID AURICULAR (OTIC) at 18:25

## 2023-05-25 RX ADMIN — RISPERIDONE 4 MG: 2 TABLET ORAL at 15:45

## 2023-05-25 RX ADMIN — HALOPERIDOL LACTATE 5 MG: 5 INJECTION, SOLUTION INTRAMUSCULAR at 16:20

## 2023-05-25 NOTE — PROGRESS NOTES
05/25/23 0851   Team Meeting   Meeting Type Daily Rounds   Team Members Present   Team Members Present Physician;Nurse;   Physician Team Member [id number] Dev   Patient/Family Present   Patient Present No   Patient's Family Present No   Pt responding to internal stim, heard laughing and crying in his room. Med/meal compliant. PRN Haldol for AH-effective. Risperdal increased yesterday. Consider cross taper to haldol next week, if no improvement. Dc tbd.

## 2023-05-25 NOTE — NURSING NOTE
Pt was overheard shouting from the shower "Shut up!, Shut up!". Pt approached this writer following his shower and exclaimed,  "I don't think that Risperdol works!"  Pt face was [employer], fists were clenched, could not keep voice at a reasonable level. Pt is experiencing CAH to "do things I don't want to do,  They keep tell me things about my family". Pt is visible aggravated,  Pacing on the mileu, growling. Pt able to follow instruction and wait in room momentarily while PRN medication was prepared. Pt was given PRN HALDOL, ATIVAN, & COGENTIN, at 5mg, 2mg, & 1mg IM respectively at 1620 for severe agitation and agreed to remain in his room, write his thoughts on paper until his was able to calm down.

## 2023-05-25 NOTE — NURSING NOTE
Pt seen resting in bed, or sitting at desk, also pacing at times in room. Pt also overhear laughing to himself,  Appears significantly less agitated. Pt claims he is less agitated,  Show this writer his notebook which the words "blood thirsty" written repeatedly on it which he claims was inspired by the voices he hears. PRN appear effective at reducing agitation. Pt also complained of hearing loss in his right ear and said he has slight pain when he touches it. Pt instructed to stop putting his finger in his ear, [facility] contacted.

## 2023-05-25 NOTE — NURSING NOTE
Patient remains calm,cooperative, and compliant with medications. Patient observed in his room, actively responding to internal stimuli, talking and laughing to himself but when asked by RN he denies AVH. Patient denies SI/HI. He requested snacks and was provided with crackers. No further complaints voiced at this time. Will continue to monitor patient.

## 2023-05-25 NOTE — NURSING NOTE
Pt calm and cooperative,compliatnt with meds, visible for meals, isolated to room in bed for rest of the day. Pt denies all psych symptoms,  Dressed in hospital attire. Pt quiet,  Does NOT appear to be responding,  But states he 'is doing welll" , appears well rested.

## 2023-05-25 NOTE — PROGRESS NOTES
[facility]  Progress Note  Name: Delia Sanders  MRN: [id number]  Unit/Bed#: [address] [place] [address] 350-02 I Date of Admission: 2023   Date of Service: 2023 I Hospital Day: 7    Assessment/Plan   Bilateral impacted cerumen  Assessment & Plan  · Patient complaining of pain and fullness of right ear for 2 days  · Patient with dark brown cerumen in bilateral ear canals. Unable to visualize TMs  · Will order debrox          Nurse Coordination of Care Discussion: discussed assessment and plan with primary RN    Discussions with Specialists or Other Care Team Provider: None    Education and Discussions with Family / Patient: Answered patients questions to the best of my ability    Time Spent for Care: 20 minutes. More than 50% of total time spent on counseling and coordination of care as described above. Current Length of Stay: 7 day(s)    Code Status: Level 1 - Full Code      Subjective:   Patient complaining of pain and fullness of right ear for 2 days. Objective:     Vitals:   Temp (24hrs), Av.1 °F (36.7 °C), Min:98 °F (36.7 °C), Max:98.1 °F (36.7 °C)    Temp:  [98 °F (36.7 °C)-98.1 °F (36.7 °C)] 98.1 °F (36.7 °C)  HR:  [59-71] 59  Resp:  [16] 16  BP: (108-113)/(56-60) 113/56  SpO2:  [99 %] 99 %  Body mass index is 27.85 kg/m².        Physical Exam:     Physical Exam      Additional Data:     Labs:    Results from last 7 days   Lab Units 23  0632   EOS PCT % 4   HEMATOCRIT % 48.1   HEMOGLOBIN g/dL 15.9   LYMPHS PCT % 48*   MONOS PCT % 10   NEUTROS PCT % 37*   PLATELETS Thousands/uL 255   WBC Thousand/uL 8.29     Results from last 7 days   Lab Units 23  0632   ANION GAP mmol/L 6   ALBUMIN g/dL 3.9   ALK PHOS U/L 53   ALT U/L 21   AST U/L 23   BUN mg/dL 18   CALCIUM mg/dL 9.3   CHLORIDE mmol/L 106   CO2 mmol/L 27   CREATININE mg/dL 1.15   GLUCOSE RANDOM mg/dL 89   POTASSIUM mmol/L 4.1   SODIUM mmol/L 139   TOTAL BILIRUBIN mg/dL 0.49                     * I Have Reviewed All Lab Data Listed Above. * Additional Pertinent Lab Tests Reviewed:  All Labs Within Last 24 Hours Reviewed    Imaging:    Imaging Reports Reviewed Today Include: no imaging reviewed today      Last 24 Hours Medication List:   Current Facility-Administered Medications   Medication Dose Route Frequency Provider Last Rate   • acetaminophen  650 mg Oral Q6H PRN Mandi Collado MD     • acetaminophen  650 mg Oral Q4H PRN Mandi Collado MD     • acetaminophen  975 mg Oral Q6H PRN Mandi Collado MD     • albuterol  2 puff Inhalation Q4H PRN Mandi Collado MD     • aluminum-magnesium hydroxide-simethicone  30 mL Oral Q4H PRN Mandi Collado MD     • haloperidol lactate  2.5 mg Intramuscular Q6H PRN Max 4/day [place] Amari, DO      And   • LORazepam  1 mg Intramuscular Q6H PRN Max 4/day [place] Amari, DO      And   • benztropine  0.5 mg Intramuscular Q6H PRN Max 4/day Nevada Amari, DO     • haloperidol lactate  5 mg Intramuscular Q4H PRN Max 4/day Nevada Amari, DO      And   • LORazepam  2 mg Intramuscular Q4H PRN Max 4/day Nevada Amari, DO      And   • benztropine  1 mg Intramuscular Q4H PRN Max 4/day Gricel Fitzpatrick, DO     • benztropine  1 mg Oral Q4H PRN Max 6/day Mandi Collado MD     • carbamide peroxide  5 drop Both Ears BID Nery Carpenter PA-C     • hydrOXYzine HCL  50 mg Oral Q6H PRN Max 4/day Mandi Collado MD      Or   • diphenhydrAMINE  50 mg Intramuscular Q6H PRN Mandi Collado MD     • fluticasone  1 spray Nasal Daily Mandi Collado MD     • haloperidol  2 mg Oral Q4H PRN Max 6/day [place] Amari, DO     • haloperidol  5 mg Oral Q6H PRN Max 4/day Gricel Fitzpatrick, DO     • haloperidol  5 mg Oral Q4H PRN Max 4/day Ruth Brock DO     • hydrOXYzine HCL  100 mg Oral Q6H PRN Max 4/day Mandi Collado MD      Or   • LORazepam  2 mg Intramuscular Q6H PRN Mandi Collado MD     • hydrOXYzine HCL  25 mg Oral Q6H PRN Max 4/day Mandi Collado MD     • melatonin  3 mg Oral HS Jori Bumpers, MD     • nicotine polacrilex  4 mg Oral Q2H PRN Jori Bumpers, MD     • polyethylene glycol  17 g Oral Daily PRN Jori Bumpers, MD     • risperiDONE  4 mg Oral BID Patsie Boeck, DO     • senna-docusate sodium  1 tablet Oral Daily PRN Jori Bumpers, MD     • traZODone  50 mg Oral HS PRN Jori Bumpers, MD          Today, Patient Was Seen By: William Sánchez PA-C      ** Please Note: Dictation voice to text software may have been used in the creation of this document.  **

## 2023-05-25 NOTE — ASSESSMENT & PLAN NOTE
· Patient complaining of pain and fullness of right ear for 2 days  · Patient with dark brown cerumen in bilateral ear canals.  Unable to visualize TMs  · Will order debrox

## 2023-05-25 NOTE — PROGRESS NOTES
Progress Note - Behavioral Health   Dyphier Millard Fleischer 26 y.o. male MRN: [id number]  Unit/Bed#: [facility] 350-02 Encounter: [id number]    Assessment/Plan   Principal Problem:    Psychosis ([address]) ro Schizophrenia vs Schizoaffective  Active Problems:    Legally blind    Hearing loss    Asthma    Medical clearance for psychiatric admission      Recommended Treatment:     1. No psychopharmacologic changes necessary at this time; will continue to assess for further optimization. 2. Continue with current medications:  a. Melatonin 3 mg HS for sleep   b. Risperidone 4 mg BID for psychosis   3. Continue to attend group therapy. 4. Continue monitoring safety per unit protocol. 5. Continue SLIM medical management  6. Continue coordinating patient care per case management      Legal Status: 201  Disposition: To be determined, coordinating with case management    Case discussed with treatment team.  Risks, benefits and possible side effects of Medications: Risks, benefits, and possible side effects of medications have been explained to the patient, who verbalizes understanding    ------------------------------------------------------------    Subjective: Patient's chart was reviewed, and patient's progress and plan was discussed with treatment team. Per nursing report last night, Gokul has been calm, cooperative and compliant with medications. Was found to be responding to internal stimuli, laughing and talking to his self. He reported to nursing staff that he denies AH, VH. This morning the nursing report states he is Isolative to self, appears internally preoccupied, and appears withdrawn but mood brightens with interaction. He is compliant with medications. Patient has been visible In the milieu but has not been seen socializing with peers. Gokul was evaluated this morning for continuity of care alongside attending physician. On examination, Gokul was found leaving a group session and interviewed in a group room.   He states his mood is "ok ." He reports sleeping is "ok" he went to sleep around 9 and woke up around 11 p.m., he did not report that this awakening was due to a nightmare. He reported using the bathroom, and had no problems falling back sleep. He woke up this morning around 7 a.m. His energy level today is "fine". His appetite has been "good", reports having pancakes for breakfast, and was found eating crackers before the interview. He was seen by the nutrition staff yesterday, because he was requesting double portions. Per the note from the nutritionist, he refuses meals on occasion, and has had a 3 pound weight gain since admission. Nutrition did not approve double portions given his nutritional status. He denies any adverse effects from medications. His goals for today are attend groups and take a shower. Today, Gokul is yenifer for safety on the unit. He denies suicidal ideation, homicidal ideation, and visual hallucinations at the time of the interview. The patient continues to endorse AH, states that the last time he had auditory hallucinations was last night. He reports that the [facility], are telling him to "get my life together". VS: Reviewed, within normal limits    Progress Toward Goals: Slow improvement    Psychiatric Review of Systems:  Behavior over the last 24 hours: unchanged  Sleep: normal, slept 10 hours  Appetite: adequate  Medication side effects: none verbalized  Medical ROS: Complete review of systems is negative except as noted above.     Vital signs in last 24 hours:  Temp:  [98 °F (36.7 °C)-98.7 °F (37.1 °C)] 98 °F (36.7 °C)  HR:  [71-76] 71  Resp:  [16] 16  BP: (106-108)/(53-60) 108/60    Mental Status Exam:    Appearance:  Overtly appearing -American male, alert, intermittent eye contact, appears older than stated age, bearded, casually dressed, in a black t-shirt and blue jeans , disheveled    Behavior:  calm, cooperative and sitting comfortably, restless   Speech:  delayed initiation, slow, normal volume, coherent, scant   Mood:  "okay"   Affect:  blunted, slightly more reactive today   Thought Process:  decreased rate of thoughts, concrete   Thought Content:  no verbalized delusions or overt paranoia   Perceptual Disturbances: auditory hallucinations telling him to get his "life together" , does not appear to be responding to internal stimuli at this time but does seem to be distracted and internally preoccupied   Risk Potential: Suicidal ideation - None, contracts for safety on the unit  Homicidal ideation - None at present  Potential for aggression - No   Sensorium:  oriented to person, place and time/date   Memory:  recent and remote memory grossly intact   Consciousness:  alert and awake   Attention/Concentration: decreased concentration and decreased attention span   Insight:  limited   Judgment: limited   Gait/Station: normal gait/station   Motor Activity: no abnormal movements     Current Medications:  Current Facility-Administered Medications   Medication Dose Route Frequency Provider Last Rate   • acetaminophen  650 mg Oral Q6H PRN Winifred Beckett MD     • acetaminophen  650 mg Oral Q4H PRN Winifred Beckett MD     • acetaminophen  975 mg Oral Q6H PRN Winifred Beckett MD     • albuterol  2 puff Inhalation Q4H PRN Winifred Beckett MD     • aluminum-magnesium hydroxide-simethicone  30 mL Oral Q4H PRN Winifred Beckett MD     • haloperidol lactate  2.5 mg Intramuscular Q6H PRN Max 4/day Salvador Costello DO      And   • LORazepam  1 mg Intramuscular Q6H PRN Max 4/day Salvador Costello DO      And   • benztropine  0.5 mg Intramuscular Q6H PRN Max 4/day Salvador Costello DO     • haloperidol lactate  5 mg Intramuscular Q4H PRN Max 4/day Salvador Costello DO      And   • LORazepam  2 mg Intramuscular Q4H PRN Max 4/day Salvador Costello DO      And   • benztropine  1 mg Intramuscular Q4H PRN Max 4/day Salvador Costello DO     • benztropine  1 mg Oral Q4H PRN Max 6/day Winifred Beckett MD • hydrOXYzine HCL  50 mg Oral Q6H PRN Max 4/day Mirian Tyler MD      Or   • diphenhydrAMINE  50 mg Intramuscular Q6H PRN Mirian Tyler MD     • fluticasone  1 spray Nasal Daily Mirian Tyler MD     • haloperidol  2 mg Oral Q4H PRN Max 6/day Maximo Shines, DO     • haloperidol  5 mg Oral Q6H PRN Max 4/day Maximo Shines, DO     • haloperidol  5 mg Oral Q4H PRN Max 4/day Boudreaux Amrobbin Gregoryy, DO     • hydrOXYzine HCL  100 mg Oral Q6H PRN Max 4/day Mirian Tyler MD      Or   • LORazepam  2 mg Intramuscular Q6H PRN Mirian Tyler MD     • hydrOXYzine HCL  25 mg Oral Q6H PRN Max 4/day Mirian Tyler MD     • melatonin  3 mg Oral HS Mirian Tyler MD     • nicotine polacrilex  4 mg Oral Q2H PRN Mirian Tyler MD     • polyethylene glycol  17 g Oral Daily PRN Mirian Tyler MD     • risperiDONE  4 mg Oral BID Luis Alberto Avalos, DO     • senna-docusate sodium  1 tablet Oral Daily PRN Mirian Tyler MD     • traZODone  50 mg Oral HS PRN Mirian Tyler MD         Behavioral Health Medications: all current active meds have been reviewed. Changes as in plan section above. Laboratory results:  I have personally reviewed all pertinent laboratory/tests results. No results found for this or any previous visit (from the past 48 hour(s)).        [address] [employer], M3

## 2023-05-25 NOTE — NURSING NOTE
Pt visible on unit, pleasant and cooperative. Pt is RTIS, talking to self and physically responding to internal stimulus. Pt denies AVH when asked by nurse. Pt asking about healthy food choices, nurse offered information. Pt receptive.

## 2023-05-25 NOTE — NURSING NOTE
Pt denies SI/HI/AH/VH but appears internally preoccupied. Present in milieu. Medication compliant. Pt refused Breakfast but once awake then received snack. Isolative to self. Pt appears withdrawn but brightens on approach. Pt remained mainly isolative to room and self. Will continue to monitor.

## 2023-05-26 ENCOUNTER — HOSPITAL ENCOUNTER (EMERGENCY)
Facility: HOSPITAL | Age: 23
Discharge: HOME | End: 2023-05-26
Attending: EMERGENCY MEDICINE

## 2023-05-26 VITALS
DIASTOLIC BLOOD PRESSURE: 64 MMHG | RESPIRATION RATE: 20 BRPM | TEMPERATURE: 98.5 F | OXYGEN SATURATION: 99 % | SYSTOLIC BLOOD PRESSURE: 133 MMHG | HEART RATE: 113 BPM

## 2023-05-26 DIAGNOSIS — N48.30 PRIAPISM, UNSPECIFIED: Primary | ICD-10-CM

## 2023-05-26 PROBLEM — F79 INTELLECTUAL DISABILITY: Status: ACTIVE | Noted: 2023-05-26

## 2023-05-26 LAB
ALBUMIN SERPL BCP-MCNC: 3.8 G/DL (ref 3.5–5)
ALP SERPL-CCNC: 51 U/L (ref 34–104)
ALT SERPL W P-5'-P-CCNC: 31 U/L (ref 7–52)
ANION GAP SERPL CALCULATED.3IONS-SCNC: 7 MMOL/L (ref 4–13)
AST SERPL W P-5'-P-CCNC: 40 U/L (ref 13–39)
BASE EX.OXY STD BLDV CALC-SCNC: 52.7 % (ref 60–80)
BASE EXCESS BLDV CALC-SCNC: -17.4 MMOL/L
BASOPHILS # BLD AUTO: 0.08 THOUSANDS/ÂΜL (ref 0–0.1)
BASOPHILS NFR BLD AUTO: 1 % (ref 0–1)
BILIRUB SERPL-MCNC: 0.51 MG/DL (ref 0.2–1)
BUN SERPL-MCNC: 13 MG/DL (ref 5–25)
CALCIUM SERPL-MCNC: 9.4 MG/DL (ref 8.4–10.2)
CHLORIDE SERPL-SCNC: 102 MMOL/L (ref 96–108)
CO2 SERPL-SCNC: 28 MMOL/L (ref 21–32)
CREAT SERPL-MCNC: 1.34 MG/DL (ref 0.6–1.3)
EOSINOPHIL # BLD AUTO: 0.11 THOUSAND/ÂΜL (ref 0–0.61)
EOSINOPHIL NFR BLD AUTO: 1 % (ref 0–6)
ERYTHROCYTE [DISTWIDTH] IN BLOOD BY AUTOMATED COUNT: 12.6 % (ref 11.6–15.1)
GFR SERPL CREATININE-BSD FRML MDRD: 74 ML/MIN/1.73SQ M
GLUCOSE SERPL-MCNC: 116 MG/DL (ref 65–140)
HCO3 BLDV-SCNC: 17.7 MMOL/L (ref 24–30)
HCT VFR BLD AUTO: 46.6 % (ref 36.5–49.3)
HGB BLD-MCNC: 15.7 G/DL (ref 12–17)
IMM GRANULOCYTES # BLD AUTO: 0.03 THOUSAND/UL (ref 0–0.2)
IMM GRANULOCYTES NFR BLD AUTO: 0 % (ref 0–2)
INR PPP: 0.83 (ref 0.84–1.19)
LYMPHOCYTES # BLD AUTO: 2.3 THOUSANDS/ÂΜL (ref 0.6–4.47)
LYMPHOCYTES NFR BLD AUTO: 25 % (ref 14–44)
MCH RBC QN AUTO: 31.5 PG (ref 26.8–34.3)
MCHC RBC AUTO-ENTMCNC: 33.7 G/DL (ref 31.4–37.4)
MCV RBC AUTO: 93 FL (ref 82–98)
MONOCYTES # BLD AUTO: 0.98 THOUSAND/ÂΜL (ref 0.17–1.22)
MONOCYTES NFR BLD AUTO: 11 % (ref 4–12)
NEUTROPHILS # BLD AUTO: 5.64 THOUSANDS/ÂΜL (ref 1.85–7.62)
NEUTS SEG NFR BLD AUTO: 62 % (ref 43–75)
NRBC BLD AUTO-RTO: 0 /100 WBCS
O2 CT BLDV-SCNC: 13.8 ML/DL
PCO2 BLDV: 87.9 MM HG (ref 42–50)
PH BLDV: 6.92 [PH] (ref 7.3–7.4)
PLATELET # BLD AUTO: 226 THOUSANDS/UL (ref 149–390)
PMV BLD AUTO: 11.2 FL (ref 8.9–12.7)
PO2 BLDV: 33.5 MM HG (ref 35–45)
POTASSIUM SERPL-SCNC: 4.7 MMOL/L (ref 3.5–5.3)
PROT SERPL-MCNC: 6.8 G/DL (ref 6.4–8.4)
PROTHROMBIN TIME: 11.7 SECONDS (ref 11.6–14.5)
RBC # BLD AUTO: 4.99 MILLION/UL (ref 3.88–5.62)
SODIUM SERPL-SCNC: 137 MMOL/L (ref 135–147)
WBC # BLD AUTO: 9.14 THOUSAND/UL (ref 4.31–10.16)

## 2023-05-26 PROCEDURE — 99232 SBSQ HOSP IP/OBS MODERATE 35: CPT | Performed by: PSYCHIATRY & NEUROLOGY

## 2023-05-26 PROCEDURE — NC001 PR NO CHARGE

## 2023-05-26 PROCEDURE — 99232 SBSQ HOSP IP/OBS MODERATE 35: CPT | Performed by: NURSE PRACTITIONER

## 2023-05-26 RX ORDER — OLANZAPINE 5 MG/1
5 TABLET ORAL 2 TIMES DAILY
Status: DISCONTINUED | OUTPATIENT
Start: 2023-05-26 | End: 2023-05-29

## 2023-05-26 RX ORDER — PSEUDOEPHEDRINE HCL 30 MG
120 TABLET ORAL DAILY PRN
Status: COMPLETED | OUTPATIENT
Start: 2023-05-26 | End: 2023-06-08

## 2023-05-26 RX ORDER — BUPIVACAINE HYDROCHLORIDE 5 MG/ML
20 INJECTION, SOLUTION EPIDURAL; INTRACAUDAL ONCE
Status: COMPLETED | OUTPATIENT
Start: 2023-05-26 | End: 2023-05-26

## 2023-05-26 RX ORDER — OLANZAPINE 10 MG/1
10 TABLET ORAL
Status: DISCONTINUED | OUTPATIENT
Start: 2023-05-26 | End: 2023-06-17

## 2023-05-26 RX ORDER — OLANZAPINE 10 MG/2ML
10 INJECTION, POWDER, FOR SOLUTION INTRAMUSCULAR
Status: DISCONTINUED | OUTPATIENT
Start: 2023-05-26 | End: 2023-06-17

## 2023-05-26 RX ORDER — WATER 10 ML/10ML
INJECTION INTRAMUSCULAR; INTRAVENOUS; SUBCUTANEOUS
Status: DISCONTINUED
Start: 2023-05-26 | End: 2023-10-19 | Stop reason: HOSPADM

## 2023-05-26 RX ORDER — OLANZAPINE 5 MG/1
5 TABLET ORAL
Status: DISCONTINUED | OUTPATIENT
Start: 2023-05-26 | End: 2023-10-19 | Stop reason: HOSPADM

## 2023-05-26 RX ORDER — OLANZAPINE 2.5 MG/1
2.5 TABLET ORAL
Status: DISCONTINUED | OUTPATIENT
Start: 2023-05-26 | End: 2023-10-19 | Stop reason: HOSPADM

## 2023-05-26 RX ORDER — RISPERIDONE 2 MG/1
4 TABLET ORAL DAILY
Status: DISCONTINUED | OUTPATIENT
Start: 2023-05-27 | End: 2023-05-26

## 2023-05-26 RX ORDER — OLANZAPINE 10 MG/2ML
5 INJECTION, POWDER, FOR SOLUTION INTRAMUSCULAR
Status: DISCONTINUED | OUTPATIENT
Start: 2023-05-26 | End: 2023-10-19 | Stop reason: HOSPADM

## 2023-05-26 RX ADMIN — PSEUDOEPHEDRINE HCL 120 MG: 30 TABLET, FILM COATED ORAL at 12:34

## 2023-05-26 RX ADMIN — ACETAMINOPHEN 975 MG: 325 TABLET ORAL at 10:32

## 2023-05-26 RX ADMIN — Medication 3 MG: at 21:52

## 2023-05-26 RX ADMIN — Medication 100 MCG: at 16:39

## 2023-05-26 RX ADMIN — OLANZAPINE 5 MG: 5 TABLET, FILM COATED ORAL at 20:12

## 2023-05-26 RX ADMIN — BENZTROPINE MESYLATE 1 MG: 1 INJECTION INTRAMUSCULAR; INTRAVENOUS at 11:23

## 2023-05-26 RX ADMIN — BUPIVACAINE HYDROCHLORIDE 20 ML: 5 INJECTION, SOLUTION EPIDURAL; INTRACAUDAL; PERINEURAL at 16:06

## 2023-05-26 RX ADMIN — Medication 100 MCG: at 16:37

## 2023-05-26 RX ADMIN — ACETAMINOPHEN 975 MG: 325 TABLET ORAL at 20:55

## 2023-05-26 RX ADMIN — HALOPERIDOL LACTATE 5 MG: 5 INJECTION, SOLUTION INTRAMUSCULAR at 11:24

## 2023-05-26 RX ADMIN — RISPERIDONE 4 MG: 2 TABLET ORAL at 09:15

## 2023-05-26 RX ADMIN — LORAZEPAM 2 MG: 2 INJECTION INTRAMUSCULAR; INTRAVENOUS at 11:23

## 2023-05-26 RX ADMIN — OLANZAPINE 5 MG: 10 INJECTION, POWDER, LYOPHILIZED, FOR SOLUTION INTRAMUSCULAR at 21:44

## 2023-05-26 RX ADMIN — CARBAMIDE PEROXIDE 6.5% 5 DROP: 6.5 LIQUID AURICULAR (OTIC) at 09:16

## 2023-05-26 NOTE — NURSING NOTE
Patient returned from ED post-procedure. Per RN from ED report given to RN [facility] prior to return: Patient aftercare would involve giving tylenol for pain, and cold pack if needed. Patient ambulating, and in group room eating dinner at this time, and offers no complaints of pain. VSS. Patient's penis is non erect, and not in pain per pt.

## 2023-05-26 NOTE — ED NOTES
Provider at bedside      Renetta Lance, [address] [facility]  05/26/23 0959

## 2023-05-26 NOTE — ED NOTES
1 mL of phenylephrine administered into L corpuscavernous by provider     Vandana Xiong RN  05/26/23 9137

## 2023-05-26 NOTE — PROGRESS NOTES
Progress Note - Behavioral Health   Gokul Harris 26 y.o. male MRN: [id number]  Unit/Bed#: [facility] 350-02 Encounter: [id number]    Assessment/Plan   Principal Problem:    Psychosis ([address]) ro Schizophrenia vs Schizoaffective  Active Problems:    Legally blind    Hearing loss    Asthma    Medical clearance for psychiatric admission    Bilateral impacted cerumen      Recommended Treatment:   Continue melatonin 3 mg nightly for insomnia  Continue risperidone 4 mg twice daily for psychosis    Continue with group therapy, milieu therapy and occupational therapy. Continue frequent safety checks and vitals per unit protocol. Case discussed with treatment team.  Risks, benefits and possible side effects of Medications: Risks, benefits, and possible side effects of medications have been explained to the patient, who verbalizes understanding    Current Legal Status: 201  Disposition: TBD  ------------------------------------------------------------    Subjective: Per nursing report, Gokul has been internally preoccupied, withdrawn, isolative to self. Yesterday, patient was aggravated, shouting on the unit stating "I don't think that Risperdal works!". He also was endorsing CAH to "do things I do not want to do, they keep telling me things on my family". Patient received HAC 5 mg, 2 mg, 1 mg IM respectively at 1620 for severe agitation. Today, Gokul was seen and evaluated alongside attending physician. On evaluation, patient is drowsy, blunted in affect, scant in speech with poverty of thought. He reports feeling "okay" this morning. Patient states he slept "really good" and estimates a total of 6-8 hours overnight. He states his energy is "good". He reports an adequate appetite for dinner but has not had breakfast yet as it has not come out. Patient denies medication side effects at this time. He reports feeling the medications are helping.  Discussed patient's statement from yesterday that risperdal was not helping him. Patient was able to recall previously discussed plan of treatment which was to trial Risperdal over the weekend and possibly cross-taper to haldol if risperdal continues to be ineffective. Patient was agreeable. Discussed prn medications available to patient. Patient denies SI/HI at this time. He reports he is no longer having VH but last heard AH yesterday stating "It's gonna be fine, you're gonna have to wait it out a little bit longer". Progress Toward Goals: slow improvement    Psychiatric Review of Systems:  Behavior over the last 24 hours: regressed  Sleep: improving  Appetite: adequate  Medication side effects: none verbalized  ROS: Complete review of systems is negative except as noted above.     Vital signs in last 24 hours:   Temp:  [98.1 °F (36.7 °C)] 98.1 °F (36.7 °C)  HR:  [59] 59  Resp:  [16] 16  BP: (106-113)/(51-56) 106/51    Mental Status Exam:  Appearance:  Overtly appearing AA male, in no acute distress, drowsy, poor eye co ntact, appears stated age, marginal grooming/hygiene, disheveled, poor dentition and dressed in hospital attire   Behavior:  limited cooperativity and laying in bed   Motor: no abnormal movements and Unable to assess gait/balance as patient was lying in bed   Speech:  clear, normal rate, normal volume, scant and coherent   Mood:  "Okay"   Affect:  blunted   Thought Process:  poverty of thought   Thought Content: no verbalized delusions or overt paranoia   Perceptual disturbances: auditory hallucinations last heard yesterday telling him "IV okay, I did not have to wait it out a little bit longer" and does not appear to be responding to internal stimuli at this time   Risk Potential: No active or passive suicidal or homicidal ideation was verbalized during interview, Potential for aggression due to acute psychosis   Cognition: oriented to self and situation, appears to be of average intelligence and cognition not formally tested   Insight:  Limited   Judgment: Limited Current Medications:  Current Facility-Administered Medications   Medication Dose Route Frequency Provider Last Rate   • acetaminophen  650 mg Oral Q6H PRN Patrick Ibrahim MD     • acetaminophen  650 mg Oral Q4H PRN Patrick Ibrahim MD     • acetaminophen  975 mg Oral Q6H PRN Patrick Ibrahim MD     • albuterol  2 puff Inhalation Q4H PRN Patrick Ibrahim MD     • aluminum-magnesium hydroxide-simethicone  30 mL Oral Q4H PRN Patrick Ibrahim MD     • haloperidol lactate  2.5 mg Intramuscular Q6H PRN Max 4/day Geraline Del Valle, DO      And   • LORazepam  1 mg Intramuscular Q6H PRN Max 4/day Geraline Del Valle, DO      And   • benztropine  0.5 mg Intramuscular Q6H PRN Max 4/day Geraline Del Valle, DO     • haloperidol lactate  5 mg Intramuscular Q4H PRN Max 4/day Geraline Del Valle, DO      And   • LORazepam  2 mg Intramuscular Q4H PRN Max 4/day Geraline Del Valle, DO      And   • benztropine  1 mg Intramuscular Q4H PRN Max 4/day Geraline Del Valle, DO     • benztropine  1 mg Oral Q4H PRN Max 6/day Patrick Ibrahim MD     • carbamide peroxide  5 drop Both Ears BID Gabino Hamman, PA-C     • hydrOXYzine HCL  50 mg Oral Q6H PRN Max 4/day Patrick Ibrahim MD      Or   • diphenhydrAMINE  50 mg Intramuscular Q6H PRN Patrick Ibrahim MD     • fluticasone  1 spray Nasal Daily Patrick Ibrahim MD     • haloperidol  2 mg Oral Q4H PRN Max 6/day Geraline Del Valle, DO     • haloperidol  5 mg Oral Q6H PRN Max 4/day Geraline Del Valle, DO     • haloperidol  5 mg Oral Q4H PRN Max 4/day Mervather Placido Brock, DO     • hydrOXYzine HCL  100 mg Oral Q6H PRN Max 4/day Patrick Ibrahim MD      Or   • LORazepam  2 mg Intramuscular Q6H PRN Patrick Ibrahim MD     • hydrOXYzine HCL  25 mg Oral Q6H PRN Max 4/day Patrick Ibrahim MD     • melatonin  3 mg Oral HS Patrick Ibrahim MD     • nicotine polacrilex  4 mg Oral Q2H PRN Patrick Ibrahim MD     • polyethylene glycol  17 g Oral Daily PRN Patrick Ibrahim MD     • risperiDONE  4 mg Oral BID Albina DO Eron     • senna-docusate sodium  1 tablet Oral Daily PRN Pepe Munoz MD     • traZODone  50 mg Oral HS PRN Pepe Munoz MD         Behavioral Health Medications: all current active meds have been reviewed. Changes as in plan section above. Laboratory results:  I have personally reviewed all pertinent laboratory/tests results. No results found for this or any previous visit (from the past 48 hour(s)).      Gaurav Peterson DO

## 2023-05-26 NOTE — ED NOTES
1 mL phenylephrine administered by provider in [facility] [address], RN  05/26/23 1640

## 2023-05-26 NOTE — PROGRESS NOTES
Progress Note - Behavioral Health   Gokul Turner 26 y.o. male MRN: [id number]  Unit/Bed#: [facility] 350-02 Encounter: [id number]    Assessment/Plan   Principal Problem:    Psychosis ([address]) ro Schizophrenia vs Schizoaffective  Active Problems:    Legally blind    Hearing loss    Asthma    Medical clearance for psychiatric admission    Bilateral impacted cerumen      Recommended Treatment:   Continue melatonin 3 mg nightly for insomnia  Continue risperidone 4 mg twice daily for psychosis    Continue with group therapy, milieu therapy and occupational therapy. Continue frequent safety checks and vitals per unit protocol. Case discussed with treatment team.  Risks, benefits and possible side effects of Medications: Risks, benefits, and possible side effects of medications have been explained to the patient, who verbalizes understanding    Current Legal Status: 201  Disposition: TBD  ------------------------------------------------------------    Subjective: Per nursing report, Gokul has been internally preoccupied, withdrawn, isolative to self. Yesterday, patient was aggravated, shouting on the unit stating "I don't think that Risperdal works!". He also was endorsing CAH to "do things I do not want to do, they keep telling me things on my family". Patient received HAC 5 mg, 2 mg, 1 mg IM respectively at 1620 for severe agitation. Today, Gokul was seen and evaluated alongside attending physician. On evaluation, patient is drowsy, blunted in affect, scant in speech with poverty of thought. He reports feeling "okay" this morning. Patient states he slept "really good" and estimates a total of 6-8 hours overnight. He states his energy is "good". He reports an adequate appetite for dinner but has not had breakfast yet as it has not come out. Patient denies medication side effects at this time. He reports feeling the medications are helping.  Discussed patient's statement from yesterday that risperdal was not helping him. Patient was able to recall previously discussed plan of treatment which was to trial Risperdal over the weekend and possibly cross-taper to haldol if risperdal continues to be ineffective. Patient was agreeable. Discussed prn medications available to patient. Patient denies SI/HI at this time. He reports he is no longer having VH but last heard AH yesterday stating "It's gonna be fine, you're gonna have to wait it out a little bit longer". Progress Toward Goals: slow improvement    Psychiatric Review of Systems:  Behavior over the last 24 hours: regressed  Sleep: improving  Appetite: adequate  Medication side effects: none verbalized  ROS: Complete review of systems is negative except as noted above.     Vital signs in last 24 hours:   Temp:  [98.1 °F (36.7 °C)] 98.1 °F (36.7 °C)  HR:  [59] 59  Resp:  [16] 16  BP: (106-113)/(51-56) 106/51    Mental Status Exam:  Appearance:  Overtly appearing AA male, in no acute distress, drowsy, poor eye co ntact, appears stated age, marginal grooming/hygiene, disheveled, poor dentition and dressed in hospital attire   Behavior:  limited cooperativity and laying in bed   Motor: no abnormal movements and Unable to assess gait/balance as patient was lying in bed   Speech:  clear, normal rate, normal volume, scant and coherent   Mood:  "Okay"   Affect:  blunted   Thought Process:  poverty of thought   Thought Content: no verbalized delusions or overt paranoia   Perceptual disturbances: auditory hallucinations last heard yesterday telling him "IV okay, I did not have to wait it out a little bit longer" and does not appear to be responding to internal stimuli at this time   Risk Potential: No active or passive suicidal or homicidal ideation was verbalized during interview, Potential for aggression due to acute psychosis   Cognition: oriented to self and situation, appears to be of average intelligence and cognition not formally tested   Insight:  Limited   Judgment: Limited Current Medications:  Current Facility-Administered Medications   Medication Dose Route Frequency Provider Last Rate   • acetaminophen  650 mg Oral Q6H PRN Aneudy Tucker MD     • acetaminophen  650 mg Oral Q4H PRN Aneudy Tucker MD     • acetaminophen  975 mg Oral Q6H PRN Aneudy Tucker MD     • albuterol  2 puff Inhalation Q4H PRN Aneudy Tucker MD     • aluminum-magnesium hydroxide-simethicone  30 mL Oral Q4H PRN Aneudy Tucker MD     • haloperidol lactate  2.5 mg Intramuscular Q6H PRN Max 4/day Arzella Boast, DO      And   • LORazepam  1 mg Intramuscular Q6H PRN Max 4/day Arzella Boast, DO      And   • benztropine  0.5 mg Intramuscular Q6H PRN Max 4/day Arzella Boast, DO     • haloperidol lactate  5 mg Intramuscular Q4H PRN Max 4/day Arzella Boast, DO      And   • LORazepam  2 mg Intramuscular Q4H PRN Max 4/day Arzella Boast, DO      And   • benztropine  1 mg Intramuscular Q4H PRN Max 4/day Arzella Boast, DO     • benztropine  1 mg Oral Q4H PRN Max 6/day Aneudy Tucker MD     • carbamide peroxide  5 drop Both Ears BID Delbert Balderrama PA-C     • hydrOXYzine HCL  50 mg Oral Q6H PRN Max 4/day Aneudy Tucker MD      Or   • diphenhydrAMINE  50 mg Intramuscular Q6H PRN Aneudy Tucker MD     • fluticasone  1 spray Nasal Daily Aneudy Tucker MD     • haloperidol  2 mg Oral Q4H PRN Max 6/day Arzella Boast, DO     • haloperidol  5 mg Oral Q6H PRN Max 4/day Arzella Boast, DO     • haloperidol  5 mg Oral Q4H PRN Max 4/day Paxton Brock DO     • hydrOXYzine HCL  100 mg Oral Q6H PRN Max 4/day Aneudy Tucker MD      Or   • LORazepam  2 mg Intramuscular Q6H PRN Aneudy Tucker MD     • hydrOXYzine HCL  25 mg Oral Q6H PRN Max 4/day Aneudy Tucker MD     • melatonin  3 mg Oral HS Aneudy Tucker MD     • nicotine polacrilex  4 mg Oral Q2H PRN Aneudy Tucker MD     • polyethylene glycol  17 g Oral Daily PRN Aneudy Tucker MD     • risperiDONE  4 mg Oral BID Albina DO Eron     • senna-docusate sodium  1 tablet Oral Daily PRN Kitty Fabry, MD     • traZODone  50 mg Oral HS PRN Kitty Fabry, MD         Behavioral Health Medications: all current active meds have been reviewed. Changes as in plan section above. Laboratory results:  I have personally reviewed all pertinent laboratory/tests results. No results found for this or any previous visit (from the past 48 hour(s)).      Allen Golden DO

## 2023-05-26 NOTE — NURSING NOTE
Patient vital signs stable. Penis non-erect, and appropriate to skin color, no pain. Per Dr. Fortino Fontenot, patient to not receive risperidone.

## 2023-05-26 NOTE — PROGRESS NOTES
05/26/23 0843   Team Meeting   Meeting Type Daily Rounds   Team Members Present   Team Members Present Physician;Nurse;   Physician Team Member [id number] Wendie   Patient/Family Present   Patient Present No   Patient's Family Present No     Pt heard responding to internal stimuli in the shower last night. Reporting CAH to do bad things, stating "I don't think the Risperdal is working". Plan to monitor Risperdal over the weekend and crosstaper to Haldol if no benefit is observed. Discharge to be determined.

## 2023-05-26 NOTE — NURSING NOTE
Patient severely agitated and severely anxious related to persistent erectile pain from erection. Patient responding to IR loudly in room and in hallway. Patient given Haldol 5mg, Ativan 2mg, Cogentin 1mg.     12:24: Patient appears and reports less anxiety, as well as is no longer responding to internal stimuli. He reports feeling no longer severely agitation.

## 2023-05-26 NOTE — ED NOTES
Patient sitting on bed; no outward signs of distress noted; [facility] at bedside     Trevor Flores RN  05/26/23 3849

## 2023-05-26 NOTE — NURSING NOTE
Pt was calm and cooperative during night time med pass. Pt went to night time Wrap up group, and visible in the milieu. He describes his mood as "fine". Denies SI/HI/AVH. Pt did state that he was hearing voices earlier in the day but did not elaborate on what they were saying. Pt encouraged to seek out staff for any needs.

## 2023-05-26 NOTE — BH TRANSITION RECORD
Contact Information: If you have any questions, concerns, pended studies, tests and/or procedures, or emergencies regarding your inpatient behavioral health visit. Please contact [address] [place] [address]  behavioral health unit 3B [phone] and ask to speak to a , nurse or physician. A contact is available 24 hours/ 7 days a week at this number. Summary of Procedures Performed During your Stay:  Below is a list of major procedures performed during your hospital stay and a summary of results:  - Cardiac Procedures/Studies: EKG 5/18/2023: "Normal sinus rhythm with sinus arrythmia, Early repolarization, Normal ECG.". If studies are pending at discharge, follow up with your PCP and/or referring provider.

## 2023-05-26 NOTE — ED NOTES
Patient attempting to urinate; [facility] remains at bedside     Gisele Sagastume RN  05/26/23 8491

## 2023-05-26 NOTE — NURSING NOTE
JOSELITO Petersen updated in regards to treatment of pseudoephedrine was not effective for erection. Per urology, patient will likely need to go to ED to have treatment for priapism. Per Dr. Annmarie Mulligan patient would be a leave of absence. and would come back after treatment if successful. Hospital Supervisor Anthony Arguelles to assist with transfer and advice. Leave of absence order- telephone with readback placed by this writer. Hospital supervisor called report to ED charge RN. 1538: Leave of absence order placed.

## 2023-05-26 NOTE — PROGRESS NOTES
[facility]  Progress Note  Name: Atiya Lazo  MRN: [id number]  Unit/Bed#: [address]  350-02 I Date of Admission: 5/18/2023   Date of Service: 5/26/2023 I Hospital Day: 8    Assessment/Plan   Intellectual disability  Assessment & Plan  · Patient has an intellectual disability  · This making his prolonged erection diagnosis and treatment challenging    Prolonged erection  Assessment & Plan  · Vital signs stable afebrile patient seen and examined by myself for prolonged erection  · Problem has been ongoing for months patient is embarrassed to discuss it  · Erection lasts anywhere from 1 to 6 hours  · Tiger text with on-call urologist Dr. Iraida Ramirez  · His suggestion is that we try either terbutaline 10 mg p.o. every 6 hours as needed, however it is a nonformulary drug that we do not have in-house  · Therefore his other suggestion was for tablets of the 30 mg of regular strength Sudafed not ER every 24 as needed prolonged erection greater than 45 minutes  · We will evaluate as the days go on how this works he feels that it is basically related to some of his psych medications  · He will need to follow-up with urology on an outpatient basis  · If the current plan does not work we need to contact urology back             Nurse Coordination of Care Discussion: 30 minutes    Discussions with Specialists or Other Care Team Provider: Dr. Muriel Hawkins of urology via [facility] text. I have spent approximately 30 minutes coordinating care with him. Education and Discussions with Patient: I spent approximately 10 minutes at the bedside having a conversation with the patient regarding this issue    Time Spent for Care: 45 minutes. More than 50% of total time spent on counseling and coordination of care as described above.     Current Length of Stay: 8 day(s)    Code Status: Level 1 - Full Code      Subjective:   "It gets really hard and it stays hard for a long time and it hurts bad and it burns"    Objective:     Vitals:   Temp (24hrs), Av.1 °F (36.7 °C), Min:98.1 °F (36.7 °C), Max:98.1 °F (36.7 °C)    Temp:  [98.1 °F (36.7 °C)] 98.1 °F (36.7 °C)  HR:  [59] 59  Resp:  [16] 16  BP: (106-113)/(51-56) 106/51  SpO2:  [97 %-99 %] 97 %  Body mass index is 27.85 kg/m². Physical Exam:     Physical Exam  Constitutional:       Appearance: Normal appearance. HENT:      Head: Normocephalic. Right Ear: Tympanic membrane normal.      Left Ear: Tympanic membrane normal.      Nose: Nose normal.      Mouth/Throat:      Mouth: Mucous membranes are moist.   Eyes:      Extraocular Movements: Extraocular movements intact. Pupils: Pupils are equal, round, and reactive to light. Cardiovascular:      Rate and Rhythm: Normal rate and regular rhythm. Pulses: Normal pulses. Heart sounds: Normal heart sounds. Pulmonary:      Effort: Pulmonary effort is normal.      Breath sounds: Normal breath sounds. Abdominal:      General: Abdomen is flat. Palpations: Abdomen is soft. Genitourinary:     Comments: His penis has an erection there is no drainage or deformities  Musculoskeletal:         General: Normal range of motion. Skin:     General: Skin is warm and dry. Neurological:      General: No focal deficit present. Mental Status: He is alert. Mental status is at baseline. Psychiatric:         Behavior: Behavior normal.           Additional Data:     Labs:                            * I Have Reviewed All Lab Data Listed Above. * Additional Pertinent Lab Tests Reviewed:  [address] [place] [address] Admission Reviewed    Imaging:    Imaging Reports Reviewed Today Include:  None      Last 24 Hours Medication List:   Current Facility-Administered Medications   Medication Dose Route Frequency Provider Last Rate   • acetaminophen  650 mg Oral Q6H PRN Patrick Ibrahim MD     • acetaminophen  650 mg Oral Q4H PRN Patrick Ibrahim MD     • acetaminophen  975 mg Oral Q6H PRN Hedy TERRAZAS Brittany Loyola MD     • albuterol  2 puff Inhalation Q4H PRN Fariba Conte MD     • aluminum-magnesium hydroxide-simethicone  30 mL Oral Q4H PRN Fariba Conte MD     • haloperidol lactate  2.5 mg Intramuscular Q6H PRN Max 4/day Hayward Gosselin, DO      And   • LORazepam  1 mg Intramuscular Q6H PRN Max 4/day Hayward Gosselin, DO      And   • benztropine  0.5 mg Intramuscular Q6H PRN Max 4/day Yuki Gosselin, DO     • haloperidol lactate  5 mg Intramuscular Q4H PRN Max 4/day Yuki Gosselin, DO      And   • LORazepam  2 mg Intramuscular Q4H PRN Max 4/day Hayward Gosselin, DO      And   • benztropine  1 mg Intramuscular Q4H PRN Max 4/day Hayward Gosselin, DO     • benztropine  1 mg Oral Q4H PRN Max 6/day Fariba Conte MD     • carbamide peroxide  5 drop Both Ears BID Kerney Alpers, PA-C     • hydrOXYzine HCL  50 mg Oral Q6H PRN Max 4/day Fariba Conte MD      Or   • diphenhydrAMINE  50 mg Intramuscular Q6H PRN Fariba Conte MD     • fluticasone  1 spray Nasal Daily Fariba Conte MD     • haloperidol  2 mg Oral Q4H PRN Max 6/day Yuki Gosselin, DO     • haloperidol  5 mg Oral Q6H PRN Max 4/day Yuki Gosselin, DO     • haloperidol  5 mg Oral Q4H PRN Max 4/day Rutsy Brock DO     • hydrOXYzine HCL  100 mg Oral Q6H PRN Max 4/day Fariba Conte MD      Or   • LORazepam  2 mg Intramuscular Q6H PRN Fariba Conte MD     • hydrOXYzine HCL  25 mg Oral Q6H PRN Max 4/day Fariba Conte MD     • melatonin  3 mg Oral HS Fariba Conte MD     • nicotine polacrilex  4 mg Oral Q2H PRN Fariba Conte MD     • polyethylene glycol  17 g Oral Daily PRN Fariba Conte MD     • pseudoephedrine  120 mg Oral Daily PRN ELA Wise     • risperiDONE  4 mg Oral BID Tee Darby DO     • senna-docusate sodium  1 tablet Oral Daily PRN Fariba Conte MD     • traZODone  50 mg Oral HS PRN Fariba Conte MD          Today, Patient Was Seen By: ELA Oro      ** Please Note: Dictation voice to text software may have been used in the creation of this document.  **

## 2023-05-26 NOTE — TREATMENT PLAN
Received TT message from Medical Provider Omi Henning at [facility] unit regarding patient with prolonged morning erection. As there is no onsite Urology coverage at [address] [place] [address] I did not personally evaluate the patient. History of physical exam was obtained from comprehensive chart review and from primary provider caring for the patient. HPI:  Henry Hernandez is a 25 y/o male with PMHx significant for substance use disorder, asthma, legally blind, and hearing impaired. Per chart he initially presented to the ED for abnormal behavior and auditory hallucinations reporting he was off his psychiatric medications for 1 week. He would be admitted to \[facility]\""-Psychiatric unit for Psychosis r/o Schizophrenia vs Schizoaffective. Urology was contacted due to reports of patient experiencing a prolonged morning erection with complaints of pain. According to primary provider Omi Henning patient reports that this has been ongoing for several months and usually he will wake up with an erection in the morning which can sometimes last until 1 or 2 in the afternoon. He tries to put ice on it to help with the pain and burning he experiences. Assessment/Plan:    Priapism  · Intermittent episodes of ischemic priapism that have been occurring for several months. Can last anywhere from 1 to 6 hours. I suspect this is likely secondary to risperiDONE which was increased from 3 mg BID to 4 mg BID on 5/24. · Low clinical suspicion for sickle cell disease as possible cause of priapism as patient has no history of sickle cell disease and CBC from 5/14/2023 was unremarkable. Plan:  · Recommend alternative to risperiDONE as this is likely the cause of patients intermittent priapism. · Oral treatment options for acute ischemic priapism include the following options.   · One time dose of pseudoephedrine 120 mg  PO    Or  · Terbutaline 10 mg PO every 6 hours as needed  · If patient continues with prolonged erection lasting greater than 1 hour following PO treatment, can consider evaluation in emergency department as they can inject phenylephrine and perform needle aspiration decompression to avoid potential need for transfer. If unsuccessful attempts made to treat priapism will need to consider transfer to [address] [place] [address] for formal evaluation by Urology. Urology will continue to follow peripherally and remain available for any further inpatient needs. Please feel free to contact the provider currently covering the [facility] role for this campus with questions or concerns.       John Mccarthy

## 2023-05-26 NOTE — ED NOTES
Patient d/c and report called to 3B; [facility] 3B [facility]  05/26/23 7831

## 2023-05-26 NOTE — NURSING NOTE
975mg of tylenol for 7/10 severe pain related to penile pain patient reports has NOT been effective. Patient reports no relief. SLIM at bedside to assess.

## 2023-05-26 NOTE — ASSESSMENT & PLAN NOTE
· Vital signs stable afebrile patient seen and examined by myself for prolonged erection  · Problem has been ongoing for months patient is embarrassed to discuss it  · Erection lasts anywhere from 1 to 6 hours  · Tiger text with on-call urologist Dr. Fam Young  · His suggestion is that we try either terbutaline 10 mg p.o. every 6 hours as needed, however it is a nonformulary drug that we do not have in-house  · Therefore his other suggestion was for tablets of the 30 mg of regular strength Sudafed not ER every 24 as needed prolonged erection greater than 45 minutes  · We will evaluate as the days go on how this works he feels that it is basically related to some of his psych medications  · He will need to follow-up with urology on an outpatient basis  · If the current plan does not work we need to contact urology back

## 2023-05-26 NOTE — ASSESSMENT & PLAN NOTE
· Patient has an intellectual disability  · This making his prolonged erection diagnosis and treatment challenging

## 2023-05-26 NOTE — NURSING NOTE
Transport arranged to go to ED room 5 for priaprism. Security present to escort patient down with BHT.      15:38: Patient escorted down to ED

## 2023-05-26 NOTE — NURSING NOTE
Patient given 120mg of ordered prn pseudoephedrine for persistent painful erection     14:15: Pseuedoephedrine not effective. Patient observed sleeping in bed with erection. Patient awoken to have a private discussion. Patient is familiar with masturbation, and has attempted to use it in the past. Patient states he tried this today to no relief.

## 2023-05-26 NOTE — SOCIAL WORK
Pt observed in the hallway responding to internal stimuli. Pt pleasant and cooperative upon approach, denying any CM related needs at this time.

## 2023-05-26 NOTE — NURSING NOTE
Patient came to nurse's station reporting penile pain related to his persistent erection. The patient states "It goes on for a few hours each day." He cannot remember how many hours. Dr. Grey Olivares aware, [facility] contacted and stated he is on their consult list. This RN can see a visible erection through gown. Patient given 975mg tylenol for 7/10 severe pain related to penile pain from erection. Patient denies AVH,SI,HI this morning. He is medication compliant, and visible at times. He is cooperative, and pleasant.

## 2023-05-26 NOTE — NURSING NOTE
Patient's penis assessed. Penis is erect, soft at the glans. Patient reports pain at penis related to erection. Penis is warm to the touch, and appropriate to skin color ethnicity.

## 2023-05-27 PROCEDURE — 99232 SBSQ HOSP IP/OBS MODERATE 35: CPT | Performed by: INTERNAL MEDICINE

## 2023-05-27 PROCEDURE — 99232 SBSQ HOSP IP/OBS MODERATE 35: CPT | Performed by: PSYCHIATRY & NEUROLOGY

## 2023-05-27 RX ADMIN — OLANZAPINE 5 MG: 5 TABLET, FILM COATED ORAL at 17:01

## 2023-05-27 RX ADMIN — NICOTINE POLACRILEX 4 MG: 4 GUM, CHEWING BUCCAL at 06:55

## 2023-05-27 RX ADMIN — Medication 3 MG: at 21:11

## 2023-05-27 RX ADMIN — DIPHENHYDRAMINE HYDROCHLORIDE 50 MG: 50 INJECTION, SOLUTION INTRAMUSCULAR; INTRAVENOUS at 17:24

## 2023-05-27 RX ADMIN — OLANZAPINE 5 MG: 5 TABLET, FILM COATED ORAL at 10:28

## 2023-05-27 RX ADMIN — ACETAMINOPHEN 975 MG: 325 TABLET ORAL at 16:57

## 2023-05-27 RX ADMIN — OLANZAPINE 10 MG: 10 TABLET, FILM COATED ORAL at 05:49

## 2023-05-27 RX ADMIN — NICOTINE POLACRILEX 4 MG: 4 GUM, CHEWING BUCCAL at 19:28

## 2023-05-27 RX ADMIN — CARBAMIDE PEROXIDE 6.5% 5 DROP: 6.5 LIQUID AURICULAR (OTIC) at 17:01

## 2023-05-27 RX ADMIN — OLANZAPINE 5 MG: 10 INJECTION, POWDER, LYOPHILIZED, FOR SOLUTION INTRAMUSCULAR at 17:24

## 2023-05-27 RX ADMIN — OLANZAPINE 10 MG: 10 TABLET, FILM COATED ORAL at 21:16

## 2023-05-27 RX ADMIN — FLUTICASONE PROPIONATE 1 SPRAY: 50 SPRAY, METERED NASAL at 10:28

## 2023-05-27 NOTE — NURSING NOTE
Patient appraoched the nursing station irritable and requested a PRN for agitation due to hearing voices. Broset score 3. Patient received PRN zyprexa 10 mg PO for severe agitation.

## 2023-05-27 NOTE — PLAN OF CARE
Problem: Ineffective Coping  Goal: Participates in unit activities  Description: Interventions:  - Provide therapeutic environment   - Provide required programming   - Redirect inappropriate behaviors   Outcome: Progressing     Problem: SELF HARM/SUICIDALITY  Goal: Will have no self-injury during hospital stay  Description: INTERVENTIONS:  - Q 15 MINUTES: Routine safety checks  - Q WAKING SHIFT & PRN: Assess risk to determine if routine checks are adequate to maintain patient safety  - Encourage patient to participate actively in care by formulating a plan to combat response to suicidal ideation, identify supports and resources  Outcome: Progressing     Problem: ANXIETY  Goal: Will report anxiety at manageable levels  Description: INTERVENTIONS:  - Administer medication as ordered  - Teach and encourage coping skills  - Provide emotional support  - Assess patient/family for anxiety and ability to cope  Outcome: Progressing  Goal: By discharge: Patient will verbalize 2 strategies to deal with anxiety  Description: Interventions:  - Identify any obvious source/trigger to anxiety  - Staff will assist patient in applying identified coping technique/skills  - Encourage attendance of scheduled groups and activities  Outcome: Progressing     Problem: SUBSTANCE USE/ABUSE  Goal: Will have no detox symptoms and will verbalize plan for changing substance-related behavior  Description: INTERVENTIONS:  - Monitor physical status and assess for symptoms of withdrawal  - Administer medication as ordered  - Provide emotional support with 1 on 1 interaction with staff  - Encourage recovery focused program/ addiction education  - Assess for verbalization of changing behaviors related to substance abuse  - Initiate consults and referrals as appropriate (Case Management, Spiritual Care, etc.)  Outcome: Progressing  Goal: By discharge, will develop insight into their chemical dependency and sustain motivation to continue in recovery  Description: INTERVENTIONS:  - Attends all daily group sessions and scheduled AA groups  - Actively practices coping skills through participation in the therapeutic community and adherence to program rules  - Reviews and completes assignments from individual treatment plan  - Assist patient development of understanding of their personal cycle of addiction and relapse triggers  Outcome: Progressing  Goal: By discharge, patient will have ongoing treatment plan addressing chemical dependency  Description: INTERVENTIONS:  - Assist patient with resources and/or appointments for ongoing recovery based living  Outcome: Progressing

## 2023-05-27 NOTE — PROGRESS NOTES
· Received a call from RN stating that patient banged his head at the nurse station  · During my encounter, patient denies any pain  · Does not seem to have significant swelling or open injury  · Patient is ambulating fine, no neurological change  · Patient is AAOx4  · Continue to monitor closely, no imaging needed at this time

## 2023-05-27 NOTE — NURSING NOTE
Patient was in their bed resting at the beginning of the shift. Patient then came out of their room and was irritable and responding to internal stimuli. Patient received PRN zyprexa 5 mg IM at 2144 for moderate agitation. Upon reassessment one hour later at 2244 patient was in their bed sleeping, effective. Patient denied depression, anxiety, SI, and HI. Compliant with scheduled evening medications. Able to verbalize their needs and denies any unmet needs.

## 2023-05-27 NOTE — PROGRESS NOTES
Progress Note - Behavioral Health   Gokul Morales 26 y.o. male MRN: [id number]  Unit/Bed#: [facility] 350-02 Encounter: [id number]    Assessment/Plan   Principal Problem:    Psychosis ([address]) ro Schizophrenia vs Schizoaffective  Active Problems:    Legally blind    Hearing loss    Asthma    Medical clearance for psychiatric admission    Bilateral impacted cerumen    Prolonged erection    Intellectual disability      Recommended Treatment:     No psychopharmacologic changes necessary at this moment; will continue to assess daily for further optimization. Continue with pharmacotherapy, group therapy, milieu therapy and occupational therapy. Continue to assess for adverse medication side effects. Encourage patient to participate in nonverbal forms of therapy including journaling and art/music therapy. Continue frequent safety checks and vitals per unit protocol. Continue to engage CM/SW to assist with collateral, disposition planning, and the implementation of an individualized, patient-centered plan of care. Continue medical management by medical team.  Case discussed with treatment team.  Legal Status: 201    ------------------------------------------------------------    Subjective: All documentation including nursing notes, medication history to ensure medication adherence on the unit, labs, and vitals were reviewed. Gokul was evaluated this morning for continuity of care and no acute distress noted throughout the evaluation. Over the past 24 hours per nursing report since his return from the ED, Gokul has compliant with medication though mainly isolative to his room. He was observed coming out of his room during the evening shift and responding to internal stimuli and received PRN Zyprexa 5 mg IM at 2144 for moderate agitation which was effective. He received PRN Tylenol 975 mg at 2055 for 8/10 pain in his head and noted improvement when reassessed.  Overnight, patient approached nursing station and requested PRN for agitation due to hearing voices and received PRN 10 mg Zyprexa which was effective. Today, Gokul is seen at bedside covered in bedsheets with his head covered. Patient states, "I don't care" when asked if he could uncover his head and speak to this writer. He refused to cooperative during evaluation despite multiple attempts. He did not verbalize any SI/HI/AVH.      PRNs overnight: IM Zyprexa 5 mg @2144, Tylenol 975 mg @2055, and PO Zyprexa 10 mg @0549  VS: Reviewed, within normal limits    Progress Toward Goals: unchanged    Psychiatric Review of Systems:  Behavior over the last 24 hours:  unchanged  Sleep: unable to assess  Appetite: unable to assess  Medication side effects: unable to assess  ROS: does not answer    Vital signs in last 24 hours:  Temp:  [97.2 °F (36.2 °C)-98.5 °F (36.9 °C)] 97.2 °F (36.2 °C)  HR:  [] 71  Resp:  [16-20] 16  BP: (110-139)/(61-77) 110/61    Laboratory results: I have personally reviewed all pertinent laboratory/tests results  Recent Results (from the past 48 hour(s))   Blood gas, venous    Collection Time: 05/26/23  4:26 PM   Result Value Ref Range    pH, Micah 6.921 (L) 7.300 - 7.400    pCO2, Micah 87.9 (H) 42.0 - 50.0 mm Hg    pO2, Micah 33.5 (L) 35.0 - 45.0 mm Hg    HCO3, Micah 17.7 (L) 24 - 30 mmol/L    Base Excess, Micah -17.4 mmol/L    O2 Content, Micah 13.8 ml/dL    O2 HGB, VENOUS 52.7 (L) 60.0 - 80.0 %   CBC and differential    Collection Time: 05/26/23  4:45 PM   Result Value Ref Range    WBC 9.14 4.31 - 10.16 Thousand/uL    RBC 4.99 3.88 - 5.62 Million/uL    Hemoglobin 15.7 12.0 - 17.0 g/dL    Hematocrit 46.6 36.5 - 49.3 %    MCV 93 82 - 98 fL    MCH 31.5 26.8 - 34.3 pg    MCHC 33.7 31.4 - 37.4 g/dL    RDW 12.6 11.6 - 15.1 %    MPV 11.2 8.9 - 12.7 fL    Platelets 487 723 - 502 Thousands/uL    nRBC 0 /100 WBCs    Neutrophils Relative 62 43 - 75 %    Immat GRANS % 0 0 - 2 %    Lymphocytes Relative 25 14 - 44 %    Monocytes Relative 11 4 - 12 %    Eosinophils Relative 1 0 - 6 %    Basophils Relative 1 0 - 1 %    Neutrophils Absolute 5.64 1.85 - 7.62 Thousands/µL    Immature Grans Absolute 0.03 0.00 - 0.20 Thousand/uL    Lymphocytes Absolute 2.30 0.60 - 4.47 Thousands/µL    Monocytes Absolute 0.98 0.17 - 1.22 Thousand/µL    Eosinophils Absolute 0.11 0.00 - 0.61 Thousand/µL    Basophils Absolute 0.08 0.00 - 0.10 Thousands/µL   Comprehensive metabolic panel    Collection Time: 05/26/23  4:45 PM   Result Value Ref Range    Sodium 137 135 - 147 mmol/L    Potassium 4.7 3.5 - 5.3 mmol/L    Chloride 102 96 - 108 mmol/L    CO2 28 21 - 32 mmol/L    ANION GAP 7 4 - 13 mmol/L    BUN 13 5 - 25 mg/dL    Creatinine 1.34 (H) 0.60 - 1.30 mg/dL    Glucose 116 65 - 140 mg/dL    Calcium 9.4 8.4 - 10.2 mg/dL    AST 40 (H) 13 - 39 U/L    ALT 31 7 - 52 U/L    Alkaline Phosphatase 51 34 - 104 U/L    Total Protein 6.8 6.4 - 8.4 g/dL    Albumin 3.8 3.5 - 5.0 g/dL    Total Bilirubin 0.51 0.20 - 1.00 mg/dL    eGFR 74 ml/min/1.73sq m   Protime-INR    Collection Time: 05/26/23  4:45 PM   Result Value Ref Range    Protime 11.7 11.6 - 14.5 seconds    INR 0.83 (L) 0.84 - 1.19         Mental Status Evaluation:    Appearance:  covered in bed sheets, lying in bed   Behavior:  guarded, uncooperative, evasive, does not answer questions   Speech:  normal rate and volume, scant   Mood:  "I don't care"   Affect:  irritable   Thought Process:  unable to assess   Associations: unable to assess - does not answer   Thought Content:  paranoid ideation   Perceptual Disturbances: Unable to assess   Risk Potential: Suicidal ideation - unable to assess  Homicidal ideation - unable to assess  Potential for aggression - Not at present   Sensorium:  unable to assess   Memory:  recent and remote memory: unable to assess due to lack of cooperation   Consciousness:  awake   Attention/Concentration: attention span and concentration: unable to assess due to lack of cooperation   Insight:  limited   Judgment: limited Gait/Station: in bed   Motor Activity: no abnormal movements       Current Medications:  Current Facility-Administered Medications   Medication Dose Route Frequency Provider Last Rate   • acetaminophen  650 mg Oral Q6H PRN Marciano Hamman, MD     • acetaminophen  650 mg Oral Q4H PRN Marciano Hamman, MD     • acetaminophen  975 mg Oral Q6H PRN Marciano Hamman, MD     • albuterol  2 puff Inhalation Q4H PRN Marciano Hamman, MD     • aluminum-magnesium hydroxide-simethicone  30 mL Oral Q4H PRN Marciano Hamman, MD     • benztropine  1 mg Oral Q4H PRN Max 6/day Marciano Hamman, MD     • carbamide peroxide  5 drop Both Ears BID Renetta Brewer PA-C     • hydrOXYzine HCL  50 mg Oral Q6H PRN Max 4/day Marciano Hamman, MD      Or   • diphenhydrAMINE  50 mg Intramuscular Q6H PRN Marciano Hamman, MD     • fluticasone  1 spray Nasal Daily Marciano Hamman, MD     • hydrOXYzine HCL  100 mg Oral Q6H PRN Max 4/day Marciano Hamman, MD      Or   • LORazepam  2 mg Intramuscular Q6H PRN Marciano Hamman, MD     • hydrOXYzine HCL  25 mg Oral Q6H PRN Max 4/day Marciano Hamman, MD     • melatonin  3 mg Oral HS Marciano Hamman, MD     • nicotine polacrilex  4 mg Oral Q2H PRN Marciano Hamman, MD     • OLANZapine  10 mg Oral Q3H PRN Max 3/day Clotilde Dodd MD      Or   • OLANZapine  10 mg Intramuscular Q3H PRN Max 3/day Clotilde Dodd MD     • OLANZapine  5 mg Oral Q3H PRN Max 6/day Clotilde Dodd MD      Or   • OLANZapine  5 mg Intramuscular Q3H PRN Max 6/day Clotilde Dodd MD     • OLANZapine  2.5 mg Oral Q3H PRN Max 8/day Clotilde Dodd MD     • OLANZapine  5 mg Oral BID Clotilde Dodd MD     • polyethylene glycol  17 g Oral Daily PRN Marciano Hamman, MD     • pseudoephedrine  120 mg Oral Daily PRN ELA Wise     • senna-docusate sodium  1 tablet Oral Daily PRN Marciano Hamman, MD     • sterile water                Behavioral Health Medications: All current active meds have been reviewed. Changes as in plan section above. Risks, benefits and possible side effects of Medications:   Risks, benefits, and possible side effects of medications explained to patient and patient verbalizes understanding. Counseling / Coordination of Care:  Patient's progress discussed with staff in treatment team meeting. Jason Morales DO 05/27/23  Psychiatry Resident, PGY-II    This note was completed in part utilizing Dragon dictation Software. Grammatical, translation, syntax errors, random word insertions, spelling mistakes, and incomplete sentences may be an occasional consequence of this system secondary to software limitations with voice recognition, ambient noise, and hardware issues. If you have any questions or concerns about the content, text, or information contained within the body of this dictation, please contact the provider for clarification.

## 2023-05-27 NOTE — PLAN OF CARE
Problem: Ineffective Coping  Goal: Participates in unit activities  Description: Interventions:  - Provide therapeutic environment   - Provide required programming   - Redirect inappropriate behaviors   5/27/2023 1441 by Gene Bottoms  Outcome: Progressing  5/27/2023 0802 by [facility]  Outcome: Progressing     Problem: SELF HARM/SUICIDALITY  Goal: Will have no self-injury during hospital stay  Description: INTERVENTIONS:  - Q 15 MINUTES: Routine safety checks  - Q WAKING SHIFT & PRN: Assess risk to determine if routine checks are adequate to maintain patient safety  - Encourage patient to participate actively in care by formulating a plan to combat response to suicidal ideation, identify supports and resources  5/27/2023 1441 by Gene Bottoms  Outcome: Progressing  5/27/2023 0802 by [facility]  Outcome: Progressing     Problem: ANXIETY  Goal: Will report anxiety at manageable levels  Description: INTERVENTIONS:  - Administer medication as ordered  - Teach and encourage coping skills  - Provide emotional support  - Assess patient/family for anxiety and ability to cope  5/27/2023 1441 by Gene Bottoms  Outcome: Progressing  5/27/2023 0802 by [facility]  Outcome: Progressing  Goal: By discharge: Patient will verbalize 2 strategies to deal with anxiety  Description: Interventions:  - Identify any obvious source/trigger to anxiety  - Staff will assist patient in applying identified coping technique/skills  - Encourage attendance of scheduled groups and activities  5/27/2023 1441 by Gene Bottoms  Outcome: Progressing  5/27/2023 0802 by [facility]  Outcome: Progressing     Problem: SUBSTANCE USE/ABUSE  Goal: Will have no detox symptoms and will verbalize plan for changing substance-related behavior  Description: INTERVENTIONS:  - Monitor physical status and assess for symptoms of withdrawal  - Administer medication as ordered  - Provide emotional support with 1 on 1 interaction with staff  - Encourage recovery focused program/ addiction education  - Assess for verbalization of changing behaviors related to substance abuse  - Initiate consults and referrals as appropriate (Case Management, Spiritual Care, etc.)  5/27/2023 1441 by Annemarie Sanchez  Outcome: Progressing  5/27/2023 0802 by Annemarie Sanchez  Outcome: Progressing  Goal: By discharge, will develop insight into their chemical dependency and sustain motivation to continue in recovery  Description: INTERVENTIONS:  - Attends all daily group sessions and scheduled AA groups  - Actively practices coping skills through participation in the therapeutic community and adherence to program rules  - Reviews and completes assignments from individual treatment plan  - Assist patient development of understanding of their personal cycle of addiction and relapse triggers  5/27/2023 1441 by Annemarie Sanchez  Outcome: Progressing  5/27/2023 0802 by Annemarie Sanchez  Outcome: Progressing  Goal: By discharge, patient will have ongoing treatment plan addressing chemical dependency  Description: INTERVENTIONS:  - Assist patient with resources and/or appointments for ongoing recovery based living  5/27/2023 1441 by Annemarie Sanchez  Outcome: Progressing  5/27/2023 0802 by Annemarie Sanchez  Outcome: Progressing

## 2023-05-27 NOTE — NURSING NOTE
Patient did not want to get out of bed for assessment today, patient was willing to take covers off of face. He reports that he is no longer having penile pain, or persistent erection. He denies AVH/SI/HI. He has had no behavioral issues. Patient took his medication and is cooperative. Patient reports "Feeling good!" with a smile. Patient at start of AM shift was walking around the unit social with peers and staff and bright.

## 2023-05-27 NOTE — NURSING NOTE
Patient complained of 8/10 pain in their head. Patient received PRN tylenol 975 mg at 2055 for severe pain. Upon reassessment one hour later at 2155 patient rates their pain as 4/10, effective.

## 2023-05-28 PROCEDURE — 99232 SBSQ HOSP IP/OBS MODERATE 35: CPT | Performed by: PSYCHIATRY & NEUROLOGY

## 2023-05-28 RX ORDER — WATER 10 ML/10ML
INJECTION INTRAMUSCULAR; INTRAVENOUS; SUBCUTANEOUS
Status: DISCONTINUED
Start: 2023-05-28 | End: 2023-10-19 | Stop reason: HOSPADM

## 2023-05-28 RX ADMIN — OLANZAPINE 10 MG: 10 INJECTION, POWDER, FOR SOLUTION INTRAMUSCULAR at 16:14

## 2023-05-28 RX ADMIN — HYDROXYZINE HYDROCHLORIDE 100 MG: 50 TABLET, FILM COATED ORAL at 19:51

## 2023-05-28 RX ADMIN — Medication 3 MG: at 21:17

## 2023-05-28 RX ADMIN — FLUTICASONE PROPIONATE 1 SPRAY: 50 SPRAY, METERED NASAL at 08:28

## 2023-05-28 RX ADMIN — OLANZAPINE 10 MG: 10 INJECTION, POWDER, FOR SOLUTION INTRAMUSCULAR at 21:52

## 2023-05-28 RX ADMIN — OLANZAPINE 5 MG: 5 TABLET, FILM COATED ORAL at 19:05

## 2023-05-28 RX ADMIN — DIPHENHYDRAMINE HYDROCHLORIDE 50 MG: 50 INJECTION, SOLUTION INTRAMUSCULAR; INTRAVENOUS at 16:15

## 2023-05-28 RX ADMIN — DIPHENHYDRAMINE HYDROCHLORIDE 50 MG: 50 INJECTION, SOLUTION INTRAMUSCULAR; INTRAVENOUS at 21:52

## 2023-05-28 RX ADMIN — OLANZAPINE 5 MG: 5 TABLET, FILM COATED ORAL at 08:27

## 2023-05-28 NOTE — PROGRESS NOTES
[facility] Group Note     05/28/23 1300   Activity/Group Checklist   Group Life Skills  (Teamwork and Communication)   Attendance Attended   Attendance Duration (min) 0-15  (only attended group briefly)   Interactions Interacted appropriately   Affect/Mood Calm   Goals Achieved Able to listen to others; Able to engage in interactions; Able to recieve feedback; Able to give feedback to another  (benefited from social presence of the group)

## 2023-05-28 NOTE — NURSING NOTE
Patient began banging forehead on corner of wall near nurse's station very loudly. Patient reported "The voices are making me mad! They are loud!" Patient redirected to room, given 5mg IM zyprexa (5mg PO zyprexa scheduled was given shortly beforehand so 5mg IM zyprexa was opted for) and 50mg Benadryl IM for severe anxiety. Dr. Jc Frank contacted in regards to patient hitting forehead on corner near nurse's station. Patient has small swelling on forehead. Given cold compress. 18:24: 5mg Zyprexa IM effective for severe agitation and Benadryl 50mg effective for severe anxiety. Patient is pleasant, reports the voices are not loud anymore. Patient stated he no longer needed a cold compress, and no swelling on forehead could be visualized. 18:30: This RN was notified the patient was observed to have an erection periodically through his jeans towards the late afternoon and into evening. Patient taken to exam room, and educated on importance of letting staff know if he has an erection lasting longer than 3 hours, as the patient on 5/26 had priapism requiring decompression in the ED. Patient educated on potential risks of letting priapism including death of soft tissue. Patient verbalized understanding. This RN asked the patient to pull down his jeans, patient kept boxers on, and this RN visualized the patient's penis was not erect. Patient states it has not caused him pain today, but has had periodic erections but none that last for long.

## 2023-05-28 NOTE — NURSING NOTE
Pt came out of room into hallway agitated, yelling verbal threats and profanities, RTIS. Pt escorted back to room with security present, began banging on desk in room, continued to yell. IM Zyprexa 10mg and IM Benadryl 50mg administered at 1615. Will monitor.

## 2023-05-28 NOTE — NURSING NOTE
1718 PRN Zyprexa 10mg IM and Benadryl 50mg effective. Pt continues to RTIS but is redirectable, able to smile and joke with staff when approached. Redirected pt back to his room when he becomes loud and disruptive to unit. 1800 Pt sleeping in bed, unable to be woken up for scheduled Zyprexa. Medication held. 1905: Pt awake and requesting medications for agitation. Scheduled Zyprexa 5mg po administered.

## 2023-05-28 NOTE — PROGRESS NOTES
Progress Note - Behavioral Health   Gokul Smith Coma 26 y.o. male MRN: [id number]  Unit/Bed#: [facility], [place] 350-02 Encounter: [id number]    Assessment/Plan   Principal Problem:    Psychosis ([address]) ro Schizophrenia vs Schizoaffective  Active Problems:    Legally blind    Hearing loss    Asthma    Medical clearance for psychiatric admission    Bilateral impacted cerumen    Prolonged erection    Intellectual disability      Recommended Treatment:     No psychopharmacologic changes necessary at this moment; will continue to assess daily for further optimization. Continue with pharmacotherapy, group therapy, milieu therapy and occupational therapy. Continue to assess for adverse medication side effects. Encourage patient to participate in nonverbal forms of therapy including journaling and art/music therapy. Continue frequent safety checks and vitals per unit protocol. Continue to engage CM/SW to assist with collateral, disposition planning, and the implementation of an individualized, patient-centered plan of care. Continue medical management by medical team.  Case discussed with treatment team.  Legal Status: 201    ------------------------------------------------------------    Subjective: All documentation including nursing notes, medication history to ensure medication adherence on the unit, labs, and vitals were reviewed. Gokul was evaluated this morning for continuity of care and no acute distress noted throughout the evaluation. Over the past 24 hours per nursing report, Gokul has been cooperative with care and compliant with medications. Patient has been social with peers though did complain of 8/10 head pain from an earache and received 975 mg Tylenol which was effective. During evening shift, patient was seen banging his forehead due to auditory hallucinations that he described were loud and received IM 5 mg Zyprexa and Benadryl IM 50 mg which were effective. Medical was made aware about head hitting. During evening shift, he was observed to have an erection periodically and was educated by staff about priapism to which patient expressed understanding. Additionally, he reported HI towards mother and sister and VH of blood to staff and later heard yelling at voices in the bathroom. He received PRN Zyprexa 10 mg at 2116 for severe agitation which was effective. Today, Gokul is consenting for safety on the unit. Patient seen in his room asleep though rouses to verbal stimuli and slightly more cooperative with evaluation than yesterday though appeared drowsy and later observed visibly asleep by the telephone station on the unit. Patient is guarded and continues to be scant with speech. Gokul reports feeling "good" and describes he feels "tired" which is likely due to receiving Zyprexa overnight. He denies any feelings of depression or anxiety. He denies any medical issues and denied any erections or penile pain. He did admit to banging his head yesterday due to "hearing things" though was unable to expand further. Gokul notes having good sleep. Gokul states having a good appetite. Gokul has been taking the medications as prescribed and reporting no side effects. Gokul denies suicidal ideations. Gokul denies homicidal ideations though did express HI initially towards sister and mother then later denied it. Regarding hallucinations, Gokul denies any visual or auditory hallucinations though appears internally preoccupied at times.     PRNs overnight: Zyprexa 10 mg @2116   VS: Reviewed, within normal limits    Progress Toward Goals: unchanged    Psychiatric Review of Systems:  Behavior over the last 24 hours:  unchanged  Sleep: normal  Appetite: normal  Medication side effects: No   ROS: all other systems are negative    Vital signs in last 24 hours:  Temp:  [98.2 °F (36.8 °C)] 98.2 °F (36.8 °C)  HR:  [85] 85  Resp:  [16] 16  BP: (116)/(57) 116/57    Laboratory results: I have personally reviewed all pertinent laboratory/tests results  Recent Results (from the past 48 hour(s))   Blood gas, venous    Collection Time: 05/26/23  4:26 PM   Result Value Ref Range    pH, Micah 6.921 (L) 7.300 - 7.400    pCO2, Micah 87.9 (H) 42.0 - 50.0 mm Hg    pO2, Micah 33.5 (L) 35.0 - 45.0 mm Hg    HCO3, Micah 17.7 (L) 24 - 30 mmol/L    Base Excess, Micah -17.4 mmol/L    O2 Content, Micah 13.8 ml/dL    O2 HGB, VENOUS 52.7 (L) 60.0 - 80.0 %   CBC and differential    Collection Time: 05/26/23  4:45 PM   Result Value Ref Range    WBC 9.14 4.31 - 10.16 Thousand/uL    RBC 4.99 3.88 - 5.62 Million/uL    Hemoglobin 15.7 12.0 - 17.0 g/dL    Hematocrit 46.6 36.5 - 49.3 %    MCV 93 82 - 98 fL    MCH 31.5 26.8 - 34.3 pg    MCHC 33.7 31.4 - 37.4 g/dL    RDW 12.6 11.6 - 15.1 %    MPV 11.2 8.9 - 12.7 fL    Platelets 069 872 - 789 Thousands/uL    nRBC 0 /100 WBCs    Neutrophils Relative 62 43 - 75 %    Immat GRANS % 0 0 - 2 %    Lymphocytes Relative 25 14 - 44 %    Monocytes Relative 11 4 - 12 %    Eosinophils Relative 1 0 - 6 %    Basophils Relative 1 0 - 1 %    Neutrophils Absolute 5.64 1.85 - 7.62 Thousands/µL    Immature Grans Absolute 0.03 0.00 - 0.20 Thousand/uL    Lymphocytes Absolute 2.30 0.60 - 4.47 Thousands/µL    Monocytes Absolute 0.98 0.17 - 1.22 Thousand/µL    Eosinophils Absolute 0.11 0.00 - 0.61 Thousand/µL    Basophils Absolute 0.08 0.00 - 0.10 Thousands/µL   Comprehensive metabolic panel    Collection Time: 05/26/23  4:45 PM   Result Value Ref Range    Sodium 137 135 - 147 mmol/L    Potassium 4.7 3.5 - 5.3 mmol/L    Chloride 102 96 - 108 mmol/L    CO2 28 21 - 32 mmol/L    ANION GAP 7 4 - 13 mmol/L    BUN 13 5 - 25 mg/dL    Creatinine 1.34 (H) 0.60 - 1.30 mg/dL    Glucose 116 65 - 140 mg/dL    Calcium 9.4 8.4 - 10.2 mg/dL    AST 40 (H) 13 - 39 U/L    ALT 31 7 - 52 U/L    Alkaline Phosphatase 51 34 - 104 U/L    Total Protein 6.8 6.4 - 8.4 g/dL    Albumin 3.8 3.5 - 5.0 g/dL    Total Bilirubin 0.51 0.20 - 1.00 mg/dL    eGFR 74 ml/min/1.73sq m   Protime-INR    Collection Time: 05/26/23  4:45 PM   Result Value Ref Range    Protime 11.7 11.6 - 14.5 seconds    INR 0.83 (L) 0.84 - 1.19         Mental Status Evaluation:    Appearance:  disheveled, dressed in hospital attire, looks older than stated age, overweight   Behavior:  guarded, slightly more cooperative   Speech:  soft, mumbled, scant   Mood:  "tired"   Affect:  flat   Thought Process:  decreased rate of thoughts   Associations: concrete associations   Thought Content:  some paranoia   Perceptual Disturbances: Denies auditory or visual hallucinations and Appears to be internally preoccupied   Risk Potential: Suicidal ideation - None at present  Homicidal ideation - denied HI though had reported to staff HI towards mother and sister  Potential for aggression - Yes, due to acute psychosis   Sensorium:  unable to assess   Memory:  recent and remote memory: unable to assess due to lack of cooperation   Consciousness:  Awake though drowsy   Attention/Concentration: attention span and concentration appear shorter than expected for age   Insight:  limited   Judgment: limited   Gait/Station: in bed   Motor Activity: no abnormal movements       Current Medications:  Current Facility-Administered Medications   Medication Dose Route Frequency Provider Last Rate   • acetaminophen  650 mg Oral Q6H PRN Winifred Beckett MD     • acetaminophen  650 mg Oral Q4H PRN Winifred Beckett MD     • acetaminophen  975 mg Oral Q6H PRN Winifred Beckett MD     • albuterol  2 puff Inhalation Q4H PRN Winifred Beckett MD     • aluminum-magnesium hydroxide-simethicone  30 mL Oral Q4H PRN Winifred Beckett MD     • benztropine  1 mg Oral Q4H PRN Max 6/day Winifred Beckett MD     • carbamide peroxide  5 drop Both Ears BID Chad Jordan PA-C     • hydrOXYzine HCL  50 mg Oral Q6H PRN Max 4/day Winifred Beckett MD      Or   • diphenhydrAMINE  50 mg Intramuscular Q6H PRN Winifred Beckett MD     • fluticasone  1 spray Nasal Daily Mary Paredes MD     • hydrOXYzine HCL  100 mg Oral Q6H PRN Max 4/day Mary Paredes MD      Or   • LORazepam  2 mg Intramuscular Q6H PRN Mary Paredes MD     • hydrOXYzine HCL  25 mg Oral Q6H PRN Max 4/day Mary Paredes MD     • melatonin  3 mg Oral HS Mary Paredes MD     • nicotine polacrilex  4 mg Oral Q2H PRN Mary Paredes MD     • OLANZapine  10 mg Oral Q3H PRN Max 3/day Devin Freedman MD      Or   • OLANZapine  10 mg Intramuscular Q3H PRN Max 3/day Devin Freedman MD     • OLANZapine  5 mg Oral Q3H PRN Max 6/day Devin Freedman MD      Or   • OLANZapine  5 mg Intramuscular Q3H PRN Max 6/day Devin Freedman MD     • OLANZapine  2.5 mg Oral Q3H PRN Max 8/day Devin Freedman MD     • OLANZapine  5 mg Oral BID Devin Freedman MD     • polyethylene glycol  17 g Oral Daily PRN Mary Paredes MD     • pseudoephedrine  120 mg Oral Daily PRN ELA Wise     • senna-docusate sodium  1 tablet Oral Daily PRN Mary Paredes MD     • sterile water              Behavioral Health Medications: All current active meds have been reviewed. Changes as in plan section above. Risks, benefits and possible side effects of Medications:   Risks, benefits, and possible side effects of medications explained to patient and patient verbalizes understanding. Counseling / Coordination of Care:  Patient's progress discussed with staff in treatment team meeting. Medications, treatment progress and treatment plan reviewed with patient. IVANA Fenton DO 05/28/23  Psychiatry Resident, PGY-II    This note was completed in part utilizing Dragon dictation Software. Grammatical, translation, syntax errors, random word insertions, spelling mistakes, and incomplete sentences may be an occasional consequence of this system secondary to software limitations with voice recognition, ambient noise, and hardware issues.  If you have any questions or concerns about the content, text, or information contained within the body of this dictation, please contact the provider for clarification.

## 2023-05-28 NOTE — NURSING NOTE
Pt awake, visible for needs, encouraged to get OOB for meals. Denies SI/HI/AVH at this time, although appears internally preoccupied. Compliant with scheduled medications and meals. Isolative to self, withdrawn but brightens on approach. Denies any unmet needs. Encouraged to shower and attend groups. Q7 minute safety checks maintained.

## 2023-05-28 NOTE — PLAN OF CARE
Problem: SELF HARM/SUICIDALITY  Goal: Will have no self-injury during hospital stay  Description: INTERVENTIONS:  - Q 15 MINUTES: Routine safety checks  - Q WAKING SHIFT & PRN: Assess risk to determine if routine checks are adequate to maintain patient safety  - Encourage patient to participate actively in care by formulating a plan to combat response to suicidal ideation, identify supports and resources  Outcome: Progressing     Problem: ANXIETY  Goal: Will report anxiety at manageable levels  Description: INTERVENTIONS:  - Administer medication as ordered  - Teach and encourage coping skills  - Provide emotional support  - Assess patient/family for anxiety and ability to cope  Outcome: Progressing  Goal: By discharge: Patient will verbalize 2 strategies to deal with anxiety  Description: Interventions:  - Identify any obvious source/trigger to anxiety  - Staff will assist patient in applying identified coping technique/skills  - Encourage attendance of scheduled groups and activities  Outcome: Progressing     Problem: SUBSTANCE USE/ABUSE  Goal: Will have no detox symptoms and will verbalize plan for changing substance-related behavior  Description: INTERVENTIONS:  - Monitor physical status and assess for symptoms of withdrawal  - Administer medication as ordered  - Provide emotional support with 1 on 1 interaction with staff  - Encourage recovery focused program/ addiction education  - Assess for verbalization of changing behaviors related to substance abuse  - Initiate consults and referrals as appropriate (Case Management, Spiritual Care, etc.)  Outcome: Progressing  Goal: By discharge, will develop insight into their chemical dependency and sustain motivation to continue in recovery  Description: INTERVENTIONS:  - Attends all daily group sessions and scheduled AA groups  - Actively practices coping skills through participation in the therapeutic community and adherence to program rules  - Reviews and completes assignments from individual treatment plan  - Assist patient development of understanding of their personal cycle of addiction and relapse triggers  Outcome: Progressing  Goal: By discharge, patient will have ongoing treatment plan addressing chemical dependency  Description: INTERVENTIONS:  - Assist patient with resources and/or appointments for ongoing recovery based living  Outcome: Progressing

## 2023-05-28 NOTE — NURSING NOTE
Patient was isolative to their room during the evening shift. Agitated upon approach but compliant. Patient reported feeling a little depression, anxiety, homicidal ideation towards their mother and sister, and visual hallucinations of blood. Patient was in their bathroom yelling at the voices. Patient received PRN zyprexa 10 mg PO for severe agitation at 2116. Patient stated to RN that they need an increase in their medication dosages. Upon reassessment one hour later at 2216 patient was in their bed sleeping without any signs of distress, effective. Compliant with scheduled evening medication. Patient able to make their physical needs known. Denies any unmet needs.

## 2023-05-29 PROCEDURE — 99232 SBSQ HOSP IP/OBS MODERATE 35: CPT | Performed by: STUDENT IN AN ORGANIZED HEALTH CARE EDUCATION/TRAINING PROGRAM

## 2023-05-29 RX ORDER — OLANZAPINE 10 MG/1
10 TABLET ORAL
Status: DISCONTINUED | OUTPATIENT
Start: 2023-05-29 | End: 2023-05-31

## 2023-05-29 RX ORDER — OLANZAPINE 5 MG/1
5 TABLET ORAL DAILY
Status: DISCONTINUED | OUTPATIENT
Start: 2023-05-30 | End: 2023-05-31

## 2023-05-29 RX ADMIN — NICOTINE POLACRILEX 4 MG: 4 GUM, CHEWING BUCCAL at 18:58

## 2023-05-29 RX ADMIN — OLANZAPINE 10 MG: 10 TABLET, FILM COATED ORAL at 21:25

## 2023-05-29 RX ADMIN — CARBAMIDE PEROXIDE 6.5% 5 DROP: 6.5 LIQUID AURICULAR (OTIC) at 09:33

## 2023-05-29 RX ADMIN — OLANZAPINE 5 MG: 5 TABLET, FILM COATED ORAL at 16:41

## 2023-05-29 RX ADMIN — OLANZAPINE 5 MG: 5 TABLET, FILM COATED ORAL at 09:31

## 2023-05-29 RX ADMIN — Medication 3 MG: at 21:25

## 2023-05-29 RX ADMIN — FLUTICASONE PROPIONATE 1 SPRAY: 50 SPRAY, METERED NASAL at 09:33

## 2023-05-29 RX ADMIN — NICOTINE POLACRILEX 4 MG: 4 GUM, CHEWING BUCCAL at 16:41

## 2023-05-29 RX ADMIN — OLANZAPINE 5 MG: 10 INJECTION, POWDER, LYOPHILIZED, FOR SOLUTION INTRAMUSCULAR at 18:54

## 2023-05-29 NOTE — NURSING NOTE
Pt observed responding in hallway and appears anxious. Pt c/o severe anxiety. PRN atarax 100mg given @ 19:51.

## 2023-05-29 NOTE — PROGRESS NOTES
Progress Note - Behavioral Health   Gokul Grant 26 y.o. male MRN: [id number]  Unit/Bed#: [facility] 350-02 Encounter: [id number]    Assessment/Plan   Principal Problem:    Psychosis ([address]) ro Schizophrenia vs Schizoaffective  Active Problems:    Legally blind    Hearing loss    Asthma    Medical clearance for psychiatric admission    Bilateral impacted cerumen    Prolonged erection    Intellectual disability      Recommended Treatment:     Increase evening dose of Zyprexa to 10 mg QHS for better control of psychosis and continue 5 mg QAM; will continue to assess daily for further optimization. Continue with pharmacotherapy, group therapy, milieu therapy and occupational therapy. Continue to assess for adverse medication side effects. Encourage patient to participate in nonverbal forms of therapy including journaling and art/music therapy. Continue frequent safety checks and vitals per unit protocol. Continue to engage CM/SW to assist with collateral, disposition planning, and the implementation of an individualized, patient-centered plan of care. Continue medical management by medical team.  Case discussed with treatment team.  Legal Status: 201    ------------------------------------------------------------    Subjective: All documentation including nursing notes, medication history to ensure medication adherence on the unit, labs, and vitals were reviewed. Gokul was evaluated this morning for continuity of care and no acute distress noted throughout the evaluation. Over the past 24 hours per nursing report, Gokul has been isolated to self, withdrawn, but brightens on approach. He has been compliant with medications and briefly attended group yesterday. Yesterday afternoon, patient was noted to be yelling verbal threats and profanities, banging on desk in his room, and responding to internal stimuli.  Security was present and patient was given IM Zyprexa 10 mg and IM Benadryl 50 mg at 1615 was effective. During evening shift, patient was responding in hallway and complained of severe anxiety. He received as needed Atarax 100 mg at [address] which was ineffective then later appeared on the verge of tears, severely anxious, and complaining of AVH, attempting to hit himself, and appeared internally preoccupied and paranoid. He was given as needed IM Benadryl 50 mg and IM Zyprexa 10 mg at 2152 which was effective. Today, Gokul is consenting for safety on the unit. Patient was seen in his room, lying with his head and body covered in sheets asleep though able to be minimally roused for evaluation; however appeared drowsy. He continues to be scant in speech with brief one-word answers. Gokul reports feeling "good." Gokul notes having good sleep. When asked about yesterday evening's incident, patient fell asleep though woke up with verbal stimuli and responded he had "heard voices"; however did not elaborate further. He denies any penile pain or erections. He denies any current auditory or visual hallucinations though continues to appear internally preoccupied at times. He denies any thoughts to harm self or others. Gokul states having a good appetite. Gkoul has been taking the medications as prescribed and reporting no side effects. Due to consistent PRNs in the evening for agitation and active psychosis, will proceed to increase his evening dose of Zyprexa.     PRNs overnight:  IM Zyprexa 10 mg and IM Benadryl 50 mg @1615, Atarax 100 mg @1951, IM Benadryl 50 mg and IM Zyprexa 10 mg @2152  VS: Reviewed, within normal limits    Progress Toward Goals: unchanged, continues to be agitated, acutely psychotic, responding to internal stimuli at times though compliant with with medications     Psychiatric Review of Systems:  Behavior over the last 24 hours:  unchanged  Sleep: normal  Appetite: normal  Medication side effects: No   ROS: all other systems are negative    Vital signs in last 24 hours:  Temp: [97.7 °F (36.5 °C)] 97.7 °F (36.5 °C)  HR:  [62] 62  Resp:  [16] 16  BP: (128)/(58) 128/58    Laboratory results: I have personally reviewed all pertinent laboratory/tests results  No results found for this or any previous visit (from the past 48 hour(s)).       Mental Status Evaluation:    Appearance:  disheveled, dressed in hospital attire, looks older than stated age, covered head and body in bed sheets   Behavior:  guarded, minimally cooperative   Speech:  scant, soft, mumbled   Mood:  "good"   Affect:  flat   Thought Process:  decreased rate of thoughts, concrete   Associations: concrete associations   Thought Content:  no overt delusions   Perceptual Disturbances: Denies auditory or visual hallucinations and Appears to be internally preoccupied   Risk Potential: Suicidal ideation - None at present  Homicidal ideation - None at present  Potential for aggression - Yes, due to acute psychosis   Sensorium:  unable to assess   Memory:  recent and remote memory: unable to assess due to lack of cooperation   Consciousness:  drowsy   Attention/Concentration: attention span and concentration appear shorter than expected for age   Insight:  limited   Judgment: limited   Gait/Station: in bed   Motor Activity: no abnormal movements       Current Medications:  Current Facility-Administered Medications   Medication Dose Route Frequency Provider Last Rate   • acetaminophen  650 mg Oral Q6H PRN Patrick Ibrahim MD     • acetaminophen  650 mg Oral Q4H PRN Patrick Ibrahim MD     • acetaminophen  975 mg Oral Q6H PRN Patrick Ibrahim MD     • albuterol  2 puff Inhalation Q4H PRN Patrick Ibrahim MD     • aluminum-magnesium hydroxide-simethicone  30 mL Oral Q4H PRN Patrick Ibrahim MD     • benztropine  1 mg Oral Q4H PRN Max 6/day Patrick Ibrahim MD     • carbamide peroxide  5 drop Both Ears BID Gabino Hamman, PA-C     • hydrOXYzine HCL  50 mg Oral Q6H PRN Max 4/day Patrick Ibrahim MD      Or   • diphenhydrAMINE  50 mg Intramuscular Q6H PRN Ness Teresa MD     • fluticasone  1 spray Nasal Daily Ness Teresa MD     • hydrOXYzine HCL  100 mg Oral Q6H PRN Max 4/day Ness Teresa MD      Or   • LORazepam  2 mg Intramuscular Q6H PRN Ness Teresa MD     • hydrOXYzine HCL  25 mg Oral Q6H PRN Max 4/day Ness Teresa MD     • melatonin  3 mg Oral HS Ness Teresa MD     • nicotine polacrilex  4 mg Oral Q2H PRN Ness Teresa MD     • OLANZapine  10 mg Oral Q3H PRN Max 3/day Claudia Koehler MD      Or   • OLANZapine  10 mg Intramuscular Q3H PRN Max 3/day Claudia Koehler MD     • OLANZapine  5 mg Oral Q3H PRN Max 6/day Claudia Koehler MD      Or   • OLANZapine  5 mg Intramuscular Q3H PRN Max 6/day Claudia Koehler MD     • OLANZapine  2.5 mg Oral Q3H PRN Max 8/day Claudia Koehler MD     • OLANZapine  5 mg Oral BID Claudia Koehler MD     • polyethylene glycol  17 g Oral Daily PRN Ness Teresa MD     • pseudoephedrine  120 mg Oral Daily PRN ELA Wise     • senna-docusate sodium  1 tablet Oral Daily PRN Ness Teresa MD     • sterile water          • sterile water          • sterile water              Behavioral Health Medications: All current active meds have been reviewed. Changes as in plan section above. Risks, benefits and possible side effects of Medications:   Risks, benefits, and possible side effects of medications explained to patient and patient verbalizes understanding. Counseling / Coordination of Care:  Patient's progress discussed with staff in treatment team meeting. Medications, treatment progress and treatment plan reviewed with patient. Carmen Todd DO 05/29/23  Psychiatry Resident, PGY-II    This note was completed in part utilizing Dragon dictation Software.  Grammatical, translation, syntax errors, random word insertions, spelling mistakes, and incomplete sentences may be an occasional consequence of this system secondary to software limitations with voice recognition, ambient noise, and hardware issues. If you have any questions or concerns about the content, text, or information contained within the body of this dictation, please contact the provider for clarification.

## 2023-05-29 NOTE — NURSING NOTE
Patient denies AVH/SI/HI at this moment. Patient was woken up for medications, and is resting in bed. He was compliant with medications, and cooperative. He has had no behavioral issues to note, pain and no indications of priapism currently.

## 2023-05-29 NOTE — NURSING NOTE
Pt RTIS, severely anxious, sad affect. Pt appears on the verge of tears. PRN atarax 100mg ineffective in reducing anxiety by 20:51. Pt c/o voices and VH, began loudly RTIS and attempting to hit himself in the head while staff intervened. Pt appears fearful and paranoid, internally preoccupied. Pt agreeable to and given PRN benadryl 50mg IM and zyprexa 10mg IM @ 21:52. Both effective, pt calm and appears much more comfortable by 22:52. Pt endorses effectiveness, no further inappropriate behaviors.

## 2023-05-29 NOTE — NURSING NOTE
1641: Patient has moderate agitation related to AH. Given 5mg of PO zyprexa. 1741: 5mg of PO zyprexa effective. Patient no longer appears moderately agitated. yes

## 2023-05-30 PROCEDURE — 99232 SBSQ HOSP IP/OBS MODERATE 35: CPT | Performed by: PSYCHIATRY & NEUROLOGY

## 2023-05-30 RX ADMIN — FLUTICASONE PROPIONATE 1 SPRAY: 50 SPRAY, METERED NASAL at 10:01

## 2023-05-30 RX ADMIN — Medication 3 MG: at 21:47

## 2023-05-30 RX ADMIN — OLANZAPINE 10 MG: 10 TABLET, FILM COATED ORAL at 13:29

## 2023-05-30 RX ADMIN — HYDROXYZINE HYDROCHLORIDE 100 MG: 50 TABLET, FILM COATED ORAL at 13:29

## 2023-05-30 RX ADMIN — OLANZAPINE 10 MG: 10 TABLET, FILM COATED ORAL at 21:48

## 2023-05-30 RX ADMIN — OLANZAPINE 5 MG: 5 TABLET, FILM COATED ORAL at 10:00

## 2023-05-30 RX ADMIN — ACETAMINOPHEN 975 MG: 325 TABLET ORAL at 21:47

## 2023-05-30 RX ADMIN — LORAZEPAM 2 MG: 2 INJECTION INTRAMUSCULAR; INTRAVENOUS at 16:46

## 2023-05-30 NOTE — NURSING NOTE
Pt is resting in bed most of the morning. Medication compliant. Not overheard responding to internal stimuli but does report AH. Denies SI/HI/VH at this time. Encouraged to attend groups. Denies any unmet needs or complaints at this time.

## 2023-05-30 NOTE — NURSING NOTE
Pt came out of his room and slammed the door and approached the nurses station loudly and firmly asking for something to help with agitation, voices and anxiety. Pt was visibly responding talking to the walls yelling "leave me the fuck alone." Asked pt if he felt he needed an injection and pt stated "ma'am I hurt from the shots can I please have it in pill form." Pt received zyprexa 10 mg po and atarax 100 mg @ 1329. Pt agreeable to rest in bed to let the medication help him where he continued to respond loudly for awhile. When reassessed @ 1429 pt stated he was feeling much better and was visibly less anxious and agitated.

## 2023-05-30 NOTE — PROGRESS NOTES
[facility] Group Note     05/30/23 1400   Activity/Group Checklist   Group Life Skills  (Strengths Use Plan)   Attendance Attended   Attendance Duration (min) 16-30  (arrived late/left group early)   Interactions Disorganized interaction  (Responding to internal stimuli)   Affect/Mood Incongruent; Wide   Goals Achieved Able to listen to others; Able to recieve feedback; Able to give feedback to another  (limited participation in activity)

## 2023-05-30 NOTE — NURSING NOTE
Pt states that earlier PRN zyprexa effective at decreasing AH and agitation when reassessed @ 19:54. Pt visible throughout the evening, quiet, calm, cooperative. Pt is medication adherent and has remained in behavioral control. Pt states he is feeling, "good now." Denies needs.

## 2023-05-30 NOTE — PROGRESS NOTES
Progress Note - Behavioral Health   Gokul Hyatt Batch 26 y.o. male MRN: [id number]  Unit/Bed#: [facility] 350-02 Encounter: [id number]    Assessment/Plan   Principal Problem:    Psychosis ([address]) ro Schizophrenia vs Schizoaffective  Active Problems:    Legally blind    Hearing loss    Asthma    Medical clearance for psychiatric admission    Bilateral impacted cerumen    Prolonged erection    Intellectual disability      Recommended Treatment:   Continue Zyprexa 5 mg daily and 10 mg nightly for psychosis  Continue melatonin 3 mg nightly for insomnia    Continue with group therapy, milieu therapy and occupational therapy. Continue frequent safety checks and vitals per unit protocol. Case discussed with treatment team.  Risks, benefits and possible side effects of Medications: Risks, benefits, and possible side effects of medications have been explained to the patient, who verbalizes understanding    Current Legal Status: 201  Disposition: DC to rehab  ------------------------------------------------------------    Subjective: Per nursing report, Gokul was noted to be agitated on 05/28 yelling of verbal threats and profanities and responding to internal stimuli. Security was called to the unit and patient received IM Zyprexa 10 mg and IM Benadryl 50 mg at 1615. At [address] patient received Atarax 100 mg for severe anxiety, ineffective as patient continued to RTIS and endorsed AVH. He was noted to be hitting himself, paranoid and fearful. He received IM Benadryl 50 mg and Zyprexa 10 mg at 2152. On 05/29, patient was given 5 mg p.o. Zyprexa for moderate agitation at 1641, effective. Patient later received IM Zyprexa 5 mg at 1854 for AH/moderate agitation. He has been compliant with scheduled medications. Today, Gokul was seen and evaluated alongside attending physician. On evaluation, patient is blunted in affect, internally preoccupied and decreased attention span, disheveled, scant.  He reports feeling "good" this morning. Patient states he slept "okay" and estimates a total of 6-8 hours overnight. He reports his appetite is "fine" but missed breakfast. Encouraged patient to ask nursing for a snack. He reports his weekend was "okay". He plans to attend groups later today. He reports tolerating his current medication regimen well and denies medication side effects. He states "I am still talking to myself and getting mad" but admits the Zyprexa has made his voice is quieter. He states he last heard the voices yesterday night and they stated "you gotta go to sleep". He denies the voices being command in nature and denies any VH. Patient appeared internally preoccupied ant distracted during interview and replied "huh" when prompted by repeating a question. He denies SI/HI. Progress Toward Goals: slow improvement    Psychiatric Review of Systems:  Behavior over the last 24 hours: slowly improving  Sleep: normal, total 6-8 hrs overnight  Appetite: adequate, reports missing breakfast  Medication side effects: none verbalized  ROS: Complete review of systems is negative except as noted above.     Vital signs in last 24 hours:  Temp:  [98.2 °F (36.8 °C)-98.7 °F (37.1 °C)] 98.7 °F (37.1 °C)  HR:  [66-85] 66  Resp:  [16-18] 16  BP: (111-138)/(61-94) 138/94    Mental Status Exam:  Appearance:   Overtly appearing -American male, no acute distress, alert, intermittent eye contact, appears stated age, marginal grooming/hygiene, disheveled   Behavior:  calm, cooperative and sitting comfortably   Motor: no abnormal movements and normal gait and balance   Speech:  spontaneous, clear, normal rate, normal volume, scant and coherent   Mood:  "Good"   Affect:  blunted   Thought Process:  poverty of thought   Thought Content: no verbalized delusions or overt paranoia   Perceptual disturbances: auditory hallucinations Stating "you gotta go to sleep" yesterday night, denies command in nature and denies VH; does not appear to be responding to internal stimuli at this time; internally preoccupied and distracted   Risk Potential: No active or passive suicidal or homicidal ideation was verbalized during interview   Cognition: oriented to self and situation, appears to be of average intelligence, attention span appeared shorter than expected for age and cognition not formally tested   Insight:  Limited   Judgment: Limited     Current Medications:  Current Facility-Administered Medications   Medication Dose Route Frequency Provider Last Rate   • acetaminophen  650 mg Oral Q6H PRN Elta Schooling, MD     • acetaminophen  650 mg Oral Q4H PRN Elta Schooling, MD     • acetaminophen  975 mg Oral Q6H PRN Elta Schooling, MD     • albuterol  2 puff Inhalation Q4H PRN Elta Schooling, MD     • aluminum-magnesium hydroxide-simethicone  30 mL Oral Q4H PRN Elta Schooling, MD     • benztropine  1 mg Oral Q4H PRN Max 6/[facility]     • hydrOXYzine HCL  50 mg Oral Q6H PRN Max 4/day [facility], MD      Or   • diphenhydrAMINE  50 mg Intramuscular Q6H PRN ta [facility], MD     • fluticasone  1 spray Nasal Daily [facility], MD     • hydrOXYzine HCL  100 mg Oral Q6H PRN Max 4/day [facility], MD      Or   • LORazepam  2 mg Intramuscular Q6H PRN Elta Schooling, MD     • hydrOXYzine HCL  25 mg Oral Q6H PRN Max 4/day [facility]     • melatonin  3 mg Oral HS jas Green MD     • nicotine polacrilex  4 mg Oral Q2H PRN Eljas [facility], MD     • OLANZapine  10 mg Oral Q3H PRN Max 3/day Tobi Anderson MD      Or   • OLANZapine  10 mg Intramuscular Q3H PRN Max 3/day Tobi Anderson MD     • OLANZapine  5 mg Oral Q3H PRN Max 6/day Tobi Anderson MD      Or   • OLANZapine  5 mg Intramuscular Q3H PRN Max 6/day Tobi Anderson MD     • OLANZapine  10 mg Oral HS Tad Barger DO     • OLANZapine  2.5 mg Oral Q3H PRN Max 8/day Tobi Anderson MD     • OLANZapine  5 mg Oral Daily Vish Retana DO     • polyethylene glycol  17 g Oral Daily PRN Kamaljit Barreto MD     • pseudoephedrine  120 mg Oral Daily PRN ELA Wise     • senna-docusate sodium  1 tablet Oral Daily PRN Kamaljit Barreto MD     • sterile water          • sterile water          • sterile water              Behavioral Health Medications: all current active meds have been reviewed. Changes as in plan section above. Laboratory results:  I have personally reviewed all pertinent laboratory/tests results. No results found for this or any previous visit (from the past 48 hour(s)).      Mely Enamorado, DO

## 2023-05-30 NOTE — PROGRESS NOTES
05/30/23 0843   Team Meeting   Meeting Type Daily Rounds   Team Members Present   Team Members Present Physician;Nurse   Physician Team Member [id number] [facility] Member Dev   Patient/Family Present   Patient Present No   Patient's Family Present No   PRN IM zyprexa and benadryl for AH x3 over the weekend. PRN atarax for anxiety. Med/meal compliant. DC tbd.
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
## 2023-05-31 PROCEDURE — 99232 SBSQ HOSP IP/OBS MODERATE 35: CPT | Performed by: PSYCHIATRY & NEUROLOGY

## 2023-05-31 RX ORDER — OLANZAPINE 10 MG/1
10 TABLET ORAL 2 TIMES DAILY
Status: DISCONTINUED | OUTPATIENT
Start: 2023-05-31 | End: 2023-06-08

## 2023-05-31 RX ADMIN — HYDROXYZINE HYDROCHLORIDE 100 MG: 50 TABLET, FILM COATED ORAL at 21:28

## 2023-05-31 RX ADMIN — Medication 3 MG: at 21:29

## 2023-05-31 RX ADMIN — OLANZAPINE 5 MG: 5 TABLET, FILM COATED ORAL at 08:34

## 2023-05-31 RX ADMIN — FLUTICASONE PROPIONATE 1 SPRAY: 50 SPRAY, METERED NASAL at 08:34

## 2023-05-31 RX ADMIN — OLANZAPINE 5 MG: 5 TABLET, FILM COATED ORAL at 21:28

## 2023-05-31 RX ADMIN — OLANZAPINE 10 MG: 10 TABLET, FILM COATED ORAL at 18:00

## 2023-05-31 NOTE — NURSING NOTE
Pt is mostly withdrawn to room. Medication and meal compliant. Not observed responding to any internal stimuli. When asked about psych symptoms pt says "no ma'am." Denies any unmet needs or complaints at this time.
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
## 2023-05-31 NOTE — NURSING NOTE
Pt appears sad in affect tonight, quiet, isolated to room for much of evening. Pt is cooperative, calm, denies SI/HI/AH/VH at the moment. Pt not observed RTIS by this nurse tonight. Pt c/o 10/10 headache, given PRN tylenol 975mg @ 21:47; effective.
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
## 2023-06-01 PROCEDURE — 99232 SBSQ HOSP IP/OBS MODERATE 35: CPT | Performed by: PSYCHIATRY & NEUROLOGY

## 2023-06-01 PROCEDURE — 99447 NTRPROF PH1/NTRNET/EHR 11-20: CPT | Performed by: UROLOGY

## 2023-06-01 RX ADMIN — PSEUDOEPHEDRINE HCL 120 MG: 30 TABLET, FILM COATED ORAL at 12:42

## 2023-06-01 RX ADMIN — OLANZAPINE 10 MG: 10 TABLET, FILM COATED ORAL at 08:28

## 2023-06-01 RX ADMIN — Medication 3 MG: at 21:43

## 2023-06-01 RX ADMIN — FLUTICASONE PROPIONATE 1 SPRAY: 50 SPRAY, METERED NASAL at 08:28

## 2023-06-01 RX ADMIN — OLANZAPINE 10 MG: 10 TABLET, FILM COATED ORAL at 17:51

## 2023-06-01 RX ADMIN — ACETAMINOPHEN 975 MG: 325 TABLET ORAL at 01:48

## 2023-06-01 NOTE — PROGRESS NOTES
06/01/23 0860   Team Meeting   Meeting Type Daily Rounds   Team Members Present   Team Members Present Physician;Nurse;   Physician Team Member [id number] Member Dev   Patient/Family Present   Patient Present No   Patient's Family Present No   Seclusive in evening. PRN zyprexa and atarax due to AH-effective. Med/meal compliant. DC tbd.

## 2023-06-01 NOTE — PROGRESS NOTES
Progress Note - Behavioral Health   Gokul Sneed 26 y.o. male MRN: [id number]  Unit/Bed#: [facility] 350-02 Encounter: [id number]    Assessment/Plan   Principal Problem:    Psychosis ([address]) ro Schizophrenia vs Schizoaffective  Active Problems:    Legally blind    Hearing loss    Asthma    Medical clearance for psychiatric admission    Bilateral impacted cerumen    Prolonged erection    Intellectual disability      Recommended Treatment:   Continue Zyprexa 10 mg BID for psychosis  Continue melatonin 3 mg nightly for insomnia    Continue with group therapy, milieu therapy and occupational therapy. Continue frequent safety checks and vitals per unit protocol. Case discussed with treatment team.  Risks, benefits and possible side effects of Medications: Risks, benefits, and possible side effects of medications have been explained to the patient, who verbalizes understanding    Current Legal Status: 201  Disposition: DC to rehab  ------------------------------------------------------------    Subjective: Per nursing report, Gokul was complaining of severe anxiety due to [facility]. Patient received Atarax 100 mg and Zyprexa 5 mg for moderate agitation/severe anxiety at 2128, effective. He is compliant with scheduled medications. Patient also received 975 mg Tylenol for 8/10 tooth pain, effective    Today, Gokul was seen and evaluated alongside attending physician. On evaluation, patient is blunted, internally preoccupied, guarded, scant. He reports feeling "good" this morning. Patient states he slept an estimated 8 hours overnight and denies sleep disturbances. Patient states he did not get up for breakfast this morning because he was not hungry. He states he did have dinner yesterday and will plan to have lunch later. Patient reports adequate energy levels today. He reports doing "fine" on his current medication regimen and notes that he is "not hearing voices as much".   He reports he last heard AH 3 days ago and last saw shadows a day ago. When asked about patient's behavioral disturbances yesterday, patient states "I was just getting angry" and reports feeling stress due to not having anywhere to go upon discharge. Patient is still interested in rehab upon discharge. Patient was noted to be tearful at 1 point and states it is because he is feeling cold. Patient has arms crossed for duration of interview. He denies SI/HI and  Denies medication side effects at this time. Discussed plan of treatment to which patient is agreeable. He denied any pain at time of interview. He reports he plans to attend groups later today. He states he has been in touch with Ms. Ike [facility] who is "a friend of mine". Progress Toward Goals: slow improvement    Psychiatric Review of Systems:  Behavior over the last 24 hours: improving slowly  Sleep: Improving slowly  Appetite: adequate  Medication side effects: none verbalized  ROS: Complete review of systems is negative except as noted above.     Vital signs in last 24 hours:   Temp:  [99.2 °F (37.3 °C)] 99.2 °F (37.3 °C)  HR:  [110] 110  Resp:  [16] 16  BP: (134)/(83) 134/83    Mental Status Exam:  Appearance:  Overtly appearing -American male, no acute distress, drowsy, minimal eye contact, appears stated age, disheveled and dressed in hospital attire, Noel Rodriguez initials tattooed on R shoulder    Behavior:  calm, cooperative, guarded and sitting comfortably   Motor: no abnormal movements and normal gait and balance   Speech:  spontaneous, clear, normal rate, normal volume, scant and coherent   Mood:  "good"   Affect:  blunted and tearful at times   Thought Process:  poverty of thought   Thought Content: no verbalized delusions or overt paranoia   Perceptual disturbances: no reported hallucinations and does not appear to be responding to internal stimuli at this time; appears internally preoccupied   Risk Potential: No active or passive suicidal or homicidal ideation was verbalized during interview, Potential for aggression due to acute psychosis   Cognition: oriented to self and situation, appears to be of average intelligence and cognition not formally tested   Insight:  Limited   Judgment: Limited     Current Medications:  Current Facility-Administered Medications   Medication Dose Route Frequency Provider Last Rate   • acetaminophen  650 mg Oral Q6H PRN Edmund Melo MD     • acetaminophen  650 mg Oral Q4H PRN Edmund Melo MD     • acetaminophen  975 mg Oral Q6H PRN Edmund Melo MD     • albuterol  2 puff Inhalation Q4H PRN Edmund Melo MD     • aluminum-magnesium hydroxide-simethicone  30 mL Oral Q4H PRN Edmund Melo MD     • benztropine  1 mg Oral Q4H PRN Max 6/day Edmund Melo MD     • hydrOXYzine HCL  50 mg Oral Q6H PRN Max 4/day Edmund Melo MD      Or   • diphenhydrAMINE  50 mg Intramuscular Q6H PRN Edmund Melo MD     • fluticasone  1 spray Nasal Daily Edmund Melo MD     • hydrOXYzine HCL  100 mg Oral Q6H PRN Max 4/day Edmund Melo MD      Or   • LORazepam  2 mg Intramuscular Q6H PRN Edmund Melo MD     • hydrOXYzine HCL  25 mg Oral Q6H PRN Max 4/day Edmund Melo MD     • melatonin  3 mg Oral HS Edmund Melo MD     • nicotine polacrilex  4 mg Oral Q2H PRN Edmund Melo MD     • OLANZapine  10 mg Oral Q3H PRN Max 3/day Raúl Boyle MD      Or   • OLANZapine  10 mg Intramuscular Q3H PRN Max 3/day Raúl Boyle MD     • OLANZapine  5 mg Oral Q3H PRN Max 6/day Raúl Boyle MD      Or   • OLANZapine  5 mg Intramuscular Q3H PRN Max 6/day Raúl Boyle MD     • OLANZapine  10 mg Oral BID Lennie Oliva DO     • OLANZapine  2.5 mg Oral Q3H PRN Max 8/day Raúl Boyle MD     • polyethylene glycol  17 g Oral Daily PRN Edmund Melo MD     • pseudoephedrine  120 mg Oral Daily PRN ELA Wise     • senna-docusate sodium  1 tablet Oral Daily PRN Edmund Melo MD     • sterile water          • sterile water • sterile water              Behavioral Health Medications: all current active meds have been reviewed. Changes as in plan section above. Laboratory results:  I have personally reviewed all pertinent laboratory/tests results. No results found for this or any previous visit (from the past 48 hour(s)).      Sil Parmar, DO

## 2023-06-01 NOTE — NURSING NOTE
Pt more visible on the unit interacting with staff and peers. Requesting the [place] alphabet be printed for him and inquiring if taepaul would be included in tonight's groups. Denies JAIDEN and CRISTOBAL at this time.

## 2023-06-01 NOTE — NURSING NOTE
Pt isolative to room, sleeping. Easy to wake for medications. Denies SI/HI/AH/VH at this time. Encouraged to get up and attend groups. Denies any unmet needs or complaints at this time.

## 2023-06-01 NOTE — SOCIAL WORK
Pt continues to report interest in attending rehab upon discharge. Pt is not yet ready for discharge and bed search will begin when pt is stabilized.

## 2023-06-01 NOTE — NURSING NOTE
PRN sudafed given for erection lasting longer than 45 minutes @ 1242, when reassessed pt reports it was effective.

## 2023-06-01 NOTE — NURSING NOTE
Patient mainly isolative this shift. Patient came to nurse's station c/o severe anxiety r/t AH that were negative in nature. Patient reported moderate agitation. PRN atarax 100mg and zyprexa 5mg were administered at 2128. Both medications were effective.

## 2023-06-01 NOTE — QUICK NOTE
Received TT about patient who had a prolonged erection on 526 for which she was taken to the emergency department for drainage. Today he is experienced another prolonged erection. Is unclear as to the timeframe due to patient being a poor historian with hallucinations. He has not yet been given Sudafed. Previously Sudafed was ineffective. I recommend trial of Sudafed now if this does not work within an hour can be seen in the ER again for phenylephrine injection and drainage.   If not we will need transfer to [facility] for intervention with Dr. Savi Haas switching him off of Cogentin which could be the source of his recurring prolonged erections

## 2023-06-01 NOTE — CONSULTS
Hospital staff contacted urology to evaluate patient for possible recurrent priapism    Chief complaint: History of priapism  History of present illness: 67-year-old Afro-American male with a history of intermittent episodes of painful erections treated last for priapism in the emergency room. The patient has since been admitted to the behavioral unit at [facility] and noted that this morning he had another erection. Erection however spontaneously detumesced and at the present time the patient denies any erection and denies any pain. Sudafed have been ordered for initial treatment. No history of sickle cell disease or sickle cell trait in family members. Allergies medications past medical and surgical history family history system review reviewed in detail. Physical examination     Afro-American male appearing stated age no apparent distress. HEENT-atraumatic  Pulmonary-unlabored breathing  Penis-flaccid normal normally placed meatus    Impression  History of priapism without current erection.     Plan    Sudafed as ordered as needed  Sickle cell prep to rule out sickle trait     Total time face to face with patient 15 minutes

## 2023-06-02 LAB — SICKLE CELLS BLD QL SMEAR: NEGATIVE

## 2023-06-02 PROCEDURE — 85660 RBC SICKLE CELL TEST: CPT | Performed by: PSYCHIATRY & NEUROLOGY

## 2023-06-02 PROCEDURE — 99232 SBSQ HOSP IP/OBS MODERATE 35: CPT | Performed by: PSYCHIATRY & NEUROLOGY

## 2023-06-02 RX ADMIN — FLUTICASONE PROPIONATE 1 SPRAY: 50 SPRAY, METERED NASAL at 09:03

## 2023-06-02 RX ADMIN — OLANZAPINE 5 MG: 5 TABLET, FILM COATED ORAL at 21:18

## 2023-06-02 RX ADMIN — OLANZAPINE 10 MG: 10 TABLET, FILM COATED ORAL at 09:04

## 2023-06-02 RX ADMIN — HYDROXYZINE HYDROCHLORIDE 100 MG: 50 TABLET, FILM COATED ORAL at 16:54

## 2023-06-02 RX ADMIN — NICOTINE POLACRILEX 4 MG: 4 GUM, CHEWING BUCCAL at 17:15

## 2023-06-02 RX ADMIN — Medication 3 MG: at 21:09

## 2023-06-02 RX ADMIN — OLANZAPINE 10 MG: 10 TABLET, FILM COATED ORAL at 17:04

## 2023-06-02 NOTE — PROGRESS NOTES
06/02/23 0846   Team Meeting   Meeting Type Daily Rounds   Team Members Present   Team Members Present Physician;Nurse;   Physician Team Member [id number] [facility] Team Member [facility] Management Team Member Wendie   Patient/Family Present   Patient Present No   Patient's Family Present No     Pt reporting prolonged erection yesterday. Given sudafed which appeared to be effective. Pt is med/meal compliant. Continues responding to internal stimuli at times. Discharge to be determined.

## 2023-06-02 NOTE — NURSING NOTE
Patient is in and out of the community. Patient appears to be responding less. Patient denied SI/AVH. Patient is compliant with scheduled medications Staff to maintain continuous rounding for safety and support.

## 2023-06-02 NOTE — PROGRESS NOTES
Progress Note - Behavioral Health   Gokul Hyatt Batch 26 y.o. male MRN: [id number]  Unit/Bed#: [facility] 350-02 Encounter: [id number]    Assessment/Plan   Principal Problem:    Psychosis ([address]) ro Schizophrenia vs Schizoaffective  Active Problems:    Legally blind    Hearing loss    Asthma    Medical clearance for psychiatric admission    Bilateral impacted cerumen    Prolonged erection    Intellectual disability      Recommended Treatment:   Continue Zyprexa 10 mg BID for psychosis  Continue melatonin 3 mg nightly for insomnia  PRN Sudafed per Urology for priapism    Urology recommendations - Sickle Cell prep to r/o sickle trait  Continue with group therapy, milieu therapy and occupational therapy. Continue frequent safety checks and vitals per unit protocol. Case discussed with treatment team.  Risks, benefits and possible side effects of Medications: Risks, benefits, and possible side effects of medications have been explained to the patient, who verbalizes understanding    Current Legal Status: 201  Disposition: DC to rehab  ------------------------------------------------------------    Subjective: Per nursing report, Gokul experienced a prolonged erection yesterday and received prn Sudafed 120 mg at 1242 per Urology recommendaton, effective. He has been more visible on the unit, interacting with staff and peers. Patient was requesting a [place] offer but and asking if queenie would be included in night group. He appears to be RIS less. He is cooperative on the unit and compliant with scheduled medications. Today, Gokul was seen and evaluated alongside attending physician. On evaluation, patient is blunted in affect, poverty of thought, internally preoccupied, attention span improving, scant and guarded, laying in bed. Patient states he is feeling "okay" this morning. He states he slept "fine" and without any sleep disturbances. He states he was able to go "straight to sleep".   Patient reports he did not Spoke with patient and reviewed all lab work with patient  TSH mildly elevated  Patient reports she is due to see her Endocrinologist on 7/16/22 and will see if she needs a dose adjustment in Levothyroxine; currently on 25 mcg daily  have breakfast today but will plan to have some lunch later. He reports "okay" energy today and will plan to attend groups later. He reports he last heard voices telling him to "get [his] life together" and saw shadows "a day ago". He reports he is tolerating his current medication regimen well and denies any medication side effects at this time. Discussed plan of care with patient to which she is agreeable. He states he is not experiencing any pain. He felt his discussion with urology went "okay". Patient reports still feeling "little bit" stressed but is "can't remember" what he has been stressed about. He denies SI/HI/AVH and denies medication side effects at this time. Progress Toward Goals: slow improvement    Psychiatric Review of Systems:  Behavior over the last 24 hours: improving slowly  Sleep: improving  Appetite: adequate  Medication side effects: none verbalized  ROS: Complete review of systems is negative except as noted above.     Vital signs in last 24 hours:   HR:  [80] 80  BP: (123)/(57) 123/57    Mental Status Exam:  Appearance:  alert, fair eye contact, appears stated age, disheveled and dressed in hospital attire   Behavior:  calm, cooperative, guarded and laying in bed   Motor: no abnormal movements and Unable to assess as patient was lying in bed   Speech:  spontaneous, clear, normal rate, normal volume, scant and coherent   Mood:  "Okay"   Affect:  blunted   Thought Process:  poverty of thought   Thought Content: no verbalized delusions or overt paranoia   Perceptual disturbances: no reported hallucinations and does not appear to be responding to internal stimuli at this time; appears internally preoccupied   Risk Potential: No active or passive suicidal or homicidal ideation was verbalized during interview   Cognition: oriented to self and situation, appears to be of average intelligence and cognition not formally tested   Insight:  Limited   Judgment: Limited     Current Medications:  Current Facility-Administered Medications   Medication Dose Route Frequency Provider Last Rate   • acetaminophen  650 mg Oral Q6H PRN Edgar Rudolph MD     • acetaminophen  650 mg Oral Q4H PRN Edgar Rudolph MD     • acetaminophen  975 mg Oral Q6H PRN Edgar Rudolph MD     • albuterol  2 puff Inhalation Q4H PRN Edgar Rudolph MD     • aluminum-magnesium hydroxide-simethicone  30 mL Oral Q4H PRN Edgar Rudolph MD     • benztropine  1 mg Oral Q4H PRN Max 6/day Edgar Rudolph MD     • hydrOXYzine HCL  50 mg Oral Q6H PRN Max 4/day Edgar Rudolph MD      Or   • diphenhydrAMINE  50 mg Intramuscular Q6H PRN Edgar Rudolph MD     • fluticasone  1 spray Nasal Daily Edgar Rudolph MD     • hydrOXYzine HCL  100 mg Oral Q6H PRN Max 4/day Edgar Rudolph MD      Or   • LORazepam  2 mg Intramuscular Q6H PRN Edgar Rudolph MD     • hydrOXYzine HCL  25 mg Oral Q6H PRN Max 4/day Edgar Rudolph MD     • melatonin  3 mg Oral HS Edgar Rudolph MD     • nicotine polacrilex  4 mg Oral Q2H PRN Edgar Rudolph MD     • OLANZapine  10 mg Oral Q3H PRN Max 3/day Luis Art MD      Or   • OLANZapine  10 mg Intramuscular Q3H PRN Max 3/day Luis Art MD     • OLANZapine  5 mg Oral Q3H PRN Max 6/day Luis Art MD      Or   • OLANZapine  5 mg Intramuscular Q3H PRN Max 6/day Luis Art MD     • OLANZapine  10 mg Oral BID Antonella Segovia DO     • OLANZapine  2.5 mg Oral Q3H PRN Max 8/day Lusi Art MD     • polyethylene glycol  17 g Oral Daily PRN Edgar Rudolph MD     • pseudoephedrine  120 mg Oral Daily PRN ELA Wise     • senna-docusate sodium  1 tablet Oral Daily PRN Edgar Rudolph MD     • sterile water          • sterile water          • sterile water              Behavioral Health Medications: all current active meds have been reviewed. Changes as in plan section above.     Laboratory results:  I have personally reviewed all pertinent laboratory/tests results. No results found for this or any previous visit (from the past 48 hour(s)).      Elli Solis, DO

## 2023-06-02 NOTE — NURSING NOTE
Patient in bed and refused his breakfast.  Patient was medication compliant. He denied SI/HI and A/V hallucinations at this time. He is calm and cooperative.

## 2023-06-03 PROCEDURE — 99232 SBSQ HOSP IP/OBS MODERATE 35: CPT | Performed by: STUDENT IN AN ORGANIZED HEALTH CARE EDUCATION/TRAINING PROGRAM

## 2023-06-03 RX ADMIN — HYDROXYZINE HYDROCHLORIDE 100 MG: 50 TABLET, FILM COATED ORAL at 18:11

## 2023-06-03 RX ADMIN — PSEUDOEPHEDRINE HCL 120 MG: 30 TABLET, FILM COATED ORAL at 09:24

## 2023-06-03 RX ADMIN — OLANZAPINE 10 MG: 10 TABLET, FILM COATED ORAL at 17:05

## 2023-06-03 RX ADMIN — OLANZAPINE 10 MG: 10 TABLET, FILM COATED ORAL at 21:20

## 2023-06-03 RX ADMIN — OLANZAPINE 10 MG: 10 TABLET, FILM COATED ORAL at 08:33

## 2023-06-03 RX ADMIN — Medication 3 MG: at 21:20

## 2023-06-03 NOTE — PROGRESS NOTES
Progress Note - Behavioral Health   Gokul Mares 26 y.o. male MRN: [id number]  Unit/Bed#: [facility] 350-02 Encounter: [id number]    Assessment/Plan   Principal Problem:    Psychosis ([address]) ro Schizophrenia vs Schizoaffective  Active Problems:    Legally blind    Hearing loss    Asthma    Medical clearance for psychiatric admission    Bilateral impacted cerumen    Prolonged erection    Intellectual disability    Recommended Treatment:   Continue medications at current doses as below. Encourage group therapy, milieu therapy and occupational therapy  All current active medications have been reviewed  Encourage group therapy, milieu therapy and occupational therapy  Behavioral Health checks every 7 minutes    ----------------------------------------      Subjective:   Per nursing report, patient has episodes of increased auditory hallucinations later in the day. He has been calm and cooperative on the unit. He has been without any acute behavioral issues. Patient has had intermittent episodes of previous him that have responded to Sudafed. Patient did report to nursing staff having problems with painful erection this morning and did receive Sudafed with good response. Patient reports feeling well today. He is calm, pleasant, and appropriate throughout interview. Patient reports that the Sudafed did help with his painful erection and that this had resolved. Patient reminded to continue to reach out to nursing staff if he has an episode of this again. Patient expressed understanding was agreeable to this. He denies any SI, HI, or AVH at this time. He does note that he does have intermittent auditory hallucinations later in the day, which she had endorsed to nursing staff as well. He denies any questions or concerns at this time.     Behavior over the last 24 hours:  unchanged  Sleep: normal  Appetite: normal  Medication side effects: Yes priapism episodes  ROS: all other systems are negative    Mental Status Evaluation:  Appearance:  adequate grooming, marginal hygiene, dressed in hospital attire   Behavior:  cooperative, calm, guarded   Speech:  slow, soft   Mood:  euthymic   Affect:  constricted   Thought Process:  linear, slowing of thoughts   Associations: concrete associations   Thought Content:  no overt delusions   Perceptual Disturbances: denies auditory or visual hallucinations when asked, does not appear responding to internal stimuli   Risk Potential: Suicidal ideation - None  Homicidal ideation - None  Potential for aggression - Not at present   Sensorium:  oriented to person, place and time/date   Memory:  recent and remote memory grossly intact   Consciousness:  alert and awake   Attention/Concentration: attention span and concentration are age appropriate   Insight:  limited   Judgment: limited   Gait/Station: normal gait/station   Motor Activity: no abnormal movements     Medications: all current active meds have been reviewed.   Current Facility-Administered Medications   Medication Dose Route Frequency Provider Last Rate   • acetaminophen  650 mg Oral Q6H PRN Redd Zepeda MD     • acetaminophen  650 mg Oral Q4H PRN Redd Zepeda MD     • acetaminophen  975 mg Oral Q6H PRN Redd Zepeda MD     • albuterol  2 puff Inhalation Q4H PRN Redd Zepeda MD     • aluminum-magnesium hydroxide-simethicone  30 mL Oral Q4H PRN Redd Zepeda MD     • benztropine  1 mg Oral Q4H PRN Max 6/day Redd Zepeda MD     • hydrOXYzine HCL  50 mg Oral Q6H PRN Max 4/day Redd Zepeda MD      Or   • diphenhydrAMINE  50 mg Intramuscular Q6H PRN Redd Zepeda MD     • fluticasone  1 spray Nasal Daily Redd Zepeda MD     • hydrOXYzine HCL  100 mg Oral Q6H PRN Max 4/day Redd Zepeda MD      Or   • LORazepam  2 mg Intramuscular Q6H PRN Redd Zepeda MD     • hydrOXYzine HCL  25 mg Oral Q6H PRN Max 4/day Redd Zepeda MD     • melatonin  3 mg Oral HS Redd Zepeda MD     • nicotine polacrilex  4 mg Oral Q2H PRN Sergio Agarwal MD     • OLANZapine  10 mg Oral Q3H PRN Max 3/day Minetta Duane, MD      Or   • OLANZapine  10 mg Intramuscular Q3H PRN Max 3/day Minetta Duane, MD     • OLANZapine  5 mg Oral Q3H PRN Max 6/day Minetta Duane, MD      Or   • OLANZapine  5 mg Intramuscular Q3H PRN Max 6/day Minetta Duane, MD     • OLANZapine  10 mg Oral BID Sommer Monique DO     • OLANZapine  2.5 mg Oral Q3H PRN Max 8/day Minetta Duane, MD     • polyethylene glycol  17 g Oral Daily PRN Sergio Agarwal MD     • pseudoephedrine  120 mg Oral Daily PRN ELA Wise     • senna-docusate sodium  1 tablet Oral Daily PRN Sergio Agarwal MD     • sterile water          • sterile water          • sterile water              Labs: I have personally reviewed all pertinent laboratory/tests results  Most Recent Labs:   Lab Results   Component Value Date    ALB 3.8 05/26/2023    ALKPHOS 51 05/26/2023    ALT 31 05/26/2023    AMMONIA 22 10/09/2022    AST 40 (H) 05/26/2023    BUN 13 05/26/2023    CALCIUM 9.4 05/26/2023    CHOLESTEROL 140 05/19/2023     05/26/2023    CO2 28 05/26/2023    CREATININE 1.34 (H) 05/26/2023     06/16/2022    FREET4 1.16 10/09/2022    GLUC 116 05/26/2023    HCT 46.6 05/26/2023    HDL 59 05/19/2023    HGB 15.7 05/26/2023    HGBA1C 5.2 06/16/2022    K 4.7 05/26/2023    LDLCALC 60 05/19/2023    NEUTROABS 5.64 05/26/2023    NONHDLC 81 05/19/2023     05/26/2023    RBC 4.99 05/26/2023    RDW 12.6 05/26/2023    SODIUM 137 05/26/2023    TBILI 0.51 05/26/2023    TP 6.8 05/26/2023    TRIG 106 05/19/2023    AKN1VFOKIAOB 1.841 05/19/2023    WBC 9.14 05/26/2023       Progress Toward Goals: progressing    Risks / Benefits of Treatment:    Risks, benefits, and possible side effects of medications explained to patient and patient verbalizes understanding and agreement for treatment.     Counseling / Coordination of Care:    Patient's progress discussed with staff in treatment team meeting. Medications, treatment progress and treatment plan reviewed with patient.     Elton Olvera MD 06/03/23

## 2023-06-03 NOTE — NURSING NOTE
Patient complained of a painful erection. He has history of priapism and was treated in the ED this past week. Patient was given sudafed at 9:24 and cold pack. Urology was also contacted. At 10:50 am patient stated the sudafed had worked and was feeling much better. Patient is bright, pleasant, and cooperative. He is med/meal compliant.

## 2023-06-03 NOTE — NURSING NOTE
Pt cooperative with shift assessment. Denies SI.HI. complains of mod agitation d/t AH. Pt observed RIS pacing and appears irritation. 2118 Prn zyprexa 5mg given for mod agitation. Pt resting in room. Continued q7m checks for safety. 2218 pt resting comfortably in bed. not irritable. No responding observed at this time. Med effective.

## 2023-06-04 PROCEDURE — 99232 SBSQ HOSP IP/OBS MODERATE 35: CPT | Performed by: STUDENT IN AN ORGANIZED HEALTH CARE EDUCATION/TRAINING PROGRAM

## 2023-06-04 RX ADMIN — HYDROXYZINE HYDROCHLORIDE 100 MG: 50 TABLET, FILM COATED ORAL at 20:06

## 2023-06-04 RX ADMIN — Medication 3 MG: at 22:26

## 2023-06-04 RX ADMIN — OLANZAPINE 10 MG: 10 TABLET, FILM COATED ORAL at 20:06

## 2023-06-04 RX ADMIN — OLANZAPINE 10 MG: 10 TABLET, FILM COATED ORAL at 17:10

## 2023-06-04 RX ADMIN — OLANZAPINE 10 MG: 10 TABLET, FILM COATED ORAL at 09:08

## 2023-06-04 NOTE — PLAN OF CARE
Problem: Ineffective Coping  Goal: Participates in unit activities  Description: Interventions:  - Provide therapeutic environment   - Provide required programming   - Redirect inappropriate behaviors   6/4/2023 1159 by Shahla Fermin RN  Outcome: Progressing  6/4/2023 0733 by Shahla Fermin RN  Outcome: Progressing     Problem: SELF HARM/SUICIDALITY  Goal: Will have no self-injury during hospital stay  Description: INTERVENTIONS:  - Q 15 MINUTES: Routine safety checks  - Q WAKING SHIFT & PRN: Assess risk to determine if routine checks are adequate to maintain patient safety  - Encourage patient to participate actively in care by formulating a plan to combat response to suicidal ideation, identify supports and resources  6/4/2023 1159 by Shahla Fermin RN  Outcome: Progressing  6/4/2023 0733 by Shahla Fermin RN  Outcome: Progressing     Problem: ANXIETY  Goal: Will report anxiety at manageable levels  Description: INTERVENTIONS:  - Administer medication as ordered  - Teach and encourage coping skills  - Provide emotional support  - Assess patient/family for anxiety and ability to cope  6/4/2023 1159 by Shahla Fermin RN  Outcome: Progressing  6/4/2023 0733 by Shahla Fermin RN  Outcome: Progressing  Goal: By discharge: Patient will verbalize 2 strategies to deal with anxiety  Description: Interventions:  - Identify any obvious source/trigger to anxiety  - Staff will assist patient in applying identified coping technique/skills  - Encourage attendance of scheduled groups and activities  6/4/2023 1159 by Shahla Fermin RN  Outcome: Progressing  6/4/2023 0733 by Shahla Fermin RN  Outcome: Progressing     Problem: SUBSTANCE USE/ABUSE  Goal: Will have no detox symptoms and will verbalize plan for changing substance-related behavior  Description: INTERVENTIONS:  - Monitor physical status and assess for symptoms of withdrawal  - Administer medication as ordered  - Provide emotional support with 1 on 1 interaction with staff  - Encourage recovery focused program/ addiction education  - Assess for verbalization of changing behaviors related to substance abuse  - Initiate consults and referrals as appropriate (Case Management, Spiritual Care, etc.)  6/4/2023 1159 by Alec De Dios RN  Outcome: Progressing  6/4/2023 0733 by Alec De Dios RN  Outcome: Progressing  Goal: By discharge, will develop insight into their chemical dependency and sustain motivation to continue in recovery  Description: INTERVENTIONS:  - Attends all daily group sessions and scheduled AA groups  - Actively practices coping skills through participation in the therapeutic community and adherence to program rules  - Reviews and completes assignments from individual treatment plan  - Assist patient development of understanding of their personal cycle of addiction and relapse triggers  6/4/2023 1159 by Alec De Dios RN  Outcome: Progressing  6/4/2023 0733 by Alec De Dios RN  Outcome: Progressing  Goal: By discharge, patient will have ongoing treatment plan addressing chemical dependency  Description: INTERVENTIONS:  - Assist patient with resources and/or appointments for ongoing recovery based living  6/4/2023 1159 by Alec De Dios RN  Outcome: Progressing  6/4/2023 0733 by Alec De Dios RN  Outcome: Progressing     Problem: DISCHARGE PLANNING  Goal: Discharge to home or other facility with appropriate resources  Description: INTERVENTIONS:  - Identify barriers to discharge w/patient and caregiver  - Arrange for needed discharge resources and transportation as appropriate  - Identify discharge learning needs (meds, wound care, etc.)  - Arrange for interpretive services to assist at discharge as needed  - Refer to Case Management Department for coordinating discharge planning if the patient needs post-hospital services based on physician/advanced practitioner order or complex needs related to functional status, cognitive ability, or social support system  6/4/2023 1159 by Pearle Ahumada, RN  Outcome: Progressing  6/4/2023 0733 by Pearle Ahumada, RN  Outcome: Progressing

## 2023-06-04 NOTE — PROGRESS NOTES
Progress Note - Behavioral Health   Gokul Escalante 26 y.o. male MRN: [id number]  Unit/Bed#: [facility] 350-02 Encounter: [id number]    Assessment/Plan   Principal Problem:    Psychosis ([address]) ro Schizophrenia vs Schizoaffective  Active Problems:    Legally blind    Hearing loss    Asthma    Medical clearance for psychiatric admission    Bilateral impacted cerumen    Prolonged erection    Intellectual disability    Recommended Treatment:   Continue medications at current doses as below. Encourage group therapy, milieu therapy and occupational therapy  All current active medications have been reviewed  Encourage group therapy, milieu therapy and occupational therapy  Behavioral Health checks every 7 minutes    ----------------------------------------      Subjective:   Per nursing report, patient has been generally cooperative on the unit. He remains medication compliant. However, he did have an episode of auditory hallucinations last night, at which time he had banged on the nursing window. Patient did take as needed Zyprexa which did prove to be beneficial. Patient appears more tired today. He reports that his mood is good and that he slept well. He reports no further episodes of.  Prism after his episode yesterday had resolved with Sudafed. He reports tolerating his medications well otherwise. He denies any SI, HI, or AVH.     Behavior over the last 24 hours:  unchanged  Sleep: normal  Appetite: normal  Medication side effects: Yes episodes of priapism, most recent yesterday  ROS: no complaints and all other systems are negative    Mental Status Evaluation:  Appearance:  marginal hygiene, dressed in hospital attire   Behavior:  pleasant, cooperative, calm, guarded   Speech:  slow, scant, delayed, soft   Mood:  "good"   Affect:  constricted, mood-incongruent   Thought Process:  goal directed, linear   Associations: concrete associations   Thought Content:  no overt delusions   Perceptual Disturbances: denies auditory or visual hallucinations when asked, but appears distracted   Risk Potential: Suicidal ideation - None  Homicidal ideation - None  Potential for aggression - Yes, due to poor impulse control   Sensorium:  oriented to person, place and time/date   Memory:  recent and remote memory grossly intact   Consciousness:  alert and awake   Attention/Concentration: attention span and concentration are age appropriate   Insight:  limited   Judgment: limited   Gait/Station: normal gait/station   Motor Activity: no abnormal movements     Medications: all current active meds have been reviewed.   Current Facility-Administered Medications   Medication Dose Route Frequency Provider Last Rate   • acetaminophen  650 mg Oral Q6H PRN Jori Bumpers, MD     • acetaminophen  650 mg Oral Q4H PRN Jori Bumpers, MD     • acetaminophen  975 mg Oral Q6H PRN Jori Bumpers, MD     • albuterol  2 puff Inhalation Q4H PRN Jori Bumpers, MD     • aluminum-magnesium hydroxide-simethicone  30 mL Oral Q4H PRN Jori Bumpers, MD     • benztropine  1 mg Oral Q4H PRN Max 6/day Jori Bumpers, MD     • hydrOXYzine HCL  50 mg Oral Q6H PRN Max 4/day Jori Bumpers, MD      Or   • diphenhydrAMINE  50 mg Intramuscular Q6H PRN Jori Bumpers, MD     • fluticasone  1 spray Nasal Daily Jori Bumpers, MD     • hydrOXYzine HCL  100 mg Oral Q6H PRN Max 4/day Jori Bumpers, MD      Or   • LORazepam  2 mg Intramuscular Q6H PRN Jori Bumpers, MD     • hydrOXYzine HCL  25 mg Oral Q6H PRN Max 4/day Jori Bumpers, MD     • melatonin  3 mg Oral HS Jori Bumpers, MD     • nicotine polacrilex  4 mg Oral Q2H PRN Jori Bumpers, MD     • OLANZapine  10 mg Oral Q3H PRN Max 3/day Madyson Christy MD      Or   • OLANZapine  10 mg Intramuscular Q3H PRN Max 3/day Madyson Christy MD     • OLANZapine  5 mg Oral Q3H PRN Max 6/day Madyson Christy MD      Or   • OLANZapine  5 mg Intramuscular Q3H PRN Max 6/day Madyson Christy MD     • OLANZapine  10 mg Oral BID Sunita Soler DO     • OLANZapine  2.5 mg Oral Q3H PRN Max 8/day Lexie Novak MD     • polyethylene glycol  17 g Oral Daily PRN Corrian Hinojosa MD     • pseudoephedrine  120 mg Oral Daily PRN ELA Wise     • senna-docusate sodium  1 tablet Oral Daily PRN Corrina Hinojosa MD     • sterile water          • sterile water          • sterile water              Labs: I have personally reviewed all pertinent laboratory/tests results  Most Recent Labs:   Lab Results   Component Value Date    ALB 3.8 05/26/2023    ALKPHOS 51 05/26/2023    ALT 31 05/26/2023    AMMONIA 22 10/09/2022    AST 40 (H) 05/26/2023    BUN 13 05/26/2023    CALCIUM 9.4 05/26/2023    CHOLESTEROL 140 05/19/2023     05/26/2023    CO2 28 05/26/2023    CREATININE 1.34 (H) 05/26/2023     06/16/2022    FREET4 1.16 10/09/2022    GLUC 116 05/26/2023    HCT 46.6 05/26/2023    HDL 59 05/19/2023    HGB 15.7 05/26/2023    HGBA1C 5.2 06/16/2022    K 4.7 05/26/2023    LDLCALC 60 05/19/2023    NEUTROABS 5.64 05/26/2023    NONHDLC 81 05/19/2023     05/26/2023    RBC 4.99 05/26/2023    RDW 12.6 05/26/2023    SODIUM 137 05/26/2023    TBILI 0.51 05/26/2023    TP 6.8 05/26/2023    TRIG 106 05/19/2023    [id number] 1.841 05/19/2023    WBC 9.14 05/26/2023       Progress Toward Goals: progressing    Risks / Benefits of Treatment:    Risks, benefits, and possible side effects of medications explained to patient and patient verbalizes understanding and agreement for treatment. Counseling / Coordination of Care:    Patient's progress discussed with staff in treatment team meeting. Medications, treatment progress and treatment plan reviewed with patient.     Anna Marie Jorge MD 06/04/23

## 2023-06-04 NOTE — NURSING NOTE
Patient is bright, pleasant, and cooperative. He is med/meal compliant. Patient continues to complain of auditory hallucinations and can be very agitated/anxious when the voices become too much for the patient.

## 2023-06-04 NOTE — PROGRESS NOTES
06/03/23 1300   Activity/Group Checklist   Group Other (Comment)  (OPEN STUDIO Art Therapy/Social Group)   Attendance Attended   Attendance Duration (min) 16-30   Interactions Did not interact   Affect/Mood Blunted/flat   Goals Achieved Able to listen to others

## 2023-06-04 NOTE — NURSING NOTE
Patient came to nurses station and hit his fist on the window. He was asked what he was doing and he said he needed medication. He was irritated he said. Education given and reinforced appropriateness of just approaching staff and asking for prn medication - he agreed to do this and he was offered and accepted Zyprexa 10mgs po prn.

## 2023-06-04 NOTE — NURSING NOTE
Patient given 100 mg of atarax for anxiety related to auditory hallucinations at 16:14. Medication was effective.

## 2023-06-05 PROCEDURE — 99232 SBSQ HOSP IP/OBS MODERATE 35: CPT | Performed by: PSYCHIATRY & NEUROLOGY

## 2023-06-05 RX ORDER — DIVALPROEX SODIUM 500 MG/1
500 TABLET, DELAYED RELEASE ORAL EVERY 12 HOURS SCHEDULED
Status: DISCONTINUED | OUTPATIENT
Start: 2023-06-05 | End: 2023-06-08

## 2023-06-05 RX ORDER — DIVALPROEX SODIUM 500 MG/1
500 TABLET, DELAYED RELEASE ORAL EVERY 8 HOURS SCHEDULED
Status: DISCONTINUED | OUTPATIENT
Start: 2023-06-05 | End: 2023-06-05

## 2023-06-05 RX ADMIN — OLANZAPINE 10 MG: 10 TABLET, FILM COATED ORAL at 20:18

## 2023-06-05 RX ADMIN — Medication 3 MG: at 21:19

## 2023-06-05 RX ADMIN — NICOTINE POLACRILEX 4 MG: 4 GUM, CHEWING BUCCAL at 16:37

## 2023-06-05 RX ADMIN — DIVALPROEX SODIUM 500 MG: 500 TABLET, DELAYED RELEASE ORAL at 14:32

## 2023-06-05 RX ADMIN — FLUTICASONE PROPIONATE 1 SPRAY: 50 SPRAY, METERED NASAL at 09:20

## 2023-06-05 RX ADMIN — HYDROXYZINE HYDROCHLORIDE 100 MG: 50 TABLET, FILM COATED ORAL at 20:18

## 2023-06-05 RX ADMIN — DIVALPROEX SODIUM 500 MG: 500 TABLET, DELAYED RELEASE ORAL at 21:19

## 2023-06-05 RX ADMIN — OLANZAPINE 10 MG: 10 TABLET, FILM COATED ORAL at 17:01

## 2023-06-05 RX ADMIN — OLANZAPINE 10 MG: 10 TABLET, FILM COATED ORAL at 09:20

## 2023-06-05 NOTE — PROGRESS NOTES
06/05/23 0800   Team Meeting   Meeting Type Daily Rounds   Team Members Present   Team Members Present Physician;Nurse;   Physician Team Member [id number] Team Member Dev   Patient/Family Present   Patient Present No   Patient's Family Present No   Pt hit fist on window because he was irritated. PRN zyprexa given-effective. Reports AH, responding loudly. PRN zyprexa and atarax-effective. DC tbd. Consider a mood stabilizer.

## 2023-06-05 NOTE — NURSING NOTE
Pt approached nurses station stating "ma'am I need something for these voices." while tapping his head. Pt has scheduled zyprexa 10 mg po at this time. Pt agreeable to take this and reassess should a PRN be needed additionally.

## 2023-06-05 NOTE — NURSING NOTE
Patient is sitting in the small TV room eating snack and he is in behavioral control at this time. He has had HS Melatonin also at this time.

## 2023-06-05 NOTE — PLAN OF CARE
SW reached out to therapeutic services to request completion of drug and alcohol assessment for rehab placement at time of discharge. Therapeutic services confirmed they will schedule assessment with Pt.

## 2023-06-05 NOTE — PROGRESS NOTES
Progress Note - Behavioral Health   Gokul Turner 26 y.o. male MRN: [id number]  Unit/Bed#: [facility] 350-02 Encounter: [id number]    Assessment/Plan   Principal Problem:    Psychosis ([address]) ro Schizophrenia vs Schizoaffective  Active Problems:    Legally blind    Hearing loss    Asthma    Medical clearance for psychiatric admission    Bilateral impacted cerumen    Prolonged erection    Intellectual disability      Recommended Treatment:   Continue olanzapine 10mg BID for psychotic symptoms  Continue melatonin 3mg for sleep  Start Depakote 500mg BID for mood stabilization    Discuss with Heme work-up for priapism  Continue with pharmacotherapy, group therapy, milieu therapy and occupational therapy. Continue to assess for adverse medication side effects. Encourage Libby Martino to participate in nonverbal forms of therapy including journaling and art/music therapy. Continue frequent safety checks and vitals per unit protocol. Continue to engage CM/SW to assist with collateral, disposition planning, and the implementation of an individualized, patient-centered plan of care. Continue medical management by medical team.  Case discussed with treatment team.    Legal Status: 201  Disposition: DC to rehab  ------------------------------------------------------------    Subjective: All documentation including nursing notes, medication history to ensure medication adherence on the unit, labs, and vitals were reviewed. Gokul was evaluated this morning for continuity of care and no acute distress noted throughout the evaluation. Over the past 24 hours per nursing report, Gokul has been cooperative on the unit and compliant with medications. Over the weekend, report states that he had a painful erection and urology was consulted. It was resolved with ice pack and sudafed. Today, Gokul is consenting for safety on the unit. Gokul reports feeling "good." oGkul notes having good sleep.  Gokul states having a good appetite having eaten Syriac toast and eggs this morning. Gokul has been taking the medications as prescribed and reporting no side effects. He describes his priapism having started one month ago before admission. Denies knowing if any family member has been diagnosed with sickle cell. He no longer has contact with the majority of his family for an extended period of time. He is rather concrete when discussing and it would be useful to gain collateral but this may be limited. Gokul denies suicidal ideations. Gokul denies homicidal ideations. Regarding hallucinations, Gokul endorses auditory hallucinations of one person saying "you will be fine, you will be ok" yesterday but not now. He states previously he has seen shadows and silhouettes of a man and woman by the door 2-3 days ago. He denies current AVH but  appears internally preoccupied at times. PRNs overnight: atarax for anxiety   VS: Reviewed, within normal limits    Progress Toward Goals: slow improvement    Psychiatric Review of Systems:  Behavior over the last 24 hours:  improved  Sleep: normal  Appetite: normal  Medication side effects: No   ROS: all other systems are negative    Vital signs in last 24 hours:  Temp:  [98.4 °F (36.9 °C)] 98.4 °F (36.9 °C)  HR:  [] 113  Resp:  [16] 16  BP: (108-131)/(57-61) 131/61    Laboratory results:  I have personally reviewed all pertinent laboratory/tests results. No results found for this or any previous visit (from the past 48 hour(s)).       Mental Status Evaluation:    Appearance:  disheveled, looks stated age, overtly appearing  male, minimal eye contact   Behavior:  cooperative, guarded   Speech:  normal rate and volume   Mood:  "good"   Affect:  blunted   Thought Process:  concrete, poverty of thought   Associations: concrete associations   Thought Content:  no overt delusions   Perceptual Disturbances: Denies auditory or visual hallucinations and Appears to be internally preoccupied   Risk Potential: Suicidal ideation - None at present  Homicidal ideation - None at present  Potential for aggression - Not at present   Sensorium:  oriented to person, place and time/date   Memory:  recent and remote memory grossly intact   Consciousness:  alert and awake   Attention/Concentration: attention span and concentration are age appropriate   Insight:  limited   Judgment: limited   Gait/Station: normal gait/station   Motor Activity: no abnormal movements       Current Medications:  Current Facility-Administered Medications   Medication Dose Route Frequency Provider Last Rate   • acetaminophen  650 mg Oral Q6H PRN Emilee Segundo MD     • acetaminophen  650 mg Oral Q4H PRN Emilee Segundo MD     • acetaminophen  975 mg Oral Q6H PRN Emilee Segundo MD     • albuterol  2 puff Inhalation Q4H PRN Emilee Segundo MD     • aluminum-magnesium hydroxide-simethicone  30 mL Oral Q4H PRN Emilee Segundo MD     • benztropine  1 mg Oral Q4H PRN Max 6/day Emilee Segundo MD     • hydrOXYzine HCL  50 mg Oral Q6H PRN Max 4/day Emilee Segundo MD      Or   • diphenhydrAMINE  50 mg Intramuscular Q6H PRN Emilee Segundo MD     • divalproex sodium  500 mg Oral Q12H [address] [place] , DO     • fluticasone  1 spray Nasal Daily Emilee Segundo MD     • hydrOXYzine HCL  100 mg Oral Q6H PRN Max 4/day Emilee Segundo MD      Or   • LORazepam  2 mg Intramuscular Q6H PRN Emilee Segundo MD     • hydrOXYzine HCL  25 mg Oral Q6H PRN Max 4/day Emilee Segundo MD     • melatonin  3 mg Oral HS Emilee Segundo MD     • nicotine polacrilex  4 mg Oral Q2H PRN Emilee Segundo MD     • OLANZapine  10 mg Oral Q3H PRN Max 3/day Sohail Rodriguez MD      Or   • OLANZapine  10 mg Intramuscular Q3H PRN Max 3/day Sohail Rodriguez MD     • OLANZapine  5 mg Oral Q3H PRN Max 6/day Sohail Rodriguez MD      Or   • OLANZapine  5 mg Intramuscular Q3H PRN Max 6/day Sohail Rodriguez MD     • OLANZapine  10 mg Oral BID Garret Dowell DO     • OLANZapine  2.5 mg Oral Q3H PRN Max 8/day Keisha Bridges MD     • polyethylene glycol  17 g Oral Daily PRN Bam Newton MD     • pseudoephedrine  120 mg Oral Daily PRN ELA Wise     • senna-docusate sodium  1 tablet Oral Daily PRN Bam Newton MD     • sterile water          • sterile water          • sterile water              Suicide/Homicide Risk Assessment:  Risk of Harm to Self:   Based on today's assessment, Dyphier presents the following risk of harm to self: high    Risk of Harm to Others:  Based on today's assessment, Dyphier presents the following risk of harm to others: moderate    The following interventions are recommended: behavioral checks every 7 minutes, continued hospitalization on locked unit    Behavioral Health Medications: All current active meds have been reviewed. Changes as in plan section above. Risks, benefits and possible side effects of Medications:   Risks, benefits, and possible side effects of medications explained to patient and patient verbalizes understanding. Counseling / Coordination of Care:  Patient's progress discussed with staff in treatment team meeting. Medications, treatment progress and treatment plan reviewed with patient. Marina Caban DO 06/05/23  Psychiatry Resident, PGY-II    This note was completed in part utilizing [employer] Software. Grammatical, translation, syntax errors, random word insertions, spelling mistakes, and incomplete sentences may be an occasional consequence of this system secondary to software limitations with voice recognition, ambient noise, and hardware issues. If you have any questions or concerns about the content, text, or information contained within the body of this dictation, please contact the provider for clarification.

## 2023-06-05 NOTE — NURSING NOTE
Patient responding loudly and angrily  to internal stimuli and hitting his head             . He was angry,irritable and impulsive. He was offered and accepted Zyprexa prn and Atarax prn - both of which he accepted. He was also accepting of support from [facility].  Will reassess for effectiveness

## 2023-06-05 NOTE — NURSING NOTE
Pt in bed most of the morning, awake for breakfast and medications. Pt at this time denies SI/HI/VH/AH to this writer and covers his head with his blanket. Encouraged to attend groups. Hygiene products provided to pt and encouraged to shower. Denies any unmet needs or complaints at this time.

## 2023-06-06 PROCEDURE — 99232 SBSQ HOSP IP/OBS MODERATE 35: CPT | Performed by: PSYCHIATRY & NEUROLOGY

## 2023-06-06 RX ADMIN — HYDROXYZINE HYDROCHLORIDE 100 MG: 50 TABLET, FILM COATED ORAL at 17:08

## 2023-06-06 RX ADMIN — OLANZAPINE 10 MG: 10 TABLET, FILM COATED ORAL at 08:46

## 2023-06-06 RX ADMIN — DIVALPROEX SODIUM 500 MG: 500 TABLET, DELAYED RELEASE ORAL at 08:46

## 2023-06-06 RX ADMIN — DIVALPROEX SODIUM 500 MG: 500 TABLET, DELAYED RELEASE ORAL at 21:27

## 2023-06-06 RX ADMIN — OLANZAPINE 10 MG: 10 TABLET, FILM COATED ORAL at 17:08

## 2023-06-06 RX ADMIN — FLUTICASONE PROPIONATE 1 SPRAY: 50 SPRAY, METERED NASAL at 08:46

## 2023-06-06 RX ADMIN — Medication 3 MG: at 21:27

## 2023-06-06 NOTE — SOCIAL WORK
SW checked in with Pt who stated he is "alright". Pt continues to express interest in rehab placement. Pt denying any CM related needs at this time. SW will continue to check in with Pt.

## 2023-06-06 NOTE — PROGRESS NOTES
DATE:23 [facility]    NAME:  Libby Martino  AGE:  26  : 2000  DIAGNOSIS: Psychosis ([address]) ro Schizophrenia vs Schizoaffective Psychotherapist:   Zuleyka Mejia, PhD, [facility]  Manager Therapeutic Services     PRESENTATION:   Patient presented himself cooperative but was in bed under covers. He remained for the full session. He answered questions in short answers without any elaboration. He was aware that he was in detox twice and in a rehab at [facility] and [facility]. He only completed the [facility] program.      ONSET OF RELAPSE   Patient stated that he was introduced to Crack Cocaine at the age of 17years old. He stated before the age of 17 he was happy and had many friends. He did not complete high school due to his drug use. GENERAL SYMPTOMS OF   CHEMICAL DEPENDENCY Patient reported that he has never worked and dropped out of high school due to his drug use. TIME SOBER Patient reported that he has only been clean when he was in rehab. REHAB TIME     Patient reported he was in Detox jvora__[id number]- and __58-. He was also in two rehabs at [facility], [facility] only. PHYSICAL- WITHDRAWAL SYMPTOMS OF CHEMICAL DEPENDENCY Patient had to be detoxed twice at [facility]. LEGAL/ BEHAVIOR Patient reported no legal issues at this time. Patient has had six charges in  either inactive or closed. Unable to identify type. PROBLEMS WITH EMPLOYMENT/RELATIONSHIPS Patient reported that he has never worked and has no relationship. He would like to work. PERSONALITY CHANGES DUE TO DRINKING/DRUGGING Unmotivated, lack of interest.     DRUG USE HISTORY   Patient stated that he was introduced to crack/ cocaine. His pattern of use was every other day, no daily. His form of use is snorting. Chart Review reported other drugs Cocaine, Marijuana, Methamphetamines and 2-40’s of beer per day.      CURRENT MEDICATIONS DEPAKOTE TAB 500MG  FLONASE 50 MG  MELATONIN TAB 3MG  ZYPREXA TAB 10MG 2 X DAY    MEDICAL ISSUES DUE TO USE. Patient reported no major medical issues except allergies and he needs to lose weight. He stated that he is always hungry. FAMILY HISTORY     Patient reported that there are extended family members who also struggled with alcohol ( father)    SUMMARY Patient comprehension limited in nature. He had difficulty understanding some of assessment questions. His report and chart review has some discrepancies about his use, patient downplays use. Patient stated that His Grandmother is his support and had to be taken to the ED by the neighbor due to his [facility]. Patient unable to elaborate on his goals to become clean and sober. After conducting chart review patient has been in the ED, Detox and or Rehab and inpatient psych 19 times in 2022-23. Unable to know if patient was in the children’s system and aged out.       RECOMMENDATIONS Patient is pre-contemplation stage with little engagement in his hospital treatment, he is med compliant, but lacks interest. Patient would need to complete a qualified rehab, then to sober [place] which includes AA/NA meetings, Co-Occurring therapist.          Aiyana Solitario, PhD, [facility], [place]   Date:  06-06-23   Therapeutic [address] [place], [place]. aKylee

## 2023-06-06 NOTE — NURSING NOTE
Pt up and out of bed for medications and meals. Denies SI/HI/AH/VH at this time. Encouraged to attend groups. Denies any unmet needs or complaints at this time.

## 2023-06-06 NOTE — PROGRESS NOTES
06/06/23 0840   Team Meeting   Meeting Type Daily Rounds   Team Members Present   Team Members Present Physician;Nurse;   Physician Team Member [id number] [facility] Team Member Wendie   Patient/Family Present   Patient Present No   Patient's Family Present No   Pt med/meal compliant. Received PRN Atarax and Zyprexa for VH and anxiety. Pt sleeping in this morning. Refused vitals. Pt was started on Depakote yesterday. Discharge to rehab to be determined.

## 2023-06-06 NOTE — NURSING NOTE
Patient in simona becoming increasingly agitated. Reported AH "They tell me I'm not a good person." Patient reported severe anxiety symptoms. Zyprexa 10 mg PO PRN and Atarax 100 mg PO PRN requested and received at 2018. HS medication compliant. Retreated to her room for sleep without issue. At 2115, patient reassessed and reported effectiveness.

## 2023-06-06 NOTE — PROGRESS NOTES
Progress Note - Behavioral Health   Gokul Hill 26 y.o. male MRN: [id number]  Unit/Bed#: [facility] 350-02 Encounter: [id number]    Assessment/Plan   Principal Problem:    Psychosis ([address]) ro Schizophrenia vs Schizoaffective  Active Problems:    Legally blind    Hearing loss    Asthma    Medical clearance for psychiatric admission    Bilateral impacted cerumen    Prolonged erection    Intellectual disability      Recommended Treatment:   No psychopharmacologic changes necessary at this moment; will continue to assess daily for further optimization. Current Medications:    Reached out to hematology about consult for priapism. Continue with pharmacotherapy, group therapy, milieu therapy and occupational therapy. Continue to assess for adverse medication side effects. Encourage Rafita Song to participate in nonverbal forms of therapy including journaling and art/music therapy. Continue frequent safety checks and vitals per unit protocol. Continue to engage CM/SW to assist with collateral, disposition planning, and the implementation of an individualized, patient-centered plan of care. Continue medical management by medical team.  Case discussed with treatment team.    Legal Status: 201   ------------------------------------------------------------    Subjective: All documentation including nursing notes, medication history to ensure medication adherence on the unit, labs, and vitals were reviewed. Gokul was evaluated this morning for continuity of care and no acute distress noted throughout the evaluation. Over the past 24 hours per nursing report, Gokul has been cooperative on the unit and compliant with medications. Today, Gokul is consenting for safety on the unit. Gokul reports feeling "ok." Gokul notes having good sleep. Gokul states having a good appetite. Gokul has been taking the medications as prescribed and reporting no side effects.     We discussed him "schizing" yesterday and how it caused him anxiety. He has goals to shower today before lunch and attend groups. He last heard voices yesterday that were bothersome telling him he has to leave here. He agrees he should leave but is willing to stay. We discussed a possible medication change later this week if he is unable to achieve substantial relief from the voices. Gokul denies suicidal ideations. Tierraer denies homicidal ideations. Regarding hallucinations, Gokul denies and does not appear to     PRNs overnight: atarax and zyprexa for voices and anxiety   VS: Reviewed, within normal limits    Progress Toward Goals: slow improvement    Psychiatric Review of Systems:  Behavior over the last 24 hours:  improved  Sleep: normal  Appetite: normal  Medication side effects: No   ROS: all other systems are negative    Vital signs in last 24 hours:  Temp:  [98.4 °F (36.9 °C)] 98.4 °F (36.9 °C)  HR:  [113] 113  Resp:  [16] 16  BP: (131)/(61) 131/61    Laboratory results:  I have personally reviewed all pertinent laboratory/tests results. No results found for this or any previous visit (from the past 48 hour(s)).       Mental Status Evaluation:    Appearance:  disheveled, overweight, overtly appearing AA male, in no apparent acute distress, improving eye contact   Behavior:  cooperative, less guarded   Speech:  normal rate and volume, coherent, less talkative than anticipated baseline   Mood:  "ok"   Affect:  blunted, slightly brighter   Thought Process:  coherent, goal directed, linear, concrete   Associations: concrete associations   Thought Content:  no overt delusions   Perceptual Disturbances: Reports last hearing and seeing voices yesterday, Denies auditory or visual hallucinations and Does not appear to be responding to internal stimuli   Risk Potential: Suicidal ideation - None at present  Homicidal ideation - None at present  Potential for aggression - Not at present   Sensorium:  oriented to person, place and time/date   Memory:  recent and remote memory grossly intact   Consciousness:  alert and awake   Attention/Concentration: attention span and concentration are age appropriate   Insight:  poor   Judgment: poor   Gait/Station: normal gait/station   Motor Activity: no abnormal movements       Current Medications:  Current Facility-Administered Medications   Medication Dose Route Frequency Provider Last Rate   • acetaminophen  650 mg Oral Q6H PRN Rosana Dennison MD     • acetaminophen  650 mg Oral Q4H PRN Rosana Dennison MD     • acetaminophen  975 mg Oral Q6H PRN Rosana Dennison MD     • albuterol  2 puff Inhalation Q4H PRN Rosana Dennison MD     • aluminum-magnesium hydroxide-simethicone  30 mL Oral Q4H PRN Rosana Dennison MD     • benztropine  1 mg Oral Q4H PRN Max 6/day Rosana Dennison MD     • hydrOXYzine HCL  50 mg Oral Q6H PRN Max 4/day Rosana Dennison MD      Or   • diphenhydrAMINE  50 mg Intramuscular Q6H PRN Rosana Dennison MD     • divalproex sodium  500 mg Oral Q12H [address] DO     • fluticasone  1 spray Nasal Daily Rosana Dennison MD     • hydrOXYzine HCL  100 mg Oral Q6H PRN Max 4/day Rosana Dennison MD      Or   • LORazepam  2 mg Intramuscular Q6H PRN Rosana Dennison MD     • hydrOXYzine HCL  25 mg Oral Q6H PRN Max 4/day Rosana Dennison MD     • melatonin  3 mg Oral HS Rosana Dennison MD     • nicotine polacrilex  4 mg Oral Q2H PRN Rosana Dennison MD     • OLANZapine  10 mg Oral Q3H PRN Max 3/day Jorge Burton MD      Or   • OLANZapine  10 mg Intramuscular Q3H PRN Max 3/day Jorge Burton MD     • OLANZapine  5 mg Oral Q3H PRN Max 6/day Jorge Burton MD      Or   • OLANZapine  5 mg Intramuscular Q3H PRN Max 6/day Jorge Burton MD     • OLANZapine  10 mg Oral BID Celena Lobato DO     • OLANZapine  2.5 mg Oral Q3H PRN Max 8/day Jorge Burton MD     • polyethylene glycol  17 g Oral Daily PRN Rosana Dennison MD     • pseudoephedrine  120 mg Oral Daily PRN ELA Han • senna-docusate sodium  1 tablet Oral Daily PRN Bam Newton MD     • sterile water          • sterile water          • sterile water          Marina Caban DO 06/06/23  Psychiatry Resident, PGY-II    This note was completed in part utilizing [employer] Software. Grammatical, translation, syntax errors, random word insertions, spelling mistakes, and incomplete sentences may be an occasional consequence of this system secondary to software limitations with voice recognition, ambient noise, and hardware issues. If you have any questions or concerns about the content, text, or information contained within the body of this dictation, please contact the provider for clarification.

## 2023-06-07 PROCEDURE — 99232 SBSQ HOSP IP/OBS MODERATE 35: CPT | Performed by: PSYCHIATRY & NEUROLOGY

## 2023-06-07 PROCEDURE — 99447 NTRPROF PH1/NTRNET/EHR 11-20: CPT | Performed by: NURSE PRACTITIONER

## 2023-06-07 RX ADMIN — Medication 3 MG: at 21:17

## 2023-06-07 RX ADMIN — OLANZAPINE 5 MG: 5 TABLET, FILM COATED ORAL at 21:45

## 2023-06-07 RX ADMIN — FLUTICASONE PROPIONATE 1 SPRAY: 50 SPRAY, METERED NASAL at 08:42

## 2023-06-07 RX ADMIN — DIVALPROEX SODIUM 500 MG: 500 TABLET, DELAYED RELEASE ORAL at 08:41

## 2023-06-07 RX ADMIN — DIVALPROEX SODIUM 500 MG: 500 TABLET, DELAYED RELEASE ORAL at 21:17

## 2023-06-07 RX ADMIN — PSEUDOEPHEDRINE HCL 120 MG: 30 TABLET, FILM COATED ORAL at 10:04

## 2023-06-07 RX ADMIN — HYDROXYZINE HYDROCHLORIDE 100 MG: 50 TABLET, FILM COATED ORAL at 18:01

## 2023-06-07 RX ADMIN — OLANZAPINE 10 MG: 10 TABLET, FILM COATED ORAL at 08:41

## 2023-06-07 RX ADMIN — OLANZAPINE 10 MG: 10 TABLET, FILM COATED ORAL at 17:27

## 2023-06-07 NOTE — NURSING NOTE
Pt attended art group, visible at times throughout the day. Reports AH but says they are not bothering him at this time. Denies SI/HI/VH. Medication and meal compliant. Denies any unmet needs or complaints.

## 2023-06-07 NOTE — NURSING NOTE
Patient is in and out of the community. Patient reported, "The depakote is working. The voices weren't as bad today." Patient was pleasant and responding was less to interna; stimuli. Patient was compliant with scheduled medications. Staff to maintain continuous rounding for safety and support.

## 2023-06-07 NOTE — PROGRESS NOTES
Progress Note - Behavioral Health   Gokul Hyatt Batch 26 y.o. male MRN: [id number]  Unit/Bed#: [facility] 350-02 Encounter: [id number]    Assessment/Plan   Principal Problem:    Psychosis ([address]) ro Schizophrenia vs Schizoaffective  Active Problems:    Legally blind    Hearing loss    Asthma    Medical clearance for psychiatric admission    Bilateral impacted cerumen    Prolonged erection    Intellectual disability      Recommended Treatment:   No psychopharmacologic changes necessary at this moment; will continue to assess daily for further optimization. Continue Current Medications:  Depakote 500mg BID for mood stabilization and augmentation  Olanzapine 10mg BID for psychotic symptoms    Contacted medical and hematology    Continue with pharmacotherapy, group therapy, milieu therapy and occupational therapy. Continue to assess for adverse medication side effects. Encourage Cedric Guzman to participate in nonverbal forms of therapy including journaling and art/music therapy. Continue frequent safety checks and vitals per unit protocol. Continue to engage CM/SW to assist with collateral, disposition planning, and the implementation of an individualized, patient-centered plan of care. Continue medical management by medical team.  Case discussed with treatment team.    Legal Status: 201  ------------------------------------------------------------    Subjective: All documentation including nursing notes, medication history to ensure medication adherence on the unit, labs, and vitals were reviewed. Gokul was evaluated this morning for continuity of care and no acute distress noted throughout the evaluation. Over the past 24 hours per nursing report, Gokul has been cooperative on the unit and compliant with medications. Today, Gokul is consenting for safety on the unit. Gokul reports feeling "fine." Gokul notes having fine sleep. Gokul states having a good appetite.  Gokul has been taking the medications as prescribed and reporting no side effects. Gokul states he has current priapism and it is 9/10 pain. Discussed the possibility of drainage necessary with him if sudafed and ice do not help. He states that the medications (depakote) have been helping his voices and since two days he has not heard voices. He plans to go to more groups today and shower. Gokul denies suicidal ideations. Gokul denies homicidal ideations. Regarding hallucinations, Gokul denies and does not appear to be responding to internal stimuli. PRNs overnight: none    VS: Reviewed, within normal limits    Progress Toward Goals: slow improvement    Psychiatric Review of Systems:  Behavior over the last 24 hours:  improved  Sleep: normal  Appetite: normal  Medication side effects: No   ROS: all other systems are negative - experiencing painful priapism    Vital signs in last 24 hours:  Temp:  [98.2 °F (36.8 °C)] 98.2 °F (36.8 °C)  HR:  [80] 80  Resp:  [16] 16  BP: (118)/(63) 118/63    Laboratory results:  I have personally reviewed all pertinent laboratory/tests results. No results found for this or any previous visit (from the past 48 hour(s)).       Mental Status Evaluation:    Appearance:  casually dressed, improved grooming, looks older than stated age, overweight, opvertly appearing AA male with improved eye contact   Behavior:  cooperative, calm   Speech:  clear, coherent, slightly less scant than previous   Mood:  "fine"   Affect:  blunted, slightly brighter   Thought Process:  coherent, goal directed, linear, concrete   Associations: concrete associations   Thought Content:  no overt delusions   Perceptual Disturbances: Denies auditory or visual hallucinations and Does not appear to be responding to internal stimuli   Risk Potential: Suicidal ideation - None at present  Homicidal ideation - None at present  Potential for aggression - Not at present   Sensorium:  oriented to person, place and time/date   Memory:  recent and remote memory grossly intact   Consciousness:  alert and awake   Attention/Concentration: attention span and concentration are age appropriate   Insight:  limited   Judgment: limited   Gait/Station: normal gait/station   Motor Activity: no abnormal movements       Current Medications:  Current Facility-Administered Medications   Medication Dose Route Frequency Provider Last Rate   • acetaminophen  650 mg Oral Q6H PRN Kitty Fabry, MD     • acetaminophen  650 mg Oral Q4H PRN Kitty Fabry, MD     • acetaminophen  975 mg Oral Q6H PRN Kitty Fabry, MD     • albuterol  2 puff Inhalation Q4H PRN Kitty Fabry, MD     • aluminum-magnesium hydroxide-simethicone  30 mL Oral Q4H PRN Kitty Fabry, MD     • benztropine  1 mg Oral Q4H PRN Max 6/day Kitty Fabry, MD     • hydrOXYzine HCL  50 mg Oral Q6H PRN Max 4/day Kitty Fabry, MD      Or   • diphenhydrAMINE  50 mg Intramuscular Q6H PRN Kitty Fabry, MD     • divalproex sodium  500 mg Oral Q12H [address], DO     • fluticasone  1 spray Nasal Daily Kitty Fabry, MD     • hydrOXYzine HCL  100 mg Oral Q6H PRN Max 4/day Kitty Fabry, MD      Or   • LORazepam  2 mg Intramuscular Q6H PRN Kitty Fabry, MD     • hydrOXYzine HCL  25 mg Oral Q6H PRN Max 4/day Kitty Fabry, MD     • melatonin  3 mg Oral HS Kitty Fabry, MD     • nicotine polacrilex  4 mg Oral Q2H PRN Kitty Fabry, MD     • OLANZapine  10 mg Oral Q3H PRN Max 3/day Arnaldo Canela MD      Or   • OLANZapine  10 mg Intramuscular Q3H PRN Max 3/day Arnaldo Canela MD     • OLANZapine  5 mg Oral Q3H PRN Max 6/day Arnaldo Canela MD      Or   • OLANZapine  5 mg Intramuscular Q3H PRN Max 6/day Arnaldo Canela MD     • OLANZapine  10 mg Oral BID Allenkendrick Franks DO     • OLANZapine  2.5 mg Oral Q3H PRN Max 8/day Arnaldo Canela MD     • polyethylene glycol  17 g Oral Daily PRN Kitty Fabry, MD     • pseudoephedrine  120 mg Oral Daily PRN Jam Sharma, ELA     • senna-docusate sodium  1 tablet Oral Daily PRN Zamzam Mesa MD     • sterile water          • sterile water          • sterile water              Rc Mayorga DO 06/07/23  Psychiatry Resident, PGY-II    This note was completed in part utilizing [employer] Software. Grammatical, translation, syntax errors, random word insertions, spelling mistakes, and incomplete sentences may be an occasional consequence of this system secondary to software limitations with voice recognition, ambient noise, and hardware issues. If you have any questions or concerns about the content, text, or information contained within the body of this dictation, please contact the provider for clarification.

## 2023-06-07 NOTE — NURSING NOTE
Pt approached this writer asking for something for voices and anxiety. PRN atarax 100 mg given and explained to pt he received his scheduled 10 mg of zyprexa approximately 30 minutes prior, agreeable to give it some time to work. Resting in bed at this time.

## 2023-06-07 NOTE — PROGRESS NOTES
06/07/23 1000   Activity/Group Checklist   Group Other (Comment)  (Group Art Therapy/Psychodynamic, Creatively Challenging Locked Perceptions with Discussion)   Attendance Attended   Attendance Duration (min) 46-60  (Patient left group before discussion)   Interactions Interacted appropriately   Affect/Mood Appropriate   Goals Achieved Able to listen to others; Able to engage in interactions; Able to recieve feedback  (Able to engage materials and directive; partial participation due to patient left group early)

## 2023-06-07 NOTE — NURSING NOTE
PRN sudafed 120 mg and ice pack given for erection lasting longer than 45 minutes @ 1004, reassessed and effective per pt.

## 2023-06-07 NOTE — PROGRESS NOTES
06/07/23 0837   Team Meeting   Meeting Type Daily Rounds   Team Members Present   Team Members Present Physician;Nurse;   Physician Team Member [id number] [facility] Team Member [facility] Team Member Rajiv   Patient/Family Present   Patient Present No   Patient's Family Present No   PRN atarax-effective. Pt reports he feels meds are working because [address] is less. Med/meal compliant. Priapism still an issue, consider work up for sickle cell.

## 2023-06-08 ENCOUNTER — HOSPITAL ENCOUNTER (EMERGENCY)
Facility: HOSPITAL | Age: 23
Discharge: HOME | End: 2023-06-08
Attending: INTERNAL MEDICINE
Payer: COMMERCIAL

## 2023-06-08 VITALS
SYSTOLIC BLOOD PRESSURE: 136 MMHG | TEMPERATURE: 97.6 F | BODY MASS INDEX: 29.6 KG/M2 | DIASTOLIC BLOOD PRESSURE: 78 MMHG | OXYGEN SATURATION: 97 % | HEART RATE: 84 BPM | RESPIRATION RATE: 16 BRPM | WEIGHT: 167.1 LBS

## 2023-06-08 DIAGNOSIS — Z87.438 HISTORY OF PRIAPISM: Primary | ICD-10-CM

## 2023-06-08 LAB
ALBUMIN SERPL BCP-MCNC: 3.8 G/DL (ref 3.5–5)
ALP SERPL-CCNC: 54 U/L (ref 34–104)
ALT SERPL W P-5'-P-CCNC: 35 U/L (ref 7–52)
ANION GAP SERPL CALCULATED.3IONS-SCNC: 7 MMOL/L (ref 4–13)
AST SERPL W P-5'-P-CCNC: 18 U/L (ref 13–39)
BASOPHILS # BLD MANUAL: 0 THOUSAND/UL (ref 0–0.1)
BASOPHILS NFR MAR MANUAL: 0 % (ref 0–1)
BILIRUB SERPL-MCNC: 0.48 MG/DL (ref 0.2–1)
BUN SERPL-MCNC: 18 MG/DL (ref 5–25)
CALCIUM SERPL-MCNC: 9.7 MG/DL (ref 8.4–10.2)
CHLORIDE SERPL-SCNC: 103 MMOL/L (ref 96–108)
CO2 SERPL-SCNC: 30 MMOL/L (ref 21–32)
CREAT SERPL-MCNC: 1.12 MG/DL (ref 0.6–1.3)
EOSINOPHIL # BLD MANUAL: 0.72 THOUSAND/UL (ref 0–0.4)
EOSINOPHIL NFR BLD MANUAL: 7 % (ref 0–6)
ERYTHROCYTE [DISTWIDTH] IN BLOOD BY AUTOMATED COUNT: 12.8 % (ref 11.6–15.1)
GFR SERPL CREATININE-BSD FRML MDRD: 92 ML/MIN/1.73SQ M
GLUCOSE P FAST SERPL-MCNC: 105 MG/DL (ref 65–99)
GLUCOSE SERPL-MCNC: 105 MG/DL (ref 65–140)
HCT VFR BLD AUTO: 45.8 % (ref 36.5–49.3)
HGB BLD-MCNC: 14.7 G/DL (ref 12–17)
IGA SERPL-MCNC: 92 MG/DL (ref 70–400)
IGG SERPL-MCNC: 1460 MG/DL (ref 700–1600)
IGM SERPL-MCNC: 99 MG/DL (ref 40–230)
LDH SERPL-CCNC: 171 U/L (ref 140–271)
LYMPHOCYTES # BLD AUTO: 3.6 THOUSAND/UL (ref 0.6–4.47)
LYMPHOCYTES # BLD AUTO: 35 % (ref 14–44)
MCH RBC QN AUTO: 31.1 PG (ref 26.8–34.3)
MCHC RBC AUTO-ENTMCNC: 32.1 G/DL (ref 31.4–37.4)
MCV RBC AUTO: 97 FL (ref 82–98)
MONOCYTES # BLD AUTO: 0.82 THOUSAND/UL (ref 0–1.22)
MONOCYTES NFR BLD: 8 % (ref 4–12)
MYELOCYTES NFR BLD MANUAL: 3 % (ref 0–1)
NEUTROPHILS # BLD MANUAL: 4.84 THOUSAND/UL (ref 1.85–7.62)
NEUTS BAND NFR BLD MANUAL: 1 % (ref 0–8)
NEUTS SEG NFR BLD AUTO: 46 % (ref 43–75)
PLATELET # BLD AUTO: 334 THOUSANDS/UL (ref 149–390)
PLATELET BLD QL SMEAR: ADEQUATE
PMV BLD AUTO: 10.4 FL (ref 8.9–12.7)
POTASSIUM SERPL-SCNC: 4.3 MMOL/L (ref 3.5–5.3)
PROT SERPL-MCNC: 7 G/DL (ref 6.4–8.4)
RBC # BLD AUTO: 4.72 MILLION/UL (ref 3.88–5.62)
RBC MORPH BLD: NORMAL
SODIUM SERPL-SCNC: 140 MMOL/L (ref 135–147)
URATE SERPL-MCNC: 6.5 MG/DL (ref 3.5–8.5)
VALPROATE SERPL-MCNC: 47 UG/ML (ref 50–100)
WBC # BLD AUTO: 10.29 THOUSAND/UL (ref 4.31–10.16)

## 2023-06-08 PROCEDURE — 85007 BL SMEAR W/DIFF WBC COUNT: CPT

## 2023-06-08 PROCEDURE — 84550 ASSAY OF BLOOD/URIC ACID: CPT | Performed by: NURSE PRACTITIONER

## 2023-06-08 PROCEDURE — 80164 ASSAY DIPROPYLACETIC ACD TOT: CPT

## 2023-06-08 PROCEDURE — 80053 COMPREHEN METABOLIC PANEL: CPT

## 2023-06-08 PROCEDURE — 83521 IG LIGHT CHAINS FREE EACH: CPT | Performed by: NURSE PRACTITIONER

## 2023-06-08 PROCEDURE — 84165 PROTEIN E-PHORESIS SERUM: CPT | Performed by: NURSE PRACTITIONER

## 2023-06-08 PROCEDURE — 99283 EMERGENCY DEPT VISIT LOW MDM: CPT

## 2023-06-08 PROCEDURE — 85027 COMPLETE CBC AUTOMATED: CPT

## 2023-06-08 PROCEDURE — 82784 ASSAY IGA/IGD/IGG/IGM EACH: CPT | Performed by: NURSE PRACTITIONER

## 2023-06-08 PROCEDURE — 83020 HEMOGLOBIN ELECTROPHORESIS: CPT | Performed by: NURSE PRACTITIONER

## 2023-06-08 PROCEDURE — 99232 SBSQ HOSP IP/OBS MODERATE 35: CPT | Performed by: PSYCHIATRY & NEUROLOGY

## 2023-06-08 PROCEDURE — 83615 LACTATE (LD) (LDH) ENZYME: CPT | Performed by: NURSE PRACTITIONER

## 2023-06-08 PROCEDURE — 99231 SBSQ HOSP IP/OBS SF/LOW 25: CPT | Performed by: INTERNAL MEDICINE

## 2023-06-08 RX ORDER — OLANZAPINE 10 MG/1
10 TABLET ORAL DAILY
Status: DISCONTINUED | OUTPATIENT
Start: 2023-06-09 | End: 2023-06-12

## 2023-06-08 RX ADMIN — NICOTINE POLACRILEX 4 MG: 4 GUM, CHEWING BUCCAL at 20:07

## 2023-06-08 RX ADMIN — OLANZAPINE 10 MG: 10 TABLET, FILM COATED ORAL at 09:39

## 2023-06-08 RX ADMIN — PSEUDOEPHEDRINE HCL 120 MG: 30 TABLET, FILM COATED ORAL at 09:50

## 2023-06-08 RX ADMIN — FLUTICASONE PROPIONATE 1 SPRAY: 50 SPRAY, METERED NASAL at 09:39

## 2023-06-08 RX ADMIN — OLANZAPINE 10 MG: 10 TABLET, FILM COATED ORAL at 20:05

## 2023-06-08 RX ADMIN — DIVALPROEX SODIUM 500 MG: 500 TABLET, DELAYED RELEASE ORAL at 09:39

## 2023-06-08 RX ADMIN — Medication 3 MG: at 21:17

## 2023-06-08 RX ADMIN — ACETAMINOPHEN 975 MG: 325 TABLET ORAL at 11:15

## 2023-06-08 RX ADMIN — DIVALPROEX SODIUM 750 MG: 250 TABLET, DELAYED RELEASE ORAL at 21:16

## 2023-06-08 RX ADMIN — HYDROXYZINE HYDROCHLORIDE 100 MG: 50 TABLET, FILM COATED ORAL at 20:05

## 2023-06-08 RX ADMIN — OLANZAPINE 15 MG: 5 TABLET, FILM COATED ORAL at 17:18

## 2023-06-08 NOTE — PROGRESS NOTES
Priapism    · Received the message from psychiatrist stating that patient has an episode of painful erection  · History of intermittent priapism which required treatment in ED in the past  · Patient received Sudafed around 9:50 a.m. for erection with severe pain and he received Sudafed 10 am yesterday for similar symptoms  · On physical examination, patient still has erection. Patient currently denies pain  · Since the erection is not relieved with Sudafed, recommended to go down to ED for further evaluation. · Psychiatry team aware.

## 2023-06-08 NOTE — PROGRESS NOTES
Progress Note - Behavioral Health   Gokul Childs 26 y.o. male MRN: [id number]  Unit/Bed#: [facility] 350-02 Encounter: [id number]    Assessment/Plan   Principal Problem:    Psychosis ([address]) ro Schizophrenia vs Schizoaffective  Active Problems:    Legally blind    Hearing loss    Asthma    Medical clearance for psychiatric admission    Bilateral impacted cerumen    Prolonged erection    Intellectual disability      Recommended Treatment:   Increased Zyprexa to 10 mg daily and 15 mg QHS for psychosis  Increased Depakote to 750 mg BID for mood stability  Most recent Depakote level 6/8/23 was 47  Continue melatonin 3 mg QHS for insomnia     At this time, heme-onc and urology have been consulted due to the patient's recurrent priapism. Per heme-onc, the patient's symptoms are highly unlikely to be related to sickle cell disease as the patient has a normal CBC and negative sickle cell screen. At this time hemoglobin fractionation cascade is pending. Continue sudafed 120 mg daily PRN erection lasting greater than 45 minutes. Will continue to monitor and coordinate care with specialists. Further medical management per  IM    Continue to encourage the patient to engage with group therapy, milieu therapy and occupational therapy. Continue frequent safety checks and vitals per unit protocol. Case discussed with treatment team.  Risks, benefits and possible side effects of Medications: Risks, benefits, and possible side effects of medications have been explained to the patient, who verbalizes understanding    ------------------------------------------------------------    Subjective:     Per nursing report, on the inpatient psychiatric unit Gokul continues to make slow progress and remains with symptoms of psychosis. Yesterday he was observed by nursing to be visible on the unit at times throughout the day.  He attended art group in the morning and participated partially, leaving before the group discussion could take place. He received Sudafed 120 mg and an ice pack at around 10:00 AM due to an erection lasting longer than 45 minutes. On nursing assessments throughout the day he was observed to be cooperative, quiet, and scant, appearing internally preoccupied. He reported to nursing that he was experiencing auditory hallucinations yesterday afternoon, but stated that they were not bothering him. Later in the evening he requested and received Atarax 100 mg p.o. at around 6:00 PM due to anxiety in the setting of auditory hallucinations and subsequently received Zyprexa 5 mg p.o. at around 9:45 PM due to ongoing auditory hallucinations. At the time of interview, Gokul continues to remain with limited eye contact, scant in speech, with poverty of thought. He appears distracted and preoccupied. Today, Gokul reports that he feels "good". He reports that he slept in and is looking forward to eating breakfast this morning. Gokul reports that last night he experienced auditory hallucinations of two voices telling him to "get in trouble" and "to do stupid stuff" as well as visual hallucinations of a "man with a black baseball cap and a women standing". He denies current hallucinations at the time of interview. Gokul reports that he is currently experiencing penile pain due to a prolonged erection. Nursing was present at the time of interview and he received sudafed 120 mg at 9:50 AM for his symptoms. At the time of interview, Gokul reports he has been eating well and sleeping well (he does not know how many hours he slept). He reports has has been going to the bathroom without difficulty. He has been taking his medications as directed and has not noticed any medication side effects. Gokul currently denies suicidal ideation, homicidal ideation, visual and auditory hallucinations.      Per nursing report, shortly after this interview the patient was observed responding to internal stimuli and swinging his arms at the air. Progress Toward Goals: slow improvement    Psychiatric Review of Systems:  Behavior over the last 24 hours: unchanged  Sleep: hypersomnia  Appetite: normal compared to baseline  Medication side effects: none verbalized  ROS: Continues to experience recurrent and painful erections    Vital signs in last 24 hours:  Temp:  [98.4 °F (36.9 °C)] 98.4 °F (36.9 °C)  HR:  [118] 118  Resp:  [16] 16  BP: (137)/(63) 137/63    Mental Status Exam:  Appearance:  overtly appearing  male, casually dressed, improved grooming, looks older than stated age, overweight, with limited eye contact   Behavior:  calm and cooperative   Motor: no abnormal movements and normal gait and balance   Speech:  scant   Mood:  "good"   Affect:  blunted   Thought Process:  Linear, concrete, goal directed   Thought Content: no verbalized delusions or overt paranoia, apparent poverty of thought   Perceptual disturbances: Gokul reports that last night he experienced auditory hallucinations of two voices telling him to "get in trouble" and "to do stupid stuff" as well as visual hallucinations of a "man with a black baseball cap and a women standing".  Denies current hallucinations, however appears distracted and preoccupied   Risk Potential: No active suicidal ideation, No active homicidal ideation   Cognition: oriented to person, place, and situation, impaired abstract reasoning, attention span appeared shorter than expected for age and cognition not formally tested   Insight:  Limited   Judgment: Limited     Current Medications:  Current Facility-Administered Medications   Medication Dose Route Frequency Provider Last Rate   • acetaminophen  650 mg Oral Q6H PRN Sergio Agarwal MD     • acetaminophen  650 mg Oral Q4H PRN Sergio Agarwal MD     • acetaminophen  975 mg Oral Q6H PRN Sergio Agarwal MD     • albuterol  2 puff Inhalation Q4H PRN Sergio Agarwal MD     • aluminum-magnesium hydroxide-simethicone  30 mL Oral Q4H PRN Sharon Holter, MD     • benztropine  1 mg Oral Q4H PRN Max 6/day Sharon Holter, MD     • hydrOXYzine HCL  50 mg Oral Q6H PRN Max 4/day Sharon Holter, MD      Or   • diphenhydrAMINE  50 mg Intramuscular Q6H PRN Sharon Holter, MD     • divalproex sodium  750 mg Oral Q12H [address] Ivy Rockwell MD     • fluticasone  1 spray Nasal Daily Sharon Holter, MD     • hydrOXYzine HCL  100 mg Oral Q6H PRN Max 4/day Sharon Holter, MD      Or   • LORazepam  2 mg Intramuscular Q6H PRN Sharon Holter, MD     • hydrOXYzine HCL  25 mg Oral Q6H PRN Max 4/day Sharon Holter, MD     • melatonin  3 mg Oral HS Sharon Holter, MD     • nicotine polacrilex  4 mg Oral Q2H PRN Sharon Holter, MD     • OLANZapine  10 mg Oral Q3H PRN Max 3/day Sarai Sanches MD      Or   • OLANZapine  10 mg Intramuscular Q3H PRN Max 3/day Sarai Sanches MD     • OLANZapine  5 mg Oral Q3H PRN Max 6/day Sarai Sanches MD      Or   • OLANZapine  5 mg Intramuscular Q3H PRN Max 6/day Sarai Sanches MD     • [START ON 6/9/2023] OLANZapine  10 mg Oral Daily Ivy Rockwell MD     • OLANZapine  15 mg Oral HS Ivy Rockwell MD     • OLANZapine  2.5 mg Oral Q3H PRN Max 8/day Sarai Sanches MD     • polyethylene glycol  17 g Oral Daily PRN Sharon Holter, MD     • senna-docusate sodium  1 tablet Oral Daily PRN Sharon Holter, MD     • sterile water          • sterile water          • sterile water              Behavioral Health Medications: all current active meds have been reviewed. Changes as in plan section above. Laboratory results:  I have personally reviewed all pertinent laboratory/tests results.   Recent Results (from the past 48 hour(s))   Valproic acid level, total    Collection Time: 06/08/23  6:33 AM   Result Value Ref Range    Valproic Acid, Total 47 (L) 50 - 100 ug/mL   Comprehensive metabolic panel    Collection Time: 06/08/23  6:33 AM   Result Value Ref Range    Sodium 140 135 - 147 mmol/L    Potassium 4.3 3.5 - 5.3 mmol/L    Chloride 103 96 - 108 mmol/L    CO2 30 21 - 32 mmol/L    ANION GAP 7 4 - 13 mmol/L    BUN 18 5 - 25 mg/dL    Creatinine 1.12 0.60 - 1.30 mg/dL    Glucose 105 65 - 140 mg/dL    Glucose, Fasting 105 (H) 65 - 99 mg/dL    Calcium 9.7 8.4 - 10.2 mg/dL    AST 18 13 - 39 U/L    ALT 35 7 - 52 U/L    Alkaline Phosphatase 54 34 - 104 U/L    Total Protein 7.0 6.4 - 8.4 g/dL    Albumin 3.8 3.5 - 5.0 g/dL    Total Bilirubin 0.48 0.20 - 1.00 mg/dL    eGFR 92 ml/min/1.73sq m   CBC and differential    Collection Time: 06/08/23  6:33 AM   Result Value Ref Range    WBC 10.29 (H) 4.31 - 10.16 Thousand/uL    RBC 4.72 3.88 - 5.62 Million/uL    Hemoglobin 14.7 12.0 - 17.0 g/dL    Hematocrit 45.8 36.5 - 49.3 %    MCV 97 82 - 98 fL    MCH 31.1 26.8 - 34.3 pg    MCHC 32.1 31.4 - 37.4 g/dL    RDW 12.8 11.6 - 15.1 %    MPV 10.4 8.9 - 12.7 fL    Platelets 087 564 - 216 Thousands/uL   IgG, IgA, IgM    Collection Time: 06/08/23  6:33 AM   Result Value Ref Range    IGA 92.0 70.0 - 400.0 mg/dL    IGG 1,460.0 700.0 - 1,600.0 mg/dL    IGM 99.0 40.0 - 230.0 mg/dL   LD,Blood    Collection Time: 06/08/23  6:33 AM   Result Value Ref Range     140 - 271 U/L   Uric acid    Collection Time: 06/08/23  6:33 AM   Result Value Ref Range    Uric Acid 6.5 3.5 - 8.5 mg/dL   Manual Differential(PHLEBS Do Not Order)    Collection Time: 06/08/23  6:33 AM   Result Value Ref Range    Segmented % 46 43 - 75 %    Bands % 1 0 - 8 %    Lymphocytes % 35 14 - 44 %    Monocytes % 8 4 - 12 %    Eosinophils, % 7 (H) 0 - 6 %    Basophils % 0 0 - 1 %    Myelocytes % 3 (H) 0 - 1 %    Absolute Neutrophils 4.84 1.85 - 7.62 Thousand/uL    Lymphocytes Absolute 3.60 0.60 - 4.47 Thousand/uL    Monocytes Absolute 0.82 0.00 - 1.22 Thousand/uL    Eosinophils Absolute 0.72 (H) 0.00 - 0.40 Thousand/uL    Basophils Absolute 0.00 0.00 - 0.10 Thousand/uL    Total Counted      RBC Morphology Normal     Platelet Estimate Adequate Adequate Dari Webber MD

## 2023-06-08 NOTE — NURSING NOTE
The pt slept in late, he came out his rm shortly after 0900 and sat in the pt's lounge for breakfast and to talk with his doctor. This writer noticed the pt had an erection that wasn't relieved, the pt informed this writer that it was bothering him  but not causing any pain at the time of assessment. This writer gave the pt Sudafed at 2214 which was not effective in relieving the erection. At [phone] the pt was given 975mg Tylenol for severe penis pain. The doctors were made aware that the Sudafed was not effective. Medical was notified, the pt was sent to the ED 1155 as per urology protocol. This writer received a call from ED at approximately 1300 informing this writer the pt's erection went down without intervention. The ED nurse informed this writer the pt said his erection went away after he received his pain medication. The pt returned to 3B at approximately 1310. No issues or complaints at this time.

## 2023-06-08 NOTE — ED PROVIDER NOTES
History  Chief Complaint   Patient presents with   • Medical Problem     Priapism - frequently happening  Here for shot and possible drainage       27-year-old male with past medical history of schizophrenia, asthma, priapism presents to emergency department from inpatient unit for painful erection lasting over 2 hours  Was in the ED 13 days prior for similar issue, did not improve with p o  medications, required injections and drainage  Patient states he woke up this morning with painful erection, emergency previous episodes  Per RN he was given dose of Sudafed at 9:50 AM then examined an hour later by the internal medicine doctor at that point had not yet resolved  The patient states that it resolved spontaneously before he came down here, after he got his pain medication  Per RN he got Tylenol at 11:15 AM prior to transfer to ED  Reports that he urinated without difficulty or pain after this  He denies any acute complaints at this time, including no current pain  History provided by:  Patient (SHERITA Yang on [facility] )  Penis / Scrotum Problem  Presenting symptoms: penile pain    Presenting symptoms: no dysuria, no penile discharge, no scrotal pain and no swelling    Context: spontaneously    Associated symptoms: priapism    Associated symptoms: no abdominal pain, no diarrhea, no fever, no flank pain, no genital itching, no genital lesions, no genital rash, no groin pain, no hematuria, no nausea, no penile redness, no penile swelling, no scrotal swelling, no urinary frequency, no urinary hesitation, no urinary incontinence, no urinary retention and no vomiting    Medication or dosage change: they are unsure if they have yet made any changes to psychiatric medications  Prior to Admission Medications   Prescriptions Last Dose Informant Patient Reported? Taking?    albuterol (PROVENTIL HFA,VENTOLIN HFA) 90 mcg/act inhaler   No No   Sig: Inhale 2 puffs every 4 (four) hours as needed for wheezing or shortness of breath   fluticasone (FLONASE) 50 mcg/act nasal spray   No No   Si spray into each nostril daily   paliperidone palmitate (INVEGA) 234 MG/1 5ML im injection   Yes No   Sig: Inject 234 mg into a muscle every 28 days   risperiDONE (RisperDAL M-TAB) 2 mg disintegrating tablet   No No   Sig: Take 1 tablet (2 mg total) by mouth daily at bedtime   risperiDONE (RisperDAL) 2 mg tablet  Self Yes No   Sig: Take 2 mg by mouth daily      Facility-Administered Medications: None       Past Medical History:   Diagnosis Date   • Asthma    • Hearing impaired    • Legally blind    • Schizophrenia ([facility] [address] )        Past Surgical History:   Procedure Laterality Date   • HERNIA REPAIR     • TEAR DUCT SURGERY Bilateral    • TONSILLECTOMY         Family History   Problem Relation Age of Onset   • Schizophrenia Mother    • Mental illness Mother    • Abnormal EKG Sister    • Mental illness Sister      I have reviewed and agree with the history as documented  E-Cigarette/Vaping   • E-Cigarette Use Never User      E-Cigarette/Vaping Substances     Social History     Tobacco Use   • Smoking status: Every Day     Packs/day: 1 00     Years: 3 00     Total pack years: 3 00     Types: Cigarettes   • Smokeless tobacco: Never   Vaping Use   • Vaping Use: Never used   Substance Use Topics   • Alcohol use: Yes     Comment: (3) 40 oz  beers per day   • Drug use: Yes     Types: Cocaine, Marijuana, Methamphetamines     Comment: patient denies drug use today       Review of Systems   Constitutional: Negative for fever  Gastrointestinal: Negative for abdominal pain, diarrhea, nausea and vomiting  Genitourinary: Positive for penile pain  Negative for bladder incontinence, decreased urine volume, difficulty urinating, dysuria, flank pain, frequency, genital sores, hematuria, hesitancy, penile discharge, penile swelling, scrotal swelling, testicular pain and urgency  Skin: Negative for color change, rash and wound     All other systems "reviewed and are negative  Physical Exam  Physical Exam  Vitals and nursing note reviewed  Exam conducted with a chaperone present (SHERITA Escamilla)  Constitutional:       General: He is not in acute distress  Appearance: Normal appearance  He is not ill-appearing, toxic-appearing or diaphoretic  HENT:      Head: Normocephalic  Cardiovascular:      Rate and Rhythm: Normal rate and regular rhythm  Pulmonary:      Effort: Pulmonary effort is normal    Abdominal:      General: There is no distension  Palpations: Abdomen is soft  Tenderness: There is no abdominal tenderness  Genitourinary:     Penis: Normal  No erythema, tenderness, discharge, swelling or lesions  Testes: Normal       Comments: flaccid  Neurological:      Mental Status: He is alert  Vital Signs  ED Triage Vitals   Temperature Pulse Respirations Blood Pressure SpO2   06/08/23 1204 06/08/23 1204 06/08/23 1204 06/08/23 1204 06/08/23 1204   97 6 °F (36 4 °C) 84 16 136/78 97 %      Temp Source Heart Rate Source Patient Position - Orthostatic VS BP Location FiO2 (%)   06/08/23 1204 06/08/23 1204 06/08/23 1204 06/08/23 1204 --   Oral Monitor Lying Left arm       Pain Score       06/08/23 1302       No Pain           Vitals:    06/08/23 1204   BP: 136/78   Pulse: 84   Patient Position - Orthostatic VS: Lying         Visual Acuity      ED Medications  Medications - No data to display    Diagnostic Studies  Results Reviewed     None                 No orders to display              Procedures  Procedures         ED Course  ED Course as of 06/09/23 1346   Thu Jun 08, 2023   1238 Patient reports erection resolved after he got his \"pain medication\"  Erection has not returned  On exam no priapism  1240 Sudafed at 9:50 AM , still fully erect at 11 AM when Dr Rosario Sepulveda   Tylenol 975 mg at 11:15   Then got in shower  Where patient said it resolved                                  SBIRT 22yo+    Flowsheet Row Most Recent Value " Initial Alcohol Screen: US AUDIT-C     1  How often do you have a drink containing alcohol? 0 Filed at: 06/08/2023 1215   2  How many drinks containing alcohol do you have on a typical day you are drinking? 0 Filed at: 06/08/2023 1215   3a  Male UNDER 65: How often do you have five or more drinks on one occasion? 0 Filed at: 06/08/2023 1215   3b  FEMALE Any Age, or MALE 65+: How often do you have 4 or more drinks on one occassion? 0 Filed at: 06/08/2023 1215   Audit-C Score 0 Filed at: 06/08/2023 1215   Full Alcohol Screen: US AUDIT    4  How often during the last year have you found that you were not able to stop drinking once you had started? 0 Filed at: 06/08/2023 1215   5  How often during past year have you failed to do what was normally expected of you because of drinking? 0 Filed at: 06/08/2023 1215   6  How often in past year have you needed a first drink in the morning to get yourself going after a heavy drinking session? 0 Filed at: 06/08/2023 1215   7  How often in past year have you had feeling of guilt or remorse after drinking? 0 Filed at: 06/08/2023 1215   8  How often in past year have you been unable to remember what happened night before because you had been drinking? 0 Filed at: 06/08/2023 1215   9  Have you or someone else been injured as a result of your drinking? 0 Filed at: 06/08/2023 1215   10  Has a relative, friend, doctor or other health worker been concerned about your drinking and suggested you cut down?  0 Filed at: 06/08/2023 1215   AUDIT Total Score 0 Filed at: 06/08/2023 1215   LATA: How many times in the past year have you    Used an illegal drug or used a prescription medication for non-medical reasons?  Never Filed at: 06/08/2023 1215                    Medical Decision Making  Per chart review including review of full work-up including labs, patient had recent episode of likely ischemic priapism, discussion was had with urology and it was thought to be secondary to "trazodone  Cessation of this medication was again recommended, patient unsure if he is still getting it  He reported priapism had resolved prior to presentation to the emergency department and was not noted on chaperoned exam   Patient had since voided without difficulty  Has no acute complaints  No acute interventions or labs necessary at this time  Patient discharged back to [facility] unit       Portions of the record may have been created with voice recognition software  Occasional wrong word or \"sound a like\" substitutions may have occurred due to the inherent limitations of voice recognition software  Read the chart carefully and recognize, using context, where substitutions have occurred  Disposition  Final diagnoses:   History of priapism     Time reflects when diagnosis was documented in both MDM as applicable and the Disposition within this note     Time User Action Codes Description Comment    6/8/2023 12:45 PM Marykristen Tidwellbabak Add [[id number] [id number]] History of priapism       ED Disposition     ED Disposition   Discharge    Condition   Stable    Date/Time   Thu Jun 8, 2023 12:46 PM    Comment   Gokul Archer discharge to home/self care                 Follow-up Information     Follow up With Specialties Details Why Contact Info Additional [address] [facility] Emergency Department Emergency Medicine  If symptoms worsen [address] [facility] [id number] [facility] Emergency Department          Discharge Medication List as of 6/8/2023 12:46 PM      CONTINUE these medications which have NOT CHANGED    Details   albuterol (PROVENTIL HFA,VENTOLIN HFA) 90 mcg/act inhaler Inhale 2 puffs every 4 (four) hours as needed for wheezing or shortness of breath, Starting Tue 3/22/2022, Normal      fluticasone (FLONASE) 50 mcg/act nasal spray 1 spray into each nostril daily, Starting Wed 3/23/2022, No Print      paliperidone palmitate (INVEGA) 234 " MG/1 5ML im injection Inject 234 mg into a muscle every 28 days, Historical Med      risperiDONE (RisperDAL M-TAB) 2 mg disintegrating tablet Take 1 tablet (2 mg total) by mouth daily at bedtime, Starting Wed 5/17/2023, Until Fri 6/16/2023, Normal      risperiDONE (RisperDAL) 2 mg tablet Take 2 mg by mouth daily, Historical Med             No discharge procedures on file      PDMP Review     None          ED Provider  Electronically Signed by           Ellen Coleman PA-C  06/09/23 5404

## 2023-06-08 NOTE — NURSING NOTE
Pt quiet, cooperative, scant, pleasant. Pt appears internally preoccupied and depressed in affect. When nurse asked pt if pt is experiencing any AVH, pt endorses hearing voices and requests PRN. PRN zyprexa 5mg PO given @ 21:45. Pt denies other needs at this time. Denies SI/HI. Medication adherent. Pt asleep for zyprexa reassessment @ 22:45. Effective.

## 2023-06-08 NOTE — PROGRESS NOTES
06/08/23 0859   Team Meeting   Meeting Type Daily Rounds   Team Members Present   Team Members Present Physician;Nurse;   Physician Team Member [id number] [facility] Wendie   Patient/Family Present   Patient Present No   Patient's Family Present No     Pt is med/meal compliant. Episode of prolonged erection yesterday. Pt continues to complain of hallucinations. Discussed possible increase of Zyprexa at HS. Discharge to be determined.

## 2023-06-08 NOTE — PROGRESS NOTES
06/08/23 1000   Activity/Group Checklist   Group Other (Comment)  (OPEN STUDIO Art Therapy/Social Group)   Attendance Attended   Attendance Duration (min) Greater than 60   Interactions Interacted appropriately   Affect/Mood Appropriate   Goals Achieved Able to listen to others; Able to engage in interactions

## 2023-06-09 LAB
KAPPA LC FREE SER-MCNC: 47.7 MG/L (ref 3.3–19.4)
KAPPA LC FREE/LAMBDA FREE SER: 2.37 {RATIO} (ref 0.26–1.65)
LAMBDA LC FREE SERPL-MCNC: 20.1 MG/L (ref 5.7–26.3)

## 2023-06-09 PROCEDURE — 99232 SBSQ HOSP IP/OBS MODERATE 35: CPT | Performed by: PSYCHIATRY & NEUROLOGY

## 2023-06-09 RX ADMIN — OLANZAPINE 10 MG: 10 TABLET, FILM COATED ORAL at 09:53

## 2023-06-09 RX ADMIN — DIVALPROEX SODIUM 750 MG: 250 TABLET, DELAYED RELEASE ORAL at 09:53

## 2023-06-09 RX ADMIN — Medication 3 MG: at 21:14

## 2023-06-09 RX ADMIN — NICOTINE POLACRILEX 4 MG: 4 GUM, CHEWING BUCCAL at 21:50

## 2023-06-09 RX ADMIN — HYDROXYZINE HYDROCHLORIDE 100 MG: 50 TABLET, FILM COATED ORAL at 16:30

## 2023-06-09 RX ADMIN — DIVALPROEX SODIUM 750 MG: 250 TABLET, DELAYED RELEASE ORAL at 20:07

## 2023-06-09 RX ADMIN — OLANZAPINE 10 MG: 10 TABLET, FILM COATED ORAL at 21:14

## 2023-06-09 RX ADMIN — OLANZAPINE 15 MG: 5 TABLET, FILM COATED ORAL at 17:50

## 2023-06-09 NOTE — SOCIAL WORK
Pt remains pleasant and cooperative. Pt continues to c/o CAH and appears internally preoccupied. Pt continues to report interest in rehab placement following discharge.

## 2023-06-09 NOTE — NURSING NOTE
Patient denies AVH/SI/HI. He is pleasant and cooperative. He reports not having erectile issues this morning. Patient is medication compliant.  He is resting in his room as this assessment is done, after he had eaten breakfast.

## 2023-06-09 NOTE — NURSING NOTE
Patient reported PRN zyprexa 10mg and atarax 100mg was effective.  Patient reported "Voices better and I feel calmer."

## 2023-06-09 NOTE — NURSING NOTE
Patient complaining of severe anxiety, given 100mg of atarax.      1730: Patient reports 100mg atarax effective for severe anxiety

## 2023-06-09 NOTE — PROGRESS NOTES
Progress Note - Behavioral Health   Gokul Marino 26 y.o. male MRN: [id number]  Unit/Bed#: [facility] 350-02 Encounter: [id number]    Assessment/Plan   Principal Problem:    Psychosis ([address]) ro Schizophrenia vs Schizoaffective  Active Problems:    Legally blind    Hearing loss    Asthma    Medical clearance for psychiatric admission    Bilateral impacted cerumen    Prolonged erection    Intellectual disability    Recommended Treatment:     · No medication changes at this time  · Continue Zyprexa 10 mg daily and 15 mg QHS for symptoms of psychosis  · Continue Depakote 750 mg twice daily for mood stability  · Most recent Depakote level 6/8/2023 was 47  · Continue melatonin 3 mg QHS for insomnia    At this time, heme-onc and urology have been consulted due to the patient's recurrent priapism. Per heme-onc, the patient's symptoms are highly unlikely to be related to sickle cell disease as the patient has a normal CBC and negative sickle cell screen. At this time hemoglobin fractionation cascade is pending. Continue sudafed 120 mg daily PRN erection lasting greater than 45 minutes. Will continue to monitor and coordinate care with specialists. Further medical management per [facility]    Continue to encourage the patient to engage with group therapy, milieu therapy and occupational therapy. Continue frequent safety checks and vitals per unit protocol. Case discussed with treatment team.  Risks, benefits and possible side effects of Medications: Risks, benefits, and possible side effects of medications have been explained to the patient, who verbalizes understanding    ------------------------------------------------------------    Subjective:     Per nursing report, on the inpatient psychiatric unit Gokul continues to make slow progress. He remains with symptoms of psychosis.   Yesterday Gokul slept in late, waking up at around 9:00 AM to eat breakfast.  On the morning nursing assessment he was noted to have an erection and he reported to nursing that he was experiencing pain as a result of the erection. He received a as needed of Sudafed 120 mg at around 9:50 AM. The patient continued to experience a painful erection and he was sent to the emergency department at 11:55 AM as per urology protocol. In the emergency department the patient's erection went down without intervention. He was then returned back to the inpatient behavioral health unit. Throughout the day Gokul remained generally isolated to himself. He was observed by nursing at times responding to internal stimuli and swinging at the air in the afternoon. In the evening Gokul informed nursing that he was experiencing voices and received a as needed of Zyprexa 10 mg and Atarax 100 mg at around 8:00 PM, which was effective. Gokul was seen and interviewed alongside attending psychiatrist Dr. Melissa Mcmillan. At the time of interview, Gokul was seen resting comfortably in bed. This morning, Gokul was noted to be fatigued. He remained with limited eye contact, scant speech, and poverty of thought. He continues to appear preoccupied. Today, Gokul reports that he feels "good". He reports that last night he experienced auditory hallucinations of a woman's voice telling him "to mess with" others. He denies experiencing visual hallucinations at that time. Gokul denies experiencing visual and auditory hallucinations today. Today, Gokul confirms that he feels tired and confirms that he is planning to sleep in. He reports that he is not in any pain and reports he has not had an erection this morning. At the time of interview, Gokul reports that he continues to eat well and sleep well in the hospital (he does not know how many hours he slept). He reports that he has been going to the bathroom without difficulty. He has been taking his medications as directed and has not noticed any medication side effects.     Gokul currently denies suicidal ideation, homicidal ideation, visual and auditory hallucinations. Progress Toward Goals:  Continue to make slow improvements on the inpatient unit    Psychiatric Review of Systems:  Behavior over the last 24 hours: unchanged  Sleep: normal  Appetite: normal compared to baseline  Medication side effects: none verbalized  ROS: Complete review of systems is negative except as noted above. Vital signs in last 24 hours:  Temp:  [97.6 °F (36.4 °C)] 97.6 °F (36.4 °C)  HR:  [84] 84  Resp:  [16] 16  BP: (136)/(78) 136/78    Mental Status Exam:  Appearance:  overtly appearing  male, casually dressed, improved grooming, looks older than stated age, overweight, with limited eye contact   Behavior:  calm and cooperative   Motor: no abnormal movements   Speech:  scant   Mood:  "Good"   Affect:  blunted   Thought Process:  Linear, concrete, goal directed   Thought Content: no verbalized delusions or overt paranoia, apparent poverty of thought   Perceptual disturbances: Continues to appear distracted and preoccupied. Reports that last night he experienced auditory hallucinations of a woman's voice telling him "to mess with" others. He denies experiencing visual hallucinations at that time. Gokul denies experiencing visual and auditory hallucinations today.    Risk Potential: No active suicidal ideation, No active homicidal ideation   Cognition: oriented to self and situation, appears to be below average intelligence, impaired abstract reasoning, attention span appeared shorter than expected for age and cognition not formally tested   Insight:  Limited   Judgment: Limited     Current Medications:  Current Facility-Administered Medications   Medication Dose Route Frequency Provider Last Rate   • acetaminophen  650 mg Oral Q6H PRASHOK Beckett MD     • acetaminophen  650 mg Oral Q4H PRASHOK Beckett MD     • acetaminophen  975 mg Oral Q6H PRN Winifred Beckett MD     • albuterol  2 puff Inhalation Q4H PRN Sergio Agarwal MD     • aluminum-magnesium hydroxide-simethicone  30 mL Oral Q4H PRN Sergio Agarwal MD     • benztropine  1 mg Oral Q4H PRN Max 6/day Sergio Agarwal MD     • hydrOXYzine HCL  50 mg Oral Q6H PRN Max 4/day Sergio Agarwal MD      Or   • diphenhydrAMINE  50 mg Intramuscular Q6H PRN Sergio Agarwal MD     • divalproex sodium  750 mg Oral Q12H [address] Viky Hayward MD     • fluticasone  1 spray Nasal Daily Sergio Agarwal MD     • hydrOXYzine HCL  100 mg Oral Q6H PRN Max 4/day Sergio Agarwal MD      Or   • LORazepam  2 mg Intramuscular Q6H PRN Sergio Agarwal MD     • hydrOXYzine HCL  25 mg Oral Q6H PRN Max 4/day Sergio Agarwal MD     • melatonin  3 mg Oral HS Sergio Agarwal MD     • nicotine polacrilex  4 mg Oral Q2H PRN Sergio Agarwal MD     • OLANZapine  10 mg Oral Q3H PRN Max 3/day Minetta Duane, MD      Or   • OLANZapine  10 mg Intramuscular Q3H PRN Max 3/day Minetta Duane, MD     • OLANZapine  5 mg Oral Q3H PRN Max 6/day Minetta Duane, MD      Or   • OLANZapine  5 mg Intramuscular Q3H PRN Max 6/day Minetta Duane, MD     • OLANZapine  10 mg Oral Daily Viky Hayward MD     • OLANZapine  15 mg Oral Daily Minetta Duane, MD     • OLANZapine  2.5 mg Oral Q3H PRN Max 8/day Minetta Duane, MD     • polyethylene glycol  17 g Oral Daily PRN Sergio Agarwal MD     • senna-docusate sodium  1 tablet Oral Daily PRN Sergio Agarwal MD     • sterile water          • sterile water          • sterile water              Behavioral Health Medications: all current active meds have been reviewed. Changes as in plan section above. Laboratory results:  I have personally reviewed all pertinent laboratory/tests results.   Recent Results (from the past 48 hour(s))   Valproic acid level, total    Collection Time: 06/08/23  6:33 AM   Result Value Ref Range    Valproic Acid, Total 47 (L) 50 - 100 ug/mL   Comprehensive metabolic panel    Collection Time: 06/08/23  6:33 AM   Result Value Ref Range    Sodium 140 135 - 147 mmol/L    Potassium 4.3 3.5 - 5.3 mmol/L    Chloride 103 96 - 108 mmol/L    CO2 30 21 - 32 mmol/L    ANION GAP 7 4 - 13 mmol/L    BUN 18 5 - 25 mg/dL    Creatinine 1.12 0.60 - 1.30 mg/dL    Glucose 105 65 - 140 mg/dL    Glucose, Fasting 105 (H) 65 - 99 mg/dL    Calcium 9.7 8.4 - 10.2 mg/dL    AST 18 13 - 39 U/L    ALT 35 7 - 52 U/L    Alkaline Phosphatase 54 34 - 104 U/L    Total Protein 7.0 6.4 - 8.4 g/dL    Albumin 3.8 3.5 - 5.0 g/dL    Total Bilirubin 0.48 0.20 - 1.00 mg/dL    eGFR 92 ml/min/1.73sq m   CBC and differential    Collection Time: 06/08/23  6:33 AM   Result Value Ref Range    WBC 10.29 (H) 4.31 - 10.16 Thousand/uL    RBC 4.72 3.88 - 5.62 Million/uL    Hemoglobin 14.7 12.0 - 17.0 g/dL    Hematocrit 45.8 36.5 - 49.3 %    MCV 97 82 - 98 fL    MCH 31.1 26.8 - 34.3 pg    MCHC 32.1 31.4 - 37.4 g/dL    RDW 12.8 11.6 - 15.1 %    MPV 10.4 8.9 - 12.7 fL    Platelets 492 486 - 352 Thousands/uL   IgG, IgA, IgM    Collection Time: 06/08/23  6:33 AM   Result Value Ref Range    IGA 92.0 70.0 - 400.0 mg/dL    IGG 1,460.0 700.0 - 1,600.0 mg/dL    IGM 99.0 40.0 - 230.0 mg/dL   LD,Blood    Collection Time: 06/08/23  6:33 AM   Result Value Ref Range     140 - 271 U/L   Uric acid    Collection Time: 06/08/23  6:33 AM   Result Value Ref Range    Uric Acid 6.5 3.5 - 8.5 mg/dL   Manual Differential(PHLEBS Do Not Order)    Collection Time: 06/08/23  6:33 AM   Result Value Ref Range    Segmented % 46 43 - 75 %    Bands % 1 0 - 8 %    Lymphocytes % 35 14 - 44 %    Monocytes % 8 4 - 12 %    Eosinophils, % 7 (H) 0 - 6 %    Basophils % 0 0 - 1 %    Myelocytes % 3 (H) 0 - 1 %    Absolute Neutrophils 4.84 1.85 - 7.62 Thousand/uL    Lymphocytes Absolute 3.60 0.60 - 4.47 Thousand/uL    Monocytes Absolute 0.82 0.00 - 1.22 Thousand/uL    Eosinophils Absolute 0.72 (H) 0.00 - 0.40 Thousand/uL    Basophils Absolute 0.00 0.00 - 0.10 Thousand/uL    Total Counted RBC Morphology Normal     Platelet Estimate Adequate Adequate        Candido Kaur, MD

## 2023-06-09 NOTE — NURSING NOTE
Patient c/o severe agitation r/t voices, which was causing severe anxiety. PRN zyprexa 10mg and atarax 100mg was administered.

## 2023-06-09 NOTE — PROGRESS NOTES
06/09/23 0840   Team Meeting   Meeting Type Daily Rounds   Team Members Present   Team Members Present Physician;Nurse;   Physician Team Member [id number]   Care Management Team Member Wendie   Patient/Family Present   Patient Present No   Patient's Family Present No   Pt with prolonged erection yesterday. Given PRN Sudafed and ice pack and taken to ED for evaluation. Erection resolved prior to intervention in the ED. Pt reporting anxiety in the evening and received PRNs. Pt reported these were helpful and slept overnight. Pt continues to report AVH. Discharge pending rehab.

## 2023-06-10 ENCOUNTER — HOSPITAL ENCOUNTER (EMERGENCY)
Facility: HOSPITAL | Age: 23
Discharge: HOME | End: 2023-06-10
Attending: EMERGENCY MEDICINE
Payer: COMMERCIAL

## 2023-06-10 VITALS
SYSTOLIC BLOOD PRESSURE: 136 MMHG | RESPIRATION RATE: 20 BRPM | HEART RATE: 77 BPM | DIASTOLIC BLOOD PRESSURE: 78 MMHG | WEIGHT: 183.3 LBS | TEMPERATURE: 97.3 F | OXYGEN SATURATION: 99 % | BODY MASS INDEX: 32.47 KG/M2

## 2023-06-10 DIAGNOSIS — N48.30 PRIAPISM: Primary | ICD-10-CM

## 2023-06-10 LAB
ALBUMIN SERPL ELPH-MCNC: 3.72 G/DL (ref 3.2–5.1)
ALBUMIN SERPL ELPH-MCNC: 53.2 % (ref 48–70)
ALPHA1 GLOB SERPL ELPH-MCNC: 0.27 G/DL (ref 0.15–0.47)
ALPHA1 GLOB SERPL ELPH-MCNC: 3.9 % (ref 1.8–7)
ALPHA2 GLOB SERPL ELPH-MCNC: 0.91 G/DL (ref 0.42–1.04)
ALPHA2 GLOB SERPL ELPH-MCNC: 13 % (ref 5.9–14.9)
BETA GLOB ABNORMAL SERPL ELPH-MCNC: 0.35 G/DL (ref 0.31–0.57)
BETA1 GLOB SERPL ELPH-MCNC: 5 % (ref 4.7–7.7)
BETA2 GLOB SERPL ELPH-MCNC: 5.9 % (ref 3.1–7.9)
BETA2+GAMMA GLOB SERPL ELPH-MCNC: 0.41 G/DL (ref 0.2–0.58)
GAMMA GLOB ABNORMAL SERPL ELPH-MCNC: 1.33 G/DL (ref 0.4–1.66)
GAMMA GLOB SERPL ELPH-MCNC: 19 % (ref 6.9–22.3)
IGG/ALB SER: 1.14 {RATIO} (ref 1.1–1.8)
PROT PATTERN SERPL ELPH-IMP: NORMAL
PROT SERPL-MCNC: 7 G/DL (ref 6.4–8.2)

## 2023-06-10 PROCEDURE — 84165 PROTEIN E-PHORESIS SERUM: CPT | Performed by: STUDENT IN AN ORGANIZED HEALTH CARE EDUCATION/TRAINING PROGRAM

## 2023-06-10 PROCEDURE — 99232 SBSQ HOSP IP/OBS MODERATE 35: CPT | Performed by: PSYCHIATRY & NEUROLOGY

## 2023-06-10 PROCEDURE — 99284 EMERGENCY DEPT VISIT MOD MDM: CPT

## 2023-06-10 RX ORDER — DIAZEPAM 5 MG/1
5 TABLET ORAL ONCE
Status: COMPLETED | OUTPATIENT
Start: 2023-06-10 | End: 2023-06-10

## 2023-06-10 RX ORDER — PSEUDOEPHEDRINE HCL 30 MG
120 TABLET ORAL EVERY 8 HOURS PRN
Status: DISCONTINUED | OUTPATIENT
Start: 2023-06-10 | End: 2023-06-10

## 2023-06-10 RX ORDER — LANOLIN ALCOHOL/MO/W.PET/CERES
3 CREAM (GRAM) TOPICAL
Status: DISCONTINUED | OUTPATIENT
Start: 2023-06-10 | End: 2023-06-22

## 2023-06-10 RX ORDER — PSEUDOEPHEDRINE HCL 30 MG
120 TABLET ORAL 2 TIMES DAILY PRN
Status: DISCONTINUED | OUTPATIENT
Start: 2023-06-10 | End: 2023-06-25

## 2023-06-10 RX ADMIN — OLANZAPINE 15 MG: 5 TABLET, FILM COATED ORAL at 18:43

## 2023-06-10 RX ADMIN — OLANZAPINE 10 MG: 10 TABLET, FILM COATED ORAL at 01:40

## 2023-06-10 RX ADMIN — PSEUDOEPHEDRINE HCL 120 MG: 30 TABLET, FILM COATED ORAL at 12:07

## 2023-06-10 RX ADMIN — HYDROXYZINE HYDROCHLORIDE 100 MG: 50 TABLET, FILM COATED ORAL at 00:51

## 2023-06-10 RX ADMIN — OLANZAPINE 10 MG: 10 TABLET, FILM COATED ORAL at 09:22

## 2023-06-10 RX ADMIN — DIVALPROEX SODIUM 750 MG: 250 TABLET, DELAYED RELEASE ORAL at 09:22

## 2023-06-10 RX ADMIN — DIAZEPAM 5 MG: 5 TABLET ORAL at 15:39

## 2023-06-10 RX ADMIN — ACETAMINOPHEN 650 MG: 325 TABLET ORAL at 13:06

## 2023-06-10 RX ADMIN — FLUTICASONE PROPIONATE 1 SPRAY: 50 SPRAY, METERED NASAL at 09:22

## 2023-06-10 RX ADMIN — Medication 3 MG: at 21:11

## 2023-06-10 RX ADMIN — DIVALPROEX SODIUM 750 MG: 250 TABLET, DELAYED RELEASE ORAL at 21:11

## 2023-06-10 NOTE — ED PROVIDER NOTES
History  Chief Complaint   Patient presents with   • Medical Problem     Patient is coming down from inpatient mental health unit with a priapism for the past 3 hours  Patient rec'd 120mg of sudafed at noon  Patient is a 72-year-old male who presents from inpt psych with a 3 hour history of priapism  Ongoing for 3 hours  Given 120 mg of sudafed around onset of symptoms  States has pain at the site  Has been seen here in the ER for same issue twice recently  No f/s/c  No abdomina pain, n/v            Prior to Admission Medications   Prescriptions Last Dose Informant Patient Reported? Taking? albuterol (PROVENTIL HFA,VENTOLIN HFA) 90 mcg/act inhaler   No No   Sig: Inhale 2 puffs every 4 (four) hours as needed for wheezing or shortness of breath   fluticasone (FLONASE) 50 mcg/act nasal spray   No No   Si spray into each nostril daily   paliperidone palmitate (INVEGA) 234 MG/1 5ML im injection   Yes No   Sig: Inject 234 mg into a muscle every 28 days   risperiDONE (RisperDAL M-TAB) 2 mg disintegrating tablet   No No   Sig: Take 1 tablet (2 mg total) by mouth daily at bedtime   risperiDONE (RisperDAL) 2 mg tablet  Self Yes No   Sig: Take 2 mg by mouth daily      Facility-Administered Medications: None       Past Medical History:   Diagnosis Date   • Asthma    • Hearing impaired    • Legally blind    • Schizophrenia ([facility] [address] )        Past Surgical History:   Procedure Laterality Date   • HERNIA REPAIR     • TEAR DUCT SURGERY Bilateral    • TONSILLECTOMY         Family History   Problem Relation Age of Onset   • Schizophrenia Mother    • Mental illness Mother    • Abnormal EKG Sister    • Mental illness Sister      I have reviewed and agree with the history as documented      E-Cigarette/Vaping   • E-Cigarette Use Never User      E-Cigarette/Vaping Substances     Social History     Tobacco Use   • Smoking status: Every Day     Packs/day: 1 00     Years: 3 00     Total pack years: 3 00     Types: Cigarettes   • Smokeless tobacco: Never   Vaping Use   • Vaping Use: Never used   Substance Use Topics   • Alcohol use: Yes     Comment: (3) 40 oz  beers per day   • Drug use: Not Currently     Types: Cocaine, Marijuana, Methamphetamines     Comment: patient denies drug use today       Review of Systems   Constitutional: Negative  HENT: Negative  Eyes: Negative  Respiratory: Negative  Cardiovascular: Negative  Gastrointestinal: Negative  Endocrine: Negative  Genitourinary: Positive for penile pain  Musculoskeletal: Negative  Skin: Negative  Allergic/Immunologic: Negative  Neurological: Negative  Hematological: Negative  Psychiatric/Behavioral: Negative  All other systems reviewed and are negative  Physical Exam  Physical Exam  Vitals and nursing note reviewed  Exam conducted with a chaperone present  Constitutional:       Appearance: Normal appearance  He is normal weight  Cardiovascular:      Rate and Rhythm: Normal rate and regular rhythm  Pulses: Normal pulses  Heart sounds: Normal heart sounds  Pulmonary:      Effort: Pulmonary effort is normal       Breath sounds: Normal breath sounds  Genitourinary:     Comments: Semi erect penis  No ttp  No testicular swelling, pain  Musculoskeletal:         General: Normal range of motion  Cervical back: Normal range of motion and neck supple  Skin:     General: Skin is warm and dry  Capillary Refill: Capillary refill takes less than 2 seconds  Neurological:      Mental Status: He is alert and oriented to person, place, and time  Mental status is at baseline           Vital Signs  ED Triage Vitals [06/10/23 1532]   Temperature Pulse Respirations Blood Pressure SpO2   (!) 97 3 °F (36 3 °C) 77 20 136/78 99 %      Temp Source Heart Rate Source Patient Position - Orthostatic VS BP Location FiO2 (%)   Tympanic Monitor Sitting Left arm --      Pain Score       --           Vitals:    06/10/23 1532   BP: 136/78 Pulse: 77   Patient Position - Orthostatic VS: Sitting         Visual Acuity      ED Medications  Medications   phenylephrine (TAMIR-SYNEPHRINE) 100 mcg in sodium chloride 0 9% FOR PRIAPISM (100 mcg Intracavernosal Not Given 6/10/23 1555)   diazepam (VALIUM) tablet 5 mg (5 mg Oral Given 6/10/23 1539)       Diagnostic Studies  Results Reviewed     None                 No orders to display              Procedures  Procedures         ED Course  ED Course as of 06/10/23 1602   Sat Sohail 10, 2023   1556 Phenyephrine solution came from pharmacy  Went to inject patient  Symptoms have resolved  No pain  [facility]  SBIRT 20yo+    Flowsheet Row Most Recent Value   Initial Alcohol Screen: US AUDIT-C     1  How often do you have a drink containing alcohol? 0 Filed at: 06/10/2023 1533   2  How many drinks containing alcohol do you have on a typical day you are drinking? 0 Filed at: 06/10/2023 1533   3a  Male UNDER 65: How often do you have five or more drinks on one occasion? 0 Filed at: 06/10/2023 1533   3b  FEMALE Any Age, or MALE 65+: How often do you have 4 or more drinks on one occassion? 0 Filed at: 06/10/2023 1533   Audit-C Score 0 Filed at: 06/10/2023 1533   Full Alcohol Screen: US AUDIT    4  How often during the last year have you found that you were not able to stop drinking once you had started? 0 Filed at: 06/10/2023 1533   5  How often during past year have you failed to do what was normally expected of you because of drinking? 0 Filed at: 06/10/2023 1533   6  How often in past year have you needed a first drink in the morning to get yourself going after a heavy drinking session? 0 Filed at: 06/10/2023 1533   7  How often in past year have you had feeling of guilt or remorse after drinking? 0 Filed at: 06/10/2023 1533   8  How often in past year have you been unable to remember what happened night before because you had been drinking? 0 Filed at: 06/10/2023 1533   9  Have you or someone else been injured as a result of your drinking? 0 Filed at: 06/10/2023 1533   10  Has a relative, friend, doctor or other health worker been concerned about your drinking and suggested you cut down?  0 Filed at: 06/10/2023 1533   AUDIT Total Score 0 Filed at: 06/10/2023 1533   LATA: How many times in the past year have you    Used an illegal drug or used a prescription medication for non-medical reasons? Never Filed at: 06/10/2023 1533                    Medical Decision Making  Priapism: chronic illness or injury that poses a threat to life or bodily functions     Details: on going issue  3rd recent ER visit for same  low flow  no h/o trauma to consider high flow  resolved here with po valium only   have access to phenylephrine if needed  Amount and/or Complexity of Data Reviewed  Independent Historian:      Details: [place] staff  External Data Reviewed: notes  Details: previous ER priapism visits  Risk  Prescription drug management  Disposition  Final diagnoses:   Priapism     Time reflects when diagnosis was documented in both MDM as applicable and the Disposition within this note     Time User Action Codes Description Comment    6/10/2023  3:57 PM Sathya Rudd Add [N48 30] Priapism       ED Disposition     ED Disposition   Discharge    Condition   Stable    Date/Time   Sat Sohail 10, 2023  3:57 PM    Comment   Gokul Dailey discharge to home/self care                 Follow-up Information     Follow up With Specialties Details Why Contact Info Additional [address] [facility]   [facility] [address] [phone]  [address]  [facility] [address], [place], [place], [id number] [phone]          Discharge Medication List as of 6/10/2023  3:57 PM      CONTINUE these medications which have NOT CHANGED    Details   albuterol (PROVENTIL HFA,VENTOLIN HFA) 90 mcg/act inhaler Inhale 2 puffs every 4 (four) hours as needed for wheezing or shortness of breath, Starting Tue 3/22/2022, Normal      fluticasone (FLONASE) 50 mcg/act nasal spray 1 spray into each nostril daily, Starting Wed 3/23/2022, No Print      paliperidone palmitate (INVEGA) 234 MG/1 5ML im injection Inject 234 mg into a muscle every 28 days, Historical Med      risperiDONE (RisperDAL M-TAB) 2 mg disintegrating tablet Take 1 tablet (2 mg total) by mouth daily at bedtime, Starting Wed 5/17/2023, Until Fri 6/16/2023, Normal      risperiDONE (RisperDAL) 2 mg tablet Take 2 mg by mouth daily, Historical Med             No discharge procedures on file      PDMP Review     None          ED Provider  Electronically Signed by           Go Ruggiero MD  06/10/23 3701

## 2023-06-10 NOTE — NURSING NOTE
Patient approached the nursing station and complained of agitation, received their scheduled night time dose of depakote. Upon reassessment one hour later patient complained of hallucinations and requested a PRN. Broset score was 3. Patient received PRN zyprexa 10 mg for severe agitation at 2114. Upon reassessment one hour later patient reports that the zyprexa helped a bit with the hallucinations but that they feel that the PRN dose needs to be increased. Patient further complained of depression, and suicidal and homicidal ideation without any plan. Agrees to come to staff. Compliant with scheduled night time medications. Cooperative and well mannered saying "Thank you". Staff availability reinforced.

## 2023-06-10 NOTE — NURSING NOTE
1300: Patient reports no relief from prn pseudophedrine 120mg, yet    13:06: Patient given 650mg tylenol for moderate 6/10 penile pain from erection. 13:13: Patient reports 120mg of pseudoephedrine is starting to provide priapism relief. Will continue to monitor. 13:36: Patient reports no relief, erection visualized upon inspection. [facility] AP on duty contacted, and psychiatry    13:43: Dr. Vicky Brewer, and Dr. Betancourt Brochure at beside with patient. Instructed to give patient new icepack, and reach out to medical again if no improvement by 2 pm.     14:06: Patient reports no pain, but does have persistent erection. Tylenol 650mg effective for moderate pain. 1410: Patient has had no relief from pseudophedrine. [facility] contacted.

## 2023-06-10 NOTE — NURSING NOTE
Patient is resting in room. Patient woke up to take AM medications. Patient was medication compliant. Patient denies AVH/SI/HI. Patient has had no behavioral issues to note.

## 2023-06-10 NOTE — PROGRESS NOTES
Progress Note - Behavioral Health   Gokul Anders 26 y.o. male MRN: [id number]  Unit/Bed#: U 350-02 Encounter: [id number]    Assessment/Plan   Principal Problem:    Psychosis ([address]) ro Schizophrenia vs Schizoaffective  Active Problems:    Legally blind    Hearing loss    Asthma    Medical clearance for psychiatric admission    Bilateral impacted cerumen    Prolonged erection    Intellectual disability      Recommended Treatment:   No psychopharmacologic changes necessary at this moment; will continue to assess daily for further optimization. Will strongly consider switch to loxapine as patient is maximized on scheduled and prn zyprexa. Continue with pharmacotherapy, group therapy, milieu therapy and occupational therapy. Continue to assess for adverse medication side effects. Encourage Quinten Buerger to participate in nonverbal forms of therapy including journaling and art/music therapy. Continue frequent safety checks and vitals per unit protocol. Continue to engage CM/SW to assist with collateral, disposition planning, and the implementation of an individualized, patient-centered plan of care. Continue medical management by medical team.  Case discussed with treatment team.    Legal Status: 201  ------------------------------------------------------------    Subjective: All documentation including nursing notes, medication history to ensure medication adherence on the unit, labs, and vitals were reviewed. Gokul was evaluated this morning for continuity of care and no acute distress noted throughout the evaluation. Over the past 24 hours per nursing report, Gokul has been cooperative on the unit and compliant with medications. Today, Gokul is consenting for safety on the unit. Gokul reports feeling "good." Gokul notes having good sleep. Gokul states having a good appetite. Gokul has been taking the medications as prescribed and reporting no side effects.     Patient again experienced azam today, medical was contacted. He took sudafed at 1200 and still had no relief by 1400. [id number] Located within [facility], Urology, ultimately decided to transfer patient to ED to [place]. Gokul denies suicidal ideations. Gokul denies homicidal ideations. Regarding hallucinations, Gokul denies currently, states he last heard AH yesterday. PRNs overnight: hydroxyzine 100, olanzapine 10mg at 2114 and another 10mg at 0140  VS: Reviewed, within normal limits    Progress Toward Goals: slow improvement    Psychiatric Review of Systems:  Behavior over the last 24 hours:  improved  Sleep: normal  Appetite: normal  Medication side effects: No   ROS: all other systems are negative    Vital signs in last 24 hours:  Temp:  [97.7 °F (36.5 °C)] 97.7 °F (36.5 °C)  HR:  [70-97] 70  Resp:  [16] 16  BP: (103-105)/(57-89) 103/57    Laboratory results:  I have personally reviewed all pertinent laboratory/tests results. No results found for this or any previous visit (from the past 48 hour(s)).       Mental Status Evaluation:    Appearance:  dressed appropriately, marginal hygiene, looks older than stated age, overweight, overtly appearing AA male, in no apparent acute distress, good eye contact   Behavior:  pleasant, cooperative   Speech:  paucity of speech, impoverished   Mood:  "good"   Affect:  blunted   Thought Process:  coherent, goal directed, linear, slowing of thoughts   Associations: concrete associations   Thought Content:  no overt delusions   Perceptual Disturbances: Denies auditory or visual hallucinations and Does not appear to be responding to internal stimuli   Risk Potential: Suicidal ideation - None at present  Homicidal ideation - None at present  Potential for aggression - Not at present   Sensorium:  oriented to person, place and time/date   Memory:  recent and remote memory grossly intact   Consciousness:  alert and awake   Attention/Concentration: attention span and concentration are age appropriate Insight:  limited   Judgment: limited   Gait/Station: normal gait/station   Motor Activity: no abnormal movements       Current Medications:  Current Facility-Administered Medications   Medication Dose Route Frequency Provider Last Rate   • acetaminophen  650 mg Oral Q6H PRN Winifred Beckett MD     • acetaminophen  650 mg Oral Q4H PRN Winifred Beckett MD     • acetaminophen  975 mg Oral Q6H PRN Winifred Beckett MD     • albuterol  2 puff Inhalation Q4H PRN Winifred Beckett MD     • aluminum-magnesium hydroxide-simethicone  30 mL Oral Q4H PRN Winifred Beckett MD     • benztropine  1 mg Oral Q4H PRN Max 6/day Winifred Beckett MD     • hydrOXYzine HCL  50 mg Oral Q6H PRN Max 4/day Winifred Beckett MD      Or   • diphenhydrAMINE  50 mg Intramuscular Q6H PRN Winifred Beckett MD     • divalproex sodium  750 mg Oral Q12H [address] Melissa Vivas MD     • fluticasone  1 spray Nasal Daily Winifred Beckett MD     • hydrOXYzine HCL  100 mg Oral Q6H PRN Max 4/day Winifred Beckett MD      Or   • LORazepam  2 mg Intramuscular Q6H PRN Winifred Beckett MD     • hydrOXYzine HCL  25 mg Oral Q6H PRN Max 4/day Winifred Beckett MD     • melatonin  3 mg Oral HS Winifred Beckett MD     • melatonin  3 mg Oral HS PRN Genna Rodriguez DO     • nicotine polacrilex  4 mg Oral Q2H PRN Winifred Beckett MD     • OLANZapine  10 mg Oral Q3H PRN Max 3/day Yolande Esparza MD      Or   • OLANZapine  10 mg Intramuscular Q3H PRN Max 3/day Yolande Esparza MD     • OLANZapine  5 mg Oral Q3H PRN Max 6/day Yolande Esparza MD      Or   • OLANZapine  5 mg Intramuscular Q3H PRN Max 6/day Yolande Esparza MD     • OLANZapine  10 mg Oral Daily Melissa Vivas MD     • OLANZapine  15 mg Oral Daily Yolande Esparza MD     • OLANZapine  2.5 mg Oral Q3H PRN Max 8/day Yolande Esparza MD     • polyethylene glycol  17 g Oral Daily PRN Winifred Beckett MD     • pseudoephedrine  120 mg Oral BID PRN Genna Rodriguez DO     • senna-docusate sodium  1 tablet Oral Daily PRN Emilee Segundo MD     • sterile water          • sterile water          • sterile water              Calin Trinidad DO 06/10/23  Psychiatry Resident, PGY-II    This note was completed in part utilizing [employer] Direct Software. Grammatical, translation, syntax errors, random word insertions, spelling mistakes, and incomplete sentences may be an occasional consequence of this system secondary to software limitations with voice recognition, ambient noise, and hardware issues. If you have any questions or concerns about the content, text, or information contained within the body of this dictation, please contact the provider for clarification.

## 2023-06-10 NOTE — ED NOTES
Assisted Dr Tonio Stewart in exam of priapism, pt states erection has been reducing    Dr Tonio Stewart stated no need for Arturo injection at this time     Samara Lassiter, RN  06/10/23 4316

## 2023-06-10 NOTE — NURSING NOTE
Patient approached the nursing station and complained of anxiety. [place] Anxiety Score 30. Patient received atarax 100 mg PO at 0051 for severe anxiety. Upon reassessment one hour later patient reports feeling a bit less anxious, somewhat effective. Patient was sitting on a chair in front of the nursing station responding to internal stimuli and hallucinating. Broset score 3. Patient received zyprexa 10 mg PO at 0140 for severe agitation. Upon reassessment one hour later at 0240 patient is in their bed sleeping, effective.

## 2023-06-10 NOTE — NURSING NOTE
1442: Consult placed for urology.      1525: patient being sent on leave of absence to ED for treatment of priapism

## 2023-06-11 PROCEDURE — 99232 SBSQ HOSP IP/OBS MODERATE 35: CPT | Performed by: PSYCHIATRY & NEUROLOGY

## 2023-06-11 RX ADMIN — NICOTINE POLACRILEX 4 MG: 4 GUM, CHEWING BUCCAL at 21:43

## 2023-06-11 RX ADMIN — OLANZAPINE 10 MG: 10 TABLET, FILM COATED ORAL at 09:45

## 2023-06-11 RX ADMIN — OLANZAPINE 15 MG: 5 TABLET, FILM COATED ORAL at 18:32

## 2023-06-11 RX ADMIN — FLUTICASONE PROPIONATE 1 SPRAY: 50 SPRAY, METERED NASAL at 09:45

## 2023-06-11 RX ADMIN — DIVALPROEX SODIUM 750 MG: 250 TABLET, DELAYED RELEASE ORAL at 20:26

## 2023-06-11 RX ADMIN — HYDROXYZINE HYDROCHLORIDE 100 MG: 50 TABLET, FILM COATED ORAL at 20:26

## 2023-06-11 RX ADMIN — Medication 3 MG: at 21:07

## 2023-06-11 RX ADMIN — DIVALPROEX SODIUM 750 MG: 250 TABLET, DELAYED RELEASE ORAL at 09:45

## 2023-06-11 RX ADMIN — Medication 3 MG: at 23:58

## 2023-06-11 NOTE — NURSING NOTE
Patient denies AVH/SI/HI. Patient is resting in bed, and did not wish to get up for breakfast. Patient is not complaining of erectile pain this AM. He is medication cooperative, and medication compliant. Patient is constricted in his answers, and would like to go back to sleep.

## 2023-06-11 NOTE — PROGRESS NOTES
Progress Note - Behavioral Health   Gokul Hill 26 y.o. male MRN: [id number]  Unit/Bed#: U 350-02 Encounter: [id number]    Assessment/Plan   Principal Problem:    Psychosis ([address]) ro Schizophrenia vs Schizoaffective  Active Problems:    Legally blind    Hearing loss    Asthma    Medical clearance for psychiatric admission    Bilateral impacted cerumen    Prolonged erection    Intellectual disability      Recommended Treatment:   No psychopharmacologic changes necessary at this moment; will continue to assess daily for further optimization. Continue Current Medications:  Zyprexa 10mg am and 15mg HS for psychotic symptoms  Continue valproic acid 750mg BID for mood    *consider switch to loxapine due to total daily dose of zyprexa exceeds 30mg with PRN needs at this time. Continue with pharmacotherapy, group therapy, milieu therapy and occupational therapy. Continue to assess for adverse medication side effects. Encourage Rafita Song to participate in nonverbal forms of therapy including journaling and art/music therapy. Continue frequent safety checks and vitals per unit protocol. Continue to engage CM/SW to assist with collateral, disposition planning, and the implementation of an individualized, patient-centered plan of care. Continue medical management by medical team.  Case discussed with treatment team.    Legal Status: 201  ------------------------------------------------------------    Subjective: All documentation including nursing notes, medication history to ensure medication adherence on the unit, labs, and vitals were reviewed. Gokul was evaluated this morning for continuity of care and no acute distress noted throughout the evaluation. Over the past 24 hours per nursing report, Gokul has been cooperative on the unit and compliant with medications. Today, Gokul is consenting for safety on the unit. Gokul reports feeling "good." Gokul notes having good sleep.  Gokul states having a good appetite. Gokul has been taking the medications as prescribed and reporting no side effects. We discussed the events of yesterday, not needing drainage, and that he has heard voices 2 days ago was the last time. Patient received a total of 35mg zyprexa over the last 24 hours with PRN. Gokul dnies suicidal ideations. Gokul denies homicidal ideations. Regarding hallucinations, Gokul denies and appears distracted    PRNs overnight: zyprexa 10mg at [facility] on 6/10/23   VS: Reviewed, within normal limits    Progress Toward Goals:  slow improvement    Psychiatric Review of Systems:  Behavior over the last 24 hours:  improved  Sleep: normal  Appetite: normal  Medication side effects: No   ROS: all other systems are negative    Vital signs in last 24 hours:  Temp:  [97.3 °F (36.3 °C)-98.8 °F (37.1 °C)] 98.8 °F (37.1 °C)  HR:  [76-92] 76  Resp:  [16-20] 16  BP: (116-136)/(57-78) 116/57    Laboratory results:  I have personally reviewed all pertinent laboratory/tests results. No results found for this or any previous visit (from the past 48 hour(s)).       Mental Status Evaluation:    Appearance:  dressed appropriately, marginal hygiene, improved grooming, looks older than stated age, overweight, overtly appearing AA male in no apparent acute distress, good eye contact   Behavior:  cooperative, calm   Speech:  clear, coherent, increased latency of response   Mood:  "good"   Affect:  blunted   Thought Process:  coherent, goal directed, linear   Associations: concrete associations   Thought Content:  no overt delusions   Perceptual Disturbances: Denies auditory or visual hallucinations and Does not appear to be responding to internal stimuli   Risk Potential: Suicidal ideation - None at present  Homicidal ideation - None at present  Potential for aggression - Not at present   Sensorium:  oriented to person, place and time/date   Memory:  recent and remote memory grossly intact   Consciousness:  alert and awake   Attention/Concentration: attention span and concentration are age appropriate   Insight:  improving   Judgment: improving   Gait/Station: normal gait/station   Motor Activity: no abnormal movements       Current Medications:  Current Facility-Administered Medications   Medication Dose Route Frequency Provider Last Rate   • acetaminophen  650 mg Oral Q6H PRN Bianca Villafana MD     • acetaminophen  650 mg Oral Q4H PRN Bianca Villafana MD     • acetaminophen  975 mg Oral Q6H PRN Bianca Villafana MD     • albuterol  2 puff Inhalation Q4H PRN Bianca Villafana MD     • aluminum-magnesium hydroxide-simethicone  30 mL Oral Q4H PRN Bianca Villafana MD     • benztropine  1 mg Oral Q4H PRN Max 6/day Bianca Villafana MD     • hydrOXYzine HCL  50 mg Oral Q6H PRN Max 4/day Bianca Villafana MD      Or   • diphenhydrAMINE  50 mg Intramuscular Q6H PRN Bianca Villafana MD     • divalproex sodium  750 mg Oral Q12H [address] Fartun Shah MD     • fluticasone  1 spray Nasal Daily Bianca Villafana MD     • hydrOXYzine HCL  100 mg Oral Q6H PRN Max 4/day Bianca Villafana MD      Or   • LORazepam  2 mg Intramuscular Q6H PRN Bianca Villafana MD     • hydrOXYzine HCL  25 mg Oral Q6H PRN Max 4/day Bianca Villafana MD     • melatonin  3 mg Oral HS Bianca Villafana MD     • melatonin  3 mg Oral HS PRN Sherice Chan DO     • nicotine polacrilex  4 mg Oral Q2H PRN Bianca Villafana MD     • OLANZapine  10 mg Oral Q3H PRN Max 3/day Josias Shah MD      Or   • OLANZapine  10 mg Intramuscular Q3H PRN Max 3/day Josias Shah MD     • OLANZapine  5 mg Oral Q3H PRN Max 6/day Josias Shah MD      Or   • OLANZapine  5 mg Intramuscular Q3H PRN Max 6/day Josias Shah MD     • OLANZapine  10 mg Oral Daily Fartun Shah MD     • OLANZapine  15 mg Oral Daily Josias Shah MD     • OLANZapine  2.5 mg Oral Q3H PRN Max 8/day Josias Shah MD     • polyethylene glycol  17 g Oral Daily PRN Hedy Xiao Gasca MD     • pseudoephedrine  120 mg Oral BID PRN Saul Zarate DO     • senna-docusate sodium  1 tablet Oral Daily PRN Kitty Fabry, MD     • sterile water          • sterile water          • sterile water              Saul Zarate DO 06/11/23  Psychiatry Resident, PGY-II    This note was completed in part utilizing [employer]. Grammatical, translation, syntax errors, random word insertions, spelling mistakes, and incomplete sentences may be an occasional consequence of this system secondary to software limitations with voice recognition, ambient noise, and hardware issues. If you have any questions or concerns about the content, text, or information contained within the body of this dictation, please contact the provider for clarification.

## 2023-06-11 NOTE — NURSING NOTE
Patient was in their room most of the shift laying with their blanket over their face. Was out briefly and visible in the small tv room. Denied depression, anxiety, SI, HI, hallucinations of any kind, and pain. Denies any current issues in their groin area. Compliant with scheduled night time medications. Denies any unmet needs.

## 2023-06-12 LAB
ALBUMIN SERPL BCP-MCNC: 3.8 G/DL (ref 3.5–5)
ALP SERPL-CCNC: 56 U/L (ref 34–104)
ALT SERPL W P-5'-P-CCNC: 15 U/L (ref 7–52)
ANION GAP SERPL CALCULATED.3IONS-SCNC: 9 MMOL/L (ref 4–13)
AST SERPL W P-5'-P-CCNC: 14 U/L (ref 13–39)
ATRIAL RATE: 101 BPM
ATRIAL RATE: 103 BPM
ATRIAL RATE: 108 BPM
BASOPHILS # BLD AUTO: 0.1 THOUSANDS/ÂΜL (ref 0–0.1)
BASOPHILS NFR BLD AUTO: 1 % (ref 0–1)
BILIRUB SERPL-MCNC: 0.46 MG/DL (ref 0.2–1)
BUN SERPL-MCNC: 18 MG/DL (ref 5–25)
CALCIUM SERPL-MCNC: 9.8 MG/DL (ref 8.4–10.2)
CHLORIDE SERPL-SCNC: 101 MMOL/L (ref 96–108)
CO2 SERPL-SCNC: 29 MMOL/L (ref 21–32)
CREAT SERPL-MCNC: 1.11 MG/DL (ref 0.6–1.3)
EOSINOPHIL # BLD AUTO: 0.29 THOUSAND/ÂΜL (ref 0–0.61)
EOSINOPHIL NFR BLD AUTO: 3 % (ref 0–6)
ERYTHROCYTE [DISTWIDTH] IN BLOOD BY AUTOMATED COUNT: 12.5 % (ref 11.6–15.1)
GFR SERPL CREATININE-BSD FRML MDRD: 93 ML/MIN/1.73SQ M
GLUCOSE P FAST SERPL-MCNC: 125 MG/DL (ref 65–99)
GLUCOSE SERPL-MCNC: 125 MG/DL (ref 65–140)
HCT VFR BLD AUTO: 44.2 % (ref 36.5–49.3)
HGB BLD-MCNC: 14.8 G/DL (ref 12–17)
IMM GRANULOCYTES # BLD AUTO: 0.17 THOUSAND/UL (ref 0–0.2)
IMM GRANULOCYTES NFR BLD AUTO: 2 % (ref 0–2)
LYMPHOCYTES # BLD AUTO: 4.44 THOUSANDS/ÂΜL (ref 0.6–4.47)
LYMPHOCYTES NFR BLD AUTO: 47 % (ref 14–44)
MCH RBC QN AUTO: 31.6 PG (ref 26.8–34.3)
MCHC RBC AUTO-ENTMCNC: 33.5 G/DL (ref 31.4–37.4)
MCV RBC AUTO: 94 FL (ref 82–98)
MONOCYTES # BLD AUTO: 0.96 THOUSAND/ÂΜL (ref 0.17–1.22)
MONOCYTES NFR BLD AUTO: 10 % (ref 4–12)
NEUTROPHILS # BLD AUTO: 3.47 THOUSANDS/ÂΜL (ref 1.85–7.62)
NEUTS SEG NFR BLD AUTO: 37 % (ref 43–75)
NRBC BLD AUTO-RTO: 0 /100 WBCS
P AXIS: 57 DEGREES
P AXIS: 57 DEGREES
P AXIS: 58 DEGREES
PLATELET # BLD AUTO: 303 THOUSANDS/UL (ref 149–390)
PMV BLD AUTO: 10.9 FL (ref 8.9–12.7)
POTASSIUM SERPL-SCNC: 4.1 MMOL/L (ref 3.5–5.3)
PR INTERVAL: 132 MS
PR INTERVAL: 134 MS
PR INTERVAL: 134 MS
PROT SERPL-MCNC: 6.6 G/DL (ref 6.4–8.4)
QRS AXIS: 80 DEGREES
QRS AXIS: 82 DEGREES
QRS AXIS: 86 DEGREES
QRSD INTERVAL: 70 MS
QRSD INTERVAL: 72 MS
QRSD INTERVAL: 90 MS
QT INTERVAL: 328 MS
QT INTERVAL: 340 MS
QT INTERVAL: 340 MS
QTC INTERVAL: 439 MS
QTC INTERVAL: 440 MS
QTC INTERVAL: 445 MS
RBC # BLD AUTO: 4.69 MILLION/UL (ref 3.88–5.62)
SODIUM SERPL-SCNC: 139 MMOL/L (ref 135–147)
T WAVE AXIS: 42 DEGREES
T WAVE AXIS: 46 DEGREES
T WAVE AXIS: 47 DEGREES
VALPROATE SERPL-MCNC: 91 UG/ML (ref 50–100)
VENTRICULAR RATE: 101 BPM
VENTRICULAR RATE: 103 BPM
VENTRICULAR RATE: 108 BPM
WBC # BLD AUTO: 9.43 THOUSAND/UL (ref 4.31–10.16)

## 2023-06-12 PROCEDURE — 93005 ELECTROCARDIOGRAM TRACING: CPT

## 2023-06-12 PROCEDURE — 80053 COMPREHEN METABOLIC PANEL: CPT | Performed by: PSYCHIATRY & NEUROLOGY

## 2023-06-12 PROCEDURE — 85025 COMPLETE CBC W/AUTO DIFF WBC: CPT | Performed by: PSYCHIATRY & NEUROLOGY

## 2023-06-12 PROCEDURE — 80164 ASSAY DIPROPYLACETIC ACD TOT: CPT | Performed by: PSYCHIATRY & NEUROLOGY

## 2023-06-12 PROCEDURE — 84166 PROTEIN E-PHORESIS/URINE/CSF: CPT | Performed by: NURSE PRACTITIONER

## 2023-06-12 PROCEDURE — 99232 SBSQ HOSP IP/OBS MODERATE 35: CPT | Performed by: PSYCHIATRY & NEUROLOGY

## 2023-06-12 PROCEDURE — 99231 SBSQ HOSP IP/OBS SF/LOW 25: CPT | Performed by: UROLOGY

## 2023-06-12 PROCEDURE — 83521 IG LIGHT CHAINS FREE EACH: CPT | Performed by: NURSE PRACTITIONER

## 2023-06-12 PROCEDURE — 93010 ELECTROCARDIOGRAM REPORT: CPT | Performed by: STUDENT IN AN ORGANIZED HEALTH CARE EDUCATION/TRAINING PROGRAM

## 2023-06-12 RX ORDER — LOXAPINE SUCCINATE 5 MG/1
5 TABLET ORAL 2 TIMES DAILY
Status: COMPLETED | OUTPATIENT
Start: 2023-06-12 | End: 2023-06-12

## 2023-06-12 RX ORDER — LOXAPINE SUCCINATE 10 MG/1
10 TABLET ORAL 2 TIMES DAILY
Status: COMPLETED | OUTPATIENT
Start: 2023-06-13 | End: 2023-06-13

## 2023-06-12 RX ORDER — LOXAPINE SUCCINATE 10 MG/1
10 TABLET ORAL 3 TIMES DAILY
Status: DISCONTINUED | OUTPATIENT
Start: 2023-06-14 | End: 2023-06-16

## 2023-06-12 RX ORDER — OLANZAPINE 5 MG/1
5 TABLET ORAL DAILY
Status: COMPLETED | OUTPATIENT
Start: 2023-06-12 | End: 2023-06-12

## 2023-06-12 RX ADMIN — OLANZAPINE 10 MG: 10 TABLET, FILM COATED ORAL at 09:59

## 2023-06-12 RX ADMIN — Medication 3 MG: at 21:01

## 2023-06-12 RX ADMIN — LOXAPINE 5 MG: 5 CAPSULE ORAL at 17:28

## 2023-06-12 RX ADMIN — DIVALPROEX SODIUM 750 MG: 250 TABLET, DELAYED RELEASE ORAL at 09:59

## 2023-06-12 RX ADMIN — DIVALPROEX SODIUM 750 MG: 250 TABLET, DELAYED RELEASE ORAL at 21:01

## 2023-06-12 RX ADMIN — OLANZAPINE 5 MG: 5 TABLET, FILM COATED ORAL at 17:28

## 2023-06-12 RX ADMIN — FLUTICASONE PROPIONATE 1 SPRAY: 50 SPRAY, METERED NASAL at 09:59

## 2023-06-12 RX ADMIN — LOXAPINE 5 MG: 5 CAPSULE ORAL at 11:26

## 2023-06-12 NOTE — PROGRESS NOTES
06/12/23 0837   Team Meeting   Meeting Type Daily Rounds   Team Members Present   Team Members Present Physician;Nurse;   Physician Team Member [facility] Wendie   Patient/Family Present   Patient Present No   Patient's Family Present No   Pt with another episode of priapism for 3 hours. Pt taken to ED and given valium which was effective. Pt appears brighter in affect. Denying AH. Pt with PRN Zyprexa multiple times over the weekend. C/O anxiety and received PRN Atarax twice over the weekend. Med/meal compliant. PRN melatonin. Discharge to be determined.

## 2023-06-12 NOTE — PLAN OF CARE
Problem: SELF HARM/SUICIDALITY  Goal: Will have no self-injury during hospital stay  Description: INTERVENTIONS:  - Q 15 MINUTES: Routine safety checks  - Q WAKING SHIFT & PRN: Assess risk to determine if routine checks are adequate to maintain patient safety  - Encourage patient to participate actively in care by formulating a plan to combat response to suicidal ideation, identify supports and resources  Outcome: Progressing     Problem: ANXIETY  Goal: Will report anxiety at manageable levels  Description: INTERVENTIONS:  - Administer medication as ordered  - Teach and encourage coping skills  - Provide emotional support  - Assess patient/family for anxiety and ability to cope  Outcome: Progressing  Goal: By discharge: Patient will verbalize 2 strategies to deal with anxiety  Description: Interventions:  - Identify any obvious source/trigger to anxiety  - Staff will assist patient in applying identified coping technique/skills  - Encourage attendance of scheduled groups and activities  Outcome: Progressing     Problem: SUBSTANCE USE/ABUSE  Goal: Will have no detox symptoms and will verbalize plan for changing substance-related behavior  Description: INTERVENTIONS:  - Monitor physical status and assess for symptoms of withdrawal  - Administer medication as ordered  - Provide emotional support with 1 on 1 interaction with staff  - Encourage recovery focused program/ addiction education  - Assess for verbalization of changing behaviors related to substance abuse  - Initiate consults and referrals as appropriate (Case Management, Spiritual Care, etc.)  Outcome: Progressing  Goal: By discharge, will develop insight into their chemical dependency and sustain motivation to continue in recovery  Description: INTERVENTIONS:  - Attends all daily group sessions and scheduled AA groups  - Actively practices coping skills through participation in the therapeutic community and adherence to program rules  - Reviews and completes assignments from individual treatment plan  - Assist patient development of understanding of their personal cycle of addiction and relapse triggers  Outcome: Progressing  Goal: By discharge, patient will have ongoing treatment plan addressing chemical dependency  Description: INTERVENTIONS:  - Assist patient with resources and/or appointments for ongoing recovery based living  Outcome: Progressing     Problem: DISCHARGE PLANNING  Goal: Discharge to home or other facility with appropriate resources  Description: INTERVENTIONS:  - Identify barriers to discharge w/patient and caregiver  - Arrange for needed discharge resources and transportation as appropriate  - Identify discharge learning needs (meds, wound care, etc.)  - Arrange for interpretive services to assist at discharge as needed  - Refer to Case Management Department for coordinating discharge planning if the patient needs post-hospital services based on physician/advanced practitioner order or complex needs related to functional status, cognitive ability, or social support system  Outcome: Progressing

## 2023-06-12 NOTE — NURSING NOTE
Pt sleeping throughout most of the day only getting up for interviewing, EKG, and late lunch. Denies SI/HI/AVH, depression or anxiety. Affect is blunted, reports feeling "tired." Pt did not eat breakfast and only ate lunch at 1400.

## 2023-06-12 NOTE — NURSING NOTE
Patient has been in the milieu on and off this evening. He has received scheduled medications and the prn Atarax noted earlier but he is still awake and alert.  He denies S.I.H.I A/H V/H

## 2023-06-12 NOTE — SOCIAL WORK
Pt seclusive to his room for most of the day. Pt denies any CM related needs at this time. Rehab bed search to begin once medication titrations complete.

## 2023-06-12 NOTE — PROGRESS NOTES
Progress Note - Behavioral Health   Gokul Ayalaischer [place] male MRN: [id number]  Unit/Bed#: U 344-02 Encounter: [id number]    Assessment/Plan   Principal Problem:    Psychosis ([address]) ro Schizophrenia vs Schizoaffective  Active Problems:    Legally blind    Hearing loss    Asthma    Medical clearance for psychiatric admission    Bilateral impacted cerumen    Prolonged erection    Intellectual disability      Recommended Treatment:   Zyprexa Taper: Received 10mg Zyprexa today, will receive 7.5mg tonight, tomorrow patient will get a total of 7.5, and by 6/14 he will be off zyprexa for psychotic symptoms. Patient has been unable to experience sustained reduction of psychotic symptoms at below 35mg and the decision has been made to switch him to Loxapine. Loxapine Titration: Start 5mg BID today, increase to 10mg BID tomorrow, and will be on 10mg TID by 6/14 for psychotic symptoms  Nursing made aware  Depakote Level 91  Continue Depakote 750mg BID  Continue Melatonin 3mg HS for sleep    Heme still following for workup of priapism. EKG will be obtained prior to loxapine administration    Continue with pharmacotherapy, group therapy, milieu therapy and occupational therapy. Continue to assess for adverse medication side effects. Encourage Doroteo Parrish to participate in nonverbal forms of therapy including journaling and art/music therapy. Continue frequent safety checks and vitals per unit protocol. Continue to engage CM/SW to assist with collateral, disposition planning, and the implementation of an individualized, patient-centered plan of care. Continue medical management by medical team.  Case discussed with treatment team.    Legal Status: 201  ------------------------------------------------------------    Subjective: All documentation including nursing notes, medication history to ensure medication adherence on the unit, labs, and vitals were reviewed.  Gokul was evaluated this morning for continuity of care and no acute distress noted throughout the evaluation. Over the past 24 hours per nursing report, Gokul has been cooperative on the unit and compliant with medications. Today, Gokul is consenting for safety on the unit. Gokul reports feeling "good." Gokul notes having good sleep. Gokul states having a good appetite. Gokul has been taking the medications as prescribed and reporting no side effects. We discussed his medication regimen and decided on a switch. Gokul denies suicidal ideations. Gokul denies homicidal ideations. Regarding hallucinations, Gokul states he hasn't heard any voices for "a few" days and hasn't seen any VH for 3 days, but is apparently distracted in interview. PRNs overnight: atarax 100mg overnight   VS: Reviewed, within normal limits    Progress Toward Goals: slow improvement    Psychiatric Review of Systems:  Behavior over the last 24 hours:  improved  Sleep: normal  Appetite: normal  Medication side effects: No   ROS: all other systems are negative    Vital signs in last 24 hours:  Temp:  [97.6 °F (36.4 °C)] 97.6 °F (36.4 °C)  HR:  [97] 97  Resp:  [16] 16  BP: (131)/(77) 131/77    Laboratory results:  I have personally reviewed all pertinent laboratory/tests results.   Recent Results (from the past 48 hour(s))   CBC and differential    Collection Time: 06/12/23  6:03 AM   Result Value Ref Range    WBC 9.43 4.31 - 10.16 Thousand/uL    RBC 4.69 3.88 - 5.62 Million/uL    Hemoglobin 14.8 12.0 - 17.0 g/dL    Hematocrit 44.2 36.5 - 49.3 %    MCV 94 82 - 98 fL    MCH 31.6 26.8 - 34.3 pg    MCHC 33.5 31.4 - 37.4 g/dL    RDW 12.5 11.6 - 15.1 %    MPV 10.9 8.9 - 12.7 fL    Platelets 184 180 - 829 Thousands/uL    nRBC 0 /100 WBCs    Neutrophils Relative 37 (L) 43 - 75 %    Immat GRANS % 2 0 - 2 %    Lymphocytes Relative 47 (H) 14 - 44 %    Monocytes Relative 10 4 - 12 %    Eosinophils Relative 3 0 - 6 %    Basophils Relative 1 0 - 1 %    Neutrophils Absolute 3.47 1.85 - 7.62 Thousands/µL    Immature Grans Absolute 0.17 0.00 - 0.20 Thousand/uL    Lymphocytes Absolute 4.44 0.60 - 4.47 Thousands/µL    Monocytes Absolute 0.96 0.17 - 1.22 Thousand/µL    Eosinophils Absolute 0.29 0.00 - 0.61 Thousand/µL    Basophils Absolute 0.10 0.00 - 0.10 Thousands/µL         Mental Status Evaluation:    Appearance:  dressed appropriately, marginal hygiene, looks older than stated age, overtly appearing AA male, in no apparent acute distress, minimal eye contact   Behavior:  cooperative   Speech:  scant   Mood:  "good"   Affect:  blunted   Thought Process:  coherent, concrete, apparent poverty of thought   Associations: concrete associations   Thought Content:  no overt delusions   Perceptual Disturbances: Denies auditory or visual hallucinations and Appears to be internally preoccupied   Risk Potential: Suicidal ideation - None at present  Homicidal ideation - None at present  Potential for aggression - Not at present   Sensorium:  oriented to person, place and time/date   Memory:  recent and remote memory grossly intact   Consciousness:  alert and awake   Attention/Concentration: attention span and concentration are age appropriate   Insight:  poor   Judgment: poor   Gait/Station: normal gait/station   Motor Activity: no abnormal movements       Current Medications:  Current Facility-Administered Medications   Medication Dose Route Frequency Provider Last Rate   • acetaminophen  650 mg Oral Q6H PRN Sharon Holter, MD     • acetaminophen  650 mg Oral Q4H PRN Sharon Holter, MD     • acetaminophen  975 mg Oral Q6H PRN Sharon Holter, MD     • albuterol  2 puff Inhalation Q4H PRN Sharon Holter, MD     • aluminum-magnesium hydroxide-simethicone  30 mL Oral Q4H PRN Sharon Holter, MD     • benztropine  1 mg Oral Q4H PRN Max 6/day Sharon Holter, MD     • hydrOXYzine HCL  50 mg Oral Q6H PRN Max 4/day Sharon Holter, MD      Or   • diphenhydrAMINE  50 mg Intramuscular Q6H PRN Hedy TERRAZAS Juan Gibson MD     • divalproex sodium  750 mg Oral Q12H [facility] Poncho Rossi MD     • fluticasone  1 spray Nasal Daily Aneudy Tucker MD     • hydrOXYzine HCL  100 mg Oral Q6H PRN Max 4/day Aneudy Tucker MD      Or   • LORazepam  2 mg Intramuscular Q6H PRN Aneudy Tucker MD     • hydrOXYzine HCL  25 mg Oral Q6H PRN Max 4/day Aneudy Tucker MD     • melatonin  3 mg Oral HS Aneudy Tucker MD     • melatonin  3 mg Oral HS PRN Louvenia Groom, DO     • nicotine polacrilex  4 mg Oral Q2H PRN Aneudy Tucker MD     • OLANZapine  10 mg Oral Q3H PRN Max 3/day Jaxson Rosas MD      Or   • OLANZapine  10 mg Intramuscular Q3H PRN Max 3/day Jaxson Rosas MD     • OLANZapine  5 mg Oral Q3H PRN Max 6/day Jaxson Rosas MD      Or   • OLANZapine  5 mg Intramuscular Q3H PRN Max 6/day Jaxson Rosas MD     • OLANZapine  10 mg Oral Daily Poncho Rossi MD     • OLANZapine  15 mg Oral Daily Jaxson Rosas MD     • OLANZapine  2.5 mg Oral Q3H PRN Max 8/day Jaxson Rosas MD     • polyethylene glycol  17 g Oral Daily PRN Aneudy Tucker MD     • pseudoephedrine  120 mg Oral BID PRN Louvenia Groom, DO     • senna-docusate sodium  1 tablet Oral Daily PRN Aneudy Tucker MD     • sterile water          • sterile water          • sterile water            Louvenia Groom, DO 06/12/23  Psychiatry Resident, PGY-II    This note was completed in part utilizing [employer] Direct Software. Grammatical, translation, syntax errors, random word insertions, spelling mistakes, and incomplete sentences may be an occasional consequence of this system secondary to software limitations with voice recognition, ambient noise, and hardware issues. If you have any questions or concerns about the content, text, or information contained within the body of this dictation, please contact the provider for clarification.

## 2023-06-12 NOTE — PROGRESS NOTES
No complaints. He has no penile pain and is voiding well. PE:  No distress. Afebrile  Abdomen soft  Phallus- no erection    Imp: Priapism resolved. Plan: reconsult prn. Consider casodex if problem continues.

## 2023-06-12 NOTE — NURSING NOTE
Upon reassessment one hour later of PRN melatonin patient is in their bed sleeping. PRN melatonin effective.

## 2023-06-13 LAB
HGB A MFR BLD: 2.8 % (ref 1.8–3.2)
HGB A MFR BLD: 97.2 % (ref 96.4–98.8)
HGB F MFR BLD: 0 % (ref 0–2)
HGB FRACT BLD-IMP: NORMAL
HGB S MFR BLD: 0 %

## 2023-06-13 PROCEDURE — 99232 SBSQ HOSP IP/OBS MODERATE 35: CPT | Performed by: PSYCHIATRY & NEUROLOGY

## 2023-06-13 RX ORDER — BICALUTAMIDE 50 MG/1
50 TABLET, FILM COATED ORAL DAILY
Status: DISCONTINUED | OUTPATIENT
Start: 2023-06-13 | End: 2023-06-13

## 2023-06-13 RX ORDER — ONDANSETRON 4 MG/1
4 TABLET, ORALLY DISINTEGRATING ORAL EVERY 8 HOURS PRN
Status: DISCONTINUED | OUTPATIENT
Start: 2023-06-13 | End: 2023-10-19 | Stop reason: HOSPADM

## 2023-06-13 RX ADMIN — LOXAPINE SUCCINATE 10 MG: 10 TABLET ORAL at 17:54

## 2023-06-13 RX ADMIN — OLANZAPINE 7.5 MG: 2.5 TABLET, FILM COATED ORAL at 08:34

## 2023-06-13 RX ADMIN — Medication 3 MG: at 21:09

## 2023-06-13 RX ADMIN — LOXAPINE SUCCINATE 10 MG: 10 TABLET ORAL at 08:33

## 2023-06-13 RX ADMIN — DIVALPROEX SODIUM 750 MG: 250 TABLET, DELAYED RELEASE ORAL at 21:09

## 2023-06-13 RX ADMIN — DIVALPROEX SODIUM 750 MG: 250 TABLET, DELAYED RELEASE ORAL at 08:34

## 2023-06-13 RX ADMIN — FLUTICASONE PROPIONATE 1 SPRAY: 50 SPRAY, METERED NASAL at 08:34

## 2023-06-13 NOTE — NURSING NOTE
Pt was difficult to arouse this morning appearing drowsy. Denies Si/HI/AVH, depression or anxiety. Pt did get up for breakfast and then returned to bed. Pt seen sitting in front of telephone slouching appearing to be falling asleep. Pt more alert after eating lunch. Reurned to bed to lie down. Pt reports feeling "tired." No requests at this time.

## 2023-06-13 NOTE — PROGRESS NOTES
Progress Note - Behavioral Health   Gokul Mares 26 y.o. male MRN: [id number]  Unit/Bed#: U 344-02 Encounter: [id number]    Assessment/Plan   Principal Problem:    Psychosis ([address]) ro Schizophrenia vs Schizoaffective  Active Problems:    Legally blind    Hearing loss    Asthma    Medical clearance for psychiatric admission    Bilateral impacted cerumen    Prolonged erection    Intellectual disability      Recommended Treatment:   Continue Depakote 750mg BID for mood  Loxapine 10mg BID today, 10mg TID starting tomorrow for psychotic symptoms, no anticipated changes   Continue taper zyprexa today, last dose 7.5mg today  Continue melatonin 3mg HS for sleep  Consider Casodex for priapism per urology    Continue with pharmacotherapy, group therapy, milieu therapy and occupational therapy. Continue to assess for adverse medication side effects. Encourage Henry Patelion to participate in nonverbal forms of therapy including journaling and art/music therapy. Continue frequent safety checks and vitals per unit protocol. Continue to engage CM/SW to assist with collateral, disposition planning, and the implementation of an individualized, patient-centered plan of care. Continue medical management by medical team.  Case discussed with treatment team.    Legal Status: 201  ------------------------------------------------------------    Subjective: All documentation including nursing notes, medication history to ensure medication adherence on the unit, labs, and vitals were reviewed. Gokul was evaluated this morning for continuity of care and no acute distress noted throughout the evaluation. Over the past 24 hours per nursing report, Gokul has been cooperative on the unit and compliant with medications. Per report he vomited last night. Had gingerale, able to sleep again afterwards. Today, Gokul is consenting for safety on the unit. Gokul reports feeling "good." Gokul notes having good sleep.  Gokul states having a good appetite. Gokul has been taking the medications as prescribed and reporting no side effects. Patient was seen and examined today at bedside. We discussed his goals of getting a shower today. He states he last heard voices 3-4 days ago and thinks he vomitted last night due to eating "too much". Feels fine now. Gokul denies suicidal ideations. Gokul denies homicidal ideations. Regarding hallucinations, Gokul denies and does not appear to be responding to internal stimuli    PRNs overnight: none   VS: Reviewed, within normal limits    Progress Toward Goals: slow improvement    Psychiatric Review of Systems:  Behavior over the last 24 hours:  improved  Sleep: normal  Appetite: normal  Medication side effects: No   ROS: all other systems are negative    Vital signs in last 24 hours:  Temp:  [98.6 °F (37 °C)] 98.6 °F (37 °C)  HR:  [107] 107  Resp:  [16] 16  BP: (122)/(76) 122/76    Laboratory results:  I have personally reviewed all pertinent laboratory/tests results.   Recent Results (from the past 48 hour(s))   CBC and differential    Collection Time: 06/12/23  6:03 AM   Result Value Ref Range    WBC 9.43 4.31 - 10.16 Thousand/uL    RBC 4.69 3.88 - 5.62 Million/uL    Hemoglobin 14.8 12.0 - 17.0 g/dL    Hematocrit 44.2 36.5 - 49.3 %    MCV 94 82 - 98 fL    MCH 31.6 26.8 - 34.3 pg    MCHC 33.5 31.4 - 37.4 g/dL    RDW 12.5 11.6 - 15.1 %    MPV 10.9 8.9 - 12.7 fL    Platelets 009 672 - 108 Thousands/uL    nRBC 0 /100 WBCs    Neutrophils Relative 37 (L) 43 - 75 %    Immat GRANS % 2 0 - 2 %    Lymphocytes Relative 47 (H) 14 - 44 %    Monocytes Relative 10 4 - 12 %    Eosinophils Relative 3 0 - 6 %    Basophils Relative 1 0 - 1 %    Neutrophils Absolute 3.47 1.85 - 7.62 Thousands/µL    Immature Grans Absolute 0.17 0.00 - 0.20 Thousand/uL    Lymphocytes Absolute 4.44 0.60 - 4.47 Thousands/µL    Monocytes Absolute 0.96 0.17 - 1.22 Thousand/µL    Eosinophils Absolute 0.29 0.00 - 0.61 Thousand/µL Basophils Absolute 0.10 0.00 - 0.10 Thousands/µL   Comprehensive metabolic panel    Collection Time: 06/12/23  6:03 AM   Result Value Ref Range    Sodium 139 135 - 147 mmol/L    Potassium 4.1 3.5 - 5.3 mmol/L    Chloride 101 96 - 108 mmol/L    CO2 29 21 - 32 mmol/L    ANION GAP 9 4 - 13 mmol/L    BUN 18 5 - 25 mg/dL    Creatinine 1.11 0.60 - 1.30 mg/dL    Glucose 125 65 - 140 mg/dL    Glucose, Fasting 125 (H) 65 - 99 mg/dL    Calcium 9.8 8.4 - 10.2 mg/dL    AST 14 13 - 39 U/L    ALT 15 7 - 52 U/L    Alkaline Phosphatase 56 34 - 104 U/L    Total Protein 6.6 6.4 - 8.4 g/dL    Albumin 3.8 3.5 - 5.0 g/dL    Total Bilirubin 0.46 0.20 - 1.00 mg/dL    eGFR 93 ml/min/1.73sq m   Valproic acid level, total    Collection Time: 06/12/23  6:03 AM   Result Value Ref Range    Valproic Acid, Total 91 50 - 100 ug/mL   ECG 12 lead    Collection Time: 06/12/23  1:33 PM   Result Value Ref Range    Ventricular Rate 101 BPM    Atrial Rate 101 BPM    HI Interval 134 ms    QRSD Interval 72 ms    QT Interval 340 ms    QTC Interval 440 ms    P [place] 57 degrees    QRS Axis 82 degrees    T Wave Axis 46 degrees   ECG 12 lead    Collection Time: 06/12/23  1:34 PM   Result Value Ref Range    Ventricular Rate 108 BPM    Atrial Rate 108 BPM    HI Interval 132 ms    QRSD Interval 70 ms    QT Interval 328 ms    QTC Interval 439 ms    P [place] 58 degrees    QRS Axis 86 degrees    T Wave Axis 47 degrees   ECG 12 lead    Collection Time: 06/12/23  1:35 PM   Result Value Ref Range    Ventricular Rate 103 BPM    Atrial Rate 103 BPM    HI Interval 134 ms    QRSD Interval 90 ms    QT Interval 340 ms    QTC Interval 445 ms    P [place] 57 degrees    QRS Axis 80 degrees    T Wave Axis 42 degrees         Mental Status Evaluation:    Appearance:  dressed appropriately, wearing hospital clothes, looks older than stated age, overweight, overtly appearing AA male, no eye contact, in no apparent acute distress   Behavior:  cooperative   Speech:  scant   Mood:  "good" Affect:  blunted   Thought Process:  goal directed, linear   Associations: concrete associations   Thought Content:  no overt delusions   Perceptual Disturbances: Denies auditory or visual hallucinations and Does not appear to be responding to internal stimuli   Risk Potential: Suicidal ideation - None at present  Homicidal ideation - None at present  Potential for aggression - Not at present   Sensorium:  oriented to person, place and time/date   Memory:  recent and remote memory grossly intact   Consciousness:  alert and awake   Attention/Concentration: attention span and concentration are age appropriate   Insight:  limited   Judgment: limited   Gait/Station: in bed    Motor Activity: no abnormal movements       Current Medications:  Current Facility-Administered Medications   Medication Dose Route Frequency Provider Last Rate   • acetaminophen  650 mg Oral Q6H PRN Bianca Villafana MD     • acetaminophen  650 mg Oral Q4H PRN Bianca Villafana MD     • acetaminophen  975 mg Oral Q6H PRN Bianca Villafana MD     • albuterol  2 puff Inhalation Q4H PRN Bianca Villafana MD     • aluminum-magnesium hydroxide-simethicone  30 mL Oral Q4H PRN Bianca Villafana MD     • benztropine  1 mg Oral Q4H PRN Max 6/day Bianca Villafana MD     • hydrOXYzine HCL  50 mg Oral Q6H PRN Max 4/day Bianca Villafana MD      Or   • diphenhydrAMINE  50 mg Intramuscular Q6H PRN Bianca Villafana MD     • divalproex sodium  750 mg Oral Q12H [address]  Fartun Shah MD     • fluticasone  1 spray Nasal Daily Bianca Villafana MD     • hydrOXYzine HCL  100 mg Oral Q6H PRN Max 4/day Bianca Villafana MD      Or   • LORazepam  2 mg Intramuscular Q6H PRN Bianca Villafana MD     • hydrOXYzine HCL  25 mg Oral Q6H PRN Max 4/day Bianca Villafana MD     • loxapine  10 mg Oral BID Sherice Chan DO     • [START ON 6/14/2023] loxapine  10 mg Oral TID Sherice Chan DO     • melatonin  3 mg Oral HS Bianca Villafana MD     • melatonin  3 mg Oral HS PRN Rebeca Shaffer DO     • nicotine polacrilex  4 mg Oral Q2H PRN Tamara Barnes MD     • OLANZapine  10 mg Oral Q3H PRN Max 3/day Casper Shi MD      Or   • OLANZapine  10 mg Intramuscular Q3H PRN Max 3/day Casper Shi MD     • OLANZapine  5 mg Oral Q3H PRN Max 6/day Casper Shi MD      Or   • OLANZapine  5 mg Intramuscular Q3H PRN Max 6/day Casper Shi MD     • OLANZapine  2.5 mg Oral Q3H PRN Max 8/day Casper Shi MD     • polyethylene glycol  17 g Oral Daily PRN Tamara Barnes MD     • pseudoephedrine  120 mg Oral BID PRN Rebeca Shaffer DO     • senna-docusate sodium  1 tablet Oral Daily PRN Tamara Barnes MD     • sterile water          • sterile water          • sterile water            Rebeca Shaffer DO 06/13/23  Psychiatry Resident, PGY-II    This note was completed in part utilizing [employer]. Grammatical, translation, syntax errors, random word insertions, spelling mistakes, and incomplete sentences may be an occasional consequence of this system secondary to software limitations with voice recognition, ambient noise, and hardware issues. If you have any questions or concerns about the content, text, or information contained within the body of this dictation, please contact the provider for clarification.

## 2023-06-13 NOTE — NURSING NOTE
Pt calm pleasant and cooperative, Pt isolated in room in bed all shift , Pt denied all pain anxiety and depression, Pt denies HI and SI , Pt denied AH and VH, Pt took all scheduled HS meds, Q7 min safety checks maintained

## 2023-06-13 NOTE — NURSING NOTE
Pt came to nursing station c/o of vomiting in bed, Vomit was clear liquid small/medium amount, Pt refused vitals stated he was okay and requested ginger ale and went back to bed.

## 2023-06-13 NOTE — PROGRESS NOTES
06/13/23 0833   Team Meeting   Meeting Type Daily Rounds   Team Members Present   Team Members Present Physician;Nurse;   Physician Team Member [id number] Wendie   Patient/Family Present   Patient Present No   Patient's Family Present No   Pt calm, pleasant, cooperative, med/meal compliant. Vomited this morning in bed. Pt received ginger ale and returned to bed. Pt continues to c/o AH. Discharge to be determined.

## 2023-06-14 LAB
ALBUMIN UR ELPH-MCNC: 100 %
ALPHA1 GLOB MFR UR ELPH: 0 %
ALPHA2 GLOB MFR UR ELPH: 0 %
B-GLOBULIN MFR UR ELPH: 0 %
GAMMA GLOB MFR UR ELPH: 0 %
PROT PATTERN UR ELPH-IMP: NORMAL
PROT UR-MCNC: 14 MG/DL

## 2023-06-14 PROCEDURE — 99232 SBSQ HOSP IP/OBS MODERATE 35: CPT | Performed by: STUDENT IN AN ORGANIZED HEALTH CARE EDUCATION/TRAINING PROGRAM

## 2023-06-14 PROCEDURE — 84166 PROTEIN E-PHORESIS/URINE/CSF: CPT | Performed by: PATHOLOGY

## 2023-06-14 RX ADMIN — LOXAPINE 10 MG: 10 CAPSULE ORAL at 16:01

## 2023-06-14 RX ADMIN — LOXAPINE 10 MG: 10 CAPSULE ORAL at 08:14

## 2023-06-14 RX ADMIN — DIVALPROEX SODIUM 750 MG: 250 TABLET, DELAYED RELEASE ORAL at 08:14

## 2023-06-14 RX ADMIN — FLUTICASONE PROPIONATE 1 SPRAY: 50 SPRAY, METERED NASAL at 08:14

## 2023-06-14 RX ADMIN — NICOTINE POLACRILEX 4 MG: 4 GUM, CHEWING BUCCAL at 21:34

## 2023-06-14 RX ADMIN — DIVALPROEX SODIUM 750 MG: 250 TABLET, DELAYED RELEASE ORAL at 21:29

## 2023-06-14 RX ADMIN — Medication 3 MG: at 21:34

## 2023-06-14 RX ADMIN — LOXAPINE 10 MG: 10 CAPSULE ORAL at 21:30

## 2023-06-14 NOTE — NURSING NOTE
Pt calm pleasant and cooperative, Pt isolated in room in bed all shift except snack time , Pt denied all pain anxiety and depression, Pt denies HI and SI , Pt denied AH and VH, Pt took all scheduled HS meds, Q7 min safety checks maintained

## 2023-06-14 NOTE — TREATMENT PLAN
TREATMENT PLAN REVIEW - [address] 26 y.o. 2000 male MRN: [id number]    [address] [facility] [address] Room / Bed: [place] [address]/[facility] 802-16 Encounter: [id number]          Admit Date/Time:  5/18/2023  2:45 PM    Treatment Team: Attending Provider: Lois Guerrero MD; Consulting Physician: Yulisa Bryan MD; : Kwame Milian; Resident: Estefany Ames DO; : Roland Estes, PhD; Nurse Practitioner: ELA Valladares;  Licensed Practical Nurse: Talia Fitch LPN    Diagnosis: Principal Problem:    Psychosis ([address]) ro Schizophrenia vs Schizoaffective  Active Problems:    Legally blind    Hearing loss    Asthma    Medical clearance for psychiatric admission    Bilateral impacted cerumen    Prolonged erection    Intellectual disability      Patient Strengths/Assets: ability for insight, cooperative, compliant with medication, motivated, negotiates basic needs, patient is on a voluntary commitment, patient is willing to work on problems, reasoning ability    Patient Barriers/Limitations: difficulty adapting, impaired cognition, limited support system, poor past treatment response, poor self-care, substance abuse    Short Term Goals: decrease in paranoid thoughts, decrease in psychotic symptoms, decrease in level of agitation, ability to stay safe on the unit, ability to stay free of restraints, improvement in ability to express basic needs, improvement in insight, improvement in reality testing, improvement in reasoning ability, improvement in self care, sleep improvement, improvement in appetite, mood stabilization, increase in group attendance, increase in socialization with peers on the unit, acceptance of need for psychiatric treatment, acceptance of psychiatric medications    Long Term Goals: free of suicidal thoughts, free of homicidal thoughts, no self abusive behavior, resolution of psychotic symptoms, improvement in reality testing, improvement in reasoning ability, improved insight, able to express basic needs, acceptance of need for psychiatric medications, acceptance of need for psychiatric treatment, acceptance of need for psychiatric follow up after discharge, acceptance of psychiatric diagnosis, adequate self care, adequate sleep, adequate appetite, appropriate interaction with peers, stable living arrangements upon discharge    Progress Towards Goals: continue psychiatric medications as prescribed, progressing, attends groups, participates in milieu therapy, reality testing is improving, less agitated, more appropriate, less paranoid, still has psychotic symptoms    Recommended Treatment: medication management, patient medication education, group therapy, milieu therapy, continued Behavioral Health psychiatric evaluation/assessment process    Treatment Frequency: daily medication monitoring, group and milieu therapy daily, monitoring through interdisciplinary rounds, monitoring through weekly patient care conferences    Expected Discharge Date:  7/14/23    Discharge Plan: placement in inpatient drug and alcohol rehabilitation program    Treatment Plan Created/Updated By: Juhi Navarro MD

## 2023-06-14 NOTE — PROGRESS NOTES
Progress Note - Behavioral Health   Gokul Anders 26 y.o. male MRN: [id number]  Unit/Bed#: -02 Encounter: [id number]    Assessment/Plan   Principal Problem:    Psychosis ([address]) ro Schizophrenia vs Schizoaffective  Active Problems:    Legally blind    Hearing loss    Asthma    Medical clearance for psychiatric admission    Bilateral impacted cerumen    Prolonged erection    Intellectual disability      Recommended Treatment:   Continue Depakote 750mg BID for mood  Loxapine 10mg TID for psychotic symptoms  Melatonin 3mg HS for sleep  Consider Casodex for priapism per urology     Continue with pharmacotherapy, group therapy, milieu therapy and occupational therapy. Continue to assess for adverse medication side effects. Encourage Quinten Buerger to participate in nonverbal forms of therapy including journaling and art/music therapy. Continue frequent safety checks and vitals per unit protocol. Continue to engage CM/SW to assist with collateral, disposition planning, and the implementation of an individualized, patient-centered plan of care. Continue medical management by medical team.  Case discussed with treatment team.     Legal Status: 201  ------------------------------------------------------------    Subjective: All documentation including nursing notes, medication history to ensure medication adherence on the unit, labs, and vitals were reviewed. Gokul was evaluated this morning for continuity of care and no acute distress noted throughout the evaluation. Over the past 24 hours per nursing report, Gokul has been cooperative on the unit and compliant with medications. Per nursing note, patient is mainly isolative to room, except for meals/snacks. Today, Gokul is consenting for safety on the unit. Gokul reports feeling "good." He reports that this is because his "mental health" is better. He attributes this to taking his medication. Gokul notes having "ok" sleep.  Gokul states having a "good" appetite. Gokul has been taking the medications as prescribed and reporting no side effects. Patient was seen and examined today in interview room. Gokul mentioned how he enjoys going to groups. He appreciates that people have the opportunity to talk about their "issues". He also enjoys learning about "coping". He states his goal for today is to attend 2 groups. Gokul denies suicidal ideations. Gokul denies homicidal ideations. Regarding hallucinations, Gokul states the his last AH was "a few days ago". He denies VH. PRNs overnight: None  VS: Patient refused    Progress Toward Goals: slow improvement    Psychiatric Review of Systems:  Behavior over the last 24 hours:  improved  Sleep: "ok"  Appetite: "good"  Medication side effects: No   ROS: no complaints    Vital signs in last 24 hours:  Temp:  [97.7 °F (36.5 °C)] 97.7 °F (36.5 °C)  HR:  [97] 97  BP: (122)/(82) 122/82    Laboratory results:  I have personally reviewed all pertinent laboratory/tests results.   Recent Results (from the past 48 hour(s))   ECG 12 lead    Collection Time: 06/12/23  1:33 PM   Result Value Ref Range    Ventricular Rate 101 BPM    Atrial Rate 101 BPM    WV Interval 134 ms    QRSD Interval 72 ms    QT Interval 340 ms    QTC Interval 440 ms    P United 57 degrees    QRS Axis 82 degrees    T Wave Axis 46 degrees   ECG 12 lead    Collection Time: 06/12/23  1:34 PM   Result Value Ref Range    Ventricular Rate 108 BPM    Atrial Rate 108 BPM    WV Interval 132 ms    QRSD Interval 70 ms    QT Interval 328 ms    QTC Interval 439 ms    P United 58 degrees    QRS Axis 86 degrees    T Wave Axis 47 degrees   ECG 12 lead    Collection Time: 06/12/23  1:35 PM   Result Value Ref Range    Ventricular Rate 103 BPM    Atrial Rate 103 BPM    WV Interval 134 ms    QRSD Interval 90 ms    QT Interval 340 ms    QTC Interval 445 ms    P United 57 degrees    QRS Axis 80 degrees    T Wave Axis 42 degrees   Protein electrophoresis, urine Collection Time: 06/12/23  6:28 PM   Result Value Ref Range    Urine Protein 14.0 mg/dL    Albumin ELP, Urine 100.0 %    Alpha 1, Urine 0.0 %    Alpha 2, Urine 0.0 %    Beta, Urine 0.0 %    Gamma Globulin, Urine 0.0 %    UPEP Interp See Comment          Mental Status Evaluation:    Appearance:  dressed appropriately, adequate grooming, looks older than stated age, overtly appearing AA male, overweight   Behavior:  pleasant, cooperative, minimal eye contact   Speech:  normal volume, more talkative than yesterday, mild articulation dysfunction   Mood:  "good"   Affect:  Still constricted, though more reactive than yesterday, mood congruent   Thought Process:  goal directed, linear   Associations: concrete associations   Thought Content:  no overt delusions   Perceptual Disturbances: Denies auditory or visual hallucinations and Does not appear to be responding to internal stimuli   Risk Potential: Suicidal ideation - None at present  Homicidal ideation - None at present  Potential for aggression - Not at present   Sensorium:  oriented to person, place and situation   Memory:  recent and remote memory grossly intact   Consciousness:  alert and awake   Attention/Concentration: attention span and concentration are age appropriate   Insight:  improving and partial   Judgment: improving and partial   Gait/Station: normal gait/station   Motor Activity: no abnormal movements       Current Medications:  Current Facility-Administered Medications   Medication Dose Route Frequency Provider Last Rate   • acetaminophen  650 mg Oral Q6H PRN Erasto Mejia MD     • acetaminophen  650 mg Oral Q4H PRN Erasto Mejia MD     • acetaminophen  975 mg Oral Q6H PRN Erasto Mejia MD     • albuterol  2 puff Inhalation Q4H PRN Erasto Mejia MD     • aluminum-magnesium hydroxide-simethicone  30 mL Oral Q4H PRN Erasto Mejia MD     • benztropine  1 mg Oral Q4H PRN Max 6/day Erasto Mejia MD     • hydrOXYzine HCL  50 mg Oral Q6H PRN Max 4/day Fariba Conte MD      Or   • diphenhydrAMINE  50 mg Intramuscular Q6H PRN Fariba Conte MD     • divalproex sodium  750 mg Oral Q12H [address] MD     • fluticasone  1 spray Nasal Daily Fariba Conte MD     • hydrOXYzine HCL  100 mg Oral Q6H PRN Max 4/day Fairba Conte MD      Or   • LORazepam  2 mg Intramuscular Q6H PRN Fariba Conte MD     • hydrOXYzine HCL  25 mg Oral Q6H PRN Max 4/day Fariba Conte MD     • loxapine  10 mg Oral TID Abel Rodriguez DO     • melatonin  3 mg Oral HS Fariba Conte MD     • melatonin  3 mg Oral HS PRN Abel Rodriguez DO     • nicotine polacrilex  4 mg Oral Q2H PRN Fariba Conte MD     • OLANZapine  10 mg Oral Q3H PRN Max 3/day Jeanne Bryson MD      Or   • OLANZapine  10 mg Intramuscular Q3H PRN Max 3/day Jeanne Bryson MD     • OLANZapine  5 mg Oral Q3H PRN Max 6/day Jeanne Bryson MD      Or   • OLANZapine  5 mg Intramuscular Q3H PRN Max 6/day Jeanne Bryson MD     • OLANZapine  2.5 mg Oral Q3H PRN Max 8/day Jeanne Bryson MD     • ondansetron  4 mg Oral Q8H PRN Abel Rodriguez DO     • polyethylene glycol  17 g Oral Daily PRN Fariba Conte MD     • pseudoephedrine  120 mg Oral BID PRN Abel Rodriguez DO     • senna-docusate sodium  1 tablet Oral Daily PRN Fariba Conte MD     • sterile water          • sterile water          • sterile water                [employer] 06/14/23  MS IV    This note was completed in part utilizing [employer] Direct Software. Grammatical, translation, syntax errors, random word insertions, spelling mistakes, and incomplete sentences may be an occasional consequence of this system secondary to software limitations with voice recognition, ambient noise, and hardware issues. If you have any questions or concerns about the content, text, or information contained within the body of this dictation, please contact the provider for clarification.

## 2023-06-14 NOTE — PROGRESS NOTES
06/14/23 0837   Team Meeting   Meeting Type Daily Rounds   Team Members Present   Team Members Present Physician;Nurse;   Physician Team Member Shikha   [facility]   Care Management Team Member Wendie   Patient/Family Present   Patient Present No   Patient's Family Present No   Pt calm, pleasant, cooperative, seclusive to his room. Denies AVH. Med/meal compliant. Pt slept for most of the day. Cross taper from Zyprexa to Loxapine continues. Discharge to be determined.

## 2023-06-14 NOTE — NURSING NOTE
Pt isolative to room, reports feeling tired and did not wish to get OOB this morning. Compliant with scheduled medications. Denies SI/HI/AVH at this time. Encouraged to attend groups and be present in the milieu. Denies any unmet needs. No behavioral issues to be reported. Q7 minute safety checks maintained.

## 2023-06-15 LAB
ATRIAL RATE: 76 BPM
ATRIAL RATE: 80 BPM
ATRIAL RATE: 82 BPM
KAPPA LC UR-MCNC: 35.73 MG/L (ref 1.17–86.46)
KAPPA LC/LAMBDA UR: 20.53 {RATIO} (ref 1.83–14.26)
LAMBDA LC UR-MCNC: 1.74 MG/L (ref 0.27–15.21)
P AXIS: 53 DEGREES
P AXIS: 53 DEGREES
P AXIS: 54 DEGREES
PR INTERVAL: 144 MS
PR INTERVAL: 148 MS
PR INTERVAL: 148 MS
QRS AXIS: 55 DEGREES
QRS AXIS: 57 DEGREES
QRS AXIS: 57 DEGREES
QRSD INTERVAL: 96 MS
QT INTERVAL: 372 MS
QT INTERVAL: 372 MS
QT INTERVAL: 378 MS
QTC INTERVAL: 425 MS
QTC INTERVAL: 429 MS
QTC INTERVAL: 434 MS
T WAVE AXIS: 48 DEGREES
T WAVE AXIS: 48 DEGREES
T WAVE AXIS: 50 DEGREES
VENTRICULAR RATE: 76 BPM
VENTRICULAR RATE: 80 BPM
VENTRICULAR RATE: 82 BPM

## 2023-06-15 PROCEDURE — 93005 ELECTROCARDIOGRAM TRACING: CPT

## 2023-06-15 PROCEDURE — 93010 ELECTROCARDIOGRAM REPORT: CPT | Performed by: INTERNAL MEDICINE

## 2023-06-15 PROCEDURE — 99232 SBSQ HOSP IP/OBS MODERATE 35: CPT | Performed by: PSYCHIATRY & NEUROLOGY

## 2023-06-15 RX ADMIN — HYDROXYZINE HYDROCHLORIDE 100 MG: 50 TABLET, FILM COATED ORAL at 22:04

## 2023-06-15 RX ADMIN — OLANZAPINE 10 MG: 10 TABLET, FILM COATED ORAL at 00:20

## 2023-06-15 RX ADMIN — DIVALPROEX SODIUM 750 MG: 250 TABLET, DELAYED RELEASE ORAL at 08:38

## 2023-06-15 RX ADMIN — FLUTICASONE PROPIONATE 1 SPRAY: 50 SPRAY, METERED NASAL at 08:41

## 2023-06-15 RX ADMIN — Medication 3 MG: at 21:34

## 2023-06-15 RX ADMIN — LOXAPINE 10 MG: 10 CAPSULE ORAL at 21:34

## 2023-06-15 RX ADMIN — DIVALPROEX SODIUM 750 MG: 250 TABLET, DELAYED RELEASE ORAL at 21:34

## 2023-06-15 RX ADMIN — LOXAPINE 10 MG: 10 CAPSULE ORAL at 08:37

## 2023-06-15 RX ADMIN — HYDROXYZINE HYDROCHLORIDE 100 MG: 50 TABLET, FILM COATED ORAL at 00:20

## 2023-06-15 RX ADMIN — OLANZAPINE 10 MG: 10 TABLET, FILM COATED ORAL at 22:04

## 2023-06-15 RX ADMIN — LOXAPINE 10 MG: 10 CAPSULE ORAL at 17:19

## 2023-06-15 NOTE — PROGRESS NOTES
06/15/23 0835   Team Meeting   Meeting Type Daily Rounds   Team Members Present   Team Members Present Physician;Nurse;   Physician Team Member [id number] Team Member [facility] Team Member Wendie   Patient/Family Present   Patient Present No   Patient's Family Present No   Pt c/o AH yesterday, approached nurses station around 0100 stating the voices were bothersome. Pt could be seen sitting near the nurses station responding to internal stimuli. Cross taper from Seroquel to Loxapine continues. Discharge to be determined.

## 2023-06-15 NOTE — NURSING NOTE
Pt is calm, cooperative, and pleasant. He is visible in the milieu and socializes with select peers. Denies SI/HI/AVH. Pt was seen RIS shortly after assessment. PRN nicotine gum given. Q 7 min checks maintained for safety.

## 2023-06-15 NOTE — SOCIAL WORK
Pt is not yet cleared for discharge d/t medication titration and ongoing symptoms. Plan for rehab bed search once medication titration is complete.

## 2023-06-15 NOTE — PROGRESS NOTES
06/15/23 1510   Team Meeting   Meeting Type Tx Team Meeting   Initial Conference Date 06/15/23   Team Members Present   Team Members Present Physician;Nurse;   Physician Team Member [facility]   Nursing Team Member [id number] Management Team Member Wendie   Patient/Family Present   Patient Present No   Patient's Family Present No     Tx team attempted to meet with pt to review tx plan. Pt was asleep and did not respond to verbal prompting to wake up. Pt appeared to be sleeping soundly for most of the day d/t AH overnight. Tx team will continue to meet with Pt to review tx plan.

## 2023-06-15 NOTE — PROGRESS NOTES
Progress Note - Behavioral Health   Gokul Childs 26 y.o. male MRN: [id number]  Unit/Bed#: U 344-02 Encounter: [id number]    Assessment/Plan   Principal Problem:    Psychosis ([address]) ro Schizophrenia vs Schizoaffective  Active Problems:    Legally blind    Hearing loss    Asthma    Medical clearance for psychiatric admission    Bilateral impacted cerumen    Prolonged erection    Intellectual disability      Recommended Treatment:   Increase to Loxapine 15mg TID given breakthrough auditory hallucinations  Continue Depakote 750mg BID for mood  Continue melatonin 3mg at night for sleep    Continue with pharmacotherapy, group therapy, milieu therapy and occupational therapy. Continue to assess for adverse medication side effects. Encourage Amirah Can to participate in nonverbal forms of therapy including journaling and art/music therapy. Continue frequent safety checks and vitals per unit protocol. Continue to engage CM/SW to assist with collateral, disposition planning, and the implementation of an individualized, patient-centered plan of care. Continue medical management by medical team.  Case discussed with treatment team.    Legal Status: 201  ------------------------------------------------------------    Subjective: All documentation including nursing notes, medication history to ensure medication adherence on the unit, labs, and vitals were reviewed. Gokul was evaluated this morning for continuity of care and no acute distress noted throughout the evaluation. Over the past 24 hours per nursing report, Gokul has been cooperative on the unit and compliant with medications. Per nursing note at [address] on 6/15, patient stated "the voices are really bad" and that they were making him "anxious" and unable to sleep. Patient given PRN atarax and zyprexa, which were effective per nursing note. Today, Gokul is consenting for safety on the unit.  Gokul reports feeling "good." Gokul notes having "pretty fine" sleep. Tierraer states having a "pretty great" appetite. Gokul has been taking the medications as prescribed and reporting no side effects. Patient was seen and examined today in the interview room. We discussed the patient's episode of auditory hallucinations last night. Patient stated that he was lying in bed when "my voices started talking to me". Patient states that the voices were "just messing with me" and were "talking about someone I knew who had passed". He states that the voices were repeating themselves "over and over". Patient stated that he found th voices distressing. He reports that the voices were not telling him to do anything. He reports that after receiving zyprexa from the nurse, the voices went away and he was able to go back to sleep. Patient denies any visual hallucinations during this time. Patient does not report any hallucinations at the time of this interview. Patient also mentioned that he attended 2 groups yesterday, which he enjoyed. He participated in "music" and "arts and crafts". He states that his 2 treatment goals during his inpatient stay are to not experience hallucinations anymore and to treat his anxiety and depression. He feels his anxiety and depression treatment are going "so-so". He feels it would be helpful for him to write down the things that make him anxious. He states that he thinks this would be a good goal for him today. Tierraer denies suicidal ideations. Tierraer denies homicidal ideations. PRNs overnight: Zyprexa 10mg, Atarax 100mg  VS: Reviewed, within normal limits    Progress Toward Goals: slow improvement    Psychiatric Review of Systems:  Behavior over the last 24 hours:  unchanged  Sleep: Disrupted from [address]. Patient was able to fall back asleep after receiving zyprexa and atarax.   Appetite: "pretty great"  Medication side effects: No   ROS: no complaints    Vital signs in last 24 hours:  Temp:  [98.1 °F (36.7 °C)] 98.1 °F (36.7 °C)  HR: [95] 95  Resp:  [16] 16  BP: (130)/(70) 130/70    Laboratory results:  I have personally reviewed all pertinent laboratory/tests results. No results found for this or any previous visit (from the past 48 hour(s)). Mental Status Evaluation:    Appearance:  dressed appropriately, looks older than stated age, Overtly appearing AA male, overweight   Behavior:  cooperative, marginal eye contact, slgihtly hunched over in chair, intermittently resting his head on the table   Speech:  normal rate, normal volume, scant   Mood:  "good"   Affect:  blunted   Thought Process:  goal directed, linear   Associations: concrete associations   Thought Content:  no overt delusions   Perceptual Disturbances: Reports AH of voices talking about "someone who had passed". The voices repeated themselves "over and over" and were distressing to patient. Denies CAH. Denies VH. Patient not appearing to be responding to internal stimuli.    Risk Potential: Suicidal ideation - None at present  Homicidal ideation - None at present  Potential for aggression - Not at present   Sensorium:  oriented to person, place and situation   Memory:  recent and remote memory grossly intact   Consciousness:  alert and awake   Attention/Concentration: attention span and concentration are age appropriate   Insight:  improving and partial   Judgment: improved and partial   Gait/Station: normal gait/station   Motor Activity: no abnormal movements       Current Medications:  Current Facility-Administered Medications   Medication Dose Route Frequency Provider Last Rate   • acetaminophen  650 mg Oral Q6H PRN Erasto Mejia MD     • acetaminophen  650 mg Oral Q4H PRN Erasto Mejia MD     • acetaminophen  975 mg Oral Q6H PRN Erasto Mejia MD     • albuterol  2 puff Inhalation Q4H PRN Erasto Mejia MD     • aluminum-magnesium hydroxide-simethicone  30 mL Oral Q4H PRN Erasto Mejia MD     • benztropine  1 mg Oral Q4H PRN Max 6/day Erasto Mejia MD     • hydrOXYzine HCL  50 mg Oral Q6H PRN Max 4/day Fernandez Topete MD      Or   • diphenhydrAMINE  50 mg Intramuscular Q6H PRN Fernandez Topete MD     • divalproex sodium  750 mg Oral Q12H [address] Sandra Lowe MD     • fluticasone  1 spray Nasal Daily Fernandez Topete MD     • hydrOXYzine HCL  100 mg Oral Q6H PRN Max 4/day Fernandez Topete MD      Or   • LORazepam  2 mg Intramuscular Q6H PRN Fernandez Topete MD     • hydrOXYzine HCL  25 mg Oral Q6H PRN Max 4/day Fernandez Topete MD     • loxapine  10 mg Oral TID Maddi Hai, DO     • melatonin  3 mg Oral HS Fernandez Topete MD     • melatonin  3 mg Oral HS PRN Maddi Remington DO     • nicotine polacrilex  4 mg Oral Q2H PRN Fernandez Topete MD     • OLANZapine  10 mg Oral Q3H PRN Max 3/day Selene Mac MD      Or   • OLANZapine  10 mg Intramuscular Q3H PRN Max 3/day Selene Mac MD     • OLANZapine  5 mg Oral Q3H PRN Max 6/day Selene Mac MD      Or   • OLANZapine  5 mg Intramuscular Q3H PRN Max 6/day Selene Mac MD     • OLANZapine  2.5 mg Oral Q3H PRN Max 8/day Selene Mac MD     • ondansetron  4 mg Oral Q8H PRN Maddi Macedo DO     • polyethylene glycol  17 g Oral Daily PRN Fernandez Topete MD     • pseudoephedrine  120 mg Oral BID PRN Maddi Macedo DO     • senna-docusate sodium  1 tablet Oral Daily PRN Fernandez Topete MD     • sterile water          • sterile water          • sterile water                Jimmy Yusuf 06/15/23  MS IV    This note was completed in part utilizing [employer] Software. Grammatical, translation, syntax errors, random word insertions, spelling mistakes, and incomplete sentences may be an occasional consequence of this system secondary to software limitations with voice recognition, ambient noise, and hardware issues.  If you have any questions or concerns about the content, text, or information contained within the body of this dictation, please contact the provider for clarification.

## 2023-06-15 NOTE — NURSING NOTE
Pt approached nurse and states, "the voices are really bad" Pt sitting by nurses' station RTIS loudly verbally and with gestures. Pt denies CAH, but endorses "loud" AH that are making him anxious and unable to sleep. Pt given PRN zyprexa 10mg and atarax 100mg PO @ 00:20. PRN atarax and zyprexa effective; pt resting quietly in bed.

## 2023-06-16 PROCEDURE — 99232 SBSQ HOSP IP/OBS MODERATE 35: CPT | Performed by: PSYCHIATRY & NEUROLOGY

## 2023-06-16 RX ORDER — GLYCOPYRROLATE 1 MG/1
1 TABLET ORAL 3 TIMES DAILY
Status: DISCONTINUED | OUTPATIENT
Start: 2023-06-16 | End: 2023-08-03

## 2023-06-16 RX ADMIN — FLUTICASONE PROPIONATE 1 SPRAY: 50 SPRAY, METERED NASAL at 08:31

## 2023-06-16 RX ADMIN — LOXAPINE 10 MG: 10 CAPSULE ORAL at 08:30

## 2023-06-16 RX ADMIN — LOXAPINE 15 MG: 5 CAPSULE ORAL at 16:40

## 2023-06-16 RX ADMIN — HYDROXYZINE HYDROCHLORIDE 100 MG: 50 TABLET, FILM COATED ORAL at 23:23

## 2023-06-16 RX ADMIN — DIVALPROEX SODIUM 750 MG: 250 TABLET, DELAYED RELEASE ORAL at 21:19

## 2023-06-16 RX ADMIN — OLANZAPINE 10 MG: 10 TABLET, FILM COATED ORAL at 23:22

## 2023-06-16 RX ADMIN — GLYCOPYRROLATE 1 MG: 1 TABLET ORAL at 16:40

## 2023-06-16 RX ADMIN — GLYCOPYRROLATE 1 MG: 1 TABLET ORAL at 21:19

## 2023-06-16 RX ADMIN — DIVALPROEX SODIUM 750 MG: 250 TABLET, DELAYED RELEASE ORAL at 08:30

## 2023-06-16 RX ADMIN — Medication 3 MG: at 21:19

## 2023-06-16 RX ADMIN — LOXAPINE 15 MG: 5 CAPSULE ORAL at 21:18

## 2023-06-16 NOTE — TREATMENT TEAM
06/16/23 1300   Activity/Group Checklist   Group   (recovery group)   Attendance Attended   Attendance Duration (min) 31-45   Interactions Did not interact   Affect/Mood Appropriate   Goals Achieved Discussed coping strategies; Discussed self-esteem issues; Able to listen to others

## 2023-06-16 NOTE — NURSING NOTE
Patient was briefly awake this morning, medication compliant but refused to eat his breakfast. He reports having auditory hallucinations but denies SI/HI, and visual hallucinations. He has been pleasant and cooperative on the unit, with no behavioral issues.

## 2023-06-16 NOTE — PROGRESS NOTES
Progress Note - Behavioral Health   Gokul Andersen 26 y.o. male MRN: [id number]  Unit/Bed#: U 344-02 Encounter: [id number]    Assessment/Plan   Principal Problem:    Psychosis ([address]) ro Schizophrenia vs Schizoaffective  Active Problems:    Legally blind    Hearing loss    Asthma    Medical clearance for psychiatric admission    Bilateral impacted cerumen    Prolonged erection    Intellectual disability      Recommended Treatment:   Increase to Loxapine 15mg TID given breakthrough auditory hallucinations  Continue Depakote 750mg BID for mood  Continue melatonin 3mg at night for sleep  Start Glycopyrrolate 1mg TID for drooling     Continue with pharmacotherapy, group therapy, milieu therapy and occupational therapy. Continue to assess for adverse medication side effects. Encourage Beni Peppers to participate in nonverbal forms of therapy including journaling and art/music therapy. Continue frequent safety checks and vitals per unit protocol. Continue to engage CM/SW to assist with collateral, disposition planning, and the implementation of an individualized, patient-centered plan of care. Continue medical management by medical team.  Case discussed with treatment team.     Legal Status: 201  ------------------------------------------------------------    Subjective: All documentation including nursing notes, medication history to ensure medication adherence on the unit, labs, and vitals were reviewed. Gokul was evaluated this morning for continuity of care and no acute distress noted throughout the evaluation. Over the past 24 hours per nursing report, Gokul has been cooperative on the unit and compliant with medications. Today, Gokul is consenting for safety on the unit. Gokul reports feeling "ok." Gokul notes having "ok" sleep. Gokul states having a "fine" appetite. Gokul has been taking the medications as prescribed and reporting some mild drooling.  He states that it is slightly bothersome and sometimes "keeps me up at night". Patient was seen and examined today in interview room. Gokul states that he was hearing voices last night when he was lying in bed. He states that the voices were "picking on him". He states that the voices belonged to a "man and a woman". He states that the voices were bothersome because they were causing "me to talk to myself" and were "keeping my roommate up". Gokul reports that after taking zyprexa, the voices "went away". He denies CAH. He denies VH. Patient states that he did not write down his stressors as per his previous goal, but intends to do so today. He also intends to go to groups today. He said that doing karaoke in groups last night was "awesome". Gokul denies suicidal ideations. Gokul denies homicidal ideations. PRNs overnight: Zyprexa 10mg, Atarax 100mg   VS: Reviewed, within normal limits    Progress Toward Goals: slow improvement    Psychiatric Review of Systems:  Behavior over the last 24 hours:  unchanged  Sleep: "ok"  Appetite: "fine"  Medication side effects: drooling   ROS: all other systems are negative    Vital signs in last 24 hours:  Temp:  [97.8 °F (36.6 °C)] 97.8 °F (36.6 °C)  HR:  [81] 81  Resp:  [16] 16  BP: (120)/(70) 120/70    Laboratory results:  I have personally reviewed all pertinent laboratory/tests results.   Recent Results (from the past 48 hour(s))   ECG 12 lead    Collection Time: 06/15/23  1:51 PM   Result Value Ref Range    Ventricular Rate 82 BPM    Atrial Rate 82 BPM    CA Interval 148 ms    QRSD Interval 96 ms    QT Interval 372 ms    QTC Interval 434 ms    P [place] 54 degrees    QRS Axis 57 degrees    T Wave Axis 50 degrees   ECG 12 lead    Collection Time: 06/15/23  1:51 PM   Result Value Ref Range    Ventricular Rate 80 BPM    Atrial Rate 80 BPM    CA Interval 148 ms    QRSD Interval 96 ms    QT Interval 372 ms    QTC Interval 429 ms    P [place] 53 degrees    QRS Axis 57 degrees    T Wave Axis 48 degrees   ECG 12 lead    Collection Time: 06/15/23  1:52 PM   Result Value Ref Range    Ventricular Rate 76 BPM    Atrial Rate 76 BPM    VA Interval 144 ms    QRSD Interval 96 ms    QT Interval 378 ms    QTC Interval 425 ms    P [place] 53 degrees    QRS Axis 55 degrees    T Wave Axis 48 degrees         Mental Status Evaluation:    Appearance:  casually dressed, marginal hygiene, looks older than stated age, overtly appearing AA male, overweight, tired   Behavior:  cooperative, slow responses, poor eye contact   Speech:  slow, scant, slurred   Mood:  "ok"   Affect:  blunted, mood-congruent   Thought Process:  goal directed, linear   Associations: concrete associations   Thought Content:  no overt delusions   Perceptual Disturbances: Reports AH of a man and woman "picking on me". States that the voices were bothersome because they caused "me to talk to myself" and "kept my roommate up". Denies CAH. Denies VH.  and Does not appear to be responding to internal stimuli   Risk Potential: Suicidal ideation - None at present  Homicidal ideation - None at present  Potential for aggression - Not at present   Sensorium:  oriented to person, place and situation   Memory:  recent and remote memory grossly intact   Consciousness:  alert and awake   Attention/Concentration: attention span and concentration appear shorter than expected for age   Insight:  limited, stable   Judgment: limited, stable   Gait/Station: normal gait/station   Motor Activity: no abnormal movements       Current Medications:  Current Facility-Administered Medications   Medication Dose Route Frequency Provider Last Rate   • acetaminophen  650 mg Oral Q6H PRN Erasto Mejia MD     • acetaminophen  650 mg Oral Q4H PRN Erasto Mejia MD     • acetaminophen  975 mg Oral Q6H PRN Erasto Mejia MD     • albuterol  2 puff Inhalation Q4H PRN Erasto Mjeia MD     • aluminum-magnesium hydroxide-simethicone  30 mL Oral Q4H PRN Erasto Mejia MD     • benztropine  1 mg Oral Q4H PRN Max 6/day Jewell Keita MD     • hydrOXYzine HCL  50 mg Oral Q6H PRN Max 4/day Jewell Keita MD      Or   • diphenhydrAMINE  50 mg Intramuscular Q6H PRN Jewell Keita MD     • divalproex sodium  750 mg Oral Q12H [facility] Rosendo De La Cruz MD     • fluticasone  1 spray Nasal Daily Jewell Keita MD     • hydrOXYzine HCL  100 mg Oral Q6H PRN Max 4/day Jewell Keita MD      Or   • LORazepam  2 mg Intramuscular Q6H PRN Jewell Keita MD     • hydrOXYzine HCL  25 mg Oral Q6H PRN Max 4/day Jewell Keita MD     • loxapine  10 mg Oral TID Anu Britt DO     • melatonin  3 mg Oral HS Jewell Keita MD     • melatonin  3 mg Oral HS PRN Anu Britt DO     • nicotine polacrilex  4 mg Oral Q2H PRN Jewell Keita MD     • OLANZapine  10 mg Oral Q3H PRN Max 3/day Dada Jones MD      Or   • OLANZapine  10 mg Intramuscular Q3H PRN Max 3/day Dada Jones MD     • OLANZapine  5 mg Oral Q3H PRN Max 6/day Dada Jones MD      Or   • OLANZapine  5 mg Intramuscular Q3H PRN Max 6/day Dada Jones MD     • OLANZapine  2.5 mg Oral Q3H PRN Max 8/day Dada Jones MD     • ondansetron  4 mg Oral Q8H PRN Anu Britt DO     • polyethylene glycol  17 g Oral Daily PRN Jewell Keita MD     • pseudoephedrine  120 mg Oral BID PRN Anu Britt DO     • senna-docusate sodium  1 tablet Oral Daily PRN Jewell Keita MD     • sterile water          • sterile water          • sterile water                Runell Pion 06/16/23  MS IV    This note was completed in part utilizing [employer] Direct Software. Grammatical, translation, syntax errors, random word insertions, spelling mistakes, and incomplete sentences may be an occasional consequence of this system secondary to software limitations with voice recognition, ambient noise, and hardware issues.  If you have any questions or concerns about the content, text, or information contained within the body of this dictation, please contact the provider for clarification.

## 2023-06-16 NOTE — NURSING NOTE
Pt pleasant and cooperative, Pt visible on the unit seen talking with peers, Pt denied all pain anxiety and depression, Pt denies AH and VH but was seen and heard loudly responding to internal stimuli, Pt given PRN Atarax and Zyprexa at 2204 will reassess Pt denied SI and HI, Pt took all scheduled HS meds, Q7 min safety checks maintained

## 2023-06-16 NOTE — SOCIAL WORK
Tx team again attempted to meet with pt to review tx plan as he was sleeping yesterday. Tx plan reviewed with pt, medications discussed and pt encouraged to attend groups. Pt signed tx plan.

## 2023-06-16 NOTE — PROGRESS NOTES
06/16/23 0836   Team Meeting   Meeting Type Daily Rounds   Team Members Present   Team Members Present Physician;Nurse;   Physician Team Member [id number] [facility] Team Member [facility] Management Team Member Wendie   Patient/Family Present   Patient Present No   Patient's Family Present No   Pt responding loudly overnight, received PRN Zyprexa and Atarax which were effective. Pt med/meal compliant. Discharge to be determined.

## 2023-06-17 PROCEDURE — 99232 SBSQ HOSP IP/OBS MODERATE 35: CPT | Performed by: PSYCHIATRY & NEUROLOGY

## 2023-06-17 RX ORDER — LORAZEPAM 1 MG/1
2 TABLET ORAL EVERY 8 HOURS PRN
Status: DISCONTINUED | OUTPATIENT
Start: 2023-06-17 | End: 2023-06-22

## 2023-06-17 RX ORDER — LORAZEPAM 2 MG/ML
2 INJECTION INTRAMUSCULAR EVERY 8 HOURS PRN
Status: DISCONTINUED | OUTPATIENT
Start: 2023-06-17 | End: 2023-06-22

## 2023-06-17 RX ADMIN — OLANZAPINE 5 MG: 5 TABLET, FILM COATED ORAL at 22:17

## 2023-06-17 RX ADMIN — GLYCOPYRROLATE 1 MG: 1 TABLET ORAL at 22:00

## 2023-06-17 RX ADMIN — LOXAPINE 15 MG: 5 CAPSULE ORAL at 09:28

## 2023-06-17 RX ADMIN — LOXAPINE 15 MG: 5 CAPSULE ORAL at 21:45

## 2023-06-17 RX ADMIN — Medication 3 MG: at 22:17

## 2023-06-17 RX ADMIN — DIVALPROEX SODIUM 750 MG: 250 TABLET, DELAYED RELEASE ORAL at 09:28

## 2023-06-17 RX ADMIN — DIVALPROEX SODIUM 750 MG: 250 TABLET, DELAYED RELEASE ORAL at 21:45

## 2023-06-17 RX ADMIN — Medication 3 MG: at 23:29

## 2023-06-17 RX ADMIN — LOXAPINE 15 MG: 5 CAPSULE ORAL at 17:38

## 2023-06-17 RX ADMIN — NICOTINE POLACRILEX 4 MG: 4 GUM, CHEWING BUCCAL at 19:40

## 2023-06-17 RX ADMIN — GLYCOPYRROLATE 1 MG: 1 TABLET ORAL at 17:38

## 2023-06-17 NOTE — NURSING NOTE
Patient was in his room responding to internal stimuli, loudly and verbally aggressively e.g. cursing and angry/paranoid content. Patient is unable to sleep and is anxious about that. Patient was offered and accepted Zyprexa 10mgs po prn auditory hallucinationsand Atarax 100mgs po prn anxiety.

## 2023-06-17 NOTE — PROGRESS NOTES
Progress Note - Behavioral Health   Gokul Grier 26 y.o. male MRN: [id number]  Unit/Bed#: [facility] 344-02 Encounter: [id number]    Assessment/Plan   Principal Problem:    Psychosis ([address]) ro Schizophrenia vs Schizoaffective  Active Problems:    Legally blind    Hearing loss    Asthma    Medical clearance for psychiatric admission    Bilateral impacted cerumen    Prolonged erection    Intellectual disability    Recommended Treatment:   · Continue Depakote 750 mg BID for mood stabilization  · Continue Loxapine 15 mg TID for psychotic symptoms  · Continue Melatonin 3 mg QHS for sleep  · Continue Glycopyrrolate 1 mg TID for sialorrhea   · PRN medications adjusted to minimize Zyprexa dose given priapism can be related to medication administration. Patient can receive PRN Ativan 2 mg PO or IM for severe agitation. • Continue with group therapy, milieu therapy and occupational therapy. • Continue frequent safety checks and vitals per unit protocol. • Continue medication management per SLIM as indicated. • Case discussed with treatment team.  • Discharge disposition: TBD   • Legal status: 201    Risks, benefits and possible side effects of Medications: Risks, benefits, and possible side effects of medications have previously been explained. No new medications at this time. ------------------------------------------------------------    Subjective: Patient seen and evaluated for continuity of care. Patient in no acute distress. Per nursing report, Gokul has been difficult to awaken in the morning, observed responding to internal stimuli - patient verbally agitated and responding overnight which required PRN medication. He has been cooperative with care and compliant with medications. Patient required PRN Zyprexa at 10 mg at 2322 and Atarax 100 mg at 2323. Today, Gokul was difficult to awaken this morning. Patient woke for lunch and was evaluated after. Patient reported good sleep and appetite.  He endorsed a good mood overall. Patient denied SI or HI at time of evaluation. Patient denied AVH, though notes he has had auditory hallucinations recently. Discussed pain in setting of reported priapism during this admission, patient denied. Patient denied N/V/C/D, headache, restlessness. Progress Toward Goals: slow improvement    Psychiatric Review of Systems:  Behavior over the last 24 hours: unchanged  Sleep: hypersomnia  Appetite: adequate  Medication side effects: none verbalized  ROS: Complete review of systems is negative except as noted above.     Vital signs in last 24 hours:  Temp:  [98 °F (36.7 °C)] 98 °F (36.7 °C)  HR:  [104] 104  Resp:  [16] 16  BP: (129)/(71) 129/71    Mental Status Exam:  Appearance:  alert, intermittent eye contact, appears older than stated age, casually dressed and marginal grooming/hygiene   Behavior:  calm and cooperative   Motor: no abnormal movements and normal gait and balance   Speech:  spontaneous, clear, slow, scant and coherent   Mood:  anxious   Affect:  blunted   Thought Process:  generally linear   Thought Content: no verbalized delusions or overt paranoia   Perceptual disturbances: no reported hallucinations and appears internally preoccupied   Risk Potential: No active or passive suicidal or homicidal ideation was verbalized during interview   Cognition: oriented to self and situation, impaired abstract reasoning and attention span appeared shorter than expected for age   Insight:  Limited   Judgment: Limited     Current Medications:  Current Facility-Administered Medications   Medication Dose Route Frequency Provider Last Rate   • acetaminophen  650 mg Oral Q6H PRN Redd Zepeda MD     • acetaminophen  650 mg Oral Q4H PRN Redd Zepeda MD     • acetaminophen  975 mg Oral Q6H PRN Redd Zepeda MD     • albuterol  2 puff Inhalation Q4H PRN Redd Zepeda MD     • aluminum-magnesium hydroxide-simethicone  30 mL Oral Q4H PRN Redd Zepeda MD     • benztropine  1 mg Oral Q4H PRN Max 6/day Erasto Mejia MD     • hydrOXYzine HCL  50 mg Oral Q6H PRN Max 4/day Erasto Mejia MD      Or   • diphenhydrAMINE  50 mg Intramuscular Q6H PRN Erasto Mejia MD     • divalproex sodium  750 mg Oral Q12H [address]  [address] Simon MD     • fluticasone  1 spray Nasal Daily Erasto Mejia MD     • glycopyrrolate  1 mg Oral TID Jamal Mckeon MD     • hydrOXYzine HCL  100 mg Oral Q6H PRN Max 4/day Erasto Mejia MD      Or   • LORazepam  2 mg Intramuscular Q6H PRN Erasto Mejia MD     • hydrOXYzine HCL  25 mg Oral Q6H PRN Max 4/day Erasto Mejia MD     • loxapine  15 mg Oral TID Jamal Mckeon MD     • melatonin  3 mg Oral HS Erasto Mejia MD     • melatonin  3 mg Oral HS PRN Shanti Zeng DO     • nicotine polacrilex  4 mg Oral Q2H PRN Erasto Mejia MD     • OLANZapine  10 mg Oral Q3H PRN Max 3/day Cheryl Johnson MD      Or   • OLANZapine  10 mg Intramuscular Q3H PRN Max 3/day Cheryl Johnson MD     • OLANZapine  5 mg Oral Q3H PRN Max 6/day Cheryl Johnson MD      Or   • OLANZapine  5 mg Intramuscular Q3H PRN Max 6/day Cheryl Johnson MD     • OLANZapine  2.5 mg Oral Q3H PRN Max 8/day Cheryl Johnson MD     • ondansetron  4 mg Oral Q8H PRN Shanti Zeng DO     • polyethylene glycol  17 g Oral Daily PRN Erasto Mejia MD     • pseudoephedrine  120 mg Oral BID PRN Shanti Zeng DO     • senna-docusate sodium  1 tablet Oral Daily PRN Erasto Mejia MD     • sterile water          • sterile water          • sterile water              Behavioral Health Medications: all current active meds have been reviewed. Changes as in plan section above. Laboratory results:  I have personally reviewed all pertinent laboratory/tests results.   Recent Results (from the past 48 hour(s))   ECG 12 lead    Collection Time: 06/15/23  1:51 PM   Result Value Ref Range    Ventricular Rate 82 BPM    Atrial Rate 82 BPM    NM Interval 148 ms    QRSD Interval 96 ms QT Interval 372 ms    QTC Interval 434 ms    P [place] 54 degrees    QRS Axis 57 degrees    T Wave Axis 50 degrees   ECG 12 lead    Collection Time: 06/15/23  1:51 PM   Result Value Ref Range    Ventricular Rate 80 BPM    Atrial Rate 80 BPM    NM Interval 148 ms    QRSD Interval 96 ms    QT Interval 372 ms    QTC Interval 429 ms    P [place] 53 degrees    QRS Axis 57 degrees    T Wave Axis 48 degrees   ECG 12 lead    Collection Time: 06/15/23  1:52 PM   Result Value Ref Range    Ventricular Rate 76 BPM    Atrial Rate 76 BPM    NM Interval 144 ms    QRSD Interval 96 ms    QT Interval 378 ms    QTC Interval 425 ms    P [place] 53 degrees    QRS Axis 55 degrees    T Wave Axis 48 degrees      Iker Garcia DO   Psychiatry Resident, PGY-II

## 2023-06-17 NOTE — NURSING NOTE
Patient refused breakfast but was medication compliant. He continues to experience auditory hallucinations but denies visual hallucinations, and SI/HI. He is calm and cooperative.

## 2023-06-17 NOTE — NURSING NOTE
Patient has been responding to internal stimuli alone or in the presence of staff and /or peers without self consciousness. He received HS medication and was cooperative with them.  He denies S.I.H.I.V/H

## 2023-06-17 NOTE — NURSING NOTE
Still responding to internal stimuli in his bedroom under his covers but he is quieter than before he received the prn  Medication.

## 2023-06-18 PROCEDURE — 99232 SBSQ HOSP IP/OBS MODERATE 35: CPT | Performed by: PSYCHIATRY & NEUROLOGY

## 2023-06-18 RX ADMIN — DIVALPROEX SODIUM 750 MG: 250 TABLET, DELAYED RELEASE ORAL at 21:00

## 2023-06-18 RX ADMIN — GLYCOPYRROLATE 1 MG: 1 TABLET ORAL at 21:00

## 2023-06-18 RX ADMIN — OLANZAPINE 5 MG: 5 TABLET, FILM COATED ORAL at 20:23

## 2023-06-18 RX ADMIN — GLYCOPYRROLATE 1 MG: 1 TABLET ORAL at 15:58

## 2023-06-18 RX ADMIN — LORAZEPAM 2 MG: 1 TABLET ORAL at 21:48

## 2023-06-18 RX ADMIN — LOXAPINE 15 MG: 5 CAPSULE ORAL at 09:57

## 2023-06-18 RX ADMIN — Medication 3 MG: at 21:00

## 2023-06-18 RX ADMIN — LOXAPINE 15 MG: 5 CAPSULE ORAL at 15:58

## 2023-06-18 RX ADMIN — NICOTINE POLACRILEX 4 MG: 4 GUM, CHEWING BUCCAL at 15:57

## 2023-06-18 RX ADMIN — DIVALPROEX SODIUM 750 MG: 250 TABLET, DELAYED RELEASE ORAL at 09:57

## 2023-06-18 RX ADMIN — LOXAPINE 15 MG: 5 CAPSULE ORAL at 21:00

## 2023-06-18 RX ADMIN — GLYCOPYRROLATE 1 MG: 1 TABLET ORAL at 09:59

## 2023-06-18 RX ADMIN — HYDROXYZINE HYDROCHLORIDE 100 MG: 50 TABLET, FILM COATED ORAL at 20:23

## 2023-06-18 NOTE — NURSING NOTE
Patient sitting by the nurses station, responding to self and laughing inappropriately. Patient boisterous "Am going to kill you". Patient pacing the hallways and the nurses station. Broset scale score 2; PRN Zyprexa 5 mg given at 2217. Will continue to monitor.

## 2023-06-18 NOTE — NURSING NOTE
Pt approached the nursing station and complained of insomnia. Pt received PRN melatonin 3 mg. Upon reassessment one hour later at 0029 patient is in their bed sleeping, medication effective.

## 2023-06-18 NOTE — PROGRESS NOTES
Progress Note - Behavioral Health   Gokul Machado 26 y.o. male MRN: [id number]  Unit/Bed#: [facility] 344-02 Encounter: [id number]    Assessment/Plan   Principal Problem:    Psychosis ([address]) ro Schizophrenia vs Schizoaffective  Active Problems:    Legally blind    Hearing loss    Asthma    Medical clearance for psychiatric admission    Bilateral impacted cerumen    Prolonged erection    Intellectual disability    Recommended Treatment:   · Continue Depakote 750 mg BID for mood stabilization  · Continue Loxapine 15 mg TID for psychotic symptoms  · Continue Melatonin 3 mg QHS for sleep  · Continue Glycopyrrolate 1 mg TID for sialorrhea   • Continue with group therapy, milieu therapy and occupational therapy. • Continue frequent safety checks and vitals per unit protocol. • Continue medication management per SLIM as indicated. • Case discussed with treatment team.  • Discharge disposition: TBD   • Legal status: 201    Risks, benefits and possible side effects of Medications: Risks, benefits, and possible side effects of medications have previously been explained. No new medications at this time. ------------------------------------------------------------    Subjective: Patient seen and evaluated for continuity of care. Patient in no acute distress. Per nursing report, Gokul refused breakfast yesterday, though was able to take morning medications. Patient observed responding to self at time and bizarre with peers in milieu later in the day, patient received PRN with good effectiveness. Patient required PRN Zyprexa 5 mg at 2217, Melatonin 3 mg at 2329, and Nicorette gum 4 mg x1. Today, Gokul is sleeping prior to evaluation, patient was able to be awoken with repeated attempts and participate in evaluation. Patient did maintain sheet over his head for evaluation. He reports he feels "good," and had good sleep and appetite. He denies SI or HI, and denies AVH at time of evaluation.  The patient denies side effects of medication. He denies pain, headache, N/V/C/D. Progress Toward Goals: slow improvement    Psychiatric Review of Systems:  Behavior over the last 24 hours: unchanged  Sleep: hypersomnia  Appetite: adequate  Medication side effects: none verbalized  ROS:  Complete review of systems is negative except as noted above.     Vital signs in last 24 hours:  Temp:  [97.8 °F (36.6 °C)] 97.8 °F (36.6 °C)  HR:  [106] 106  Resp:  [16] 16  BP: (129)/(63) 129/63    Mental Status Exam:  Appearance:  alert, no eye contact, appears older than stated age, casually dressed and marginal grooming/hygiene   Behavior:  calm and cooperative, laying in bed with sheets over face   Motor: no abnormal movements and unable to assess gait and balance as patient remained in hospital bed   Speech:  clear, slow, scant and coherent   Mood:  "good"   Affect:  blunted   Thought Process:  generally linear   Thought Content: no verbalized delusions or overt paranoia   Perceptual disturbances: no reported hallucinations and does not appear to be responding to internal stimuli at this time   Risk Potential: No active or passive suicidal or homicidal ideation was verbalized during interview   Cognition: oriented to self and situation, impaired abstract reasoning and attention span appeared shorter than expected for age   Insight:  Limited   Judgment: Limited     Current Medications:  Current Facility-Administered Medications   Medication Dose Route Frequency Provider Last Rate   • acetaminophen  650 mg Oral Q6H PRN Misael Guzman MD     • acetaminophen  650 mg Oral Q4H PRN Misael Guzman MD     • acetaminophen  975 mg Oral Q6H PRN Misael Guzman MD     • albuterol  2 puff Inhalation Q4H PRN Misael Guzman MD     • aluminum-magnesium hydroxide-simethicone  30 mL Oral Q4H PRN Misael Guzman MD     • benztropine  1 mg Oral Q4H PRN Max 6/day Misael Guzman MD     • hydrOXYzine HCL  50 mg Oral Q6H PRN Max 4/day Misael Guzman MD      Or   • diphenhydrAMINE  50 mg Intramuscular Q6H PRN Ness Teresa MD     • divalproex sodium  750 mg Oral Q12H [facility] Isabel Chavez MD     • fluticasone  1 spray Nasal Daily Ness Teresa MD     • glycopyrrolate  1 mg Oral TID Linh Saab MD     • hydrOXYzine HCL  100 mg Oral Q6H PRN Max 4/day Ness Teresa MD      Or   • LORazepam  2 mg Intramuscular Q6H PRN Ness Teresa MD     • hydrOXYzine HCL  25 mg Oral Q6H PRN Max 4/day Ness Teresa MD     • LORazepam  2 mg Oral Q8H PRN Meri Ramsay DO      Or   • LORazepam  2 mg Intravenous Q8H PRN Meri Ramsay DO     • loxapine  15 mg Oral TID Linh Saab MD     • melatonin  3 mg Oral HS Ness Teresa MD     • melatonin  3 mg Oral HS PRN Tin Corrales DO     • nicotine polacrilex  4 mg Oral Q2H PRN Ness Teresa MD     • OLANZapine  5 mg Oral Q3H PRN Max 6/day Claudia Koehler MD      Or   • OLANZapine  5 mg Intramuscular Q3H PRN Max 6/day Claudia Koehler MD     • OLANZapine  2.5 mg Oral Q3H PRN Max 8/day Claudia Koehler MD     • ondansetron  4 mg Oral Q8H PRN Tin Corrales DO     • polyethylene glycol  17 g Oral Daily PRN Ness Teresa MD     • pseudoephedrine  120 mg Oral BID PRN Tin Corrales DO     • senna-docusate sodium  1 tablet Oral Daily PRN Ness Teresa MD     • sterile water          • sterile water          • sterile water              Behavioral Health Medications: all current active meds have been reviewed. Changes as in plan section above. Laboratory results:  I have personally reviewed all pertinent laboratory/tests results. No results found for this or any previous visit (from the past 48 hour(s)).      Tsering Antony DO   Psychiatry Resident, PGY-II

## 2023-06-19 PROCEDURE — 99232 SBSQ HOSP IP/OBS MODERATE 35: CPT | Performed by: PSYCHIATRY & NEUROLOGY

## 2023-06-19 RX ORDER — LOXAPINE SUCCINATE 10 MG/1
20 TABLET ORAL 3 TIMES DAILY
Status: DISCONTINUED | OUTPATIENT
Start: 2023-06-19 | End: 2023-06-21

## 2023-06-19 RX ADMIN — LOXAPINE 15 MG: 5 CAPSULE ORAL at 08:32

## 2023-06-19 RX ADMIN — GLYCOPYRROLATE 1 MG: 1 TABLET ORAL at 21:31

## 2023-06-19 RX ADMIN — GLYCOPYRROLATE 1 MG: 1 TABLET ORAL at 08:32

## 2023-06-19 RX ADMIN — DIVALPROEX SODIUM 750 MG: 250 TABLET, DELAYED RELEASE ORAL at 21:31

## 2023-06-19 RX ADMIN — DIVALPROEX SODIUM 750 MG: 250 TABLET, DELAYED RELEASE ORAL at 08:32

## 2023-06-19 RX ADMIN — GLYCOPYRROLATE 1 MG: 1 TABLET ORAL at 16:07

## 2023-06-19 RX ADMIN — LOXAPINE 20 MG: 10 CAPSULE ORAL at 21:31

## 2023-06-19 RX ADMIN — LOXAPINE 20 MG: 10 CAPSULE ORAL at 16:07

## 2023-06-19 RX ADMIN — Medication 3 MG: at 21:31

## 2023-06-19 RX ADMIN — FLUTICASONE PROPIONATE 1 SPRAY: 50 SPRAY, METERED NASAL at 08:33

## 2023-06-19 NOTE — PROGRESS NOTES
06/19/23 0828   Team Meeting   Meeting Type Daily Rounds   Team Members Present   Team Members Present Physician;Nurse;   Physician Team Member [id number] [facility] Team Member [facility] Management Team Member Rajiv   Patient/Family Present   Patient Present No   Patient's Family Present No   Continues to report AH. Calm, cooperative. Saturday night responding loudly to self, pacing, stating "I'm going to kill you". PRN zyprexa 5mg-effective. PRN melatonin-effective. Loudly responding last night, PRN zyprexa 5mg and atarax 100mg-ineffective. Content of voices are violent and aggressive. PRN ativan 2mg-effective. Dc tbd.

## 2023-06-19 NOTE — NURSING NOTE
Patient has been loudly responding to internal stimuli and [facility] Atarax 100mgs po prn at 2025. He also received his standing schedulrd medication. The Zyprexa/Atarax prn was ineffective. He additionally received scheduled HS medications  He continued to loudly hallucinate and respond to internal stimuli. The content of his responses is violent and aggressive although he is not himself aggressive or threatening. He is however very disturbed by "hearing voices" and he requested medication. He was given Ativan 2mgs po prn at 2148 and at this time he is still responding to internal stimuli.

## 2023-06-19 NOTE — NURSING NOTE
Pt seclusive to room this morning, declined breakfast stating he prefers to eat around 10am instead. Compliant with scheduled medications. Pt continues to report AH, denies SI/HI/VH at this time. Cooperative with no behavioral issues to be reported. Denies any unmet needs. Q7 minute safety checks maintained.

## 2023-06-19 NOTE — PROGRESS NOTES
Progress Note - Behavioral Health   Dyphier Millard Fleischer 26 y.o. male MRN: [id number]  Unit/Bed#: -02 Encounter: [id number]    Assessment/Plan   Principal Problem:    Psychosis ([address]) ro Schizophrenia vs Schizoaffective  Active Problems:    Legally blind    Hearing loss    Asthma    Medical clearance for psychiatric admission    Bilateral impacted cerumen    Prolonged erection    Intellectual disability      Recommended Treatment:   Increased loxapine to 20 mg 3 times daily for ongoing symptoms of psychosis  Continue Depakote 750 mg twice daily for mood stability  Most recent valproic acid level 6/12/2023 was 91  Continue melatonin 3 mg at bedtime for sleep    Continue to monitor patient for erections lasting greater than 45 minutes. Continue Sudafed 120 mg daily as needed for erection lasting greater than 45 minutes. Continue with group therapy, milieu therapy and occupational therapy. Continue frequent safety checks and vitals per unit protocol. Case discussed with treatment team.  Risks, benefits and possible side effects of Medications: Risks, benefits, and possible side effects of medications have been explained to the patient, who verbalizes understanding    ------------------------------------------------------------    Subjective:    Per nursing report, on the inpatient psychiatric unit Gokul continues to make slow progress. He remains with symptoms of psychosis. Per nursing report, yesterday on the inpatient psychiatric unit Gokul slept in did not eat breakfast.  He was noted to remain calm and cooperative on nursing assessment throughout the morning. On evening nursing assessment he was observed loudly responding to internal stimuli in his room. He received a as needed of Zyprexa 5 mg p.o. and Atarax 100 mg p.o. at around 8:25 PM.  These medications had limited effect and he received a dose of Ativan 2 mg p.o. at around 9:50 PM, which was effective and allowed the patient to fall asleep. On the inpatient psychiatric unit the patient has remained medication and meal compliant. Gokul was seen and interviewed at bedside today. At the time of interview, he was noted to be resting in his bed. This morning, Gokul was noted to be somnolent and was falling asleep every 5 seconds in conversation. He remained with limited eye contact, scant speech, and poverty of thought. He continues to appear preoccupied. Today, Gokul reports that he feels "okay" and "stable". He reports that he continues to experience auditory hallucinations on the inpatient psychiatric unit and stated that he last experienced auditory hallucinations last night, but he was unable to discuss further details at the time of interview today. He denies recently experiencing visual hallucinations. Gokul reports that he has been spending his time on the inpatient unit "sleeping, talking, and going to groups". He continues to feel tired and reports that he is planning to sleep in. At the time of interview, Gokul reports that he continues to eat well and sleep well in the hospital (he reports he slept about 6 to 8 hours last night). He reports he has been going to the bathroom without difficulty. He reports he has been taking his medications as directed and has not noticed any medication side effects. Gokul denies suicidal ideation, homicidal ideation. Progress Toward Goals: At this time the patient remains with limited improvement on the inpatient psychiatric unit. He continues to have periods (generally in the evening) where he is observed by nursing responding to internal stimuli    Psychiatric Review of Systems:  Behavior over the last 24 hours: unchanged  Sleep: normal  Appetite: normal compared to baseline  Medication side effects: none verbalized  ROS: Complete review of systems is negative except as noted above.     Vital signs in last 24 hours:  Temp:  [98.1 °F (36.7 °C)] 98.1 °F (36.7 °C)  HR: [89] 89  Resp:  [16] 16  BP: (128)/(75) 128/75    Mental Status Exam:  Appearance:  overtly appearing  male, casually dressed, improved grooming, looks older than stated age, overweight, with limited eye contact   Behavior:  calm and cooperative   Motor: no abnormal movements   Speech:  Scant   Mood:  "okay" , "stable"   Affect:  blunted   Thought Process:  Linear, concrete, goal directed   Thought Content: no verbalized delusions or overt paranoia, apparent poverty of thought   Perceptual disturbances: He reports that he continues to experience auditory hallucinations on the inpatient psychiatric unit and stated that he last experienced auditory hallucinations last night, but he was unable to discuss further details at the time of interview today. He denies recently experiencing visual hallucinations. He continues to appear distracted and preoccupied on interview.    Risk Potential: No active suicidal ideation, No active homicidal ideation   Cognition: oriented to self and situation, appears to be below average intelligence, impaired abstract reasoning, attention span appeared shorter than expected for age and cognition not formally tested   Insight:  Limited   Judgment: Limited     Current Medications:  Current Facility-Administered Medications   Medication Dose Route Frequency Provider Last Rate   • acetaminophen  650 mg Oral Q6H PRN Mandi Collado MD     • acetaminophen  650 mg Oral Q4H PRN Mandi Collado MD     • acetaminophen  975 mg Oral Q6H PRN Mandi Collado MD     • albuterol  2 puff Inhalation Q4H PRN Mandi Collado MD     • aluminum-magnesium hydroxide-simethicone  30 mL Oral Q4H PRN Mandi Collado MD     • benztropine  1 mg Oral Q4H PRN Max 6/day Mandi Collado MD     • hydrOXYzine HCL  50 mg Oral Q6H PRN Max 4/day Mandi Collado MD      Or   • diphenhydrAMINE  50 mg Intramuscular Q6H PRN Mandi Collado MD     • divalproex sodium  750 mg Oral Q12H [address] Davon Yepez MD     • fluticasone  1 spray Nasal Daily Emmanuel Zuleta MD     • glycopyrrolate  1 mg Oral TID Regis Gonzalez MD     • hydrOXYzine HCL  100 mg Oral Q6H PRN Max 4/day Emmanuel Zuleta MD      Or   • LORazepam  2 mg Intramuscular Q6H PRN Emmanuel Zuleta MD     • hydrOXYzine HCL  25 mg Oral Q6H PRN Max 4/day Emmanuel Zuleta MD     • LORazepam  2 mg Oral Q8H PRN Meri Ramsay DO      Or   • LORazepam  2 mg Intravenous Q8H PRN Meri Ramsay DO     • loxapine  20 mg Oral TID Sondra Cordova MD     • melatonin  3 mg Oral HS Emmanuel Zuleta MD     • melatonin  3 mg Oral HS PRN Sandoval Mckeon DO     • nicotine polacrilex  4 mg Oral Q2H PRN Emmanuel Zuleta MD     • OLANZapine  5 mg Oral Q3H PRN Max 6/day Sondra Cordova MD      Or   • OLANZapine  5 mg Intramuscular Q3H PRN Max 6/day Sondra Cordova MD     • OLANZapine  2.5 mg Oral Q3H PRN Max 8/day Sondra Cordova MD     • ondansetron  4 mg Oral Q8H PRN Sandoval Mckeon DO     • polyethylene glycol  17 g Oral Daily PRN Emmanuel Zuleta MD     • pseudoephedrine  120 mg Oral BID PRN Sandoval Mckeon DO     • senna-docusate sodium  1 tablet Oral Daily PRN Emmanuel Zuleta MD     • sterile water          • sterile water          • sterile water              Behavioral Health Medications: all current active meds have been reviewed. Changes as in plan section above. Laboratory results:  I have personally reviewed all pertinent laboratory/tests results. No results found for this or any previous visit (from the past 48 hour(s)).      Viky Keller MD

## 2023-06-20 PROBLEM — F20.9 SCHIZOPHRENIA (HCC): Status: ACTIVE | Noted: 2019-05-31

## 2023-06-20 PROCEDURE — 99232 SBSQ HOSP IP/OBS MODERATE 35: CPT | Performed by: PSYCHIATRY & NEUROLOGY

## 2023-06-20 RX ADMIN — GLYCOPYRROLATE 1 MG: 1 TABLET ORAL at 08:14

## 2023-06-20 RX ADMIN — LOXAPINE 20 MG: 10 CAPSULE ORAL at 08:14

## 2023-06-20 RX ADMIN — FLUTICASONE PROPIONATE 1 SPRAY: 50 SPRAY, METERED NASAL at 08:16

## 2023-06-20 RX ADMIN — DIVALPROEX SODIUM 750 MG: 250 TABLET, DELAYED RELEASE ORAL at 21:16

## 2023-06-20 RX ADMIN — DIVALPROEX SODIUM 750 MG: 250 TABLET, DELAYED RELEASE ORAL at 08:14

## 2023-06-20 RX ADMIN — Medication 3 MG: at 21:16

## 2023-06-20 RX ADMIN — LOXAPINE 20 MG: 10 CAPSULE ORAL at 15:03

## 2023-06-20 RX ADMIN — LOXAPINE 20 MG: 10 CAPSULE ORAL at 21:16

## 2023-06-20 RX ADMIN — GLYCOPYRROLATE 1 MG: 1 TABLET ORAL at 15:03

## 2023-06-20 RX ADMIN — GLYCOPYRROLATE 1 MG: 1 TABLET ORAL at 21:16

## 2023-06-20 NOTE — PROGRESS NOTES
Progress Note - Behavioral Health   Gokul Hoodant 26 y.o. male MRN: [id number]  Unit/Bed#: -02 Encounter: [id number]    Assessment/Plan   Principal Problem:    Schizophrenia ([address]) vs. Schizoaffective disorder  Active Problems:    Legally blind    Hearing loss    Asthma    Medical clearance for psychiatric admission    Bilateral impacted cerumen    Prolonged erection    Intellectual disability      Recommended Treatment:   No psychopharmacologic changes necessary at this moment except to DC melatonin since patient appears sedated; will continue to assess daily for further optimization. CBC ordered for tomorrow  Continue with pharmacotherapy, group therapy, milieu therapy and occupational therapy. Continue to assess for adverse medication side effects. Encourage Clifton Luster to participate in nonverbal forms of therapy including journaling and art/music therapy. Continue frequent safety checks and vitals per unit protocol. Continue to engage CM/SW to assist with collateral, disposition planning, and the implementation of an individualized, patient-centered plan of care. Continue medical management by medical team.  Case discussed with treatment team.    ------------------------------------------------------------    Subjective: All documentation including nursing notes, medication history to ensure medication adherence on the unit, labs, and vitals were reviewed. Gokul was evaluated this morning for continuity of care and no acute distress noted throughout the evaluation. Over the past 24 hours per nursing report, Gokul has been cooperative on the unit and compliant with medications. Today, Gokul is consenting for safety on the unit. Gokul reports feeling "joyful." Gokul notes having good sleep. Gokul states having a good appetite. Gokul has been taking the medications as prescribed and reporting no side effects.     Further discussion yielded patient heard voices after snack time and while his eyes were closed last night and the night before. Discussed time changes to medications. Still interested in rehab. Dyphier denies suicidal ideations. Dyphier denies homicidal ideations. Regarding hallucinations, Gokul denies and does not appear to be responding to internal stimuli. PRNs overnight: IM benadryl and IM zyprexa for hallucainations 0155 today  VS: Reviewed, within normal limits    Progress Toward Goals:   slow improvement    Psychiatric Review of Systems:  Behavior over the last 24 hours:  improved  Sleep: normal  Appetite: normal  Medication side effects: No   ROS: all other systems are negative    Vital signs in last 24 hours:  Temp:  [98.3 °F (36.8 °C)] 98.3 °F (36.8 °C)  HR:  [104] 104  Resp:  [18] 18  BP: (122)/(67) 122/67    Laboratory results:  I have personally reviewed all pertinent laboratory/tests results. No results found for this or any previous visit (from the past 48 hour(s)).       Mental Status Evaluation:    Appearance:  disheveled, overweight, malodorous, overtlyappearing AA male, in no apparent acute distress, falling alseep during interview    Behavior:  cooperative, sleepy   Speech:  less scant than previous   Mood:  "joyful"   Affect:  blunted   Thought Process:  coherent, goal directed, concrete   Associations: concrete associations   Thought Content:  no overt delusions   Perceptual Disturbances: Denies auditory or visual hallucinations and Does not appear to be responding to internal stimuli   Risk Potential: Suicidal ideation - None at present  Homicidal ideation - None at present  Potential for aggression - Not at present   Sensorium:  oriented to person, place and time/date   Memory:  recent and remote memory grossly intact   Consciousness:  alert and awake   Attention/Concentration: attention span and concentration are age appropriate   Insight:  limited   Judgment: limited   Gait/Station: normal gait/station   Motor Activity: no abnormal movements Current Medications:  Current Facility-Administered Medications   Medication Dose Route Frequency Provider Last Rate   • acetaminophen  650 mg Oral Q6H PRN Emilee Segundo MD     • acetaminophen  650 mg Oral Q4H PRN Emilee Segundo MD     • acetaminophen  975 mg Oral Q6H PRN Emilee Segundo MD     • albuterol  2 puff Inhalation Q4H PRN Emilee Segundo MD     • aluminum-magnesium hydroxide-simethicone  30 mL Oral Q4H PRN Emilee Segundo MD     • benztropine  1 mg Oral Q4H PRN Max 6/day Emilee Segundo MD     • hydrOXYzine HCL  50 mg Oral Q6H PRN Max 4/day Emilee Segundo MD      Or   • diphenhydrAMINE  50 mg Intramuscular Q6H PRN Emilee Segundo MD     • divalproex sodium  750 mg Oral Q12H [address] Layton Gutting, DO     • fluticasone  1 spray Nasal Daily Emilee Segundo MD     • glycopyrrolate  1 mg Oral TID David Gibson MD     • hydrOXYzine HCL  100 mg Oral Q6H PRN Max 4/day Emilee Segundo MD      Or   • LORazepam  2 mg Intramuscular Q6H PRN Emilee Segundo MD     • hydrOXYzine HCL  25 mg Oral Q6H PRN Max 4/day Emilee Segundo MD     • LORazepam  2 mg Oral Q8H PRN Meri Sponidiai, DO      Or   • LORazepam  2 mg Intravenous Q8H PRN Meri Sponidiai, DO     • loxapine  20 mg Oral TID Layton Gutting, DO     • melatonin  3 mg Oral HS PRN Layton Gutting, DO     • melatonin  3 mg Oral HS PRN Layton Gutting, DO     • nicotine polacrilex  4 mg Oral Q2H PRN Emilee Segundo MD     • OLANZapine  5 mg Oral Q3H PRN Max 6/day Sohail Rodriguez MD      Or   • OLANZapine  5 mg Intramuscular Q3H PRN Max 6/day Sohail Rodriguez MD     • OLANZapine  2.5 mg Oral Q3H PRN Max 8/day Sohail Rodriguez MD     • ondansetron  4 mg Oral Q8H PRN Layton Gutting, DO     • polyethylene glycol  17 g Oral Daily PRN Emilee Segundo MD     • pseudoephedrine  120 mg Oral BID PRN Layton Gutting, DO     • senna-docusate sodium  1 tablet Oral Daily PRN Emilee Segundo MD     • sterile water          • sterile water 80 y/o M with PMH of CAD, HTN, BPH, basal cell carcinoma, metastatic malignant melanoma, cervical spine fracture s/p surgical repair complicated by respiratory failure, elective robotic assisted excision of L chest wall/rib mass, SIADH, perforated large bowel s/p colon resection,  p/w R inguinal pain since this morning. Patient states pain is near inguinal hernia. Usually he is able to reduce it himself, but he was unable to today. In ED, hernia was reduced by ER physician. Patient states pain has significantly decreased, but is still there. Had some nausea, but denies vomiting, abdominal pain, fever, chills, dysuria, increased urinary frequency, CP, SOB, cough, runny nose, sore throat.     Pt found to have an indirect right inguinal hernia, soft, reduced by ER physician, now stable and tolerating diet and ambualting. Seen by Surgery and to f/u Dr Camara as OP. when     UA positive, pt not sure if he had dysuria or not will treat as simple UTI, dc on po antibiotics. Details below.         SUB - pt not sure if he had dysuria or not. Rt groin pain led him to the ER and after reduction of the hernia there is mild soreness but he is doing well.    Observed over the course of the day and has been tolerating his meals, passing gas, ambulating. At home uses a RW.        PHYSICAL EXAM:    GENERAL: Pleasant, NAD, able to lie flat in bed    NECK: Supple, No JVD, no nuchal rigidity    CHEST/LUNG: Clear to auscultation bilaterally; No rales, rhonchi, wheezing, or rubs. Unlabored respirations    HEART: Regular rate and rhythm; No murmurs, rubs, or gallops    ABDOMEN: Bowel sounds present; Soft, Nontender, Nondistended. No hepatomegaly    EXTREMITIES:  no pitting bilaterally    NERVOUS SYSTEM:  Alert & Oriented X3, speech clear. No focal motor or sensory deficits    MSK: FROM all 4 extremities, full and equal strength        LABS: All Labs Reviewed:                            12.8     6.86  )-----------( 152      ( 01 Jan 2020 08:35 )               39.9        RADIOLOGY/EKG:    < from: CT Abdomen and Pelvis w/ IV Cont (12.31.19 @ 20:00) >    : No current evidence of bowel herniation nor of obstruction. There is fluid in the right inguinal canal    The large bowel is diffusely fluid-filled which may be seen in the setting of diarrhea. The stomach is also distended. Small bowel is not distended.    Prostate gland is enlarged.    Small foci of scarring at the lung bases, 1 of which in the inferior right middle lobe is somewhat nodular in configuration and may be followed with CAT scan of the chest in 12 months.        A/P    80 y/o M with PMH of CAD, HTN, BPH, basal cell carcinoma, mestastatic malignant melanoma, cervical spine fracture s/p surgical reapir complicated by respiratory failure, elective robotic assisted excision of L chest wall/rib mass, SIADH, perforated large bowel s/p colon resection,  p/w R inguinal pain         *Incarcerated inguinal R hernia s/p reduction by ED physician     -CT notes fluid in R inguinal canal, distended stomach / fluid-filled colon     -Surgery consult  - f/u as OP        *UTI    -F/u urine cx    -Ceftriaxone        *Incidentally noted on CT - pulmonary nodules    -F/u outpatient with pulm for further work up         *Hepatic hypodensities    -F/u outpatient for further work up         *H/o CAD / HTN / BPH / basal cell carcinoma / metastatic malignant melanoma / SIADH     -C/w home meds and f/u outpatient for further management        time spent on discharge - 60 mins    discussed with RN • sterile water          • sterile water              Sandoval Mckeon DO 06/21/23  Psychiatry Resident, PGY-II    This note was completed in part utilizing [employer] Software. Grammatical, translation, syntax errors, random word insertions, spelling mistakes, and incomplete sentences may be an occasional consequence of this system secondary to software limitations with voice recognition, ambient noise, and hardware issues. If you have any questions or concerns about the content, text, or information contained within the body of this dictation, please contact the provider for clarification.

## 2023-06-20 NOTE — PROGRESS NOTES
[facility] Group Note     06/18/23 1300   Activity/Group Checklist   Group Life Skills  (Teamwork and Communication)   Attendance Attended   Attendance Duration (min) 46-60  (arrived late to group)   Interactions Interacted appropriately   Affect/Mood Appropriate;Bright   Goals Achieved Discussed coping strategies; Able to listen to others; Able to engage in interactions; Able to reflect/comment on own behavior;Able to recieve feedback; Able to give feedback to another

## 2023-06-20 NOTE — SOCIAL WORK
Medication titration continuing for pt d/t AH. Pt remains in agreement with rehab placement once psychiatrically stable.

## 2023-06-20 NOTE — PROGRESS NOTES
Progress Note - 1001 Pauline Hopson 26 y.o. male MRN: [id number]   Unit/Bed#: [facility] 865-01 Encounter: [id number]    Patient was seen and for continuity of care. Per nursing report yesterday morning, patient was isolative to his room in the morning and was noted by nurse to be medication adherent. He reported auditory hallucinations to the nurse but did not have any behavioral issues per documentation. Per nighttime nursing report, patient did not have any complaints and reported to nurse that he was feeling "okay."  Per nursing report this morning, patient was observed talking with staff about their weekend and was encouraged to attend groups. During the interview today, patient describes his mood as "ok" and states that he had some difficulty with falling asleep and states that he needed to "talk to myself in order to calm down."  I allowed the patient to express his feelings and emotions in the session as I addressed his affect. He did report feeling anxious about "going back out into the world," and was given reassurance about his treatment in the hospital.  He states initially that he experienced auditory hallucinations 1 week ago but clarified later on in the interview that he last experienced auditory hallucinations last night of a male voice that said "it's going to be rough."  He states that this made him feel anxious overnight and that it was difficult for him to fall asleep last night. He denies current SI/HI/AVH and denies any plan or intent to harm himself or others. He continues to be medication adherent and denies side effects. He agrees to continue with his current medication regimen as prescribed and denies any issues with the increase in his loxapine. He appeared to be distracted during the interview but appeared to be less preoccupied as compared to previous days. He was not observed responding to internal stimuli.   He agrees to take a shower, attend group, and brush his teeth today. He denies any painful erections or priapism. Sleep: normal  Appetite: normal  Medication side effects: No   ROS: no complaints, all other systems are negative    Mental Status Evaluation:    Appearance:  -American male, no acute distress, malodorous, poor hygiene, poor dentition, no acute distress, appears to be older than stated age, dressed in sweat shirt and sweat pants   Behavior:  cooperative, guarded, brief eye contact   Speech:  slow, scant, soft   Mood:  "ok"   Affect:  blunted   Thought Process:  slowing of thoughts, concrete   Associations: concrete associations   Thought Content:  mild paranoia   Perceptual Disturbances: auditory hallucinations of a male voice last night telling him "it's going to be rough", appears distracted, does not appear responding to internal stimuli, appears less internally preoccupied   Risk Potential: Suicidal ideation - None at present, contracts for safety on the unit, would talk to staff if not feeling safe on the unit  Homicidal ideation - None  Potential for aggression - Not at present   Sensorium:  oriented to person, place and situation   Memory:  recent and remote memory grossly intact   Consciousness:  alert and awake   Attention/Concentration: attention span and concentration appear shorter than expected for age   Insight:  limited   Judgment: limited   Gait/Station: normal gait/station   Motor Activity: no abnormal movements     Vital signs in last 24 hours:    Temp:  [97.3 °F (36.3 °C)-97.8 °F (36.6 °C)] 97.3 °F (36.3 °C)  HR:  [91-98] 91  Resp:  [16] 16  BP: (128-138)/(63-73) 128/73    Laboratory results: I have personally reviewed all pertinent laboratory/tests results    Results from the past 24 hours: No results found for this or any previous visit (from the past 24 hour(s)).   Most Recent Labs:   Lab Results   Component Value Date    WBC 9.43 06/12/2023    RBC 4.69 06/12/2023    HGB 14.8 06/12/2023    HCT 44.2 06/12/2023     06/12/2023    RDW 12.5 06/12/2023    NEUTROABS 3.47 06/12/2023    TOTANEUTABS 4.84 06/08/2023    SODIUM 139 06/12/2023    K 4.1 06/12/2023     06/12/2023    CO2 29 06/12/2023    BUN 18 06/12/2023    CREATININE 1.11 06/12/2023    GLUC 125 06/12/2023    CALCIUM 9.8 06/12/2023    AST 14 06/12/2023    ALT 15 06/12/2023    ALKPHOS 56 06/12/2023    TP 6.6 06/12/2023    ALB 3.8 06/12/2023    TBILI 0.46 06/12/2023    CHOLESTEROL 140 05/19/2023    HDL 59 05/19/2023    TRIG 106 05/19/2023    LDLCALC 60 05/19/2023    NONHDLC 81 05/19/2023    VALPROICTOT 91 06/12/2023    AMMONIA 22 10/09/2022    TKR3VLDCSUYH 1.841 05/19/2023    FREET4 1.16 10/09/2022    HGBA1C 5.2 06/16/2022     06/16/2022     Progress Toward Goals: progressing slowly    Assessment/Plan   Principal Problem:    Schizophrenia ([address]) vs. Schizoaffective disorder  Active Problems:    Legally blind    Hearing loss    Asthma    Medical clearance for psychiatric admission    Bilateral impacted cerumen    Prolonged erection    Intellectual disability      Recommended Treatment:     Planned medication and treatment changes:     All current active medications have been reviewed  Encourage group therapy, milieu therapy and occupational therapy  Behavioral Health checks every 7 minutes    1) Continue Loxapine 20mg TID to for hallucinations  2) Continue Melatonin 3mg HS for insomnia  3) Continue encouraging patient to attend groups  4) Continue to encourage patient to remain visible on the unit  5) Continue SLIM medical management  6) CM to continue coordinating patient care  7) Continue Depakote 750 mg twice daily for agitation/mood (Depakote level was noted to be 91 on 6/12/2023)  8) Follow up EKG tomorrow    Current Facility-Administered Medications   Medication Dose Route Frequency Provider Last Rate   • acetaminophen  650 mg Oral Q6H PRN Sharon Holter, MD     • acetaminophen  650 mg Oral Q4H PRN Sharon Holter, MD     • acetaminophen  975 mg Oral Q6H PRN Redd Zepeda MD     • albuterol  2 puff Inhalation Q4H PRN Redd Zepeda MD     • aluminum-magnesium hydroxide-simethicone  30 mL Oral Q4H PRN Redd Zepeda MD     • benztropine  1 mg Oral Q4H PRN Max 6/day Redd Zepeda MD     • hydrOXYzine HCL  50 mg Oral Q6H PRN Max 4/day Redd Zepeda MD      Or   • diphenhydrAMINE  50 mg Intramuscular Q6H PRN Redd Zepeda MD     • divalproex sodium  750 mg Oral Q12H [address] Timothy Lara MD     • fluticasone  1 spray Nasal Daily Redd Zepeda MD     • glycopyrrolate  1 mg Oral TID Frank Mclaughlin MD     • hydrOXYzine HCL  100 mg Oral Q6H PRN Max 4/day Redd Zepeda MD      Or   • LORazepam  2 mg Intramuscular Q6H PRN Redd Zepeda MD     • hydrOXYzine HCL  25 mg Oral Q6H PRN Max 4/day Redd Zepeda MD     • LORazepam  2 mg Oral Q8H PRN Meri Ramsay DO      Or   • LORazepam  2 mg Intravenous Q8H PRN Meri Ramsay DO     • loxapine  20 mg Oral TID Juliette Webber MD     • melatonin  3 mg Oral HS Redd Zepeda MD     • melatonin  3 mg Oral HS PRN Thelma Solano DO     • nicotine polacrilex  4 mg Oral Q2H PRN Redd Zepeda MD     • OLANZapine  5 mg Oral Q3H PRN Max 6/day Juliette Webber MD      Or   • OLANZapine  5 mg Intramuscular Q3H PRN Max 6/day Juliette Webber MD     • OLANZapine  2.5 mg Oral Q3H PRN Max 8/day Juliette Webber MD     • ondansetron  4 mg Oral Q8H PRN Thelma Solano DO     • polyethylene glycol  17 g Oral Daily PRN Redd Zepeda MD     • pseudoephedrine  120 mg Oral BID PRN Thelma Solano DO     • senna-docusate sodium  1 tablet Oral Daily PRN Redd Zepeda MD     • sterile water          • sterile water          • sterile water            Risks / Benefits of Treatment:    Risks, benefits, and possible side effects of medications explained to patient.  Patient has limited understanding of risks and benefits of treatment at this time, but agrees to take medications as prescribed. Counseling / Coordination of Care: Total floor / unit time spent today 20 minutes. Greater than 50% of total time was spent with the patient and / or family counseling and / or coordination of care. A description of counseling / coordination of care:  Patient's progress discussed with staff in treatment team meeting. Medications, treatment progress and treatment plan reviewed with patient. Importance of medication and treatment compliance reviewed with patient. Reassurance and supportive therapy provided. Encouraged participation in milieu and group therapy on the unit.     Jaxson Rosas MD 06/20/23

## 2023-06-20 NOTE — NURSING NOTE
Pt awake and in the milieu this morning. Medication compliant, then returned to bed. Denies psych symptoms at this time. talking with staff about their weekend. Encouraged to attend groups. Denies any unmet needs or complaints at this time.

## 2023-06-20 NOTE — NURSING NOTE
Patient observed in dayroom and visible in milieu, limited interaction with peers and staff. Patient reports feeling "ok". Denies SI/HI/AVH at this time. Patient is compliant with medications and meal. No complaints or concerns voiced at this time. Q7 minute safety checks maintained.

## 2023-06-20 NOTE — TREATMENT TEAM
06/20/23 7337   Activity/Group Checklist   Group Community meeting   Attendance Attended   Attendance Duration (min) 16-30   Interactions Interacted appropriately   Affect/Mood Appropriate   Goals Achieved Able to listen to others; Able to engage in interactions; Able to self-disclose; Able to recieve feedback

## 2023-06-20 NOTE — PROGRESS NOTES
06/20/23 0838   Team Meeting   Meeting Type Daily Rounds   Team Members Present   Team Members Present Physician;Nurse;   Physician Team Member [id number] [facility] Team Member [facility] Management Team Member Wendie   Patient/Family Present   Patient Present No   Patient's Family Present No   Pt visible, selectively social. Pt reports continued AH. Loxapine was increased yesterday. Discharge to be determined.

## 2023-06-20 NOTE — TREATMENT TEAM
06/20/23 1300   Activity/Group Checklist   Group   (recovery group)   Attendance Attended   Attendance Duration (min) 46-60   Interactions Interacted appropriately   Affect/Mood Blunted/flat   Goals Achieved Discussed coping strategies; Discussed self-esteem issues; Able to listen to others; Able to engage in interactions; Able to self-disclose; Able to recieve feedback

## 2023-06-21 LAB
ATRIAL RATE: 97 BPM
P AXIS: 49 DEGREES
PR INTERVAL: 134 MS
QRS AXIS: 73 DEGREES
QRSD INTERVAL: 98 MS
QT INTERVAL: 342 MS
QTC INTERVAL: 434 MS
T WAVE AXIS: 24 DEGREES
VENTRICULAR RATE: 97 BPM

## 2023-06-21 PROCEDURE — 99232 SBSQ HOSP IP/OBS MODERATE 35: CPT | Performed by: PSYCHIATRY & NEUROLOGY

## 2023-06-21 PROCEDURE — 93010 ELECTROCARDIOGRAM REPORT: CPT

## 2023-06-21 PROCEDURE — 93005 ELECTROCARDIOGRAM TRACING: CPT

## 2023-06-21 RX ORDER — HALOPERIDOL 5 MG/ML
5 INJECTION INTRAMUSCULAR ONCE
Status: COMPLETED | OUTPATIENT
Start: 2023-06-22 | End: 2023-06-22

## 2023-06-21 RX ORDER — LANOLIN ALCOHOL/MO/W.PET/CERES
3 CREAM (GRAM) TOPICAL
Status: DISCONTINUED | OUTPATIENT
Start: 2023-06-21 | End: 2023-10-19 | Stop reason: HOSPADM

## 2023-06-21 RX ORDER — WATER 10 ML/10ML
INJECTION INTRAMUSCULAR; INTRAVENOUS; SUBCUTANEOUS
Status: DISCONTINUED
Start: 2023-06-21 | End: 2023-10-19 | Stop reason: HOSPADM

## 2023-06-21 RX ORDER — LOXAPINE SUCCINATE 10 MG/1
20 TABLET ORAL 3 TIMES DAILY
Status: DISCONTINUED | OUTPATIENT
Start: 2023-06-21 | End: 2023-06-22

## 2023-06-21 RX ADMIN — LORAZEPAM 2 MG: 1 TABLET ORAL at 23:30

## 2023-06-21 RX ADMIN — LOXAPINE 20 MG: 10 CAPSULE ORAL at 15:23

## 2023-06-21 RX ADMIN — DIPHENHYDRAMINE HYDROCHLORIDE 50 MG: 50 INJECTION, SOLUTION INTRAMUSCULAR; INTRAVENOUS at 01:55

## 2023-06-21 RX ADMIN — DIVALPROEX SODIUM 750 MG: 250 TABLET, DELAYED RELEASE ORAL at 08:45

## 2023-06-21 RX ADMIN — GLYCOPYRROLATE 1 MG: 1 TABLET ORAL at 21:04

## 2023-06-21 RX ADMIN — OLANZAPINE 5 MG: 10 INJECTION, POWDER, LYOPHILIZED, FOR SOLUTION INTRAMUSCULAR at 01:55

## 2023-06-21 RX ADMIN — GLYCOPYRROLATE 1 MG: 1 TABLET ORAL at 15:23

## 2023-06-21 RX ADMIN — LOXAPINE 20 MG: 10 CAPSULE ORAL at 08:45

## 2023-06-21 RX ADMIN — LOXAPINE 20 MG: 10 CAPSULE ORAL at 18:26

## 2023-06-21 RX ADMIN — DIVALPROEX SODIUM 750 MG: 500 TABLET, DELAYED RELEASE ORAL at 18:26

## 2023-06-21 RX ADMIN — GLYCOPYRROLATE 1 MG: 1 TABLET ORAL at 08:45

## 2023-06-21 RX ADMIN — OLANZAPINE 5 MG: 5 TABLET, FILM COATED ORAL at 22:12

## 2023-06-21 RX ADMIN — MELATONIN TAB 3 MG 3 MG: 3 TAB at 23:30

## 2023-06-21 NOTE — PROGRESS NOTES
06/21/23 0837   Team Meeting   Meeting Type Daily Rounds   Team Members Present   Team Members Present Physician;Nurse;   Physician Team Member [id number] [facility] Wendie   Patient/Family Present   Patient Present No   Patient's Family Present No   Pt pleasant, cooperative, intermittently visible. Pt agitated, anxious responding to internal stimuli overnight. Pt received PRN IM Zyprexa and Benadryl. Discharge to rehab to be determined.

## 2023-06-21 NOTE — NURSING NOTE
Pt has been pleasant and cooperative throughout the evening. He is intermittently visible on the unit and social with select peers. Pt states "I thought I was going home soon?" Reassurance provided. Pt denies any SI/HI/AH/VH. He is med and meal compliant. Staff availability reinforced.

## 2023-06-21 NOTE — NURSING NOTE
Pt up and eating breakfast with peers. Scant in conversation but denies psych symptoms at this time. Does appear to be internally preoccupied. Medication and meal compliant. Denies any unmet needs at this time.

## 2023-06-21 NOTE — NURSING NOTE
Pt agitated, anxious, RTIS loudly. Pt exhibiting bizarre movements and physically interactive with hallucinations. Pt offered PO medications, but pt requested IM PRNs despite education. PRN IM zyprexa 5mg and benadryl 50mg administered @ 01:55. Pt cooperative.

## 2023-06-22 LAB
BASOPHILS # BLD AUTO: 0.09 THOUSANDS/ÂΜL (ref 0–0.1)
BASOPHILS NFR BLD AUTO: 1 % (ref 0–1)
EOSINOPHIL # BLD AUTO: 0.36 THOUSAND/ÂΜL (ref 0–0.61)
EOSINOPHIL NFR BLD AUTO: 5 % (ref 0–6)
ERYTHROCYTE [DISTWIDTH] IN BLOOD BY AUTOMATED COUNT: 12.2 % (ref 11.6–15.1)
HCT VFR BLD AUTO: 42 % (ref 36.5–49.3)
HGB BLD-MCNC: 14 G/DL (ref 12–17)
IMM GRANULOCYTES # BLD AUTO: 0.08 THOUSAND/UL (ref 0–0.2)
IMM GRANULOCYTES NFR BLD AUTO: 1 % (ref 0–2)
LYMPHOCYTES # BLD AUTO: 3.34 THOUSANDS/ÂΜL (ref 0.6–4.47)
LYMPHOCYTES NFR BLD AUTO: 44 % (ref 14–44)
MCH RBC QN AUTO: 30.9 PG (ref 26.8–34.3)
MCHC RBC AUTO-ENTMCNC: 33.3 G/DL (ref 31.4–37.4)
MCV RBC AUTO: 93 FL (ref 82–98)
MONOCYTES # BLD AUTO: 1 THOUSAND/ÂΜL (ref 0.17–1.22)
MONOCYTES NFR BLD AUTO: 13 % (ref 4–12)
NEUTROPHILS # BLD AUTO: 2.69 THOUSANDS/ÂΜL (ref 1.85–7.62)
NEUTS SEG NFR BLD AUTO: 36 % (ref 43–75)
NRBC BLD AUTO-RTO: 0 /100 WBCS
PLATELET # BLD AUTO: 172 THOUSANDS/UL (ref 149–390)
PMV BLD AUTO: 11.4 FL (ref 8.9–12.7)
RBC # BLD AUTO: 4.53 MILLION/UL (ref 3.88–5.62)
WBC # BLD AUTO: 7.56 THOUSAND/UL (ref 4.31–10.16)

## 2023-06-22 PROCEDURE — 85025 COMPLETE CBC W/AUTO DIFF WBC: CPT

## 2023-06-22 PROCEDURE — 99232 SBSQ HOSP IP/OBS MODERATE 35: CPT | Performed by: PSYCHIATRY & NEUROLOGY

## 2023-06-22 RX ORDER — LOXAPINE SUCCINATE 25 MG/1
25 TABLET ORAL 3 TIMES DAILY
Status: DISCONTINUED | OUTPATIENT
Start: 2023-06-22 | End: 2023-06-26

## 2023-06-22 RX ORDER — HYDROXYZINE 50 MG/1
100 TABLET, FILM COATED ORAL
Status: DISCONTINUED | OUTPATIENT
Start: 2023-06-22 | End: 2023-10-19 | Stop reason: HOSPADM

## 2023-06-22 RX ORDER — LORAZEPAM 2 MG/ML
1 INJECTION INTRAMUSCULAR EVERY 6 HOURS PRN
Status: DISCONTINUED | OUTPATIENT
Start: 2023-06-22 | End: 2023-10-19 | Stop reason: HOSPADM

## 2023-06-22 RX ORDER — OLANZAPINE 10 MG/2ML
5 INJECTION, POWDER, FOR SOLUTION INTRAMUSCULAR EVERY 8 HOURS PRN
Status: DISCONTINUED | OUTPATIENT
Start: 2023-06-22 | End: 2023-10-19 | Stop reason: HOSPADM

## 2023-06-22 RX ADMIN — GLYCOPYRROLATE 1 MG: 1 TABLET ORAL at 15:09

## 2023-06-22 RX ADMIN — GLYCOPYRROLATE 1 MG: 1 TABLET ORAL at 08:19

## 2023-06-22 RX ADMIN — DIVALPROEX SODIUM 750 MG: 500 TABLET, DELAYED RELEASE ORAL at 18:03

## 2023-06-22 RX ADMIN — ALBUTEROL SULFATE 2 PUFF: 90 AEROSOL, METERED RESPIRATORY (INHALATION) at 18:03

## 2023-06-22 RX ADMIN — DIVALPROEX SODIUM 750 MG: 500 TABLET, DELAYED RELEASE ORAL at 08:19

## 2023-06-22 RX ADMIN — LOXAPINE 25 MG: 25 CAPSULE ORAL at 18:03

## 2023-06-22 RX ADMIN — LOXAPINE 25 MG: 25 CAPSULE ORAL at 15:09

## 2023-06-22 RX ADMIN — LOXAPINE 20 MG: 10 CAPSULE ORAL at 08:19

## 2023-06-22 RX ADMIN — FLUTICASONE PROPIONATE 1 SPRAY: 50 SPRAY, METERED NASAL at 08:21

## 2023-06-22 RX ADMIN — HALOPERIDOL LACTATE 5 MG: 5 INJECTION, SOLUTION INTRAMUSCULAR at 00:00

## 2023-06-22 RX ADMIN — GLYCOPYRROLATE 1 MG: 1 TABLET ORAL at 21:14

## 2023-06-22 NOTE — PROGRESS NOTES
06/22/23 1000   Activity/Group Checklist   Group Other (Comment)  (Group Art Therapy/Psychodynamic, Creatively Exploring the Kinesthetic and affective properties of color)   Attendance Attended   Attendance Duration (min) Greater than 60   Interactions Interacted appropriately   Affect/Mood Appropriate   Goals Achieved Able to engage in interactions

## 2023-06-22 NOTE — NURSING NOTE
PRN zyprexa 5mg PO ineffective as of 23:12. Pt responding loudly verbally, laughing, and with behaviors; pt appears to have severely deminished behavioral control r/t AH. Pt given PRN melatonin 3mg for sleep and PRN ativan 2mg PO as indicated for severe agitation @ 23:30.

## 2023-06-22 NOTE — TREATMENT TEAM
06/22/23 1300   Activity/Group Checklist   Group   (recovery group)   Attendance Attended   Attendance Duration (min) 46-60   Interactions Interacted appropriately  (then fell asleep)   Affect/Mood Blunted/flat   Goals Achieved Able to listen to others; Able to engage in interactions; Able to self-disclose     Patient has difficulty maintaining being awake.

## 2023-06-22 NOTE — NURSING NOTE
Patient with complaints of "lungs hurting. PRN albuterol administered and patient reports "no more lung pain" immediately after inhaler administration. Patient did appear to be in any distress and/or no sob noted on PRN administration.

## 2023-06-22 NOTE — PROGRESS NOTES
Progress Note - Behavioral Health   Gokul Almonte 26 y.o. male MRN: [id number]  Unit/Bed#: [facility] 344-02 Encounter: [id number]    Assessment/Plan   Principal Problem:    Schizophrenia ([address]) vs. Schizoaffective disorder  Active Problems:    Legally blind    Hearing loss    Asthma    Medical clearance for psychiatric admission    Bilateral impacted cerumen    Prolonged erection    Intellectual disability      Recommended Treatment:   No psychopharmacologic changes necessary at this moment; will continue to assess daily for further optimization.  on 6/21/23  Continue with pharmacotherapy, group therapy, milieu therapy and occupational therapy. Continue to assess for adverse medication side effects. Encourage Aakash Norwood to participate in nonverbal forms of therapy including journaling and art/music therapy. Continue frequent safety checks and vitals per unit protocol. Continue to engage CM/SW to assist with collateral, disposition planning, and the implementation of an individualized, patient-centered plan of care. Continue medical management by medical team.  Case discussed with treatment team.    Legal Status: 201  ------------------------------------------------------------    Subjective: All documentation including nursing notes, medication history to ensure medication adherence on the unit, labs, and vitals were reviewed. Gokul was evaluated this morning for continuity of care and no acute distress noted throughout the evaluation. Over the past 24 hours per nursing report, Gokul has been cooperative on the unit and compliant with medications. Per report he needed multiple PRNs last night, he was loudly responding to internal stimuli. Today, Gokul is consenting for safety on the unit. Gokul reports feeling "good." Gokul notes having good sleep. Gokul states having a good appetite. Gokul has been taking the medications as prescribed and reporting no side effects.  He states he had a difficult night last night and was upset because his "friend" (another patient) was leaving and he states he had voices that were asking "why is he leaving and I'm stuck" He states he was sad his friend was leaving. He also states he fell asleep after medications by 2am. He recalls eating sausage this morning for breakfast and he didn't have dreams but normally he pushes his dreams aside because "they are a bunch of nonsense". He continued to fall asleep during     Dyphier denies suicidal ideations. Dyphier denies homicidal ideations. Regarding hallucinations, Dydaveer denies and does not appear to be responding to internal stimuli. PRNs overnight: Haldol IM 5mg, Ativan 2mg, for agitation, 3mg melatonin, zyprexa for agitation   VS: Reviewed, within normal limits    Progress Toward Goals: slow improvement    Psychiatric Review of Systems:  Behavior over the last 24 hours:  improved  Sleep: normal  Appetite: normal  Medication side effects: No   ROS: all other systems are negative    Vital signs in last 24 hours:  HR:  [85] 85    Laboratory results:  I have personally reviewed all pertinent laboratory/tests results.   Recent Results (from the past 48 hour(s))   ECG 12 lead    Collection Time: 06/21/23  2:03 PM   Result Value Ref Range    Ventricular Rate 97 BPM    Atrial Rate 97 BPM    DE Interval 134 ms    QRSD Interval 98 ms    QT Interval 342 ms    QTC Interval 434 ms    P Axis 49 degrees    QRS Axis 73 degrees    T Wave Axis 24 degrees   CBC and differential    Collection Time: 06/22/23  6:34 AM   Result Value Ref Range    WBC 7.56 4.31 - 10.16 Thousand/uL    RBC 4.53 3.88 - 5.62 Million/uL    Hemoglobin 14.0 12.0 - 17.0 g/dL    Hematocrit 42.0 36.5 - 49.3 %    MCV 93 82 - 98 fL    MCH 30.9 26.8 - 34.3 pg    MCHC 33.3 31.4 - 37.4 g/dL    RDW 12.2 11.6 - 15.1 %    MPV 11.4 8.9 - 12.7 fL    Platelets 037 432 - 973 Thousands/uL    nRBC 0 /100 WBCs    Neutrophils Relative 36 (L) 43 - 75 %    Immat GRANS % 1 0 - 2 %    Lymphocytes Relative 44 14 - 44 %    Monocytes Relative 13 (H) 4 - 12 %    Eosinophils Relative 5 0 - 6 %    Basophils Relative 1 0 - 1 %    Neutrophils Absolute 2.69 1.85 - 7.62 Thousands/µL    Immature Grans Absolute 0.08 0.00 - 0.20 Thousand/uL    Lymphocytes Absolute 3.34 0.60 - 4.47 Thousands/µL    Monocytes Absolute 1.00 0.17 - 1.22 Thousand/µL    Eosinophils Absolute 0.36 0.00 - 0.61 Thousand/µL    Basophils Absolute 0.09 0.00 - 0.10 Thousands/µL         Mental Status Evaluation:    Appearance:  dressed appropriately, adequate grooming, looks older than stated age, overtly appearing AA male, minmal eye contact, actively falling alseep   Behavior:  limited cooperativity, kept falling asleep   Speech:  Less scant, circumstantial, mostly coherent   Mood:  "good"   Affect:  blunted   Thought Process:  circumstantial   Associations: circumstantial associations   Thought Content:  no overt delusions   Perceptual Disturbances: Denies auditory or visual hallucinations and Does not appear to be responding to internal stimuli   Risk Potential: Suicidal ideation - None at present  Homicidal ideation - None at present  Potential for aggression - Not at present   Sensorium:  oriented to person, place and time/date   Memory:  recent and remote memory grossly intact   Consciousness:  alert and awake   Attention/Concentration: attention span and concentration are age appropriate   Insight:  limited   Judgment: limited   Gait/Station: normal gait/station   Motor Activity: no abnormal movements       Current Medications:  Current Facility-Administered Medications   Medication Dose Route Frequency Provider Last Rate   • acetaminophen  650 mg Oral Q6H PRN Sharon Holter, MD     • acetaminophen  650 mg Oral Q4H PRN Sharon Holter, MD     • acetaminophen  975 mg Oral Q6H PRN Sharon Holter, MD     • albuterol  2 puff Inhalation Q4H PRN Sharon Holter, MD     • aluminum-magnesium hydroxide-simethicone  30 mL Oral Q4H PRN Bianca Villafana MD     • benztropine  1 mg Oral Q4H PRN Max 6/day Bianca Villafana MD     • divalproex sodium  750 mg Oral BID Josias Shah MD     • fluticasone  1 spray Nasal Daily Bianca Villafana MD     • glycopyrrolate  1 mg Oral TID Nagi Carmona MD     • hydrOXYzine HCL  100 mg Oral Q6H PRN Max 4/day Sherice Chan DO      Or   • LORazepam  1 mg Intramuscular Q6H PRN Sherice Chan DO     • hydrOXYzine HCL  25 mg Oral Q6H PRN Max 4/day Bianca Villafana MD     • hydrOXYzine HCL  50 mg Oral Q6H PRN Max 4/day Bianca Villafana MD     • loxapine  25 mg Oral TID Sherice Chan DO     • melatonin  3 mg Oral HS PRN Sherice Chan DO     • nicotine polacrilex  4 mg Oral Q2H PRN Bianca Villafana MD     • OLANZapine  5 mg Oral Q3H PRN Max 6/day Josias Shah MD      Or   • OLANZapine  5 mg Intramuscular Q3H PRN Max 6/day Josias Shah MD     • OLANZapine  5 mg Intramuscular Q8H PRN Sherice Chan DO      Or   • OLANZapine  7.5 mg Oral Q8H PRN Sherice Chan DO     • OLANZapine  2.5 mg Oral Q3H PRN Max 8/day Josias Shah MD     • ondansetron  4 mg Oral Q8H PRN Sherice Chan DO     • polyethylene glycol  17 g Oral Daily PRN Bianca Villafana MD     • pseudoephedrine  120 mg Oral BID PRN Sherice Chan DO     • senna-docusate sodium  1 tablet Oral Daily PRN Bianca Villafana MD     • sterile water          • sterile water          • sterile water          • sterile water          Sherice Chan DO 06/22/23  Psychiatry Resident, PGY-II    This note was completed in part utilizing [employer] Software. Grammatical, translation, syntax errors, random word insertions, spelling mistakes, and incomplete sentences may be an occasional consequence of this system secondary to software limitations with voice recognition, ambient noise, and hardware issues.  If you have any questions or concerns about the content, text, or information contained within the body of this dictation, please contact the provider for clarification.

## 2023-06-22 NOTE — PROGRESS NOTES
06/22/23 0827   Team Meeting   Meeting Type Daily Rounds   Team Members Present   Team Members Present Physician;Nurse;   Physician Team Member [id number] [facility]   Care Management Team Member Dmitry Khan   Patient/Family Present   Patient Present No   Patient's Family Present No   Pt had a rough night. Pleasant and cooperative at beginning of evening. Later PRN for increased agitation due to [address], zyprexa-ineffective. Continued to respond louder and laughing. PRN melatonin and ativan PO-ineffective. Continued wondering unit, distracted, reports being lost in his own head. PRN haldol IM-effective and then slept around 0100. DC tbd.

## 2023-06-22 NOTE — NURSING NOTE
Patient is tired and sleepy at start of shift. Appears withdrawn and internally preoccupied, staring out and unfocused. Patient is med compliant and denies SI, Hi, A/V hallucinations on assessment. Isolated to room for most of shift. He did attend group although was observed sleeping and snoring during group therapy.

## 2023-06-22 NOTE — NURSING NOTE
Pt has been pleasant and cooperative throughout the evening. He reports having poor sleep last night due to increased AH that "say things about my family." He states that when he is around peers the voices are not as bad. He denies any PRN medication. Pt denies any SI/HI/AH/VH. He is visible on the unit and social with peers. Pt is med and meal compliant. Staff availability reinforced.

## 2023-06-22 NOTE — NURSING NOTE
Pt wandering around unit, loudly responding and disrupting sleeping milieu. Pt preoccupied and appears completely distracted when approached by staff. Pt asks nurse for help and states, "I'm lost in my own head right now."    Jack contacted via TT by nurse requesting antipsychotic. Pt given 1x dose of 5mg haldol IM in R-delt @ 00:00; pt tolerant. Haldol, ativan, and melatonin effective; pt now resting peacefully.

## 2023-06-22 NOTE — NURSING NOTE
Pt requested PRN medication for increased agitation r/t increasing AH. Pt observed responding to internal stimuli and having a difficult time accepting verbal redirection. PRN PO Zyprexa 5mg administered at this time.

## 2023-06-23 PROCEDURE — 99232 SBSQ HOSP IP/OBS MODERATE 35: CPT | Performed by: PSYCHIATRY & NEUROLOGY

## 2023-06-23 RX ADMIN — LOXAPINE 25 MG: 25 CAPSULE ORAL at 17:32

## 2023-06-23 RX ADMIN — GLYCOPYRROLATE 1 MG: 1 TABLET ORAL at 09:54

## 2023-06-23 RX ADMIN — DIVALPROEX SODIUM 750 MG: 500 TABLET, DELAYED RELEASE ORAL at 21:34

## 2023-06-23 RX ADMIN — SENNOSIDES AND DOCUSATE SODIUM 1 TABLET: 50; 8.6 TABLET ORAL at 17:29

## 2023-06-23 RX ADMIN — GLYCOPYRROLATE 1 MG: 1 TABLET ORAL at 21:35

## 2023-06-23 RX ADMIN — DIVALPROEX SODIUM 750 MG: 500 TABLET, DELAYED RELEASE ORAL at 09:54

## 2023-06-23 RX ADMIN — LOXAPINE 25 MG: 25 CAPSULE ORAL at 09:54

## 2023-06-23 RX ADMIN — LOXAPINE 25 MG: 25 CAPSULE ORAL at 21:34

## 2023-06-23 RX ADMIN — GLYCOPYRROLATE 1 MG: 1 TABLET ORAL at 17:33

## 2023-06-23 NOTE — NURSING NOTE
Patient seclusive to room sleeping. Patient out for breakfast and medication compliant. He denied SI/HI and A/V hallucinations. Patient is malodorous and shower has been encouraged.

## 2023-06-23 NOTE — PROGRESS NOTES
Progress Note - 1001 Pauline Hopson 26 y.o. male MRN: [id number]   Unit/Bed#: [facility] 787-71 Encounter: [id number]    Patient was seen and evaluated for continuity of care. As per nursing report yesterday afternoon, patient was noted to be drowsy during the start of shift and nursing noted that patient appeared withdrawn and internally preoccupied. He was isolative to his room for most of the shift but attended group and was observed to sleeping during groups. Patient complained of his "lungs hurting" per nursing documentation and received albuterol as needed which was effective. Nursing noted that patient did not have any shortness of breath and did not appear to be in any distress. Per nighttime nursing report, patient was visible in the milieu and day room. He was noted to have limited interaction with peers and staff. He was noted to be guarded but cooperative. During the interview today, patient describes his mood as "good."  He states that he slept 8 hours last night. He was observed eating breakfast in the day room. He states that he did not have any distressing dreams overnight. He states that he last had a bowel movement last night. He notes doing well overall in terms of his appetite. He reports some decreased overall energy and feels "a little bit tired."  He denies SI/HI and denies any plan or intent to harm himself or others. He denies auditory or visual hallucinations at the time of interview. He states that he last experienced auditory hallucinations yesterday that told him to "get my shit together."  He did not receive any as needed medications for hallucinations or agitation last night. He denies any painful erections today. He agrees to continue with his current medication regimen as prescribed and was encouraged to maintain his ADLs and continue working on staying awake during the day.     Sleep: slept 8 hours  Appetite: normal  Medication side effects: No   ROS: no complaints, all other systems are negative    Mental Status Evaluation:    Appearance:  -American male, no acute distress, lying in bed, drowsy, unkempt, appears to be older than stated age, intermittent eye contact   Behavior:  cooperative, guarded, mild psychomotor slowing   Speech:  clear, coherent, scant, slow at times   Mood:  "good"   Affect:  blunted   Thought Process:  decreased rate of thoughts, concrete   Associations: concrete associations   Thought Content:  mild paranoia   Perceptual Disturbances: auditory hallucinations of voices last night telling him to "get my shit together", appears distracted, appears preoccupied, does not appear responding to internal stimuli at the time of interview   Risk Potential: Suicidal ideation - None, contracts for safety on the unit, would talk to staff if not feeling safe on the unit  Homicidal ideation - None  Potential for aggression - No   Sensorium:  oriented to person, place and situation   Memory:  recent and remote memory grossly intact   Consciousness:  alert and awake   Attention/Concentration: attention span and concentration appear shorter than expected for age   Insight:  limited   Judgment: limited   Gait/Station: in bed   Motor Activity: no abnormal movements     Vital signs in last 24 hours:    Temp:  [97.5 °F (36.4 °C)] 97.5 °F (36.4 °C)  HR:  [] 87  Resp:  [16] 16  BP: (126-143)/(80-81) 126/80    Laboratory results: I have personally reviewed all pertinent laboratory/tests results    Results from the past 24 hours: No results found for this or any previous visit (from the past 24 hour(s)).   Most Recent Labs:   Lab Results   Component Value Date    WBC 7.56 06/22/2023    RBC 4.53 06/22/2023    HGB 14.0 06/22/2023    HCT 42.0 06/22/2023     06/22/2023    RDW 12.2 06/22/2023    NEUTROABS 2.69 06/22/2023    TOTANEUTABS 4.84 06/08/2023    SODIUM 139 06/12/2023    K 4.1 06/12/2023     06/12/2023    CO2 29 06/12/2023    BUN 18 06/12/2023    CREATININE 1.11 06/12/2023    GLUC 125 06/12/2023    CALCIUM 9.8 06/12/2023    AST 14 06/12/2023    ALT 15 06/12/2023    ALKPHOS 56 06/12/2023    TP 6.6 06/12/2023    ALB 3.8 06/12/2023    TBILI 0.46 06/12/2023    CHOLESTEROL 140 05/19/2023    HDL 59 05/19/2023    TRIG 106 05/19/2023    LDLCALC 60 05/19/2023    NONHDLC 81 05/19/2023    VALPROICTOT 91 06/12/2023    AMMONIA 22 10/09/2022    TKL2CSNDHJGW 1.841 05/19/2023    FREET4 1.16 10/09/2022    HGBA1C 5.2 06/16/2022     06/16/2022     Progress Toward Goals: progressing slowly, patient appears to be less sedated and less drowsy today and was encouraged to maintain wakefulness during the day. He continues to be in good behavioral control and has been medication adherent    Assessment/Plan   Principal Problem:    Schizophrenia ([address]) vs. Schizoaffective disorder  Active Problems:    Legally blind    Hearing loss    Asthma    Medical clearance for psychiatric admission    Bilateral impacted cerumen    Prolonged erection    Intellectual disability      Recommended Treatment:     Planned medication and treatment changes:     All current active medications have been reviewed  Encourage group therapy, milieu therapy and occupational therapy  Behavioral Health checks every 7 minutes    1) Continue Loxapine 25mg TID for hallucinations   2) Continue Melatonin 3mg HS as needed insomnia  3) Continue to encourage patient for group attendance  4) Continue encouraging patient to stay visible in the milieu  5) Continue medical recommendations per SLIM  6) CM to continue care coordination for patient  7) Continue Depakote 750 mg twice daily for agitation/mood (Depakote level was noted to be 91 on 6/12/2023)    Current Facility-Administered Medications   Medication Dose Route Frequency Provider Last Rate   • acetaminophen  650 mg Oral Q6H PRN Misael Guzman MD     • acetaminophen  650 mg Oral Q4H PRN Misael Guzman MD     • acetaminophen  975 mg Oral Q6H PRN Winifred Beckett MD     • albuterol  2 puff Inhalation Q4H PRN Winifred Beckett MD     • aluminum-magnesium hydroxide-simethicone  30 mL Oral Q4H PRN Winifred Beckett MD     • benztropine  1 mg Oral Q4H PRN Max 6/day Winifred Beckett MD     • divalproex sodium  750 mg Oral BID Yolande Espraza MD     • fluticasone  1 spray Nasal Daily Winifred Beckett MD     • glycopyrrolate  1 mg Oral TID Nicky Grant MD     • hydrOXYzine HCL  100 mg Oral Q6H PRN Max 4/day Genna Cooler, DO      Or   • LORazepam  1 mg Intramuscular Q6H PRN Genna Cooler, DO     • hydrOXYzine HCL  25 mg Oral Q6H PRN Max 4/day Winifred Beckett MD     • hydrOXYzine HCL  50 mg Oral Q6H PRN Max 4/day Winifred Beckett MD     • loxapine  25 mg Oral TID Genna Cooler, DO     • melatonin  3 mg Oral HS PRN Genna Cooler, DO     • nicotine polacrilex  4 mg Oral Q2H PRN Winifred Beckett MD     • OLANZapine  5 mg Oral Q3H PRN Max 6/day Yolande Esparza MD      Or   • OLANZapine  5 mg Intramuscular Q3H PRN Max 6/day Yolande Esparza MD     • OLANZapine  5 mg Intramuscular Q8H PRN Genna Cooler, DO      Or   • OLANZapine  7.5 mg Oral Q8H PRN Genna Cooler, DO     • OLANZapine  2.5 mg Oral Q3H PRN Max 8/day Yolande Esparza MD     • ondansetron  4 mg Oral Q8H PRN Genna Cooler, DO     • polyethylene glycol  17 g Oral Daily PRN Winifred Beckett MD     • pseudoephedrine  120 mg Oral BID PRN Genna Cooler, DO     • senna-docusate sodium  1 tablet Oral Daily PRN Winifred Beckett MD     • sterile water          • sterile water          • sterile water          • sterile water            Risks / Benefits of Treatment:    Risks, benefits, and possible side effects of medications explained to patient. Patient has limited understanding of risks and benefits of treatment at this time, but agrees to take medications as prescribed. Counseling / Coordination of Care: Total floor / unit time spent today 20 minutes.  Greater than 50% of total time was spent with the patient and / or family counseling and / or coordination of care. A description of counseling / coordination of care:  Patient's progress discussed with staff in treatment team meeting. Medications, treatment progress and treatment plan reviewed with patient. Importance of medication and treatment compliance reviewed with patient. Reassurance and supportive therapy provided. Encouraged participation in milieu and group therapy on the unit.     Jorge Burton MD 06/23/23

## 2023-06-23 NOTE — SOCIAL WORK
Pt visible on the unit this morning. When approached by SW, pt stating he is doing "okay" today. Pt denies any CM related needs at this time. Pt still not psychiatrically cleared for rehab placement.

## 2023-06-23 NOTE — PROGRESS NOTES
06/23/23 0836   Team Meeting   Meeting Type Daily Rounds   Team Members Present   Team Members Present Physician;Nurse;   Physician Team Member [facility] Team Member [facility] Team Member Wendie   Patient/Family Present   Patient Present No   Patient's Family Present No     Pt is med/meal compliant. C/O lung pain and received his albuterol inhaler. Pt visible on the unit at times. Discharge to be determined.

## 2023-06-23 NOTE — NURSING NOTE
Patient visible in milieu and dayroom. Patient is more withdrawn to self, limited interaction with peers and staff. He is guarded but cooperative and compliant with medication and meals. Patient denies current SI/HI/AVH. No complaints voiced at this time and patient denies any unmet needs.

## 2023-06-23 NOTE — PROGRESS NOTES
06/23/23 1000   Activity/Group Checklist   Group Other (Comment)  (Group Art Therapy, Randomly choose a rock with a word on it or a quote and make a piece of art that reflects your relationship with what you picked)   Attendance Attended   Attendance Duration (min) Greater than 60   Interactions Interacted appropriately   Affect/Mood Normal range   Goals Achieved Able to engage in interactions; Able to reflect/comment on own behavior

## 2023-06-24 PROCEDURE — 99232 SBSQ HOSP IP/OBS MODERATE 35: CPT | Performed by: STUDENT IN AN ORGANIZED HEALTH CARE EDUCATION/TRAINING PROGRAM

## 2023-06-24 RX ADMIN — LOXAPINE 25 MG: 25 CAPSULE ORAL at 09:11

## 2023-06-24 RX ADMIN — DIVALPROEX SODIUM 750 MG: 500 TABLET, DELAYED RELEASE ORAL at 18:18

## 2023-06-24 RX ADMIN — GLYCOPYRROLATE 1 MG: 1 TABLET ORAL at 20:24

## 2023-06-24 RX ADMIN — GLYCOPYRROLATE 1 MG: 1 TABLET ORAL at 09:11

## 2023-06-24 RX ADMIN — LOXAPINE 25 MG: 25 CAPSULE ORAL at 16:43

## 2023-06-24 RX ADMIN — GLYCOPYRROLATE 1 MG: 1 TABLET ORAL at 16:43

## 2023-06-24 RX ADMIN — DIVALPROEX SODIUM 750 MG: 500 TABLET, DELAYED RELEASE ORAL at 09:11

## 2023-06-24 NOTE — NURSING NOTE
Patient calm and cooperative, medication compliant. Patient denies symptoms, remains visibly social with peers.

## 2023-06-24 NOTE — NURSING NOTE
Patient has been visible on the unit watching TV. He is med/meal compliant, cooperative, and pleasant. Patient is malodorous and showers have been encouraged. Patient reports auditory hallucinations.

## 2023-06-25 PROCEDURE — 99232 SBSQ HOSP IP/OBS MODERATE 35: CPT | Performed by: STUDENT IN AN ORGANIZED HEALTH CARE EDUCATION/TRAINING PROGRAM

## 2023-06-25 RX ORDER — PSEUDOEPHEDRINE HCL 30 MG
120 TABLET ORAL 2 TIMES DAILY PRN
Status: DISCONTINUED | OUTPATIENT
Start: 2023-06-25 | End: 2023-10-19 | Stop reason: HOSPADM

## 2023-06-25 RX ORDER — LOXAPINE SUCCINATE 25 MG/1
25 TABLET ORAL
Status: DISCONTINUED | OUTPATIENT
Start: 2023-06-25 | End: 2023-06-26

## 2023-06-25 RX ADMIN — HYDROXYZINE HYDROCHLORIDE 100 MG: 50 TABLET, FILM COATED ORAL at 00:26

## 2023-06-25 RX ADMIN — GLYCOPYRROLATE 1 MG: 1 TABLET ORAL at 15:24

## 2023-06-25 RX ADMIN — HYDROXYZINE HYDROCHLORIDE 100 MG: 50 TABLET, FILM COATED ORAL at 23:19

## 2023-06-25 RX ADMIN — LOXAPINE 25 MG: 25 CAPSULE ORAL at 21:21

## 2023-06-25 RX ADMIN — DIVALPROEX SODIUM 750 MG: 500 TABLET, DELAYED RELEASE ORAL at 09:18

## 2023-06-25 RX ADMIN — LOXAPINE 25 MG: 25 CAPSULE ORAL at 15:24

## 2023-06-25 RX ADMIN — GLYCOPYRROLATE 1 MG: 1 TABLET ORAL at 09:18

## 2023-06-25 RX ADMIN — DIVALPROEX SODIUM 750 MG: 500 TABLET, DELAYED RELEASE ORAL at 21:21

## 2023-06-25 RX ADMIN — LOXAPINE 25 MG: 25 CAPSULE ORAL at 21:22

## 2023-06-25 RX ADMIN — GLYCOPYRROLATE 1 MG: 1 TABLET ORAL at 21:21

## 2023-06-25 RX ADMIN — LOXAPINE 25 MG: 25 CAPSULE ORAL at 09:18

## 2023-06-25 RX ADMIN — MELATONIN TAB 3 MG 3 MG: 3 TAB at 00:26

## 2023-06-25 NOTE — PROGRESS NOTES
Progress Note - Behavioral Health   Gokul Marino 26 y.o. male MRN: [id number]  Unit/Bed#: -02 Encounter: [id number]    Assessment/Plan   Principal Problem:    Schizophrenia ([address]) vs. Schizoaffective disorder  Active Problems:    Legally blind    Hearing loss    Asthma    Medical clearance for psychiatric admission    Bilateral impacted cerumen    Prolonged erection    Intellectual disability      Recommended Treatment:   No medication changes at this time  Continue loxapine 20 mg 3 times daily for ongoing symptoms of psychosis  Continue Depakote 750 mg twice daily for mood stability  Most recent valproic acid level 6/12/2023 was 91  Continue melatonin 3 mg at bedtime for sleep    Continue to monitor the patient for erections lasting greater than 45 minutes. Continue Sudafed 125 mg daily as needed for erection lasting greater than 45 minutes    Continue with group therapy, milieu therapy and occupational therapy. Continue frequent safety checks and vitals per unit protocol. Case discussed with treatment team.  Risks, benefits and possible side effects of Medications: Risks, benefits, and possible side effects of medications have been explained to the patient, who verbalizes understanding    ------------------------------------------------------------    Subjective:     Per nursing report, on the inpatient psychiatric unit Hersey Sicard continues to make slow progress. He remains with symptoms of psychosis. Per nursing report, on the inpatient psychiatric unit Gokul has been increasingly visible on the unit and he has been socializing with peers as well as attending groups and participating appropriately. On nursing assessments throughout the day he has not voiced any complaints or concerns and has been consistently denying suicidal ideation, homicidal ideation, visual auditory hallucinations. He was noted by nursing to be malodorous and was encouraged to shower.     Today Gokul was seen sitting by himself alone in the TV room watching cartoons. At the time of interview he was noted to be fatigued, but he had fair eye contact and was more verbose in conversation than on previous interviews. Today, Earnestine Louis reports that he feels "okay". He reports that he has been feeling "better", stating that his voices are improving and he has been hearing them less. He reports he last experienced auditory hallucinations yesterday afternoon of both male and female voices saying bad things about his family. He denies experiencing visual hallucinations recently and denies visual and auditory hallucinations today. Today, Gokul reports that his goals are to socialize and attend groups. He reports that he continues to feel tired in the morning, but is trying to be more active. He also is planning to shower today. Today, Gokul reports that he continues to eat well and sleep while in the hospital (he believes he slept about 11 hours last night). He reports that he has been going to the bathroom without difficulty. He has been taking his medications as directed and has not noticed any medication side effects. At the time of interview today he currently denies suicidal ideation, homicidal ideation, visual auditory hallucinations. Progress Toward Goals: Patient continues to make slow improvements on the inpatient psychiatric unit and symptoms of psychosis continue to slowly improve    Psychiatric Review of Systems:  Behavior over the last 24 hours: improved  Sleep: normal  Appetite: normal compared to baseline  Medication side effects: none verbalized  ROS: Complete review of systems is negative except as noted above.     Vital signs in last 24 hours:  Temp:  [97.7 °F (36.5 °C)] 97.7 °F (36.5 °C)  HR:  [94] 94  Resp:  [16] 16  BP: (145)/(76) 145/76    Mental Status Exam:  Appearance:  overtly appearing  male, casually dressed, improved grooming, looks older than stated age, overweight, with fair eye contact   Behavior:  calm and cooperative   Motor: no abnormal movements   Speech:  Spontaneous, remains scant howeer more verbose than previous interviews   Mood:  "better"   Affect:  blunted   Thought Process:   Linear, concrete, goal directed   Thought Content: no verbalized delusions or overt paranoia, apparent poverty of thought   Perceptual disturbances: denies current hallucinations and does not appear to be responding to internal stimuli at this time, continues to appear preoccupied   Risk Potential: No active suicidal ideation, No active homicidal ideation   Cognition: oriented to self and situation, memory grossly intact, appears to be below average intelligence, impaired abstract reasoning, attention span appeared shorter than expected for age and cognition not formally tested   Insight:  Limited   Judgment: Limited     Current Medications:  Current Facility-Administered Medications   Medication Dose Route Frequency Provider Last Rate   • acetaminophen  650 mg Oral Q6H PRN Jori Bumpers, MD     • acetaminophen  650 mg Oral Q4H PRN Jori Bumpers, MD     • acetaminophen  975 mg Oral Q6H PRN Jori Bumpers, MD     • albuterol  2 puff Inhalation Q4H PRN Jori Bumpers, MD     • aluminum-magnesium hydroxide-simethicone  30 mL Oral Q4H PRN Jori Bumpers, MD     • benztropine  1 mg Oral Q4H PRN Max 6/day Jori Bumpers, MD     • divalproex sodium  750 mg Oral BID Madyson Christy MD     • fluticasone  1 spray Nasal Daily Jori Bumpers, MD     • glycopyrrolate  1 mg Oral TID Paresh Wells MD     • hydrOXYzine HCL  100 mg Oral Q6H PRN Max 4/day Bigg Siddiqi DO      Or   • LORazepam  1 mg Intramuscular Q6H PRN Bigg Siddiqi DO     • hydrOXYzine HCL  25 mg Oral Q6H PRN Max 4/day Jori Bumpers, MD     • hydrOXYzine HCL  50 mg Oral Q6H PRN Max 4/day Jori Bumpers, MD     • loxapine  25 mg Oral TID Bigg Siddiqi DO     • melatonin  3 mg Oral HS PRN Bigg Siddiqi DO     • nicotine polacrilex  4 mg Oral Q2H PRN Winifred Beckett MD     • OLANZapine  5 mg Oral Q3H PRN Max 6/day Yolande Esparza MD      Or   • OLANZapine  5 mg Intramuscular Q3H PRN Max 6/day Yolande Esparza MD     • OLANZapine  5 mg Intramuscular Q8H PRN Genna Cooler, DO      Or   • OLANZapine  7.5 mg Oral Q8H PRN Genna Cooler, DO     • OLANZapine  2.5 mg Oral Q3H PRN Max 8/day Yolande Esparza MD     • ondansetron  4 mg Oral Q8H PRN Genna Cooler, DO     • polyethylene glycol  17 g Oral Daily PRN Winifred Beckett MD     • pseudoephedrine  120 mg Oral BID PRN Genna Cooler, DO     • senna-docusate sodium  1 tablet Oral Daily PRN Winifred Beckett MD     • sterile water          • sterile water          • sterile water          • sterile water              Behavioral Health Medications: all current active meds have been reviewed. Changes as in plan section above. Laboratory results:  I have personally reviewed all pertinent laboratory/tests results. No results found for this or any previous visit (from the past 48 hour(s)).      Melissa Vivas MD

## 2023-06-25 NOTE — PROGRESS NOTES
Progress Note - Behavioral Health   Gokul Almonte 26 y.o. male MRN: [id number]  Unit/Bed#: U 344-02 Encounter: [id number]    Assessment/Plan   Principal Problem:    Schizophrenia ([address]) vs. Schizoaffective disorder  Active Problems:    Legally blind    Hearing loss    Asthma    Medical clearance for psychiatric admission    Bilateral impacted cerumen    Prolonged erection    Intellectual disability      Recommended Treatment:   Increased loxapine to 25 mg in the morning, 25 mg in the afternoon, and 50 mg in the evening for ongoing symptoms of psychosis  Continue Depakote 750 mg twice daily for mood stability  Most recent valproic acid level 6/12/2023 was 91  Continue melatonin 3 mg at bedtime for sleep    Continue to monitor the patient for erections lasting greater than 45 minutes. Continue Sudafed 120 mg BID as needed for erection lasting greater than 45 minutes. Continue with group therapy, milieu therapy and occupational therapy. Continue frequent safety checks and vitals per unit protocol. Case discussed with treatment team.  Risks, benefits and possible side effects of Medications: Risks, benefits, and possible side effects of medications have been explained to the patient, who verbalizes understanding    ------------------------------------------------------------    Subjective:     Per nursing report, on the inpatient psychiatric unit Gokul continues to make slow progress. He remains with ongoing symptoms of psychosis and at times is observed by nursing responding to internal stimuli. On the inpatient unit he has been increasingly visible and yesterday was visible on the unit, social with peers, spending his time watching TV. On nursing assessment in the morning he reported ongoing auditory hallucinations. In the evening he was noted by nursing to be calm and cooperative, denying psychiatric symptoms and denying unmet needs. He has remained medication and meal compliant.     Gokul was interviewed in his room. At the time of interview he was seen resting in bed in no acute distress. At the time of interview he continues to appear distracted and preoccupied, but he is more talkative and more spontaneous in conversation overall. Today, Gokul reports that he feels "good". He reports that yesterday he spent his time watching movies and reports that he watched a show about magicians. He reports that this morning he took a shower and he feels refreshed. Gokul reports that he has been eating well and sleeping well in the hospital (he believes he slept about 8 hours last night). He reports he has been going to the bathroom without difficulty. He has been taking his medications as directed and has not noticed any medication side effects. Gokul reports continued improvements in his hallucinations on loxapine, stating that he did not experience any visual or auditory hallucinations yesterday and is not experiencing any visual or auditory hallucinations today. He is agreeable to an increase in his loxapine to better address his psychotic symptoms. At the time of interview, Gokul denies suicidal ideation, homicidal ideation, visual and auditory hallucinations. Of note, following this interview the patient was observed by Dr. Tamia Whitney responding to internal stimuli and talking to himself. Progress Toward Goals: slow improvement -symptoms of psychosis continue to slowly improve and the patient is becoming more organized, more spontaneous, and more talkative in conversation    Psychiatric Review of Systems:  Behavior over the last 24 hours: unchanged  Sleep: normal  Appetite: normal compared to baseline  Medication side effects: none verbalized  ROS: Complete review of systems is negative except as noted above.     Vital signs in last 24 hours:  Temp:  [97.6 °F (36.4 °C)-97.7 °F (36.5 °C)] 97.6 °F (36.4 °C)  HR:  [83-94] 83  Resp:  [16] 16  BP: (132-145)/(75-76) 132/75    Mental Status Exam:  Appearance:  alert, fair eye contact, appears older than stated age, casually dressed, appropriate grooming and hygiene and overweight   Behavior:  calm and cooperative   Motor: no abnormal movements   Speech:  More spontaneous and more talkative than previous interviews, remains scant at times   Mood:  "good"   Affect:  blunted   Thought Process:  Linear, concrete, goal directed   Thought Content: no verbalized delusions or overt paranoia, apparent poverty of thought   Perceptual disturbances: denies current hallucinations, appears distracted and preoccupied, was later observed respond to internal stimuli   Risk Potential: No active suicidal ideation, No active homicidal ideation   Cognition: oriented to self and situation, memory grossly intact, appears to be below average intelligence, impaired abstract reasoning, attention span appeared shorter than expected for age and cognition not formally tested   Insight:  Limited   Judgment: Limited     Current Medications:  Current Facility-Administered Medications   Medication Dose Route Frequency Provider Last Rate   • acetaminophen  650 mg Oral Q6H PRN Fariba Conte MD     • acetaminophen  650 mg Oral Q4H PRN Fariba Conte MD     • acetaminophen  975 mg Oral Q6H PRN Fariba Conte MD     • albuterol  2 puff Inhalation Q4H PRN Fariba Conte MD     • aluminum-magnesium hydroxide-simethicone  30 mL Oral Q4H PRN Fariba Conte MD     • benztropine  1 mg Oral Q4H PRN Max 6/day Fariba Conte MD     • divalproex sodium  750 mg Oral BID Jeanne Bryson MD     • fluticasone  1 spray Nasal Daily Fariba Conte MD     • glycopyrrolate  1 mg Oral TID Mara Andres MD     • hydrOXYzine HCL  100 mg Oral Q6H PRN Max 4/day Abel Rodriguez DO      Or   • LORazepam  1 mg Intramuscular Q6H PRN Abel Rodriguez DO     • hydrOXYzine HCL  25 mg Oral Q6H PRN Max 4/day Fariba Conte MD     • hydrOXYzine HCL  50 mg Oral Q6H PRN Max 4/day Hedy TERRAZAS Kathy Rodas MD     • loxapine  25 mg Oral TID Estefany Ames DO     • loxapine  25 mg Oral HS Anson Owusu MD     • melatonin  3 mg Oral HS PRN Estefany Ames DO     • nicotine polacrilex  4 mg Oral Q2H PRN Jose Juan Sanchez MD     • OLANZapine  5 mg Oral Q3H PRN Max 6/day Lois Guerrero MD      Or   • OLANZapine  5 mg Intramuscular Q3H PRN Max 6/day Lois Guerrero MD     • OLANZapine  5 mg Intramuscular Q8H PRN Estefany Ames DO      Or   • OLANZapine  7.5 mg Oral Q8H PRN Estefany Ames DO     • OLANZapine  2.5 mg Oral Q3H PRN Max 8/day Lois Guerrero MD     • ondansetron  4 mg Oral Q8H PRN Estefany Ames DO     • polyethylene glycol  17 g Oral Daily PRN Jose Juan Sanchez MD     • pseudoephedrine  120 mg Oral BID PRN Estefany Ames DO     • senna-docusate sodium  1 tablet Oral Daily PRN Jose Juan Sanchez MD     • sterile water          • sterile water          • sterile water          • sterile water              Behavioral Health Medications: all current active meds have been reviewed. Changes as in plan section above. Laboratory results:  I have personally reviewed all pertinent laboratory/tests results. No results found for this or any previous visit (from the past 48 hour(s)).      Anson Owusu MD

## 2023-06-25 NOTE — NURSING NOTE
Patient came to the nurses station requesting medication for anxiety and to help him sleep. HAM scale score 26, Atarax 100 mg and melatonin 3 mg given at 0026. PRN was effective patient currently sleeping. Will continue to monitor.

## 2023-06-25 NOTE — PROGRESS NOTES
[facility] Group Note     06/25/23 8459   Activity/Group Checklist   Group Life Skills  (Teamwork and Communication)   Attendance Attended   Attendance Duration (min) 31-45  (in and out of group)   Interactions Interacted appropriately  (did not participate in activity, but offered encouragement to peers)   Affect/Mood Blunted/flat   Goals Achieved Able to listen to others; Able to engage in interactions; Able to recieve feedback; Able to give feedback to another  (benefited from social presence of the group)

## 2023-06-25 NOTE — NURSING NOTE
Patient has been visible on the unit, med/meal compliant. Patient showered this morning. Patient denies A/V hallucinations. He pleasant and cooperative.

## 2023-06-25 NOTE — NURSING NOTE
Patient calm and cooperative, medication compliant. Patient denies psychiatric symptoms at this time, denies any unmet needs. Patient visible and social with peers in the unit. No behavioral issues noted.

## 2023-06-26 PROCEDURE — 99232 SBSQ HOSP IP/OBS MODERATE 35: CPT | Performed by: PSYCHIATRY & NEUROLOGY

## 2023-06-26 RX ORDER — LOXAPINE SUCCINATE 25 MG/1
25 TABLET ORAL 2 TIMES DAILY
Status: DISCONTINUED | OUTPATIENT
Start: 2023-06-26 | End: 2023-07-01

## 2023-06-26 RX ORDER — LOXAPINE SUCCINATE 50 MG/1
50 TABLET ORAL
Status: DISCONTINUED | OUTPATIENT
Start: 2023-06-26 | End: 2023-06-28

## 2023-06-26 RX ADMIN — LOXAPINE 25 MG: 25 CAPSULE ORAL at 15:13

## 2023-06-26 RX ADMIN — NICOTINE POLACRILEX 4 MG: 4 GUM, CHEWING BUCCAL at 16:06

## 2023-06-26 RX ADMIN — GLYCOPYRROLATE 1 MG: 1 TABLET ORAL at 09:13

## 2023-06-26 RX ADMIN — GLYCOPYRROLATE 1 MG: 1 TABLET ORAL at 15:13

## 2023-06-26 RX ADMIN — LOXAPINE 50 MG: 50 CAPSULE ORAL at 18:15

## 2023-06-26 RX ADMIN — GLYCOPYRROLATE 1 MG: 1 TABLET ORAL at 21:26

## 2023-06-26 RX ADMIN — FLUTICASONE PROPIONATE 1 SPRAY: 50 SPRAY, METERED NASAL at 09:13

## 2023-06-26 RX ADMIN — LOXAPINE 25 MG: 25 CAPSULE ORAL at 09:13

## 2023-06-26 RX ADMIN — DIVALPROEX SODIUM 750 MG: 500 TABLET, DELAYED RELEASE ORAL at 18:15

## 2023-06-26 RX ADMIN — DIVALPROEX SODIUM 750 MG: 500 TABLET, DELAYED RELEASE ORAL at 09:12

## 2023-06-26 NOTE — PROGRESS NOTES
Progress Note - Behavioral Health   Gokul Grier 26 y.o. male MRN: [id number]  Unit/Bed#: U 344-02 Encounter: [id number]    Assessment/Plan   Principal Problem:    Schizophrenia ([address]) vs. Schizoaffective disorder  Active Problems:    Legally blind    Hearing loss    Asthma    Medical clearance for psychiatric admission    Bilateral impacted cerumen    Prolonged erection    Intellectual disability      Recommended Treatment:   Continue loxapine 25 mg in the morning, 25 mg in the afternoon, and 50 mg in the evening for ongoing symptoms of psychosis  Continue Depakote 750 mg twice daily for mood stability   level 6/12/2023 was 91  Continue melatonin 3 mg at bedtime for sleep    Continue to monitor the patient for erections lasting greater than 45 minutes. Continue Sudafed 120 mg BID as needed for erection lasting greater than 45 minutes. Continue with group therapy, milieu therapy and occupational therapy. Continue frequent safety checks and vitals per unit protocol. Case discussed with treatment team.  Risks, benefits and possible side effects of Medications: Risks, benefits, and possible side effects of medications have been explained to the patient, who verbalizes understanding    ------------------------------------------------------------    Subjective:     Per nursing report, patient has been calm and pleasant, about the unit. He showered yesterday morning. He has been denying psychiatric symptoms. He is compliant with meds and meals. On exam today, patient is sitting comfortably in the interview room with his eyes closed often. He states his mood is "good." Over the weekend, he said he "watched movies, video games, eat, sleep" and explained that he watched the movie [employer]icide Squad. Today, he plans to shower and "try something new." He says he is sleeping "okay" getting about 6 hours/night though it was a "little cold." He reports a good appetite, eating 3 meals a day.  He says his energy is good but then states he feels he has "too much energy that is draining" him. We discussed things he can do to dissipate his energy and he agreed to working out, doing push-ups and sit-ups. He thinks his meds are working "pretty good." He does endorse the side effect of "talking more at night, just saying random stuff." He says he last noticed this Friday night at which time he also was hearing voices. The voices were telling him that " my family doesn't like me." He reports that his hallucinations were not bothersome and reports feeling that their content in "not true." Currently, he denies AVH, SI/HI/paranoia. Progress Toward Goals: slow improvement -symptoms of psychosis continue to slowly improve and the patient is becoming more organized, more spontaneous, and more talkative in conversation    Psychiatric Review of Systems:  Behavior over the last 24 hours: unchanged  Sleep: normal  Appetite: normal compared to baseline  Medication side effects: "talking more at night, saying random stuff"  ROS: Complete review of systems is negative except as noted above.     Vital signs in last 24 hours:  Temp:  [97.3 °F (36.3 °C)-97.5 °F (36.4 °C)] 97.5 °F (36.4 °C)  HR:  [] 99  Resp:  [16] 16  BP: (114-128)/(66-75) 114/66    Mental Status Exam:  Appearance:  [place] appearing -American male, drowsy at times, poor eye contact, appears older than stated age, casually dressed, marginal grooming/hygiene and overweight   Behavior:  calm, cooperative and guarded, no psychomotor agitation or slwoing   Motor: no abnormal movements   Speech:  Scant at times, but spontaneous and per staff and treatment team has been more talkative than previous interviews, slow, poor articulation   Mood:  "good"   Affect:  blunted   Thought Process:  Linear, concrete, goal directed,  slowing of thoughts   Thought Content: no verbalized delusions or overt paranoia   Perceptual disturbances: denies current hallucinations, does not appear to be responding to internal stimuli at this time and at times distracted but did not appear internally preoccupied   Risk Potential: No active suicidal ideation, No active homicidal ideation, Potential for aggression: None at present   Cognition: oriented to self and situation, memory grossly intact, appears to be below average intelligence, impaired abstract reasoning, attention span appeared shorter than expected for age and cognition not formally tested   Insight:  Limited   Judgment: Limited     Current Medications:  Current Facility-Administered Medications   Medication Dose Route Frequency Provider Last Rate   • acetaminophen  650 mg Oral Q6H PRN Jewell Keita MD     • acetaminophen  650 mg Oral Q4H PRN Jewell Keita MD     • acetaminophen  975 mg Oral Q6H PRN Jewell Keita MD     • albuterol  2 puff Inhalation Q4H PRN Jewell Keita MD     • aluminum-magnesium hydroxide-simethicone  30 mL Oral Q4H PRN Jewell Keita MD     • benztropine  1 mg Oral Q4H PRN Max 6/day Jewell Keita MD     • divalproex sodium  750 mg Oral BID Dada Jones MD     • fluticasone  1 spray Nasal Daily Jewell Keita MD     • glycopyrrolate  1 mg Oral TID Anika Campbell MD     • hydrOXYzine HCL  100 mg Oral Q6H PRN Max 4/day Anu Britt DO      Or   • LORazepam  1 mg Intramuscular Q6H PRN Anu Britt DO     • hydrOXYzine HCL  25 mg Oral Q6H PRN Max 4/day Jewell Keita MD     • hydrOXYzine HCL  50 mg Oral Q6H PRN Max 4/day Jewell Keita MD     • loxapine  25 mg Oral TID Anu Britt DO     • loxapine  25 mg Oral HS Rosendo De La Cruz MD     • melatonin  3 mg Oral HS PRN Anu Britt DO     • nicotine polacrilex  4 mg Oral Q2H PRN Jewell Keita MD     • OLANZapine  5 mg Oral Q3H PRN Max 6/day Dada Jones MD      Or   • OLANZapine  5 mg Intramuscular Q3H PRN Max 6/day Dada Jones MD     • OLANZapine  5 mg Intramuscular Q8H PRN Anu Britt DO      Or   • OLANZapine 7.5 mg Oral Q8H PRN Philemon Decant, DO     • OLANZapine  2.5 mg Oral Q3H PRN Max 8/day Farhana Urena MD     • ondansetron  4 mg Oral Q8H PRN Jose Eemon Decrae, DO     • polyethylene glycol  17 g Oral Daily PRN Patrick Ibrahim MD     • pseudoephedrine  120 mg Oral BID PRN Zach Anaya MD     • senna-docusate sodium  1 tablet Oral Daily PRN Patrick Ibrahim MD     • sterile water          • sterile water          • sterile water          • sterile water              Behavioral Health Medications: all current active meds have been reviewed. Changes as in plan section above. Laboratory results:  I have personally reviewed all pertinent laboratory/tests results. No results found for this or any previous visit (from the past 48 hour(s)).      Rusty Boyd   MS-4

## 2023-06-26 NOTE — PROGRESS NOTES
06/26/23 0837   Team Meeting   Meeting Type Daily Rounds   Team Members Present   Team Members Present Physician;Nurse;   Physician Team Member [id number] Team Member [facility] Management Team Member Wendie   Patient/Family Present   Patient Present No   Patient's Family Present No   Pt med/meal compliant. Showered over the weekend. Responding to internal stimuli. No PRNs required over the weekend. Discharge pending rehab.

## 2023-06-26 NOTE — NURSING NOTE
Patient calm and cooperative, medication and meal compliant. Patient denies psychiatric symptoms at this time, denies any unmet needs.

## 2023-06-26 NOTE — SOCIAL WORK
Pt continues to make progress towards rehab placement. Reporting lessening AH and requesting less PRN medication for same.

## 2023-06-26 NOTE — NURSING NOTE
Pt is calm and pleasant with a blunted affect reporting feeling "tired" this morning. Denies SI/HI/AVH, depression or anxiety. Pt returned to bed after breakfast and slept for an hour. Now sitting in dayroom on chair sleeping during spirituality group.

## 2023-06-27 PROCEDURE — 99232 SBSQ HOSP IP/OBS MODERATE 35: CPT | Performed by: PSYCHIATRY & NEUROLOGY

## 2023-06-27 RX ADMIN — DIVALPROEX SODIUM 750 MG: 500 TABLET, DELAYED RELEASE ORAL at 21:22

## 2023-06-27 RX ADMIN — LOXAPINE 50 MG: 50 CAPSULE ORAL at 21:22

## 2023-06-27 RX ADMIN — GLYCOPYRROLATE 1 MG: 1 TABLET ORAL at 08:11

## 2023-06-27 RX ADMIN — GLYCOPYRROLATE 1 MG: 1 TABLET ORAL at 21:22

## 2023-06-27 RX ADMIN — HYDROXYZINE HYDROCHLORIDE 100 MG: 50 TABLET, FILM COATED ORAL at 00:01

## 2023-06-27 RX ADMIN — DIVALPROEX SODIUM 750 MG: 500 TABLET, DELAYED RELEASE ORAL at 08:11

## 2023-06-27 RX ADMIN — LOXAPINE 25 MG: 25 CAPSULE ORAL at 15:27

## 2023-06-27 RX ADMIN — LOXAPINE 25 MG: 25 CAPSULE ORAL at 08:11

## 2023-06-27 RX ADMIN — GLYCOPYRROLATE 1 MG: 1 TABLET ORAL at 15:27

## 2023-06-27 RX ADMIN — FLUTICASONE PROPIONATE 1 SPRAY: 50 SPRAY, METERED NASAL at 08:11

## 2023-06-27 RX ADMIN — MELATONIN TAB 3 MG 3 MG: 3 TAB at 00:01

## 2023-06-27 NOTE — PROGRESS NOTES
06/27/23 0838   Team Meeting   Meeting Type Daily Rounds   Team Members Present   Team Members Present Physician;Nurse;   Physician Team Member [id number] Team Member [facility]   Care Management Team Member Wendie   Patient/Family Present   Patient Present No   Patient's Family Present No   Pt calm, pleasant, cooperative. Mostly isolative to his room. Limited interactions with peers and staff. Remains internally preoccupied, responding to internal stimuli at times. Pt overnight c/o insomnia, restlessness and increased anxiety. Pt given PRN Atarax and Melatonin. Discharge to rehab to be determined.

## 2023-06-27 NOTE — NURSING NOTE
Pt sleeping in bed throughout the morning reporting feeling "tired." Out of bed for breakfast. Denies SI/HI/AVH, depression or anxiety. Pt does not appear internally preoccupied at this time. No requests at this time.

## 2023-06-27 NOTE — NURSING NOTE
Patient visible in milieu, calm, cooperative, withdrawn to self, limited interaction with peers and staff. Patient denies current SI/HI/AVH. He denies feelings of depression and anxiety. Patient remains internally preoccupied, responding at times, talking and laughing to self but remains in behavioral control. Patient is compliant with medication and meal. Patient denies any unmet needs at this time.

## 2023-06-27 NOTE — PROGRESS NOTES
Progress Note - Behavioral Health   Gokul Llanes 26 y.o. male MRN: [id number]  Unit/Bed#: U 344-02 Encounter: [id number]    Assessment/Plan   Principal Problem:    Schizophrenia ([address]) vs. Schizoaffective disorder  Active Problems:    Legally blind    Hearing loss    Asthma    Medical clearance for psychiatric admission    Bilateral impacted cerumen    Prolonged erection    Intellectual disability    Recommended Treatment:   Continue loxapine 25 mg in the morning, 25 mg in the afternoon, and 50 mg in the evening for ongoing symptoms of psychosis  Continue Depakote 750 mg twice daily for mood stability              level 6/12/2023 was 91  Continue melatonin 3 mg at bedtime for sleep     Continue to monitor the patient for erections lasting greater than 45 minutes. Continue Sudafed 120 mg BID as needed for erection lasting greater than 45 minutes. All current active medications have been reviewed  Encourage group therapy, milieu therapy and occupational therapy  Behavioral Health checks every 7 minutes  Medical management per SLIM. Commitment Status: 201  ----------------------------------------      Subjective: Patient discussed in nursing report and overnight notes and charting reviewed. Per nursing report, patient was calm and cooperative though internally preoccupied and distracted. At 145am, he noted restlessness, anxiety, and insomnia for which he received PRN atarax 100mg and melatonin 3mg which was effective. Compliant with meds and meals. On exam, patient is very sleepy after waking him up from bed. He says he is tired because "voices kept me up" and he reports "talking to myself." He states he knows they are AH instead of loud thoughts because "not a lot of people call me by my full name." He also had nightmares towards the end of the night. He reports about 5-6 hours of sleep. He denies VH. He also denies SI/HI without a current plan/intent to harm himself.  He denies paranoia and states he feels safe on the unit and can alert staff if he does not. His appetite is good, eating 3 meals a day. For dinner, he had a chicken sandwich. He had a BM yesterday. We discussed his restlessness which he reported yesterday and today. He said he did not follow through with the plan to dissipate energy by exercising becayse "I got distracted with groups and stuff." He attended [employer] but did not sing, just watched others sing, because "I could not think of a song." He agreed to try to exercise today if he feels restless. He thinks the medicine is working well. He originally denied side effects but then endorsed drooling. Behavior over the last 24 hours:  unchanged  Sleep: insomnia  Appetite: normal  Medication side effects: Yes patient reports drooling.  Increased secretions noted on exam  ROS: no complaints and all other systems are negative    Mental Status Evaluation:  Appearance:  [place] appearing  male, casually dressed, marginal hygiene, overweight, appears older than stated age, drowsy   Behavior:  cooperative, calm, slightly less guarded, poor eye contact, slow responses   Speech:  slow, scant, soft, slightly more spontaneous, articulation error   Mood:  "tired"   Affect:  blunted, mood-congruent   Thought Process:  goal directed, linear, slowing of thoughts, concrete   Associations: concrete associations   Thought Content:  no overt delusions, no paranoia   Perceptual Disturbances: vague auditory hallucinations, cannot understand voices, denies visual hallucinations when asked, appears distracted, appears preoccupied, reports talking to self overnight but not observed doing so on exam   Risk Potential: Suicidal ideation - None at present, would talk to staff if not feeling safe on the unit  Homicidal ideation - None at present  Potential for aggression - Not at present   Sensorium:  oriented to person and place   Memory:  recent and remote memory grossly intact   Consciousness:  sedated Attention/Concentration: attention span and concentration appear shorter than expected for age   Insight:  limited   Judgment: limited   Gait/Station: normal gait/station   Motor Activity: no abnormal movements     Medications: all current active meds have been reviewed.   Current Facility-Administered Medications   Medication Dose Route Frequency Provider Last Rate   • acetaminophen  650 mg Oral Q6H PRN Jori Bumpers, MD     • acetaminophen  650 mg Oral Q4H PRN Jori Bumpers, MD     • acetaminophen  975 mg Oral Q6H PRN Jori Bumpers, MD     • albuterol  2 puff Inhalation Q4H PRN Jori Bumpers, MD     • aluminum-magnesium hydroxide-simethicone  30 mL Oral Q4H PRN Jori Bumpers, MD     • benztropine  1 mg Oral Q4H PRN Max 6/day Jori Bumpers, MD     • divalproex sodium  750 mg Oral BID Madyson Christy MD     • fluticasone  1 spray Nasal Daily Jori Bumpers, MD     • glycopyrrolate  1 mg Oral TID Paresh Wells MD     • hydrOXYzine HCL  100 mg Oral Q6H PRN Max 4/day Bigg Siddiqi DO      Or   • LORazepam  1 mg Intramuscular Q6H PRN Bigg Siddiqi DO     • hydrOXYzine HCL  25 mg Oral Q6H PRN Max 4/day Jori Bumpers, MD     • hydrOXYzine HCL  50 mg Oral Q6H PRN Max 4/day Jori Bumpers, MD     • loxapine  25 mg Oral BID Madyson Christy MD     • loxapine  50 mg Oral HS Madyson Christy MD     • melatonin  3 mg Oral HS PRN Bigg Siddiqi DO     • nicotine polacrilex  4 mg Oral Q2H PRN Jori Bumpers, MD     • OLANZapine  5 mg Oral Q3H PRN Max 6/day Madyson Christy MD      Or   • OLANZapine  5 mg Intramuscular Q3H PRN Max 6/day Madyson Christy MD     • OLANZapine  5 mg Intramuscular Q8H PRN Bigg Siddiqi DO      Or   • OLANZapine  7.5 mg Oral Q8H PRN Bigg Siddiqi DO     • OLANZapine  2.5 mg Oral Q3H PRN Max 8/day Madyson Christy MD     • ondansetron  4 mg Oral Q8H PRN Bigg Siddiqi DO     • polyethylene glycol  17 g Oral Daily PRN Jori Bumpers, MD     • pseudoephedrine 120 mg Oral BID PRN Maximo Reynolds MD     • senna-docusate sodium  1 tablet Oral Daily PRN Jessica Snider MD     • sterile water          • sterile water          • sterile water          • sterile water              Labs: I have personally reviewed all pertinent laboratory/tests results  Most Recent Labs:     Results from the past 24 hours: No results found for this or any previous visit (from the past 24 hour(s)). Progress Toward Goals: progressing gradually    Risks / Benefits of Treatment:    Risks, benefits, and possible side effects of medications explained to patient and patient verbalizes understanding and agreement for treatment. Counseling / Coordination of Care: Total floor / unit time spent today 35 minutes. Greater than 50% of total time was spent with the patient and / or family counseling and / or coordination of care. A description of counseling / coordination of care:  Patient's progress discussed with staff in treatment team meeting. Treatment plan, treatment progress and medication changes were reviewed with nursing staff, pharmacy service, and case management in interdisciplinary treatment team meeting. Medications, treatment progress and treatment plan reviewed with patient. Educated on importance of medication and treatment compliance. Reassurance and supportive therapy provided. Encouraged participation in milieu and group therapy on the unit.       Le Lund 06/27/23

## 2023-06-27 NOTE — NURSING NOTE
Patient came to nursing station c/o insomnia, restlessness and increased anxiety. Patient received PRN Atarax 100mg PO and Melatonin 3mg PO at 12:01am with good effect.

## 2023-06-28 PROCEDURE — 99232 SBSQ HOSP IP/OBS MODERATE 35: CPT | Performed by: PSYCHIATRY & NEUROLOGY

## 2023-06-28 RX ADMIN — OLANZAPINE 7.5 MG: 2.5 TABLET, FILM COATED ORAL at 23:15

## 2023-06-28 RX ADMIN — HYDROXYZINE HYDROCHLORIDE 100 MG: 50 TABLET, FILM COATED ORAL at 23:15

## 2023-06-28 RX ADMIN — GLYCOPYRROLATE 1 MG: 1 TABLET ORAL at 08:46

## 2023-06-28 RX ADMIN — FLUTICASONE PROPIONATE 1 SPRAY: 50 SPRAY, METERED NASAL at 08:46

## 2023-06-28 RX ADMIN — MELATONIN TAB 3 MG 3 MG: 3 TAB at 00:35

## 2023-06-28 RX ADMIN — GLYCOPYRROLATE 1 MG: 1 TABLET ORAL at 15:43

## 2023-06-28 RX ADMIN — LOXAPINE 25 MG: 25 CAPSULE ORAL at 08:46

## 2023-06-28 RX ADMIN — DIVALPROEX SODIUM 750 MG: 500 TABLET, DELAYED RELEASE ORAL at 08:46

## 2023-06-28 RX ADMIN — HYDROXYZINE HYDROCHLORIDE 100 MG: 50 TABLET, FILM COATED ORAL at 00:35

## 2023-06-28 RX ADMIN — DIVALPROEX SODIUM 750 MG: 500 TABLET, DELAYED RELEASE ORAL at 18:01

## 2023-06-28 RX ADMIN — GLYCOPYRROLATE 1 MG: 1 TABLET ORAL at 21:33

## 2023-06-28 RX ADMIN — OLANZAPINE 5 MG: 5 TABLET, FILM COATED ORAL at 00:35

## 2023-06-28 RX ADMIN — MELATONIN TAB 3 MG 3 MG: 3 TAB at 23:15

## 2023-06-28 RX ADMIN — LOXAPINE 75 MG: 25 CAPSULE ORAL at 18:01

## 2023-06-28 RX ADMIN — LOXAPINE 25 MG: 25 CAPSULE ORAL at 14:10

## 2023-06-28 NOTE — PROGRESS NOTES
06/28/23 0843   Team Meeting   Meeting Type Daily Rounds   Team Members Present   Team Members Present Physician;Nurse;   Physician Team Member [id number] [facility] Team Member [facility] Management Team Member Wendie   Patient/Family Present   Patient Present No   Patient's Family Present No   Pt pleasant, cooperative. Loudly responding to internal stimuli in his room, laughing inappropriately. Pt later stating "I am having an episode". Pt appeared to be responding to internal stimuli. Pt received PRN medication for same. Discharge to be determined.

## 2023-06-28 NOTE — NURSING NOTE
Pt is calm with a blunted affect reporting a "good" mood. Denies SI/HI/AVH, depression or anxiety. Does not appear internally preoccupied. Returned to bed after breakfast, now sleeping.

## 2023-06-28 NOTE — NURSING NOTE
Upon reassessment one hour later pt is in their bed sleeping. PRN melatonin, zyprexa, and atarax effective.

## 2023-06-28 NOTE — NURSING NOTE
Pt pleasant and cooperative, Pt visible on the unit seen watching TV and talking with peers, Pt was heard and seen is his room responding to internal stimuli laughing loudly, when asked about the voices Pt stated "Everything is good we're just chilling talking about bitches and shit" Pt denied all pain anxiety and depression, Pt denies HI and SI, Pt took all scheduled HS meds, Q7 min safety checks maintained

## 2023-06-28 NOTE — PROGRESS NOTES
Progress Note - Behavioral Health   Gokul Crawford 26 y.o. male MRN: [id number]  Unit/Bed#: U 344-02 Encounter: [id number]    Assessment/Plan   Principal Problem:    Schizophrenia ([address]) vs. Schizoaffective disorder  Active Problems:    Legally blind    Hearing loss    Asthma    Medical clearance for psychiatric admission    Bilateral impacted cerumen    Prolonged erection    Intellectual disability    Recommended Treatment:   Continue loxapine 25 mg in the morning, 25 mg in the afternoon, and increase to 75 mg in the evening for ongoing symptoms of psychosis  Continue Depakote 750 mg twice daily for mood stability              level 6/12/2023 was 91  Continue melatonin 3 mg at bedtime for sleep     Continue to monitor the patient for erections lasting greater than 45 minutes. Continue Sudafed 120 mg BID as needed for erection lasting greater than 45 minutes. All current active medications have been reviewed  Encourage group therapy, milieu therapy and occupational therapy  Behavioral Health checks every 7 minutes  Medical management per SLIM. Commitment Status: 201  ----------------------------------------      Subjective: Patient discussed in nursing report and overnight notes and charting reviewed. Per nursing report, patient was calm and cooperative throughout the day. At 10pm, he was responding loudly noting "Everything is good we're just chilling talking about bitches and shit." Around 1230am, he reported having "an episode" appearing scared and responding to internal stimuli for which he received 3mg melatonin, 5mg PO zyprexa, and 100mg atarax which was effective. Compliant with meds and meals. On exam, patient is very sleepy after waking him up from bed. He states his mood is "alright." He reports sleeping well, 6-8 hours.  When asked about the episode last night, he said "they were just laughin' and talkin' loud." He states the voices are people he knows but was hesitant to share more or talk about it longer. He was agreeable to increase his HS meds. He denies AVH at time of interview. He also denies SI/HI/paranoia. He reports a good appetite, eating 3 meals a day. He had grilled cheese for dinner. He reports trying exercises to dissipate energy and reports that it "felt good." He then said that he is "tired of going to group" but agreed to give it another shot today and attempt to engage and share more. His plan for today includes a shower and group. He denies side effects.      Behavior over the last 24 hours:  unchanged  Sleep: reports difficulty initiating sleep due to AVH, reports sleeping 6-8 hours in total  Appetite: normal  Medication side effects: none reported or appreciated today  ROS: no complaints and all other systems are negative    Mental Status Evaluation:  Appearance:  overtly appearing  male, casually dressed, marginal hygiene, overweight, appears older than stated age, drowsy - yawning throughout interview   Behavior:  cooperative, calm, slightly more guarded, no eye contact, slow responses, facing away from this writer   Speech:  slow, scant, soft, slightly less spontaneous, articulation error   Mood:  "alright"   Affect:  blunted   Thought Process:  goal directed, linear, slowing of thoughts, concrete   Associations: concrete associations   Thought Content:  no overt delusions, no paranoia   Perceptual Disturbances: denies auditory hallucinations when asked, denies visual hallucinations when asked, appears preoccupied and at times distracted   Risk Potential: Suicidal ideation - None at present, contracts for safety on the unit, would talk to staff if not feeling safe on the unit  Homicidal ideation - None at present  Potential for aggression - Not at present   Sensorium:  oriented to person and place   Memory:  recent and remote memory grossly intact   Consciousness:  drowsy   Attention/Concentration: attention span and concentration appear shorter than expected for age   Insight:  limited   Judgment: limited   Gait/Station: normal gait/station   Motor Activity: no abnormal movements     Medications: all current active meds have been reviewed.   Current Facility-Administered Medications   Medication Dose Route Frequency Provider Last Rate   • acetaminophen  650 mg Oral Q6H PRN Fernandez Topete MD     • acetaminophen  650 mg Oral Q4H PRN Fernandez Topete MD     • acetaminophen  975 mg Oral Q6H PRN Fernandez Topete MD     • albuterol  2 puff Inhalation Q4H PRN Fernandez Topete MD     • aluminum-magnesium hydroxide-simethicone  30 mL Oral Q4H PRN Fernandez Topete MD     • benztropine  1 mg Oral Q4H PRN Max 6/day Fernandez Topete MD     • divalproex sodium  750 mg Oral BID Selene Mac MD     • fluticasone  1 spray Nasal Daily Fernandez Topete MD     • glycopyrrolate  1 mg Oral TID Jailyn Mcdonald MD     • hydrOXYzine HCL  100 mg Oral Q6H PRN Max 4/day Maddi Remington, DO      Or   • LORazepam  1 mg Intramuscular Q6H PRN Maddi Remington, DO     • hydrOXYzine HCL  25 mg Oral Q6H PRN Max 4/day Fernandez Topete MD     • hydrOXYzine HCL  50 mg Oral Q6H PRN Max 4/day Fernandez Topete MD     • loxapine  25 mg Oral BID Selene Mac MD     • loxapine  75 mg Oral HS Maddi Remington, DO     • melatonin  3 mg Oral HS PRN Maddi Remington, DO     • nicotine polacrilex  4 mg Oral Q2H PRN Fernandez Topete MD     • OLANZapine  5 mg Oral Q3H PRN Max 6/day Selene Mac MD      Or   • OLANZapine  5 mg Intramuscular Q3H PRN Max 6/day Selene Mac MD     • OLANZapine  5 mg Intramuscular Q8H PRN Maddi Remington, DO      Or   • OLANZapine  7.5 mg Oral Q8H PRN Maddi Remington, DO     • OLANZapine  2.5 mg Oral Q3H PRN Max 8/day Selene Mac MD     • ondansetron  4 mg Oral Q8H PRN Maddi Remington, DO     • polyethylene glycol  17 g Oral Daily PRN Fernandez Topete MD     • pseudoephedrine  120 mg Oral BID PRN Sandra Lowe MD     • senna-docusate sodium  1 tablet Oral Daily PRN Kitty Fabry, MD     • sterile water          • sterile water          • sterile water          • sterile water              Labs: I have personally reviewed all pertinent laboratory/tests results  Most Recent Labs:     Results from the past 24 hours: No results found for this or any previous visit (from the past 24 hour(s)). Progress Toward Goals: progressing gradually    Risks / Benefits of Treatment:    Risks, benefits, and possible side effects of medications explained to patient and patient verbalizes understanding and agreement for treatment. Counseling / Coordination of Care: Total floor / unit time spent today 35 minutes. Greater than 50% of total time was spent with the patient and / or family counseling and / or coordination of care. A description of counseling / coordination of care:  Patient's progress discussed with staff in treatment team meeting. Treatment plan, treatment progress and medication changes were reviewed with nursing staff, pharmacy service, and case management in interdisciplinary treatment team meeting. Medications, treatment progress and treatment plan reviewed with patient. Educated on importance of medication and treatment compliance. Reassurance and supportive therapy provided. Encouraged participation in milieu and group therapy on the unit.       Ashleigh Oshea 06/28/23

## 2023-06-28 NOTE — NURSING NOTE
Pt approached the nurses' station, agitated, restless, anxious and states to nurse, "I'm having like, an, an, episode" Pt is distracted in speech, appearing to be RTIS with darting eyes, inappropriate laugher and speaking to hallucinations, and has paranoid appearance. Pt given PRN melatonin 3mg for insomnia, zyprexa 5mg PO for moderate agitation, and atarax 100mg for severe anxiety.

## 2023-06-29 PROCEDURE — 99232 SBSQ HOSP IP/OBS MODERATE 35: CPT | Performed by: PSYCHIATRY & NEUROLOGY

## 2023-06-29 RX ADMIN — LOXAPINE 75 MG: 25 CAPSULE ORAL at 18:00

## 2023-06-29 RX ADMIN — DIVALPROEX SODIUM 750 MG: 500 TABLET, DELAYED RELEASE ORAL at 18:01

## 2023-06-29 RX ADMIN — GLYCOPYRROLATE 1 MG: 1 TABLET ORAL at 15:39

## 2023-06-29 RX ADMIN — LOXAPINE 25 MG: 25 CAPSULE ORAL at 08:00

## 2023-06-29 RX ADMIN — GLYCOPYRROLATE 1 MG: 1 TABLET ORAL at 21:43

## 2023-06-29 RX ADMIN — DIVALPROEX SODIUM 750 MG: 500 TABLET, DELAYED RELEASE ORAL at 08:01

## 2023-06-29 RX ADMIN — GLYCOPYRROLATE 1 MG: 1 TABLET ORAL at 08:01

## 2023-06-29 RX ADMIN — LOXAPINE 25 MG: 25 CAPSULE ORAL at 14:07

## 2023-06-29 NOTE — NURSING NOTE
Patient is flat and withdrawn on approach. Appears disheveled and mildly confused with assessment questions. Patient reports feeling tired and wanting to go back to sleep. Patient declined breakfast and was intermittently more visible as the day progressed. Patient denied SI, HI,A/V hallucinations. Denies any unmet needs.

## 2023-06-29 NOTE — NURSING NOTE
Patient visible on unit and in dayroom, limited interaction with peers and staff. Patient is calm, cooperative, and compliant with medication. Patient denies SI/HI. He remains internally preoccupied, actively responding to internal stimuli but denies AVH. No complaints or concerns verbalized at this time.

## 2023-06-29 NOTE — NURSING NOTE
PRN melatonin, atarax and zyprexa effective. Pt now resting in bed, no longer RTIS, yelling or appearing fearful.

## 2023-06-29 NOTE — PROGRESS NOTES
06/29/23 1000   Activity/Group Checklist   Group Other (Comment)  (Group Art Therapy, Social Group)   Attendance Attended   Attendance Duration (min) Greater than 60   Interactions Interacted appropriately   Affect/Mood Appropriate   Goals Achieved Able to engage in interactions

## 2023-06-29 NOTE — PROGRESS NOTES
Progress Note - Behavioral Health   Gokul Mabry 26 y.o. male MRN: [id number]  Unit/Bed#: -02 Encounter: [id number]    Assessment/Plan   Principal Problem:    Schizophrenia ([address]) vs. Schizoaffective disorder  Active Problems:    Legally blind    Hearing loss    Asthma    Medical clearance for psychiatric admission    Bilateral impacted cerumen    Prolonged erection    Intellectual disability    Recommended Treatment:   Continue loxapine 25 mg in the morning, 25 mg in the afternoon, and 75 mg in the evening for ongoing symptoms of psychosis  Continue Depakote 750 mg twice daily for mood stability              level 6/12/2023 was 91  Continue melatonin 3 mg at bedtime for sleep     Continue to monitor the patient for erections lasting greater than 45 minutes. Continue Sudafed 120 mg BID as needed for erection lasting greater than 45 minutes. All current active medications have been reviewed  Encourage group therapy, milieu therapy and occupational therapy  Behavioral Health checks every 7 minutes  Medical management per SLIM. Commitment Status: 201  ----------------------------------------      Subjective: Patient discussed in nursing report and overnight notes and charting reviewed. Per nursing report, patient was calm and cooperative throughout the day. At 11pm, he was responding loudly, appearing fearful and paranoid. He received 3mg melatonin, 7.5mg PO zyprexa, and 100mg atarax which was effective. Compliant with meds and meals. On exam, patient is very sleepy, hard to wake up several times. At [phone], patient is still sleeping but agreeable to sit up in bed and talk. He states his mood is "okay." He says he slept "off and on" getting about 4-5 hours. He reports "dealing with voices" before falling asleep and throughout the night when he woke up. He said he could not understand the voices noting they were "mumbles." He said the PRN medication was not completely effective.  He did not wish to elaborate more. His appetite is good, eating 3 meals a day. He had "vegetable lasagna" for dinner last night which he enjoyed. At the time of this interview, he denied SI/HI/AVH/paranoia. He also denies restlessness and anxiety. His plan for today is to shower stating "I definitely need one" and go to group. He denies side effects.     Behavior over the last 24 hours:  unchanged  Sleep: reports difficulty initiating and maintaining sleep due to AVH, reports sleeping 5 hours in total  Appetite: normal  Medication side effects: none reported or appreciated today  ROS: no complaints and all other systems are negative    Mental Status Evaluation:  Appearance:  overtly appearing  male, casually dressed, poor hygiene, disheveled, overweight, appears older than stated age, drowsy - eyes closed and head down throughout interview   Behavior:  cooperative, calm, slightly more guarded, no eye contact, slow responses, drowsy   Speech:  slow, scant, soft, less spontaneous, articulation error   Mood:  "good"   Affect:  blunted   Thought Process:  slowing of thoughts, concrete   Associations: concrete associations   Thought Content:  no overt delusions, no paranoia   Perceptual Disturbances: denies auditory hallucinations when asked during interview but reported mumbles last night interfering with sleep, denies visual hallucinations when asked, appears preoccupied and at times distracted   Risk Potential: Suicidal ideation - None at present, contracts for safety on the unit, would talk to staff if not feeling safe on the unit  Homicidal ideation - None at present  Potential for aggression - Not at present   Sensorium:  oriented to person and place   Memory:  recent and remote memory grossly intact   Consciousness:  drowsy/sedated   Attention/Concentration: attention span and concentration appear shorter than expected for age   Insight:  limited   Judgment: limited   Gait/Station: normal gait/station   Motor Activity: no abnormal movements     Medications: all current active meds have been reviewed.   Current Facility-Administered Medications   Medication Dose Route Frequency Provider Last Rate   • acetaminophen  650 mg Oral Q6H PRN Mack Rivas MD     • acetaminophen  650 mg Oral Q4H PRN Mack Rivas MD     • acetaminophen  975 mg Oral Q6H PRN Mack Rivas MD     • albuterol  2 puff Inhalation Q4H PRN Mack Rivas MD     • aluminum-magnesium hydroxide-simethicone  30 mL Oral Q4H PRN Mack Rivas MD     • benztropine  1 mg Oral Q4H PRN Max 6/day Mack Rivas MD     • divalproex sodium  750 mg Oral BID Roel Jones MD     • fluticasone  1 spray Nasal Daily Mack Rivas MD     • glycopyrrolate  1 mg Oral TID Presley Valdivia MD     • hydrOXYzine HCL  100 mg Oral Q6H PRN Max 4/day Deepthi Meng, DO      Or   • LORazepam  1 mg Intramuscular Q6H PRN Deepthi Meng, DO     • hydrOXYzine HCL  25 mg Oral Q6H PRN Max 4/day Mack Rivas MD     • hydrOXYzine HCL  50 mg Oral Q6H PRN Max 4/day Mack Rivas MD     • loxapine  25 mg Oral BID Roel Jones MD     • loxapine  75 mg Oral HS Deepthi Meng, DO     • melatonin  3 mg Oral HS PRN Deepthi Meng, DO     • nicotine polacrilex  4 mg Oral Q2H PRN Mack Rivas MD     • OLANZapine  5 mg Oral Q3H PRN Max 6/day Roel Jones MD      Or   • OLANZapine  5 mg Intramuscular Q3H PRN Max 6/day Roel Jones MD     • OLANZapine  5 mg Intramuscular Q8H PRN Deepthi Meng, DO      Or   • OLANZapine  7.5 mg Oral Q8H PRN Deepthi Meng, DO     • OLANZapine  2.5 mg Oral Q3H PRN Max 8/day Roel Jones MD     • ondansetron  4 mg Oral Q8H PRN Deepthi Meng, DO     • polyethylene glycol  17 g Oral Daily PRN Mack Rivas MD     • pseudoephedrine  120 mg Oral BID PRN Amauri Boo MD     • senna-docusate sodium  1 tablet Oral Daily PRN Mack Rivas MD     • sterile water          • sterile water          • sterile water          • sterile water              Labs: I have personally reviewed all pertinent laboratory/tests results  Most Recent Labs:     Results from the past 24 hours: No results found for this or any previous visit (from the past 24 hour(s)). Progress Toward Goals: progressing gradually    Risks / Benefits of Treatment:    Risks, benefits, and possible side effects of medications explained to patient and patient verbalizes understanding and agreement for treatment. Counseling / Coordination of Care: Total floor / unit time spent today 20 minutes. Greater than 50% of total time was spent with the patient and / or family counseling and / or coordination of care. A description of counseling / coordination of care:  Patient's progress discussed with staff in treatment team meeting. Treatment plan, treatment progress and medication changes were reviewed with nursing staff, pharmacy service, and case management in interdisciplinary treatment team meeting. Medications, treatment progress and treatment plan reviewed with patient. Educated on importance of medication and treatment compliance. Reassurance and supportive therapy provided. Encouraged participation in milieu and group therapy on the unit.       Massiel Klein 06/29/23

## 2023-06-29 NOTE — PROGRESS NOTES
06/29/23 0846   Team Meeting   Meeting Type Daily Rounds   Team Members Present   Team Members Present Physician;Nurse;   Physician Team Member [id number] [facility] Team Member [facility] Team Member Wendie   Patient/Family Present   Patient Present No   Patient's Family Present No   Pt is visible on the unit and in day room. Pt denies [facility] [place] Ke[place] loudly responding in his room which appeared to be escalating. Pt was given PRN melatonin, Zyprexa and Atarax. PRNs were effective. Discharge possible next week.

## 2023-06-29 NOTE — NURSING NOTE
Pt heard loudly responding while alone in  bedroom, escalating over several minutes. Pt given PRN melatonin 3mg for insomnia, zyprexa 7.5mg PO for severe agitation, and atarax 100mg for severe anxiety as pt appeared fearful and paranoid upon nurse assessment. Pt accepted medications an thanked nurse. Pt denies SI/HI/CAH.

## 2023-06-30 PROCEDURE — 99232 SBSQ HOSP IP/OBS MODERATE 35: CPT | Performed by: PSYCHIATRY & NEUROLOGY

## 2023-06-30 RX ORDER — LOXAPINE SUCCINATE 50 MG/1
100 TABLET ORAL
Status: DISCONTINUED | OUTPATIENT
Start: 2023-06-30 | End: 2023-07-01

## 2023-06-30 RX ORDER — LOXAPINE SUCCINATE 50 MG/1
100 TABLET ORAL
Status: CANCELLED | OUTPATIENT
Start: 2023-06-30

## 2023-06-30 RX ADMIN — LOXAPINE 25 MG: 25 CAPSULE ORAL at 15:10

## 2023-06-30 RX ADMIN — LOXAPINE 25 MG: 25 CAPSULE ORAL at 08:40

## 2023-06-30 RX ADMIN — OLANZAPINE 5 MG: 5 TABLET, FILM COATED ORAL at 04:25

## 2023-06-30 RX ADMIN — GLYCOPYRROLATE 1 MG: 1 TABLET ORAL at 21:17

## 2023-06-30 RX ADMIN — GLYCOPYRROLATE 1 MG: 1 TABLET ORAL at 08:40

## 2023-06-30 RX ADMIN — DIVALPROEX SODIUM 750 MG: 500 TABLET, DELAYED RELEASE ORAL at 08:40

## 2023-06-30 RX ADMIN — FLUTICASONE PROPIONATE 1 SPRAY: 50 SPRAY, METERED NASAL at 08:41

## 2023-06-30 RX ADMIN — DIVALPROEX SODIUM 750 MG: 500 TABLET, DELAYED RELEASE ORAL at 18:00

## 2023-06-30 RX ADMIN — MELATONIN TAB 3 MG 3 MG: 3 TAB at 21:17

## 2023-06-30 RX ADMIN — GLYCOPYRROLATE 1 MG: 1 TABLET ORAL at 15:10

## 2023-06-30 RX ADMIN — LOXAPINE 100 MG: 50 CAPSULE ORAL at 18:00

## 2023-06-30 NOTE — PROGRESS NOTES
06/30/23 0838   Team Meeting   Meeting Type Daily Rounds   Team Members Present   Team Members Present Physician;Nurse;   Physician Team Member [id number] Team Member [facility] Team Member Wendie   Patient/Family Present   Patient Present No   Patient's Family Present No     Pt observed responding to internal stimuli, loudly in his room. Discharge to be determined.

## 2023-06-30 NOTE — NURSING NOTE
Pt calm pleasant and cooperative, Pt isolated in room in bed most of shift but was also seen in the dayroom watching TV keeping to himself , Pt denied all pain anxiety and depression, Pt denies HI and SI , Pt denied AH and VH, Pt took all scheduled HS meds, Q7 min safety checks maintained

## 2023-06-30 NOTE — SOCIAL WORK
Pt remains interested in inpatient rehab upon discharge. Pt continues to respond to internal stimuli. Bed search to possibly begin next week.

## 2023-06-30 NOTE — PROGRESS NOTES
Progress Note - Behavioral Health   Gokul Zafar 26 y.o. male MRN: [id number]  Unit/Bed#: U 344-02 Encounter: [id number]    Assessment/Plan   Principal Problem:    Schizophrenia ([address]) vs. Schizoaffective disorder  Active Problems:    Legally blind    Hearing loss    Asthma    Medical clearance for psychiatric admission    Bilateral impacted cerumen    Prolonged erection    Intellectual disability    Recommended Treatment:   Continue loxapine 25 mg in the morning, 25 mg in the afternoon, and increase to 100 mg in the evening for ongoing symptoms of psychosis  Continue Depakote 750 mg twice daily for mood stability              level 6/12/2023 was 91  Continue melatonin 3 mg at bedtime for sleep     Continue to monitor the patient for erections lasting greater than 45 minutes. Continue Sudafed 120 mg BID as needed for erection lasting greater than 45 minutes. All current active medications have been reviewed  Encourage group therapy, milieu therapy and occupational therapy  Behavioral Health checks every 7 minutes  Medical management per SLIM. Commitment Status: 201  ----------------------------------------      Subjective: Patient discussed in nursing report and overnight notes and charting reviewed. Per nursing report, patient was withdrawn and isolative throughout the day. At 425am he was responding loudly, laughing and talking to voices. He received 5mg PO zyprexa which was effective. Compliant with meds and meals. On exam, patient is very sleepy, snoring throughout interview between talking. He states his mood is "okay." He says his sleep was "subhash" noting difficulty with both falling and staying asleep. He says "I couldn't get something off my mind." When asked about the AH, he says it was voices "bickering and battering about my family." His appetite is good, though he cannot remember what he ate for dinner.  He mentions that he wants to go out of here "to go to rehab." When asked what he can do to help him get there, he responds "hygiene, get rid of the voices, eat healthy, exercise." We discussed that the voices might never go away completely and that is okay as long as he can manage them and function well despite them. Patient understood. We also discussed the importance of going to group while he is here as group therapy is a big part of rehab. Patient states his plan for today is "shower and group" while holding up 2 fingers for his to-do list. At the time of interview, he denied SI/HI/AVH/paranoia. He denies side effects.  When asked if he had any questions, he says "I hope you have a good day."     Behavior over the last 24 hours:  unchanged  Sleep: reports difficulty initiating and maintaining sleep due to AVH  Appetite: normal  Medication side effects: none reported or appreciated today  ROS: no complaints and all other systems are negative    Mental Status Evaluation:  Appearance:  overtly appearing  male, casually dressed, poor hygiene, disheveled, overweight, appears older than stated age, drowsy - eyes closed and intermittently snoring throughout interview in bed   Behavior:  cooperative, calm, slightly more guarded, no eye contact, slow responses, drowsy   Speech:  slow, scant, delayed, soft, articulation error   Mood:  "okay"   Affect:  blunted   Thought Process:  slowing of thoughts, concrete   Associations: concrete associations   Thought Content:  no overt delusions, no paranoia   Perceptual Disturbances: denies auditory hallucinations when asked during interview but reported voices last night interfering with sleep, denies visual hallucinations when asked, appears preoccupied and at times distracted   Risk Potential: Suicidal ideation - None at present, contracts for safety on the unit, would talk to staff if not feeling safe on the unit  Homicidal ideation - None at present  Potential for aggression - Not at present   Sensorium:  oriented to person and place   Memory: recent and remote memory grossly intact   Consciousness:  drowsy/sedated   Attention/Concentration: attention span and concentration appear shorter than expected for age   Insight:  limited   Judgment: limited   Gait/Station: normal gait/station   Motor Activity: no abnormal movements     Medications: all current active meds have been reviewed.   Current Facility-Administered Medications   Medication Dose Route Frequency Provider Last Rate   • acetaminophen  650 mg Oral Q6H PRN Jackeline Hawkins MD     • acetaminophen  650 mg Oral Q4H PRN Jackeline Hawkins MD     • acetaminophen  975 mg Oral Q6H PRN Jackeline Hawkins MD     • albuterol  2 puff Inhalation Q4H PRN Jackeline Hawkins MD     • aluminum-magnesium hydroxide-simethicone  30 mL Oral Q4H PRN Jackeline Hawkins MD     • benztropine  1 mg Oral Q4H PRN Max 6/day Jackeline Hawkins MD     • divalproex sodium  750 mg Oral BID Garfield Lesches, MD     • fluticasone  1 spray Nasal Daily Jackeline Hawkins MD     • glycopyrrolate  1 mg Oral TID Laney Hunt MD     • hydrOXYzine HCL  100 mg Oral Q6H PRN Max 4/day Joyce Base, DO      Or   • LORazepam  1 mg Intramuscular Q6H PRN Joyce Base, DO     • hydrOXYzine HCL  25 mg Oral Q6H PRN Max 4/day Jackeline Hawkins MD     • hydrOXYzine HCL  50 mg Oral Q6H PRN Max 4/day Jackeline Hawkins MD     • loxapine  25 mg Oral BID Garfield Lesches, MD     • loxapine  75 mg Oral HS Joyce Base, DO     • melatonin  3 mg Oral HS PRN Joyce Base, DO     • nicotine polacrilex  4 mg Oral Q2H PRN Jackeline Hawkins MD     • OLANZapine  5 mg Oral Q3H PRN Max 6/day Garfield Lesches, MD      Or   • OLANZapine  5 mg Intramuscular Q3H PRN Max 6/day Garfield Lesches, MD     • OLANZapine  5 mg Intramuscular Q8H PRN Joyce Base, DO      Or   • OLANZapine  7.5 mg Oral Q8H PRN Joyce Base, DO     • OLANZapine  2.5 mg Oral Q3H PRN Max 8/day Garfield Lesches, MD     • ondansetron  4 mg Oral Q8H PRN Joyce Base, DO     • polyethylene glycol  17 g Oral Daily PRN Edmund Melo MD     • pseudoephedrine  120 mg Oral BID PRN Isidra Peterson MD     • senna-docusate sodium  1 tablet Oral Daily PRN Edmund Melo MD     • sterile water          • sterile water          • sterile water          • sterile water              Labs: I have personally reviewed all pertinent laboratory/tests results  Most Recent Labs:     Results from the past 24 hours: No results found for this or any previous visit (from the past 24 hour(s)). Progress Toward Goals: progressing gradually    Risks / Benefits of Treatment:    Risks, benefits, and possible side effects of medications explained to patient and patient verbalizes understanding and agreement for treatment. Counseling / Coordination of Care: Total floor / unit time spent today 20 minutes. Greater than 50% of total time was spent with the patient and / or family counseling and / or coordination of care. A description of counseling / coordination of care:  Patient's progress discussed with staff in treatment team meeting. Treatment plan, treatment progress and medication changes were reviewed with nursing staff, pharmacy service, and case management in interdisciplinary treatment team meeting. Medications, treatment progress and treatment plan reviewed with patient. Educated on importance of medication and treatment compliance. Reassurance and supportive therapy provided. Encouraged participation in milieu and group therapy on the unit.       Patel Banks 06/30/23

## 2023-06-30 NOTE — NURSING NOTE
Pt responding, laughing, talking increasingly loudly to [facility] "voices." Pt given PRN zyprexa 5mg PO for moderate agitation @ 04:25; zyprexa effective. Pt resting peacefully.

## 2023-06-30 NOTE — NURSING NOTE
Pt calm and cooperative, OOB for breakfast this morning. Appears internally preoccupied, RTIS at times, however denies any psych symptoms at this time. Pt compliant with scheduled medications. Visible intermittently on the unit, limited interaction with staff and peers. Poor hygiene; encouraged pt to shower. Encouraged group attendance. Denies any unmet needs. Q7 minute safety checks maintained.

## 2023-07-01 PROCEDURE — 99232 SBSQ HOSP IP/OBS MODERATE 35: CPT | Performed by: STUDENT IN AN ORGANIZED HEALTH CARE EDUCATION/TRAINING PROGRAM

## 2023-07-01 RX ORDER — LOXAPINE SUCCINATE 25 MG/1
25 TABLET ORAL 2 TIMES DAILY
Status: DISCONTINUED | OUTPATIENT
Start: 2023-07-01 | End: 2023-07-05

## 2023-07-01 RX ORDER — LOXAPINE SUCCINATE 50 MG/1
100 TABLET ORAL EVERY EVENING
Status: DISCONTINUED | OUTPATIENT
Start: 2023-07-01 | End: 2023-07-12

## 2023-07-01 RX ADMIN — GLYCOPYRROLATE 1 MG: 1 TABLET ORAL at 21:10

## 2023-07-01 RX ADMIN — LOXAPINE 25 MG: 25 CAPSULE ORAL at 08:03

## 2023-07-01 RX ADMIN — MELATONIN TAB 3 MG 3 MG: 3 TAB at 21:10

## 2023-07-01 RX ADMIN — DIVALPROEX SODIUM 750 MG: 500 TABLET, DELAYED RELEASE ORAL at 18:01

## 2023-07-01 RX ADMIN — LOXAPINE 25 MG: 25 CAPSULE ORAL at 17:45

## 2023-07-01 RX ADMIN — GLYCOPYRROLATE 1 MG: 1 TABLET ORAL at 08:03

## 2023-07-01 RX ADMIN — FLUTICASONE PROPIONATE 1 SPRAY: 50 SPRAY, METERED NASAL at 08:05

## 2023-07-01 RX ADMIN — LOXAPINE 100 MG: 50 CAPSULE ORAL at 17:44

## 2023-07-01 RX ADMIN — DIVALPROEX SODIUM 750 MG: 500 TABLET, DELAYED RELEASE ORAL at 08:03

## 2023-07-01 RX ADMIN — GLYCOPYRROLATE 1 MG: 1 TABLET ORAL at 15:28

## 2023-07-01 NOTE — PROGRESS NOTES
Progress Note - Behavioral Health   Gokul Almonte 26 y.o. male MRN: [id number]  Unit/Bed#: [facility] 344-02 Encounter: [id number]    Assessment/Plan   Principal Problem:    Schizophrenia ([address]) vs. Schizoaffective disorder  Active Problems:    Legally blind    Hearing loss    Asthma    Medical clearance for psychiatric admission    Bilateral impacted cerumen    Prolonged erection    Intellectual disability      • Continue Loxapine 25 mg every morning and 25 mg every afternoon, 100 mg at bedtime for psychosis (continue titrating accordingly)  • Continue Depakote 750 mg BID for agitation/mood (VPA 91 on 6/12/23)   • Continue Robinul 1 mg TID for drooling   • Continue Melatonin 3 mg QHS PRN for insomnia   • Continue to promote patient participation in therapeutic milieu. • Continue medical management by primary team.  • Discharge disposition: TBD     Subjective: The patient was evaluated this morning for continuity of care and no acute distress noted throughout the evaluation. Per nursing report over the past 24 hours the patient was heard responding to internal stimuli and heard laughing to himself. Today on my assessment the patient was laying in his bed with the blankets covering his face and body throughout the interview and reports his mood as "fine". He describes his appetite as good and reports eating all of his meals. He describes his sleep as good and reports 7 to 8 hours of uninterrupted sleep. He describes his energy as good. He denies suicidal and homicidal ideation. He also denies visual and auditory hallucinations. When asked about his goals and plans on discharge he states he wants to go to rehab and find stable housing. He says his coping skills to prevent another episode like this is to stay away from drugs, make better choices and talk to someone.      Vitals: Reviewed, within normal limits   PRN: Melatonin, Zyprexa   Legal Status: 201     Current Medications:  Current Facility-Administered Medications Medication Dose Route Frequency Provider Last Rate   • acetaminophen  650 mg Oral Q6H PRN Erasto Mejia MD     • acetaminophen  650 mg Oral Q4H PRN Erasto Mejia MD     • acetaminophen  975 mg Oral Q6H PRN Erasto Mejia MD     • albuterol  2 puff Inhalation Q4H PRN Erasto Meija MD     • aluminum-magnesium hydroxide-simethicone  30 mL Oral Q4H PRN Erasto Mejia MD     • benztropine  1 mg Oral Q4H PRN Max 6/day Erasto Mejia MD     • divalproex sodium  750 mg Oral BID Cheryl Johnson MD     • fluticasone  1 spray Nasal Daily Erasto Mejia MD     • glycopyrrolate  1 mg Oral TID Jamal Mckeon MD     • hydrOXYzine HCL  100 mg Oral Q6H PRN Max 4/day Shanti Zeng DO      Or   • LORazepam  1 mg Intramuscular Q6H PRN Shanti Zeng DO     • hydrOXYzine HCL  25 mg Oral Q6H PRN Max 4/day Erasto Mejia MD     • hydrOXYzine HCL  50 mg Oral Q6H PRN Max 4/day Erasto Mejia MD     • loxapine  100 mg Oral HS Cheryl Johnson MD     • loxapine  25 mg Oral BID Cheryl Johnson MD     • melatonin  3 mg Oral HS PRN Shanti Zeng DO     • nicotine polacrilex  4 mg Oral Q2H PRN Erasto Mejia MD     • OLANZapine  5 mg Oral Q3H PRN Max 6/day Cheryl Johnson MD      Or   • OLANZapine  5 mg Intramuscular Q3H PRN Max 6/day Cheryl Johnson MD     • OLANZapine  5 mg Intramuscular Q8H PRN Shanti Zeng DO      Or   • OLANZapine  7.5 mg Oral Q8H PRN Shanti Zeng DO     • OLANZapine  2.5 mg Oral Q3H PRN Max 8/day Cheryl Johnson MD     • ondansetron  4 mg Oral Q8H PRN Shanti Zeng DO     • polyethylene glycol  17 g Oral Daily PRN Erasto Mejia MD     • pseudoephedrine  120 mg Oral BID PRN Hue Simon MD     • senna-docusate sodium  1 tablet Oral Daily PRN Erasto Mejia MD     • sterile water          • sterile water          • sterile water          • sterile water              Behavioral Health Medications:   all current active meds have been reviewed, continue current psychiatric medications and current meds:   Current Facility-Administered Medications   Medication Dose Route Frequency   • acetaminophen (TYLENOL) tablet 650 mg  650 mg Oral Q6H PRN   • acetaminophen (TYLENOL) tablet 650 mg  650 mg Oral Q4H PRN   • acetaminophen (TYLENOL) tablet 975 mg  975 mg Oral Q6H PRN   • albuterol (PROVENTIL HFA,VENTOLIN HFA) inhaler 2 puff  2 puff Inhalation Q4H PRN   • aluminum-magnesium hydroxide-simethicone (MYLANTA) oral suspension 30 mL  30 mL Oral Q4H PRN   • benztropine (COGENTIN) tablet 1 mg  1 mg Oral Q4H PRN Max 6/day   • divalproex sodium (DEPAKOTE) DR tablet 750 mg  750 mg Oral BID   • fluticasone (FLONASE) 50 mcg/act nasal spray 1 spray  1 spray Nasal Daily   • glycopyrrolate (ROBINUL) tablet 1 mg  1 mg Oral TID   • hydrOXYzine HCL (ATARAX) tablet 100 mg  100 mg Oral Q6H PRN Max 4/day    Or   • LORazepam (ATIVAN) injection 1 mg  1 mg Intramuscular Q6H PRN   • hydrOXYzine HCL (ATARAX) tablet 25 mg  25 mg Oral Q6H PRN Max 4/day   • hydrOXYzine HCL (ATARAX) tablet 50 mg  50 mg Oral Q6H PRN Max 4/day   • loxapine (LOXITANE) capsule 100 mg  100 mg Oral HS   • loxapine (LOXITANE) capsule 25 mg  25 mg Oral BID   • melatonin tablet 3 mg  3 mg Oral HS PRN   • nicotine polacrilex (NICORETTE) gum 4 mg  4 mg Oral Q2H PRN   • OLANZapine (ZyPREXA) tablet 5 mg  5 mg Oral Q3H PRN Max 6/day    Or   • OLANZapine (ZyPREXA) IM injection 5 mg  5 mg Intramuscular Q3H PRN Max 6/day   • OLANZapine (ZyPREXA) IM injection 5 mg  5 mg Intramuscular Q8H PRN    Or   • OLANZapine (ZyPREXA) tablet 7.5 mg  7.5 mg Oral Q8H PRN   • OLANZapine (ZyPREXA) tablet 2.5 mg  2.5 mg Oral Q3H PRN Max 8/day   • ondansetron (ZOFRAN-ODT) dispersible tablet 4 mg  4 mg Oral Q8H PRN   • polyethylene glycol (MIRALAX) packet 17 g  17 g Oral Daily PRN   • pseudoephedrine (SUDAFED) tablet 120 mg  120 mg Oral BID PRN   • senna-docusate sodium (SENOKOT S) 8.6-50 mg per tablet 1 tablet  1 tablet Oral Daily PRN   • sterile water injection **ADS Override Pull**       • sterile water injection **ADS Override Pull**       • sterile water injection **ADS Override Pull**       • sterile water injection **ADS Override Pull**       . Vital signs in last 24 hours:  Temp:  [97.3 °F (36.3 °C)-97.7 °F (36.5 °C)] 97.3 °F (36.3 °C)  HR:  [94-98] 98  Resp:  [16-18] 18  BP: (132-133)/(81-87) 133/87    Laboratory results:    I have personally reviewed all pertinent laboratory/tests results.   Most Recent Labs:   Lab Results   Component Value Date    WBC 7.56 06/22/2023    RBC 4.53 06/22/2023    HGB 14.0 06/22/2023    HCT 42.0 06/22/2023     06/22/2023    RDW 12.2 06/22/2023    TOTANEUTABS 4.84 06/08/2023    NEUTROABS 2.69 06/22/2023    SODIUM 139 06/12/2023    K 4.1 06/12/2023     06/12/2023    CO2 29 06/12/2023    BUN 18 06/12/2023    CREATININE 1.11 06/12/2023    GLUC 125 06/12/2023    GLUF 125 (H) 06/12/2023    CALCIUM 9.8 06/12/2023    AST 14 06/12/2023    ALT 15 06/12/2023    ALKPHOS 56 06/12/2023    TP 6.6 06/12/2023    ALB 3.8 06/12/2023    TBILI 0.46 06/12/2023    CHOLESTEROL 140 05/19/2023    HDL 59 05/19/2023    TRIG 106 05/19/2023    LDLCALC 60 05/19/2023    NONHDLC 81 05/19/2023    VALPROICTOT 91 06/12/2023    AMMONIA 22 10/09/2022    FPF7GHQMDNLW 1.841 05/19/2023    FREET4 1.16 10/09/2022    HGBA1C 5.2 06/16/2022     06/16/2022     Depakote:   Lab Results   Component Value Date    VALPROICTOT 91 06/12/2023       Psychiatric Review of Systems:  Behavior over the last 24 hours:  unchanged  Sleep: normal  Appetite: normal  Medication side effects: No   ROS: all other systems are negative    Mental Status Evaluation:    Appearance:  casually dressed, looks older than stated age, obese,  [place] male covered in blanket    Behavior:  cooperative, guarded   Speech:  slow, scant, soft   Mood:  "fine"   Affect:  blunted   Thought Process:  slowing of thoughts, concrete, poverty of thoughts    Associations: concrete associations   Thought Content:  no overt delusions   Perceptual Disturbances: currently denying visual and auditory hallucinations, was heard responding to internal stimuli, however did not appear distracted during the interview   Risk Potential: Suicidal ideation - None at present  Homicidal ideation - None at present  Potential for aggression - No   Sensorium:  oriented to person and place   Memory:  recent and remote memory grossly intact   Consciousness:  alert and awake   Attention/Concentration: attention span and concentration appear shorter than expected for age   Insight:  limited   Judgment: limited   Gait/Station: in bed   Motor Activity: no abnormal movements         Progress Toward Goals: Slowly progressing     Recommended Treatment:   See above for assessment and plan. Risks, benefits and possible side effects of Medications:   Risks, benefits, and possible side effects of medications explained to patient and patient verbalizes understanding. This note has been constructed using a voice recognition system. There may be translation, syntax, or grammatical errors. If you have any questions, please contact the dictating provider.     Ioana Wu MD  Psychiatry Resident, PGY-1

## 2023-07-01 NOTE — NURSING NOTE
Pt awake and about the unit early today, seen sitting in hallway with blanket over his head. Compliant with scheduled medications and meals, appetite good. Pt denies SI/HI/AVH at this time, although appears internally preoccupied. Pt reports "I'm good", scant, guarded, overall cooperative and in behavioral control. Pt reports adequate sleep. Denies any unmet needs. Q7 minute safety checks maintained.

## 2023-07-01 NOTE — NURSING NOTE
Pt visible on unit and able to make needs known. Approached NS several times stating he is hungry. Denies symptoms and concerns. Compliant with unit routines and care. Safety checks continue.

## 2023-07-02 PROCEDURE — 99232 SBSQ HOSP IP/OBS MODERATE 35: CPT | Performed by: STUDENT IN AN ORGANIZED HEALTH CARE EDUCATION/TRAINING PROGRAM

## 2023-07-02 RX ADMIN — LOXAPINE 100 MG: 50 CAPSULE ORAL at 17:09

## 2023-07-02 RX ADMIN — MELATONIN TAB 3 MG 3 MG: 3 TAB at 21:30

## 2023-07-02 RX ADMIN — GLYCOPYRROLATE 1 MG: 1 TABLET ORAL at 08:33

## 2023-07-02 RX ADMIN — HYDROXYZINE HYDROCHLORIDE 100 MG: 50 TABLET, FILM COATED ORAL at 21:30

## 2023-07-02 RX ADMIN — GLYCOPYRROLATE 1 MG: 1 TABLET ORAL at 16:01

## 2023-07-02 RX ADMIN — GLYCOPYRROLATE 1 MG: 1 TABLET ORAL at 21:30

## 2023-07-02 RX ADMIN — DIVALPROEX SODIUM 750 MG: 500 TABLET, DELAYED RELEASE ORAL at 18:01

## 2023-07-02 RX ADMIN — LOXAPINE 25 MG: 25 CAPSULE ORAL at 08:32

## 2023-07-02 RX ADMIN — OLANZAPINE 5 MG: 5 TABLET, FILM COATED ORAL at 21:30

## 2023-07-02 RX ADMIN — DIVALPROEX SODIUM 750 MG: 500 TABLET, DELAYED RELEASE ORAL at 08:32

## 2023-07-02 RX ADMIN — LOXAPINE 25 MG: 25 CAPSULE ORAL at 17:09

## 2023-07-02 NOTE — PROGRESS NOTES
[facility] Group Note     07/02/23 1330   Activity/Group Checklist   Group Personal control  (Music [employer] Lyrics)   Attendance Attended   Attendance Duration (min) 31-45  (arrived late to group)   Interactions Interacted appropriately   Affect/Mood Appropriate;Bright   Goals Achieved Identified feelings; Discussed coping strategies; Able to listen to others; Able to engage in interactions; Able to reflect/comment on own behavior;Able to recieve feedback; Able to give feedback to another

## 2023-07-02 NOTE — NURSING NOTE
Patient visible intermittently throughout the evening. Cooperative with routine. Denied any unmet needs. HS medication compliant.

## 2023-07-02 NOTE — PROGRESS NOTES
Progress Note - Behavioral Health   Dyphier Millard Fleischer 26 y.o. male MRN: [id number]  Unit/Bed#: U 344-02 Encounter: [id number]    Assessment/Plan   Principal Problem:    Schizophrenia ([address]) vs. Schizoaffective disorder  Active Problems:    Legally blind    Hearing loss    Asthma    Medical clearance for psychiatric admission    Bilateral impacted cerumen    Prolonged erection    Intellectual disability      • Continue Loxapine 25 mg every morning and 25 mg every afternoon, 100 mg at bedtime for psychosis (continue titrating accordingly)  • Continue Depakote 750 mg BID for agitation/mood (VPA 91 on 6/12/23)   • Continue Robinul 1 mg TID for drooling   • Continue Melatonin 3 mg QHS PRN for insomnia   • Continue to promote patient participation in therapeutic milieu. • Continue medical management by primary team.  • Discharge disposition: TBD     Subjective: The patient was evaluated this morning for continuity of care and no acute distress noted throughout the evaluation. Per nursing report over the past 24 hours the patient had no major changes and did not have any behavioral disturbances. Today on my assessment the patient was laying in his bed with the blankets covering his face and body throughout the interview and reports his mood as "feeling fine". He describes his appetite as good. He describes his sleep as okay. He denies suicidal and homicidal ideation. He also denies visual and auditory hallucinations. He denies any adverse effects from his medication. He reports getting along well with his peers and staff. He denies making any phone calls recently. He did tell this writer he plans to take a shower as his last shower was one week ago. He was encouraged to shower and increase the frequency of his shower and he said that he would.  He reports his last auditory and visual hallucinations was weeks ago and is able to acknowledge that his medications are helping him as he feels better now and is not having any more symptoms.      Vitals: Reviewed, within normal limits   PRN: Melatonin   Legal Status: 201     Current Medications:  Current Facility-Administered Medications   Medication Dose Route Frequency Provider Last Rate   • acetaminophen  650 mg Oral Q6H PRN Mary Paredes MD     • acetaminophen  650 mg Oral Q4H PRN Mary Paredes MD     • acetaminophen  975 mg Oral Q6H PRN Mary Paredes MD     • albuterol  2 puff Inhalation Q4H PRN Mary Paredes MD     • aluminum-magnesium hydroxide-simethicone  30 mL Oral Q4H PRN Mary Paredes MD     • benztropine  1 mg Oral Q4H PRN Max 6/day Mary Paredes MD     • divalproex sodium  750 mg Oral BID Devin Freedman MD     • fluticasone  1 spray Nasal Daily Mary Paredes MD     • glycopyrrolate  1 mg Oral TID Tommie Tubbs MD     • hydrOXYzine HCL  100 mg Oral Q6H PRN Max 4/day Francine Hutson, DO      Or   • LORazepam  1 mg Intramuscular Q6H PRN Francine Hutson DO     • hydrOXYzine HCL  25 mg Oral Q6H PRN Max 4/day Mary Paredes MD     • hydrOXYzine HCL  50 mg Oral Q6H PRN Max 4/day Mary Paredes MD     • loxapine  100 mg Oral QPM Leroy Choe MD     • loxapine  25 mg Oral BID Leroy Choe MD     • melatonin  3 mg Oral HS PRN Francine Hutson, DO     • nicotine polacrilex  4 mg Oral Q2H PRN Mary Paredes MD     • OLANZapine  5 mg Oral Q3H PRN Max 6/day Devin Freedman MD      Or   • OLANZapine  5 mg Intramuscular Q3H PRN Max 6/day Devin Freedman MD     • OLANZapine  5 mg Intramuscular Q8H PRN Francine Houstonich, DO      Or   • OLANZapine  7.5 mg Oral Q8H PRN Francine Houstonich, DO     • OLANZapine  2.5 mg Oral Q3H PRN Max 8/day Devin Freedman MD     • ondansetron  4 mg Oral Q8H PRN Francine Houstonich, DO     • polyethylene glycol  17 g Oral Daily PRN Mary Paredes MD     • pseudoephedrine  120 mg Oral BID PRN Wilberto Hayward MD     • senna-docusate sodium  1 tablet Oral Daily PRN Mary Paredes MD     • sterile water          • sterile water          • sterile water          • sterile water              Behavioral Health Medications:   all current active meds have been reviewed, continue current psychiatric medications and current meds:   Current Facility-Administered Medications   Medication Dose Route Frequency   • acetaminophen (TYLENOL) tablet 650 mg  650 mg Oral Q6H PRN   • acetaminophen (TYLENOL) tablet 650 mg  650 mg Oral Q4H PRN   • acetaminophen (TYLENOL) tablet 975 mg  975 mg Oral Q6H PRN   • albuterol (PROVENTIL HFA,VENTOLIN HFA) inhaler 2 puff  2 puff Inhalation Q4H PRN   • aluminum-magnesium hydroxide-simethicone (MYLANTA) oral suspension 30 mL  30 mL Oral Q4H PRN   • benztropine (COGENTIN) tablet 1 mg  1 mg Oral Q4H PRN Max 6/day   • divalproex sodium (DEPAKOTE) DR tablet 750 mg  750 mg Oral BID   • fluticasone (FLONASE) 50 mcg/act nasal spray 1 spray  1 spray Nasal Daily   • glycopyrrolate (ROBINUL) tablet 1 mg  1 mg Oral TID   • hydrOXYzine HCL (ATARAX) tablet 100 mg  100 mg Oral Q6H PRN Max 4/day    Or   • LORazepam (ATIVAN) injection 1 mg  1 mg Intramuscular Q6H PRN   • hydrOXYzine HCL (ATARAX) tablet 25 mg  25 mg Oral Q6H PRN Max 4/day   • hydrOXYzine HCL (ATARAX) tablet 50 mg  50 mg Oral Q6H PRN Max 4/day   • loxapine (LOXITANE) capsule 100 mg  100 mg Oral QPM   • loxapine (LOXITANE) capsule 25 mg  25 mg Oral BID   • melatonin tablet 3 mg  3 mg Oral HS PRN   • nicotine polacrilex (NICORETTE) gum 4 mg  4 mg Oral Q2H PRN   • OLANZapine (ZyPREXA) tablet 5 mg  5 mg Oral Q3H PRN Max 6/day    Or   • OLANZapine (ZyPREXA) IM injection 5 mg  5 mg Intramuscular Q3H PRN Max 6/day   • OLANZapine (ZyPREXA) IM injection 5 mg  5 mg Intramuscular Q8H PRN    Or   • OLANZapine (ZyPREXA) tablet 7.5 mg  7.5 mg Oral Q8H PRN   • OLANZapine (ZyPREXA) tablet 2.5 mg  2.5 mg Oral Q3H PRN Max 8/day   • ondansetron (ZOFRAN-ODT) dispersible tablet 4 mg  4 mg Oral Q8H PRN   • polyethylene glycol (MIRALAX) packet 17 g  17 g Oral Daily PRN   • pseudoephedrine (SUDAFED) tablet 120 mg  120 mg Oral BID PRN   • senna-docusate sodium (SENOKOT S) 8.6-50 mg per tablet 1 tablet  1 tablet Oral Daily PRN   • sterile water injection **ADS Override Pull**       • sterile water injection **ADS Override Pull**       • sterile water injection **ADS Override Pull**       • sterile water injection **ADS Override Pull**       . Vital signs in last 24 hours:  Temp:  [97.1 °F (36.2 °C)-98.2 °F (36.8 °C)] 97.1 °F (36.2 °C)  HR:  [88-93] 88  Resp:  [15-16] 16  BP: (108-158)/(65-90) 108/65    Laboratory results:    I have personally reviewed all pertinent laboratory/tests results.   Most Recent Labs:   Lab Results   Component Value Date    WBC 7.56 06/22/2023    RBC 4.53 06/22/2023    HGB 14.0 06/22/2023    HCT 42.0 06/22/2023     06/22/2023    RDW 12.2 06/22/2023    TOTANEUTABS 4.84 06/08/2023    NEUTROABS 2.69 06/22/2023    SODIUM 139 06/12/2023    K 4.1 06/12/2023     06/12/2023    CO2 29 06/12/2023    BUN 18 06/12/2023    CREATININE 1.11 06/12/2023    GLUC 125 06/12/2023    GLUF 125 (H) 06/12/2023    CALCIUM 9.8 06/12/2023    AST 14 06/12/2023    ALT 15 06/12/2023    ALKPHOS 56 06/12/2023    TP 6.6 06/12/2023    ALB 3.8 06/12/2023    TBILI 0.46 06/12/2023    CHOLESTEROL 140 05/19/2023    HDL 59 05/19/2023    TRIG 106 05/19/2023    LDLCALC 60 05/19/2023    NONHDLC 81 05/19/2023    VALPROICTOT 91 06/12/2023    AMMONIA 22 10/09/2022    TCI3LHSUYHQI 1.841 05/19/2023    FREET4 1.16 10/09/2022    HGBA1C 5.2 06/16/2022     06/16/2022     Depakote:   Lab Results   Component Value Date    VALPROICTOT 91 06/12/2023       Psychiatric Review of Systems:  Behavior over the last 24 hours: unchanged  Sleep: normal  Appetite: normal  Medication side effects: No   ROS: all other systems are negative    Mental Status Evaluation:    Appearance:  casually dressed, looks older than stated age, obese,  [place] male covered in blanket    Behavior:  cooperative, guarded   Speech:  slow, scant, soft   Mood:  "feeling fine"   Affect:  blunted   Thought Process:  slowing of thoughts, concrete   Associations: concrete associations   Thought Content:  no overt delusions   Perceptual Disturbances: currently denying visual and auditory hallucinations, was heard responding to internal stimuli, however did not appear distracted during the interview   Risk Potential: Suicidal ideation - None at present  Homicidal ideation - None at present  Potential for aggression - No   Sensorium:  oriented to person and place   Memory:  recent and remote memory grossly intact   Consciousness:  alert and awake   Attention/Concentration: attention span and concentration appear shorter than expected for age   Insight:  limited   Judgment: limited   Gait/Station: in bed   Motor Activity: no abnormal movements         Progress Toward Goals: Slowly progressing     Recommended Treatment:   See above for assessment and plan. Risks, benefits and possible side effects of Medications:   Risks, benefits, and possible side effects of medications explained to patient and patient verbalizes understanding. This note has been constructed using a voice recognition system. There may be translation, syntax, or grammatical errors. If you have any questions, please contact the dictating provider.     Bharat Cabral MD  Psychiatry Resident, PGY-1

## 2023-07-02 NOTE — NURSING NOTE
Pt OOB with encouragement, reports feelng tired this morning. Appears disheveled, malodorous, guarded, scant with poor eye contact. Pt provided clean clothing, encouraged to shower. Compliant with scheduled medications and meals, appetite good. Denies SI/HI/AVH, although appears internally preoccupied at times. Pt states he is sleeping well. Remains in behavioral control. Q7 minute safety checks maintained.

## 2023-07-03 PROCEDURE — 99232 SBSQ HOSP IP/OBS MODERATE 35: CPT | Performed by: PSYCHIATRY & NEUROLOGY

## 2023-07-03 RX ADMIN — GLYCOPYRROLATE 1 MG: 1 TABLET ORAL at 09:29

## 2023-07-03 RX ADMIN — GLYCOPYRROLATE 1 MG: 1 TABLET ORAL at 21:12

## 2023-07-03 RX ADMIN — LOXAPINE 100 MG: 50 CAPSULE ORAL at 17:35

## 2023-07-03 RX ADMIN — GLYCOPYRROLATE 1 MG: 1 TABLET ORAL at 16:55

## 2023-07-03 RX ADMIN — HYDROXYZINE HYDROCHLORIDE 100 MG: 50 TABLET, FILM COATED ORAL at 21:12

## 2023-07-03 RX ADMIN — LOXAPINE 25 MG: 25 CAPSULE ORAL at 09:29

## 2023-07-03 RX ADMIN — LOXAPINE 25 MG: 25 CAPSULE ORAL at 17:34

## 2023-07-03 RX ADMIN — MELATONIN TAB 3 MG 3 MG: 3 TAB at 21:12

## 2023-07-03 RX ADMIN — DIVALPROEX SODIUM 750 MG: 500 TABLET, DELAYED RELEASE ORAL at 09:29

## 2023-07-03 RX ADMIN — DIVALPROEX SODIUM 750 MG: 500 TABLET, DELAYED RELEASE ORAL at 18:56

## 2023-07-03 NOTE — PROGRESS NOTES
07/03/23 0846   Team Meeting   Meeting Type Daily Rounds   Team Members Present   Team Members Present Physician;Nurse;   Physician Team Member [id number] [facility] Team Member [facility] Management Team Member Wendie   Patient/Family Present   Patient Present No   Patient's Family Present No   Pt is med/meal compliant. Remains pleasant, cooperative. Awake early on Saturday. Denying SI/HI/AVH. Appears internally preoccupied. Disheveled, malodorous, scant in conversation. C/O moderate agitation and received Zyprexa, c/o anxiety and received atarax. Discharge to be determined.

## 2023-07-03 NOTE — NURSING NOTE
Patient visible on the unit throughout the evening. RIS. Reported moderate agitation, at 2130 Zyprexa 5 mg PO PRN given. Patient also reported severe anxiety symptoms. Atarax 100 mg PO PRN given at 2130. Patient HS medication compliant. Retreated to his room for sleep without issue. At 2230, Zyprexa and atarax effective.

## 2023-07-03 NOTE — PROGRESS NOTES
Progress Note - Behavioral Health   Gokul Brooks 26 y.o. male MRN: [id number]  Unit/Bed#: U 344-02 Encounter: [id number]    Assessment/Plan   Principal Problem:    Schizophrenia ([address]) vs. Schizoaffective disorder  Active Problems:    Legally blind    Hearing loss    Asthma    Medical clearance for psychiatric admission    Bilateral impacted cerumen    Prolonged erection    Intellectual disability      Recommended Treatment:   · Continue Loxapine 25mg in the morning, 25mg in the afternoon, and 100mg HS for psychotic symptoms  · Continue Depakote 750mg BID for agitation/mood   · Depakote level was 91 on 06/12/2023  · Continue Robinul 1mg TID for sialorrhea   · Continue Melatonin 3mg QHS PRN for insomnia  · Continue to promote patient participation on the milieu  · Continue to follow recommendations from SLIM for medication management  · D/C disp: TBD, patient remains in agreement with substance use rehab    Continue with group therapy, milieu therapy and occupational therapy. Continue frequent safety checks and vitals per unit protocol. Case discussed with treatment team.  Risks, benefits and possible side effects of Medications: Risks, benefits, and possible side effects of medications have been explained to the patient, who verbalizes understanding    ------------------------------------------------------------    Subjective:  Per nursing, he required PRN Atarax and Zyprexa over the weekend for agitation and anxiety but was otherwise pleasant and cooperative. Today, Gokul is consenting for safety on the unit. He reports today that his mood is "fine". He endorses a good appetite and has been eating 3 meals a day. He endorses eating breakfast this morning. He feels that he has been sleeping adequately. He endorses a good amount of energy despite feeling "a little tired" this morning. He denies any medication side effects. He endorses regular bowel habits and reported having a bowel movement yesterday.  He feels that he is safe on the unit and denies feelings of paranoia. He endorses being friends with other peers on the unit and enjoys spending time with them. He has goals to shower and brush his teeth today in order to continue habits of good hygiene. He continues to be open to rehab. He currently denies SI/HI/AVH. He states that he last heard voices on Thursday of last week, however, nursing reported last night that he received PRNS as listed above for agitation and was noted to be responding to internal stimuli. Progress Toward Goals: Continues slow improvement    Psychiatric Review of Systems:  Behavior over the last 24 hours: unchanged  Sleep: normal  Appetite: adequate  Medication side effects: none verbalized  ROS: Complete review of systems is negative except as noted above.     Vital signs in last 24 hours:  Temp:  [97.8 °F (36.6 °C)] 97.8 °F (36.6 °C)  HR:  [88] 88  Resp:  [16] 16  BP: (129)/(81) 129/81    Mental Status Exam:  Appearance:  drowsy, overtly black male, poor eye contact, appears older than stated age, casually dressed and appropriate grooming and hygiene   Behavior:  calm, cooperative and slightly guarded   Motor: no abnormal movements and normal gait and balance   Speech:  normal rate, soft, scant and mumbled   Mood:  "fine"   Affect:  blunted and mood incongruent   Thought Process:  poverty of thought   Thought Content: no verbalized delusions or overt paranoia   Perceptual disturbances: no reported hallucinations and does not appear to be responding to internal stimuli at this time, appears distracted but did not appear to be responding to internal stimuli   Risk Potential: No active or passive suicidal or homicidal ideation was verbalized during interview, Potential for aggression none at present   Cognition: oriented to self and situation and cognition not formally tested   Insight:  Improving   Judgment: Improving     Current Medications:  Current Facility-Administered Medications Medication Dose Route Frequency Provider Last Rate   • acetaminophen  650 mg Oral Q6H PRN Mandi Collado MD     • acetaminophen  650 mg Oral Q4H PRN Mandi Collado MD     • acetaminophen  975 mg Oral Q6H PRN Mandi Collado MD     • albuterol  2 puff Inhalation Q4H PRN Mandi Collado MD     • aluminum-magnesium hydroxide-simethicone  30 mL Oral Q4H PRN Mandi Collado MD     • benztropine  1 mg Oral Q4H PRN Max 6/day Mandi Collado MD     • divalproex sodium  750 mg Oral BID Edita Kenney MD     • fluticasone  1 spray Nasal Daily Mandi Collado MD     • glycopyrrolate  1 mg Oral TID Hans Hutson MD     • hydrOXYzine HCL  100 mg Oral Q6H PRN Max 4/day Lashay Livingston DO      Or   • LORazepam  1 mg Intramuscular Q6H PRN Lashay Livingston DO     • hydrOXYzine HCL  25 mg Oral Q6H PRN Max 4/day Mandi Collado MD     • hydrOXYzine HCL  50 mg Oral Q6H PRN Max 4/day Mandi Collado MD     • loxapine  100 mg Oral QPM Lupillo Strauss MD     • loxapine  25 mg Oral BID Lupillo Strauss MD     • melatonin  3 mg Oral HS PRN Lashay Livingston DO     • nicotine polacrilex  4 mg Oral Q2H PRN Mandi Collado MD     • OLANZapine  5 mg Oral Q3H PRN Max 6/day Edita Kenney MD      Or   • OLANZapine  5 mg Intramuscular Q3H PRN Max 6/day Edita Kenney MD     • OLANZapine  5 mg Intramuscular Q8H PRN Lashay Livingston DO      Or   • OLANZapine  7.5 mg Oral Q8H PRN Lashay Livingston DO     • OLANZapine  2.5 mg Oral Q3H PRN Max 8/day Edita Kenney MD     • ondansetron  4 mg Oral Q8H PRN Lashay Livingston DO     • polyethylene glycol  17 g Oral Daily PRN Mandi Collado MD     • pseudoephedrine  120 mg Oral BID PRN Lizy Ye MD     • senna-docusate sodium  1 tablet Oral Daily PRN Mandi Collado MD     • sterile water          • sterile water          • sterile water          • sterile water              Behavioral Health Medications: all current active meds have been reviewed.  Changes as in plan section above. Laboratory results:  I have personally reviewed all pertinent laboratory/tests results. No results found for this or any previous visit (from the past 48 hour(s)).      Beto,

## 2023-07-04 LAB
ALBUMIN SERPL BCP-MCNC: 3.9 G/DL (ref 3.5–5)
ALP SERPL-CCNC: 42 U/L (ref 34–104)
ALT SERPL W P-5'-P-CCNC: 7 U/L (ref 7–52)
ANION GAP SERPL CALCULATED.3IONS-SCNC: 5 MMOL/L
AST SERPL W P-5'-P-CCNC: 16 U/L (ref 13–39)
BASOPHILS # BLD AUTO: 0.1 THOUSANDS/ÂΜL (ref 0–0.1)
BASOPHILS NFR BLD AUTO: 1 % (ref 0–1)
BILIRUB SERPL-MCNC: 0.45 MG/DL (ref 0.2–1)
BUN SERPL-MCNC: 15 MG/DL (ref 5–25)
CALCIUM SERPL-MCNC: 9.4 MG/DL (ref 8.4–10.2)
CHLORIDE SERPL-SCNC: 101 MMOL/L (ref 96–108)
CO2 SERPL-SCNC: 34 MMOL/L (ref 21–32)
CREAT SERPL-MCNC: 1.31 MG/DL (ref 0.6–1.3)
EOSINOPHIL # BLD AUTO: 0.47 THOUSAND/ÂΜL (ref 0–0.61)
EOSINOPHIL NFR BLD AUTO: 6 % (ref 0–6)
ERYTHROCYTE [DISTWIDTH] IN BLOOD BY AUTOMATED COUNT: 12.4 % (ref 11.6–15.1)
GFR SERPL CREATININE-BSD FRML MDRD: 76 ML/MIN/1.73SQ M
GLUCOSE SERPL-MCNC: 104 MG/DL (ref 65–140)
HCT VFR BLD AUTO: 44 % (ref 36.5–49.3)
HGB BLD-MCNC: 14.6 G/DL (ref 12–17)
IMM GRANULOCYTES # BLD AUTO: 0.18 THOUSAND/UL (ref 0–0.2)
IMM GRANULOCYTES NFR BLD AUTO: 2 % (ref 0–2)
LYMPHOCYTES # BLD AUTO: 3.5 THOUSANDS/ÂΜL (ref 0.6–4.47)
LYMPHOCYTES NFR BLD AUTO: 41 % (ref 14–44)
MCH RBC QN AUTO: 31 PG (ref 26.8–34.3)
MCHC RBC AUTO-ENTMCNC: 33.2 G/DL (ref 31.4–37.4)
MCV RBC AUTO: 93 FL (ref 82–98)
MONOCYTES # BLD AUTO: 1.03 THOUSAND/ÂΜL (ref 0.17–1.22)
MONOCYTES NFR BLD AUTO: 12 % (ref 4–12)
NEUTROPHILS # BLD AUTO: 3.29 THOUSANDS/ÂΜL (ref 1.85–7.62)
NEUTS SEG NFR BLD AUTO: 38 % (ref 43–75)
NRBC BLD AUTO-RTO: 0 /100 WBCS
PLATELET # BLD AUTO: 165 THOUSANDS/UL (ref 149–390)
PMV BLD AUTO: 10.4 FL (ref 8.9–12.7)
POTASSIUM SERPL-SCNC: 3.9 MMOL/L (ref 3.5–5.3)
PROT SERPL-MCNC: 7 G/DL (ref 6.4–8.4)
RBC # BLD AUTO: 4.71 MILLION/UL (ref 3.88–5.62)
SODIUM SERPL-SCNC: 140 MMOL/L (ref 135–147)
WBC # BLD AUTO: 8.57 THOUSAND/UL (ref 4.31–10.16)

## 2023-07-04 PROCEDURE — 99232 SBSQ HOSP IP/OBS MODERATE 35: CPT | Performed by: PSYCHIATRY & NEUROLOGY

## 2023-07-04 PROCEDURE — 80053 COMPREHEN METABOLIC PANEL: CPT

## 2023-07-04 PROCEDURE — 85025 COMPLETE CBC W/AUTO DIFF WBC: CPT

## 2023-07-04 RX ADMIN — LOXAPINE 100 MG: 50 CAPSULE ORAL at 18:23

## 2023-07-04 RX ADMIN — MELATONIN TAB 3 MG 3 MG: 3 TAB at 21:04

## 2023-07-04 RX ADMIN — FLUTICASONE PROPIONATE 1 SPRAY: 50 SPRAY, METERED NASAL at 09:13

## 2023-07-04 RX ADMIN — GLYCOPYRROLATE 1 MG: 1 TABLET ORAL at 15:47

## 2023-07-04 RX ADMIN — DIVALPROEX SODIUM 750 MG: 500 TABLET, DELAYED RELEASE ORAL at 09:13

## 2023-07-04 RX ADMIN — LOXAPINE 25 MG: 25 CAPSULE ORAL at 09:13

## 2023-07-04 RX ADMIN — GLYCOPYRROLATE 1 MG: 1 TABLET ORAL at 21:04

## 2023-07-04 RX ADMIN — LOXAPINE 25 MG: 25 CAPSULE ORAL at 18:23

## 2023-07-04 RX ADMIN — DIVALPROEX SODIUM 750 MG: 500 TABLET, DELAYED RELEASE ORAL at 18:23

## 2023-07-04 RX ADMIN — GLYCOPYRROLATE 1 MG: 1 TABLET ORAL at 09:13

## 2023-07-04 NOTE — PROGRESS NOTES
Assessment/Plan   Principal Problem:    Schizophrenia ([address]) vs. Schizoaffective disorder  Active Problems:    Legally blind    Hearing loss    Asthma    Medical clearance for psychiatric admission    Bilateral impacted cerumen    Prolonged erection    Intellectual disability    Legal status: 201 voluntary commitment  Disposition: rehabilitation, pending clinical stabilization    Recommended to the  Continue Loxapine 25mg in the morning, 25mg in the afternoon, and 100mg HS for psychotic symptoms  Continue Depakote 750mg BID for agitation/mood (VPA level 91 on 06/12/2023)  Continue Robinul 1mg TID for sialorrhea  Continue Melatonin 3mg QHS PRN for insomnia  Continue with group therapy, milieu therapy and occupational therapy. Continue frequent safety checks and vitals per unit protocol. Continue medical management per SLIM recommendations. Case discussed with treatment team. Continue coordinating disposition with case management. Risks, benefits and possible side effects of Medications: Risks, benefits, and possible side effects of medications have been explained to the patient, who verbalizes understanding    ------------------------------------------------------------    Subjective: Per nursing report, Gokul has been visible on the unit, reporting anxiety and compliant with medications. Over the last 24 hours  PRN medications: Melatonin 3 mg for sleep, Atarax 100 mg for anxiety  Major updates: None    Gokul was seen and evaluated today for continuity of care. On interview, the patient is calm and intermittent eye contact. He reports "okay" mood. Patient endorses "fine" energy levels. He reports "okay" sleep, for unknown amount of time. Per patient, appetite is "all right". Gokul demonstrates blunted affect. Currently, patient denies auditory hallucinations and denies visual hallucinations. He denies current passive or active suicidal ideation, intent, or plan.  Patient denies current passive or active homicidal ideation, intent, or plan. Patient reports tolerating medications well and denies adverse effects at this time. Team discussed plan, to which patient is amenable. Patient does not endorse additional questions or concerns at this time. Today, Gokul is consenting for safety on the unit. Progress Toward Goals: continues to improve slowly    Psychiatric Review of Systems:  Behavior over the last 24 hours: unchanged  Sleep: Normal sleep  Appetite: adequate  Medication side effects: none verbalized  ROS: Complete review of systems is negative except as noted above. Vital signs in last 24 hours: I have personally reviewed vital signs in the last 24 hours.   Temp:  [97.1 °F (36.2 °C)] 97.1 °F (36.2 °C)  HR:  [99] 99  Resp:  [16] 16  BP: (128)/(74) 128/74    Mental Status Exam:  Appearance: Overtly male, brown skin tone, brown eyes, disheveled, looks older than stated age  Behavior: calm, poor eye contact, guarded  Motor: no abnormal movements  Speech: normal rate, scant, soft  Mood: "okay"  Affect: blunted  Thought process: poverty of thought  Thought content: no overt delusions  Perceptual disturbances: denies auditory or visual hallucinations when asked, but appears distracted, does not appear responding to internal stimuli  Risk potential: No active or passive suicidal or homicidal ideation was verbalized during interview  Cognition: oriented to self and situation, appears to be of average intelligence and cognition not formally tested  Insight: Improving  Judgment: Improving    Nutrition Assessment and Intervention:     Reviewed food recall journal      Emotional and Mental Well-being, Sleep, Connectedness Assessment and Intervention:    Sleep/stress assessment performed         Current Medications:  Current Facility-Administered Medications   Medication Dose Route Frequency Provider Last Rate   • acetaminophen  650 mg Oral Q6H PRN Joe Paez MD     • acetaminophen  650 mg Oral Q4H PRN Mirian Tyler MD     • acetaminophen  975 mg Oral Q6H PRN Mirian Tyler MD     • albuterol  2 puff Inhalation Q4H PRN Mirian Tyler MD     • aluminum-magnesium hydroxide-simethicone  30 mL Oral Q4H PRN Mirian Tyler MD     • benztropine  1 mg Oral Q4H PRN Max 6/day Mirian Tyler MD     • divalproex sodium  750 mg Oral BID Patricia Huerta MD     • fluticasone  1 spray Nasal Daily Mirian Tyler MD     • glycopyrrolate  1 mg Oral TID Bjorn Palomo MD     • hydrOXYzine HCL  100 mg Oral Q6H PRN Max 4/day Virgil Archer DO      Or   • LORazepam  1 mg Intramuscular Q6H PRN Virgil Archer DO     • hydrOXYzine HCL  25 mg Oral Q6H PRN Max 4/day Mirian Tyler MD     • hydrOXYzine HCL  50 mg Oral Q6H PRN Max 4/day Mirian Tyler MD     • loxapine  100 mg Oral QPM Macy Moran MD     • loxapine  25 mg Oral BID Macy Moran MD     • melatonin  3 mg Oral HS PRN Virgil Archer DO     • nicotine polacrilex  4 mg Oral Q2H PRN Mirian Tyler MD     • OLANZapine  5 mg Oral Q3H PRN Max 6/day Patricia Huerta MD      Or   • OLANZapine  5 mg Intramuscular Q3H PRN Max 6/day Patricia Huerta MD     • OLANZapine  5 mg Intramuscular Q8H PRN Virgil Archer DO      Or   • OLANZapine  7.5 mg Oral Q8H PRN Virgil Archer DO     • OLANZapine  2.5 mg Oral Q3H PRN Max 8/day Patricia Huerta MD     • ondansetron  4 mg Oral Q8H PRN Virgil Archer DO     • polyethylene glycol  17 g Oral Daily PRN Mirian Tyler MD     • pseudoephedrine  120 mg Oral BID PRN Erica Burkitt, MD     • senna-docusate sodium  1 tablet Oral Daily PRN Mirian Tyler MD     • sterile water          • sterile water          • sterile water          • sterile water              Behavioral Health Medications: I have personally reviewed all current active medications. Changes as in plan section above. Laboratory results:  I have personally reviewed all pertinent laboratory/tests results.   Recent Results (from the past 48 hour(s))   CBC and differential    Collection Time: 07/04/23  9:29 AM   Result Value Ref Range    WBC 8.57 4.31 - 10.16 Thousand/uL    RBC 4.71 3.88 - 5.62 Million/uL    Hemoglobin 14.6 12.0 - 17.0 g/dL    Hematocrit 44.0 36.5 - 49.3 %    MCV 93 82 - 98 fL    MCH 31.0 26.8 - 34.3 pg    MCHC 33.2 31.4 - 37.4 g/dL    RDW 12.4 11.6 - 15.1 %    MPV 10.4 8.9 - 12.7 fL    Platelets 841 512 - 727 Thousands/uL    nRBC 0 /100 WBCs    Neutrophils Relative 38 (L) 43 - 75 %    Immat GRANS % 2 0 - 2 %    Lymphocytes Relative 41 14 - 44 %    Monocytes Relative 12 4 - 12 %    Eosinophils Relative 6 0 - 6 %    Basophils Relative 1 0 - 1 %    Neutrophils Absolute 3.29 1.85 - 7.62 Thousands/µL    Immature Grans Absolute 0.18 0.00 - 0.20 Thousand/uL    Lymphocytes Absolute 3.50 0.60 - 4.47 Thousands/µL    Monocytes Absolute 1.03 0.17 - 1.22 Thousand/µL    Eosinophils Absolute 0.47 0.00 - 0.61 Thousand/µL    Basophils Absolute 0.10 0.00 - 0.10 Thousands/µL   Comprehensive metabolic panel    Collection Time: 07/04/23  9:29 AM   Result Value Ref Range    Sodium 140 135 - 147 mmol/L    Potassium 3.9 3.5 - 5.3 mmol/L    Chloride 101 96 - 108 mmol/L    CO2 34 (H) 21 - 32 mmol/L    ANION GAP 5 mmol/L    BUN 15 5 - 25 mg/dL    Creatinine 1.31 (H) 0.60 - 1.30 mg/dL    Glucose 104 65 - 140 mg/dL    Calcium 9.4 8.4 - 10.2 mg/dL    AST 16 13 - 39 U/L    ALT 7 7 - 52 U/L    Alkaline Phosphatase 42 34 - 104 U/L    Total Protein 7.0 6.4 - 8.4 g/dL    Albumin 3.9 3.5 - 5.0 g/dL    Total Bilirubin 0.45 0.20 - 1.00 mg/dL    eGFR 76 ml/min/1.73sq juan antonio Strauss MD

## 2023-07-04 NOTE — NURSING NOTE
Patient visible on the unit throughout the evening. At times patient observed talking to himself. Patient reported heightened anxiety- atarax 100 mg given at 2122. Patient denied any unmet needs.      Atarax effective when reassessed at 2212

## 2023-07-04 NOTE — NURSING NOTE
Pt is calm and pleasant with a constricted affect but does brighten upon approach smiling at times. Pt is alert walking the halls early this morning. Blood draws complete. Denies SI/HI/AVH, depression, or anxiety. Pt not responding to internal stimuli. No requests at this time.

## 2023-07-05 PROCEDURE — 99232 SBSQ HOSP IP/OBS MODERATE 35: CPT | Performed by: PSYCHIATRY & NEUROLOGY

## 2023-07-05 RX ADMIN — LOXAPINE 25 MG: 25 CAPSULE ORAL at 09:45

## 2023-07-05 RX ADMIN — LOXAPINE 30 MG: 25 CAPSULE ORAL at 17:50

## 2023-07-05 RX ADMIN — DIVALPROEX SODIUM 750 MG: 500 TABLET, DELAYED RELEASE ORAL at 09:45

## 2023-07-05 RX ADMIN — GLYCOPYRROLATE 1 MG: 1 TABLET ORAL at 09:45

## 2023-07-05 RX ADMIN — GLYCOPYRROLATE 1 MG: 1 TABLET ORAL at 21:00

## 2023-07-05 RX ADMIN — DIVALPROEX SODIUM 750 MG: 500 TABLET, DELAYED RELEASE ORAL at 18:00

## 2023-07-05 RX ADMIN — FLUTICASONE PROPIONATE 1 SPRAY: 50 SPRAY, METERED NASAL at 09:44

## 2023-07-05 RX ADMIN — MELATONIN TAB 3 MG 3 MG: 3 TAB at 21:00

## 2023-07-05 RX ADMIN — NICOTINE POLACRILEX 4 MG: 4 GUM, CHEWING BUCCAL at 21:29

## 2023-07-05 RX ADMIN — LOXAPINE 100 MG: 50 CAPSULE ORAL at 17:50

## 2023-07-05 RX ADMIN — GLYCOPYRROLATE 1 MG: 1 TABLET ORAL at 15:38

## 2023-07-05 NOTE — PROGRESS NOTES
07/05/23 0857   Team Meeting   Meeting Type Daily Rounds   Team Members Present   Team Members Present Physician;Nurse;   Physician Team Member [id number] [facility] Team [facility] Member Wendie   Patient/Family Present   Patient Present No   Patient's Family Present No     Pt med/meal compliant. Pleasant and cooperative. Pt denying AH/SI/HI. Discharge to be determined.

## 2023-07-05 NOTE — PROGRESS NOTES
Progress Note - Behavioral Health   Gokul Cr 26 y.o. male MRN: [id number]  Unit/Bed#: [facility] 344-02 Encounter: [id number]    Assessment/Plan   Principal Problem:    Schizophrenia ([address]) vs. Schizoaffective disorder  Active Problems:    Legally blind    Hearing loss    Asthma    Medical clearance for psychiatric admission    Bilateral impacted cerumen    Prolonged erection    Intellectual disability      Recommended Treatment:   • Increase Loxapine from 25mg to 30mg in the morning, 25mg to 30mg in the afternoon, and continue 100mg HS for psychotic symptoms  • Continue Depakote 750mg BID for agitation/mood   ? Depakote level was 91 on 06/12/2023  • Continue Robinul 1mg TID for sialorrhea   • Continue Melatonin 3mg QHS PRN for insomnia  • Continue to promote patient participation on the milieu  • Continue to follow recommendations from SLIM for medication management  • D/C disp: TBD      Continue with pharmacotherapy, group therapy, milieu therapy and occupational therapy. Continue to assess for adverse medication side effects. Encourage Otis Liao to participate in nonverbal forms of therapy including journaling and art/music therapy. Continue frequent safety checks and vitals per unit protocol. Continue to engage CM/SW to assist with collateral, disposition planning, and the implementation of an individualized, patient-centered plan of care. Continue medical management by medical team.  Case discussed with treatment team.    Legal Status: 201  ------------------------------------------------------------    Subjective: All documentation including nursing notes, medication history to ensure medication adherence on the unit, labs, and vitals were reviewed. Gokul was evaluated this morning for continuity of care and no acute distress noted throughout the evaluation. Over the past 24 hours per nursing report, Gokul has been cooperative on the unit and compliant with medications.   Nursing also reports that they have not observed Gokul responding to internal stimuli or seeming preoccupied. Gokul required Atarax 100mg on 07/03 at 2122 for heightened anxiety. He did not require PRNs on 07/04. Today, Gokul was interviewed in his room after being found sleeping in the milieu. Gokul is consenting for safety on the unit. Gokul reports his mood as "good." Gokul notes having adequate sleep. Gokul states having an adequate appetite. Gokul has been taking the medications as prescribed and reporting no side effects. He states he has not had any issues with priapism within the last couple of days. When asked about energy he stated he was feeling tired today. He stated his goals for today were to shower and attend/participate in group therapy. He denies feelings of paranoia. Gokul denies suicidal ideations. Gokul denies homicidal ideations. Regarding hallucinations, Gokul is currently denying AVH. However, when this writer left the room and closed the door, Gokul could be heard talking to himself and laughing to himself. PRNs overnight: No   VS: Reviewed, within normal limits    Progress Toward Goals: Continues slow improvement    Psychiatric Review of Systems:  Behavior over the last 24 hours:  unchanged  Sleep: normal  Appetite: normal  Medication side effects: No   ROS: all other systems are negative    Vital signs in last 24 hours:  Temp:  [97 °F (36.1 °C)-97.6 °F (36.4 °C)] 97 °F (36.1 °C)  HR:  [] 103  Resp:  [16-18] 18  BP: (113-133)/(76-79) 113/79    Laboratory results:  I have personally reviewed all pertinent laboratory/tests results.   Recent Results (from the past 48 hour(s))   CBC and differential    Collection Time: 07/04/23  9:29 AM   Result Value Ref Range    WBC 8.57 4.31 - 10.16 Thousand/uL    RBC 4.71 3.88 - 5.62 Million/uL    Hemoglobin 14.6 12.0 - 17.0 g/dL    Hematocrit 44.0 36.5 - 49.3 %    MCV 93 82 - 98 fL    MCH 31.0 26.8 - 34.3 pg    MCHC 33.2 31.4 - 37.4 g/dL    RDW 12.4 11.6 - 15.1 %    MPV 10.4 8.9 - 12.7 fL    Platelets 789 065 - 363 Thousands/uL    nRBC 0 /100 WBCs    Neutrophils Relative 38 (L) 43 - 75 %    Immat GRANS % 2 0 - 2 %    Lymphocytes Relative 41 14 - 44 %    Monocytes Relative 12 4 - 12 %    Eosinophils Relative 6 0 - 6 %    Basophils Relative 1 0 - 1 %    Neutrophils Absolute 3.29 1.85 - 7.62 Thousands/µL    Immature Grans Absolute 0.18 0.00 - 0.20 Thousand/uL    Lymphocytes Absolute 3.50 0.60 - 4.47 Thousands/µL    Monocytes Absolute 1.03 0.17 - 1.22 Thousand/µL    Eosinophils Absolute 0.47 0.00 - 0.61 Thousand/µL    Basophils Absolute 0.10 0.00 - 0.10 Thousands/µL   Comprehensive metabolic panel    Collection Time: 07/04/23  9:29 AM   Result Value Ref Range    Sodium 140 135 - 147 mmol/L    Potassium 3.9 3.5 - 5.3 mmol/L    Chloride 101 96 - 108 mmol/L    CO2 34 (H) 21 - 32 mmol/L    ANION GAP 5 mmol/L    BUN 15 5 - 25 mg/dL    Creatinine 1.31 (H) 0.60 - 1.30 mg/dL    Glucose 104 65 - 140 mg/dL    Calcium 9.4 8.4 - 10.2 mg/dL    AST 16 13 - 39 U/L    ALT 7 7 - 52 U/L    Alkaline Phosphatase 42 34 - 104 U/L    Total Protein 7.0 6.4 - 8.4 g/dL    Albumin 3.9 3.5 - 5.0 g/dL    Total Bilirubin 0.45 0.20 - 1.00 mg/dL    eGFR 76 ml/min/1.73sq m         Mental Status Evaluation:    Appearance:  disheveled, marginal hygiene, wearing hospital clothes, looks older than stated age, overweight, overtly black male, sitting in bed   Behavior:  pleasant, cooperative, calm, guarded at times   Speech:  scant, soft, decreased volume   Mood:  "Good"   Affect:  blunted, mood-incongruent   Thought Process:  poverty of thought   Associations: concrete associations   Thought Content:  no overt delusions   Perceptual Disturbances: Denies auditory or visual hallucinations, but was observed responding to internal stimuli and laughing to himself. He also appeared to be responding to internal stimuli.    Risk Potential: Suicidal ideation - None at present  Homicidal ideation - None at present  Potential for aggression - No   Sensorium:  oriented to person, place and time/date   Memory:  recent and remote memory grossly intact   Consciousness:  alert and awake   Attention/Concentration: attention span and concentration are age appropriate   Insight:  limited   Judgment: limited   Gait/Station: normal gait/station   Motor Activity: no abnormal movements       Current Medications:  Current Facility-Administered Medications   Medication Dose Route Frequency Provider Last Rate   • acetaminophen  650 mg Oral Q6H PRN Winifred Beckett MD     • acetaminophen  650 mg Oral Q4H PRN Winifred Beckett MD     • acetaminophen  975 mg Oral Q6H PRN Winifred Beckett MD     • albuterol  2 puff Inhalation Q4H PRN Winifred Beckett MD     • aluminum-magnesium hydroxide-simethicone  30 mL Oral Q4H PRN Winifred Beckett MD     • benztropine  1 mg Oral Q4H PRN Max 6/day Winifred Beckett MD     • divalproex sodium  750 mg Oral BID Yolande Esparza MD     • fluticasone  1 spray Nasal Daily Winifred Beckett MD     • glycopyrrolate  1 mg Oral TID Nicky Grant MD     • hydrOXYzine HCL  100 mg Oral Q6H PRN Max 4/day Genna Rodirguez, DO      Or   • LORazepam  1 mg Intramuscular Q6H PRN Genna Rodriguez, DO     • hydrOXYzine HCL  25 mg Oral Q6H PRN Max 4/day Winifred Beckett MD     • hydrOXYzine HCL  50 mg Oral Q6H PRN Max 4/day Winifred Beckett MD     • loxapine  100 mg Oral QPM Armida Cohen MD     • loxapine  30 mg Oral BID Alonso Sexton DO     • melatonin  3 mg Oral HS PRN Genna Rodriguez, DO     • nicotine polacrilex  4 mg Oral Q2H PRN Winifred Beckett MD     • OLANZapine  5 mg Oral Q3H PRN Max 6/day Yolande Esparza MD      Or   • OLANZapine  5 mg Intramuscular Q3H PRN Max 6/day Yolande Esparza MD     • OLANZapine  5 mg Intramuscular Q8H PRN Genna Coolpeggy, DO      Or   • OLANZapine  7.5 mg Oral Q8H PRN Genna Cooler, DO     • OLANZapine  2.5 mg Oral Q3H PRN Max 8/day Yolande Esparza MD     • ondansetron 4 mg Oral Q8H PRN Martha Hanson DO     • polyethylene glycol  17 g Oral Daily PRN Pepe Munoz MD     • pseudoephedrine  120 mg Oral BID PRN Olga Vargas MD     • senna-docusate sodium  1 tablet Oral Daily PRN Pepe Munoz MD     • sterile water          • sterile water          • sterile water          • sterile water              Behavioral Health Medications: All current active meds have been reviewed. Changes as in plan section above. Risks, benefits and possible side effects of Medications:   Risks, benefits, and possible side effects of medications explained to patient and patient verbalizes understanding. Counseling / Coordination of Care:  Patient's progress discussed with staff in treatment team meeting. Medications, treatment progress and treatment plan reviewed with patient. Mallory Madrid DO 07/05/23  Psychiatry Resident, PGY-I    This note was completed in part utilizing Dragon dictation Software. Grammatical, translation, syntax errors, random word insertions, spelling mistakes, and incomplete sentences may be an occasional consequence of this system secondary to software limitations with voice recognition, ambient noise, and hardware issues. If you have any questions or concerns about the content, text, or information contained within the body of this dictation, please contact the provider for clarification.

## 2023-07-05 NOTE — NURSING NOTE
Pt visible on the unit, social with peers. Playing games in the dayroom with peers. Appears distracted during interactions. Compliant with scheduled medications. No behavioral issues to be noted. Denies any psych symptoms. Q7 minute safety checks maintained.

## 2023-07-05 NOTE — NURSING NOTE
Pt calm and cooperative during shift. Social with peers. Attending/participated in group. Denies si.hi. avh. compliant with evening meds.  continued q7m checks for safety

## 2023-07-05 NOTE — NURSING NOTE
Patient has been visible in the milieu the majority of the shift. Interacting with peers and attending groups. Denies all symptoms at this time. Compliant with medications. Will monitor.

## 2023-07-06 PROCEDURE — 99232 SBSQ HOSP IP/OBS MODERATE 35: CPT | Performed by: PSYCHIATRY & NEUROLOGY

## 2023-07-06 RX ADMIN — GLYCOPYRROLATE 1 MG: 1 TABLET ORAL at 21:40

## 2023-07-06 RX ADMIN — GLYCOPYRROLATE 1 MG: 1 TABLET ORAL at 16:42

## 2023-07-06 RX ADMIN — LOXAPINE 100 MG: 50 CAPSULE ORAL at 17:37

## 2023-07-06 RX ADMIN — LOXAPINE 30 MG: 25 CAPSULE ORAL at 08:28

## 2023-07-06 RX ADMIN — GLYCOPYRROLATE 1 MG: 1 TABLET ORAL at 08:28

## 2023-07-06 RX ADMIN — DIVALPROEX SODIUM 750 MG: 500 TABLET, DELAYED RELEASE ORAL at 20:00

## 2023-07-06 RX ADMIN — FLUTICASONE PROPIONATE 1 SPRAY: 50 SPRAY, METERED NASAL at 08:28

## 2023-07-06 RX ADMIN — DIVALPROEX SODIUM 750 MG: 500 TABLET, DELAYED RELEASE ORAL at 08:28

## 2023-07-06 RX ADMIN — LOXAPINE 30 MG: 25 CAPSULE ORAL at 17:36

## 2023-07-06 NOTE — NURSING NOTE
Patient spent most of the shift in bed sleeping. Scant during interaction. Denies symptoms. Compliant with medications.

## 2023-07-06 NOTE — SOCIAL WORK
Pt visible in the hallway and appeared disheveled. Upon approach, pt remained flat, responding to questions vaguely. Pt states he is doing "good" today.

## 2023-07-06 NOTE — NURSING NOTE
Pt social, playful with peers and staff in the milieu. Pt is bright. Overall improved mood. Denies si/hi/avh. Compliant with evening meds. Continued q7m checks for safety    2100 prn melatonin. 2200 pt continues to pace unit.  Med ineffective at this time

## 2023-07-06 NOTE — PROGRESS NOTES
Progress Note - Behavioral Health   Gokul Eldridge 26 y.o. male MRN: [id number]  Unit/Bed#: [facility] 344-02 Encounter: [id number]    Assessment/Plan   Principal Problem:    Schizophrenia ([address]) vs. Schizoaffective disorder  Active Problems:    Legally blind    Hearing loss    Asthma    Medical clearance for psychiatric admission    Bilateral impacted cerumen    Prolonged erection    Intellectual disability      Recommended Treatment:   Continue Loxitane 30 mg PO in the morning and every afternoon, and 100 mg PO in the evening for psychotic symptoms. Continue Depakote 750 mg PO BID for agitation/mood. Continue Robinul 1 mg TID for drooling. Continue Melatonin 3mg PRN for insomnia. Continue encouragement of milieu participation. Discharge disposition: TBD    Consulted Medicine for patient's new-onset hypertension. BP 7/5/23 15:13 was 165/82. Pt refused BP check today morning. Continue with pharmacotherapy, group therapy, milieu therapy, and occupational therapy. Continue to monitor for medication adverse effects. Continue frequent safety checks and vitals per unit protocol. Case discussed with treatment team.    ------------------------------------------------------------    Subjective: Per nursing report, Gokul has been cooperative on the unit and compliant with scheduled medications, attending group, and social with peers and staff in the milieu. He was given melatonin last night which, per nursing note, was ineffective. Patient's BP yesterday afternoon was 165/82. Pt refused BP check this morning. Medical team contacted; they reviewed Gokul's chart and will continue to follow based on BP trends. Today, patient was seen and evaluated in the consult room after being found sleeping in his room. He did not require PRNs yesterday 7/5/23. He reports his mood as "good," states he has not been having any issues with sleeping or appetite.  He is unable to report how many hours he has slept, and denies any nightmares. He has been compliant with medications, denies any side effects since the increase in loxapine dosage yesterday. Denies any abdominal pain, changes in bowel movements, and priapism. Reports his energy has been fluctuating, but feels it has improved compared to yesterday. He denies SI and HI. He denies auditory and visual hallucinations. When asked about the last time he experienced hallucinations, he states it was "Monday." He says at that time, he was hearing "muffled" voices talking about his grandmother, but was unable to discern what the voices were actually saying. When asked if he remembers laughing to himself yesterday, and whether it was laughter in response to voices or to a joke, he says he does remember and it was to a joke. When asked about the last time he talked to himself, he says it was "Monday." When asked about today's goals, he states he would like to shower and is looking forward to group because he likes when everybody "interacts." He is agreeable to continuing medications and participating in group. Progress Toward Goals: Overall, Gokul's affect is blunted and his responses are scant, and he appears to have poverty of thought. Continues to progress slowly. Continues to be in agreement with substance use rehab.     Psychiatric Review of Systems:  Behavior over the last 24 hours: unchanged  Sleep: normal  Appetite: adequate  Medication side effects: denies  ROS: all other systems negative as above    Vital signs in last 24 hours:  Temp:  [97.4 °F (36.3 °C)] 97.4 °F (36.3 °C)  HR:  [90] 90  Resp:  [17] 17  BP: (165)/(82) 165/82    Mental Status Exam:  Appearance:  minimal eye contact, appears older than stated age, casually dressed, marginal grooming/hygiene, disheveled, overtly black male   Behavior:  cooperative, pleasant, guarded   Motor: Mild psychomotor retardation and slow gait   Speech:  delayed, slow, soft, scant, poorly articulated   Mood:  "I'm good"   Affect: mood-incongruent and blunted   Thought Process:  concrete, poverty of thought   Thought Content: no verbalized delusions or overt paranoia, apparent poverty of thought,   Perceptual disturbances: denies current auditory and visual hallucinations, appears distracted & preoccupied, does not appear to be responding to internal stimuli at time of interview   Risk Potential: No active or passive suicidal or homicidal ideation was verbalized during interview   Cognition: oriented to self and situation and cognition not formally tested   Insight:  Limited   Judgment: Limited     Current Medications:  Current Facility-Administered Medications   Medication Dose Route Frequency Provider Last Rate   • acetaminophen  650 mg Oral Q6H PRN Kitty Fabry, MD     • acetaminophen  650 mg Oral Q4H PRN Kitty Fabry, MD     • acetaminophen  975 mg Oral Q6H PRN Kitty Fabry, MD     • albuterol  2 puff Inhalation Q4H PRN Kitty Fabry, MD     • aluminum-magnesium hydroxide-simethicone  30 mL Oral Q4H PRN Kitty Fabry, MD     • benztropine  1 mg Oral Q4H PRN Max 6/day Kitty Fabry, MD     • divalproex sodium  750 mg Oral BID Arnaldo Canela MD     • fluticasone  1 spray Nasal Daily Kitty Fabry, MD     • glycopyrrolate  1 mg Oral TID Elisabeth Wolfe MD     • hydrOXYzine HCL  100 mg Oral Q6H PRN Max 4/day Sharl Arm, DO      Or   • LORazepam  1 mg Intramuscular Q6H PRN Sharl Arm, DO     • hydrOXYzine HCL  25 mg Oral Q6H PRN Max 4/day Kitty Fabry, MD     • hydrOXYzine HCL  50 mg Oral Q6H PRN Max 4/day Kitty Fabry, MD     • loxapine  100 mg Oral QPM Shea Cunningham MD     • loxapine  30 mg Oral BID Kyung Parker DO     • melatonin  3 mg Oral HS PRN Sharl Arm, DO     • nicotine polacrilex  4 mg Oral Q2H PRN Kitty Fabry, MD     • OLANZapine  5 mg Oral Q3H PRN Max 6/day Arnaldo Canela MD      Or   • OLANZapine  5 mg Intramuscular Q3H PRN Max 6/day Arnaldo Canela MD     • OLANZapine  5 mg Intramuscular Q8H PRN Deepthi Toroo, DO      Or   • OLANZapine  7.5 mg Oral Q8H PRN Deepthi Toroo, DO     • OLANZapine  2.5 mg Oral Q3H PRN Max 8/day Roel Jones MD     • ondansetron  4 mg Oral Q8H PRN Deepthi Toroo, DO     • polyethylene glycol  17 g Oral Daily PRN Mack Rivas MD     • pseudoephedrine  120 mg Oral BID PRN Gemma Puckett MD     • senna-docusate sodium  1 tablet Oral Daily PRN Mack Rivas MD     • sterile water          • sterile water          • sterile water          • sterile water              Behavioral Health Medications: All current active meds have been reviewed. Risks, benefits, and possible adverse effects of medications were reviewed. Laboratory results:  I have personally reviewed all pertinent laboratory/tests results. No results found for this or any previous visit (from the past 48 hour(s)).          Jama Calloway   [facility]

## 2023-07-06 NOTE — PROGRESS NOTES
07/06/23 0841   Team Meeting   Meeting Type Daily Rounds   Team Members Present   Team Members Present Physician;Nurse;   Physician Team Member [id number] [facility] Member Wendie   Patient/Family Present   Patient Present No   Patient's Family Present No   Pt visible on the unit, pleasant, cooperative. Continues responding to internal stimuli and appears internally preoccupied. Discharge to be determined. Bed search possible on Monday.

## 2023-07-07 PROCEDURE — 99232 SBSQ HOSP IP/OBS MODERATE 35: CPT | Performed by: PSYCHIATRY & NEUROLOGY

## 2023-07-07 RX ADMIN — DIVALPROEX SODIUM 750 MG: 500 TABLET, DELAYED RELEASE ORAL at 08:39

## 2023-07-07 RX ADMIN — DIVALPROEX SODIUM 750 MG: 500 TABLET, DELAYED RELEASE ORAL at 18:14

## 2023-07-07 RX ADMIN — LOXAPINE 100 MG: 50 CAPSULE ORAL at 18:13

## 2023-07-07 RX ADMIN — LOXAPINE 30 MG: 25 CAPSULE ORAL at 18:14

## 2023-07-07 RX ADMIN — GLYCOPYRROLATE 1 MG: 1 TABLET ORAL at 08:39

## 2023-07-07 RX ADMIN — MELATONIN TAB 3 MG 3 MG: 3 TAB at 22:04

## 2023-07-07 RX ADMIN — GLYCOPYRROLATE 1 MG: 1 TABLET ORAL at 22:00

## 2023-07-07 RX ADMIN — GLYCOPYRROLATE 1 MG: 1 TABLET ORAL at 18:16

## 2023-07-07 RX ADMIN — LOXAPINE 30 MG: 25 CAPSULE ORAL at 08:39

## 2023-07-07 NOTE — PROGRESS NOTES
Progress Note - Behavioral Health   Gokul Cr 26 y.o. male MRN: [id number]  Unit/Bed#: [facility] 344-02 Encounter: [id number]    Assessment/Plan   Principal Problem:    Schizophrenia ([address]) vs. Schizoaffective disorder  Active Problems:    Legally blind    Hearing loss    Asthma    Medical clearance for psychiatric admission    Bilateral impacted cerumen    Prolonged erection    Intellectual disability      Behavior over the last 24 hours:  {sl ip bh improved/regressed/unchanged:20518}  Sleep: {sl ip bh sleep:37833}  Appetite: {sl ip bh appetite:14142}  Medication side effects: {EXAM; YES/NO:19492::"No"}  ROS: {sl ip  ros:19763}    Subjective: ***  Per RN report    Mental Status Evaluation:  Appearance:  {exam; general psych:65906}   Behavior:  {SL IP Exam; behavior:28789}   Speech:  {SL IP findings; speech:74783}   Mood:  {mood:87953}   Affect:  {desc; affect:42557::"normal"}   Thought Process:  {thought process:92280}   Associations: {Associations:86363::"intact associations"}   Thought Content:  {SL IP Exam; psych thought content:88094}   Perceptual Disturbances: {SL IP BH PERCEPTUAL DISTURBANCES:25506}   Risk Potential: Suicidal Ideations {SL IP with/without plan:18792}  Homicidal Ideations {SL IP with/without plan:81796}  Potential for Aggression {EXAM; YES/NO:19492::"No"}   Sensorium:  {orientation:86561}   Memory:  {Memory:19933}   Consciousness:  {SL IP Exam; psych consciousness:71229}    Attention: {EXAM; NEURO PED ATTENTION:54895}   Insight:  {insight/judgement:26625}   Judgment: {insight/judgement:27543}   Gait/Station: {SL IP BH Gait/Station:34420}   Motor Activity: {SL IP Psych Motor Activity:06569}     Progress Toward Goals: ***    Recommended Treatment: Continue with group therapy, milieu therapy and occupational therapy.       Risks, benefits and possible side effects of Medications:   {Risks, Benefits, and Possible Side Effects of Medications:71369}    Medications: {[facility] Behavioral Health Medications:54224}. Labs: {I have reviewed all laboratory results:39183::"I have personally reviewed all pertinent laboratory/tests results"}. { Progress Note Labs (Optional):92023}    Counseling / Coordination of Care  Total floor / unit time spent today *** minutes. Greater than 50% of total time was spent with the patient and / or family counseling and / or coordination of care.  A description of the counseling / coordination of care: ***

## 2023-07-07 NOTE — NURSING NOTE
Patient slept the entire shift. Patient denied AVH/SI/HI at this time. Patient was compliant with scheduled medications. Staff to maintain continuous rounding for safety and support.

## 2023-07-07 NOTE — PROGRESS NOTES
07/07/23 0845   Team Meeting   Meeting Type Daily Rounds   Team Members Present   Team Members Present Physician;Nurse;   Physician Team Member [id number] [id number] Member Wendie   Patient/Family Present   Patient Present No   Patient's Family Present No     Pt med/meal compliant. Sleeping for most of the day yesterday. Pt visible for meals and vitals. Pt appears internally preoccupied. Bed search possible for next week.

## 2023-07-07 NOTE — SOCIAL WORK
Pt isolative to his room. Upon approach stating he is doing "good" and denies any CM related needs at this time. Bed search to possibly begin next week.

## 2023-07-07 NOTE — NURSING NOTE
Patient in bed beginning of shift, was up for medication, refused his breakfast.  He denies SI/HI and A/V hallucinations.

## 2023-07-07 NOTE — PROGRESS NOTES
Progress Note - Behavioral Health   Gokul Sneed 26 y.o. male MRN: [id number]  Unit/Bed#: -02 Encounter: [id number]    Assessment/Plan   Principal Problem:    Schizophrenia ([address]) vs. Schizoaffective disorder  Active Problems:    Legally blind    Hearing loss    Asthma    Medical clearance for psychiatric admission    Bilateral impacted cerumen    Prolonged erection    Intellectual disability      Recommended Treatment:   Continue Loxapine 30 mg PO every morning and 30mg every afternoon, and 100 mg PO every evening for psychotic symptoms. Continue Depakote 750 mg PO BID for mood. Continue Robinul 1 mg TID for sialorrhea. Continue Melatonin 3 mg HS PRN for insomnia. Continue Cogentin 1 mg PRN for EPS. Continue Zofran 4 mg PRN for N/V. Continue Zyprexa PRN for agitation. Continue to encourage participation in milieu, group therapy, and personal hygiene. Discharge disposition: Planning for bed search next week. Medicine to follow BP, last check was 7/6/23 at 15:43: 146/86 (down from 165/82 on 7/5/23). Continue to monitor vitals and symptoms. Continue to monitor for adverse effects of medications. Patient remains agreeable to medication compliance and substance rehab. This case was discussed with treatment team.  ------------------------------------------------------------    Subjective: Per nursing report, Gokul has been cooperative on the unit and compliant with scheduled medications. He remained in bed for most of the day yesterday and was scant in interaction when approached. He did not request melatonin PRN yesterday evening. His BP yesterday afternoon was 146/86. At the time of evaluation this morning, Gokul denied any shortness of breath, chest pain, blurry vision, and headaches. Medical team to continue following. Today, Gokul was seen and evaluated in his room.  He was sleeping comfortably and states he woke up "five times at night" but feels rested, and believes he slept "six or eight hours." He states being compliant with his prescribed medications, and did not ask for melatonin yesterday evening. He reports his mood as being "same old, same old." He denies any changes in appetite and finished his food trays yesterday, and says his last bowel movement was yesterday. He denies abdominal pain, dizziness, and N/V. When asked about priapism, he said his last unwanted painful erection was "about a week ago." This writer asked Gokul if he has been sleeping more than usual, to which he responded "this is my regular sleepiness." He slept through breakfast this morning because he feels "too tired." He states his energy is "fine." Yesterday, his goals were to shower and attend group. When asked if he was able to follow-through with those goals, he laughed and said he "got too lazy" and did not complete either of those activities. When asked about his goals today, he said "to make it a good day." The team encouraged Gokul to get out of bed and start his morning, and he was agreeable to taking a shower and attending group at some point later today. At present, Gokul denies SI and HI. He denies any auditory and visual hallucinations. When asked if he has been talking and/or laughing to himself, he says no. Progress Toward Goals: Gokul remains guarded and preoccupied. His affect is slightly more reactive compared to yesterday, although still blunted. His responses continue to be scant. Gokul is agreeable to continuing medications and substance use rehabilitation. Psychiatric Review of Systems:  Behavior over the last 24 hours: unchanged  Sleep: normal  Appetite: adequate  Medication side effects: denies  ROS: Complete review of systems is negative except as noted above.     Vital signs in last 24 hours:  Temp:  [96.6 °F (35.9 °C)] 96.6 °F (35.9 °C)  HR:  [91] 91  Resp:  [16] 16  BP: (146)/(86) 146/86    Mental Status Exam:  Appearance:  Overtly black male with minimal eye contact who appears older than stated age. He is disheveled, drowsy, and casually dressed with marginal grooming and hygiene   Behavior:  calm, cooperative and guarded   Motor: Slight psychomotor retardation   Speech:  delayed initiation, slow, soft, scant, occasionally mumbled speech   Mood:  "I'm same old, same old."   Affect:  Slightly more reactive and less blunted compared to yesterday   Thought Process:  poverty of thought, concrete   Thought Content: no verbalized delusions or overt paranoia, no obsessive thinking   Perceptual disturbances: Denies AVH at present. During interview, he did not appear to be responding to internal stimuli, but appears to be internally preoccupied and distracted in interview   Risk Potential: Denies SI/HI, Potential for aggression: None   Cognition: Cognition not formally assessed; he remains oriented to self and situation.    Insight:  Limited   Judgment: Limited     Current Medications:  Current Facility-Administered Medications   Medication Dose Route Frequency Provider Last Rate   • acetaminophen  650 mg Oral Q6H PRN Jewell Keita MD     • acetaminophen  650 mg Oral Q4H PRN Jewell Keita MD     • acetaminophen  975 mg Oral Q6H PRN Jewell Keita MD     • albuterol  2 puff Inhalation Q4H PRN Jewell Keita MD     • aluminum-magnesium hydroxide-simethicone  30 mL Oral Q4H PRN Jewell Keita MD     • benztropine  1 mg Oral Q4H PRN Max 6/day Jewell Keita MD     • divalproex sodium  750 mg Oral BID Dada Jones MD     • fluticasone  1 spray Nasal Daily Jewell Keita MD     • glycopyrrolate  1 mg Oral TID Anika Campbell MD     • hydrOXYzine HCL  100 mg Oral Q6H PRN Max 4/day Anu Britt DO      Or   • LORazepam  1 mg Intramuscular Q6H PRN Anu Britt DO     • hydrOXYzine HCL  25 mg Oral Q6H PRN Max 4/day Jewell Keita MD     • hydrOXYzine HCL  50 mg Oral Q6H PRN Max 4/day Jewell Keita MD     • loxapine  100 mg Oral QPM Jose C Mares MD     • loxapine  30 mg Oral BID Radha Villatoro, DO     • melatonin  3 mg Oral HS PRN Gridley Men, DO     • nicotine polacrilex  4 mg Oral Q2H PRN Edmund Melo MD     • OLANZapine  5 mg Oral Q3H PRN Max 6/day Raúl Bolye MD      Or   • OLANZapine  5 mg Intramuscular Q3H PRN Max 6/day Raúl Boyle MD     • OLANZapine  5 mg Intramuscular Q8H PRN Gridley Men, DO      Or   • OLANZapine  7.5 mg Oral Q8H PRN Gridley Men, DO     • OLANZapine  2.5 mg Oral Q3H PRN Max 8/day Raúl Boyle MD     • ondansetron  4 mg Oral Q8H PRN Helen Men, DO     • polyethylene glycol  17 g Oral Daily PRN Edmund Melo MD     • pseudoephedrine  120 mg Oral BID PRN Isidra Peterson MD     • senna-docusate sodium  1 tablet Oral Daily PRN Edmund Melo MD     • sterile water          • sterile water          • sterile water          • sterile water              Behavioral Health Medications: all current active meds have been reviewed. Laboratory results: No results found for this or any previous visit (from the past 48 hour(s)).        Jama Calloway   [facility]

## 2023-07-08 PROCEDURE — 99232 SBSQ HOSP IP/OBS MODERATE 35: CPT | Performed by: PSYCHIATRY & NEUROLOGY

## 2023-07-08 RX ADMIN — GLYCOPYRROLATE 1 MG: 1 TABLET ORAL at 21:17

## 2023-07-08 RX ADMIN — MELATONIN TAB 3 MG 3 MG: 3 TAB at 21:17

## 2023-07-08 RX ADMIN — DIVALPROEX SODIUM 750 MG: 500 TABLET, DELAYED RELEASE ORAL at 08:08

## 2023-07-08 RX ADMIN — GLYCOPYRROLATE 1 MG: 1 TABLET ORAL at 08:09

## 2023-07-08 RX ADMIN — FLUTICASONE PROPIONATE 1 SPRAY: 50 SPRAY, METERED NASAL at 08:10

## 2023-07-08 RX ADMIN — LOXAPINE 30 MG: 25 CAPSULE ORAL at 17:02

## 2023-07-08 RX ADMIN — DIVALPROEX SODIUM 750 MG: 500 TABLET, DELAYED RELEASE ORAL at 18:40

## 2023-07-08 RX ADMIN — LOXAPINE 30 MG: 25 CAPSULE ORAL at 08:09

## 2023-07-08 RX ADMIN — GLYCOPYRROLATE 1 MG: 1 TABLET ORAL at 17:02

## 2023-07-08 RX ADMIN — LOXAPINE 100 MG: 50 CAPSULE ORAL at 17:01

## 2023-07-08 NOTE — NURSING NOTE
Patient was withdrawn to his room until later evening and then he came out of room and watched TV in patient lounge. He was calm, cooperative with scheduled medications and denies S.I.H.I. At his request he was given 3mgs po prn Melatonin.

## 2023-07-08 NOTE — PROGRESS NOTES
Assessment/Plan   Principal Problem:    Schizophrenia ([address]) vs. Schizoaffective disorder  Active Problems:    Legally blind    Hearing loss    Asthma    Medical clearance for psychiatric admission    Bilateral impacted cerumen    Prolonged erection    Intellectual disability    Legal status: 201 voluntary commitment  Disposition: TBD, pending clinical stabilization    Recommended Treatment:   Continue Loxapine 30 mg every morning, 30 mg every afternoon, 100 mg every evening for psychotic symptoms. Continue Depakote 750 mg BID for mood. Continue Robinul 1 mg TID for sialorrhea. Continue with group therapy, milieu therapy and occupational therapy. Continue frequent safety checks and vitals per unit protocol. Continue medical management per SLIM recommendations. Case discussed with treatment team. Continue coordinating disposition with case management. Risks, benefits and possible side effects of Medications: Risks, benefits, and possible side effects of medications have been explained to the patient, who verbalizes understanding    ------------------------------------------------------------    Subjective: Per nursing report, Gokul has been withdrawn to his room on the unit, calm, cooperative, and compliant with medications. Over the last 24 hours  PRN medications: melatonin for sleep  Major updates: None    Gokul was seen and evaluated today for continuity of care. On interview, the patient is calm, poor eye contact and guarded. He reports "okay" mood. Patient endorses "fine" energy levels. He reports "okay" sleep, 6-8 hours. Per patient, appetite is "okay". Gokul demonstrates blunted affect. Currently, patient denies auditory hallucinations and denies visual hallucinations. He denies current passive or active suicidal ideation, intent, or plan. Patient denies current passive or active homicidal ideation, intent, or plan.  Patient reports tolerating medications well and denies adverse effects at this time. Team discussed plan, to which patient is amenable. Patient does not endorse additional questions or concerns at this time. Today, Gokul is consenting for safety on the unit. Progress Toward Goals: continues to improve    Psychiatric Review of Systems:  Behavior over the last 24 hours: unchanged  Sleep: Normal sleep  Appetite: adequate  Medication side effects: none verbalized  ROS: Complete review of systems is negative except as noted above. Vital signs in last 24 hours: I have personally reviewed vital signs in the last 24 hours.        Mental Status Exam:  Appearance: Overtly male, casually dressed, marginal hygiene, looks older than stated age  Behavior: cooperative, calm, poor eye contact  Motor: no abnormal movements  Speech: normal rate, normal volume, normal pitch  Mood: "okay"  Affect: blunted  Thought process: poverty of thought  Thought content: no overt delusions  Perceptual disturbances: no auditory hallucinations, no visual hallucinations, appears distracted, does not appear responding to internal stimuli  Risk potential: No active or passive suicidal or homicidal ideation was verbalized during interview  Cognition: oriented to self and situation, appears to be of average intelligence and cognition not formally tested  Insight: Limited  Judgment: Limited        Emotional and Mental Well-being, Sleep, Connectedness Assessment and Intervention:    Sleep/stress assessment performed         Current Medications:  Current Facility-Administered Medications   Medication Dose Route Frequency Provider Last Rate   • acetaminophen  650 mg Oral Q6H PRN Jessica Snider MD     • acetaminophen  650 mg Oral Q4H PRN Jessica Snider MD     • acetaminophen  975 mg Oral Q6H PRN Jessica Snider MD     • albuterol  2 puff Inhalation Q4H PRN Jessica Snider MD     • aluminum-magnesium hydroxide-simethicone  30 mL Oral Q4H PRN Jessica Snider MD     • benztropine  1 mg Oral Q4H PRN Max 6/day Hedy Carlos Estrella MD     • divalproex sodium  750 mg Oral BID Sarai Sanches MD     • fluticasone  1 spray Nasal Daily Sharon Holter, MD     • glycopyrrolate  1 mg Oral TID Flor Shah MD     • hydrOXYzine HCL  100 mg Oral Q6H PRN Max 4/day Zonia Selby DO      Or   • LORazepam  1 mg Intramuscular Q6H PRN Zonia Selby DO     • hydrOXYzine HCL  25 mg Oral Q6H PRN Max 4/day Sharon Holter, MD     • hydrOXYzine HCL  50 mg Oral Q6H PRN Max 4/day Sharon Holter, MD     • loxapine  100 mg Oral QPM Jenaro Benitez MD     • loxapine  30 mg Oral BID Arelisalberto Fleming, DO     • melatonin  3 mg Oral HS PRN Zonia Selby DO     • nicotine polacrilex  4 mg Oral Q2H PRN Sharon Holter, MD     • OLANZapine  5 mg Oral Q3H PRN Max 6/day Sarai Sanches MD      Or   • OLANZapine  5 mg Intramuscular Q3H PRN Max 6/day Sarai Sanches MD     • OLANZapine  5 mg Intramuscular Q8H PRN Zonia Selby DO      Or   • OLANZapine  7.5 mg Oral Q8H PRN Zonia Selby DO     • OLANZapine  2.5 mg Oral Q3H PRN Max 8/day Sarai Sanches MD     • ondansetron  4 mg Oral Q8H PRN Zonia Selby DO     • polyethylene glycol  17 g Oral Daily PRN Sharon Holter, MD     • pseudoephedrine  120 mg Oral BID PRN Ivy Rockwell MD     • senna-docusate sodium  1 tablet Oral Daily PRN Sharon Holter, MD     • sterile water          • sterile water          • sterile water          • sterile water              Behavioral Health Medications: I have personally reviewed all current active medications. Changes as in plan section above. Laboratory results:  I have personally reviewed all pertinent laboratory/tests results. No results found for this or any previous visit (from the past 48 hour(s)).      Michael Harmon MD

## 2023-07-08 NOTE — NURSING NOTE
Patient was briefly out of his room for breakfast and social with peers. He returned to his room and went back to sleep. He was medication complaint, and denies SI/HI and A/V hallucinations.

## 2023-07-09 VITALS
DIASTOLIC BLOOD PRESSURE: 81 MMHG | RESPIRATION RATE: 16 BRPM | SYSTOLIC BLOOD PRESSURE: 133 MMHG | WEIGHT: 177.6 LBS | BODY MASS INDEX: 31.47 KG/M2 | TEMPERATURE: 97.2 F | HEIGHT: 63 IN | OXYGEN SATURATION: 98 % | HEART RATE: 90 BPM

## 2023-07-09 PROCEDURE — 99232 SBSQ HOSP IP/OBS MODERATE 35: CPT | Performed by: PSYCHIATRY & NEUROLOGY

## 2023-07-09 RX ADMIN — DIVALPROEX SODIUM 750 MG: 500 TABLET, DELAYED RELEASE ORAL at 08:52

## 2023-07-09 RX ADMIN — LOXAPINE 30 MG: 25 CAPSULE ORAL at 18:07

## 2023-07-09 RX ADMIN — LOXAPINE 30 MG: 25 CAPSULE ORAL at 08:52

## 2023-07-09 RX ADMIN — DIVALPROEX SODIUM 750 MG: 500 TABLET, DELAYED RELEASE ORAL at 18:07

## 2023-07-09 RX ADMIN — GLYCOPYRROLATE 1 MG: 1 TABLET ORAL at 21:26

## 2023-07-09 RX ADMIN — MELATONIN TAB 3 MG 3 MG: 3 TAB at 21:26

## 2023-07-09 RX ADMIN — GLYCOPYRROLATE 1 MG: 1 TABLET ORAL at 08:54

## 2023-07-09 RX ADMIN — FLUTICASONE PROPIONATE 1 SPRAY: 50 SPRAY, METERED NASAL at 08:55

## 2023-07-09 RX ADMIN — GLYCOPYRROLATE 1 MG: 1 TABLET ORAL at 18:06

## 2023-07-09 RX ADMIN — LOXAPINE 100 MG: 50 CAPSULE ORAL at 18:07

## 2023-07-09 NOTE — NURSING NOTE
Pt resting in bed most of the morning. Medication compliant, refused breakfast. Denies SI/HI/AH/VH at this time. Not observed responding to internal stimuli at this time. Denies any unmet needs or complaints at this time.

## 2023-07-09 NOTE — NURSING NOTE
Patient has been visible in the milieu this evening and he can be seen and heard responding to internal stimuli. He is cooperative and appropriate in interactions although they are fairly brief/scant.  He was cooperative with scheduled medication and he received Melatonin 3mgs po prn at his request.

## 2023-07-09 NOTE — PROGRESS NOTES
Assessment/Plan   Principal Problem:    Schizophrenia ([address]) vs. Schizoaffective disorder  Active Problems:    Legally blind    Hearing loss    Asthma    Medical clearance for psychiatric admission    Bilateral impacted cerumen    Prolonged erection    Intellectual disability    Legal status: 201 voluntary commitment  Disposition: TBD, pending clinical stabilization    Recommended Treatment:   Continue Loxapine 30 mg every morning, 30 mg every afternoon, 100 mg every evening for psychotic symptoms. Continue Depakote 750 mg BID for mood. Continue Robinul 1 mg TID for sialorrhea. Continue with group therapy, milieu therapy and occupational therapy. Continue frequent safety checks and vitals per unit protocol. Continue medical management per SLIM recommendations. Case discussed with treatment team. Continue coordinating disposition with case management. Risks, benefits and possible side effects of Medications: Risks, benefits, and possible side effects of medications have been explained to the patient, who verbalizes understanding    ------------------------------------------------------------    Subjective: Per nursing report, Gokul has been responding on the unit, visible in the milieu and compliant with medications. Over the last 24 hours  PRN medications: None  Major updates: None    Gokul was seen and evaluated today for continuity of care. On interview, the patient is cooperative, calm and poor eye contact. He reports "ok" mood. Patient endorses "good" energy levels. He reports "ok" sleep, 6-8 hours. Per patient, appetite is "good". Gokul demonstrates blunted affect. He denied experiencing priapism and states it has resolved. Currently, patient denies auditory hallucinations and denies visual hallucinations. He denies current passive or active suicidal ideation, intent, or plan. Patient denies current passive or active homicidal ideation, intent, or plan.  Patient reports tolerating medications well and denies adverse effects at this time. Team discussed plan, to which patient is amenable. Patient does not endorse additional questions or concerns at this time. Today, Gokul is consenting for safety on the unit. Progress Toward Goals: continues to improve gradually    Psychiatric Review of Systems:  Behavior over the last 24 hours: unchanged  Sleep: Normal sleep  Appetite: adequate  Medication side effects: none verbalized  ROS: Complete review of systems is negative except as noted above. Vital signs in last 24 hours: I have personally reviewed vital signs in the last 24 hours.   Temp:  [97.5 °F (36.4 °C)] 97.5 °F (36.4 °C)  HR:  [94] 94  Resp:  [16] 16  BP: (142)/(65) 142/65    Mental Status Exam:  Appearance: Overtly male, disheveled, marginal hygiene, looks stated age, covered by blanket  Behavior: cooperative, calm, poor eye contact  Motor: no abnormal movements  Speech: normal rate, normal pitch, scant, soft at times  Mood: "ok"  Affect: blunted  Thought process: poverty of thought  Thought content: no overt delusions  Perceptual disturbances: denies auditory or visual hallucinations when asked, but appears preoccupied, does not appear responding to internal stimuli  Risk potential: No active or passive suicidal or homicidal ideation was verbalized during interview  Cognition: oriented to self and situation, appears to be of average intelligence and cognition not formally tested  Insight: Limited  Judgment: Limited        Emotional and Mental Well-being, Sleep, Connectedness Assessment and Intervention:    Sleep/stress assessment performed         Current Medications:  Current Facility-Administered Medications   Medication Dose Route Frequency Provider Last Rate   • acetaminophen  650 mg Oral Q6H PRN Misael Guzman MD     • acetaminophen  650 mg Oral Q4H PRN Misael Guzman MD     • acetaminophen  975 mg Oral Q6H PRN Misael Guzman MD     • albuterol  2 puff Inhalation Q4H PRN Zamzam Mesa MD     • aluminum-magnesium hydroxide-simethicone  30 mL Oral Q4H PRN Zamzam Mesa MD     • benztropine  1 mg Oral Q4H PRN Max 6/day Zamzam Mesa MD     • divalproex sodium  750 mg Oral BID Candy Shell MD     • fluticasone  1 spray Nasal Daily Zamzam Mesa MD     • glycopyrrolate  1 mg Oral TID Seamus Padgett MD     • hydrOXYzine HCL  100 mg Oral Q6H PRN Max 4/day Rc [place], DO      Or   • LORazepam  1 mg Intramuscular Q6H PRN Rc [place], DO     • hydrOXYzine HCL  25 mg Oral Q6H PRN Max 4/day Zamzam Mesa MD     • hydrOXYzine HCL  50 mg Oral Q6H PRN Max 4/day Zazmam Mesa MD     • loxapine  100 mg Oral QPM Anika Hooper MD     • loxapine  30 mg Oral BID Colguera Morales, DO     • melatonin  3 mg Oral HS PRN Rc [place], DO     • nicotine polacrilex  4 mg Oral Q2H PRN Zamzam Mesa MD     • OLANZapine  5 mg Oral Q3H PRN Max 6/day Candy Shell MD      Or   • OLANZapine  5 mg Intramuscular Q3H PRN Max 6/day Candy Shell MD     • OLANZapine  5 mg Intramuscular Q8H PRN Rc [place], DO      Or   • OLANZapine  7.5 mg Oral Q8H PRN Rc [place], DO     • OLANZapine  2.5 mg Oral Q3H PRN Max 8/day Candy Shell MD     • ondansetron  4 mg Oral Q8H PRN Rc [place], DO     • polyethylene glycol  17 g Oral Daily PRN Zamzam Mesa MD     • pseudoephedrine  120 mg Oral BID PRN Kev Chahal MD     • senna-docusate sodium  1 tablet Oral Daily PRN Zamzam Mesa MD     • sterile water          • sterile water          • sterile water          • sterile water              Behavioral Health Medications: I have personally reviewed all current active medications. Changes as in plan section above. Laboratory results:  I have personally reviewed all pertinent laboratory/tests results. No results found for this or any previous visit (from the past 48 hour(s)).      Naif Light MD

## 2023-07-10 PROCEDURE — 99232 SBSQ HOSP IP/OBS MODERATE 35: CPT | Performed by: STUDENT IN AN ORGANIZED HEALTH CARE EDUCATION/TRAINING PROGRAM

## 2023-07-10 RX ADMIN — DIVALPROEX SODIUM 750 MG: 500 TABLET, DELAYED RELEASE ORAL at 19:51

## 2023-07-10 RX ADMIN — GLYCOPYRROLATE 1 MG: 1 TABLET ORAL at 17:00

## 2023-07-10 RX ADMIN — DIVALPROEX SODIUM 750 MG: 500 TABLET, DELAYED RELEASE ORAL at 09:07

## 2023-07-10 RX ADMIN — LOXAPINE 30 MG: 25 CAPSULE ORAL at 17:01

## 2023-07-10 RX ADMIN — LOXAPINE 30 MG: 25 CAPSULE ORAL at 09:07

## 2023-07-10 RX ADMIN — FLUTICASONE PROPIONATE 1 SPRAY: 50 SPRAY, METERED NASAL at 09:07

## 2023-07-10 RX ADMIN — GLYCOPYRROLATE 1 MG: 1 TABLET ORAL at 21:21

## 2023-07-10 RX ADMIN — GLYCOPYRROLATE 1 MG: 1 TABLET ORAL at 09:07

## 2023-07-10 RX ADMIN — LOXAPINE 100 MG: 50 CAPSULE ORAL at 17:00

## 2023-07-10 NOTE — NURSING NOTE
Patient has been in the milieu. He is very passive with peers e.g. a peer was screaming in his face and he did not react and there are other more assertive peers who he is taking direction from. He has been cooperative with scheduled medications and with unit routine. He denies S.IH. I. A/H V/H

## 2023-07-10 NOTE — PROGRESS NOTES
Progress Note - Behavioral Health   Gokul Blevins 26 y.o. male MRN: [id number]  Unit/Bed#: [facility] 344-02 Encounter: [id number]    Assessment/Plan   Principal Problem:    Schizophrenia ([address]) vs. Schizoaffective disorder  Active Problems:    Legally blind    Hearing loss    Asthma    Medical clearance for psychiatric admission    Bilateral impacted cerumen    Prolonged erection    Intellectual disability      Recommended Treatment:   · Continue Loxapine 30 mg PO every morning and 30mg every afternoon, and 100 mg PO every evening for psychotic symptoms. · Continue Depakote 750 mg PO BID for mood. · Continue Robinul 1 mg TID for sialorrhea. · Continue Melatonin 3 mg HS PRN for insomnia. · Continue Cogentin 1 mg PRN for EPS. · Continue Zofran 4 mg PRN for N/V.  · Continue Zyprexa PRN for agitation. · Continue to encourage participation in milieu, group therapy, and personal hygiene. Continue with pharmacotherapy, group therapy, milieu therapy and occupational therapy. Continue to assess for adverse medication side effects. Encourage Prasad Meza to participate in nonverbal forms of therapy including journaling and art/music therapy. Continue frequent safety checks and vitals per unit protocol. Continue to engage CM/SW to assist with collateral, disposition planning, and the implementation of an individualized, patient-centered plan of care. Continue medical management by medical team.  Case discussed with treatment team.    Legal Status: 201  ------------------------------------------------------------    Subjective: All documentation including nursing notes, medication history to ensure medication adherence on the unit, labs, and vitals were reviewed. Gokul was evaluated this morning for continuity of care and no acute distress noted throughout the evaluation. Over the past 24 hours per nursing report, Gokul has been cooperative on the unit and compliant with medications.  Nursing also reported that he was refusing his breakfasts, has been interactive with peers, and required 3mg Melatonin PRN on 07/08 and 07/09. Nursing also noted that he has been seen responding to internal stimuli and seeming distracted. Today, Gokul is consenting for safety on the unit. [place] reports feeling "fine." Gokul notes having adequate sleep, estimating he slept 6-8 hours. He stated that he has felt more energized recently. Gokul states having an adequate appetite. When asked why he hasn't been eating breakfast he said, "I don't feel like getting up for it," and "I am saving it for later." Gokul has been compliant taking the medications as prescribed and reporting no side effects. He reports that he has not experienced priapism for over a week. His goal today is to shower. He stated he last showered 2-3 days ago. Gokul denies suicidal ideations. Gokul denies homicidal ideations. Regarding hallucinations, Gokul is currently denying AVH. It was unable to be determined if he appeared distracted or was responding to internal stimuli because at the time of interview he did not want to come out from under his bed covers. PRNs overnight: Melatonin 3mg   VS: Reviewed, within normal limits    Progress Toward Goals: Continues slow improvement    Psychiatric Review of Systems:  Behavior over the last 24 hours:  unchanged  Sleep: normal  Appetite: normal  Medication side effects: No   ROS: all other systems are negative    Vital signs in last 24 hours:  Temp:  [97.2 °F (36.2 °C)] 97.2 °F (36.2 °C)  HR:  [90] 90  Resp:  [16] 16  BP: (133)/(81) 133/81    Laboratory results:  I have personally reviewed all pertinent laboratory/tests results. No results found for this or any previous visit (from the past 48 hour(s)).       Mental Status Evaluation:    Appearance:  marginal hygiene, wearing hospital clothes, looks older than stated age, overweight, overtly black male, laying in bed with blankets covering head   Behavior: pleasant, cooperative, guarded   Speech:  scant, soft, decreased volume   Mood:  "Fine"   Affect:  blunted, mood-incongruent   Thought Process:  concrete, poverty of thought   Associations: concrete associations   Thought Content:  no overt delusions   Perceptual Disturbances: Denies auditory or visual hallucinations. Nursing reported that the patient has been distracted and seen responding to internal stimuli. This was not observed during interview.     Risk Potential: Suicidal ideation - None at present  Homicidal ideation - None at present  Potential for aggression - No   Sensorium:  oriented to person, place and time/date   Memory:  recent and remote memory grossly intact   Consciousness:  alert and awake   Attention/Concentration: attention span and concentration are age appropriate   Insight:  limited   Judgment: limited   Gait/Station: normal gait/station   Motor Activity: no abnormal movements       Current Medications:  Current Facility-Administered Medications   Medication Dose Route Frequency Provider Last Rate   • acetaminophen  650 mg Oral Q6H PRN Pepe Munoz MD     • acetaminophen  650 mg Oral Q4H PRN Pepe Munoz MD     • acetaminophen  975 mg Oral Q6H PRN Pepe Munoz MD     • albuterol  2 puff Inhalation Q4H PRN Pepe Munoz MD     • aluminum-magnesium hydroxide-simethicone  30 mL Oral Q4H PRN Pepe Munoz MD     • benztropine  1 mg Oral Q4H PRN Max 6/day Pepe Munoz MD     • divalproex sodium  750 mg Oral BID Lanre Price MD     • fluticasone  1 spray Nasal Daily Pepe Munoz MD     • glycopyrrolate  1 mg Oral TID Janelle Parkinson MD     • hydrOXYzine HCL  100 mg Oral Q6H PRN Max 4/day Martha Scrape, DO      Or   • LORazepam  1 mg Intramuscular Q6H PRN Martha Scrape, DO     • hydrOXYzine HCL  25 mg Oral Q6H PRN Max 4/day Pepe Munoz MD     • hydrOXYzine HCL  50 mg Oral Q6H PRN Max 4/day Pepe Munoz MD     • loxapine  100 mg Oral QPM Virgil Bauer MD Bartolo     • loxapine  30 mg Oral BID Aminta Rockwell DO     • melatonin  3 mg Oral HS PRN Marina Caban DO     • nicotine polacrilex  4 mg Oral Q2H PRN Bam Newton MD     • OLANZapine  5 mg Oral Q3H PRN Max 6/day Keisha Bridges MD      Or   • OLANZapine  5 mg Intramuscular Q3H PRN Max 6/day Keisha Bridges MD     • OLANZapine  5 mg Intramuscular Q8H PRN Marina Caban DO      Or   • OLANZapine  7.5 mg Oral Q8H PRN Marina Caban DO     • OLANZapine  2.5 mg Oral Q3H PRN Max 8/day Keisha Bridges MD     • ondansetron  4 mg Oral Q8H PRN Marina Caban DO     • polyethylene glycol  17 g Oral Daily PRN Bam Newton MD     • pseudoephedrine  120 mg Oral BID PRN Cade Carlson MD     • senna-docusate sodium  1 tablet Oral Daily PRN Bam Newton MD     • sterile water          • sterile water          • sterile water          • sterile water            Behavioral Health Medications: All current active meds have been reviewed. Changes as in plan section above. Risks, benefits and possible side effects of Medications:   Risks, benefits, and possible side effects of medications explained to patient and patient verbalizes understanding. Counseling / Coordination of Care:  Patient's progress discussed with staff in treatment team meeting. Medications, treatment progress and treatment plan reviewed with patient. Aminta Rockwell DO 07/10/23  Psychiatry Resident, PGY-I    This note was completed in part utilizing Dragon dictation Software. Grammatical, translation, syntax errors, random word insertions, spelling mistakes, and incomplete sentences may be an occasional consequence of this system secondary to software limitations with voice recognition, ambient noise, and hardware issues. If you have any questions or concerns about the content, text, or information contained within the body of this dictation, please contact the provider for clarification.

## 2023-07-10 NOTE — PROGRESS NOTES
07/10/23 0830   Team Meeting   Meeting Type Daily Rounds   Team Members Present   Team Members Present Physician;Nurse;   Physician Team Member [id number] [facility] Dev   Patient/Family Present   Patient Present No   Patient's Family Present No   Social with select peers. Visible. Responding to internal stim. PRN melatonin for sleep Saturday night. Pt refused breakfast yesterday, more visible and appropriate. Pt cooperative. Pt responded well to a pt yelling at him. Med/meal compliant. DC tbd.

## 2023-07-10 NOTE — NURSING NOTE
Pt is calm and pleasant with a blunted affect and poor eye contact. Scant speech. Denies Depression, anxiety, SI/HI/AVH. Initially reluctant to get out of bed for breakfast but did after some encouragement. No requests at this time.

## 2023-07-11 PROCEDURE — 99232 SBSQ HOSP IP/OBS MODERATE 35: CPT | Performed by: PSYCHIATRY & NEUROLOGY

## 2023-07-11 RX ADMIN — LOXAPINE 30 MG: 25 CAPSULE ORAL at 18:19

## 2023-07-11 RX ADMIN — LOXAPINE 100 MG: 50 CAPSULE ORAL at 18:19

## 2023-07-11 RX ADMIN — LOXAPINE 30 MG: 25 CAPSULE ORAL at 09:01

## 2023-07-11 RX ADMIN — DIVALPROEX SODIUM 750 MG: 500 TABLET, DELAYED RELEASE ORAL at 09:01

## 2023-07-11 RX ADMIN — DIVALPROEX SODIUM 750 MG: 500 TABLET, DELAYED RELEASE ORAL at 18:19

## 2023-07-11 RX ADMIN — OLANZAPINE 7.5 MG: 2.5 TABLET, FILM COATED ORAL at 14:36

## 2023-07-11 RX ADMIN — GLYCOPYRROLATE 1 MG: 1 TABLET ORAL at 20:44

## 2023-07-11 RX ADMIN — GLYCOPYRROLATE 1 MG: 1 TABLET ORAL at 16:19

## 2023-07-11 RX ADMIN — FLUTICASONE PROPIONATE 1 SPRAY: 50 SPRAY, METERED NASAL at 09:03

## 2023-07-11 RX ADMIN — GLYCOPYRROLATE 1 MG: 1 TABLET ORAL at 09:01

## 2023-07-11 NOTE — TREATMENT TEAM
07/11/23 1300   Activity/Group Checklist   Group   (recovery group)   Attendance Attended   Attendance Duration (min) 46-60   Interactions Interacted appropriately   Affect/Mood Appropriate   Goals Achieved Discussed safety plan;Discussed self-esteem issues; Able to listen to others; Able to engage in interactions; Able to reflect/comment on own behavior;Able to self-disclose; Able to recieve feedback

## 2023-07-11 NOTE — TREATMENT TEAM
07/11/23 0987   Activity/Group Checklist   Group Community meeting   Attendance Attended   Attendance Duration (min) 16-30   Interactions Interacted appropriately   Affect/Mood Appropriate   Goals Achieved Able to listen to others; Able to engage in interactions; Able to self-disclose

## 2023-07-11 NOTE — NURSING NOTE
Pt spending time with peers in dayroom throughout the evening. Reports effectiveness with Zyprexa at 1536 for agitation, not agitated or responding at this time.

## 2023-07-11 NOTE — NURSING NOTE
Pt heard responding to internal stimuli in an agitated manor. Pt observed yelling while sitting up against the nurses station window. Pt says he is hearing voices that are laughing and it is bothering him. 7.5mg of po zyprexa given for severe agitation at 1436.

## 2023-07-11 NOTE — NURSING NOTE
Pt calm and cooperative, Pt visible on the unit in the dayroom watching TV and seen pacing the halls and talking with peers, Pt denied all pain anxiety and depression, Pt denies HI and SI , Pt denied AH and VH but is seen and heard responding to internal stimuli, Pt took all scheduled HS meds, Q7 min safety checks maintained

## 2023-07-11 NOTE — PROGRESS NOTES
Progress Note - Behavioral Health   Gokul Mares 26 y.o. male MRN: [id number]  Unit/Bed#: [facility] 344-02 Encounter: [id number]    Assessment/Plan   Principal Problem:    Schizophrenia ([address]) vs. Schizoaffective disorder  Active Problems:    Legally blind    Hearing loss    Asthma    Medical clearance for psychiatric admission    Bilateral impacted cerumen    Prolonged erection    Intellectual disability      Recommended Treatment:   Continue Loxapine 30 mg PO each morning and 30 mg PO each afternoon, followed by 100 mg PO every evening for psychosis. Continue Depakote 750 mg PO BID for mood. Continue Robinul 1 mg PO TID for sialorrhea. Continue Senokot PRN for bowel regimen. Continue melatonin 3 mg PRN HS for insomnia. Continue Cogentin 1 mg PRN for EPS. Continue encouragement of participation in group & milieu therapy and personal hygiene. Continue encouragement of medication compliance. Continue monitoring vitals, doing safety checks, and assessing for any medication adverse effects. Legal Status: 201  Discharge: TBD. Substance rehab pending. Possible consideration for long term psych.  ------------------------------------------------------------    Subjective: Per nursing report, Gokul has been pleasant, visible, and social on the unit, as well as compliant with medications. He did not require any PRNs per nursing report. His blood pressure continues to remain stable (last check 7/11/23 at 08:14: 107/58). All other vitals reviewed and are within normal limits. Today, Gokul was seen and evaluated in his room.   He reports his mood as "great" and his energy as "way better than yesterday, I can tell you that much."  He states he slept 6-8 hours the previous night without any awakenings, denied nightmares, and woke up feeling "good."  He says his appetite is "better" and has been finishing his food trays because he no longer feels "as sleepy as before."  He denies any N/V, abdominal pain, dizziness, and priapism. His affect appears brighter and more reactive. Gokul was seen socializing in the milieu prior to this encounter, and was determined to put his clothes in the laundry and take a shower. He reports his goals for today are to shower. When asked about what was discussed during group earlier, he says they talked about personal goals. Gokul referenced the card he was given during group to share his goals with the writer, including: "communicate with people," socialize, and "learn more about my illness, what causes psychosis, why I hallucinate, where the voices come from, stuff like that."  Currently, he denies any SI/HI. He also denies auditory/visual hallucinations; however, he was seen responding to internal stimuli and talking/laughing to himself after this encounter. When asked about what he was laughing at, he reports "I was thinking of a joke."  When asked about the last time he heard voices, particularly negative ones, he states "a while ago."  He reports "this is the best I've ever felt."  When this writer encouraged Gokul to feel proud of himself, he said "yes I am."  Positive reinforcement was provided in order to continue promoting social engagement, activity, and goal-setting. Progress Toward Goals: Overall, Gokul's affect and energy have improved. He is slightly more reactive and spontaneous in speech. He is motivated to participate in activities and eating all his meals. He continues to be compliant with medications. However, he still appears to be responding to internal stimuli. Psychiatric Review of Systems:  Behavior over the last 24 hours: improved  Sleep: improving  Appetite: adequate  Medication side effects: denies  ROS: Complete review of systems is negative except as noted above.     Vital signs in last 24 hours:  Temp:  [98.3 °F (36.8 °C)] 98.3 °F (36.8 °C)  HR:  [81] 81  BP: (107)/(58) 107/58    Mental Status Exam:  Appearance:  Overtly black male wearing hospital clothes, marginal hygiene, appears older than stated age, and improved eye contact. Behavior:  Pleasant, cooperative, less guarded than before. Motor: Less psychomotor slowing. No abnormal movements noted. Speech:  Occasionally spontaneous, louder and more inflections in tone compared to previous encounters, more talkative than previous days,    Mood:  "Great"   Affect:  mood-congruent, more reactive & less blunted   Thought Process:  concrete, less poverty of thought   Thought Content: no verbalized delusions or overt paranoia, no obsessive thinking   Perceptual disturbances: Denies current auditory/visual hallucinations; however, he appears to be responding to internal stimuli as he talks and laughs to himself and does appear distracted at times during the session and does appear to stare blankly at this writer and attending physician   Risk Potential: Denies any current SI/HI. Potential for aggression: None.    Cognition: oriented to self and situation and cognition not formally tested   Insight:  Improving   Judgment: Improving     Current Medications:  Current Facility-Administered Medications   Medication Dose Route Frequency Provider Last Rate   • acetaminophen  650 mg Oral Q6H PRN Corrina Hinojosa MD     • acetaminophen  650 mg Oral Q4H PRN Corrina Hinojosa MD     • acetaminophen  975 mg Oral Q6H PRN Corrina Hinojosa MD     • albuterol  2 puff Inhalation Q4H PRN Corrina Hinojosa MD     • aluminum-magnesium hydroxide-simethicone  30 mL Oral Q4H PRN Corrina Hinojosa MD     • benztropine  1 mg Oral Q4H PRN Max 6/day Corrina Hinojosa MD     • divalproex sodium  750 mg Oral BID Lexie Novak MD     • fluticasone  1 spray Nasal Daily Corrina Hinojosa MD     • glycopyrrolate  1 mg Oral TID Carlos Avalos MD     • hydrOXYzine HCL  100 mg Oral Q6H PRN Max 4/day Venora Alexander, DO      Or   • LORazepam  1 mg Intramuscular Q6H PRN Venora Alexander, DO     • hydrOXYzine HCL  25 mg Oral Q6H PRN Max 4/day Sergio Agarwal MD     • hydrOXYzine HCL  50 mg Oral Q6H PRN Max 4/day Sergio Agarwal MD     • loxapine  100 mg Oral QPM Consuelo Sethi MD     • loxapine  30 mg Oral BID Jaciel Fallon, DO     • melatonin  3 mg Oral HS PRN Verdene Bearded, DO     • nicotine polacrilex  4 mg Oral Q2H PRN Sergio Agarwal MD     • OLANZapine  5 mg Oral Q3H PRN Max 6/day Minetta Duane, MD      Or   • OLANZapine  5 mg Intramuscular Q3H PRN Max 6/day Minetta Duane, MD     • OLANZapine  5 mg Intramuscular Q8H PRN Verdene Bearded, DO      Or   • OLANZapine  7.5 mg Oral Q8H PRN Verdene Bearded, DO     • OLANZapine  2.5 mg Oral Q3H PRN Max 8/day Minetta Duane, MD     • ondansetron  4 mg Oral Q8H PRN Verdene Bearded, DO     • polyethylene glycol  17 g Oral Daily PRN Sergio Agarwal MD     • pseudoephedrine  120 mg Oral BID PRN Arnaldo Connors MD     • senna-docusate sodium  1 tablet Oral Daily PRN Sergio Agarwal MD     • sterile water          • sterile water          • sterile water          • sterile water              Behavioral Health Medications: all current active meds have been reviewed. Laboratory results:  No results found for this or any previous visit (from the past 48 hour(s)).      Jama Calloway   [facility]

## 2023-07-11 NOTE — PROGRESS NOTES
07/11/23 0898   Team Meeting   Meeting Type Daily Rounds   Team Members Present   Team Members Present Physician;Nurse;   Physician Team Member [id number] [facility] Member Dev   Patient/Family Present   Patient Present No   Patient's Family Present No   Visible, social, pacing halls. Pt denies all but is seen and heard responding to internal stim. Pt appears a bit brighter. Med/meal compliant. DC tbd.

## 2023-07-12 PROCEDURE — 99232 SBSQ HOSP IP/OBS MODERATE 35: CPT | Performed by: PSYCHIATRY & NEUROLOGY

## 2023-07-12 RX ORDER — LOXAPINE SUCCINATE 50 MG/1
100 TABLET ORAL EVERY EVENING
Status: DISCONTINUED | OUTPATIENT
Start: 2023-07-12 | End: 2023-08-01

## 2023-07-12 RX ADMIN — LOXAPINE 30 MG: 25 CAPSULE ORAL at 08:03

## 2023-07-12 RX ADMIN — LOXAPINE 100 MG: 50 CAPSULE ORAL at 18:03

## 2023-07-12 RX ADMIN — GLYCOPYRROLATE 1 MG: 1 TABLET ORAL at 15:15

## 2023-07-12 RX ADMIN — DIVALPROEX SODIUM 750 MG: 500 TABLET, DELAYED RELEASE ORAL at 18:03

## 2023-07-12 RX ADMIN — FLUTICASONE PROPIONATE 1 SPRAY: 50 SPRAY, METERED NASAL at 08:03

## 2023-07-12 RX ADMIN — GLYCOPYRROLATE 1 MG: 1 TABLET ORAL at 21:01

## 2023-07-12 RX ADMIN — GLYCOPYRROLATE 1 MG: 1 TABLET ORAL at 08:04

## 2023-07-12 RX ADMIN — DIVALPROEX SODIUM 750 MG: 500 TABLET, DELAYED RELEASE ORAL at 08:03

## 2023-07-12 RX ADMIN — LOXAPINE 40 MG: 25 CAPSULE ORAL at 15:15

## 2023-07-12 NOTE — PROGRESS NOTES
07/12/23 0822   Team Meeting   Meeting Type Daily Rounds   Team Members Present   Team Members Present Physician;Nurse;   Physician Team Member [id number] Member Dev   Patient/Family Present   Patient Present No   Patient's Family Present No   Loudly responding to internal stim and appears agitated. PRN Zyprexa-effective. Pt appears internally preoccupied. Med/meal compliant. DC tbd. EAC being discussed.

## 2023-07-12 NOTE — NURSING NOTE
Pt visible on the unit, sleeping in chair in dayroom at start of shift, able to wake to take scheduled medications. Denies SI/HI/AVH at this time. Scant in speech. Appears internally preoccupied at times, heard RTIS. Denies any unmet needs. No behavioral issues to be noted. Encouraged to engage in unit activities. Q7 minute safety checks maintained.

## 2023-07-12 NOTE — NURSING NOTE
Pt denies SI/HI/AH/VH but at times appears internally preoccupied. Present in dayroom and milieu but is isolative to self. Medication compliant. Pt is calm and pleasant.

## 2023-07-12 NOTE — PROGRESS NOTES
Progress Note - Behavioral Health   Gokul Hyatt Batch 26 y.o. male MRN: [id number]  Unit/Bed#: U 344-02 Encounter: [id number]    Assessment/Plan   Principal Problem:    Schizophrenia ([address]) vs. Schizoaffective disorder  Active Problems:    Legally blind    Hearing loss    Asthma    Medical clearance for psychiatric admission    Bilateral impacted cerumen    Prolonged erection    Intellectual disability      Recommended Treatment:   Increase Loxapine to 40 mg PO every morning, 40 mg PO every afternoon, and continue with 100 mg PO every evening for psychosis. Continue Depakote 750 mg PO BID for mood. Continue Robinul 1 mg PO TID for sialorrhea. Continue Zyprexa PO PRN for worsening psychosis. Continue Senokot PRN for bowel regimen. Continue Cogentin 1 mg PRN for EPS. Continue melatonin 3 mg PRN HS for insomnia. Continue with safety checks, assessing for medication adverse effects, and vital monitoring. Continue participation in group therapy, milieu involvement, and working on personal goals. Continue encouraging medication compliance. Legal Status: 201  Discharge: TBD. Consideration of long-term psychiatric facility.     ------------------------------------------------------------    Subjective:  Per nursing report, Gokul has been visible on the unit. Yesterday afternoon, he had been seen leaning against the nursing station window quite agitated, complaining of hearing voices that were laughing at and bothering him. He was given Zyprexa 7.5 mg PO PRN with resolution of the voices and agitation. Other than that, he appeared isolative but pleasant in the milieu. This morning, he was seen occasionally responding to internal stimuli and sleeping in a chair in the unit. His last recorded BP was this morning 07:51 at 98/64. All other vitals reviewed and are within normal limits. He continues to be compliant with medications. Today, Gokul was seen and evaluated in his room.   He is sleeping in bed and has to be roused from sleep multiple times throughout this encounter. He reports his mood as "alright" and his energy as "the same as yesterday."  When asked if he feels more tired this morning, he says he feels the same and slept "six or eight hours last night."  He denied any nightmares or vivid dreams. He states his appetite is "good" and has been finishing his food trays. He denies any abdominal pain, nausea/vomiting, and dizziness. He reports his last bowel movement was yesterday and was normal.     This writer asked Gokul if he is able to describe more about yesterday's incident, to which he says he was "hearing voices that kept laughing at jokes and at me and I wanted it to stop."  When asked if the voices were saying anything negative or commanding him to hurt himself or others, he says "no they were just talking."  He denies any current SI/HI. He denies auditory/visual hallucinations at present. He states the last time he experienced hearing any voices was "yesterday afternoon."  When asked about how his goals for yesterday went, he reports "I didn't shower but I cleaned my room and did my laundry."  He reports his goals for today are to "interact with other people."  Encouragement to get out of bed and participate in group was provided. Gokul is agreeable to increasing medication dosages. Later on in the day after this encounter, he was seen sleeping in his room throughout lunch. Progress Toward Goals:  Progress is currently minimal.  Gokul is exhibiting symptoms of worsening psychosis. Increasing dosage of loxapine at this time is warranted and the team will continue to monitor his progress. Psychiatric Review of Systems:  Behavior over the last 24 hours: regressed - experiencing episodes of worsening psychosis  Sleep: normal  Appetite: adequate  Medication side effects: denies  ROS: Complete review of systems is negative except as noted above.     Vital signs in last 24 hours: Temp:  [97.1 °F (36.2 °C)-97.9 °F (36.6 °C)] 97.1 °F (36.2 °C)  HR:  [] 80  Resp:  [16] 16  BP: ()/(64-66) 98/64    Mental Status Exam:  Appearance:  Overtly black male wearing hospital clothes, marginal grooming, appears older than stated age. Behavior:  calm, cooperative, laying in bed, drowsy   Motor: No abnormal movements noted, although assessment was limited as he was laying in bed. Speech:  slow, soft, scant, and monotone   Mood:  "Alright"   Affect:  Blunted, less reactive compared to yesterday   Thought Process:  [place], more poverty of thought compared to yesterday   Thought Content: No verbalized delusions, overt paranoia, or obsessive thinking. Perceptual disturbances: Denies auditory/visual hallucinations at this time; however, he still appears to be distracted and internally preoccupied, not observed RTIS in interview but was observed talking and laughing to self last night   Risk Potential: Denies active and passive SI/HI. Potential for aggression: Low.    Cognition: oriented to self and situation and cognition not formally tested   Insight:  Limited   Judgment: Limited     Current Medications:  Current Facility-Administered Medications   Medication Dose Route Frequency Provider Last Rate   • acetaminophen  650 mg Oral Q6H PRN Jose Juan Sanchez MD     • acetaminophen  650 mg Oral Q4H PRN Jose Juan Sanchez MD     • acetaminophen  975 mg Oral Q6H PRN Jose Juan Sanchez MD     • albuterol  2 puff Inhalation Q4H PRN Jose Juan Sanchez MD     • aluminum-magnesium hydroxide-simethicone  30 mL Oral Q4H PRN Jose Juan Sanchez MD     • benztropine  1 mg Oral Q4H PRN Max 6/day Jose Juan Sanchez MD     • divalproex sodium  750 mg Oral BID Lois Guerrero MD     • fluticasone  1 spray Nasal Daily Jose Juan Sanchez MD     • glycopyrrolate  1 mg Oral TID Mallory Puente MD     • hydrOXYzine HCL  100 mg Oral Q6H PRN Max 4/day Estefany Ames DO      Or   • LORazepam  1 mg Intramuscular Q6H PRN Martha Scrape, DO     • hydrOXYzine HCL  25 mg Oral Q6H PRN Max 4/day Pepe Munoz MD     • hydrOXYzine HCL  50 mg Oral Q6H PRN Max 4/day Pepe Munoz MD     • loxapine  100 mg Oral QPM Kierra Bobby MD     • loxapine  30 mg Oral BID Mirzaota Julius, DO     • melatonin  3 mg Oral HS PRN Martha Scrape, DO     • nicotine polacrilex  4 mg Oral Q2H PRN Pepe Munoz MD     • OLANZapine  5 mg Oral Q3H PRN Max 6/day Lanre Price MD      Or   • OLANZapine  5 mg Intramuscular Q3H PRN Max 6/day Lanre Price MD     • OLANZapine  5 mg Intramuscular Q8H PRN Martha Scrape, DO      Or   • OLANZapine  7.5 mg Oral Q8H PRN Martha Scrape, DO     • OLANZapine  2.5 mg Oral Q3H PRN Max 8/day Lanre Price MD     • ondansetron  4 mg Oral Q8H PRN Martha Scrape, DO     • polyethylene glycol  17 g Oral Daily PRN Pepe Munzo MD     • pseudoephedrine  120 mg Oral BID PRN Olga Vargas MD     • senna-docusate sodium  1 tablet Oral Daily PRN Pepe Munoz MD     • sterile water          • sterile water          • sterile water          • sterile water              Behavioral Health Medications: all current active meds have been reviewed. Changes as in plan section above. Laboratory results:  I have personally reviewed all pertinent laboratory/tests results. No results found for this or any previous visit (from the past 48 hour(s)).      Jama Calloway   [facility]

## 2023-07-13 PROCEDURE — 99232 SBSQ HOSP IP/OBS MODERATE 35: CPT | Performed by: PSYCHIATRY & NEUROLOGY

## 2023-07-13 RX ADMIN — DIVALPROEX SODIUM 750 MG: 500 TABLET, DELAYED RELEASE ORAL at 09:09

## 2023-07-13 RX ADMIN — LOXAPINE 100 MG: 50 CAPSULE ORAL at 18:06

## 2023-07-13 RX ADMIN — DIVALPROEX SODIUM 750 MG: 500 TABLET, DELAYED RELEASE ORAL at 18:07

## 2023-07-13 RX ADMIN — LOXAPINE 40 MG: 25 CAPSULE ORAL at 09:09

## 2023-07-13 RX ADMIN — GLYCOPYRROLATE 1 MG: 1 TABLET ORAL at 09:10

## 2023-07-13 RX ADMIN — LOXAPINE 40 MG: 25 CAPSULE ORAL at 14:37

## 2023-07-13 RX ADMIN — GLYCOPYRROLATE 1 MG: 1 TABLET ORAL at 21:39

## 2023-07-13 RX ADMIN — GLYCOPYRROLATE 1 MG: 1 TABLET ORAL at 15:24

## 2023-07-13 RX ADMIN — OLANZAPINE 7.5 MG: 2.5 TABLET, FILM COATED ORAL at 19:00

## 2023-07-13 NOTE — NURSING NOTE
Patient is yelling and responding in his room, slamming his door. Patient received Zyprexa for agitation.  Pt remain Q 7 min check

## 2023-07-13 NOTE — PROGRESS NOTES
07/13/23 0845   Team Meeting   Meeting Type Daily Rounds   Team Members Present   Team Members Present Physician;Nurse;   Physician Team Member [id number] Dev   Patient/Family Present   Patient Present No   Patient's Family Present No   Responding to internal stim. Pt cooperative. Loxapine increased yesterday. Med/meal compliant. DC tbd. Consider EAC.

## 2023-07-13 NOTE — NURSING NOTE
Pt calm/cooperative. Compliant with evening meds. Denies si.hi. avh. visible in the milieu. Making needs known. Denies any unmet needs at this time.  Continued q7m checks for safety

## 2023-07-13 NOTE — NURSING NOTE
Pt is calm and pleasant with a blunted affect denying SI/HI/AVH, depression or anxiety. Speech is scant and slow. Poverty of thought present. Not seen responding to internal stimuli. Pt sleeping during lunch time. No requests throughout the morning.

## 2023-07-13 NOTE — PROGRESS NOTES
Progress Note - Behavioral Health   Gokul Gutierrez 26 y.o. male MRN: [id number]  Unit/Bed#: U 344-02 Encounter: [id number]    Assessment/Plan   Principal Problem:    Schizophrenia ([address]) vs. Schizoaffective disorder  Active Problems:    Legally blind    Hearing loss    Asthma    Medical clearance for psychiatric admission    Bilateral impacted cerumen    Prolonged erection    Intellectual disability      Recommended Treatment:   Continue Loxapine 40 mg PO each morning, 40 mg PO each afternoon, and 100 mg PO HS for psychosis. Continue Depakote 750 mg PO BID for mood. Continue Robinul 1 mg PO TID for sialorrhea. Continue Zyprexa PO PRN for psychosis. Continue Senokot PRN for bowel regimen. Continue Cogentin 1 mg PRN for PES. Continue melatonin 3 mg PRN HS for insomnia. Perform manual blood pressure check and continue to monitor. Continue encouragement of medication compliance, milieu therapy, personal hygiene, and goal-setting. Continue with safety checks and assessing for medication adverse effects. Discharge: TBD. Considering long-term psychiatric facility. Legal status: 201  ------------------------------------------------------------    Subjective: Per nursing report, Gokul has been visible on the unit and compliant with scheduled medications. He did not require any PRNs yesterday evening and was denying any auditory or visual hallucinations. His BP yesterday morning was 98/64, and yesterday afternoon was 153/85. He refused vital signs this morning. All other vitals were within normal limits. His manual BP this afternoon is 138/85. This morning, Gokul was seen and evaluated in the consult room. He was sleeping in bed but was cooperative when asked to come out of his room. He describes his mood as "positive."  When asked how he slept last night, he says "good, it was seven hours."  He denies any nightmares.   When asked about his energy, he says "it's slowing down."  When asked if he has felt more sleepy since the increase in loxapine dosage yesterday, he responds "yeah, a little bit."  This writer asked Gokul if the medications have been helping with auditory hallucinations, to which he says "yeah the voices stopped."  When asked about the last time he had heard voices, he states he was hearing laughing voices yesterday afternoon. When the team asked him to clarify this statement later on in the encounter, he states the voices last occurred "the day before yesterday."  He denies any orthostatic symptoms and abdominal pain, and reports the last instance of any genitourinary pain was "more than a week ago."  He is agreeable to continuing taking medications as prescribed. This writer asked Gokul if he was able to accomplish his main goal from yesterday, which was to "interact with people."  He nods and says "yeah I went to group."  When asked about how group went for him, he said "good, we did karaoke, painted face masks, and talked about goals."  When asked about participation in [place], [address] [place] [address] states "yeah I sang [employer] '[employer]'."  When asked to elaborate on the goals he came up with during group, he says "to sleep less, try not to eat as much, and exercise more so I can lose weight."  He says his plans for exercise include "100 push-ups and 150 sit-ups today."  His affect appeared brighter and was able to smile appropriately to a joke in session. He reports his last shower was "a couple days ago," and his goals for today are to take a shower. Positive reinforcement to continue being social, attending group, and work towards his goals was provided. Gokul feels safe on the unit. He denies any SI/HI. He also denies any auditory or visual hallucinations at present. He denies any feelings of paranoia, and no delusions had been verbalized. During lunch, he was seen sleeping in his room. Progress Toward Goals:  Gokul continues to slowly improve.   He is tolerating the increase in loxapine with reported resolution of the auditory hallucinations. His affect is slightly less blunted and more reactive compared to yesterday; however, he does appear quite drowsy. This team will continue to monitor his progress and plan for the next steps in management. Psychiatric Review of Systems:  Behavior over the last 24 hours: improved - patient reports resolution of auditory hallucinations  Sleep: hypersomnia - patient seen sleeping through meals and intermittently throughout the day  Appetite: adequate  Medication side effects: drowsiness  ROS: Complete review of systems is negative except as noted above. Vital signs in last 24 hours:  Temp:  [97.7 °F (36.5 °C)] 97.7 °F (36.5 °C)  HR:  [97] 97  Resp:  [16] 16  BP: (153)/(85) 153/85    Mental Status Exam:  Appearance:  Overtly black male wearing hospital clothes with marginal grooming/hygiene, appearing older than stated age. Poor dentition noted. Behavior:  Calm, pleasant, cooperative, drowsy, intermittent eye contact. Motor: slow gait, psychomotor slowing   Speech:  Slightly more spontaneous and less scant than yesterday. Still slow and soft   Mood:  "Positive"   Affect:  Less blunted and more reactive compared to yesterday -- laughs in response to joke made in session. Thought Process:  Less poverty of thought compared to yesterday. He is still concrete. Thought Content: No delusions, overt paranoia, or obsessive thinking verbalized. Perceptual disturbances: Denies AVH. He is not responding to internal stimuli at the time of this encounter, but does appear internally preoccupied and occasionally distracted. Risk Potential: Denies any SI/HI. Potential for aggression: Low. Cognition: At baseline; oriented to self and situation. Cognition was not formally tested.    Insight:  Limited   Judgment: Limited     Current Medications:  Current Facility-Administered Medications   Medication Dose Route Frequency Provider Last Rate   • acetaminophen  650 mg Oral Q6H PRN Edison Alpers, MD     • acetaminophen  650 mg Oral Q4H PRN Edison Alpers, MD     • acetaminophen  975 mg Oral Q6H PRN Edison Alpers, MD     • albuterol  2 puff Inhalation Q4H PRN Edison Alpers, MD     • aluminum-magnesium hydroxide-simethicone  30 mL Oral Q4H PRN Edison Alpers, MD     • benztropine  1 mg Oral Q4H PRN Max 6/day Edison Alpers, MD     • divalproex sodium  750 mg Oral BID Luis Armando Trejo MD     • fluticasone  1 spray Nasal Daily Edison Alpers, MD     • glycopyrrolate  1 mg Oral TID Mayda Whaley MD     • hydrOXYzine HCL  100 mg Oral Q6H PRN Max 4/day Wanda Holden, DO      Or   • LORazepam  1 mg Intramuscular Q6H PRN Wanda Holden, DO     • hydrOXYzine HCL  25 mg Oral Q6H PRN Max 4/day Edison Alpers, MD     • hydrOXYzine HCL  50 mg Oral Q6H PRN Max 4/day Edison Alpers, MD     • loxapine  100 mg Oral QPM [place], DO     • loxapine  40 mg Oral BID [place], DO     • melatonin  3 mg Oral HS PRN Wanda Holden, DO     • nicotine polacrilex  4 mg Oral Q2H PRN Edison Alpers, MD     • OLANZapine  5 mg Oral Q3H PRN Max 6/day Luis Armando Trejo MD      Or   • OLANZapine  5 mg Intramuscular Q3H PRN Max 6/day Luis Armando Trejo MD     • OLANZapine  5 mg Intramuscular Q8H PRN Wanda Holden, DO      Or   • OLANZapine  7.5 mg Oral Q8H PRN Wanda Holden, DO     • OLANZapine  2.5 mg Oral Q3H PRN Max 8/day Luis Armando Trejo MD     • ondansetron  4 mg Oral Q8H PRN Wanda Holden, DO     • polyethylene glycol  17 g Oral Daily PRN Edison Alpers, MD     • pseudoephedrine  120 mg Oral BID PRN Melia Michele MD     • senna-docusate sodium  1 tablet Oral Daily PRN Edison Alpers, MD     • sterile water          • sterile water          • sterile water          • sterile water              Behavioral Health Medications: all current active meds have been reviewed.     Laboratory results: No results found for this or any previous visit (from the past 48 hour(s)).      Jama Calloway   [facility]

## 2023-07-14 PROCEDURE — 99232 SBSQ HOSP IP/OBS MODERATE 35: CPT | Performed by: PSYCHIATRY & NEUROLOGY

## 2023-07-14 RX ADMIN — LOXAPINE 40 MG: 25 CAPSULE ORAL at 08:48

## 2023-07-14 RX ADMIN — DIVALPROEX SODIUM 750 MG: 500 TABLET, DELAYED RELEASE ORAL at 08:48

## 2023-07-14 RX ADMIN — GLYCOPYRROLATE 1 MG: 1 TABLET ORAL at 08:49

## 2023-07-14 RX ADMIN — LOXAPINE 100 MG: 50 CAPSULE ORAL at 21:07

## 2023-07-14 RX ADMIN — OLANZAPINE 7.5 MG: 2.5 TABLET, FILM COATED ORAL at 22:47

## 2023-07-14 RX ADMIN — DIVALPROEX SODIUM 750 MG: 500 TABLET, DELAYED RELEASE ORAL at 18:24

## 2023-07-14 RX ADMIN — MELATONIN TAB 3 MG 3 MG: 3 TAB at 22:47

## 2023-07-14 RX ADMIN — LOXAPINE 40 MG: 25 CAPSULE ORAL at 16:26

## 2023-07-14 RX ADMIN — GLYCOPYRROLATE 1 MG: 1 TABLET ORAL at 16:26

## 2023-07-14 RX ADMIN — FLUTICASONE PROPIONATE 1 SPRAY: 50 SPRAY, METERED NASAL at 08:49

## 2023-07-14 RX ADMIN — HYDROXYZINE HYDROCHLORIDE 100 MG: 50 TABLET, FILM COATED ORAL at 22:47

## 2023-07-14 RX ADMIN — GLYCOPYRROLATE 1 MG: 1 TABLET ORAL at 21:08

## 2023-07-14 NOTE — PROGRESS NOTES
07/14/23 0828   Team Meeting   Meeting Type Daily Rounds   Team Members Present   Team Members Present Physician;Nurse;   Physician Team Member [id number] Member Dev   Patient/Family Present   Patient Present No   Patient's Family Present No   Pt responding loudly last night to internal stim. Pt slammed door. PRN zyprexa-effective. DC tbd. Consider EAC.

## 2023-07-14 NOTE — NURSING NOTE
Pt appears severely  Agitated and RIS.loudly in the anthony PRN Zyprexa 7.5 mg given with a Broset scale of 5.

## 2023-07-14 NOTE — PROGRESS NOTES
Progress Note - Behavioral Health   Gokul Brooks 26 y.o. male MRN: [id number]  Unit/Bed#: U 344-02 Encounter: [id number]    Assessment/Plan   Principal Problem:    Schizophrenia ([address]) vs. Schizoaffective disorder  Active Problems:    Legally blind    Hearing loss    Asthma    Medical clearance for psychiatric admission    Bilateral impacted cerumen    Prolonged erection    Intellectual disability      Recommended Treatment:   Continue Loxapine 40 mg PO q AM, 40 mg PO q PM, and 100 mg PO q HS for psychosis. Continue Depakote 750 mg PO BID for mood. Continue Robinul 1 mg PO TID for sialorrhea. Continue Zyprexa PO PRN for psychosis. Continue Senokot PRN for bowel regimen. Continue Cogentin 1 mg PRN for EPS. Continue melatonin 3 mg PRN HS for insomnia. Continue to monitor vitals and perform frequent safety checks. Continue to encourage group therapy, milieu therapy, and medication compliance. Continue to monitor for medication adverse effects. Discharge: TBD. Consider long term psychiatric care vs. rehab. Legal status: 201  ------------------------------------------------------------    Subjective:  Per nursing report, Gokul had an episode of agitation yesterday evening during which he was seen talking to himself loudly and slamming the door. He was given Zyprexa PO 7.5 mg with resolution of the agitation. Afterwards, he was visible and calm in the milieu. He attended group and continued to be compliant with medications. Today, Gokul is seen and evaluated in his room. Although he had been found asleep in bed, he is cooperative when asked to sit up and talk to the team.  He describes his mood as "okay."  When asked about his energy, he says "like yesterday."  When asked about how he slept last night, he states "more than five hours."  He reports having "random nightmares" and waking up "one time" during the night, although he cannot recall what the nightmares were about.   He reports finishing his lunch and dinner trays yesterday. He reports his last bowel movement was yesterday and denies any difficulty with emptying. He denies onset of abdominal pain and dizziness. Gokul denies SI/HI. He also denies any auditory/visual hallucinations at present, and reports the last time he heard any voices was "a few days ago."     This writer asked Gokul about his agitation yesterday, to which he responds "my legs got itchy and it irritated me."  He denies hearing any voices during the episode of agitation. When asked if this is new or has happened previously, he states "this is regular, it happens after I shower."  When asked to further describe the sensation, he says "it's just itchy and feels like dirt coming off."  He reported resolution of the itchiness after applying lotion. He denies feelings of restlessness, anxiety, and tremulousness. He denies any bleeding occurring at the site of the pruritus. Gokul was encouraged to let the team know if the sensation returns or changes, and to apply lotion right after showering on damp skin to reduce dryness and pruritus in the future. He is agreeable to continue making his needs known and taking his medications as prescribed. Gokul was provided positive reinforcement when he reported accomplishing his goal of showering yesterday. When asked about today's goals, Gokul states "maybe rehab and try my hardest to stick to it," followed by "I was going to make a joke but never mind."  He says the next time he would like to shower is over the weekend. The team continues to promote goal-setting and provides encouragement to get Gokul up and moving for the day. Progress Toward Goals:  Overall, progress is limited. Gokul continues to be internally preoccupied despite changes in pharmacotherapy. The team will continue to evaluate his progress and consider possible extended acute care.      Psychiatric Review of Systems:  Behavior over the last 24 hours: regressed - period of agitation and RTIS yesterday evening  Sleep: baseline  Appetite: adequate  Medication side effects: denies  ROS: Complete review of systems is negative except as noted above. Vital signs in last 24 hours:  Temp:  [98 °F (36.7 °C)] 98 °F (36.7 °C)  HR:  [101] 101  Resp:  [16] 16  BP: (133-138)/(84-85) 133/84    Mental Status Exam:  Appearance:  Overtly black male wearing casual clothes, improved grooming and hygiene compared to yesterday, appears older than stated age    Behavior:  Cooperative, drowsy, sitting up in bed, limited eye contact. Motor: No abnormal movements noted, although assessment was limited as he was sitting in bed. Speech:  Soft, slow, and still scant although he does occasionally provide detail to answers when prompted, appears to be more spontaneous   Mood:  "Okay"   Affect:  Blunted, less reactive than yesterday. Thought Process:  [place], slight poverty of thought. Thought Content: No delusions, overt paranoia, or obsessive thinking verbalized. Perceptual disturbances: Denies auditory/visual hallucinations. He is not responding to internal stimuli at the time of this encounter; however, he does appear somewhat internally preoccupied and distracted in interview   Risk Potential: Denies any SI/HI. Potential for aggression: Low. Cognition: Appears to be at baseline, oriented to self & situation. Cognition was not formally tested during this encounter.    Insight:  Limited   Judgment: Limited     Current Medications:  Current Facility-Administered Medications   Medication Dose Route Frequency Provider Last Rate   • acetaminophen  650 mg Oral Q6H PRN Sharon Holter, MD     • acetaminophen  650 mg Oral Q4H PRN Sharon Holter, MD     • acetaminophen  975 mg Oral Q6H PRN Sharon Holter, MD     • albuterol  2 puff Inhalation Q4H PRN Sharon Holter, MD     • aluminum-magnesium hydroxide-simethicone  30 mL Oral Q4H PRN Sharon Holter, MD     • benztropine  1 mg Oral Q4H PRN Max 6/day Corrina Hinojosa MD     • divalproex sodium  750 mg Oral BID Lexie Novak MD     • fluticasone  1 spray Nasal Daily Corrina Hinojosa MD     • glycopyrrolate  1 mg Oral TID Carlos Avalos MD     • hydrOXYzine HCL  100 mg Oral Q6H PRN Max 4/day Venora Alexander, DO      Or   • LORazepam  1 mg Intramuscular Q6H PRN Venora Alexander, DO     • hydrOXYzine HCL  25 mg Oral Q6H PRN Max 4/day Corrina Hinojosa MD     • hydrOXYzine HCL  50 mg Oral Q6H PRN Max 4/day Corrina Hinojosa MD     • loxapine  100 mg Oral QPM Peri Flock, DO     • loxapine  40 mg Oral BID Peri Flock, DO     • melatonin  3 mg Oral HS PRN Venora Alexander, DO     • nicotine polacrilex  4 mg Oral Q2H PRN Corrina Hinojosa MD     • OLANZapine  5 mg Oral Q3H PRN Max 6/day Lexie Novak MD      Or   • OLANZapine  5 mg Intramuscular Q3H PRN Max 6/day Lexie Novak MD     • OLANZapine  5 mg Intramuscular Q8H PRN Venora Alexander, DO      Or   • OLANZapine  7.5 mg Oral Q8H PRN Venora Alexander, DO     • OLANZapine  2.5 mg Oral Q3H PRN Max 8/day Lexie Novak MD     • ondansetron  4 mg Oral Q8H PRN Venora Alexander, DO     • polyethylene glycol  17 g Oral Daily PRN Corrina Hinojosa MD     • pseudoephedrine  120 mg Oral BID PRN Candido Kaur MD     • senna-docusate sodium  1 tablet Oral Daily PRN Corrina Hinojosa MD     • sterile water          • sterile water          • sterile water          • sterile water              Behavioral Health Medications: all current active meds have been reviewed. Changes as in plan section above. Laboratory results: No results found for this or any previous visit (from the past 48 hour(s)).      Jama Calloway   [facility]

## 2023-07-14 NOTE — NURSING NOTE
Received patient at 0700 . Patient isolative to his room. Patient compliant with schedule medication. Patient eat 100% of his breakfast. Pt did not attend morning  group-resting quietly in bed in room and awake. Patient remain Q 7 min check

## 2023-07-14 NOTE — TREATMENT PLAN
TREATMENT PLAN REVIEW - [address] 26 y.o. 2000 male MRN: [id number]    [facility] 300 [facility] Room / Bed: [place] [address]/[facility] 645-16 Encounter: [id number]        Admit Date/Time:  5/18/2023  2:45 PM    Treatment Team: Attending Provider: Josias Shah MD; Consulting Physician: Juan Pablo Nicolas MD; Resident: Sherice Chan DO; : Lizeth Glez, PhD; Nurse Practitioner: ELA Burciaga; : Sena Rodas; Resident: Flynn Moreno DO; Care Manager: Anusha Remy; Patient Care Technician: Nash Bundy; Registered Nurse: Karen Escobar RN; Patient Care Assistant: Narendra Steward; Medical Student: Shayna Parker; Patient Care Assistant: Brandy Meza    Diagnosis: Principal Problem:    Schizophrenia ([address]) vs. Schizoaffective disorder  Active Problems:    Legally blind    Hearing loss    Asthma    Medical clearance for psychiatric admission    Bilateral impacted cerumen    Prolonged erection    Intellectual disability      Patient Strengths/Assets: cooperative, compliant with medication, negotiates basic needs      Patient Barriers/Limitations: chronic mental illness, poor insight, poor interpersonal skills, poor physical health, poor self-care, history of substance abuse    Short Term Goals: decrease in psychotic symptoms, decrease in level of agitation, ability to stay safe on the unit, ability to stay free from restraints, improvement in insight, improvement in self care, mood stabilization, increase in group participation, increase in socialization with peers on the unit    Long Term Goals: stabilization of mood, free of suicidal thoughts, free of homicidal thoughts, no self abusive behavior, resolution of psychotic symptoms, improved insight, no agitation on the unit, able to express basic needs, adequate self care, adequate sleep, appropriate interaction with peers    Progress Towards Goals: continue psychiatric medications as prescribed, progressing slowly, attends groups more often, participates more in milieu therapy, working on coping skills, still has psychotic symptoms    Recommended Treatment: medication management, patient medication education, group therapy, milieu therapy, continued Behavioral Health psychiatric evaluation/assessment process     Treatment Frequency: daily medication monitoring, group and milieu therapy daily, monitoring through interdisciplinary rounds, monitoring through weekly patient care conferences    Expected Discharge Date: 7 days - 7/21/2023    Discharge Plan: EAC vs. rehab program    Treatment Plan Created/Updated By: Melvina Yusuf DO

## 2023-07-14 NOTE — NURSING NOTE
Pt is visible in the milieu, and is currently calm. Pt went to pm group. Denies SI/HI/AVH. No problems observed or reported at this time. Q 7 min checks maintained. PRN Zyprexa was effective. Pt reports feeling better after taking the medication.

## 2023-07-14 NOTE — NURSING NOTE
Pt was laying in their room at the start of the shift, then came out a bit before supper time. Social with peers and visible dancing in front of the nursing station listening to headphones. Compliant with scheduled evening medication. Denies any unmet needs.

## 2023-07-14 NOTE — NURSING NOTE
Patient isolative to his room only comes  Out for needs. Patient denies Si,HI. Patient eat 100% of his breakfast . Patient  did not attend any  group-resting quietly in bed in room.  Patient remain Q 7 min check

## 2023-07-15 PROCEDURE — 99232 SBSQ HOSP IP/OBS MODERATE 35: CPT | Performed by: PSYCHIATRY & NEUROLOGY

## 2023-07-15 RX ADMIN — NICOTINE POLACRILEX 4 MG: 4 GUM, CHEWING BUCCAL at 21:23

## 2023-07-15 RX ADMIN — MELATONIN TAB 3 MG 3 MG: 3 TAB at 21:40

## 2023-07-15 RX ADMIN — HYDROXYZINE HYDROCHLORIDE 100 MG: 50 TABLET, FILM COATED ORAL at 19:26

## 2023-07-15 RX ADMIN — GLYCOPYRROLATE 1 MG: 1 TABLET ORAL at 15:05

## 2023-07-15 RX ADMIN — LOXAPINE 40 MG: 25 CAPSULE ORAL at 08:25

## 2023-07-15 RX ADMIN — LOXAPINE 40 MG: 25 CAPSULE ORAL at 15:05

## 2023-07-15 RX ADMIN — FLUTICASONE PROPIONATE 1 SPRAY: 50 SPRAY, METERED NASAL at 08:26

## 2023-07-15 RX ADMIN — GLYCOPYRROLATE 1 MG: 1 TABLET ORAL at 21:40

## 2023-07-15 RX ADMIN — DIVALPROEX SODIUM 750 MG: 500 TABLET, DELAYED RELEASE ORAL at 08:25

## 2023-07-15 RX ADMIN — OLANZAPINE 7.5 MG: 2.5 TABLET, FILM COATED ORAL at 19:26

## 2023-07-15 RX ADMIN — LOXAPINE 100 MG: 50 CAPSULE ORAL at 18:00

## 2023-07-15 RX ADMIN — DIVALPROEX SODIUM 750 MG: 500 TABLET, DELAYED RELEASE ORAL at 18:00

## 2023-07-15 RX ADMIN — GLYCOPYRROLATE 1 MG: 1 TABLET ORAL at 08:25

## 2023-07-15 NOTE — PROGRESS NOTES
Progress Note - Behavioral Health   Gokul Cr 26 y.o. male MRN: [id number]  Unit/Bed#: [facility] 344-02 Encounter: [id number]    Assessment/Plan   Principal Problem:    Schizophrenia ([address]) vs. Schizoaffective disorder  Active Problems:    Legally blind    Hearing loss    Asthma    Medical clearance for psychiatric admission    Bilateral impacted cerumen    Prolonged erection    Intellectual disability      Behavior over the last 24 hours:  unchanged  Sleep: hypersomnia  Appetite: normal  Medication side effects: No  ROS: no complaints        Mental Status Evaluation:  Appearance:  age appropriate and casually dressed   Behavior:  cooperative   Speech:  normal pitch and normal volume   Mood:  constricted   Affect:  constricted   Thought Process:  concrete   Associations: intact associations   Thought Content:  normal   Perceptual Disturbances: None   Risk Potential: Suicidal Ideations none  Homicidal Ideations none  Potential for Aggression No   Sensorium:  person, place and time/date   Memory:  recent and remote memory grossly intact   Consciousness:  alert    Attention: attention span appeared shorter than expected for age   Insight:  limited   Judgment: limited   Gait/Station: normal gait/station and normal balance   Motor Activity: no abnormal movements     Progress Toward Goals:    Patient remains compliant with medications and denies side effects. According to staff report he remains with minimal interaction with staff or peers and prefer  to stay in bed. His appetite is good. Pt denies SI/HI/AVH at this time, although heard RTIS quietly in room. Speech is scant, delayed, fair eye contact. Reports adequate sleep. No behavioral issues reported. He has no concerns or complaints for today. Agrees to continue current treatment. Recommended Treatment: Continue with group therapy, milieu therapy and occupational therapy. Continue Loxapine 40 mg PO q AM, 40 mg PO q PM, and 100 mg PO q HS for psychosis.   Continue Depakote 750 mg PO BID for mood. Continue Robinul 1 mg PO TID for sialorrhea. Continue Zyprexa PO PRN for psychosis. Continue Senokot PRN for bowel regimen. Continue Cogentin 1 mg PRN for EPS. Continue melatonin 3 mg PRN HS for insomnia.      Continue to monitor vitals and perform frequent safety checks. Continue to encourage group therapy, milieu therapy, and medication compliance. Continue to monitor for medication adverse effects. Discharge: TBD. Consider long term psychiatric care vs. rehab. Legal status: 201    Risks, benefits and possible side effects of Medications:   Risks, benefits, and possible side effects of medications explained to patient and patient verbalizes understanding.       Medications:   all current active meds have been reviewed, continue current psychiatric medications and current meds:   Current Facility-Administered Medications   Medication Dose Route Frequency   • acetaminophen (TYLENOL) tablet 650 mg  650 mg Oral Q6H PRN   • acetaminophen (TYLENOL) tablet 650 mg  650 mg Oral Q4H PRN   • acetaminophen (TYLENOL) tablet 975 mg  975 mg Oral Q6H PRN   • albuterol (PROVENTIL HFA,VENTOLIN HFA) inhaler 2 puff  2 puff Inhalation Q4H PRN   • aluminum-magnesium hydroxide-simethicone (MYLANTA) oral suspension 30 mL  30 mL Oral Q4H PRN   • benztropine (COGENTIN) tablet 1 mg  1 mg Oral Q4H PRN Max 6/day   • divalproex sodium (DEPAKOTE) DR tablet 750 mg  750 mg Oral BID   • fluticasone (FLONASE) 50 mcg/act nasal spray 1 spray  1 spray Nasal Daily   • glycopyrrolate (ROBINUL) tablet 1 mg  1 mg Oral TID   • hydrOXYzine HCL (ATARAX) tablet 100 mg  100 mg Oral Q6H PRN Max 4/day    Or   • LORazepam (ATIVAN) injection 1 mg  1 mg Intramuscular Q6H PRN   • hydrOXYzine HCL (ATARAX) tablet 25 mg  25 mg Oral Q6H PRN Max 4/day   • hydrOXYzine HCL (ATARAX) tablet 50 mg  50 mg Oral Q6H PRN Max 4/day   • loxapine (LOXITANE) capsule 100 mg  100 mg Oral QPM   • loxapine (LOXITANE) capsule 40 mg  40 mg Oral BID • melatonin tablet 3 mg  3 mg Oral HS PRN   • nicotine polacrilex (NICORETTE) gum 4 mg  4 mg Oral Q2H PRN   • OLANZapine (ZyPREXA) tablet 5 mg  5 mg Oral Q3H PRN Max 6/day    Or   • OLANZapine (ZyPREXA) IM injection 5 mg  5 mg Intramuscular Q3H PRN Max 6/day   • OLANZapine (ZyPREXA) IM injection 5 mg  5 mg Intramuscular Q8H PRN    Or   • OLANZapine (ZyPREXA) tablet 7.5 mg  7.5 mg Oral Q8H PRN   • OLANZapine (ZyPREXA) tablet 2.5 mg  2.5 mg Oral Q3H PRN Max 8/day   • ondansetron (ZOFRAN-ODT) dispersible tablet 4 mg  4 mg Oral Q8H PRN   • polyethylene glycol (MIRALAX) packet 17 g  17 g Oral Daily PRN   • pseudoephedrine (SUDAFED) tablet 120 mg  120 mg Oral BID PRN   • senna-docusate sodium (SENOKOT S) 8.6-50 mg per tablet 1 tablet  1 tablet Oral Daily PRN   • sterile water injection **ADS Override Pull**       • sterile water injection **ADS Override Pull**       • sterile water injection **ADS Override Pull**       • sterile water injection **ADS Override Pull**       . Labs: I have personally reviewed all pertinent laboratory/tests results.    Most Recent Labs:   Lab Results   Component Value Date    WBC 8.57 07/04/2023    RBC 4.71 07/04/2023    HGB 14.6 07/04/2023    HCT 44.0 07/04/2023     07/04/2023    RDW 12.4 07/04/2023    NEUTROABS 3.29 07/04/2023    SODIUM 140 07/04/2023    K 3.9 07/04/2023     07/04/2023    CO2 34 (H) 07/04/2023    BUN 15 07/04/2023    CREATININE 1.31 (H) 07/04/2023    GLUC 104 07/04/2023    GLUF 125 (H) 06/12/2023    CALCIUM 9.4 07/04/2023    AST 16 07/04/2023    ALT 7 07/04/2023    ALKPHOS 42 07/04/2023    TP 7.0 07/04/2023    ALB 3.9 07/04/2023    TBILI 0.45 07/04/2023    CHOLESTEROL 140 05/19/2023    HDL 59 05/19/2023    TRIG 106 05/19/2023    LDLCALC 60 05/19/2023    NONHDLC 81 05/19/2023    VALPROICTOT 91 06/12/2023    AMMONIA 22 10/09/2022    ZRR1GOETQCKL 1.841 05/19/2023    FREET4 1.16 10/09/2022    HGBA1C 5.2 06/16/2022     06/16/2022       Counseling / Coordination of Care  Total floor / unit time spent today n/a minutes. Greater than 50% of total time was spent with the patient and / or family counseling and / or coordination of care.  A description of the counseling / coordination of care:

## 2023-07-15 NOTE — NURSING NOTE
Pt awake, minimal interaction with staff or peers this AM, preferred to stay in bed. More visible around lunch time and thereafter. Pt compliant with scheduled medications and meals, appetite good. Pt denies SI/HI/AVH at this time, although heard RTIS quietly in room. Speech is scant, delayed, fair eye contact. Reports adequate sleep. Denies any unmet needs. No behavioral issues to be reported. Q7 minute safety checks maintained.

## 2023-07-15 NOTE — NURSING NOTE
Pt is cooperative, visible in the milieu socializing with peers. This writer observed pt RIS. He is aware that he is responding at this time. Denies SI/HI/VH. Q 7 min checks maintained for safety.

## 2023-07-15 NOTE — NURSING NOTE
Pt was over heard RIS in his room loudly and punching the wall. Pt stated he can't control his voices and is getting agitated. He reports having anxiety and requested meds. PRN:   3282: Zyprexa 7.5 mg given for voices/agitation with a Broset scale of 6 (Agitated Behavioral Scale of 44). Atarax 100 mg given for anxiety with a HAM scale of 25, Melatonin 3 mg given for sleep. 2397: Pt was over heard by staff RIS and laughing. Pt is currently laying in bed with no further complaints at this time.

## 2023-07-16 VITALS
TEMPERATURE: 97.6 F | DIASTOLIC BLOOD PRESSURE: 74 MMHG | WEIGHT: 177.6 LBS | OXYGEN SATURATION: 98 % | HEART RATE: 108 BPM | SYSTOLIC BLOOD PRESSURE: 129 MMHG | BODY MASS INDEX: 31.47 KG/M2 | HEIGHT: 63 IN | RESPIRATION RATE: 16 BRPM

## 2023-07-16 PROCEDURE — 99232 SBSQ HOSP IP/OBS MODERATE 35: CPT | Performed by: PSYCHIATRY & NEUROLOGY

## 2023-07-16 RX ADMIN — GLYCOPYRROLATE 1 MG: 1 TABLET ORAL at 08:29

## 2023-07-16 RX ADMIN — NICOTINE POLACRILEX 4 MG: 4 GUM, CHEWING BUCCAL at 19:29

## 2023-07-16 RX ADMIN — FLUTICASONE PROPIONATE 1 SPRAY: 50 SPRAY, METERED NASAL at 08:30

## 2023-07-16 RX ADMIN — HYDROXYZINE HYDROCHLORIDE 50 MG: 50 TABLET, FILM COATED ORAL at 21:26

## 2023-07-16 RX ADMIN — DIVALPROEX SODIUM 750 MG: 500 TABLET, DELAYED RELEASE ORAL at 18:04

## 2023-07-16 RX ADMIN — NICOTINE POLACRILEX 4 MG: 4 GUM, CHEWING BUCCAL at 17:05

## 2023-07-16 RX ADMIN — LOXAPINE 100 MG: 50 CAPSULE ORAL at 18:04

## 2023-07-16 RX ADMIN — NICOTINE POLACRILEX 4 MG: 4 GUM, CHEWING BUCCAL at 21:28

## 2023-07-16 RX ADMIN — LOXAPINE 40 MG: 25 CAPSULE ORAL at 14:59

## 2023-07-16 RX ADMIN — DIVALPROEX SODIUM 750 MG: 500 TABLET, DELAYED RELEASE ORAL at 08:29

## 2023-07-16 RX ADMIN — LOXAPINE 40 MG: 25 CAPSULE ORAL at 08:29

## 2023-07-16 RX ADMIN — GLYCOPYRROLATE 1 MG: 1 TABLET ORAL at 21:25

## 2023-07-16 RX ADMIN — GLYCOPYRROLATE 1 MG: 1 TABLET ORAL at 15:00

## 2023-07-16 RX ADMIN — MELATONIN TAB 3 MG 3 MG: 3 TAB at 21:28

## 2023-07-16 NOTE — PROGRESS NOTES
[facility] Group Note     07/16/23 1300   Activity/Group Checklist   Group Life Skills  (Teamwork and Communication)   Attendance Attended   Attendance Duration (min) 16-30  (arrived toward the end of group)   Interactions Interacted appropriately   Affect/Mood Appropriate;Calm  (Focused)   Goals Achieved Able to listen to others; Able to engage in interactions; Able to reflect/comment on own behavior;Able to recieve feedback; Able to give feedback to another

## 2023-07-16 NOTE — NURSING NOTE
Pt isolative to room, needs much encouragement to wake for medications and meals. Denies psych symptoms, but can be heard RTIS quietly in room at times. Scant but cooperative. No behavioral issues to be noted. Will monitor.

## 2023-07-16 NOTE — PROGRESS NOTES
Progress Note - Behavioral Health   Gokul Turner 26 y.o. male MRN: [id number]  Unit/Bed#: U 344-02 Encounter: [id number]    Assessment/Plan   Principal Problem:    Schizophrenia ([address]) vs. Schizoaffective disorder  Active Problems:    Legally blind    Hearing loss    Asthma    Medical clearance for psychiatric admission    Bilateral impacted cerumen    Prolonged erection    Intellectual disability      Behavior over the last 24 hours:  unchanged  Sleep: hypersomnia  Appetite: normal  Medication side effects: No  ROS: no complaints        Mental Status Evaluation:  Appearance:  age appropriate and casually dressed   Behavior:  cooperative   Speech:  normal pitch and normal volume   Mood:  constricted   Affect:  constricted   Thought Process:  goal directed   Associations: intact associations   Thought Content:  normal   Perceptual Disturbances: None   Risk Potential: Suicidal Ideations none  Homicidal Ideations none  Potential for Aggression No   Sensorium:  person, place and time/date   Memory:  recent and remote memory grossly intact   Consciousness:  alert and awake    Attention: attention span and concentration were age appropriate   Insight:  limited   Judgment: limited   Gait/Station: normal gait/station and normal balance   Motor Activity: no abnormal movements     Progress Toward Goals: Patient remains compliant with medications and denies side effects. Last evening received Zyprexa and Atarax for anxiety and agitation which was effective. According to staff report he remains  isolative to room, needs much encouragement to wake for medications and meals. Staff reported he can be  heard RTIS quietly in room at times. Scant but cooperative. No concerns or complaints reported today. Recommended Treatment: Continue with group therapy, milieu therapy and occupational therapy. Continue Loxapine 40 mg PO q AM, 40 mg PO q PM, and 100 mg PO q HS for psychosis. Continue Depakote 750 mg PO BID for mood.   Continue Robinul 1 mg PO TID for sialorrhea. Continue Zyprexa PO PRN for psychosis. Continue Senokot PRN for bowel regimen. Continue Cogentin 1 mg PRN for EPS. Continue melatonin 3 mg PRN HS for insomnia.      Continue to monitor vitals and perform frequent safety checks. Continue to encourage group therapy, milieu therapy, and medication compliance. Continue to monitor for medication adverse effects. Discharge: TBD. Consider long term psychiatric care vs. rehab. Legal status: 201    Risks, benefits and possible side effects of Medications:   Risks, benefits, and possible side effects of medications explained to patient and patient verbalizes understanding.       Medications:   all current active meds have been reviewed, continue current psychiatric medications and current meds:   Current Facility-Administered Medications   Medication Dose Route Frequency   • acetaminophen (TYLENOL) tablet 650 mg  650 mg Oral Q6H PRN   • acetaminophen (TYLENOL) tablet 650 mg  650 mg Oral Q4H PRN   • acetaminophen (TYLENOL) tablet 975 mg  975 mg Oral Q6H PRN   • albuterol (PROVENTIL HFA,VENTOLIN HFA) inhaler 2 puff  2 puff Inhalation Q4H PRN   • aluminum-magnesium hydroxide-simethicone (MYLANTA) oral suspension 30 mL  30 mL Oral Q4H PRN   • benztropine (COGENTIN) tablet 1 mg  1 mg Oral Q4H PRN Max 6/day   • divalproex sodium (DEPAKOTE) DR tablet 750 mg  750 mg Oral BID   • fluticasone (FLONASE) 50 mcg/act nasal spray 1 spray  1 spray Nasal Daily   • glycopyrrolate (ROBINUL) tablet 1 mg  1 mg Oral TID   • hydrOXYzine HCL (ATARAX) tablet 100 mg  100 mg Oral Q6H PRN Max 4/day    Or   • LORazepam (ATIVAN) injection 1 mg  1 mg Intramuscular Q6H PRN   • hydrOXYzine HCL (ATARAX) tablet 25 mg  25 mg Oral Q6H PRN Max 4/day   • hydrOXYzine HCL (ATARAX) tablet 50 mg  50 mg Oral Q6H PRN Max 4/day   • loxapine (LOXITANE) capsule 100 mg  100 mg Oral QPM   • loxapine (LOXITANE) capsule 40 mg  40 mg Oral BID   • melatonin tablet 3 mg  3 mg Oral HS PRN • nicotine polacrilex (NICORETTE) gum 4 mg  4 mg Oral Q2H PRN   • OLANZapine (ZyPREXA) tablet 5 mg  5 mg Oral Q3H PRN Max 6/day    Or   • OLANZapine (ZyPREXA) IM injection 5 mg  5 mg Intramuscular Q3H PRN Max 6/day   • OLANZapine (ZyPREXA) IM injection 5 mg  5 mg Intramuscular Q8H PRN    Or   • OLANZapine (ZyPREXA) tablet 7.5 mg  7.5 mg Oral Q8H PRN   • OLANZapine (ZyPREXA) tablet 2.5 mg  2.5 mg Oral Q3H PRN Max 8/day   • ondansetron (ZOFRAN-ODT) dispersible tablet 4 mg  4 mg Oral Q8H PRN   • polyethylene glycol (MIRALAX) packet 17 g  17 g Oral Daily PRN   • pseudoephedrine (SUDAFED) tablet 120 mg  120 mg Oral BID PRN   • senna-docusate sodium (SENOKOT S) 8.6-50 mg per tablet 1 tablet  1 tablet Oral Daily PRN   • sterile water injection **ADS Override Pull**       • sterile water injection **ADS Override Pull**       • sterile water injection **ADS Override Pull**       • sterile water injection **ADS Override Pull**       . Labs: I have personally reviewed all pertinent laboratory/tests results.    Most Recent Labs:   Lab Results   Component Value Date    WBC 8.57 07/04/2023    RBC 4.71 07/04/2023    HGB 14.6 07/04/2023    HCT 44.0 07/04/2023     07/04/2023    RDW 12.4 07/04/2023    NEUTROABS 3.29 07/04/2023    SODIUM 140 07/04/2023    K 3.9 07/04/2023     07/04/2023    CO2 34 (H) 07/04/2023    BUN 15 07/04/2023    CREATININE 1.31 (H) 07/04/2023    GLUC 104 07/04/2023    GLUF 125 (H) 06/12/2023    CALCIUM 9.4 07/04/2023    AST 16 07/04/2023    ALT 7 07/04/2023    ALKPHOS 42 07/04/2023    TP 7.0 07/04/2023    ALB 3.9 07/04/2023    TBILI 0.45 07/04/2023    CHOLESTEROL 140 05/19/2023    HDL 59 05/19/2023    TRIG 106 05/19/2023    LDLCALC 60 05/19/2023    NONHDLC 81 05/19/2023    VALPROICTOT 91 06/12/2023    AMMONIA 22 10/09/2022    AVY1GNAVLVVQ 1.841 05/19/2023    FREET4 1.16 10/09/2022    HGBA1C 5.2 06/16/2022     06/16/2022       Counseling / Coordination of Care  Total floor / unit time spent today n/a minutes. Greater than 50% of total time was spent with the patient and / or family counseling and / or coordination of care.  A description of the counseling / coordination of care:

## 2023-07-16 NOTE — NURSING NOTE
At 1925 patient reported severe anxiety and severe agitation. Atarax 100 mg PO PRN and Zyprexa 7.5 mg PO PRN given. At 2030, patient reported both medications were effective. Patient HS medication compliant. Melatonin 3 mg PO PRN given. Patient requesting 6 mg of melatonin, however not available. Patient denied any unmet needs. Retreated to his room for sleep without issue.

## 2023-07-17 PROCEDURE — 99232 SBSQ HOSP IP/OBS MODERATE 35: CPT | Performed by: STUDENT IN AN ORGANIZED HEALTH CARE EDUCATION/TRAINING PROGRAM

## 2023-07-17 RX ADMIN — DIVALPROEX SODIUM 750 MG: 500 TABLET, DELAYED RELEASE ORAL at 09:55

## 2023-07-17 RX ADMIN — LOXAPINE 100 MG: 50 CAPSULE ORAL at 18:32

## 2023-07-17 RX ADMIN — OLANZAPINE 7.5 MG: 2.5 TABLET, FILM COATED ORAL at 01:40

## 2023-07-17 RX ADMIN — GLYCOPYRROLATE 1 MG: 1 TABLET ORAL at 09:55

## 2023-07-17 RX ADMIN — GLYCOPYRROLATE 1 MG: 1 TABLET ORAL at 15:27

## 2023-07-17 RX ADMIN — LOXAPINE 40 MG: 25 CAPSULE ORAL at 09:55

## 2023-07-17 RX ADMIN — LOXAPINE 40 MG: 25 CAPSULE ORAL at 15:27

## 2023-07-17 RX ADMIN — HYDROXYZINE HYDROCHLORIDE 100 MG: 50 TABLET, FILM COATED ORAL at 03:28

## 2023-07-17 RX ADMIN — NICOTINE POLACRILEX 4 MG: 4 GUM, CHEWING BUCCAL at 01:39

## 2023-07-17 RX ADMIN — DIVALPROEX SODIUM 750 MG: 500 TABLET, DELAYED RELEASE ORAL at 18:32

## 2023-07-17 NOTE — PROGRESS NOTES
Progress Note - Behavioral Health   Gokul Machado 26 y.o. male MRN: [id number]  Unit/Bed#: U 344-02 Encounter: [id number]    Assessment/Plan   Principal Problem:    Schizophrenia ([address]) vs. Schizoaffective disorder  Active Problems:    Legally blind    Hearing loss    Asthma    Medical clearance for psychiatric admission    Bilateral impacted cerumen    Prolonged erection    Intellectual disability      Recommended Treatment:   No psychopharmacologic changes necessary at this moment; will continue to assess daily for further optimization. Continue medications as below:   Depakote 750 mg BID for mood (Depakote level 6/12 was 91)   Loxapine 40 mg every morning and 40 mg every afternoon, 100 mg at bedtime for psychosis   Continue Robinul 1 mg TID for sialorrhea  Melatonin 3mg HS prn insomnia    Continue with pharmacotherapy, group therapy, milieu therapy and occupational therapy. Monitor for adverse medication side effects. Safety checks and vitals per unit protocol. CM/SW recommendations   Continue medical management by medical team.  Case discussed with treatment team.    Legal Status: voluntary 201  ------------------------------------------------------------    Subjective: All documentation including nursing notes, medication history to ensure medication adherence on the unit, labs, and vitals were reviewed. Gokul was evaluated this morning for continuity of care and no acute distress noted throughout the evaluation. Over the past 24 hours per nursing report, Gokul has been cooperative on the unit and compliant with medications. On 7/15: Patient was cooperative and in good behavioral control. Received Atarax x 1 and Zyprexa x 1 for anxiety and agitation, which were both effective. Otherwise, no acute events. On 7/16: Patient was cooperative and in good behavioral control, remained seclusive to room. Did receive Atarax x 1 for anxiety which was effective.  Also received melatonin which was effective. Otherwise no acute events. Upon interview today, Gokul is cooperative, calm, and laying in bed. He is noted to have scant speech and a blunted affect. Today, Gokul is consenting for safety on the unit. Gokul reports feeling "okay". Gokul notes having "good" sleep. Gokul states having an "okay" appetite. Gokul has been taking the medications as prescribed. When asked about medication side effects, he reports an increased appetite. States he has been "trying to work out to lose weight, but I feel like I'm just hungry". When asked about his weekend, he states "this weekend was fun, I played a lot of games and talked to some people". Gokul denies suicidal ideations. Gokul denies homicidal ideations. Regarding hallucinations, Gokul denies auditory and visual hallucinations. He endorses no further complaints. PRNs overnight: Atarax, Zyprexa, Melatonin   VS: Reviewed, within normal limits    Progress Toward Goals: slow improvement    Psychiatric Review of Systems:  Behavior over the last 24 hours:  improved  Sleep: normal  Appetite: increased  Medication side effects: No   ROS: all other systems are negative    Vital signs in last 24 hours:  Temp:  [97.6 °F (36.4 °C)-98 °F (36.7 °C)] 98 °F (36.7 °C)  HR:  [] 98  Resp:  [16] 16  BP: (128-129)/(74-76) 128/76    Laboratory results:  I have personally reviewed all pertinent laboratory/tests results. No results found for this or any previous visit (from the past 48 hour(s)).       Mental Status Evaluation:    Appearance:  overtly appearing  male, laying in bed, appears older than stated age   Behavior:  cooperative, slow responses, limited eye contact   Speech:  decreased rate, scant, soft   Mood:  "okay"   Affect:  blunted   Thought Process:  concrete, slight poverty of thought   Associations: concrete associations   Thought Content:  No delusions, no overt paranoia    Perceptual Disturbances: Denies auditory or visual hallucinations and Does not appear to be responding to internal stimuli, appears distracted during interview   Risk Potential: Suicidal ideation - None at present, contracts for safety on the unit, would talk to staff if not feeling safe on the unit  Homicidal ideation - None at present  Potential for aggression - Not at present   Sensorium:  grossly oriented to situation   Memory:  recent and remote memory grossly intact   Consciousness:  alert and awake   Attention/Concentration: attention span and concentration are age appropriate   Insight:  limited   Judgment: limited   Gait/Station: normal gait/station   Motor Activity: no abnormal movements       Current Medications:  Current Facility-Administered Medications   Medication Dose Route Frequency Provider Last Rate   • acetaminophen  650 mg Oral Q6H PRN Caitie Glasgow MD     • acetaminophen  650 mg Oral Q4H PRN Caitie Glasgow MD     • acetaminophen  975 mg Oral Q6H PRN Caitie Glasgow MD     • albuterol  2 puff Inhalation Q4H PRN Caitie Glasgow MD     • aluminum-magnesium hydroxide-simethicone  30 mL Oral Q4H PRN Caitie Glasgow MD     • benztropine  1 mg Oral Q4H PRN Max 6/day Caitie Glasgow MD     • divalproex sodium  750 mg Oral BID Ulices Becker MD     • fluticasone  1 spray Nasal Daily Caitie Glasgow MD     • glycopyrrolate  1 mg Oral TID Cuauhtemoc Zuluaga MD     • hydrOXYzine HCL  100 mg Oral Q6H PRN Max 4/day Audi Waters DO      Or   • LORazepam  1 mg Intramuscular Q6H PRN Audi Waters DO     • hydrOXYzine HCL  25 mg Oral Q6H PRN Max 4/day Caitie Glasgow MD     • hydrOXYzine HCL  50 mg Oral Q6H PRN Max 4/day Caitie Glasgow MD     • loxapine  100 mg Oral QPM Emily Duff, DO     • loxapine  40 mg Oral BID Emily Duff, DO     • melatonin  3 mg Oral HS PRN Audi Waters DO     • nicotine polacrilex  4 mg Oral Q2H PRN Caitie Glasgow MD     • OLANZapine  5 mg Oral Q3H PRN Max 6/day Ulices Becker MD Or   • OLANZapine  5 mg Intramuscular Q3H PRN Max 6/day Farhana Urena MD     • OLANZapine  5 mg Intramuscular Q8H PRN Philemon Decant, DO      Or   • OLANZapine  7.5 mg Oral Q8H PRN Philemon Decant, DO     • OLANZapine  2.5 mg Oral Q3H PRN Max 8/day Farhana Urena MD     • ondansetron  4 mg Oral Q8H PRN Jose Eemon Decant, DO     • polyethylene glycol  17 g Oral Daily PRN Patrick Ibrahim MD     • pseudoephedrine  120 mg Oral BID PRN Ne Pacheco MD     • senna-docusate sodium  1 tablet Oral Daily PRN Patrick Ibrahim MD     • sterile water          • sterile water          • sterile water          • sterile water              Behavioral Health Medications: All current active meds have been reviewed. Changes as in plan section above. Risks, benefits and possible side effects of Medications:   Risks, benefits, and possible side effects of medications explained to patient and patient verbalizes understanding. Counseling / Coordination of Care:  Patient's progress discussed with staff in treatment team meeting. Medications, treatment progress and treatment plan reviewed with patient. Gallo Fox DO  Psychiatry Resident, PGY-I    This note was completed in part utilizing Dragon dictation Software. Grammatical, translation, syntax errors, random word insertions, spelling mistakes, and incomplete sentences may be an occasional consequence of this system secondary to software limitations with voice recognition, ambient noise, and hardware issues. If you have any questions or concerns about the content, text, or information contained within the body of this dictation, please contact the provider for clarification.

## 2023-07-17 NOTE — NURSING NOTE
Pt left his bed only for meds and meals, denied all psych symptoms, and otherwise spent the entirety of the rest of his day either asleep,  Or in bed with the blankets pulled over his head. Pt remained polite upon approach, was never difficult to arouse, only difficult to keep awake. Pt expressesed no unmet needs.

## 2023-07-17 NOTE — PROGRESS NOTES
07/17/23 1451   Team Meeting   Meeting Type Tx Team Meeting   Initial Conference Date 07/17/23   Team Members Present   Team Members Present Physician;Nurse;   Physician Team Member Todd   Nursing Team Member [id number] Management Team Member Wendie   Patient/Family Present   Patient Present Yes   Patient's Family Present No     Tx plan was reviewed and discussed with Pt. Pt was encouraged to attend groups. Medication was discussed with Pt. Pt signed tx plan.

## 2023-07-17 NOTE — PROGRESS NOTES
07/17/23 0840   Team Meeting   Meeting Type Daily Rounds   Team Members Present   Team Members Present Physician;Nurse;   Physician Team Member [facility]   [facility] Wendie   Patient/Family Present   Patient Present No   Patient's Family Present No   Pt med/meal compliant. Responding to internal stimuli in his room. PRNs over the weekend for anxiety and severe agitation. Punching the walls at times. D/C pending EAC.

## 2023-07-17 NOTE — NURSING NOTE
Patient remained visible on the unit throughout the evening. Cooperative with routine. At 2126, patient given atarax 50 mg PO PRN for c/o anxiety. Patient also received melatonin. HS medication compliant. Retreated to his room for sleep without issue. At 2230, patient reported atarax effective.

## 2023-07-18 PROCEDURE — 99232 SBSQ HOSP IP/OBS MODERATE 35: CPT | Performed by: PSYCHIATRY & NEUROLOGY

## 2023-07-18 RX ADMIN — DIVALPROEX SODIUM 750 MG: 500 TABLET, DELAYED RELEASE ORAL at 19:54

## 2023-07-18 RX ADMIN — GLYCOPYRROLATE 1 MG: 1 TABLET ORAL at 15:23

## 2023-07-18 RX ADMIN — GLYCOPYRROLATE 1 MG: 1 TABLET ORAL at 09:55

## 2023-07-18 RX ADMIN — GLYCOPYRROLATE 1 MG: 1 TABLET ORAL at 20:38

## 2023-07-18 RX ADMIN — LOXAPINE 100 MG: 50 CAPSULE ORAL at 19:57

## 2023-07-18 RX ADMIN — LOXAPINE 40 MG: 25 CAPSULE ORAL at 15:23

## 2023-07-18 RX ADMIN — DIVALPROEX SODIUM 750 MG: 500 TABLET, DELAYED RELEASE ORAL at 09:55

## 2023-07-18 RX ADMIN — LOXAPINE 40 MG: 25 CAPSULE ORAL at 09:55

## 2023-07-18 NOTE — PROGRESS NOTES
07/18/23 0841   Team Meeting   Meeting Type Daily Rounds   Team Members Present   Team Members Present Physician;Nurse;   Physician Team Member [id number] Wendie   Patient/Family Present   Patient Present No   Patient's Family Present No   Pt in bed most of the day. Visible in the evening. Remains pleasant, cooperative. Continues to report AH. Discharge pending EAC placement.

## 2023-07-18 NOTE — PLAN OF CARE
[facility] called [phone] and Referral to open case for EAC. Referral submitted through [facility].     [facility] emailed [facility] part one, H&P and 14 days of progress notes to [email or web address], [email or web address]>, [email or web address] and [email or web address] to request [facility] [email or web address]

## 2023-07-18 NOTE — PROGRESS NOTES
Progress Note - Behavioral Health   Gokul Marino 26 y.o. male MRN: [id number]  Unit/Bed#: -02 Encounter: [id number]    Assessment/Plan   Principal Problem:    Schizophrenia ([address]) vs. Schizoaffective disorder  Active Problems:    Legally blind    Hearing loss    Asthma    Medical clearance for psychiatric admission    Bilateral impacted cerumen    Prolonged erection    Intellectual disability      Recommended Treatment:   No psychopharmacologic changes necessary at this moment; will continue to assess daily for further optimization. Continue medications as below:  Depakote 750 mg BID for mood (Depakote level 6/12 was 91)   Loxapine 40 mg every morning and 40 mg every afternoon, 100 mg at bedtime for psychosis   Continue Robinul 1 mg TID for sialorrhea  Melatonin 3mg HS prn insomnia     Podiatry consult ordered  Continue with pharmacotherapy, group therapy, milieu therapy and occupational therapy. Monitor for adverse medication side effects. Safety checks and vitals per unit protocol. CM/SW recommendations   Continue medical management by medical team.  Case discussed with treatment team.    Legal Status: voluntary 201   ------------------------------------------------------------    Subjective: All documentation including nursing notes, medication history to ensure medication adherence on the unit, labs, and vitals were reviewed. Gokul was evaluated this morning for continuity of care and no acute distress noted throughout the evaluation. Over the past 24 hours per nursing report, Gokul has been cooperative on the unit and compliant with medications. No acute events overnight. On examination today, Gokul is seen laying in bed, cooperative, with limited eye contact. Today, Gokul is consenting for safety on the unit. Gokul reports feeling "stable." Gokul notes having "good sleep, about 7 hours". Gokul notes having an increased appetite and states he has gained weight.  Gokul has been taking the medications as prescribed and reporting side effect of increased appetite. States his energy is "good". His goals for today are to "give people their personal space". When questioned further, he states "Sometimes I joke around too much. I told a sexual joke to someone yesterday but he didn't like it". This Ardine Sables reinforced the importance of respecting other residents and maintaining boundaries. Gokul expressed understanding. He states he attended groups yesterday and states they were "good". He last showered 2 days ago. Reports last BM was yesterday. Gokul denies suicidal ideations. Gokul denies homicidal ideations. Regarding hallucinations, Gokul reports auditory hallucinations last night. He reports "hearing people laughing loud" and states he went to the nurses station to get a prn at that time. Denies visual hallucinations. Endorses no further complaints. PRNs overnight: none  VS: Reviewed, within normal limits    Progress Toward Goals: slow improvement    Psychiatric Review of Systems:  Behavior over the last 24 hours:  unchanged  Sleep: increased  Appetite: increased  Medication side effects: Yes - increased appetite    ROS: all other systems are negative    Vital signs in last 24 hours:  Temp:  [97.1 °F (36.2 °C)] 97.1 °F (36.2 °C)  HR:  [104] 104  Resp:  [18] 18  BP: (140)/(85) 140/85    Laboratory results:  I have personally reviewed all pertinent laboratory/tests results. No results found for this or any previous visit (from the past 48 hour(s)).       Mental Status Evaluation:    Appearance:  overtly appearing  male, laying in bed, appears older than stated age, poor hygiene, dressed in casual clothing   Behavior:  cooperative, slow responses, limited eye contact, looking down at times during interview   Speech:  decreased rate, scant, decreased volume   Mood:  "stable"   Affect:  constricted   Thought Process:  thought blocking   Associations: concrete associations   Thought Content:  no overt paranoia   Perceptual Disturbances: Reports auditory hallucinations, denies visual hallucinations, Appears to be internally preoccupied and Does not appear to be responding to internal stimuli, appears distracted   Risk Potential: Suicidal ideation - None at present, contracts for safety on the unit, would talk to staff if not feeling safe on the unit  Homicidal ideation - None at present  Potential for aggression - Not at present   Sensorium:  grossly oriented to situation   Memory:  recent and remote memory grossly intact   Consciousness:  alert and awake   Attention/Concentration: attention span and concentration are age appropriate   Insight:  limited   Judgment: limited   Gait/Station: normal gait/station   Motor Activity: no abnormal movements       Current Medications:  Current Facility-Administered Medications   Medication Dose Route Frequency Provider Last Rate   • acetaminophen  650 mg Oral Q6H PRN Mary Paredes MD     • acetaminophen  650 mg Oral Q4H PRN Mary Paredes MD     • acetaminophen  975 mg Oral Q6H PRN Mary Paredes MD     • albuterol  2 puff Inhalation Q4H PRN Mary Paredes MD     • aluminum-magnesium hydroxide-simethicone  30 mL Oral Q4H PRN Mary Paredes MD     • benztropine  1 mg Oral Q4H PRN Max 6/day Mary Paredes MD     • divalproex sodium  750 mg Oral BID Devin Freedman MD     • fluticasone  1 spray Nasal Daily Mary Paredes MD     • glycopyrrolate  1 mg Oral TID Tommie Tubbs MD     • hydrOXYzine HCL  100 mg Oral Q6H PRN Max 4/day Francine Hutson DO      Or   • LORazepam  1 mg Intramuscular Q6H PRN Francine Hutson DO     • hydrOXYzine HCL  25 mg Oral Q6H PRN Max 4/day Mary Paredes MD     • hydrOXYzine HCL  50 mg Oral Q6H PRN Max 4/day Mary Paredes MD     • loxapine  100 mg Oral QPM Lisbet [place], DO     • loxapine  40 mg Oral BID Lisbet [place], DO     • melatonin  3 mg Oral HS PRN Francine Hutson DO • nicotine polacrilex  4 mg Oral Q2H PRN Fernandez Topete MD     • OLANZapine  5 mg Oral Q3H PRN Max 6/day Selene Mac MD      Or   • OLANZapine  5 mg Intramuscular Q3H PRN Max 6/day Selene Mac MD     • OLANZapine  5 mg Intramuscular Q8H PRN Maddi Macedo DO      Or   • OLANZapine  7.5 mg Oral Q8H PRN Maddi Macedo, DO     • OLANZapine  2.5 mg Oral Q3H PRN Max 8/day Selene Mac MD     • ondansetron  4 mg Oral Q8H PRN Maddi Macedo DO     • polyethylene glycol  17 g Oral Daily PRN Fernandez Topete MD     • pseudoephedrine  120 mg Oral BID PRN Sandra Lowe MD     • senna-docusate sodium  1 tablet Oral Daily PRN Fernandez Topete MD     • sterile water          • sterile water          • sterile water          • sterile water              Behavioral Health Medications: All current active meds have been reviewed. Changes as in plan section above. Risks, benefits and possible side effects of Medications:   Risks, benefits, and possible side effects of medications explained to patient and patient verbalizes understanding. Counseling / Coordination of Care:  Patient's progress discussed with staff in treatment team meeting. Medications, treatment progress and treatment plan reviewed with patient. General Hema DO  Psychiatry Resident, PGY-I    This note was completed in part utilizing Dragon dictation Software. Grammatical, translation, syntax errors, random word insertions, spelling mistakes, and incomplete sentences may be an occasional consequence of this system secondary to software limitations with voice recognition, ambient noise, and hardware issues. If you have any questions or concerns about the content, text, or information contained within the body of this dictation, please contact the provider for clarification.

## 2023-07-18 NOTE — NURSING NOTE
Pt appears to display signs of Onychocryptosis bilaterally on each 5th small toe. Podiatry consult received and Dr. Gretel Carlson was informed.

## 2023-07-18 NOTE — NURSING NOTE
Patient observed in bed resting throughout the evening. Visible for snack. Patient not given HS medication Robinul, as he was in bed resting and did not respond to his name being called.

## 2023-07-18 NOTE — NURSING NOTE
Communication from Dr. Mackenzie Messina agreeing to set time to see pt. Pt advised to use lotion on dry skin areas.

## 2023-07-18 NOTE — NURSING NOTE
Pt continues to isolate during the day, sleeping, yet easily aroused and compliant with meds. Pt is less eager to get up for mels,  Slept through breakfast time but ended up eating a small portion later. Pt did just get out of bed for lunch. Pt denies all psych symptoms, speaks softly, but takes direction and is cooperative. Pt has not been observed responding to internal stimuli.

## 2023-07-18 NOTE — NURSING NOTE
Pt is calm and cooperative,  Isolated to her room, said again today she would wait until she would wait in her room until small TV room opens. Pt denies HI/SI/AVH,  Is med and meal compliant. Pt denies any episodes of incontinence since the start of this admission. Pt's mood has improved since yesterday, has been pleased with PRN tmt of spasms, brightens upon approach, and appears more comfortable talking to staff. Pt seen by wound care nurse but again, she refused to accept bandages from her infected skin under her breasts, claims she is using scheduled  NYSTATIN which she has save from previous night, instructed to try and use more. Pt is mal-odorous, possibly stemming from these infected areas.

## 2023-07-19 PROCEDURE — 99232 SBSQ HOSP IP/OBS MODERATE 35: CPT | Performed by: PSYCHIATRY & NEUROLOGY

## 2023-07-19 RX ORDER — CLOTRIMAZOLE 1 %
CREAM (GRAM) TOPICAL 2 TIMES DAILY
Status: DISCONTINUED | OUTPATIENT
Start: 2023-07-19 | End: 2023-07-30

## 2023-07-19 RX ORDER — LOXAPINE SUCCINATE 50 MG/1
50 TABLET ORAL 2 TIMES DAILY
Status: DISCONTINUED | OUTPATIENT
Start: 2023-07-19 | End: 2023-08-03

## 2023-07-19 RX ADMIN — OLANZAPINE 7.5 MG: 2.5 TABLET, FILM COATED ORAL at 02:01

## 2023-07-19 RX ADMIN — GLYCOPYRROLATE 1 MG: 1 TABLET ORAL at 09:46

## 2023-07-19 RX ADMIN — GLYCOPYRROLATE 1 MG: 1 TABLET ORAL at 16:24

## 2023-07-19 RX ADMIN — MELATONIN TAB 3 MG 3 MG: 3 TAB at 02:01

## 2023-07-19 RX ADMIN — LOXAPINE 50 MG: 50 CAPSULE ORAL at 15:55

## 2023-07-19 RX ADMIN — LOXAPINE 40 MG: 25 CAPSULE ORAL at 09:46

## 2023-07-19 RX ADMIN — GLYCOPYRROLATE 1 MG: 1 TABLET ORAL at 20:13

## 2023-07-19 RX ADMIN — DIVALPROEX SODIUM 750 MG: 500 TABLET, DELAYED RELEASE ORAL at 09:46

## 2023-07-19 RX ADMIN — CLOTRIMAZOLE: 1 CREAM TOPICAL at 09:53

## 2023-07-19 RX ADMIN — LOXAPINE 100 MG: 50 CAPSULE ORAL at 19:46

## 2023-07-19 RX ADMIN — DIVALPROEX SODIUM 750 MG: 500 TABLET, DELAYED RELEASE ORAL at 19:45

## 2023-07-19 NOTE — NURSING NOTE
Pt slept through breakfast again, ate his breakfast tray about an hour late. Pt refused lunch at least up until this writing,  After waking up to acknowledge the trays were on the, pt put his head down and began sleeping again. Pt is compliant with all med's  except for AM FLONASE. Pt denies SI/HI and AVH, but is barely awake long enough to answer questions without falling back asleep again.

## 2023-07-19 NOTE — PROGRESS NOTES
Progress Note - Behavioral Health   Gokul Garcia 26 y.o. male MRN: [id number]  Unit/Bed#: U 344-02 Encounter: [id number]    Assessment/Plan   Principal Problem:    Schizophrenia ([address]) vs. Schizoaffective disorder  Active Problems:    Legally blind    Hearing loss    Asthma    Medical clearance for psychiatric admission    Bilateral impacted cerumen    Prolonged erection    Intellectual disability      Recommended Treatment:     Continue medications as below:   Increase Loxapine 50 mg every morning and 50 mg every afternoon, 100 mg at bedtime for psychosis   Depakote 750 mg BID for mood (Depakote level 6/12 was 91)   Continue Robinul 1 mg TID for sialorrhea  Melatonin 3mg HS prn insomnia     Continue with pharmacotherapy, group therapy, milieu therapy and occupational therapy. Monitor for adverse medication side effects. Safety checks and vitals per unit protocol. CM/SW recommendations   Continue medical management by medical team.  Case discussed with treatment team.    Legal Status: voluntary 201   ------------------------------------------------------------    Subjective: All documentation including nursing notes, medication history to ensure medication adherence on the unit, labs, and vitals were reviewed. Gokul was evaluated this morning for continuity of care and no acute distress noted throughout the evaluation. Over the past 24 hours per nursing report, Gokul has been cooperative on the unit and compliant with medications. No acute events overnight. Upon evaluation, patient is seen sleeping in bed, with sheets covering face. He is cooperative, with slow responses and scant. Today, Gokul is consenting for safety on the unit. Gokul reports feeling "feeling ok." Gokul notes having good sleep. Gokul states having a good appetite. Gokul has been taking the medications as prescribed and reporting no side effects. Gokul denies suicidal ideations. Gokul denies homicidal ideations. Regarding hallucinations, Gokul states he last "heard people laughing 6 days ago". Denies visual hallucinations. States his goals for today are to "not be so angry at people". When asked to elaborate, he states "I just get angry at everyone". He states he has not been attending groups. He denies other complaints. VS: Reviewed, within normal limits    Progress Toward Goals: slow improvement    Psychiatric Review of Systems:  Behavior over the last 24 hours:  unchanged  Sleep: hypersomnia  Appetite: normal  Medication side effects: No   ROS: all other systems are negative    Vital signs in last 24 hours:  Temp:  [97.3 °F (36.3 °C)-97.7 °F (36.5 °C)] 97.3 °F (36.3 °C)  HR:  [89-96] 96  Resp:  [18] 18  BP: (110-154)/(62-76) 110/62    Laboratory results:  I have personally reviewed all pertinent laboratory/tests results. No results found for this or any previous visit (from the past 48 hour(s)).       Mental Status Evaluation:    Appearance:  overtly appearing  male, laying in bed with sheets covering face, appears older than stated age, poor hygiene   Behavior:  guarded, slow responses, slightly irritable, no eye contact   Speech:  decreased rate, scant, decreased volume   Mood:  "okay"   Affect:  blunted   Thought Process:  concrete, poverty of thought   Associations: concrete associations   Thought Content:  no overt delusions   Perceptual Disturbances: Reports auditory hallucinations, Appears to be internally preoccupied and Does not appear to be responding to internal stimuli, appears distracted   Risk Potential: Suicidal ideation - None at present, contracts for safety on the unit, would talk to staff if not feeling safe on the unit  Homicidal ideation - None  Potential for aggression - Not at present   Sensorium:  unable to assess   Memory:  recent and remote memory grossly intact   Consciousness:  awake, drowsy   Attention/Concentration: attention span and concentration are age appropriate Insight:  limited   Judgment: limited   Gait/Station: normal gait/station   Motor Activity: no abnormal movements       Current Medications:  Current Facility-Administered Medications   Medication Dose Route Frequency Provider Last Rate   • acetaminophen  650 mg Oral Q6H PRN Fernandez Topete MD     • acetaminophen  650 mg Oral Q4H PRN Fernandez Topete MD     • acetaminophen  975 mg Oral Q6H PRN Fernandez Topete MD     • albuterol  2 puff Inhalation Q4H PRN Fernandez Topete MD     • aluminum-magnesium hydroxide-simethicone  30 mL Oral Q4H PRN Fernandez Topete MD     • benztropine  1 mg Oral Q4H PRN Max 6/day Fernandez Topete MD     • clotrimazole   Topical BID Monty Romero DPM     • divalproex sodium  750 mg Oral BID Selene Mac MD     • fluticasone  1 spray Nasal Daily Fernandez Topete MD     • glycopyrrolate  1 mg Oral TID Jailyn Mcdonald MD     • hydrOXYzine HCL  100 mg Oral Q6H PRN Max 4/day Maddi Remington, DO      Or   • LORazepam  1 mg Intramuscular Q6H PRN Maddi [place]ington, DO     • hydrOXYzine HCL  25 mg Oral Q6H PRN Max 4/day Fernandez Topete MD     • hydrOXYzine HCL  50 mg Oral Q6H PRN Max 4/day Fernandez Topete MD     • loxapine  100 mg Oral QPM Lilyan Sorrel, DO     • loxapine  40 mg Oral BID Lilyan Sorrel, DO     • melatonin  3 mg Oral HS PRN Maddi [place]ington, DO     • nicotine polacrilex  4 mg Oral Q2H PRN Fernandez Topete MD     • OLANZapine  5 mg Oral Q3H PRN Max 6/day Selene Mac MD      Or   • OLANZapine  5 mg Intramuscular Q3H PRN Max 6/day Selene Mac MD     • OLANZapine  5 mg Intramuscular Q8H PRN Maddi Remington, DO      Or   • OLANZapine  7.5 mg Oral Q8H PRN Maddi [place], DO     • OLANZapine  2.5 mg Oral Q3H PRN Max 8/day Selene Mac MD     • ondansetron  4 mg Oral Q8H PRN Maddi [place], DO     • polyethylene glycol  17 g Oral Daily PRN Fernandez Topete MD     • pseudoephedrine  120 mg Oral BID PRN Sandra Lowe MD     • senna-docusate sodium 1 tablet Oral Daily PRN Kamaljit Barreto MD     • sterile water          • sterile water          • sterile water          • sterile water              Behavioral Health Medications: All current active meds have been reviewed. Changes as in plan section above. Risks, benefits and possible side effects of Medications:   Risks, benefits, and possible side effects of medications explained to patient and patient verbalizes understanding. Counseling / Coordination of Care:  Patient's progress discussed with staff in treatment team meeting. Medications, treatment progress and treatment plan reviewed with patient. Nicholas Donis DO  Psychiatry Resident, PGY-I    This note was completed in part utilizing Dragon dictation Software. Grammatical, translation, syntax errors, random word insertions, spelling mistakes, and incomplete sentences may be an occasional consequence of this system secondary to software limitations with voice recognition, ambient noise, and hardware issues. If you have any questions or concerns about the content, text, or information contained within the body of this dictation, please contact the provider for clarification.

## 2023-07-19 NOTE — NURSING NOTE
Pt approached nurse RTIS loudly. When nurse asked pt if he wanted PRN for voices, pt responded, "yes please, and melatonin." PRN zyprexa 7.5mg PO and melatonin 3mg given @ 02:01 for severe agitation and insomnia. PRNs partially effective, pt no longer speaking to self but is still awake.

## 2023-07-19 NOTE — CONSULTS
Consult Note- Podiatry   Gokul Lyly Childs 26 y.o. male MRN: [id number]  Unit/Bed#: [facility] 344-02 Encounter: [id number]    Assessment/Plan     Assessment:  1. Dystrophic unguum  2. Athlete's foot     Plan:  - -pt eval and managed    - Number and complexity of problems addressed:  1 undiagnosed new problem with uncertain prognosis   as shown    - Amount/complexity of data reviewed and analyzed:     Category 1: prior patient notes were analyzed today before evaluating and managing patient. All PMH were discussed with pt today. - Risk of complications: moderate risk of morbidity from additional testing or treatment involved with this patient, which includes but not limited to:    - discussed anatomy, condition, treatment plan and options. They were instructed on proper foot care. The patient was seen today for greater than total of  45-59 minutes   . This is total time spent today involving both face-to-face time and non face-to-face time. This time spent includes  reviewing their past medical history  , performing a medically appropriate examination and evaluation of the patient, counseling and educating the patient,  documenting all findings in EMR, and independently interpreting results and communicating results with  patient     and discussing their condition and treatment options, risks, and potential complications. I have discussed the findings of this examination with the patient. The discussion included a complete verbal explanation of the examination results, diagnosis and planned treatment(s). A schedule for future care needs was explained. The patient has verbalized the understanding of these instructions at this time. If any questions should arise after returning home I have encouraged the patient to feel free to call the office.    - pt has no discomfort to toe nails, no treatment required at this time.   - if it becomes painful, can be removed  - I will order topical antifungal     - no other treatment required  - All questions and concerns addressed. - Podiatry signing off, thank you for the consult. History of Present Illness     HPI:  Tony Paul is a 26 y.o. male who presents with thick toe nails. No pain to the toe nails. Does get dry and itchy skin to both feet. States he is not applying anything to his feet. No open wounds. Resting comfortably. Inpatient consult to Podiatry  Consult performed by: Callie Clay DPM  Consult ordered by: Kory Burleson DO        Review of Systems   Constitutional: Negative. HENT: Negative. Eyes: Negative. Respiratory: Negative. Cardiovascular: Negative. Gastrointestinal: Negative. Musculoskeletal: Negative   Skin: elongated thickened toenails, dry, scaly skin    Neurological: Negative.         Historical Information   Past Medical History:   Diagnosis Date   • Asthma    • Hearing impaired    • Legally blind    • Schizophrenia ([address])      Past Surgical History:   Procedure Laterality Date   • HERNIA REPAIR     • TEAR DUCT SURGERY Bilateral    • TONSILLECTOMY       Social History   Social History     Substance and Sexual Activity   Alcohol Use Yes    Comment: (3) 40 oz. beers per day     Social History     Substance and Sexual Activity   Drug Use Not Currently   • Types: Cocaine, Marijuana, Methamphetamines    Comment: patient denies drug use today     Social History     Tobacco Use   Smoking Status Every Day   • Packs/day: 1.00   • Years: 3.00   • Total pack years: 3.00   • Types: Cigarettes   Smokeless Tobacco Never     Family History:   Family History   Problem Relation Age of Onset   • Schizophrenia Mother    • Mental illness Mother    • Abnormal EKG Sister    • Mental illness Sister        Meds/Allergies   Medications Prior to Admission   Medication   • paliperidone palmitate (INVEGA) 234 MG/1.5ML im injection   • risperiDONE (RisperDAL M-TAB) 2 mg disintegrating tablet   • risperiDONE (RisperDAL) 2 mg tablet   • albuterol (PROVENTIL HFA,VENTOLIN HFA) 90 mcg/act inhaler   • fluticasone (FLONASE) 50 mcg/act nasal spray     No Known Allergies    Objective   First Vitals:   Blood Pressure: 131/60 (05/18/23 1450)  Pulse: 60 (05/18/23 1450)  Temperature: (!) 97.2 °F (36.2 °C) (05/18/23 1450)  Temp Source: Temporal (05/18/23 1450)  Respirations: 17 (05/18/23 1450)  Height: 5' 3" (160 cm) (05/18/23 1450)  Weight - Scale: 70.2 kg (154 lb 12.8 oz) (05/18/23 1450)  SpO2: 99 % (05/18/23 1450)    Current Vitals:   Blood Pressure: 154/76 (07/18/23 1529)  Pulse: 89 (07/18/23 1529)  Temperature: 97.7 °F (36.5 °C) (07/18/23 1529)  Temp Source: Temporal (07/18/23 1529)  Respirations: 18 (07/18/23 1529)  Height: 5' 3" (160 cm) (05/18/23 1450)  Weight - Scale: 80.6 kg (177 lb 9.6 oz) (07/02/23 0808)  SpO2: 99 % (07/18/23 1529)    /76 (BP Location: Left arm)   Pulse 89   Temp 97.7 °F (36.5 °C) (Temporal)   Resp 18   Ht 5' 3" (1.6 m)   Wt 80.6 kg (177 lb 9.6 oz)   SpO2 99%   BMI 31.46 kg/m²     General Appearance:    Alert, cooperative, no distress   Head:    Normocephalic, without obvious abnormality, atraumatic   Eyes:    PERRL, conjunctiva/corneas clear, EOM's intact            Nose:   Moist mucous membranes, no drainage or sinus tenderness   Throat:   No tenderness, no exudates   Neck:   Supple, symmetrical, trachea midline, no JVD   Back:     Symmetric, no CVA tenderness   Lungs:     Clear to auscultation bilaterally, respirations unlabored   Chest wall:    No tenderness or deformity   Heart:    Regular rate and rhythm, S1 and S2 normal, no murmur, rub   or gallop   Abdomen:     Soft, non-tender, bowel sounds active all four quadrants,     no masses, no organomegaly     Extremities:   MMT is 5/5 to all compartments of the LE, +1/4 edema B/L, Digital ROM is intact,    Pulses:   R DP is +2/4, R PT is +2/4, L DP is +2/4, L PT is +2/4, CFT< 3sec to all digits.     Skin:   Nails are yellow discolored, thickened, elongated, with notable subungual debris and > 2 mm thickness noted to toenails 1-5 B/L. No open Lesions. Dry, scaly skin with itching b/l feet       Neurologic:   CNII-XII intact. Normal strength, sensation and reflexes       Throughout. Gross sensation is intact. Protective sensation is intact       Lab Results:   No results displayed because visit has over 200 results. Imaging: I have personally reviewed pertinent films in PACS  EKG, Pathology, and Other Studies: I have personally reviewed pertinent reports.       Code Status: Level 1 - Full Code  Advance Directive and Living Will:      Power of :    POLST:

## 2023-07-19 NOTE — PROGRESS NOTES
07/19/23 0844   Team Meeting   Meeting Type Daily Rounds   Team Members Present   Team Members Present Physician;Nurse;   Physician Team Member [id number] [facility] Wendie   Patient/Family Present   Patient Present No   Patient's Family Present No     Podiatry consult placed and advised to use lotion on dry skin. Pt visible on evening shift, limited interactions with peers and staff. Remains internally preoccupied, responding to internal stimuli. PRN Zyprexa and Melatonin overnight d/t insomnia and hallucinations. Discharge pending EAC.

## 2023-07-19 NOTE — NURSING NOTE
Patient visible on unit, keeps to self, limited interaction with peers and staff. Patient remains internally preoccupied, observed talking and laughing to self with minimal outbursts. Patient is compliant with medications and meal. He denies current suicidal/homicidal ideation. He denies feelings of depression or anxiety. No complaints or unmet needs identified at this time.

## 2023-07-20 PROCEDURE — 99232 SBSQ HOSP IP/OBS MODERATE 35: CPT | Performed by: PSYCHIATRY & NEUROLOGY

## 2023-07-20 RX ORDER — WATER 10 ML/10ML
INJECTION INTRAMUSCULAR; INTRAVENOUS; SUBCUTANEOUS
Status: DISCONTINUED
Start: 2023-07-20 | End: 2023-10-19 | Stop reason: HOSPADM

## 2023-07-20 RX ADMIN — DIVALPROEX SODIUM 750 MG: 500 TABLET, DELAYED RELEASE ORAL at 08:17

## 2023-07-20 RX ADMIN — CLOTRIMAZOLE: 1 CREAM TOPICAL at 08:18

## 2023-07-20 RX ADMIN — GLYCOPYRROLATE 1 MG: 1 TABLET ORAL at 08:17

## 2023-07-20 RX ADMIN — LOXAPINE 100 MG: 50 CAPSULE ORAL at 18:04

## 2023-07-20 RX ADMIN — LOXAPINE 50 MG: 50 CAPSULE ORAL at 08:17

## 2023-07-20 RX ADMIN — OLANZAPINE 5 MG: 10 INJECTION, POWDER, LYOPHILIZED, FOR SOLUTION INTRAMUSCULAR at 22:28

## 2023-07-20 RX ADMIN — GLYCOPYRROLATE 1 MG: 1 TABLET ORAL at 21:11

## 2023-07-20 RX ADMIN — OLANZAPINE 5 MG: 10 INJECTION, POWDER, LYOPHILIZED, FOR SOLUTION INTRAMUSCULAR at 22:34

## 2023-07-20 RX ADMIN — LOXAPINE 50 MG: 50 CAPSULE ORAL at 16:17

## 2023-07-20 RX ADMIN — CLOTRIMAZOLE 1 APPLICATION: 1 CREAM TOPICAL at 18:05

## 2023-07-20 RX ADMIN — DIVALPROEX SODIUM 750 MG: 500 TABLET, DELAYED RELEASE ORAL at 18:04

## 2023-07-20 RX ADMIN — GLYCOPYRROLATE 1 MG: 1 TABLET ORAL at 16:17

## 2023-07-20 NOTE — PROGRESS NOTES
Progress Note - Behavioral Health   Gokul Anders 26 y.o. male MRN: [id number]  Unit/Bed#: -02 Encounter: [id number]    Assessment/Plan   Principal Problem:    Schizophrenia ([address]) vs. Schizoaffective disorder  Active Problems:    Legally blind    Hearing loss    Asthma    Medical clearance for psychiatric admission    Bilateral impacted cerumen    Prolonged erection    Intellectual disability      Recommended Treatment:   No psychopharmacologic changes necessary at this moment; will continue to assess daily for further optimization. Continue medications as below:   Loxapine 50 mg every morning and 50 mg every afternoon, 100 mg at bedtime for psychosis   Depakote 750 mg BID for mood (Depakote level 6/12 was 91)   Continue Robinul 1 mg TID for sialorrhea  Melatonin 3mg HS prn insomnia     Continue with pharmacotherapy, group therapy, milieu therapy and occupational therapy. Monitor for adverse medication side effects. Safety checks and vitals per unit protocol. CM/SW recommendations   Continue medical management by medical team.  Case discussed with treatment team.    Legal Status: voluntary 201  ------------------------------------------------------------    Subjective: All documentation including nursing notes, medication history to ensure medication adherence on the unit, labs, and vitals were reviewed. Gokul was evaluated this morning for continuity of care and no acute distress noted throughout the evaluation. Over the past 24 hours per nursing report, Gokul has been cooperative on the unit and compliant with medications. No acute events overnight. Upon interview, patient is seen laying in bed, with sheets covering his face. Today, Gokul is consenting for safety on the unit. Gokul reports feeling "okay." Gokul notes having good sleep. States he has gotten up this morning to eat breakfast and reports having a good appetite.  Gokul has been taking the medications as prescribed and reporting no side effects. He reports his energy is "fine". Gokul denies suicidal ideations. Gokul denies homicidal ideations. Regarding hallucinations, Gokul denies recent auditory or visual hallucinations. He states he last heard voices 1 week ago. Gokul states his goal for today is to take a shower. He endorses no further complaints. VS: Reviewed, within normal limits    Progress Toward Goals: slow improvement    Psychiatric Review of Systems:  Behavior over the last 24 hours:  unchanged  Sleep: hypersomnia  Appetite: normal  Medication side effects: No   ROS: all other systems are negative    Vital signs in last 24 hours:  Temp:  [97.3 °F (36.3 °C)-97.8 °F (36.6 °C)] 97.3 °F (36.3 °C)  HR:  [] 92  Resp:  [16] 16  BP: (118-125)/(67-70) 118/70    Laboratory results:  I have personally reviewed all pertinent laboratory/tests results. No results found for this or any previous visit (from the past 48 hour(s)).       Mental Status Evaluation:    Appearance:  overtly appearing  male, laying in bed with sheets covering face, appears older than stated age, poor hygiene    Behavior:  cooperative, guarded, no eye contact, slow responses   Speech:  decreased rate, scant, decreased volume   Mood:  "okay"   Affect:  blunted   Thought Process:  concrete   Associations: concrete associations   Thought Content:  no overt delusions   Perceptual Disturbances: Denies auditory or visual hallucinations, Appears to be internally preoccupied and Does not appear to be responding to internal stimuli, appears distracted   Risk Potential: Suicidal ideation - None at present, contracts for safety on the unit, would talk to staff if not feeling safe on the unit  Homicidal ideation - None at present  Potential for aggression - Not at present   Sensorium:  unable to assess   Memory:  recent and remote memory grossly intact   Consciousness:  awake and drowsy   Attention/Concentration: attention span and concentration are age appropriate   Insight:  limited   Judgment: limited   Gait/Station: normal gait/station   Motor Activity: no abnormal movements       Current Medications:  Current Facility-Administered Medications   Medication Dose Route Frequency Provider Last Rate   • acetaminophen  650 mg Oral Q6H PRN Corrinatameka Hinojosa MD     • acetaminophen  650 mg Oral Q4H PRN Corrinatameka Hinojosa MD     • acetaminophen  975 mg Oral Q6H PRN Corrinatameka Hinojosa MD     • albuterol  2 puff Inhalation Q4H PRN Corrniatameka Hinojosa MD     • aluminum-magnesium hydroxide-simethicone  30 mL Oral Q4H PRN Corrinatameka Hinojosa MD     • benztropine  1 mg Oral Q4H PRN Max 6/day Corrinatameka Hinojosa MD     • clotrimazole   Topical BID Charles Lee DPM     • divalproex sodium  750 mg Oral BID Lexie Novak MD     • fluticasone  1 spray Nasal Daily Corrina Hinojosa MD     • glycopyrrolate  1 mg Oral TID Carlos Avalos MD     • hydrOXYzine HCL  100 mg Oral Q6H PRN Max 4/day Venora Alexander, DO      Or   • LORazepam  1 mg Intramuscular Q6H PRN Venora Alexander, DO     • hydrOXYzine HCL  25 mg Oral Q6H PRN Max 4/day Corrina Hinojosa MD     • hydrOXYzine HCL  50 mg Oral Q6H PRN Max 4/day Corrinatameka Hinojosa MD     • loxapine  100 mg Oral QPM Peri Lugo, DO     • loxapine  50 mg Oral BID Shirley Sin, DO     • melatonin  3 mg Oral HS PRN Venora Alexander, DO     • nicotine polacrilex  4 mg Oral Q2H PRN Corrina Hinojosa MD     • OLANZapine  5 mg Oral Q3H PRN Max 6/day Lexie Novak MD      Or   • OLANZapine  5 mg Intramuscular Q3H PRN Max 6/day Lexie Novak MD     • OLANZapine  5 mg Intramuscular Q8H PRN Venora Alexander, DO      Or   • OLANZapine  7.5 mg Oral Q8H PRN Venora Alexander, DO     • OLANZapine  2.5 mg Oral Q3H PRN Max 8/day Lexie Novak MD     • ondansetron  4 mg Oral Q8H PRN Venora Alexander, DO     • polyethylene glycol  17 g Oral Daily PRN Corrina Hinojosa MD     • pseudoephedrine  120 mg Oral BID PRN Onofre Cedar Liliya Byrne MD     • senna-docusate sodium  1 tablet Oral Daily PRN Sarina Green MD     • sterile water          • sterile water          • sterile water          • sterile water              Behavioral Health Medications: All current active meds have been reviewed. Changes as in plan section above. Risks, benefits and possible side effects of Medications:   Risks, benefits, and possible side effects of medications explained to patient and patient verbalizes understanding. Counseling / Coordination of Care:  Patient's progress discussed with staff in treatment team meeting. Medications, treatment progress and treatment plan reviewed with patient. Joyce Ordonez DO  Psychiatry Resident, PGY-I    This note was completed in part utilizing Dragon dictation Software. Grammatical, translation, syntax errors, random word insertions, spelling mistakes, and incomplete sentences may be an occasional consequence of this system secondary to software limitations with voice recognition, ambient noise, and hardware issues. If you have any questions or concerns about the content, text, or information contained within the body of this dictation, please contact the provider for clarification.

## 2023-07-20 NOTE — NURSING NOTE
Patient has been seclusive to his room the entire shift. Compliant with morning medications but did refuse his scheduled Flonase. Scant and guarded on approach. Currently denying AH/VH.

## 2023-07-20 NOTE — PROGRESS NOTES
07/20/23 0843   Team Meeting   Meeting Type Daily Rounds   Team Members Present   Team Members Present Physician;Nurse;   Physician Team Member [id number] [facility] Team Member Wendie   Patient/Family Present   Patient Present No   Patient's Family Present No     Pt is med/meal compliant. Loxapine was increased yesterday. Pt mostly isolative to his room. Discharge pending EAC. B-12 injection given in RIGHT DELTOID.  See MAR section for details.  Lisa Magill, CMA

## 2023-07-21 PROCEDURE — 99232 SBSQ HOSP IP/OBS MODERATE 35: CPT | Performed by: STUDENT IN AN ORGANIZED HEALTH CARE EDUCATION/TRAINING PROGRAM

## 2023-07-21 RX ADMIN — LOXAPINE 50 MG: 50 CAPSULE ORAL at 08:23

## 2023-07-21 RX ADMIN — DIVALPROEX SODIUM 750 MG: 500 TABLET, DELAYED RELEASE ORAL at 18:03

## 2023-07-21 RX ADMIN — LOXAPINE 50 MG: 50 CAPSULE ORAL at 16:00

## 2023-07-21 RX ADMIN — HYDROXYZINE HYDROCHLORIDE 100 MG: 50 TABLET, FILM COATED ORAL at 23:29

## 2023-07-21 RX ADMIN — OLANZAPINE 7.5 MG: 2.5 TABLET, FILM COATED ORAL at 22:17

## 2023-07-21 RX ADMIN — LOXAPINE 100 MG: 50 CAPSULE ORAL at 18:03

## 2023-07-21 RX ADMIN — GLYCOPYRROLATE 1 MG: 1 TABLET ORAL at 21:20

## 2023-07-21 RX ADMIN — GLYCOPYRROLATE 1 MG: 1 TABLET ORAL at 16:02

## 2023-07-21 RX ADMIN — GLYCOPYRROLATE 1 MG: 1 TABLET ORAL at 08:23

## 2023-07-21 RX ADMIN — DIVALPROEX SODIUM 750 MG: 500 TABLET, DELAYED RELEASE ORAL at 08:23

## 2023-07-21 NOTE — PROGRESS NOTES
07/21/23 0840   Team Meeting   Meeting Type Daily Rounds   Team Members Present   Team Members Present Physician;Nurse;   Phys[facility] Team Member [facility]   Care Management Team Member Wendie   Patient/Family Present   Patient Present No   Patient's Family Present No     Pt pacing the unit, slammed bedroom door, heard responding to internal stimuli. Pt received IM Zyprexa d/t agitation. Discharge to be determined.

## 2023-07-21 NOTE — NURSING NOTE
Pt slammed door and yelled angrily from room, "These n------ are bothering me!" Pt began pacing rapidly on unit with headphones on, acutely agitated. Pt was asked to take headphones off to speak with nurse, pt obliged and said, "I'm sorry, I wasn't yelling at y'all." Pt endorses bothersome AH of negative voices, pt given the choice of PRN zyprexa PO or IM, pt requested and given IM. PRN zyprexa 5mg IM for severe agitation given @ 22:30. Pt cooperative.

## 2023-07-21 NOTE — NURSING NOTE
Patient remain visible on the unit throughout the evening . cooperative with unit routine. . Patient denies any unmet needs.  Patient remain Q 7 min check

## 2023-07-21 NOTE — PROGRESS NOTES
Progress Note - Behavioral Health   Gokul Garcia 26 y.o. male MRN: [id number]  Unit/Bed#: -02 Encounter: [id number]    Assessment/Plan   Principal Problem:    Schizophrenia ([address]) vs. Schizoaffective disorder  Active Problems:    Legally blind    Hearing loss    Asthma    Medical clearance for psychiatric admission    Bilateral impacted cerumen    Prolonged erection    Intellectual disability      Recommended Treatment:   No psychopharmacologic changes necessary at this moment; will continue to assess daily for further optimization. Continue medications as below:   Loxapine 50 mg every morning and 50 mg every afternoon, 100 mg at bedtime for psychosis   Depakote 750 mg BID for mood (Depakote level 6/12 was 91)   Continue Robinul 1 mg TID for sialorrhea  Melatonin 3mg HS prn insomnia     Continue with pharmacotherapy, group therapy, milieu therapy and occupational therapy. Monitor for adverse medication side effects. Safety checks and vitals per unit protocol. CM/SW recommendations   Continue medical management by medical team.  Case discussed with treatment team.    Legal Status: voluntary 201  ------------------------------------------------------------    Subjective: All documentation including nursing notes, medication history to ensure medication adherence on the unit, labs, and vitals were reviewed. Gokul was evaluated this morning for continuity of care and no acute distress noted throughout the evaluation. Over the past 24 hours per nursing report, Gokul has been cooperative on the unit and compliant with medications. Per nursing notes, at 10:30PM "Pt slammed door and yelled angrily from room, "These n------ are bothering me!" Pt began pacing rapidly on unit with headphones on, acutely agitated. Pt was asked to take headphones off to speak with nurse, pt obliged and said, "I'm sorry, I wasn't yelling at y'all." Pt endorses bothersome AH of negative voices".  He was given Zyprexa IM, which was partially effective. Per nursing notes, "Pt still observed RTIS and endorsing voices,, but appears less agitated". No further events overnight. Today, Gokul is consenting for safety on the unit. Gokul reports feeling "good". Gokul notes having "good" sleep. Gokul states having a "good" appetite. Gokul has been taking the medications as prescribed and reporting no side effects. He reports his energy is "same old, same old". He states he sang karaoke yesterday but is unable to remember which song. He states his goal for today is to take a shower as he forgot to take one yesterday. Gokul denies suicidal ideations. Gokul denies homicidal ideations. Regarding hallucinations, Gokul denies auditory and visual hallucinations. He states he last heard voices one week ago and does not remember hearing them last night. He endorses no further complaints. PRNs overnight: Zyprexa IM   VS: Reviewed, within normal limits    Progress Toward Goals: slow improvement    Psychiatric Review of Systems:  Behavior over the last 24 hours:  unchanged  Sleep: normal  Appetite: normal  Medication side effects: No   ROS: all other systems are negative    Vital signs in last 24 hours:  Temp:  [97.1 °F (36.2 °C)-98.1 °F (36.7 °C)] 97.1 °F (36.2 °C)  HR:  [77-91] 77  Resp:  [16] 16  BP: (132-142)/(81-87) 132/81    Laboratory results:  I have personally reviewed all pertinent laboratory/tests results. No results found for this or any previous visit (from the past 48 hour(s)).       Mental Status Evaluation:    Appearance:  overtly apearing  male, laying in bed with sheets covering half of face, appears older than stated age, poor hygiene   Behavior:  cooperative, guarded, minimal eye contact   Speech:  decreased rate, scant, decreased volume   Mood:  "good"   Affect:  blunted   Thought Process:  concrete   Associations: concrete associations   Thought Content:  no overt delusions   Perceptual Disturbances: Reports no current auditory or visual hallucinations, Appears to be internally preoccupied and Does not appear to be responding to internal stimuli   Risk Potential: Suicidal ideation - None at present, contracts for safety on the unit, would talk to staff if not feeling safe on the unit  Homicidal ideation - None at present  Potential for aggression - Not at present   Sensorium:  unable to assess   Memory:  recent and remote memory grossly intact   Consciousness:  awake and drowsy   Attention/Concentration: attention span and concentration are age appropriate   Insight:  limited   Judgment: limited   Gait/Station: normal gait/station   Motor Activity: no abnormal movements       Current Medications:  Current Facility-Administered Medications   Medication Dose Route Frequency Provider Last Rate   • acetaminophen  650 mg Oral Q6H PRN Edison Alpers, MD     • acetaminophen  650 mg Oral Q4H PRN Edison Alpers, MD     • acetaminophen  975 mg Oral Q6H PRN Edison Alpers, MD     • albuterol  2 puff Inhalation Q4H PRN Edison Alpers, MD     • aluminum-magnesium hydroxide-simethicone  30 mL Oral Q4H PRN Edison Alpers, MD     • benztropine  1 mg Oral Q4H PRN Max 6/day Edison Alpers, MD     • clotrimazole   Topical BID Callie Estimable, DPM     • divalproex sodium  750 mg Oral BID Luis Armando Trejo MD     • fluticasone  1 spray Nasal Daily Edison Alpers, MD     • glycopyrrolate  1 mg Oral TID Mayda Whaley MD     • hydrOXYzine HCL  100 mg Oral Q6H PRN Max 4/day Wanda Holden DO      Or   • LORazepam  1 mg Intramuscular Q6H PRN Wanda Holden DO     • hydrOXYzine HCL  25 mg Oral Q6H PRN Max 4/day Edison Alpers, MD     • hydrOXYzine HCL  50 mg Oral Q6H PRN Max 4/day Edison Alpers, MD     • loxapine  100 mg Oral QPM Lalo Sanders DO     • loxapine  50 mg Oral BID Kory Burleson DO     • melatonin  3 mg Oral HS PRN Wanda Holden DO     • nicotine polacrilex  4 mg Oral Q2H PRN Edison Alpers, MD     • OLANZapine  5 mg Oral Q3H PRN Max 6/day Patricia Huerta MD      Or   • OLANZapine  5 mg Intramuscular Q3H PRN Max 6/day Patricia Huerta MD     • OLANZapine  5 mg Intramuscular Q8H PRN Virgil Archer DO      Or   • OLANZapine  7.5 mg Oral Q8H PRN Virgil Archer DO     • OLANZapine  2.5 mg Oral Q3H PRN Max 8/day Patricia Huerta MD     • ondansetron  4 mg Oral Q8H PRN Virgil Archer DO     • polyethylene glycol  17 g Oral Daily PRN Mirian Tyler MD     • pseudoephedrine  120 mg Oral BID PRN Erica Burkitt, MD     • senna-docusate sodium  1 tablet Oral Daily PRN Mirian Tyler MD     • sterile water          • sterile water          • sterile water          • sterile water          • sterile water              Behavioral Health Medications: All current active meds have been reviewed. Changes as in plan section above. Risks, benefits and possible side effects of Medications:   Risks, benefits, and possible side effects of medications explained to patient and patient verbalizes understanding. Counseling / Coordination of Care:  Patient's progress discussed with staff in treatment team meeting. Medications, treatment progress and treatment plan reviewed with patient. Janee Pérez DO  Psychiatry Resident, PGY-I    This note was completed in part utilizing Dragon dictation Software. Grammatical, translation, syntax errors, random word insertions, spelling mistakes, and incomplete sentences may be an occasional consequence of this system secondary to software limitations with voice recognition, ambient noise, and hardware issues. If you have any questions or concerns about the content, text, or information contained within the body of this dictation, please contact the provider for clarification.

## 2023-07-21 NOTE — SOCIAL WORK
CHICO met with pt to advise his grandmother had presented to the lobby to ask about him and how he is doing. CHICO advised pt there was no MITALI on file and as such, information could not be released. CHICO asked pt if he would like updates provide-d to his grandmother and pt stated he would like to sign MITALI. MITALI for Andreia Valencia ([phone]) signed. CHICO left voicemail for pt's grandmother to advise of MITALI. CHICO requested return phone call to provide grandmother with update.      [facility] meeting scheduled for 7/27 at 1400

## 2023-07-21 NOTE — NURSING NOTE
Patient has been seclusive to his room. Only out for breakfast and lunch. Scant during interaction. Currently denying AH/VH. Refused his scheduled Flonase and Lotrimin this morning.

## 2023-07-21 NOTE — NURSING NOTE
Pt remains isolative to room, scant and guarded. Visible for meals, needs much encouragement to get OOB. Otherwise cooperative. Denies any psych symptoms. Will monitor.

## 2023-07-22 PROCEDURE — 99232 SBSQ HOSP IP/OBS MODERATE 35: CPT | Performed by: PSYCHIATRY & NEUROLOGY

## 2023-07-22 RX ADMIN — DIVALPROEX SODIUM 750 MG: 500 TABLET, DELAYED RELEASE ORAL at 19:31

## 2023-07-22 RX ADMIN — OLANZAPINE 7.5 MG: 2.5 TABLET, FILM COATED ORAL at 22:12

## 2023-07-22 RX ADMIN — LOXAPINE 50 MG: 50 CAPSULE ORAL at 16:14

## 2023-07-22 RX ADMIN — DIVALPROEX SODIUM 750 MG: 500 TABLET, DELAYED RELEASE ORAL at 09:15

## 2023-07-22 RX ADMIN — FLUTICASONE PROPIONATE 1 SPRAY: 50 SPRAY, METERED NASAL at 09:15

## 2023-07-22 RX ADMIN — CLOTRIMAZOLE: 1 CREAM TOPICAL at 17:56

## 2023-07-22 RX ADMIN — LOXAPINE 50 MG: 50 CAPSULE ORAL at 09:15

## 2023-07-22 RX ADMIN — GLYCOPYRROLATE 1 MG: 1 TABLET ORAL at 09:15

## 2023-07-22 RX ADMIN — GLYCOPYRROLATE 1 MG: 1 TABLET ORAL at 21:31

## 2023-07-22 RX ADMIN — GLYCOPYRROLATE 1 MG: 1 TABLET ORAL at 16:14

## 2023-07-22 RX ADMIN — LOXAPINE 100 MG: 50 CAPSULE ORAL at 19:31

## 2023-07-22 RX ADMIN — HYDROXYZINE HYDROCHLORIDE 100 MG: 50 TABLET, FILM COATED ORAL at 21:30

## 2023-07-22 RX ADMIN — CLOTRIMAZOLE: 1 CREAM TOPICAL at 09:15

## 2023-07-22 NOTE — NURSING NOTE
Patient reportedly took an active role in his ADL cares today and participated in doing his own laundry.

## 2023-07-22 NOTE — NURSING NOTE
Patient cooperative and medication compliant, patient heard responding from room. This RN asked patient how he was feeling, patient stated he was hearing voices and was agitated. When asked if he felt safe, he stated he did, but rated his agitation "8 out of 10" to this RN. Patient given PO Olanzapine 7.5mg for severe agitation.

## 2023-07-22 NOTE — NURSING NOTE
Patient resting comfortably, and appears to be free of the anxiety symptoms that prompted the atarax.     PRN effective

## 2023-07-22 NOTE — NURSING NOTE
This RN entered patient's room and asked if he was feeling better following medication administration. Patient stated he was "okay" and when asked if the voices were still present, patient denied voices and agitation.     PRN effective

## 2023-07-22 NOTE — NURSING NOTE
PT observed shortly in AM during breakfast and remain isolative to his room since after that. PT stated slept well when approach, denies SI/HI, stated "voices are less and calm" no depression/anixety at this time of approach. Compliant with AM meds, and meals.

## 2023-07-22 NOTE — NURSING NOTE
Patient pacing in hallway, stating he was feeling very anxious, and requested a medication for same.  Patient appeared distressed but was agreeable to taking PO medication    Patient received 100mg atarax PO for severe anxiety

## 2023-07-22 NOTE — PROGRESS NOTES
[facility] 26 [facility] MRN: [id number]   Unit/Bed#: Vesna [facility] 079-11 Encounter: [id number]      Subjective: Patient chart reviewed and case discussed with the nurse. The patient was seen in his room today. The patient was sleeping but woke up in verbal stimuli. Though he was still tired and sedated. He reports his mood has been okay. Denies any depression or anxiety. Denies any SI or HI. Denies any auditory hallucination today. Though the patient was not clear when he had the last voices. Denies any visual hallucination. Did not endorse any paranoia or delusional ideation during the meeting. Reports sleep and appetite has been fine. Compliant with the medication and denies any side effect. As per the nurse the patient had complained of auditory hallucination last night and was given Zyprexa plus Atarax. No side effects from the medication. No priapism lately. As per nurse mostly in the room today. ROS: no complaints, all other systems are negative    Mental Status Evaluation:    Appearance:  casually dressed   Behavior:  cooperative   Speech:  normal rate, normal volume, normal pitch   Mood:  normal   Affect:  constricted   Thought Process:  logical   Associations: intact associations   Thought Content:  no overt delusions   Perceptual Disturbances: denies auditory hallucinations when asked, denies visual hallucinations when asked   Risk Potential: Suicidal ideation - None  Homicidal ideation - None  Potential for aggression - No   Sensorium:  oriented to person, place and time/date   Memory:  recent and remote memory grossly intact   Consciousness:  sedated   Attention: poor concentration   Insight:  fair   Judgment: fair   Gait/Station: in bed   Motor Activity: no abnormal movements     Vital signs in last 24 hours:         Laboratory results:   I have personally reviewed all pertinent laboratory/tests results.   Most Recent Labs:   Lab Results Component Value Date    WBC 8.57 07/04/2023    RBC 4.71 07/04/2023    HGB 14.6 07/04/2023    HCT 44.0 07/04/2023     07/04/2023    RDW 12.4 07/04/2023    TOTANEUTABS 4.84 06/08/2023    NEUTROABS 3.29 07/04/2023    SODIUM 140 07/04/2023    K 3.9 07/04/2023     07/04/2023    CO2 34 (H) 07/04/2023    BUN 15 07/04/2023    CREATININE 1.31 (H) 07/04/2023    GLUC 104 07/04/2023    GLUF 125 (H) 06/12/2023    CALCIUM 9.4 07/04/2023    AST 16 07/04/2023    ALT 7 07/04/2023    ALKPHOS 42 07/04/2023    TP 7.0 07/04/2023    ALB 3.9 07/04/2023    TBILI 0.45 07/04/2023    CHOLESTEROL 140 05/19/2023    HDL 59 05/19/2023    TRIG 106 05/19/2023    LDLCALC 60 05/19/2023    NONHDLC 81 05/19/2023    VALPROICTOT 91 06/12/2023    AMMONIA 22 10/09/2022    [id number] 1.841 05/19/2023    FREET4 1.16 10/09/2022    HGBA1C 5.2 06/16/2022     06/16/2022       Progress Toward Goals: improving    Assessment/Plan The patient overall has been doing better though still struggles with auditory hallucinations. Though denies any to me today but had yesterday as per the nurse. The plan is to continue the current medication with no changes. We will continue to monitor for mood behavior, safety, sleep and response to medication. Principal Problem:    Schizophrenia ([address]) vs. Schizoaffective disorder  Active Problems:    Legally blind    Hearing loss    Asthma    Medical clearance for psychiatric admission    Bilateral impacted cerumen    Prolonged erection    Intellectual disability    Recommended Treatment:     Planned medication and treatment changes:     All current active medications have been reviewed  Encourage group therapy, milieu therapy and occupational therapy  Behavioral Health checks every 7 minutes  Continue current medications:  Current Facility-Administered Medications   Medication Dose Route Frequency Provider Last Rate   • acetaminophen  650 mg Oral Q6H PRN Zamzam Mowers, MD     • acetaminophen  650 mg Oral Q4H PRN Ness Teresa MD     • acetaminophen  975 mg Oral Q6H PRN Ness Teresa MD     • albuterol  2 puff Inhalation Q4H PRN Ness Teresa MD     • aluminum-magnesium hydroxide-simethicone  30 mL Oral Q4H PRN Ness Teresa MD     • benztropine  1 mg Oral Q4H PRN Max 6/day Ness Teresa MD     • clotrimazole   Topical BID Rafaela Holstein, DPM     • divalproex sodium  750 mg Oral BID Claudia Koehler MD     • fluticasone  1 spray Nasal Daily Ness Teresa MD     • glycopyrrolate  1 mg Oral TID Linh Saab MD     • hydrOXYzine HCL  100 mg Oral Q6H PRN Max 4/day Tin Corrales DO      Or   • LORazepam  1 mg Intramuscular Q6H PRN Tin Corrales DO     • hydrOXYzine HCL  25 mg Oral Q6H PRN Max 4/day Ness Teresa MD     • hydrOXYzine HCL  50 mg Oral Q6H PRN Max 4/day Ness Teresa MD     • loxapine  100 mg Oral QPM Rosaleen Plough, DO     • loxapine  50 mg Oral BID Clevester Folds, DO     • melatonin  3 mg Oral HS PRN Tin Corrales DO     • nicotine polacrilex  4 mg Oral Q2H PRN Ness Teresa MD     • OLANZapine  5 mg Oral Q3H PRN Max 6/day Claudia Koehler MD      Or   • OLANZapine  5 mg Intramuscular Q3H PRN Max 6/day Claudia Koehler MD     • OLANZapine  5 mg Intramuscular Q8H PRN Tni Corrales DO      Or   • OLANZapine  7.5 mg Oral Q8H PRN Tin Corrales DO     • OLANZapine  2.5 mg Oral Q3H PRN Max 8/day Claudia Koehler MD     • ondansetron  4 mg Oral Q8H PRN Tin Corrales DO     • polyethylene glycol  17 g Oral Daily PRN Ness Teresa MD     • pseudoephedrine  120 mg Oral BID PRN Eufemia Kothari MD     • senna-docusate sodium  1 tablet Oral Daily PRN Ness Teresa MD     • sterile water          • sterile water          • sterile water          • sterile water          • sterile water              Risks / Benefits of Treatment:    Risks, benefits, and possible side effects of medications explained to patient and patient verbalizes understanding and agreement for treatment. Counseling / Coordination of Care:    Patient's progress discussed with staff in treatment team meeting. Medications, treatment progress and treatment plan reviewed with patient.     Sylvia Piedra MD 07/22/23

## 2023-07-23 PROCEDURE — 99232 SBSQ HOSP IP/OBS MODERATE 35: CPT | Performed by: PSYCHIATRY & NEUROLOGY

## 2023-07-23 RX ADMIN — DIVALPROEX SODIUM 750 MG: 500 TABLET, DELAYED RELEASE ORAL at 09:06

## 2023-07-23 RX ADMIN — LOXAPINE 50 MG: 50 CAPSULE ORAL at 09:06

## 2023-07-23 RX ADMIN — MELATONIN TAB 3 MG 3 MG: 3 TAB at 02:40

## 2023-07-23 RX ADMIN — GLYCOPYRROLATE 1 MG: 1 TABLET ORAL at 21:33

## 2023-07-23 RX ADMIN — LOXAPINE 100 MG: 50 CAPSULE ORAL at 18:41

## 2023-07-23 RX ADMIN — OLANZAPINE 7.5 MG: 2.5 TABLET, FILM COATED ORAL at 21:33

## 2023-07-23 RX ADMIN — GLYCOPYRROLATE 1 MG: 1 TABLET ORAL at 15:57

## 2023-07-23 RX ADMIN — LOXAPINE 50 MG: 50 CAPSULE ORAL at 15:57

## 2023-07-23 RX ADMIN — GLYCOPYRROLATE 1 MG: 1 TABLET ORAL at 09:06

## 2023-07-23 RX ADMIN — DIVALPROEX SODIUM 750 MG: 500 TABLET, DELAYED RELEASE ORAL at 18:41

## 2023-07-23 NOTE — NURSING NOTE
When patient was receiving his HS medication he said he was worried he would not sleep again tonight- he was actually pacing anxiously around in his room when RN entered the room. He was offered and accepted Atarax po prn for severe anxiety.

## 2023-07-23 NOTE — NURSING NOTE
Patient is walking in the hallway responding to internal stimuli "don't touch me" he said with an irritated tone. At this time he is also signing a 72 hour notice with a pencil and with only his first name. He was offered and accepted Zyprexa 7.5mgs po prn for agitation and for symptoms of psychosis I.e. hallucinating and responding to internal stimuli.

## 2023-07-23 NOTE — NURSING NOTE
Pt has been isolative to his room throughout the day. He has been mostly asleep with breathing observed to be unlabored and even. Pt had a poor appetite during breakfast and lunch due to sleeping through meals - despite being encouraged by staff to come out of his room for meals. During a brief conversation, pt continues to endorse AH of "soft voices." he denies any VH/SI/HI. Pt refused Lotrimin and Flonase during AM med pass. Education provided. Staff availability reinforced.

## 2023-07-23 NOTE — PROGRESS NOTES
Progress Note - 1001 Pauline Hopson 26 y.o. male MRN: [id number]   Unit/Bed#: [facility] [place] 216-04 Encounter: [id number]      Subjective: Patient chart reviewed and case discussed with the nurse. The patient was seen in his room today. The patient was sleeping but woke up on verbal stimuli. Though he was still tired and sedated. He reports his mood has been okay. Denies any depression or anxiety. Denies any SI or HI. Denies any auditory hallucination or visual hallucination. Did not endorse any paranoia or delusional ideation during the meeting. Reports sleep and appetite has been fine. Compliant with the medication and denies any side effect. As per the nurse the patient had been irritable last evening and responding to internal stimuli. [place] He was given Zyprexa 7.5 mg as needed by mouth yesterday for agitation and symptoms for psychosis. He also got Atarax last night for anxiety. No side effects from the medication. No priapism lately. As per nurse mostly in the room today.         ROS: no complaints, all other systems are negative    Mental Status Evaluation:    Appearance:  casually dressed   Behavior:  cooperative   Speech:  normal rate, normal volume, normal pitch   Mood:  normal   Affect:  constricted   Thought Process:  logical   Associations: intact associations   Thought Content:  no overt delusions   Perceptual Disturbances: denies auditory hallucinations when asked, denies visual hallucinations when asked, as per nurse patient responded to internal stimuli yesterday evening   Risk Potential: Suicidal ideation - None  Homicidal ideation - None  Potential for aggression - No   Sensorium:  oriented to person, place and time/date   Memory:  recent and remote memory grossly intact   Consciousness:  sedated   Attention: poor concentration   Insight:  fair   Judgment: fair   Gait/Station: in bed   Motor Activity: no abnormal movements     Vital signs in last 24 hours:    HR:  [100] 100  Resp:  [18] 18  BP: (130)/(72) 130/72    Laboratory results:   I have personally reviewed all pertinent laboratory/tests results. Most Recent Labs:   Lab Results   Component Value Date    WBC 8.57 07/04/2023    RBC 4.71 07/04/2023    HGB 14.6 07/04/2023    HCT 44.0 07/04/2023     07/04/2023    RDW 12.4 07/04/2023    TOTANEUTABS 4.84 06/08/2023    NEUTROABS 3.29 07/04/2023    SODIUM 140 07/04/2023    K 3.9 07/04/2023     07/04/2023    CO2 34 (H) 07/04/2023    BUN 15 07/04/2023    CREATININE 1.31 (H) 07/04/2023    GLUC 104 07/04/2023    GLUF 125 (H) 06/12/2023    CALCIUM 9.4 07/04/2023    AST 16 07/04/2023    ALT 7 07/04/2023    ALKPHOS 42 07/04/2023    TP 7.0 07/04/2023    ALB 3.9 07/04/2023    TBILI 0.45 07/04/2023    CHOLESTEROL 140 05/19/2023    HDL 59 05/19/2023    TRIG 106 05/19/2023    LDLCALC 60 05/19/2023    NONHDLC 81 05/19/2023    VALPROICTOT 91 06/12/2023    AMMONIA 22 10/09/2022    DJB4KECSIGHI 1.841 05/19/2023    FREET4 1.16 10/09/2022    HGBA1C 5.2 06/16/2022     06/16/2022       Progress Toward Goals: improving    Assessment/Plan The patient overall has been doing better though still struggles with auditory hallucinations. Though denies any to me today but had yesterday as per the nurse. The plan is to continue the current medication with no changes. We will continue to monitor for mood behavior, safety, sleep and response to medication. Principal Problem:    Schizophrenia ([address]) vs. Schizoaffective disorder  Active Problems:    Legally blind    Hearing loss    Asthma    Medical clearance for psychiatric admission    Bilateral impacted cerumen    Prolonged erection    Intellectual disability    Recommended Treatment:     Planned medication and treatment changes:     All current active medications have been reviewed  Encourage group therapy, milieu therapy and occupational therapy  Behavioral Health checks every 7 minutes  Continue current medications:  Current Facility-Administered Medications   Medication Dose Route Frequency Provider Last Rate   • acetaminophen  650 mg Oral Q6H PRN Joe Paez MD     • acetaminophen  650 mg Oral Q4H PRN Joe Paez MD     • acetaminophen  975 mg Oral Q6H PRN Joe Paez MD     • albuterol  2 puff Inhalation Q4H PRN Joe Paez MD     • aluminum-magnesium hydroxide-simethicone  30 mL Oral Q4H PRN Joe Paez MD     • benztropine  1 mg Oral Q4H PRN Max 6/day Joe Paez MD     • clotrimazole   Topical BID Kari Hall DPM     • divalproex sodium  750 mg Oral BID Gregoria May MD     • fluticasone  1 spray Nasal Daily Joe Paez MD     • glycopyrrolate  1 mg Oral TID León Burr MD     • hydrOXYzine HCL  100 mg Oral Q6H PRN Max 4/day Cleatus Roots, DO      Or   • LORazepam  1 mg Intramuscular Q6H PRN Cleatus Roots, DO     • hydrOXYzine HCL  25 mg Oral Q6H PRN Max 4/day Joe Paez MD     • hydrOXYzine HCL  50 mg Oral Q6H PRN Max 4/day Joe Paez MD     • loxapine  100 mg Oral QPM Rui Bigness, DO     • loxapine  50 mg Oral BID Jestine Daft, DO     • melatonin  3 mg Oral HS PRN Cleatus Roots, DO     • nicotine polacrilex  4 mg Oral Q2H PRN Joe Paez MD     • OLANZapine  5 mg Oral Q3H PRN Max 6/day Gregoria May MD      Or   • OLANZapine  5 mg Intramuscular Q3H PRN Max 6/day Gregoria May MD     • OLANZapine  5 mg Intramuscular Q8H PRN Cleatus Roots, DO      Or   • OLANZapine  7.5 mg Oral Q8H PRN Cleatus Roots, DO     • OLANZapine  2.5 mg Oral Q3H PRN Max 8/day Gregoria May MD     • ondansetron  4 mg Oral Q8H PRN Cleatus Roots, DO     • polyethylene glycol  17 g Oral Daily PRN Joe Paez MD     • pseudoephedrine  120 mg Oral BID PRN Tona Edmonds MD     • senna-docusate sodium  1 tablet Oral Daily PRN Joe Paez MD     • sterile water          • sterile water          • sterile water          • sterile water • sterile water              Risks / Benefits of Treatment:    Risks, benefits, and possible side effects of medications explained to patient and patient verbalizes understanding and agreement for treatment. Counseling / Coordination of Care:    Patient's progress discussed with staff in treatment team meeting. Medications, treatment progress and treatment plan reviewed with patient.     Mika Casey MD 07/23/23

## 2023-07-23 NOTE — PLAN OF CARE
Problem: Ineffective Coping  Goal: Participates in unit activities  Description: Interventions:  - Provide therapeutic environment   - Provide required programming   - Redirect inappropriate behaviors   Outcome: Not Progressing     Problem: SELF HARM/SUICIDALITY  Goal: Will have no self-injury during hospital stay  Description: INTERVENTIONS:  - Q 15 MINUTES: Routine safety checks  - Q WAKING SHIFT & PRN: Assess risk to determine if routine checks are adequate to maintain patient safety  - Encourage patient to participate actively in care by formulating a plan to combat response to suicidal ideation, identify supports and resources  Outcome: Progressing     Problem: ANXIETY  Goal: Will report anxiety at manageable levels  Description: INTERVENTIONS:  - Administer medication as ordered  - Teach and encourage coping skills  - Provide emotional support  - Assess patient/family for anxiety and ability to cope  Outcome: Progressing  Goal: By discharge: Patient will verbalize 2 strategies to deal with anxiety  Description: Interventions:  - Identify any obvious source/trigger to anxiety  - Staff will assist patient in applying identified coping technique/skills  - Encourage attendance of scheduled groups and activities  Outcome: Progressing     Problem: SUBSTANCE USE/ABUSE  Goal: Will have no detox symptoms and will verbalize plan for changing substance-related behavior  Description: INTERVENTIONS:  - Monitor physical status and assess for symptoms of withdrawal  - Administer medication as ordered  - Provide emotional support with 1 on 1 interaction with staff  - Encourage recovery focused program/ addiction education  - Assess for verbalization of changing behaviors related to substance abuse  - Initiate consults and referrals as appropriate (Case Management, Spiritual Care, etc.)  Outcome: Progressing  Goal: By discharge, will develop insight into their chemical dependency and sustain motivation to continue in recovery  Description: INTERVENTIONS:  - Attends all daily group sessions and scheduled AA groups  - Actively practices coping skills through participation in the therapeutic community and adherence to program rules  - Reviews and completes assignments from individual treatment plan  - Assist patient development of understanding of their personal cycle of addiction and relapse triggers  Outcome: Progressing  Goal: By discharge, patient will have ongoing treatment plan addressing chemical dependency  Description: INTERVENTIONS:  - Assist patient with resources and/or appointments for ongoing recovery based living  Outcome: Progressing     Problem: DISCHARGE PLANNING  Goal: Discharge to home or other facility with appropriate resources  Description: INTERVENTIONS:  - Identify barriers to discharge w/patient and caregiver  - Arrange for needed discharge resources and transportation as appropriate  - Identify discharge learning needs (meds, wound care, etc.)  - Arrange for interpretive services to assist at discharge as needed  - Refer to Case Management Department for coordinating discharge planning if the patient needs post-hospital services based on physician/advanced practitioner order or complex needs related to functional status, cognitive ability, or social support system  Outcome: Progressing

## 2023-07-24 PROBLEM — H61.23 BILATERAL IMPACTED CERUMEN: Status: RESOLVED | Noted: 2023-05-25 | Resolved: 2023-07-24

## 2023-07-24 PROCEDURE — 99232 SBSQ HOSP IP/OBS MODERATE 35: CPT | Performed by: PSYCHIATRY & NEUROLOGY

## 2023-07-24 RX ADMIN — DIVALPROEX SODIUM 750 MG: 500 TABLET, DELAYED RELEASE ORAL at 18:43

## 2023-07-24 RX ADMIN — MELATONIN TAB 3 MG 3 MG: 3 TAB at 00:53

## 2023-07-24 RX ADMIN — HYDROXYZINE HYDROCHLORIDE 100 MG: 50 TABLET, FILM COATED ORAL at 00:53

## 2023-07-24 RX ADMIN — LOXAPINE 50 MG: 50 CAPSULE ORAL at 15:18

## 2023-07-24 RX ADMIN — DIVALPROEX SODIUM 750 MG: 500 TABLET, DELAYED RELEASE ORAL at 09:18

## 2023-07-24 RX ADMIN — GLYCOPYRROLATE 1 MG: 1 TABLET ORAL at 09:18

## 2023-07-24 RX ADMIN — GLYCOPYRROLATE 1 MG: 1 TABLET ORAL at 20:20

## 2023-07-24 RX ADMIN — GLYCOPYRROLATE 1 MG: 1 TABLET ORAL at 15:18

## 2023-07-24 RX ADMIN — LOXAPINE 100 MG: 50 CAPSULE ORAL at 18:43

## 2023-07-24 RX ADMIN — LOXAPINE 50 MG: 50 CAPSULE ORAL at 09:18

## 2023-07-24 NOTE — PROGRESS NOTES
Progress Note - Behavioral Health   Gokul Harris 26 y.o. male MRN: [id number]  Unit/Bed#: U 344-02 Encounter: [id number]    Assessment/Plan   Principal Problem:    Schizophrenia ([address]) vs. Schizoaffective disorder  Active Problems:    Legally blind    Hearing loss    Asthma    Medical clearance for psychiatric admission    Bilateral impacted cerumen    Prolonged erection    Intellectual disability      Recommended Treatment:   No changes in pharmacologic management at this time. Continue the following medications as prescribed:   Loxapine PO 50 mg every morning, 50 mg every afternoon, and 100 mg q HS for psychosis. Depakote  mg PO BID for mood (Depakote level 6/12: 91)  Robinul PO 1 mg TID for sialorrhea. Zyprexa PRN for agitation. Atarax PRN for anxiety. Melatonin 3 mg HS PRN for insomnia. Lotrimin cream BID per podiatry recommendation for feet. Flonase for congestion. Continue to encourage medication compliance, participation in milieu therapy, and personal hygiene. Safety checks and vitals per unit protocol. Case discussed with treatment team.  Discharge disposition: EAC placement pending    Legal Status: 201  ------------------------------------------------------------    Subjective: Per nursing report, Gokul has been cooperative and compliant with PO medications. Per nursing notes, he was seen responding to internal stimuli over the weekend and was given PRN Zyprexa 7.5 mg Sunday evening. He was also given PRN Atarax and melatonin for anxiety and insomnia. He continues to refuse Lotrimin cream and Flonase. Today, Gokul is seen and evaluated in his room where he is found sleeping. He is cooperative when asked to participate in the interview. He reports his mood is "calm and collected," and states he slept "a good seven hours."  He denies any complaints of nightmares, abdominal pain, priapism, and difficulty with bowel movements.   Gokul states his appetite has been "fine" and reports eating all his meals. When asked if he has been using the Lotrimin cream for his feet, he says "a little bit when I feel like it."  Education regarding foot hygiene and compliance with the cream was provided, and patient verbalized understanding. When asked about the last time he had heard any voices, Gokul states it was yesterday. He says the voices were "talking junk about my mom and it made me upset."  He reports the voices stopped after receiving medication, and denies experiencing any auditory/visual hallucinations at the time of this encounter. He denies SI/HI. He reports feeling safe on the unit and is comfortable communicating his needs as they arise. Gokul says his main goal for today is to "talk more about my issues."  When asked to elaborate on this goal, he says "like talk about the voices and how they can bother me and how to fix it."  Encouragement to continue participating in group, being compliant with pharmacotherapy, and making progress towards his goals of personal hygiene was provided. PRNs: Zyprexa PO 7.5 mg, Atarax  mg, Melatonin 3 mg  VS: Reviewed, within normal limits    Progress Toward Goals: Limited progress at this time. Gokul continues to remain internally preoccupied and experience auditory hallucinations. EAC placement pending. Psychiatric Review of Systems:  Behavior over the last 24 hours: had an episode of agitation associated with auditory hallucinations that resolved with PRN Zyprexa, but is otherwise at baseline  Sleep: normal  Appetite: baseline  Medication side effects: denies  ROS: Complete review of systems is negative except as noted above.     Vital signs in last 24 hours:  Temp:  [97.4 °F (36.3 °C)] 97.4 °F (36.3 °C)  HR:  [92] 92  Resp:  [16] 16  BP: (122)/(62) 122/62    Mental Status Exam:  Appearance:  Overtly male, appears older than stated age, laying in bed, marginal grooming   Behavior:  Limited eye contact, cooperative   Motor: Psychomotor slowing, although assessment limited as patient was laying in bed for the duration of this encounter. Speech:  Scant, decreased rate, soft in volume    Mood:  "I'm feeling calm and collected."   Affect:  Blunted    Thought Process:  [place] and minimal improvement in poverty of thought   Thought Content: No delusions, overt paranoia, or somatic preoccupation verbalized. Perceptual disturbances: Appears distracted, but does not appear to be responding to internal stimuli at the time of this encounter. Denies AVH at this time. Risk Potential: Denies SI/HI. Contracts for safety on this unit.  Potential for aggression at this time: low   Cognition: Grossly oriented to self and situation, but cognition not formally tested   Insight:  Limited   Judgment: Limited     Current Medications:  Current Facility-Administered Medications   Medication Dose Route Frequency Provider Last Rate   • acetaminophen  650 mg Oral Q6H PRN Jose Juan Sanchez MD     • acetaminophen  650 mg Oral Q4H PRN Jose Juan Sanchez MD     • acetaminophen  975 mg Oral Q6H PRN Jose Juan Sanchez MD     • albuterol  2 puff Inhalation Q4H PRN Jose Juan Sanchez MD     • aluminum-magnesium hydroxide-simethicone  30 mL Oral Q4H PRN Jose Juan Sanchez MD     • benztropine  1 mg Oral Q4H PRN Max 6/day Jose Juan Sanchez MD     • clotrimazole   Topical BID Deb Gaines DPM     • divalproex sodium  750 mg Oral BID Lois Guerrero MD     • fluticasone  1 spray Nasal Daily Jose Juan Sanchez MD     • glycopyrrolate  1 mg Oral TID Mallory Puente MD     • hydrOXYzine HCL  100 mg Oral Q6H PRN Max 4/day Estefany Ames DO      Or   • LORazepam  1 mg Intramuscular Q6H PRN Estefany Ames DO     • hydrOXYzine HCL  25 mg Oral Q6H PRN Max 4/day Jose Juan Sanchez MD     • hydrOXYzine HCL  50 mg Oral Q6H PRN Max 4/day Jose Juan Sanchez MD     • loxapine  100 mg Oral QPM Luis Daniel Price DO     • loxapine  50 mg Oral BID Vick Napoles, DO     • melatonin  3 mg Oral HS PRN Judd Conn, DO     • nicotine polacrilex  4 mg Oral Q2H PRN Kamaljit Barreto MD     • OLANZapine  5 mg Oral Q3H PRN Max 6/day Anette Hernandez MD      Or   • OLANZapine  5 mg Intramuscular Q3H PRN Max 6/day Anette Hernandez MD     • OLANZapine  5 mg Intramuscular Q8H PRN Judd Conn, DO      Or   • OLANZapine  7.5 mg Oral Q8H PRN Judd Conn, DO     • OLANZapine  2.5 mg Oral Q3H PRN Max 8/day Anette Hernandez MD     • ondansetron  4 mg Oral Q8H PRN Judd Conn, DO     • polyethylene glycol  17 g Oral Daily PRN Kamaljit Barreto MD     • pseudoephedrine  120 mg Oral BID PRN Candi Alberts MD     • senna-docusate sodium  1 tablet Oral Daily PRN Kamaljit Barreto MD     • sterile water          • sterile water          • sterile water          • sterile water          • sterile water              Behavioral Health Medications: all current active meds have been reviewed. Laboratory results:   No results found for this or any previous visit (from the past 48 hour(s)).        Jama Calloway   [facility]

## 2023-07-24 NOTE — NURSING NOTE
Patient is under his covers in bed and he is hallucinating loudly. He has an irritable, guarded demeanor. He was offered and accepted Zyprexa 7.5mgs po prn. He was cooperative with HS scheduled medication.

## 2023-07-24 NOTE — PROGRESS NOTES
07/24/23 0844   Team Meeting   Meeting Type Daily Rounds   Team Members Present   Team Members Present Physician;Nurse;   [id number] Wendie   Patient/Family Present   Patient Present No   Patient's Family Present No     Pt med/meal compliant. Reporting AH were less in the beginning of the weekend. Pt observed responding to internal stimuli overnight on Saturday. Received PRN Zyprexa. Pt again endorsing AH on Sunday and received PRN Zyprexa, Atarax and Melatonin. Discharge pending EAC.

## 2023-07-24 NOTE — NURSING NOTE
Patient has been out of his room several times pacing and anxious in the hallway. He requested prn medication - Atarax and Melatonin prn offered and accepted.

## 2023-07-24 NOTE — NURSING NOTE
Pt isolated,  Sleeping in his room, only visible when coming to get his meal tray. Pt is med and meal compliant,  disheveled in personal attire. Pt denies all psych symptoms, denies any unmet needs, pt refused am FLONASE and LOTRIMIN. NO remarkable behaviors observed.

## 2023-07-25 PROCEDURE — 99232 SBSQ HOSP IP/OBS MODERATE 35: CPT | Performed by: PSYCHIATRY & NEUROLOGY

## 2023-07-25 RX ADMIN — LOXAPINE 50 MG: 50 CAPSULE ORAL at 15:27

## 2023-07-25 RX ADMIN — DIVALPROEX SODIUM 750 MG: 500 TABLET, DELAYED RELEASE ORAL at 08:10

## 2023-07-25 RX ADMIN — GLYCOPYRROLATE 1 MG: 1 TABLET ORAL at 20:42

## 2023-07-25 RX ADMIN — MELATONIN TAB 3 MG 3 MG: 3 TAB at 00:27

## 2023-07-25 RX ADMIN — NICOTINE POLACRILEX 4 MG: 4 GUM, CHEWING BUCCAL at 16:35

## 2023-07-25 RX ADMIN — LOXAPINE 50 MG: 50 CAPSULE ORAL at 08:10

## 2023-07-25 RX ADMIN — GLYCOPYRROLATE 1 MG: 1 TABLET ORAL at 15:27

## 2023-07-25 RX ADMIN — LOXAPINE 100 MG: 50 CAPSULE ORAL at 20:43

## 2023-07-25 RX ADMIN — GLYCOPYRROLATE 1 MG: 1 TABLET ORAL at 08:10

## 2023-07-25 RX ADMIN — DIVALPROEX SODIUM 750 MG: 500 TABLET, DELAYED RELEASE ORAL at 20:42

## 2023-07-25 NOTE — PLAN OF CARE
Problem: Ineffective Coping  Goal: Participates in unit activities  Description: Interventions:  - Provide therapeutic environment   - Provide required programming   - Redirect inappropriate behaviors   Outcome: Not Progressing     Problem: SELF HARM/SUICIDALITY  Goal: Will have no self-injury during hospital stay  Description: INTERVENTIONS:  - Q 15 MINUTES: Routine safety checks  - Q WAKING SHIFT & PRN: Assess risk to determine if routine checks are adequate to maintain patient safety  - Encourage patient to participate actively in care by formulating a plan to combat response to suicidal ideation, identify supports and resources  Outcome: Progressing     Problem: ANXIETY  Goal: Will report anxiety at manageable levels  Description: INTERVENTIONS:  - Administer medication as ordered  - Teach and encourage coping skills  - Provide emotional support  - Assess patient/family for anxiety and ability to cope  Outcome: Progressing  Goal: By discharge: Patient will verbalize 2 strategies to deal with anxiety  Description: Interventions:  - Identify any obvious source/trigger to anxiety  - Staff will assist patient in applying identified coping technique/skills  - Encourage attendance of scheduled groups and activities  Outcome: Progressing     Problem: SUBSTANCE USE/ABUSE  Goal: Will have no detox symptoms and will verbalize plan for changing substance-related behavior  Description: INTERVENTIONS:  - Monitor physical status and assess for symptoms of withdrawal  - Administer medication as ordered  - Provide emotional support with 1 on 1 interaction with staff  - Encourage recovery focused program/ addiction education  - Assess for verbalization of changing behaviors related to substance abuse  - Initiate consults and referrals as appropriate (Case Management, Spiritual Care, etc.)  Outcome: Progressing  Goal: By discharge, will develop insight into their chemical dependency and sustain motivation to continue in recovery  Description: INTERVENTIONS:  - Attends all daily group sessions and scheduled AA groups  - Actively practices coping skills through participation in the therapeutic community and adherence to program rules  - Reviews and completes assignments from individual treatment plan  - Assist patient development of understanding of their personal cycle of addiction and relapse triggers  Outcome: Progressing  Goal: By discharge, patient will have ongoing treatment plan addressing chemical dependency  Description: INTERVENTIONS:  - Assist patient with resources and/or appointments for ongoing recovery based living  Outcome: Progressing

## 2023-07-25 NOTE — PROGRESS NOTES
Progress Note - Behavioral Health   Gokul Cleopatra Holstein 26 y.o. male MRN: [id number]  Unit/Bed#: [facility] 344-02 Encounter: [id number]    Assessment/Plan   Principal Problem:    Schizophrenia ([address]) vs. Schizoaffective disorder  Active Problems:    Legally blind    Hearing loss    Asthma    Medical clearance for psychiatric admission    Prolonged erection    Intellectual disability      Recommended Treatment:   Continue with the following pharmacologic management:  Loxapine PO 50 mg every morning, 50 mg every afternoon, and 100 mg q HS for psychosis. Depakote  mg PO BID for mood (Depakote level 6/12: 91). Robinul PO 1 mg TID for sialorrhea. Zyprexa PRN for agitation. Atarax PRN for anxiety. Melatonin 3 mg HS PRN for insomnia. Continue Lotrimin cream BID, per podiatry recommendation. Continue to encourage medication compliance, participation in group therapy, and personal hygiene. Continue with safety checks and vitals per protocol. Case discussed with treatment team.  Discharge disposition: EAC placement pending    Legal Status: 201  ------------------------------------------------------------    Subjective: Per nursing report, Gokul has been compliant with PO medications. He was experiencing insomnia last night and was given PRN melatonin, which was reported to be effective. He was seen awake early this morning and responding loudly to internal stimuli. Today, Gokul is seen and evaluated in his room where he is asleep. He is able to be roused and is cooperative throughout the encounter. He reports his mood as "fine."  When asked if he slept throughout the night, he reports he was able to fall asleep after receiving the melatonin. He denies any nightmares or vivid dreams. When asked about appetite, he reports completing both lunch and dinner yesterday. However, he says he did not eat breakfast this morning because he feels sleepy.   He denies any physical symptoms/adverse effects from medications. Gokul reports he has not been using the Lotrimin cream for his feet because "I don't like attention on my feet."  After education regarding the importance of foot hygiene was provided, he became agreeable to applying the cream after showering today. When asked about auditory hallucinations, Gokul reports "they are better than when they started."  When asked to clarify, he says "they started when I was [age]."  He reports the last time he heard voices was yesterday, and denies there being any new voices. At the time of this encounter, he denies AVH and SI/HI. When asked if he was able to work on his goal of being more social in the unit, he states he participated in "[employer] and won a pack of gum two days ago. He says his goals for today are to use the cream, shower, and exercise by doing sit-ups and push-ups. Encouragement to continue keeping up with his goals, being compliant with medications, and participating in group was provided. PRNs: Melatonin 3 mg  VS: Reviewed, within normal limits    Progress Toward Goals:  Limited. Gokul continues to remain internally preoccupied and experience auditory hallucinations. EAC placement pending. Psychiatric Review of Systems:  Behavior over the last 24 hours: unchanged  Sleep: hypersomnia  Appetite: adequate  Medication side effects: denies  ROS: Complete review of systems is negative except as noted above. Vital signs in last 24 hours:  Temp:  [97.7 °F (36.5 °C)] 97.7 °F (36.5 °C)  HR:  [104] 104  Resp:  [16] 16  BP: (130)/(78) 130/78    Mental Status Exam:  Appearance:  Overtly male, appearing older than stated age, laying in bed, drowsy, marginal grooming/hygiene   Behavior:  calm, cooperative and laying in bed   Motor: Assessment limited as patient was laying in bed during this encounter.    Speech:  slow, soft, scant and coherent   Mood:  "Fine"   Affect:  blunted   Thought Process:  [place], slight improvement in poverty of thought   Thought Content: no verbalized delusions or overt paranoia   Perceptual disturbances: Denies current AVH. Appears distracted, but does not appear to be responding to internal stimuli at this time   Risk Potential: Denies SI/HI. Contracts for safety on this unity. Potential for aggression: low.    Cognition: Grossly oriented to self and situation, but cognition not formally tested   Insight:  Limited   Judgment: Limited     Current Medications:  Current Facility-Administered Medications   Medication Dose Route Frequency Provider Last Rate   • acetaminophen  650 mg Oral Q6H PRN Corrina MD Micaela     • acetaminophen  650 mg Oral Q4H PRN Corrina MD Micaela     • acetaminophen  975 mg Oral Q6H PRN Corrina MD Micaela     • albuterol  2 puff Inhalation Q4H PRN Corrinatameka Hinojosa MD     • aluminum-magnesium hydroxide-simethicone  30 mL Oral Q4H PRN Corrinatameka Hinojosa MD     • benztropine  1 mg Oral Q4H PRN Max 6/day Corrinatameka Hinojosa MD     • clotrimazole   Topical BID Charles Lee DPM     • divalproex sodium  750 mg Oral BID Lexie Novak MD     • fluticasone  1 spray Nasal Daily Corrina Hinojosa MD     • glycopyrrolate  1 mg Oral TID Carlos Avalos MD     • hydrOXYzine HCL  100 mg Oral Q6H PRN Max 4/day Venora Alexander, DO      Or   • LORazepam  1 mg Intramuscular Q6H PRN Venora Alexander, DO     • hydrOXYzine HCL  25 mg Oral Q6H PRN Max 4/day Corrina Hinojosa MD     • hydrOXYzine HCL  50 mg Oral Q6H PRN Max 4/day Corrinatameka Hinojosa MD     • loxapine  100 mg Oral QPM Peri Lugo, DO     • loxapine  50 mg Oral BID Shirley Black, DO     • melatonin  3 mg Oral HS PRN Venora Alexander, DO     • nicotine polacrilex  4 mg Oral Q2H PRN Corrina Hinojosa MD     • OLANZapine  5 mg Oral Q3H PRN Max 6/day Lexie Novak MD      Or   • OLANZapine  5 mg Intramuscular Q3H PRN Max 6/day Lexie Novak MD     • OLANZapine  5 mg Intramuscular Q8H PRN Venora Alexander, DO      Or   • OLANZapine  7.5 mg Oral Q8H PRN Sherice Chan DO     • OLANZapine  2.5 mg Oral Q3H PRN Max 8/day Josias Shah MD     • ondansetron  4 mg Oral Q8H PRN Sherice Chan DO     • polyethylene glycol  17 g Oral Daily PRN Bianca Villafana MD     • pseudoephedrine  120 mg Oral BID PRN Fartun Shah MD     • senna-docusate sodium  1 tablet Oral Daily PRN Bianca Villafana MD     • sterile water          • sterile water          • sterile water          • sterile water          • sterile water              Behavioral Health Medications: all current active meds have been reviewed. Laboratory results:   No results found for this or any previous visit (from the past 48 hour(s)).        Jama Calloway   [facility]

## 2023-07-25 NOTE — PROGRESS NOTES
07/25/23 0844   Team Meeting   Meeting Type Daily Rounds   Team Members Present   Team Members Present Physician;Nurse;   Physician Team Member Craig Trinh   [facility]   Care Management Team Member Wendie   Patient/Family Present   Patient Present No   Patient's Family Present No     Pt awake early this morning, loudly responding to internal stimuli. PRN melatonin for insomnia last night. Med/meal compliant. Discharge pending EAC placement.

## 2023-07-25 NOTE — NURSING NOTE
Pt visible, pacing, talking to self under breath. Pt seen with hand on his ears,  Ap[pears to be responding to internal stimuli, but insists he is not. Pt appears distracted but brightens upon approach, compliant with scheduled med and refuses PRN. Pt compliant with meals as well, denies HI/SI/AVH. Pt has not verbalized unmet needs, will continue to monitor.

## 2023-07-25 NOTE — NURSING NOTE
PT has been awake,  and visible intermittently out in the milieu. Patient Denies SI,HI,AH, and VH at this time. Patient remain Q 7 min check.

## 2023-07-26 PROCEDURE — 99232 SBSQ HOSP IP/OBS MODERATE 35: CPT | Performed by: PSYCHIATRY & NEUROLOGY

## 2023-07-26 RX ADMIN — NICOTINE POLACRILEX 4 MG: 4 GUM, CHEWING BUCCAL at 02:36

## 2023-07-26 RX ADMIN — LOXAPINE 100 MG: 50 CAPSULE ORAL at 19:29

## 2023-07-26 RX ADMIN — LOXAPINE 50 MG: 50 CAPSULE ORAL at 15:17

## 2023-07-26 RX ADMIN — LOXAPINE 50 MG: 50 CAPSULE ORAL at 08:21

## 2023-07-26 RX ADMIN — DIVALPROEX SODIUM 750 MG: 500 TABLET, DELAYED RELEASE ORAL at 08:21

## 2023-07-26 RX ADMIN — GLYCOPYRROLATE 1 MG: 1 TABLET ORAL at 15:17

## 2023-07-26 RX ADMIN — GLYCOPYRROLATE 1 MG: 1 TABLET ORAL at 21:26

## 2023-07-26 RX ADMIN — NICOTINE POLACRILEX 4 MG: 4 GUM, CHEWING BUCCAL at 19:29

## 2023-07-26 RX ADMIN — GLYCOPYRROLATE 1 MG: 1 TABLET ORAL at 08:21

## 2023-07-26 RX ADMIN — DIVALPROEX SODIUM 750 MG: 500 TABLET, DELAYED RELEASE ORAL at 19:29

## 2023-07-26 RX ADMIN — MELATONIN TAB 3 MG 3 MG: 3 TAB at 02:35

## 2023-07-26 NOTE — PROGRESS NOTES
07/26/23 0841   Team Meeting   Meeting Type Daily Rounds   Team Members Present   Team Members Present Physician;Nurse;   Physician Team Member [facility]   Nursing Team Member [facility] Management Team Member Wendie   Patient/Family Present   Patient Present No   Patient's Family Present No     Pt awake overnight and early this morning. Responding to internal stimuli. Pt received PRN medication which was effective. Discharge pending EAC.

## 2023-07-26 NOTE — NURSING NOTE
Patient visible on unit, sitting in dayroom watching TV. He remains in behavioral control. Patient denies current SI/HI/AVH. Patient is compliant with medications and meals. No complaints or unmet needs voiced at this time.

## 2023-07-26 NOTE — PROGRESS NOTES
Progress Note - Behavioral Health   Dyphier Millard Fleischer 26 y.o. male MRN: [id number]  Unit/Bed#: [facility] 344-02 Encounter: [id number]    Assessment/Plan   Principal Problem:    Schizophrenia ([address]) vs. Schizoaffective disorder  Active Problems:    Legally blind    Hearing loss    Asthma    Medical clearance for psychiatric admission    Prolonged erection    Intellectual disability      Recommended Treatment:   Continue with the following medications as prescribed:  Loxapine PO 50 mg every morning, 50 mg every afternoon, and 100 mg q HS for psychosis. Depakote  mg PO BID for mood (Depakote level 6/12: 91). Robinul PO 1 mg TID for sialorrhea. Zyprexa PRN for agitation. Atarax PRN for anxiety. Melatonin 3 mg HS PRN for insomnia. Lotrimin cream BID, per podiatry recommendation. Continue to encourage medication compliance, participation in group therapy, and personal hygiene. Continue with safety checks and vitals per protocol. Discharge disposition: EAC placement pending     Legal Status: 201  ------------------------------------------------------------    Subjective: Per nursing report, Gokul has been cooperative on the unit and compliant with scheduled PO medications. He required a PRN of melatonin overnight which was effective. Today, Gokul is seen and evaluated in the consult room. Upon interview, he is quite drowsy. He reports sleeping "seven to eight hours" after receiving melatonin last night. He denies any nightmares, abdominal pain, headaches, and any other medication side effects. He says his mood and energy are the "same as yesterday."  He says his appetite has been "fine," and that he eats his meals whenever he is not sleepy. Currently, Gokul denies any SI/HI and denies auditory/visual hallucinations.   When asked about the last time he experienced any auditory hallucinations, he states "a few days ago."  He says the voices were "just laughing and having a good time," and denies being bothered or agitated as a result of the voices. When prompted to create goals for the day, he shares he would like to shower, exercise, and use the Lotrimin cream since he was unable to do so yesterday. Towards the end of this encounter, Gokul spontaneously states "I don't want to go to rehab."  When asked to elaborate on this statement, he says "there's no point if I'm already getting better here."  This team provided reassurance and told him case management would be speaking to him regarding the next steps in his care, to which he is agreeable. PRNs: Melatonin 3 mg  VS:  Pulse: 101 BPM this morning. Continue to monitor per protocol. Progress Toward Goals: Limited. Gokul appears to be at his baseline. EAC placement pending. Psychiatric Review of Systems:  Behavior over the last 24 hours: unchanged  Sleep: increased  Appetite: adequate  Medication side effects: denies  ROS: Complete review of systems is negative except as noted above. Vital signs in last 24 hours:  Temp:  [97.3 °F (36.3 °C)-97.6 °F (36.4 °C)] 97.3 °F (36.3 °C)  HR:  [] 101  Resp:  [18] 18  BP: (130-143)/(71-89) 130/71    Mental Status Exam:  Appearance:  Overtly male, appears older than stated age, drowsy, marginal grooming/hygiene, poor dentition   Behavior:  Calm, cooperative, pleasant   Motor: Psychomotor slowing   Speech:  Slow, soft, scant, coherent. More spontaneous than yesterday. Mood:  "The same"   Affect:  Blunted   Thought Process:  [place], slight improvement in poverty of thought   Thought Content: No verbalized delusions or overt paranoia. Perceptual disturbances: Denies current AVH. Does not appear to be responding to internal stimuli at the time of this encounter, although appears occasionally distracted. Risk Potential: Denies SI/HI. Cognition: Cognition not formally assessed, but appears grossly oriented to self and situation.    Insight:  Limited   Judgment: Limited     Current Medications:  Current Facility-Administered Medications   Medication Dose Route Frequency Provider Last Rate   • acetaminophen  650 mg Oral Q6H PRN Sharon Holter, MD     • acetaminophen  650 mg Oral Q4H PRN Sharon Holter, MD     • acetaminophen  975 mg Oral Q6H PRN Sharon Holter, MD     • albuterol  2 puff Inhalation Q4H PRN Sharon Holter, MD     • aluminum-magnesium hydroxide-simethicone  30 mL Oral Q4H PRN Sharon Holter, MD     • benztropine  1 mg Oral Q4H PRN Max 6/day Sharon Holter, MD     • clotrimazole   Topical BID Johnathan Hickman DPM     • divalproex sodium  750 mg Oral BID Sarai Sanches MD     • fluticasone  1 spray Nasal Daily Sharon Holter, MD     • glycopyrrolate  1 mg Oral TID Flor Shah MD     • hydrOXYzine HCL  100 mg Oral Q6H PRN Max 4/day Zonia Selby DO      Or   • LORazepam  1 mg Intramuscular Q6H PRN Zonia Selby DO     • hydrOXYzine HCL  25 mg Oral Q6H PRN Max 4/day Sharon Holter, MD     • hydrOXYzine HCL  50 mg Oral Q6H PRN Max 4/day Sharon Holter, MD     • loxapine  100 mg Oral QPM Arelis Fleming DO     • loxapine  50 mg Oral BID Leila Colon DO     • melatonin  3 mg Oral HS PRN Zonia Selby DO     • nicotine polacrilex  4 mg Oral Q2H PRN Sharon Holter, MD     • OLANZapine  5 mg Oral Q3H PRN Max 6/day Sarai Sanches MD      Or   • OLANZapine  5 mg Intramuscular Q3H PRN Max 6/day Sarai Sanches MD     • OLANZapine  5 mg Intramuscular Q8H PRN Zonia Selby DO      Or   • OLANZapine  7.5 mg Oral Q8H PRN Zonia Selby DO     • OLANZapine  2.5 mg Oral Q3H PRN Max 8/day Sarai Sanches MD     • ondansetron  4 mg Oral Q8H PRN Zonia Selby DO     • polyethylene glycol  17 g Oral Daily PRN Sharon Holter, MD     • pseudoephedrine  120 mg Oral BID PRN Ivy Rockwell MD     • senna-docusate sodium  1 tablet Oral Daily PRN Sharon Holter, MD     • sterile water          • sterile water          • sterile water          • sterile water          • sterile water              Behavioral Health Medications: all current active meds have been reviewed. Laboratory results:   No results found for this or any previous visit (from the past 48 hour(s)).        Jama NEW

## 2023-07-26 NOTE — SOCIAL WORK
SW spoke with pt regarding potential EAC placement.  Pt is in agreement and denies any questions or concerns regarding referral.

## 2023-07-26 NOTE — NURSING NOTE
Pt visible for breakfast,  Sleeping since. Pt compliant with meds/meals,denies SI/HI/AVH. Pt dressed in hospital attire.

## 2023-07-27 PROCEDURE — 99232 SBSQ HOSP IP/OBS MODERATE 35: CPT | Performed by: PSYCHIATRY & NEUROLOGY

## 2023-07-27 RX ADMIN — MELATONIN TAB 3 MG 3 MG: 3 TAB at 02:04

## 2023-07-27 RX ADMIN — GLYCOPYRROLATE 1 MG: 1 TABLET ORAL at 21:48

## 2023-07-27 RX ADMIN — CLOTRIMAZOLE: 1 CREAM TOPICAL at 18:42

## 2023-07-27 RX ADMIN — LOXAPINE 50 MG: 50 CAPSULE ORAL at 15:11

## 2023-07-27 RX ADMIN — DIVALPROEX SODIUM 750 MG: 500 TABLET, DELAYED RELEASE ORAL at 18:41

## 2023-07-27 RX ADMIN — GLYCOPYRROLATE 1 MG: 1 TABLET ORAL at 08:55

## 2023-07-27 RX ADMIN — LOXAPINE 100 MG: 50 CAPSULE ORAL at 18:41

## 2023-07-27 RX ADMIN — FLUTICASONE PROPIONATE 1 SPRAY: 50 SPRAY, METERED NASAL at 08:55

## 2023-07-27 RX ADMIN — CLOTRIMAZOLE: 1 CREAM TOPICAL at 08:58

## 2023-07-27 RX ADMIN — LOXAPINE 50 MG: 50 CAPSULE ORAL at 08:55

## 2023-07-27 RX ADMIN — GLYCOPYRROLATE 1 MG: 1 TABLET ORAL at 15:11

## 2023-07-27 RX ADMIN — DIVALPROEX SODIUM 750 MG: 500 TABLET, DELAYED RELEASE ORAL at 08:55

## 2023-07-27 NOTE — NURSING NOTE
Pt is calm and pleasant with a blunted affect denying SI/HI/AVH, depression, or anxiety. Ate breakfast and then returned to bed. Applied scheduled lotrimin on feet. No unmet needs at this time.

## 2023-07-27 NOTE — PLAN OF CARE
Problem: Ineffective Coping  Goal: Participates in unit activities  Description: Interventions:  - Provide therapeutic environment   - Provide required programming   - Redirect inappropriate behaviors   Outcome: Not Progressing     Problem: SELF HARM/SUICIDALITY  Goal: Will have no self-injury during hospital stay  Description: INTERVENTIONS:  - Q 15 MINUTES: Routine safety checks  - Q WAKING SHIFT & PRN: Assess risk to determine if routine checks are adequate to maintain patient safety  - Encourage patient to participate actively in care by formulating a plan to combat response to suicidal ideation, identify supports and resources  Outcome: Not Progressing     Problem: ANXIETY  Goal: Will report anxiety at manageable levels  Description: INTERVENTIONS:  - Administer medication as ordered  - Teach and encourage coping skills  - Provide emotional support  - Assess patient/family for anxiety and ability to cope  Outcome: Progressing  Goal: By discharge: Patient will verbalize 2 strategies to deal with anxiety  Description: Interventions:  - Identify any obvious source/trigger to anxiety  - Staff will assist patient in applying identified coping technique/skills  - Encourage attendance of scheduled groups and activities  Outcome: Progressing     Problem: SUBSTANCE USE/ABUSE  Goal: Will have no detox symptoms and will verbalize plan for changing substance-related behavior  Description: INTERVENTIONS:  - Monitor physical status and assess for symptoms of withdrawal  - Administer medication as ordered  - Provide emotional support with 1 on 1 interaction with staff  - Encourage recovery focused program/ addiction education  - Assess for verbalization of changing behaviors related to substance abuse  - Initiate consults and referrals as appropriate (Case Management, Spiritual Care, etc.)  Outcome: Progressing  Goal: By discharge, will develop insight into their chemical dependency and sustain motivation to continue in recovery  Description: INTERVENTIONS:  - Attends all daily group sessions and scheduled AA groups  - Actively practices coping skills through participation in the therapeutic community and adherence to program rules  - Reviews and completes assignments from individual treatment plan  - Assist patient development of understanding of their personal cycle of addiction and relapse triggers  Outcome: Progressing  Goal: By discharge, patient will have ongoing treatment plan addressing chemical dependency  Description: INTERVENTIONS:  - Assist patient with resources and/or appointments for ongoing recovery based living  Outcome: Progressing

## 2023-07-27 NOTE — PROGRESS NOTES
Progress Note - Behavioral Health   Gokul Cr 26 y.o. male MRN: [id number]  Unit/Bed#: U 344-02 Encounter: [id number]    Assessment/Plan   Principal Problem:    Schizophrenia ([address]) vs. Schizoaffective disorder  Active Problems:    Legally blind    Hearing loss    Asthma    Medical clearance for psychiatric admission    Prolonged erection    Intellectual disability      Recommended Treatment:   Continue with the following medications:  Loxapine PO 50 mg q AM, 50 mg q PM, and 100 mg q HS. Depakote  mg PO BID for mood (Depakote level 6/12: 91). Robinul PO 1 mg TID for sialorrhea. Zyprexa PRN for agitation. Atarax PRN for anxiety. Melatonin 3 mg HS PRN for insomnia. Lotrimin cream BID per podiatry recommendation. Continue to encourage participation in group therapy, working on achieving personal goals, and medication compliance. Continue safety checks and vitals per protocol. Case discussed with treatment team.  Discharge disposition: EAC placement pending  Legal Status: 201  ------------------------------------------------------------    Subjective: Per nursing report, Gokul has been cooperative on the unit and compliant with scheduled medications. He was given a PRN of melatonin for insomnia which was effective. Today, Gokul is seen and evaluated in the consult room. He is sitting up in bed, applying Lotrimin cream to his feet, and cooperative with the team.  He reports sleeping "eight or nine hours" last night after taking the melatonin. He describes his mood as "refreshed" and says he feels "great" after taking a shower yesterday afternoon. He says his energy and appetite have increased compared to yesterday because "I feel more awake today."  He denies any abdominal pain, headaches, shortness of breath, and priapism. Currently, Gokul denies any SI/HI, AVH, and feelings of paranoia.   When asked if he was hearing voices, he says "the last time was a few days ago when they were talking about my mom."  He denies experiencing auditory hallucinations since then, and has not felt agitated or upset. He does not appear to be responding to internal stimuli at this time. When asked if he has any questions or concerns regarding EAC, he denies. Positive reinforcement was provided to continue encouraging Gokul to keep up with his goals of socializing, showering, using the Lotrimin cream, and continuing to make his needs known as they come up. He overall feels safe and comfortable on the unit. PRNs: Melatonin 3 mg  VS: Reviewed, wnl     Progress Toward Goals:  Limited. Gokul is mostly at baseline and continues to be compliant with medications. EAC placement pending. Psychiatric Review of Systems:  Behavior over the last 24 hours: unchanged  Sleep: normal  Appetite: adequate  Medication side effects: denies  ROS: Complete review of systems is negative. Vital signs in last 24 hours:  Temp:  [97.4 °F (36.3 °C)] 97.4 °F (36.3 °C)  HR:  [87] 87  Resp:  [18] 18  BP: (121)/(76) 121/76    Mental Status Exam:  Appearance:  Overtly male, appears older than stated age, more alert than yesterday, improved grooming/hygiene   Behavior:  Calm, cooperative, pleasant   Motor: No abnormal movements noted. Speech:  Louder than yesterday. Slow, coherent, slightly less scant. Mood:  "Refreshed"   Affect:  Blunted, although more reactive than yesterday. Thought Process:  [place], improvement in poverty of thought compared to yesterday. Thought Content: No verbalized delusions or overt paranoia. Perceptual disturbances: Denies current AVH. Does not appear to be responding to internal stimuli at the time of this encounter, although appears occasionally distracted. Risk Potential: Denies SI/HI. Potential for aggression: low   Cognition: Cognition not formally assessed, but grossly oriented to self and situation.    Insight:  Limited   Judgment: Limited     Current Medications:  Current Facility-Administered Medications   Medication Dose Route Frequency Provider Last Rate   • acetaminophen  650 mg Oral Q6H PRN Anastasia Andres MD     • acetaminophen  650 mg Oral Q4H PRN Anastasia Andres MD     • acetaminophen  975 mg Oral Q6H PRN Anastasia Andres MD     • albuterol  2 puff Inhalation Q4H PRN Anastasia Andres MD     • aluminum-magnesium hydroxide-simethicone  30 mL Oral Q4H PRN Anastasia Andres MD     • benztropine  1 mg Oral Q4H PRN Max 6/day Anastasia Andres MD     • clotrimazole   Topical BID Gabriela Cornejo DPM     • divalproex sodium  750 mg Oral BID Alfred Qureshi MD     • fluticasone  1 spray Nasal Daily Anastasia Andres MD     • glycopyrrolate  1 mg Oral TID Anjum Chew MD     • hydrOXYzine HCL  100 mg Oral Q6H PRN Max 4/day Tess Rasmussen, DO      Or   • LORazepam  1 mg Intramuscular Q6H PRN Tess Rasmussen, DO     • hydrOXYzine HCL  25 mg Oral Q6H PRN Max 4/day Anastasia Andres MD     • hydrOXYzine HCL  50 mg Oral Q6H PRN Max 4/day Anastasia Andres MD     • loxapine  100 mg Oral QPM Letifabián Hernández, DO     • loxapine  50 mg Oral BID Nino Gibson DO     • melatonin  3 mg Oral HS PRN Tess Rasmussen, DO     • nicotine polacrilex  4 mg Oral Q2H PRN Anastasia Andres MD     • OLANZapine  5 mg Oral Q3H PRN Max 6/day Alfred Qureshi MD      Or   • OLANZapine  5 mg Intramuscular Q3H PRN Max 6/day Alfred Qureshi MD     • OLANZapine  5 mg Intramuscular Q8H PRN Tess Coastpeggy, DO      Or   • OLANZapine  7.5 mg Oral Q8H PRN Tess Rasmussen, DO     • OLANZapine  2.5 mg Oral Q3H PRN Max 8/day Alfred Qureshi MD     • ondansetron  4 mg Oral Q8H PRN Tess Coastpeggy, DO     • polyethylene glycol  17 g Oral Daily PRN Anastasia Andres MD     • pseudoephedrine  120 mg Oral BID PRN Federica Hess MD     • senna-docusate sodium  1 tablet Oral Daily PRN Anastasia Andres MD     • sterile water          • sterile water          • sterile water          • sterile water • sterile water              Behavioral Health Medications: all current active meds have been reviewed. Laboratory results:   No results found for this or any previous visit (from the past 48 hour(s)).      Jama QUICK-EWA

## 2023-07-27 NOTE — NURSING NOTE
Pt visible throughout evening, calm, cooperative and pleasant. Pt not observed by nurse to be RTIS, pt also denies SI/HI/AH/VH and all internal stimulus. Pt endorses mild anxiety, declined PRN and states that qhs scheduled medications effective in reducing anxiety. Pt denies further needs. Medication adherent.

## 2023-07-28 PROCEDURE — 99232 SBSQ HOSP IP/OBS MODERATE 35: CPT | Performed by: PSYCHIATRY & NEUROLOGY

## 2023-07-28 RX ORDER — WATER 10 ML/10ML
INJECTION INTRAMUSCULAR; INTRAVENOUS; SUBCUTANEOUS
Status: DISCONTINUED
Start: 2023-07-28 | End: 2023-10-19 | Stop reason: HOSPADM

## 2023-07-28 RX ADMIN — GLYCOPYRROLATE 1 MG: 1 TABLET ORAL at 17:55

## 2023-07-28 RX ADMIN — OLANZAPINE 7.5 MG: 2.5 TABLET, FILM COATED ORAL at 00:38

## 2023-07-28 RX ADMIN — GLYCOPYRROLATE 1 MG: 1 TABLET ORAL at 09:13

## 2023-07-28 RX ADMIN — LOXAPINE 50 MG: 50 CAPSULE ORAL at 17:54

## 2023-07-28 RX ADMIN — OLANZAPINE 5 MG: 10 INJECTION, POWDER, LYOPHILIZED, FOR SOLUTION INTRAMUSCULAR at 23:48

## 2023-07-28 RX ADMIN — DIVALPROEX SODIUM 750 MG: 500 TABLET, DELAYED RELEASE ORAL at 18:10

## 2023-07-28 RX ADMIN — DIVALPROEX SODIUM 750 MG: 500 TABLET, DELAYED RELEASE ORAL at 09:13

## 2023-07-28 RX ADMIN — GLYCOPYRROLATE 1 MG: 1 TABLET ORAL at 21:19

## 2023-07-28 RX ADMIN — MELATONIN TAB 3 MG 3 MG: 3 TAB at 00:38

## 2023-07-28 RX ADMIN — LOXAPINE 50 MG: 50 CAPSULE ORAL at 09:13

## 2023-07-28 RX ADMIN — LOXAPINE 100 MG: 50 CAPSULE ORAL at 18:10

## 2023-07-28 NOTE — NURSING NOTE
Pt approached nurse requesting PRN for insomnia. Pt observed to be internally preoccupied and when asked, pt endorsed AH of voices bothering him. Pt appears agitated and distracted during conversation. Pt given PRN melatonin 3mg for insomnia and PRN zyprexa 7.5mg PO for severe agitation @ 00:38.

## 2023-07-28 NOTE — NURSING NOTE
Pt awake, endorses improved AH, appears less agitated. PRN melatonin ineffective, PRN zyprexa effective. Pt and nurse discussed sleep hygiene, pt receptive.

## 2023-07-28 NOTE — PROGRESS NOTES
Progress Note - Behavioral Health   Gokul Cr 26 y.o. male MRN: [id number]  Unit/Bed#: [facility] 344-02 Encounter: [id number]    Assessment/Plan   Principal Problem:    Schizophrenia ([address]) vs. Schizoaffective disorder  Active Problems:    Legally blind    Hearing loss    Asthma    Medical clearance for psychiatric admission    Prolonged erection    Intellectual disability      Recommended Treatment:   Continue with the following pharmacologic management:  Loxapine PO 50 mg q AM, 50 mg q PM, and 100 mg q HS. Depakote  mg PO BID for mood (Depakote level 6/12: 91). Robinul PO 1 mg TID for sialorrhea. Zyprexa PRN for agitation. Atarax PRN for anxiety. Melatonin 3 mg HS PRN for insomnia. Lotrimin cream BID per podiatry recommendation.     Continue to encourage participation in group therapy, milieu therapy, goal-setting, and medication compliance. Continue safety checks and vitals per unit protocol. Case discussed with treatment team.  Discharge disposition: EAC placement pending  Legal Status: 201  ------------------------------------------------------------    Subjective:  Per nursing report, Gokul has been visible on the unit and compliant with scheduled medications. He had complaints of insomnia and auditory hallucinations last night, for which he received PRN melatonin and Zyprexa. Per nursing note, Zyprexa was effective, and melatonin was ineffective. Vital signs reviewed. Today, Gokul is seen and evaluated in his room. He is cooperative with the team during this interview and eventually sits up in bed. When asked how he slept last night, he says "okay."  He reports hearing voices last night that were "guys talking reckless" which bothered him, but denies experiencing any command auditory hallucinations. He says the Zyprexa stopped the voices, and he was eventually able to fall asleep. He endorses feeling "calm" overall at this time.   He says his appetite is "good" this morning and is looking forward to the next meal.  When asked about his goals for the day, he shares he would like to take care of "hygiene like showering" and continuing to use the Lotrimin cream for his feet. He denies experiencing any side effects, including changes in bowels, abdominal pain, priapism, and palpitations. Currently, Gokul denies auditory/visual hallucinations, SI/HI, and feelings of paranoia. He does not have any questions or concerns at this time. This team provided him with support, reassurance, and encouragement to continue working towards his goals and making his needs known. PRNs:  Zyprexa 7.5 mg, Melatonin 5 mg  VS:  Reviewed, wnl    Progress Toward Goals:  Remains limited; Gokul appears largely at baseline. EAC placement pending. Psychiatric Review of Systems:  Behavior over the last 24 hours: some agitation d/t auditory hallucinations which resolved w PRN Zyprexa  Sleep: improving  Appetite: adequate  Medication side effects: denies  ROS: Review of systems is negative as described above. Vital signs in last 24 hours:  Temp:  [97.2 °F (36.2 °C)] 97.2 °F (36.2 °C)  HR:  [91] 91  Resp:  [16] 16  BP: (133)/(71) 133/71    Mental Status Exam:  Appearance:  Overtly male, appears older than stated age, drowsy, casually dressed w marginal grooming compared to yesterday   Behavior:  Calm, cooperative, somewhat guarded   Motor: Slight psychomotor slowing compared to yesterday   Speech:  Soft, slow, coherent, scant   Mood:  "Calm"   Affect:  Blunted, less reactive than yesterday   Thought Process:  [place], slight poverty of thought   Thought Content: No verbalized delusions or paranoia. Perceptual disturbances: Denies current auditory/visual hallucinations. Does not appear to be responding to internal stimuli at the time of this interview, but appears distracted. Risk Potential: Denies SI/HI. Potential for aggression: low   Cognition: Cognition not formally assessed.  Grossly oriented to self and situation.    Insight:  Limited   Judgment: Limited     Current Medications:  Current Facility-Administered Medications   Medication Dose Route Frequency Provider Last Rate   • acetaminophen  650 mg Oral Q6H PRN Erasto Mejia MD     • acetaminophen  650 mg Oral Q4H PRN Erasto Mejia MD     • acetaminophen  975 mg Oral Q6H PRN Erasto Mejia MD     • albuterol  2 puff Inhalation Q4H PRN Erasto Mejia MD     • aluminum-magnesium hydroxide-simethicone  30 mL Oral Q4H PRN Erasto Mejia MD     • benztropine  1 mg Oral Q4H PRN Max 6/day Erasto Mejia MD     • clotrimazole   Topical BID Adwoa Ridley DPM     • divalproex sodium  750 mg Oral BID Cheryl Johnson MD     • fluticasone  1 spray Nasal Daily Erasto Mejia MD     • glycopyrrolate  1 mg Oral TID Jamal Mckeon MD     • hydrOXYzine HCL  100 mg Oral Q6H PRN Max 4/day Shanti Zeng DO      Or   • LORazepam  1 mg Intramuscular Q6H PRN Shanti Zeng DO     • hydrOXYzine HCL  25 mg Oral Q6H PRN Max 4/day Erasto Mejia MD     • hydrOXYzine HCL  50 mg Oral Q6H PRN Max 4/day Erasto Mejia MD     • loxapine  100 mg Oral QPM Rachel Metz DO     • loxapine  50 mg Oral BID Garrison Quintanilla, DO     • melatonin  3 mg Oral HS PRN Shanti Zeng DO     • nicotine polacrilex  4 mg Oral Q2H PRN Erasto Mejia MD     • OLANZapine  5 mg Oral Q3H PRN Max 6/day Cheryl Johnson MD      Or   • OLANZapine  5 mg Intramuscular Q3H PRN Max 6/day Cheryl Johnson MD     • OLANZapine  5 mg Intramuscular Q8H PRN Shanti Zeng DO      Or   • OLANZapine  7.5 mg Oral Q8H PRN Shanti Zeng DO     • OLANZapine  2.5 mg Oral Q3H PRN Max 8/day Cheryl Johnson MD     • ondansetron  4 mg Oral Q8H PRN Shanti Zeng DO     • polyethylene glycol  17 g Oral Daily PRN Erasto Mejia MD     • pseudoephedrine  120 mg Oral BID PRN Hue Simon MD     • senna-docusate sodium  1 tablet Oral Daily PRN Erasto Mejia MD     • sterile water          • sterile water          • sterile water          • sterile water          • sterile water              Behavioral Health Medications: all current active meds have been reviewed. Laboratory results:  I have personally reviewed all pertinent laboratory/tests results. No results found for this or any previous visit (from the past 48 hour(s)).        Jama Calloway   [facility]

## 2023-07-28 NOTE — NURSING NOTE
Pt visible intermittently, took a nap this evening. Pt calm, quiet, cooperative, pleasant. Pt has not been observed to be RTIS this evening, pt denies SI/// [facility] and current unmet needs. Pt medication adherent.

## 2023-07-28 NOTE — PROGRESS NOTES
07/28/23 0840   Team Meeting   Meeting Type Daily Rounds   Team Members Present   Team Members Present Physician;Nurse;   Physician Team Member [facility]   [facility]ie   Patient/Family Present   Patient Present No   Patient's Family Present No   Pt visible intermittently in the evening. Around midnight requesting PRN for insomnia and responding to internal stimuli. Pt received PRN melatonin and Zyprexa. [place] meeting yesterday. Discharge pending EAC placement.

## 2023-07-28 NOTE — SOCIAL WORK
EAC referral completed and submitted to [facility], Dustin Silva and Kirstin Rabago.

## 2023-07-29 PROCEDURE — 99232 SBSQ HOSP IP/OBS MODERATE 35: CPT | Performed by: PSYCHIATRY & NEUROLOGY

## 2023-07-29 RX ADMIN — OLANZAPINE 5 MG: 5 TABLET, FILM COATED ORAL at 02:56

## 2023-07-29 RX ADMIN — DIVALPROEX SODIUM 750 MG: 500 TABLET, DELAYED RELEASE ORAL at 08:50

## 2023-07-29 RX ADMIN — LOXAPINE 50 MG: 50 CAPSULE ORAL at 18:03

## 2023-07-29 RX ADMIN — MELATONIN TAB 3 MG 3 MG: 3 TAB at 21:16

## 2023-07-29 RX ADMIN — NICOTINE POLACRILEX 4 MG: 4 GUM, CHEWING BUCCAL at 21:16

## 2023-07-29 RX ADMIN — MELATONIN TAB 3 MG 3 MG: 3 TAB at 00:53

## 2023-07-29 RX ADMIN — LOXAPINE 50 MG: 50 CAPSULE ORAL at 08:49

## 2023-07-29 RX ADMIN — GLYCOPYRROLATE 1 MG: 1 TABLET ORAL at 18:06

## 2023-07-29 RX ADMIN — CLOTRIMAZOLE: 1 CREAM TOPICAL at 08:53

## 2023-07-29 RX ADMIN — DIVALPROEX SODIUM 750 MG: 500 TABLET, DELAYED RELEASE ORAL at 18:03

## 2023-07-29 RX ADMIN — CLOTRIMAZOLE: 1 CREAM TOPICAL at 18:04

## 2023-07-29 RX ADMIN — HYDROXYZINE HYDROCHLORIDE 100 MG: 50 TABLET, FILM COATED ORAL at 00:53

## 2023-07-29 RX ADMIN — NICOTINE POLACRILEX 4 MG: 4 GUM, CHEWING BUCCAL at 00:53

## 2023-07-29 RX ADMIN — HYDROXYZINE HYDROCHLORIDE 100 MG: 50 TABLET, FILM COATED ORAL at 23:01

## 2023-07-29 RX ADMIN — GLYCOPYRROLATE 1 MG: 1 TABLET ORAL at 08:50

## 2023-07-29 RX ADMIN — OLANZAPINE 7.5 MG: 2.5 TABLET, FILM COATED ORAL at 23:04

## 2023-07-29 RX ADMIN — LOXAPINE 100 MG: 50 CAPSULE ORAL at 18:04

## 2023-07-29 RX ADMIN — GLYCOPYRROLATE 1 MG: 1 TABLET ORAL at 21:16

## 2023-07-29 NOTE — PROGRESS NOTES
Progress Note - Behavioral Health   Gokul Diaz Crew 26 y.o. male MRN: [id number]  Unit/Bed#: -02 Encounter: [id number]    Assessment/Plan   Principal Problem:    Schizophrenia ([address]) vs. Schizoaffective disorder  Active Problems:    Legally blind    Hearing loss    Asthma    Medical clearance for psychiatric admission    Prolonged erection    Intellectual disability      Recommended Treatment:   Continue Depakote 750 mg twice daily, for mood  Continue loxapine 50 mg twice daily and 100 mg qPM, for psychosis  Continue Robinul 1 mg p.o. 3 times daily, for sialorrhea  Continue Zyprexa as needed, for agitation  Continue Atarax as needed, for anxiety  Continue melatonin 3 mg nightly as needed, for insomnia  Patient currently awaiting placement with EAC    Continue with group therapy, milieu therapy and occupational therapy. Continue frequent safety checks and vitals per unit protocol. Case discussed with treatment team.  Continue with [facility] medical management as indicated  Continue coordinating with case management regarding disposition  Risks, benefits and possible side effects of Medications: Risks, benefits, and possible side effects of medications have been explained to the patient, who verbalizes understanding    Legal Status: 201  ------------------------------------------------------------    Subjective: Per nursing report, Gokul has been calm, cooperative, and friendly on approach as well as medication compliant. Overnight, patient observed responding loudly in room and came running out, holding head and tearful, stating "the voices are really bad."  Patient was given as needed Zyprexa 5 mg IM for moderate agitation. During encounter, patient was observed lying in bed with blankets over his head, somewhat somnolent. Patient describes his current mood as "okay" and reports adequate appetite and energy levels. Patient reports adequate sleep overnight.   He denies any medication side effects and states that his auditory hallucinations are better this morning. Patient denies any current suicidal ideation, homicidal ideation, auditory or visual hallucinations. PRNs overnight: Zyprexa 5 mg p.o., melatonin 3 mg p.o., Atarax 100 mg p.o.  VS: Reviewed, within normal limits    Progress Toward Goals: Gradual improvement     Psychiatric Review of Systems:  Behavior over the last 24 hours: Some agitation overnight due to disturbing auditory hallucinations, which resolved with as needed Zyprexa  Sleep: improving  Appetite: adequate  Medication side effects: none verbalized  ROS: Complete review of systems is negative except as noted above.     Vital signs in last 24 hours:       Mental Status Exam:  Appearance:  age appropriate, Wrapped in blanket   Behavior:  cooperative, calm, still somewhat guarded   Speech:  normal rate and volume, scant   Mood:  "Okay"   Affect:  blunted   Thought Process:  slight poverty of thought   Associations: concrete associations   Thought Content:  no overt delusions   Perceptual Disturbances: Denies auditory or visual hallucinations and Does not appear to be responding to internal stimuli   Risk Potential: Suicidal ideation - None  Homicidal ideation - None  Potential for aggression - Not at present   Sensorium:  oriented to person, place and time/date   Memory:  recent and remote memory grossly intact   Consciousness:  Awake, though drowsy   Attention/Concentration: attention span and concentration are age appropriate   Insight:  limited   Judgment: limited   Gait/Station: in bed   Motor Activity: no abnormal movements     Current Medications:  Current Facility-Administered Medications   Medication Dose Route Frequency Provider Last Rate   • sterile water          • acetaminophen  650 mg Oral Q6H PRN Mirian Tyler MD     • acetaminophen  650 mg Oral Q4H PRN Mirian Tyler MD     • acetaminophen  975 mg Oral Q6H PRN Mirian Tyler MD     • albuterol  2 puff Inhalation Q4H PRN Ness Teresa MD     • aluminum-magnesium hydroxide-simethicone  30 mL Oral Q4H PRN Ness Teresa MD     • benztropine  1 mg Oral Q4H PRN Max 6/day Ness Teresa MD     • clotrimazole   Topical BID Rafaela Holstein, DPM     • divalproex sodium  750 mg Oral BID Claudia Koehler MD     • fluticasone  1 spray Nasal Daily Ness Teresa MD     • glycopyrrolate  1 mg Oral TID Linh Saab MD     • hydrOXYzine HCL  100 mg Oral Q6H PRN Max 4/day Tin Corrales DO      Or   • LORazepam  1 mg Intramuscular Q6H PRN Tin Corrales DO     • hydrOXYzine HCL  25 mg Oral Q6H PRN Max 4/day Ness Teresa MD     • hydrOXYzine HCL  50 mg Oral Q6H PRN Max 4/day Ness Teresa MD     • loxapine  100 mg Oral QPM Rosaleen Plough, DO     • loxapine  50 mg Oral BID Clevester Folds, DO     • melatonin  3 mg Oral HS PRN Tin Corrales DO     • nicotine polacrilex  4 mg Oral Q2H PRN Ness Teresa MD     • OLANZapine  5 mg Oral Q3H PRN Max 6/day Claudia Koehler MD      Or   • OLANZapine  5 mg Intramuscular Q3H PRN Max 6/day Claudia Koehler MD     • OLANZapine  5 mg Intramuscular Q8H PRN Tin Corrales DO      Or   • OLANZapine  7.5 mg Oral Q8H PRN Tin Corrales DO     • OLANZapine  2.5 mg Oral Q3H PRN Max 8/day Claudia Koehler MD     • ondansetron  4 mg Oral Q8H PRN Tin Corrales DO     • polyethylene glycol  17 g Oral Daily PRN Ness Teresa MD     • pseudoephedrine  120 mg Oral BID PRN Isabel Chavez MD     • senna-docusate sodium  1 tablet Oral Daily PRN Ness Teresa MD     • sterile water          • sterile water          • sterile water          • sterile water          • sterile water              Behavioral Health Medications: all current active meds have been reviewed. Changes as in plan section above. Laboratory results:  I have personally reviewed all pertinent laboratory/tests results. No results found for this or any previous visit (from the past 48 hour(s)). This note has been constructed using a voice recognition system. There may be translation, syntax, or grammatical errors. If you have any questions, please contact the dictating author.     Malvina Hatchet, MD PGY 2

## 2023-07-29 NOTE — NURSING NOTE
Patient is calm and cooperative. Friendly upon approach, denies all psych symptoms at this time. Medication compliant.

## 2023-07-29 NOTE — NURSING NOTE
Patient heard responding loudly in room and then came out of room, holding head and tearful. Patient then went to corner of unit and punched wall. Patient redirected to dayroom with staff. Patient remained calm, reporting, "The voices are really bad." PRN zyprexa 5mg IM was administered for moderate agitation. Patient was given a snack and calmed down. Will monitor for effectiveness.

## 2023-07-29 NOTE — NURSING NOTE
Patient reports IM zyprexa 5mg was effective at relieving the agitation from voices. Patient c/o severe anxiety and difficulty sleeping. PRN atarax 100mg and melatonin 3mg was administered. Will monitor fore effectiveness.

## 2023-07-29 NOTE — NURSING NOTE
PRN atarax 100mg and melatonin 3mg were minimally effective. Patient continues to respond to internal stimuli and c/o of anxiety causing difficulty sleeping.

## 2023-07-30 PROCEDURE — 99232 SBSQ HOSP IP/OBS MODERATE 35: CPT | Performed by: PSYCHIATRY & NEUROLOGY

## 2023-07-30 RX ORDER — BENZTROPINE MESYLATE 1 MG/ML
1 INJECTION INTRAMUSCULAR; INTRAVENOUS 2 TIMES DAILY PRN
Status: DISCONTINUED | OUTPATIENT
Start: 2023-07-30 | End: 2023-10-19 | Stop reason: HOSPADM

## 2023-07-30 RX ORDER — FLUTICASONE PROPIONATE 50 MCG
1 SPRAY, SUSPENSION (ML) NASAL DAILY PRN
Status: DISCONTINUED | OUTPATIENT
Start: 2023-07-30 | End: 2023-10-19 | Stop reason: HOSPADM

## 2023-07-30 RX ORDER — HALOPERIDOL 5 MG/ML
5 INJECTION INTRAMUSCULAR ONCE
Status: COMPLETED | OUTPATIENT
Start: 2023-07-30 | End: 2023-07-30

## 2023-07-30 RX ADMIN — NICOTINE POLACRILEX 4 MG: 4 GUM, CHEWING BUCCAL at 00:42

## 2023-07-30 RX ADMIN — HALOPERIDOL LACTATE 5 MG: 5 INJECTION, SOLUTION INTRAMUSCULAR at 01:34

## 2023-07-30 RX ADMIN — LOXAPINE 100 MG: 50 CAPSULE ORAL at 20:44

## 2023-07-30 RX ADMIN — GLYCOPYRROLATE 1 MG: 1 TABLET ORAL at 09:08

## 2023-07-30 RX ADMIN — OLANZAPINE 7.5 MG: 2.5 TABLET, FILM COATED ORAL at 20:24

## 2023-07-30 RX ADMIN — LOXAPINE 50 MG: 50 CAPSULE ORAL at 09:08

## 2023-07-30 RX ADMIN — GLYCOPYRROLATE 1 MG: 1 TABLET ORAL at 20:45

## 2023-07-30 RX ADMIN — BENZTROPINE MESYLATE 1 MG: 1 TABLET ORAL at 01:34

## 2023-07-30 RX ADMIN — NICOTINE POLACRILEX 4 MG: 4 GUM, CHEWING BUCCAL at 22:40

## 2023-07-30 RX ADMIN — DIVALPROEX SODIUM 750 MG: 500 TABLET, DELAYED RELEASE ORAL at 09:08

## 2023-07-30 RX ADMIN — DIVALPROEX SODIUM 750 MG: 500 TABLET, DELAYED RELEASE ORAL at 20:45

## 2023-07-30 RX ADMIN — GLYCOPYRROLATE 1 MG: 1 TABLET ORAL at 15:12

## 2023-07-30 RX ADMIN — LOXAPINE 50 MG: 50 CAPSULE ORAL at 15:12

## 2023-07-30 RX ADMIN — MELATONIN TAB 3 MG 3 MG: 3 TAB at 20:45

## 2023-07-30 RX ADMIN — HYDROXYZINE HYDROCHLORIDE 100 MG: 50 TABLET, FILM COATED ORAL at 22:41

## 2023-07-30 NOTE — PROGRESS NOTES
[facility] Group Note     07/30/23 1300   Activity/Group Checklist   Group Life Skills  (Teamwork and Communication)   Attendance Attended   Attendance Duration (min) 46-60   Interactions Did not interact   Affect/Mood Constricted  (slept)   Goals Achieved Able to listen to others

## 2023-07-30 NOTE — NURSING NOTE
Patient remained in behavioral control. Patient denies AVH/SI at this time. Patient was compliant with scheduled medications. Patient requested melatonin for sleep, which was administered at 2116 and ineffective so far. Patient is awake pacing unit. Staff to maintain continuous rounding for safety and support.

## 2023-07-30 NOTE — PLAN OF CARE
Problem: Ineffective Coping  Goal: Participates in unit activities  Description: Interventions:  - Provide therapeutic environment   - Provide required programming   - Redirect inappropriate behaviors   7/30/2023 0358 by Siomara Jones RN  Outcome: Not Progressing  7/30/2023 0357 by Siomara Jones RN  Outcome: Not Progressing     Problem: SELF HARM/SUICIDALITY  Goal: Will have no self-injury during hospital stay  Description: INTERVENTIONS:  - Q 15 MINUTES: Routine safety checks  - Q WAKING SHIFT & PRN: Assess risk to determine if routine checks are adequate to maintain patient safety  - Encourage patient to participate actively in care by formulating a plan to combat response to suicidal ideation, identify supports and resources  7/30/2023 0358 by Siomara Jones RN  Outcome: Progressing  7/30/2023 0357 by Siomara Jones RN  Outcome: Progressing     Problem: ANXIETY  Goal: Will report anxiety at manageable levels  Description: INTERVENTIONS:  - Administer medication as ordered  - Teach and encourage coping skills  - Provide emotional support  - Assess patient/family for anxiety and ability to cope  7/30/2023 0358 by Siomara Jones RN  Outcome: Progressing  7/30/2023 0357 by Siomara Jones RN  Outcome: Progressing  Goal: By discharge: Patient will verbalize 2 strategies to deal with anxiety  Description: Interventions:  - Identify any obvious source/trigger to anxiety  - Staff will assist patient in applying identified coping technique/skills  - Encourage attendance of scheduled groups and activities  7/30/2023 0358 by Siomara Jones RN  Outcome: Progressing  7/30/2023 0357 by Siomara Jones RN  Outcome: Progressing     Problem: SUBSTANCE USE/ABUSE  Goal: Will have no detox symptoms and will verbalize plan for changing substance-related behavior  Description: INTERVENTIONS:  - Monitor physical status and assess for symptoms of withdrawal  - Administer medication as ordered  - Provide emotional support with 1 on 1 interaction with staff  - Encourage recovery focused program/ addiction education  - Assess for verbalization of changing behaviors related to substance abuse  - Initiate consults and referrals as appropriate (Case Management, Spiritual Care, etc.)  7/30/2023 0358 by Genna Ronquillo RN  Outcome: Progressing  7/30/2023 0357 by Genna Ronquillo RN  Outcome: Progressing  Goal: By discharge, will develop insight into their chemical dependency and sustain motivation to continue in recovery  Description: INTERVENTIONS:  - Attends all daily group sessions and scheduled AA groups  - Actively practices coping skills through participation in the therapeutic community and adherence to program rules  - Reviews and completes assignments from individual treatment plan  - Assist patient development of understanding of their personal cycle of addiction and relapse triggers  7/30/2023 0358 by Genna Ronquillo RN  Outcome: Progressing  7/30/2023 0357 by Genna Ronquillo RN  Outcome: Progressing  Goal: By discharge, patient will have ongoing treatment plan addressing chemical dependency  Description: INTERVENTIONS:  - Assist patient with resources and/or appointments for ongoing recovery based living  7/30/2023 0358 by Genna Ronquillo RN  Outcome: Progressing  7/30/2023 0357 by Genna Ronquillo RN  Outcome: Progressing     Problem: DISCHARGE PLANNING  Goal: Discharge to home or other facility with appropriate resources  Description: INTERVENTIONS:  - Identify barriers to discharge w/patient and caregiver  - Arrange for needed discharge resources and transportation as appropriate  - Identify discharge learning needs (meds, wound care, etc.)  - Arrange for interpretive services to assist at discharge as needed  - Refer to Case Management Department for coordinating discharge planning if the patient needs post-hospital services based on physician/advanced practitioner order or complex needs related to functional status, cognitive ability, or social support system  7/30/2023 0358 by Darlene Irizarry RN  Outcome: Progressing  7/30/2023 0357 by Darlene Irizarry RN  Outcome: Progressing

## 2023-07-30 NOTE — NURSING NOTE
Patient is unable to sleep and is irritable, anxious about not being able to sleep. He is responding to internal stimuli and is trying to distract himself by watching a cartoon - however this has not been successful and he came to staff requesting medication to help with his symptoms. He denies S.I.H.I.

## 2023-07-30 NOTE — PROGRESS NOTES
Progress Note - [address] 26 y.o. male MRN: [id number]  Unit/Bed#: [address] 838-80 Encounter: [id number]    Assessment/Plan   Principal Problem:    Schizophrenia ([address]) vs. Schizoaffective disorder  Active Problems:    Legally blind    Hearing loss    Asthma    Medical clearance for psychiatric admission    Prolonged erection    Intellectual disability      Recommended Treatment:   Continue Depakote 750 mg twice daily, for mood  Continue loxapine 50 mg twice daily and 100 mg qPM, for psychosis, may consider Clozapine sometime in the future to better control psychotic symptoms  Continue Robinul 1 mg p.o. 3 times daily, for sialorrhea  Continue Zyprexa as needed, for agitation  Continue Atarax as needed, for anxiety  Continue melatonin 3 mg nightly as needed, for insomnia  Patient currently awaiting placement with [facility]    Continue with group therapy, milieu therapy and occupational therapy. Continue frequent safety checks and vitals per unit protocol. Case discussed with treatment team.  Continue with [facility] medical management as indicated  Continue coordinating with case management regarding disposition  Risks, benefits and possible side effects of Medications: Risks, benefits, and possible side effects of medications have been explained to the patient, who verbalizes understanding    Legal Status: 201  ------------------------------------------------------------    Subjective: Per nursing report, Gokul has been sleeping most of the day, though has been eating his breakfast and is medication compliant. At night patient is awake, unable to sleep, irritable and anxious, responding to internal stimuli and trying to distract himself by watching a cartoon. Patient reports that auditory hallucinations are getting worse, and was observed punching the bathroom wall saying "the voices are strong."  Patient was given Zyprexa 7.5 mg p.o. and Atarax 100 mg p.o.  IM Haldol 5 mg with Cogentin 1 mg p.o. was also ordered. These meds were somewhat effective as patient was no longer agitated and was eventually able to get some sleep. During encounter this morning, patient was observed laying in bed resting. Patient describes his current mood as "okay" and gives a "thumbs up" sign. He denies any auditory hallucinations this morning but reports tiredness. Patient also denies any medication side effects related to the scheduled or as needed meds. This writer spoke to patient about his current loxapine dosage of 200 mg a day and that we may defer to patient's primary psychiatry team regarding any medication switches or adjustments. Patient expressed understanding. Patient denies any current suicidal ideation, homicidal ideations or AVH. PRNs overnight: Atarax 100 mg p.o., melatonin 3 mg p.o., Zyprexa 5 mg and 7.5 mg p.o., Haldol 5 mg IM, and Cogentin 1 mg p.o.  VS: Reviewed, within normal limits    Progress Toward Goals: slow improvement    Psychiatric Review of Systems:  Behavior over the last 24 hours: unchanged  Sleep: Difficulty sleeping at night, increased sleep during the day  Appetite: adequate  Medication side effects: none verbalized  ROS: Complete review of systems is negative except as noted above.     Vital signs in last 24 hours:       Mental Status Exam:  Appearance:  age appropriate, casually dressed   Behavior:  cooperative, calm, still somewhat guarded   Speech:  normal rate and volume, scant   Mood:  "Okay"   Affect:  blunted   Thought Process:  Slight poverty of thought   Associations: concrete associations   Thought Content:  no overt delusions   Perceptual Disturbances: Denies auditory or visual hallucinations and Does not appear to be responding to internal stimuli, though reports intense AH overnight   Risk Potential: Suicidal ideation - None at present  Homicidal ideation - None at present  Potential for aggression - Not at present   Sensorium:  oriented to person, place and time/date Memory:  recent and remote memory grossly intact   Consciousness:  awake , drowsy   Attention/Concentration: attention span and concentration are age appropriate   Insight:  limited   Judgment: limited   Gait/Station: in bed   Motor Activity: no abnormal movements     Current Medications:  Current Facility-Administered Medications   Medication Dose Route Frequency Provider Last Rate   • acetaminophen  650 mg Oral Q6H PRN Zamzam Mesa MD     • acetaminophen  650 mg Oral Q4H PRN Zamzam Mesa MD     • acetaminophen  975 mg Oral Q6H PRN Zamzam Mesa MD     • albuterol  2 puff Inhalation Q4H PRN Zamzam Mesa MD     • aluminum-magnesium hydroxide-simethicone  30 mL Oral Q4H PRN Zamzam Mesa MD     • benztropine  1 mg Intramuscular BID PRN Jessica Adkins MD     • benztropine  1 mg Oral Q4H PRN Max 6/day Zamzam Mesa MD     • clotrimazole   Topical BID Ellen Whitfield DPM     • divalproex sodium  750 mg Oral BID Candy Shell MD     • fluticasone  1 spray Nasal Daily Zamzam Mesa MD     • glycopyrrolate  1 mg Oral TID Seamus Padgett MD     • hydrOXYzine HCL  100 mg Oral Q6H PRN Max 4/day Rc Concordia, DO      Or   • LORazepam  1 mg Intramuscular Q6H PRN Rc Concordia, DO     • hydrOXYzine HCL  25 mg Oral Q6H PRN Max 4/day Zamzam Mesa MD     • hydrOXYzine HCL  50 mg Oral Q6H PRN Max 4/day Zamzam Mesa MD     • loxapine  100 mg Oral QPM Toby Morales, DO     • loxapine  50 mg Oral BID August Dinorata, DO     • melatonin  3 mg Oral HS PRN Rc Concordia, DO     • nicotine polacrilex  4 mg Oral Q2H PRN Zamzam Mesa MD     • OLANZapine  5 mg Oral Q3H PRN Max 6/day Candy Shell MD      Or   • OLANZapine  5 mg Intramuscular Q3H PRN Max 6/day Candy Shell MD     • OLANZapine  5 mg Intramuscular Q8H PRN Rc Concordia, DO      Or   • OLANZapine  7.5 mg Oral Q8H PRN Rc Concordia, DO     • OLANZapine  2.5 mg Oral Q3H PRN Max 8/day Candy Shell MD • ondansetron  4 mg Oral Q8H PRN Deepthi Fan DO     • polyethylene glycol  17 g Oral Daily PRN Mack Rivas MD     • pseudoephedrine  120 mg Oral BID PRN Amauri Boo MD     • senna-docusate sodium  1 tablet Oral Daily PRN Mack Rivas MD     • sterile water          • sterile water          • sterile water          • sterile water          • sterile water          • sterile water              Behavioral Health Medications: all current active meds have been reviewed. Changes as in plan section above. Laboratory results:  I have personally reviewed all pertinent laboratory/tests results. No results found for this or any previous visit (from the past 48 hour(s)). This note has been constructed using a voice recognition system. There may be translation, syntax, or grammatical errors. If you have any questions, please contact the dictating author.     Jose Ramon Pandey MD PGY2

## 2023-07-30 NOTE — NURSING NOTE
Pt has been pleasant and cooperative during interaction. He remains mostly isolative/withdrawn. Pt strongly encouraged to attend groups and socialize with his peers throughout the day. He continues to endorse AH but says that they are "not as bad" currently. He denies any SI/HI/VH. He is med and meal compliant. Staff availability reinforced.

## 2023-07-30 NOTE — NURSING NOTE
When patient was assessed at 2.40am for effectiveness he was still awake and still internally stimulated however he was no longer agitated. He has since gone into bed and has fallen asleep.

## 2023-07-30 NOTE — NURSING NOTE
Patient in and out of room loudly talking in response to internal stimuli, punching the bathroom wall and saying to staff "the voices are strong."  No prn medications are available at this time as he has had prn Melatonin and he received Zyprexa 7.5mgs and Atarax 100mgs at 2304. Dr Alex Barrios psychiatry contacted and Haldol 5mgs IM ordered. He was also given 1mg po Cogentin with the Haldol 5mgs IM. He was educated about the purpose of both medications and was accepting of the information.

## 2023-07-31 PROCEDURE — 99232 SBSQ HOSP IP/OBS MODERATE 35: CPT | Performed by: PSYCHIATRY & NEUROLOGY

## 2023-07-31 RX ADMIN — GLYCOPYRROLATE 1 MG: 1 TABLET ORAL at 08:34

## 2023-07-31 RX ADMIN — DIVALPROEX SODIUM 750 MG: 500 TABLET, DELAYED RELEASE ORAL at 08:34

## 2023-07-31 RX ADMIN — DIVALPROEX SODIUM 750 MG: 500 TABLET, DELAYED RELEASE ORAL at 19:39

## 2023-07-31 RX ADMIN — LOXAPINE 50 MG: 50 CAPSULE ORAL at 15:43

## 2023-07-31 RX ADMIN — GLYCOPYRROLATE 1 MG: 1 TABLET ORAL at 21:46

## 2023-07-31 RX ADMIN — GLYCOPYRROLATE 1 MG: 1 TABLET ORAL at 15:43

## 2023-07-31 RX ADMIN — LOXAPINE 100 MG: 50 CAPSULE ORAL at 19:39

## 2023-07-31 RX ADMIN — OLANZAPINE 7.5 MG: 2.5 TABLET, FILM COATED ORAL at 16:13

## 2023-07-31 RX ADMIN — LOXAPINE 50 MG: 50 CAPSULE ORAL at 08:34

## 2023-07-31 NOTE — PROGRESS NOTES
07/31/23 0856   Team Meeting   Meeting Type Daily Rounds   Team Members Present   Team Members Present Physician;Nurse;   Physician Team Member [id number] Wendie   Patient/Family Present   Patient Present No   Patient's Family Present No   Pt med/meal compliant. In his room, loudly responding to internal stimuli. Received PRN Atarax and melatonin for sleep. Pt remains in good behavioral control. Unable to sleep Saturday night and reporting irritability and anxiety d/t this. Reporting "strong" AH. PRN Zyprexa and Atarax which were not effective. Received PRN Haldol. Pt reporting decrease in AH yesterday. Ankush Guzman responding to internal stimuli in his room yesterday evening. Discharge pending [facility].

## 2023-07-31 NOTE — NURSING NOTE
Patient approached nurses station asking for something for his severe anxiety. Patient was given atarax 100 mg po without incident.

## 2023-07-31 NOTE — NURSING NOTE
Patient is RTIS in room upon approach. Denies all other psych symptoms at this time. Medication compliant.

## 2023-07-31 NOTE — NURSING NOTE
Pt out of room for breakfast but went back to bed afterwards. Encouraged to attend groups. Medication and meal compliant. Pt denies SI/HI/AH/VH at this time and does not appear to be responding to any internal stimuli at this time. Denies any unmet needs or complaints at this time.

## 2023-07-31 NOTE — PROGRESS NOTES
Progress Note - Behavioral Health   Gokul Brooks 26 y.o. male MRN: [id number]  Unit/Bed#: [facility] 343-01 Encounter: [id number]    Assessment/Plan   Principal Problem:    Schizophrenia ([address]) vs. Schizoaffective disorder  Active Problems:    Legally blind    Hearing loss    Asthma    Medical clearance for psychiatric admission    Prolonged erection    Intellectual disability      Recommended Treatment:   Continue Loxapine 50 mg BID and 100 mg qPM for psychosis  Continue Depakote 750 mg BID for mood stabilization  Continue glycopyrrolate 1 mg TID for drooling       Continue with group therapy, milieu therapy and occupational therapy. Continue frequent safety checks and vitals per unit protocol. Case discussed with treatment team.  Risks, benefits and possible side effects of Medications: Risks, benefits, and possible side effects of medications have been explained to the patient, who verbalizes understanding    Current Legal Status: 201  Disposition: pending placement to [facility]  ------------------------------------------------------------    Subjective: Per nursing report, Gokul has been cooperative on the unit and compliant with scheduled medications. On 07/28, patient was noted to be RIS and tearful, punched a wall due to "the voices are really bad". Patient was given 7.5 mg IM zyprexa, effective. He was given atarax 100 mg po and melatonin 3 mg for insomnia and anxiety around 12:53 AM, minimally effective and patient continued to RIS. On 07/29 night, patient again anxious, RIS and given Zyprexa 7.5 mg zyprexa, atarax 100 mg po. On call provider contacted and was ordered Haldol 5 mg IM. On 07/30 afternoon, patient reported VH "not as bad" but was RTIS at night, given 100 mg po atarax. Today, Gokul was seen and evaluated alongside attending physician. On evaluation, patient is blunted in affect, poverty of thought and concrete, somewhat drowsy, scant and soft in speech. He reports feeling "good" this morning. Patient states his breakfast was "okay" but ate adequately. He reports getting "little bit" of sleep overnight. He reports energy levels are "low" today "kaela I sleep too much". Encouraged patient to spend more time outside of his room and try to minimize daytime naps to which he was agreeable. Patient reports he typically spends his days by "relaxing and video games" but has no particular plans for today. At one point, patient states "I'm sorry, I zoned out" after he had dozed off during interview. He denies SI/HI/AVH at this time and reports his last AH was "2-3 days ago" and "they're just irritating, I don't like it". Patient denies medication side effects at this time. Progress Toward Goals: slowly imoving    Psychiatric Review of Systems:  Behavior over the last 24 hours: unchanged  Sleep: difficulty falling asleep at nights, increasing daytime sleeping  Appetite: adequate  Medication side effects: none verbalized  ROS: Complete review of systems is negative except as noted above.     Vital signs in last 24 hours:   Temp:  [97.1 °F (36.2 °C)] 97.1 °F (36.2 °C)  HR:  [88-92] 92  Resp:  [16] 16  BP: (111-134)/(59-71) 134/71    Mental Status Exam:  Appearance:  Somewhat drowsy, poor eye contact, appears stated age, marginal grooming/hygiene and disheveled   Behavior:  calm, cooperative and laying in bed   Motor: no abnormal movements and Unable to assess gait/balance as patient was laying in bed    Speech:  spontaneous, clear, normal rate, soft and coherent, scant   Mood:  "good"   Affect:  blunted   Thought Process:  poverty of thought, concrete   Thought Content: no verbalized delusions or overt paranoia   Perceptual disturbances: no reported hallucinations and does not appear to be responding to internal stimuli at this time; reports AH "2-3 days ago"   Risk Potential: No active or passive suicidal or homicidal ideation was verbalized during interview   Cognition: oriented to self and situation, appears to be of average intelligence and cognition not formally tested   Insight:  Limited   Judgment: Limited     Current Medications:  Current Facility-Administered Medications   Medication Dose Route Frequency Provider Last Rate   • acetaminophen  650 mg Oral Q6H PRN Mirian Tyler MD     • acetaminophen  650 mg Oral Q4H PRN Mirian Tyler MD     • acetaminophen  975 mg Oral Q6H PRN Mirian Tyler MD     • albuterol  2 puff Inhalation Q4H PRN Mirian Tyler MD     • aluminum-magnesium hydroxide-simethicone  30 mL Oral Q4H PRN Mirian Tyler MD     • benztropine  1 mg Intramuscular BID PRN Byron Heredia MD     • benztropine  1 mg Oral Q4H PRN Max 6/day Mirian Tyler MD     • divalproex sodium  750 mg Oral BID Patricia Huerta MD     • fluticasone  1 spray Nasal Daily PRN Mirna Telles PA-C     • glycopyrrolate  1 mg Oral TID Bjron Palomo MD     • hydrOXYzine HCL  100 mg Oral Q6H PRN Max 4/day Virgil Archer DO      Or   • LORazepam  1 mg Intramuscular Q6H PRN Virgil Archer DO     • hydrOXYzine HCL  25 mg Oral Q6H PRN Max 4/day Mirian Tyler MD     • hydrOXYzine HCL  50 mg Oral Q6H PRN Max 4/day Mirian Tyler MD     • loxapine  100 mg Oral QPM Sangeetha Flores, DO     • loxapine  50 mg Oral BID Janee Pérez DO     • melatonin  3 mg Oral HS PRN Virgil Archer DO     • nicotine polacrilex  4 mg Oral Q2H PRN Mirian Tyler MD     • OLANZapine  5 mg Oral Q3H PRN Max 6/day Patricia Huerta MD      Or   • OLANZapine  5 mg Intramuscular Q3H PRN Max 6/day Patricia Huerta MD     • OLANZapine  5 mg Intramuscular Q8H PRN Virgil Archer DO      Or   • OLANZapine  7.5 mg Oral Q8H PRN Virgil Archer DO     • OLANZapine  2.5 mg Oral Q3H PRN Max 8/day Patricia Huerta MD     • ondansetron  4 mg Oral Q8H PRN Virgil Archer DO     • polyethylene glycol  17 g Oral Daily PRN Mirian Tyler MD     • pseudoephedrine  120 mg Oral BID PRN Erica Burkitt, MD     • senna-docusate sodium  1 tablet Oral Daily PRN Patrick Ibrahim MD     • sterile water          • sterile water          • sterile water          • sterile water          • sterile water          • sterile water              Behavioral Health Medications: all current active meds have been reviewed. Changes as in plan section above. Laboratory results:  I have personally reviewed all pertinent laboratory/tests results. No results found for this or any previous visit (from the past 48 hour(s)).      Beka Hutson,

## 2023-07-31 NOTE — NURSING NOTE
Pt observed talking loudly to himself, punching the air as well as hitting his bedroom wall a few times. PRN zyprexa 7.5mg po given @ 879 0653 and reassessed at this time, pt says he feels better and is less agitated.

## 2023-08-01 PROBLEM — F25.9 SCHIZOAFFECTIVE DISORDER (HCC): Status: ACTIVE | Noted: 2019-05-31

## 2023-08-01 PROCEDURE — 99232 SBSQ HOSP IP/OBS MODERATE 35: CPT | Performed by: PSYCHIATRY & NEUROLOGY

## 2023-08-01 RX ORDER — WATER 10 ML/10ML
INJECTION INTRAMUSCULAR; INTRAVENOUS; SUBCUTANEOUS
Status: DISCONTINUED
Start: 2023-08-01 | End: 2023-10-19 | Stop reason: HOSPADM

## 2023-08-01 RX ADMIN — HYDROXYZINE HYDROCHLORIDE 100 MG: 50 TABLET, FILM COATED ORAL at 21:16

## 2023-08-01 RX ADMIN — GLYCOPYRROLATE 1 MG: 1 TABLET ORAL at 15:57

## 2023-08-01 RX ADMIN — LOXAPINE 50 MG: 50 CAPSULE ORAL at 15:57

## 2023-08-01 RX ADMIN — OLANZAPINE 7.5 MG: 2.5 TABLET, FILM COATED ORAL at 19:18

## 2023-08-01 RX ADMIN — LOXAPINE 110 MG: 50 CAPSULE ORAL at 18:34

## 2023-08-01 RX ADMIN — GLYCOPYRROLATE 1 MG: 1 TABLET ORAL at 21:03

## 2023-08-01 RX ADMIN — DIVALPROEX SODIUM 750 MG: 500 TABLET, DELAYED RELEASE ORAL at 18:35

## 2023-08-01 RX ADMIN — ACETAMINOPHEN 975 MG: 325 TABLET ORAL at 22:23

## 2023-08-01 RX ADMIN — OLANZAPINE 5 MG: 10 INJECTION, POWDER, LYOPHILIZED, FOR SOLUTION INTRAMUSCULAR at 01:00

## 2023-08-01 RX ADMIN — DIVALPROEX SODIUM 750 MG: 500 TABLET, DELAYED RELEASE ORAL at 08:15

## 2023-08-01 RX ADMIN — OLANZAPINE 5 MG: 10 INJECTION, POWDER, LYOPHILIZED, FOR SOLUTION INTRAMUSCULAR at 23:20

## 2023-08-01 RX ADMIN — LORAZEPAM 1 MG: 2 INJECTION INTRAMUSCULAR; INTRAVENOUS at 03:34

## 2023-08-01 RX ADMIN — GLYCOPYRROLATE 1 MG: 1 TABLET ORAL at 08:15

## 2023-08-01 RX ADMIN — LOXAPINE 50 MG: 50 CAPSULE ORAL at 08:15

## 2023-08-01 NOTE — PROGRESS NOTES
08/01/23 0840   Team Meeting   Meeting Type Daily Rounds   Team Members Present   Team Members Present Physician;Nurse;   Physician Team Member [id number] [facility] Member Wendie   Patient/Family Present   Patient Present No   Patient's Family Present No   Pt responding to internal stimuli yesterday evening. Punching the wall and yelling. Pt awake overnight continuing to respond to internal stimuli. Pt received PRN Zyprexa and IM Ativan yesterday. Discussed possible increase in Loxapine. Discharge to be determined.

## 2023-08-01 NOTE — NURSING NOTE
Received patient at 1900. Pt sleeping only comes out for snack. Patient compliant with schedule medication . Denies any unmet needs.

## 2023-08-01 NOTE — NURSING NOTE
Patient has been seclusive to his room the entire shift. In bed sleeping. Only out for breakfast. Compliant with morning medications. Currently denying AH/VH.

## 2023-08-01 NOTE — NURSING NOTE
Pt heard to be responding and agitated by rounder. Pt approached nurse and requested PRN IM because of AH of "rude and disrespectful voices bothering me a lot!" Pt appears fearful and irritated, continues to RTIS. Pt given PRN zyprexa 5mg IM for moderate agitation in R-delt following education on risks of IM vs PO medications.

## 2023-08-01 NOTE — NURSING NOTE
Patient responding loudly and verbally aggressive towards internal stimuli. PRN zyprexa 7/5mg was administered. Will monitor for effectiveness.

## 2023-08-01 NOTE — PROGRESS NOTES
Progress Note - Behavioral Health   Gokul Crawford 26 y.o. male MRN: [id number]  Unit/Bed#: [facility] 343-01 Encounter: [id number]    Assessment/Plan   Principal Problem:    Schizoaffective disorder ([address])  Active Problems:    Legally blind    Hearing loss    Asthma    Medical clearance for psychiatric admission    Prolonged erection    Intellectual disability      Recommended Treatment:   Increase loxapine to 50 mg twice daily and 110 mg every afternoon for psychosis  Continue Depakote 750 mg twice daily for mood stabilization  Continue glycopyrrolate 1 mg 3 times daily for drooling    Continue with group therapy, milieu therapy and occupational therapy. Continue frequent safety checks and vitals per unit protocol. Case discussed with treatment team.  Risks, benefits and possible side effects of Medications: Risks, benefits, and possible side effects of medications have been explained to the patient, who verbalizes understanding    Current Legal Status: 201  Disposition: Pending placement to [facility]  ------------------------------------------------------------    Subjective: Per nursing report, Gokul was observed to be talking loudly to himself, punching the air and hitting his bedroom wall a few times. Patient was given Zyprexa 7.5 mg p.o. at 1613. Patient later RIS and requested IM due to [facility] of "rude and disrespectful voices bother me a lot!"  Patient given as needed Zyprexa 5 mg IM for moderate agitation, partially effective. Ativan 1 mg IM PRN for severe anxiety as well. He is compliant with scheduled medications. Today, Gokul was seen and evaluated alongside attending physician. On evaluation, patient is drowsy, remains blunted in affect, with poverty of thought and concrete at times, soft at times and scant in speech. He reports feeling "okay" this morning. Patient states his energy is "down".   He reports having lunch this morning but was informed it was only around 9 AM and lunch has likely not come out yet.  Patient then did state he had breakfast.  He denies any medication side effects at this time. He denies SI/HI/AVH at this time. He reports he last had VH yesterday overnight and acknowledges they were bothering him. Discussed recommendation to increase loxapine to which patient was agreeable. Progress Toward Goals: slow improvement    Psychiatric Review of Systems:  Behavior over the last 24 hours: unchanged  Sleep: increased daytime sleeping  Appetite: adequate, reports having breakfast   Medication side effects: none verbalized  ROS: Complete review of systems is negative except as noted above.     Vital signs in last 24 hours:   Temp:  [97.1 °F (36.2 °C)-97.4 °F (36.3 °C)] 97.4 °F (36.3 °C)  HR:  [92] 92  Resp:  [16] 16  BP: (132-134)/(71-76) 132/76    Mental Status Exam:  Appearance:  drowsy, appears stated age, marginal grooming/hygiene, disheveled and dressed in hospital attire, overtly appearing -American male, no acute distress   Behavior:  calm, limited cooperativity and laying in bed   Motor: no abnormal movements and Unable to assess for gait/balance due to patient factors   Speech:  delayed initiation at times, clear, and soft at times, scant and coherent   Mood:  "okay"   Affect:  blunted   Thought Process:  poverty of thought, concrete   Thought Content: no verbalized delusions or overt paranoia   Perceptual disturbances: no reported hallucinations and does not appear to be responding to internal stimuli at this time; reports VH yesterday   Risk Potential: No active or passive suicidal or homicidal ideation was verbalized during interview   Cognition: oriented to self and situation, appears to be of average intelligence and cognition not formally tested   Insight:  Limited   Judgment: Limited     Current Medications:  Current Facility-Administered Medications   Medication Dose Route Frequency Provider Last Rate   • acetaminophen  650 mg Oral Q6H PRN Bianca Villafana MD     • acetaminophen  650 mg Oral Q4H PRN Joe Paez MD     • acetaminophen  975 mg Oral Q6H PRN Joe Paez MD     • albuterol  2 puff Inhalation Q4H PRN Joe Paez MD     • aluminum-magnesium hydroxide-simethicone  30 mL Oral Q4H PRN Joe Paez MD     • benztropine  1 mg Intramuscular BID PRN Tom Otto MD     • benztropine  1 mg Oral Q4H PRN Max 6/day Joe Paez MD     • divalproex sodium  750 mg Oral BID Gregoria May MD     • fluticasone  1 spray Nasal Daily PRN Linn Feliciano PA-C     • glycopyrrolate  1 mg Oral TID León Burr MD     • hydrOXYzine HCL  100 mg Oral Q6H PRN Max 4/day Cleatus Roots, DO      Or   • LORazepam  1 mg Intramuscular Q6H PRN Cleatus Roots, DO     • hydrOXYzine HCL  25 mg Oral Q6H PRN Max 4/day Joe Paez MD     • hydrOXYzine HCL  50 mg Oral Q6H PRN Max 4/day Joe Paez MD     • loxapine  110 mg Oral QPM Riccardo Block, DO     • loxapine  50 mg Oral BID Max Cornejo, DO     • melatonin  3 mg Oral HS PRN Cleatus Roots, DO     • nicotine polacrilex  4 mg Oral Q2H PRN Joe Paez MD     • OLANZapine  5 mg Oral Q3H PRN Max 6/day Gregoria May MD      Or   • OLANZapine  5 mg Intramuscular Q3H PRN Max 6/day Gregoria May MD     • OLANZapine  5 mg Intramuscular Q8H PRN Cleatus Roots, DO      Or   • OLANZapine  7.5 mg Oral Q8H PRN Cleatus Roots, DO     • OLANZapine  2.5 mg Oral Q3H PRN Max 8/day Gregoria May MD     • ondansetron  4 mg Oral Q8H PRN Cleatus Roots, DO     • polyethylene glycol  17 g Oral Daily PRN Joe Paez MD     • pseudoephedrine  120 mg Oral BID PRN Tona Edmonds MD     • senna-docusate sodium  1 tablet Oral Daily PRN Joe Paez MD     • sterile water          • sterile water          • sterile water          • sterile water          • sterile water          • sterile water          • sterile water              Behavioral Health Medications: all current active meds have been reviewed. Changes as in plan section above. Laboratory results:  I have personally reviewed all pertinent laboratory/tests results. No results found for this or any previous visit (from the past 48 hour(s)).      Gisela Junior, DO

## 2023-08-01 NOTE — NURSING NOTE
Patient remains awake. Presenting with severe anxiety. Ativan 1 mg IM PRN given in left deltoid. Patient is currently sitting with staff listening to music until medication takes effect. Will monitor.

## 2023-08-02 PROCEDURE — 99232 SBSQ HOSP IP/OBS MODERATE 35: CPT | Performed by: PSYCHIATRY & NEUROLOGY

## 2023-08-02 PROCEDURE — 93005 ELECTROCARDIOGRAM TRACING: CPT

## 2023-08-02 RX ADMIN — HYDROXYZINE HYDROCHLORIDE 100 MG: 50 TABLET, FILM COATED ORAL at 20:10

## 2023-08-02 RX ADMIN — OLANZAPINE 5 MG: 5 TABLET, FILM COATED ORAL at 20:10

## 2023-08-02 RX ADMIN — NICOTINE POLACRILEX 4 MG: 4 GUM, CHEWING BUCCAL at 18:26

## 2023-08-02 RX ADMIN — DIVALPROEX SODIUM 750 MG: 500 TABLET, DELAYED RELEASE ORAL at 08:12

## 2023-08-02 RX ADMIN — ALUMINUM HYDROXIDE, MAGNESIUM HYDROXIDE, AND DIMETHICONE 30 ML: 200; 20; 200 SUSPENSION ORAL at 01:18

## 2023-08-02 RX ADMIN — DIVALPROEX SODIUM 750 MG: 500 TABLET, DELAYED RELEASE ORAL at 19:47

## 2023-08-02 RX ADMIN — LOXAPINE 50 MG: 50 CAPSULE ORAL at 15:47

## 2023-08-02 RX ADMIN — MELATONIN TAB 3 MG 3 MG: 3 TAB at 20:10

## 2023-08-02 RX ADMIN — LOXAPINE 50 MG: 50 CAPSULE ORAL at 08:12

## 2023-08-02 RX ADMIN — GLYCOPYRROLATE 1 MG: 1 TABLET ORAL at 15:47

## 2023-08-02 RX ADMIN — GLYCOPYRROLATE 1 MG: 1 TABLET ORAL at 08:12

## 2023-08-02 RX ADMIN — GLYCOPYRROLATE 1 MG: 1 TABLET ORAL at 20:13

## 2023-08-02 RX ADMIN — LOXAPINE 110 MG: 50 CAPSULE ORAL at 19:47

## 2023-08-02 NOTE — NURSING NOTE
Pt approached nurses station agitated and loudly RTIS verbally. Pt slapped table. Pt endorses AH of voices, "talking about my sister" which was visible upsetting pt. Pt given PRN zyprexa 5mg IM in L-delt @ 23:20.  Pt cooperative with administration

## 2023-08-02 NOTE — NURSING NOTE
PRN atarax 100mg was ineffective. Patient remained in behavioral control and reports anxiety is the same. Patient reports feeling angry. Will continue to monitor for any change.

## 2023-08-02 NOTE — NURSING NOTE
Patient is out more in the community. Patient denies thoughts of SI, but endorse AH. Patient requested medication for severe anxiety at 2116, which was administered. Patient was compliant with scheduled medications. Staff to maintain continuous rounding for safety and support.

## 2023-08-02 NOTE — NURSING NOTE
Patient appears less agitated and pt reports, "The voices are quieter." Zyprexa 7.5mg was effective.

## 2023-08-02 NOTE — SOCIAL WORK
CHICO met with pt to discuss MITALI for [facility] to obtain information. Pt was agreeable and signed MITALI. SW left message for Pat at  [facility] to obtain pt's med list from when he was discharged from [facility] and to obtain information relating to his baseline.

## 2023-08-02 NOTE — NURSING NOTE
Patient has been seclusive to his room the entire shift. Only out for meals. In bed soundly sleeping, snoring. Scant during interaction. Compliant with medications. Currently denying symptoms.

## 2023-08-02 NOTE — PROGRESS NOTES
Progress Note - Behavioral Health   Gokul Cleopatra Holstein 26 y.o. male MRN: [id number]  Unit/Bed#: [facility] 343-01 Encounter: [id number]    Assessment/Plan   Principal Problem:    Schizoaffective disorder ([address])  Active Problems:    Legally blind    Hearing loss    Asthma    Medical clearance for psychiatric admission    Prolonged erection    Intellectual disability      Recommended Treatment:   Continue with Depakote 750 mg BID for mood stabilization  Continue with loxapine 50 mg twice daily and 110 mg every afternoon for psychosis  Continue Robinul 1 mg 3 times daily for sialorrhea    Continue with group therapy, milieu therapy and occupational therapy. Continue frequent safety checks and vitals per unit protocol. Case discussed with treatment team.  Risks, benefits and possible side effects of Medications: Risks, benefits, and possible side effects of medications have been explained to the patient, who verbalizes understanding    Current Legal Status: 201  Disposition: coordinating with , likely [facility]  ------------------------------------------------------------    Subjective: Per nursing report, Gokul has been cooperative on the unit and compliant with scheduled medications. Patient was RIS yesterday night and received 7.5 mg of Zyprexa, effective. He was more visible in the milieu yesterday night. He requested medication for severe anxiety at 2116 Atarax 100 mg po and 5 mg IM Zyprexa at 2320. Today, Gokul was seen and evaluated at bedside for continuity. On evaluation, patient is somewhat drowsy, blunted affect, poverty of thought and concrete. He reports feeling "good" this morning. Patient states he slept "good" without any sleep disturbances. He reports an adequate appetite for breakfast.  He describes his energy as "low". He denies SI/HI/AVH at time of interview. Patient endorsed VH yesterday night but states they are "silent right now".  He reports tolerating medications adequately without any side effects. He denies any plans for today. Upon further questioning, patient was able to state a plan to "exercise" more later in the afternoon. Progress Toward Goals: slow improvement    Psychiatric Review of Systems:  Behavior over the last 24 hours: unchanged  Sleep: improving, increased daytime sleeping   Appetite: adequate  Medication side effects: none verbalized  ROS: Complete review of systems is negative except as noted above.     Vital signs in last 24 hours:   Temp:  [97.4 °F (36.3 °C)] 97.4 °F (36.3 °C)  HR:  [118] 118  Resp:  [17] 17  BP: (137)/(87) 137/87    Mental Status Exam:  Appearance:  Somewhat drowsy, appears stated age, marginal grooming/hygiene and dressed in hospital attire    Behavior:  calm, cooperative and laying in bed   Motor: no abnormal movements and Unable to assess due to patient laying in bed   Speech:  spontaneous, clear, scant and coherent   Mood:  "Good"   Affect:  blunted   Thought Process:  poverty of thought, Cut [place]   Thought Content: no verbalized delusions or overt paranoia   Perceptual disturbances: no reported hallucinations and does not appear to be responding to internal stimuli at this time; appears internally preoccupied and distracted   Risk Potential: No active or passive suicidal or homicidal ideation was verbalized during interview   Cognition: oriented to self and situation, appears to be of average intelligence and cognition not formally tested   Insight:  Limited   Judgment: Limited     Current Medications:  Current Facility-Administered Medications   Medication Dose Route Frequency Provider Last Rate   • sterile water          • acetaminophen  650 mg Oral Q6H PRN Jackeline Hawkins MD     • acetaminophen  650 mg Oral Q4H PRN Jackeline Hawkins MD     • acetaminophen  975 mg Oral Q6H PRN Jackeline Hawkins MD     • albuterol  2 puff Inhalation Q4H PRN Jackeline Hawkins MD     • aluminum-magnesium hydroxide-simethicone  30 mL Oral Q4H PRN Jackeline Hawkins MD • benztropine  1 mg Intramuscular BID PRN Tom Otto MD     • benztropine  1 mg Oral Q4H PRN Max 6/day Joe Paez MD     • divalproex sodium  750 mg Oral BID Gregoria May MD     • fluticasone  1 spray Nasal Daily PRN Linn Feliciano PA-C     • glycopyrrolate  1 mg Oral TID León Burr MD     • hydrOXYzine HCL  100 mg Oral Q6H PRN Max 4/day Cleatus Roots, DO      Or   • LORazepam  1 mg Intramuscular Q6H PRN Cleatus Roots, DO     • hydrOXYzine HCL  25 mg Oral Q6H PRN Max 4/day Joe Paez MD     • hydrOXYzine HCL  50 mg Oral Q6H PRN Max 4/day Joe Paez MD     • loxapine  110 mg Oral QPM Riccardo Block DO     • loxapine  50 mg Oral BID Jestine Daft, DO     • melatonin  3 mg Oral HS PRN Cleatus Roots, DO     • nicotine polacrilex  4 mg Oral Q2H PRN Joe Paez MD     • OLANZapine  5 mg Oral Q3H PRN Max 6/day Gregoria May MD      Or   • OLANZapine  5 mg Intramuscular Q3H PRN Max 6/day Gregoria May MD     • OLANZapine  5 mg Intramuscular Q8H PRN Cleatus Roots, DO      Or   • OLANZapine  7.5 mg Oral Q8H PRN Cleatus Roots, DO     • OLANZapine  2.5 mg Oral Q3H PRN Max 8/day Gregoria May MD     • ondansetron  4 mg Oral Q8H PRN Cleatus Roots, DO     • polyethylene glycol  17 g Oral Daily PRN Joe Paez MD     • pseudoephedrine  120 mg Oral BID PRN Tona Edmonds MD     • senna-docusate sodium  1 tablet Oral Daily PRN Joe Paez MD     • sterile water          • sterile water          • sterile water          • sterile water          • sterile water          • sterile water          • sterile water              Behavioral Health Medications: all current active meds have been reviewed. Changes as in plan section above. Laboratory results:  I have personally reviewed all pertinent laboratory/tests results. No results found for this or any previous visit (from the past 48 hour(s)).      Riccardo Block DO

## 2023-08-02 NOTE — NURSING NOTE
Patient c/o 8/10 pain in abdomen. PRN tylenol 975mg was administered. Will monitor for effectiveness.

## 2023-08-02 NOTE — NURSING NOTE
Received patient at 1500. Patient  Lying in bed quiet only comes out for needs. Patient compliant with schedule medication. Patient denies AH at this time. Patient remain Q  7 min check.

## 2023-08-02 NOTE — NURSING NOTE
PRN Mylanta given for heartburn. Will continue to monitor. 0220: Pt appears to be sleeping. No further complaints at this time.

## 2023-08-02 NOTE — PROGRESS NOTES
08/02/23 0837   Team Meeting   Meeting Type Daily Rounds   Team Members Present   Team Members Present Physician;Nurse;   Physician Team Member [id number]   Care Management Team Member Wendie   Patient/Family Present   Patient Present No   Patient's Family Present No   Seclusive to his room for most of the day. Visible on the unit in the evening. Responding to internal stimuli. Denies SI/HI. Reports AH that are bothersome and talking about his family. Pt received PRN Zyprexa IM d/t agitation. Med/meal compliant. Discharge pending [facility].

## 2023-08-03 PROBLEM — M79.18 MUSCULOSKELETAL PAIN: Status: RESOLVED | Noted: 2023-08-03 | Resolved: 2023-08-03

## 2023-08-03 PROBLEM — M79.18 MUSCULOSKELETAL PAIN: Status: ACTIVE | Noted: 2023-08-03

## 2023-08-03 PROBLEM — R07.81 RIB PAIN ON LEFT SIDE: Status: ACTIVE | Noted: 2023-08-03

## 2023-08-03 LAB
ATRIAL RATE: 78 BPM
ATRIAL RATE: 82 BPM
P AXIS: 41 DEGREES
P AXIS: 48 DEGREES
PR INTERVAL: 136 MS
PR INTERVAL: 138 MS
QRS AXIS: 61 DEGREES
QRS AXIS: 65 DEGREES
QRSD INTERVAL: 80 MS
QRSD INTERVAL: 94 MS
QT INTERVAL: 344 MS
QT INTERVAL: 352 MS
QTC INTERVAL: 392 MS
QTC INTERVAL: 411 MS
T WAVE AXIS: 29 DEGREES
T WAVE AXIS: 38 DEGREES
VENTRICULAR RATE: 78 BPM
VENTRICULAR RATE: 82 BPM

## 2023-08-03 PROCEDURE — 99232 SBSQ HOSP IP/OBS MODERATE 35: CPT | Performed by: PSYCHIATRY & NEUROLOGY

## 2023-08-03 PROCEDURE — 93010 ELECTROCARDIOGRAM REPORT: CPT | Performed by: INTERNAL MEDICINE

## 2023-08-03 PROCEDURE — 99232 SBSQ HOSP IP/OBS MODERATE 35: CPT | Performed by: PHYSICIAN ASSISTANT

## 2023-08-03 RX ORDER — GLYCOPYRROLATE 1 MG/1
1 TABLET ORAL 3 TIMES DAILY
Status: DISCONTINUED | OUTPATIENT
Start: 2023-08-03 | End: 2023-10-12

## 2023-08-03 RX ORDER — LOXAPINE SUCCINATE 50 MG/1
50 TABLET ORAL 2 TIMES DAILY
Status: DISCONTINUED | OUTPATIENT
Start: 2023-08-03 | End: 2023-08-21

## 2023-08-03 RX ADMIN — LOXAPINE 120 MG: 50 CAPSULE ORAL at 21:10

## 2023-08-03 RX ADMIN — DICLOFENAC SODIUM 2 G: 10 GEL TOPICAL at 14:37

## 2023-08-03 RX ADMIN — GLYCOPYRROLATE 1 MG: 1 TABLET ORAL at 17:33

## 2023-08-03 RX ADMIN — NICOTINE POLACRILEX 4 MG: 4 GUM, CHEWING BUCCAL at 19:49

## 2023-08-03 RX ADMIN — GLYCOPYRROLATE 1 MG: 1 TABLET ORAL at 08:14

## 2023-08-03 RX ADMIN — GLYCOPYRROLATE 1 MG: 1 TABLET ORAL at 21:10

## 2023-08-03 RX ADMIN — OLANZAPINE 5 MG: 5 TABLET, FILM COATED ORAL at 00:28

## 2023-08-03 RX ADMIN — DIVALPROEX SODIUM 750 MG: 500 TABLET, DELAYED RELEASE ORAL at 08:14

## 2023-08-03 RX ADMIN — LOXAPINE 50 MG: 50 CAPSULE ORAL at 17:33

## 2023-08-03 RX ADMIN — DIVALPROEX SODIUM 750 MG: 500 TABLET, DELAYED RELEASE ORAL at 18:00

## 2023-08-03 RX ADMIN — ACETAMINOPHEN 650 MG: 325 TABLET ORAL at 01:27

## 2023-08-03 RX ADMIN — HYDROXYZINE HYDROCHLORIDE 100 MG: 50 TABLET, FILM COATED ORAL at 21:09

## 2023-08-03 RX ADMIN — LOXAPINE 50 MG: 50 CAPSULE ORAL at 08:14

## 2023-08-03 NOTE — PROGRESS NOTES
Progress Note - Behavioral Health   Gokul Brooks 26 y.o. male MRN: [id number]  Unit/Bed#: [facility] 343-01 Encounter: [id number]    Assessment/Plan   Principal Problem:    Schizoaffective disorder ([address])  Active Problems:    Legally blind    Hearing loss    Asthma    Medical clearance for psychiatric admission    Prolonged erection    Intellectual disability    Musculoskeletal pain      Recommended Treatment:   Increase Loxapine to 50 mg BID and 120 mg qPM for psychosis  Continue Depakote 750 mg BID for mood stabilization  Continue glycopyrrolate1 mg TID for sialorrhea  EKG 08/02/23 - QTc: 411 ms   AIMS scale score: 1 for observed mild tongue fasciculations     Continue with group therapy, milieu therapy and occupational therapy. Continue frequent safety checks and vitals per unit protocol. Case discussed with treatment team.  Risks, benefits and possible side effects of Medications: Risks, benefits, and possible side effects of medications have been explained to the patient, who verbalizes understanding    Current Legal Status: 201  Disposition: coordinating with , likely [facility]  ------------------------------------------------------------    Subjective: Per nursing report, Gokul has been cooperative on the unit and compliant with scheduled medications. PRNs Tylenol; Atarax 100 mg, Zyprexa 5 mg p.o. and melatonin 3 mg at 2010, and Zyprexa 5 mg po at 0028 for [facility] and moderate/severe anxiety     Today, Gokul was seen and evaluated at bedside for continuity of care. On evaluation, patient remains blunted, drowsy, poverty of thought and scant in conversation. He reports feeling "good, simple" this morning. Patient states he slept an estimated 7-8 hours overnight and describes his energy as "up", rated a 6-7 out of 10 today. He reports an adequate appetite for breakfast.  He is tolerating his medications and denies any side effects at this time. He denies any penile pain. He denies SI/HI/AVH at this time.   Patient endorses AH overnight but denies VH. Recommended increasing loxapine for better control of psychotic symptoms, to which patient was agreeable. Progress Toward Goals: slow improvement    Psychiatric Review of Systems:  Behavior over the last 24 hours: unchanged  Sleep: increased daytime sleeping  Appetite: adequate  Medication side effects: none verbalized  ROS: Complete review of systems is negative except as noted above.     Vital signs in last 24 hours:   Temp:  [97 °F (36.1 °C)] 97 °F (36.1 °C)  HR:  [] 86  Resp:  [16] 16  BP: (131-135)/(80-81) 131/81    Mental Status Exam:  Appearance:  Overtly appearing AA male, in no acute distress, drowsy, minimal eye contact, appears stated age, marginal grooming/hygiene and disheveled, dressed in hospital attire   Behavior:  calm, cooperative and laying in bed   Motor: no abnormal movements and unable to assess for gait/balance    Speech:  delayed initiation, clear, slow, normal volume, scant and coherent   Mood:  "good, simple"   Affect:  blunted   Thought Process:  poverty of thought, concrete   Thought Content: no verbalized delusions or overt paranoia   Perceptual disturbances: no reported hallucinations at time of interview and does not appear to be responding to internal stimuli at this time; appears internally preoccupied; reports no VH overnight but endorses AH   Risk Potential: No active or passive suicidal or homicidal ideation was verbalized during interview   Cognition: oriented to self and situation, appears to be of average intelligence and cognition not formally tested   Insight:  Limited   Judgment: Limited     Current Medications:  Current Facility-Administered Medications   Medication Dose Route Frequency Provider Last Rate   • acetaminophen  650 mg Oral Q6H PRN Edmund Melo MD     • acetaminophen  650 mg Oral Q4H PRASHOK Melo MD     • acetaminophen  975 mg Oral Q6H PRN Edmund Melo MD     • albuterol  2 puff Inhalation Q4H PRN Zamzam Mesa MD     • aluminum-magnesium hydroxide-simethicone  30 mL Oral Q4H PRN Zamzam Mesa MD     • benztropine  1 mg Intramuscular BID PRN Jessica Adkins MD     • benztropine  1 mg Oral Q4H PRN Max 6/day Zamzam Mesa MD     • Diclofenac Sodium  2 g Topical 4x Daily Margot Alonzo PA-C     • divalproex sodium  750 mg Oral BID Candy Shell MD     • fluticasone  1 spray Nasal Daily PRN Margot Alonzo PA-C     • glycopyrrolate  1 mg Oral TID Elli Solis DO     • hydrOXYzine HCL  100 mg Oral Q6H PRN Max 4/day Rc [place], DO      Or   • LORazepam  1 mg Intramuscular Q6H PRN Rc [place], DO     • hydrOXYzine HCL  25 mg Oral Q6H PRN Max 4/day Zamzam Mesa MD     • hydrOXYzine HCL  50 mg Oral Q6H PRN Max 4/day Zamzam Mesa MD     • loxapine  120 mg Oral QPM Elli Solis DO     • loxapine  50 mg Oral BID Elli Solis DO     • melatonin  3 mg Oral HS PRN Rc [place], DO     • nicotine polacrilex  4 mg Oral Q2H PRN Zamzam Mesa MD     • OLANZapine  5 mg Oral Q3H PRN Max 6/day Candy Shell MD      Or   • OLANZapine  5 mg Intramuscular Q3H PRN Max 6/day Candy Shell MD     • OLANZapine  5 mg Intramuscular Q8H PRN Rc [place], DO      Or   • OLANZapine  7.5 mg Oral Q8H PRN Rc [place], DO     • OLANZapine  2.5 mg Oral Q3H PRN Max 8/day Candy Shell MD     • ondansetron  4 mg Oral Q8H PRN Rc [place], DO     • polyethylene glycol  17 g Oral Daily PRN Zamzam Mesa MD     • pseudoephedrine  120 mg Oral BID PRN Kev Chahal MD     • senna-docusate sodium  1 tablet Oral Daily PRN Zamzam Mesa MD     • sterile water          • sterile water          • sterile water          • sterile water          • sterile water          • sterile water          • sterile water          • sterile water              Behavioral Health Medications: all current active meds have been reviewed.  Changes as in plan section above. Laboratory results:  I have personally reviewed all pertinent laboratory/tests results.   Recent Results (from the past 48 hour(s))   ECG 12 lead    Collection Time: 08/02/23  3:36 PM   Result Value Ref Range    Ventricular Rate 78 BPM    Atrial Rate 78 BPM    ND Interval 136 ms    QRSD Interval 80 ms    QT Interval 344 ms    QTC Interval 392 ms    P Axis 41 degrees    QRS Axis 65 degrees    T Wave Axis 29 degrees   ECG 12 lead    Collection Time: 08/02/23  3:37 PM   Result Value Ref Range    Ventricular Rate 82 BPM    Atrial Rate 82 BPM    ND Interval 138 ms    QRSD Interval 94 ms    QT Interval 352 ms    QTC Interval 411 ms    P Dexter 48 degrees    QRS Axis 61 degrees    T Wave Axis 38 degrees        Sil Parmar DO

## 2023-08-03 NOTE — ASSESSMENT & PLAN NOTE
· Patient complaining of left sided "rib pain" for a "couple of days. Denies injury. Reports pain is intermittent. · Likely musculoskeletal pain as patient reports that he lies in bed most of the day, however given issues with priapism concern for plasma disorder, and tachycardia - low suspicion for PE  · Upon reevaluation, patient denies any current pain. · Last CMP on 07/04/23 with creatinine at 1.31  · Will check AM BMP  · Will order voltaren gel and continue to monitor. · If pt with SOB, tachypnea, tachycardia above baseline, increase in pain - please reach out to on call medical provider.

## 2023-08-03 NOTE — NURSING NOTE
Pt approached nurse appearing visibly upset and anxious. Pt states, "Can I please have something for these voices?" When nurse asked the content of the AH, pt states, "Viki Bejarano saying really bad things to me right now. I was having a good day before the voices." Pt given PRN zyprexa 5mg PO for moderate agitation, atarax 100mg for severe anxiety, and melatonin for sleep @ 20:10.

## 2023-08-03 NOTE — NURSING NOTE
Patient visible at the start of the shift but became more seclusive to room as day went on. Currently in bed resting. Scant although pleasant during interaction. Currently denying AH and has not required any PRNs. Compliant with medications. Will monitor.

## 2023-08-03 NOTE — PROGRESS NOTES
[address] [facility]  Progress Note  Name: Keira Paz  MRN: [id number]  Unit/Bed#: [facility] 343-01 I Date of Admission: 2023   Date of Service: 8/3/2023 I Hospital Day: 68    Assessment/Plan   Rib pain on left side  Assessment & Plan  · Patient complaining of left sided "rib pain" for a "couple of days. Denies injury. Reports pain is intermittent. · Likely musculoskeletal pain as patient reports that he lies in bed most of the day, however given issues with priapism concern for plasma disorder, and tachycardia - low suspicion for PE  · Upon reevaluation, patient denies any current pain. · Last CMP on 23 with creatinine at 1.31  · Will check AM BMP  · Will order voltaren gel and continue to monitor. · If pt with SOB, tachypnea, tachycardia above baseline, increase in pain - please reach out to on call medical provider. Intellectual disability  Assessment & Plan  · Offer supportive care    Asthma  Assessment & Plan  · No acute exacerbation  · Continue albuterol PRN    Hearing loss  Assessment & Plan  · Offer supportive care    Legally blind  Assessment & Plan  · Offer supportive care    Nurse Coordination of Care Discussion: discussed assessment and plan with charge nurse. Discussions with Specialists or Other Care Team Provider: none    Education and Discussions with Family / Patient: answered patients questions to the best of my abilites    Time Spent for Care: 30 minutes. More than 50% of total time spent on counseling and coordination of care as described above. Current Length of Stay: 77 day(s)    Code Status: Level 1 - Full Code      Subjective:   Patient complains of left sided mild localized "rib pain." Denies injury. States pain is intermittent. Feels pain is more superficial. Denies abdominal pain, nausea, constipation, flank pain, chest pain, pain with inspiration, cough, or SOB or chest pain.     Objective:     Vitals:   Temp (24hrs), Av °F (36.1 °C), Min:97 °F (36.1 °C), Max:97 °F (36.1 °C)    Temp:  [97 °F (36.1 °C)] 97 °F (36.1 °C)  HR:  [] 86  Resp:  [16] 16  BP: (131-135)/(80-81) 131/81  SpO2:  [98 %] 98 %  Body mass index is 31.46 kg/m². Physical Exam:     Physical Exam  HENT:      Head: Normocephalic. Nose: Nose normal.      Mouth/Throat:      Mouth: Mucous membranes are moist.   Cardiovascular:      Rate and Rhythm: Normal rate and regular rhythm. Pulmonary:      Effort: Pulmonary effort is normal.      Breath sounds: Normal breath sounds. Abdominal:      General: Abdomen is flat. Bowel sounds are normal. There is no distension. Palpations: Abdomen is soft. Tenderness: There is no abdominal tenderness. Musculoskeletal:        Arms:    Skin:     General: Skin is warm and dry. Neurological:      General: No focal deficit present. Mental Status: He is alert. Additional Data:     Labs:                            * I Have Reviewed All Lab Data Listed Above. * Additional Pertinent Lab Tests Reviewed:  All Labs Within Last 24 Hours Reviewed    Imaging:    Imaging Reports Reviewed Today Include: No imaging reviewed today      Last 24 Hours Medication List:   Current Facility-Administered Medications   Medication Dose Route Frequency Provider Last Rate   • acetaminophen  650 mg Oral Q6H PRN Anastasia Andres MD     • acetaminophen  650 mg Oral Q4H PRN Anastasia Andres MD     • acetaminophen  975 mg Oral Q6H PRN Anastasia Andres MD     • albuterol  2 puff Inhalation Q4H PRN Anastasia Andres MD     • aluminum-magnesium hydroxide-simethicone  30 mL Oral Q4H PRN Anastasia Andres MD     • benztropine  1 mg Intramuscular BID PRN Elyse Banks MD     • benztropine  1 mg Oral Q4H PRN Max 6/day Anastasia Andres MD     • Diclofenac Sodium  2 g Topical 4x Daily Moris Campbell PA-C     • divalproex sodium  750 mg Oral BID Alfred Qureshi MD     • fluticasone  1 spray Nasal Daily PRN Moris Campbell PA-C • glycopyrrolate  1 mg Oral TID Santiago Ramin, DO     • hydrOXYzine HCL  100 mg Oral Q6H PRN Max 4/day Thelma Solano DO      Or   • LORazepam  1 mg Intramuscular Q6H PRN Thelma Solano DO     • hydrOXYzine HCL  25 mg Oral Q6H PRN Max 4/day Redd Zepeda MD     • hydrOXYzine HCL  50 mg Oral Q6H PRN Max 4/day Redd Zepeda MD     • loxapine  120 mg Oral QPM Santiago Ramin, DO     • loxapine  50 mg Oral BID Santiago Ramin, DO     • melatonin  3 mg Oral HS PRN Thelma Solano DO     • nicotine polacrilex  4 mg Oral Q2H PRN Redd Zepeda MD     • OLANZapine  5 mg Oral Q3H PRN Max 6/day Juliette Webber MD      Or   • OLANZapine  5 mg Intramuscular Q3H PRN Max 6/day Juliette Webber MD     • OLANZapine  5 mg Intramuscular Q8H PRN Thelma Solano DO      Or   • OLANZapine  7.5 mg Oral Q8H PRN Thelma Solano DO     • OLANZapine  2.5 mg Oral Q3H PRN Max 8/day Juliette Webber MD     • ondansetron  4 mg Oral Q8H PRN Thelma Solano DO     • polyethylene glycol  17 g Oral Daily PRN Redd Zepeda MD     • pseudoephedrine  120 mg Oral BID PRN Elissa Olivier MD     • senna-docusate sodium  1 tablet Oral Daily PRN Redd Zepeda MD     • sterile water          • sterile water          • sterile water          • sterile water          • sterile water          • sterile water          • sterile water          • sterile water               Today, Patient Was Seen By: Dmitriy Marrero PA-C      ** Please Note: Dictation voice to text software may have been used in the creation of this document.  **

## 2023-08-03 NOTE — PROGRESS NOTES
08/03/23 0843   Team Meeting   Meeting Type Daily Rounds   Team Members Present   Team Members Present Physician;Nurse;   Physician Team Member [id number] Wendie   Patient/Family Present   Patient Present No   Patient's Family Present No     Pt visible on the unit in the evening. Stating he is having a bad day d/t AH. Pt received PRN Zyprexa and Atarax. Discharge pending EAC.

## 2023-08-03 NOTE — NURSING NOTE
Pt has retired to bed and appears to be resting comfortably without anxiety or agitation. PRN melatonin, atarax and zyprexa effective.

## 2023-08-03 NOTE — NURSING NOTE
Pt c/o severe agitation d/t AH. Requested prn  0028 zyprexa 5 mg administered  0128 pt denied AH. Med effective    Pt c/o mod rib pain with inhalation and oral discomfort  0127 tylenol 650 mg administered  0227 no further c/o pain.  Med effective

## 2023-08-03 NOTE — PLAN OF CARE
Problem: Ineffective Coping  Goal: Participates in unit activities  Description: Interventions:  - Provide therapeutic environment   - Provide required programming   - Redirect inappropriate behaviors   Outcome: Progressing     Problem: SELF HARM/SUICIDALITY  Goal: Will have no self-injury during hospital stay  Description: INTERVENTIONS:  - Q 15 MINUTES: Routine safety checks  - Q WAKING SHIFT & PRN: Assess risk to determine if routine checks are adequate to maintain patient safety  - Encourage patient to participate actively in care by formulating a plan to combat response to suicidal ideation, identify supports and resources  Outcome: Progressing     Problem: SUBSTANCE USE/ABUSE  Goal: Will have no detox symptoms and will verbalize plan for changing substance-related behavior  Description: INTERVENTIONS:  - Monitor physical status and assess for symptoms of withdrawal  - Administer medication as ordered  - Provide emotional support with 1 on 1 interaction with staff  - Encourage recovery focused program/ addiction education  - Assess for verbalization of changing behaviors related to substance abuse  - Initiate consults and referrals as appropriate (Case Management, Spiritual Care, etc.)  Outcome: Progressing  Goal: By discharge, will develop insight into their chemical dependency and sustain motivation to continue in recovery  Description: INTERVENTIONS:  - Attends all daily group sessions and scheduled AA groups  - Actively practices coping skills through participation in the therapeutic community and adherence to program rules  - Reviews and completes assignments from individual treatment plan  - Assist patient development of understanding of their personal cycle of addiction and relapse triggers  Outcome: Progressing

## 2023-08-03 NOTE — NURSING NOTE
Pt noted out of room for meal compliant with meds denied pain and refused routine voltaren gel. Denies SI/HI/AH/VH at this time. Walking the unit with headphones on.

## 2023-08-04 LAB
ANION GAP SERPL CALCULATED.3IONS-SCNC: 7 MMOL/L
BUN SERPL-MCNC: 16 MG/DL (ref 5–25)
CALCIUM SERPL-MCNC: 9.3 MG/DL (ref 8.4–10.2)
CHLORIDE SERPL-SCNC: 102 MMOL/L (ref 96–108)
CO2 SERPL-SCNC: 31 MMOL/L (ref 21–32)
CREAT SERPL-MCNC: 1.26 MG/DL (ref 0.6–1.3)
GFR SERPL CREATININE-BSD FRML MDRD: 80 ML/MIN/1.73SQ M
GLUCOSE P FAST SERPL-MCNC: 120 MG/DL (ref 65–99)
GLUCOSE SERPL-MCNC: 120 MG/DL (ref 65–140)
POTASSIUM SERPL-SCNC: 4.4 MMOL/L (ref 3.5–5.3)
SODIUM SERPL-SCNC: 140 MMOL/L (ref 135–147)

## 2023-08-04 PROCEDURE — 99232 SBSQ HOSP IP/OBS MODERATE 35: CPT | Performed by: PSYCHIATRY & NEUROLOGY

## 2023-08-04 PROCEDURE — 80048 BASIC METABOLIC PNL TOTAL CA: CPT | Performed by: PHYSICIAN ASSISTANT

## 2023-08-04 RX ADMIN — LOXAPINE 120 MG: 50 CAPSULE ORAL at 21:04

## 2023-08-04 RX ADMIN — DIVALPROEX SODIUM 750 MG: 500 TABLET, DELAYED RELEASE ORAL at 08:16

## 2023-08-04 RX ADMIN — MELATONIN TAB 3 MG 3 MG: 3 TAB at 01:37

## 2023-08-04 RX ADMIN — LOXAPINE 50 MG: 50 CAPSULE ORAL at 16:52

## 2023-08-04 RX ADMIN — OLANZAPINE 5 MG: 5 TABLET, FILM COATED ORAL at 00:14

## 2023-08-04 RX ADMIN — GLYCOPYRROLATE 1 MG: 1 TABLET ORAL at 16:52

## 2023-08-04 RX ADMIN — LOXAPINE 50 MG: 50 CAPSULE ORAL at 08:16

## 2023-08-04 RX ADMIN — GLYCOPYRROLATE 1 MG: 1 TABLET ORAL at 08:16

## 2023-08-04 RX ADMIN — GLYCOPYRROLATE 1 MG: 1 TABLET ORAL at 21:06

## 2023-08-04 RX ADMIN — NICOTINE POLACRILEX 4 MG: 4 GUM, CHEWING BUCCAL at 18:45

## 2023-08-04 RX ADMIN — DIVALPROEX SODIUM 750 MG: 500 TABLET, DELAYED RELEASE ORAL at 18:43

## 2023-08-04 RX ADMIN — MELATONIN TAB 3 MG 3 MG: 3 TAB at 21:39

## 2023-08-04 RX ADMIN — OLANZAPINE 7.5 MG: 2.5 TABLET, FILM COATED ORAL at 18:43

## 2023-08-04 NOTE — NURSING NOTE
Patient actively responding to internal stimuli, talking to himself, agitated and loud at times.  Patient given PRN Zyprexa 5mg PO at 12:14am.

## 2023-08-04 NOTE — NURSING NOTE
Patient has only been out of his room for meals. Pleasant but scant during interaction. Currently denies AH. No prns required. Refused both doses of Voltaren gel this shift and denies pain.

## 2023-08-04 NOTE — NURSING NOTE
Patient c/o severe agitation r/t AH. PRN zyprexa 7.5mg was administered. Will monitor for effectiveness.

## 2023-08-04 NOTE — NURSING NOTE
Pt reassessed for agitation, presents calm in day room socializing with peers. Atarax prn effective.

## 2023-08-04 NOTE — PROGRESS NOTES
08/04/23 0850   Team Meeting   Meeting Type Daily Rounds   Team Members Present   Team Members Present Physician;Nurse;   Physician Team Member [id number] Team Member [facility] Management Team Member Wendie   Patient/Family Present   Patient Present No   Patient's Family Present No     Pt is med/meal compliant. Pt c/o AH overnight and received PRN Zyprexa. PRN melatonin given for insomnia overnight. Discharge pending EAC.

## 2023-08-04 NOTE — PROGRESS NOTES
Progress Note - Behavioral Health   Gokul Escalante 26 y.o. male MRN: [id number]  Unit/Bed#: U 343-01 Encounter: [id number]    Assessment/Plan   Principal Problem:    Schizoaffective disorder ([address])  Active Problems:    Legally blind    Hearing loss    Asthma    Medical clearance for psychiatric admission    Prolonged erection    Intellectual disability    Rib pain on left side      Recommended Treatment:   Continue loxapine 50 mg twice daily and 120 mg every afternoon for psychosis  Continue Depakote 750 mg twice daily for mood stabilization  Continue comparably 1 mg 3 times daily for sialorrhea  BMP 08/04 reviewed     Continue with group therapy, milieu therapy and occupational therapy. Continue frequent safety checks and vitals per unit protocol. Case discussed with treatment team.  Risks, benefits and possible side effects of Medications: Risks, benefits, and possible side effects of medications have been explained to the patient, who verbalizes understanding    Current Legal Status: 201  Disposition: Coordinating with case management, likely EAC  ------------------------------------------------------------    Subjective: Per nursing report, Gokul has been cooperative on the unit and is compliant with scheduled medications. He refused voltaren gel orderd by SLIM and denied pain. He was seen walking on the unit with headphones on. Patient received atarax for anxiety due to [facility]. Around 12:14 AM, patient was seen RIS and agitated, loud and received Zyprexa 5 mg p.o. Patient also received melatonin 3 mg po for insomnia. Today, Gokul was seen and evaluated at bedside for continuity of care. On evaluation, patient is blunted, with poverty of thought, scant in speech, drowsy. He reports feeling "good" this morning. Patient states he got "6 hours and 45 minutes" of sleep overnight without any sleep disturbances.   He reports he has not had breakfast yet this morning but was later seen out of bed to get breakfast.  He describes his energy levels as "the usual, up and down". He shares he played [employer] yesterday but denies any plans for today. Patient is tolerating current medication regimen well and denies any medication side effects at this time. He denies any abdominal pain, penile pain or priapism. Patient denies SI/HI/AVH at this time and denies AVH overnight, though per nursing documentation did have VH and was seen RIS. Progress Toward Goals: slow improvement    Psychiatric Review of Systems:  Behavior over the last 24 hours: Unchanged  Sleep: Increased daytime sleeping  Appetite: adequate  Medication side effects: none verbalized  ROS: Complete review of systems is negative except as noted above.     Vital signs in last 24 hours:   Temp:  [96.5 °F (35.8 °C)] 96.5 °F (35.8 °C)  HR:  [82] 82  Resp:  [16] 16  BP: (127)/(70) 127/70    Mental Status Exam:  Appearance:  Overtly appearing -American male, no acute distress, drowsy, Poor eye contact, appears stated age, marginal grooming/hygiene, disheveled and Dressed in hospital attire   Behavior:  calm, cooperative and laying in bed   Motor: no abnormal movements and normal gait and balance   Speech:  spontaneous, clear, normal rate, normal volume, scant and coherent   Mood:  "Good"   Affect:  blunted   Thought Process:  poverty of thought, concrete   Thought Content: no verbalized delusions or overt paranoia   Perceptual disturbances: denies current hallucinations and does not appear to be responding to internal stimuli at this time; denies AVH overnight but per documentation was endorsing VH overnight and seen RIS   Risk Potential: No active or passive suicidal or homicidal ideation was verbalized during interview   Cognition: oriented to self and situation, appears to be of average intelligence and cognition not formally tested   Insight:  Limited   Judgment: Limited     Current Medications:  Current Facility-Administered Medications   Medication Dose Route Frequency Provider Last Rate   • acetaminophen  650 mg Oral Q6H PRN Fernandez Topete MD     • acetaminophen  650 mg Oral Q4H PRN Fernandez Topete MD     • acetaminophen  975 mg Oral Q6H PRN Fernandez Topete MD     • albuterol  2 puff Inhalation Q4H PRN Fernandez Topete MD     • aluminum-magnesium hydroxide-simethicone  30 mL Oral Q4H PRN Fernandez Topete MD     • benztropine  1 mg Intramuscular BID PRN Yobani Art MD     • benztropine  1 mg Oral Q4H PRN Max 6/day Fernandez Topete MD     • Diclofenac Sodium  2 g Topical 4x Daily Madeline Umanzor PA-C     • divalproex sodium  750 mg Oral BID Selene Mac MD     • fluticasone  1 spray Nasal Daily PRN Madeline Umanzor PA-C     • glycopyrrolate  1 mg Oral TID Yamila Mchugh DO     • hydrOXYzine HCL  100 mg Oral Q6H PRN Max 4/day Maddi Remington, DO      Or   • LORazepam  1 mg Intramuscular Q6H PRN Maddi Remington, DO     • hydrOXYzine HCL  25 mg Oral Q6H PRN Max 4/day Fernandez Topete MD     • hydrOXYzine HCL  50 mg Oral Q6H PRN Max 4/day Fernandez Topete MD     • loxapine  120 mg Oral QPM Yamila Mchugh DO     • loxapine  50 mg Oral BID Yamila Mchugh DO     • melatonin  3 mg Oral HS PRN Maddi Remington, DO     • nicotine polacrilex  4 mg Oral Q2H PRN Fernandez Topete MD     • OLANZapine  5 mg Oral Q3H PRN Max 6/day Selene Mac MD      Or   • OLANZapine  5 mg Intramuscular Q3H PRN Max 6/day Selene Mac MD     • OLANZapine  5 mg Intramuscular Q8H PRN Maddi Remington, DO      Or   • OLANZapine  7.5 mg Oral Q8H PRN Maddi Remington, DO     • OLANZapine  2.5 mg Oral Q3H PRN Max 8/day Selene Mac MD     • ondansetron  4 mg Oral Q8H PRN Maddi Remington, DO     • polyethylene glycol  17 g Oral Daily PRN Fernandez Topete MD     • pseudoephedrine  120 mg Oral BID PRN Sandra Lowe MD     • senna-docusate sodium  1 tablet Oral Daily PRN Fernandez Topete MD     • sterile water          • sterile water          • sterile water          • sterile water          • sterile water          • sterile water          • sterile water          • sterile water              Behavioral Health Medications: all current active meds have been reviewed. Changes as in plan section above. Laboratory results:  I have personally reviewed all pertinent laboratory/tests results.   Recent Results (from the past 48 hour(s))   ECG 12 lead    Collection Time: 08/02/23  3:36 PM   Result Value Ref Range    Ventricular Rate 78 BPM    Atrial Rate 78 BPM    MA Interval 136 ms    QRSD Interval 80 ms    QT Interval 344 ms    QTC Interval 392 ms    P Axis 41 degrees    QRS Axis 65 degrees    T Wave Axis 29 degrees   ECG 12 lead    Collection Time: 08/02/23  3:37 PM   Result Value Ref Range    Ventricular Rate 82 BPM    Atrial Rate 82 BPM    MA Interval 138 ms    QRSD Interval 94 ms    QT Interval 352 ms    QTC Interval 411 ms    P [place] 48 degrees    QRS Axis 61 degrees    T Wave Axis 38 degrees   Basic metabolic panel    Collection Time: 08/04/23  6:37 AM   Result Value Ref Range    Sodium 140 135 - 147 mmol/L    Potassium 4.4 3.5 - 5.3 mmol/L    Chloride 102 96 - 108 mmol/L    CO2 31 21 - 32 mmol/L    ANION GAP 7 mmol/L    BUN 16 5 - 25 mg/dL    Creatinine 1.26 0.60 - 1.30 mg/dL    Glucose 120 65 - 140 mg/dL    Glucose, Fasting 120 (H) 65 - 99 mg/dL    Calcium 9.3 8.4 - 10.2 mg/dL    eGFR 80 ml/min/1.73sq juan antonio Lobato DO

## 2023-08-04 NOTE — NURSING NOTE
Pt reassessed for agitation, no longer visibly agitated and expressed relief. Zyprexa prn effective Currently walking unit with headphones.

## 2023-08-04 NOTE — NURSING NOTE
Pt remains in room at this time, wishes to sleep reports not sleeping and feeling tired. Denies SI/HI/AH/VH at this time.  Compliant with meds

## 2023-08-05 PROCEDURE — 99232 SBSQ HOSP IP/OBS MODERATE 35: CPT | Performed by: STUDENT IN AN ORGANIZED HEALTH CARE EDUCATION/TRAINING PROGRAM

## 2023-08-05 RX ADMIN — LOXAPINE 50 MG: 50 CAPSULE ORAL at 15:58

## 2023-08-05 RX ADMIN — MELATONIN TAB 3 MG 3 MG: 3 TAB at 23:03

## 2023-08-05 RX ADMIN — GLYCOPYRROLATE 1 MG: 1 TABLET ORAL at 15:58

## 2023-08-05 RX ADMIN — LOXAPINE 120 MG: 50 CAPSULE ORAL at 21:34

## 2023-08-05 RX ADMIN — OLANZAPINE 7.5 MG: 2.5 TABLET, FILM COATED ORAL at 18:49

## 2023-08-05 RX ADMIN — GLYCOPYRROLATE 1 MG: 1 TABLET ORAL at 21:35

## 2023-08-05 RX ADMIN — GLYCOPYRROLATE 1 MG: 1 TABLET ORAL at 09:36

## 2023-08-05 RX ADMIN — DIVALPROEX SODIUM 750 MG: 500 TABLET, DELAYED RELEASE ORAL at 09:36

## 2023-08-05 RX ADMIN — DIVALPROEX SODIUM 750 MG: 500 TABLET, DELAYED RELEASE ORAL at 18:23

## 2023-08-05 RX ADMIN — HYDROXYZINE HYDROCHLORIDE 100 MG: 50 TABLET, FILM COATED ORAL at 23:05

## 2023-08-05 RX ADMIN — LOXAPINE 50 MG: 50 CAPSULE ORAL at 09:36

## 2023-08-05 NOTE — NURSING NOTE
Patient at nurses station appearing agitated with c/o AH. Patient reporting voices are bothering him and are uncontrollable. Broset score of 3.     1649 - PRN zyprexa 7.5 mg PO administered.

## 2023-08-05 NOTE — NURSING NOTE
Patient is withdrawn to and isolated to room and observed sleeping for most of shift. Visible for meals only. Medication compliant. Cooperative and pleasant, denying SI, HI, A/V hallucinations.

## 2023-08-05 NOTE — PROGRESS NOTES
Progress Note - Behavioral Health   Gokul Hill 26 y.o. male MRN: [id number]  Unit/Bed#: [facility] 343-01 Encounter: [id number]    Assessment/Plan   Principal Problem:    Schizoaffective disorder ([address])  Active Problems:    Legally blind    Hearing loss    Asthma    Medical clearance for psychiatric admission    Prolonged erection    Intellectual disability    Rib pain on left side      Recommended Treatment:   Continue loxapine 50 mg twice daily and 120 mg every afternoon for psychosis  Continue Depakote 700 mg twice daily for mood stabilization new line continue glycopyrrolate 1 mg 3 times daily for sialorrhea    Continue with group therapy, milieu therapy and occupational therapy. Continue frequent safety checks and vitals per unit protocol. Case discussed with treatment team.  Risks, benefits and possible side effects of Medications: Risks, benefits, and possible side effects of medications have been explained to the patient, who verbalizes understanding    Current Legal Status: 201  Disposition: Coordinating with case management, likely EAC  ------------------------------------------------------------    Subjective: Per nursing report, Gokul has been calm, cooperative on the unit and compliant with scheduled medications. He has been scant during conversations and reporting not sleeping well and feeling tired yesterday evening. Patient complaining of severe agitation due to [facility] and received Zyprexa po, effective. He was later noted to be walking on the unit with headphones. PRNs: Melatonin 3 mg at 2139, Zyprexa 7.5 mg p.o. at [address] [facility]      Today, Gokul was seen and evaluated at bedside for continuity of care. On evaluation, patient is slightly less drowsy, remains blunted with poverty of thought, scant in speech. He reports feeling "okay" this morning. Patient states he is tolerating current medication regimen well and denies any side effects at this time. He denies any drooling or pain.   Patient denies SI/HI/AVH at this time. He reports VH overnight "they were getting on my nerves". Progress Toward Goals: slow improvement    Psychiatric Review of Systems:  Behavior over the last 24 hours: Improving slowly  Sleep: Increased daytime sleeping  Appetite: adequate  Medication side effects: none verbalized  ROS: Complete review of systems is negative except as noted above. Vital signs in last 24 hours:   Temp:  [97.8 °F (36.6 °C)] 97.8 °F (36.6 °C)  HR:  [99] 99  Resp:  [18] 18  BP: (144)/(77) 144/77    Mental Status Exam:  Appearance:  alert, Minimal eye contact, appears stated age, marginal grooming/hygiene and disheveled   Behavior:  calm, cooperative and laying in bed   Motor: no abnormal movements and normal gait and balance   Speech:  spontaneous, clear, normal rate, normal volume, scant and coherent   Mood:  "Okay"   Affect:  blunted   Thought Process:  poverty of thought, concrete at times   Thought Content: no verbalized delusions or overt paranoia   Perceptual disturbances: no reported hallucinations and does not appear to be responding to internal stimuli at this time; reports VH overnight and that "they were getting on my nerves".   Denies AVH at time of interview   Risk Potential: No active or passive suicidal or homicidal ideation was verbalized during interview   Cognition: oriented to self and situation, appears to be of average intelligence and cognition not formally tested   Insight:  Limited   Judgment: Limited     Current Medications:  Current Facility-Administered Medications   Medication Dose Route Frequency Provider Last Rate   • acetaminophen  650 mg Oral Q6H PRN Sharon Holter, MD     • acetaminophen  650 mg Oral Q4H PRN Sharon Holter, MD     • acetaminophen  975 mg Oral Q6H PRN Sharon Holter, MD     • albuterol  2 puff Inhalation Q4H PRN Sharon Holter, MD     • aluminum-magnesium hydroxide-simethicone  30 mL Oral Q4H PRN Sharon Holter, MD     • benztropine  1 mg Intramuscular BID PRN Iglesia Savage MD     • benztropine  1 mg Oral Q4H PRN Max 6/day Bam Newton MD     • Diclofenac Sodium  2 g Topical 4x Daily Melissa Velazquez PA-C     • divalproex sodium  750 mg Oral BID Keisha Bridges MD     • fluticasone  1 spray Nasal Daily PRN Melissa Velazquez PA-C     • glycopyrrolate  1 mg Oral TID Garret Dowell, DO     • hydrOXYzine HCL  100 mg Oral Q6H PRN Max 4/day Marina Caban DO      Or   • LORazepam  1 mg Intramuscular Q6H PRN Mairna Caban, DO     • hydrOXYzine HCL  25 mg Oral Q6H PRN Max 4/day Bam Newton MD     • hydrOXYzine HCL  50 mg Oral Q6H PRN Max 4/day Bam Newton MD     • loxapine  120 mg Oral QPM Garret Dowell, DO     • loxapine  50 mg Oral BID Garret Dowell, DO     • melatonin  3 mg Oral HS PRN Marina Caban DO     • nicotine polacrilex  4 mg Oral Q2H PRN Bam Newton MD     • OLANZapine  5 mg Oral Q3H PRN Max 6/day Keisha Bridges MD      Or   • OLANZapine  5 mg Intramuscular Q3H PRN Max 6/day Keisha Bridges MD     • OLANZapine  5 mg Intramuscular Q8H PRN Marina Caban DO      Or   • OLANZapine  7.5 mg Oral Q8H PRN Marina Caban DO     • OLANZapine  2.5 mg Oral Q3H PRN Max 8/day Keisha Bridges MD     • ondansetron  4 mg Oral Q8H PRN Marina Caban DO     • polyethylene glycol  17 g Oral Daily PRN Bam Newton MD     • pseudoephedrine  120 mg Oral BID PRN Cade Carlson MD     • senna-docusate sodium  1 tablet Oral Daily PRN Bam Newton MD     • sterile water          • sterile water          • sterile water          • sterile water          • sterile water          • sterile water          • sterile water          • sterile water              Behavioral Health Medications: all current active meds have been reviewed. Changes as in plan section above. Laboratory results:  I have personally reviewed all pertinent laboratory/tests results.   Recent Results (from the past 48 hour(s))   Basic metabolic panel    Collection Time: 08/04/23  6:37 AM   Result Value Ref Range    Sodium 140 135 - 147 mmol/L    Potassium 4.4 3.5 - 5.3 mmol/L    Chloride 102 96 - 108 mmol/L    CO2 31 21 - 32 mmol/L    ANION GAP 7 mmol/L    BUN 16 5 - 25 mg/dL    Creatinine 1.26 0.60 - 1.30 mg/dL    Glucose 120 65 - 140 mg/dL    Glucose, Fasting 120 (H) 65 - 99 mg/dL    Calcium 9.3 8.4 - 10.2 mg/dL    eGFR 80 ml/min/1.73sq m        Allen Golden, DO

## 2023-08-05 NOTE — NURSING NOTE
Pt c/o difficulty sleeping prn melatonin administered at 2139, pt in bed resting medication effective.

## 2023-08-06 VITALS
SYSTOLIC BLOOD PRESSURE: 108 MMHG | HEIGHT: 63 IN | OXYGEN SATURATION: 97 % | BODY MASS INDEX: 31.47 KG/M2 | WEIGHT: 177.6 LBS | TEMPERATURE: 97.6 F | HEART RATE: 107 BPM | DIASTOLIC BLOOD PRESSURE: 66 MMHG | RESPIRATION RATE: 16 BRPM

## 2023-08-06 PROCEDURE — 99232 SBSQ HOSP IP/OBS MODERATE 35: CPT | Performed by: STUDENT IN AN ORGANIZED HEALTH CARE EDUCATION/TRAINING PROGRAM

## 2023-08-06 RX ORDER — WATER 10 ML/10ML
INJECTION INTRAMUSCULAR; INTRAVENOUS; SUBCUTANEOUS
Status: DISCONTINUED
Start: 2023-08-06 | End: 2023-10-19 | Stop reason: HOSPADM

## 2023-08-06 RX ADMIN — LOXAPINE 50 MG: 50 CAPSULE ORAL at 15:41

## 2023-08-06 RX ADMIN — DIVALPROEX SODIUM 750 MG: 500 TABLET, DELAYED RELEASE ORAL at 18:48

## 2023-08-06 RX ADMIN — LOXAPINE 120 MG: 50 CAPSULE ORAL at 21:05

## 2023-08-06 RX ADMIN — OLANZAPINE 5 MG: 10 INJECTION, POWDER, LYOPHILIZED, FOR SOLUTION INTRAMUSCULAR at 17:22

## 2023-08-06 RX ADMIN — OLANZAPINE 7.5 MG: 2.5 TABLET, FILM COATED ORAL at 23:32

## 2023-08-06 RX ADMIN — LOXAPINE 50 MG: 50 CAPSULE ORAL at 09:02

## 2023-08-06 RX ADMIN — HYDROXYZINE HYDROCHLORIDE 100 MG: 50 TABLET, FILM COATED ORAL at 23:31

## 2023-08-06 RX ADMIN — GLYCOPYRROLATE 1 MG: 1 TABLET ORAL at 15:41

## 2023-08-06 RX ADMIN — GLYCOPYRROLATE 1 MG: 1 TABLET ORAL at 21:05

## 2023-08-06 RX ADMIN — GLYCOPYRROLATE 1 MG: 1 TABLET ORAL at 09:03

## 2023-08-06 RX ADMIN — DIVALPROEX SODIUM 750 MG: 500 TABLET, DELAYED RELEASE ORAL at 09:03

## 2023-08-06 NOTE — NURSING NOTE
Patient is resting in bed comfortable, RR even and unlabored. Denies all psych symptoms at this time. Medication compliant. No s/s of distress.

## 2023-08-06 NOTE — NURSING NOTE
Patient at nursing station requesting PRN for voices that persist and are "annoying". Patient declined PO method and preferred IM route d/t voices "irritate me", as patient stated. 1722 - PRN zyprexa 5 mg IM administered. 4214 - Patient reports PRN zyprexa IM " I'ts alright, its ok ma'am." Patient rolled over onto his side, put covers over his head and went to sleep.

## 2023-08-06 NOTE — PROGRESS NOTES
Progress Note - Behavioral Health   Gokul Sellersise Morning 26 y.o. male MRN: [id number]  Unit/Bed#: [facility] 343-01 Encounter: [id number]    Assessment/Plan   Principal Problem:    Schizoaffective disorder ([address])  Active Problems:    Legally blind    Hearing loss    Asthma    Medical clearance for psychiatric admission    Prolonged erection    Intellectual disability    Rib pain on left side      Recommended Treatment:   Continue loxapine 50 mg twice daily and 120 mg every afternoon for psychosis  Continue Depakote 700 mg twice daily for mood stabilization  Continue Glycopyrrolate 1 mg 3 times daily for sialorrhea    Continue with group therapy, milieu therapy and occupational therapy. Continue frequent safety checks and vitals per unit protocol. Case discussed with treatment team.  Risks, benefits and possible side effects of Medications: Risks, benefits, and possible side effects of medications have been explained to the patient, who verbalizes understanding    Current Legal Status: 201  Disposition: Coordinating with case management, likely EAC  ------------------------------------------------------------    Subjective: Per nursing report, Gokul has been cooperative on the unit and compliant with scheduled medications. He has been sleeping during the afternoon. At [address] patient approached nursing station and appeared agitated. He reported AH that were uncontrollable and bothering him with Broset score of 3. Patient received 7.5 mg p.o. Zyprexa. Around 11:30 PM, patient was noted to be storming down the hallway and loudly cursing "you fucking bitch I will fuck you up- that's rude."  He was noted to be RIS with angry affect. PRNs: melatonin 3 mg, Senokot, 7.5 mg p.o. Zyprexa, Atarax 100 mg. Per nursing and chart, patient has been refusing voltaren gel and denying abdominal pain. Today, Gokul was seen and evaluated for continuity of care at bedside.  On evaluation, patient is drowsy, blunted, poverty of thought, scant in speech. He reports feeling "good" this morning. Patient denies SI/HI/AVH currently at time of interview but reports VH overnight were "calm". He reports plans to "participate in groups"and "hop in the shower" today. He denies any medication side effects at this time and denies any penile or abdominal pain. He is tolerating current medication regimen well and reports he would like to hold on any medication adjustments for today. Patient was seen later in the morning out for breakfast tray. Progress Toward Goals: slow improvement    Psychiatric Review of Systems:  Behavior over the last 24 hours: unchanged  Sleep: Increase daytime sleeping  Appetite: adequate  Medication side effects: none verbalized  ROS: Complete review of systems is negative except as noted above.     Vital signs in last 24 hours:   Temp:  [97.8 °F (36.6 °C)] 97.8 °F (36.6 °C)  HR:  [108] 108  Resp:  [16] 16  BP: (135)/(72) 135/72    Mental Status Exam:  Appearance:  drowsy, minimal eye contact, appears stated age, marginal grooming/hygiene and disheveled   Behavior:  calm, cooperative and laying in bed   Motor: no abnormal movements and normal gait and balance    Speech:  spontaneous, clear, normal rate, normal volume, scant at times and coherent   Mood:  "Good"   Affect:  blunted   Thought Process:  poverty of thought, [place] at times   Thought Content: no verbalized delusions or overt paranoia   Perceptual disturbances: denies current hallucinations and does not appear to be responding to internal stimuli at this time; reports his VH were "calm" overnight despite being seen RIS per nursing documentation    Risk Potential: No active or passive suicidal or homicidal ideation was verbalized during interview, Potential for aggression due to acute psychosis   Cognition: oriented to self and situation, appears to be of average intelligence and cognition not formally tested   Insight:  Limited   Judgment: Limited     Current Medications:  Current Facility-Administered Medications   Medication Dose Route Frequency Provider Last Rate   • acetaminophen  650 mg Oral Q6H PRN Redd Zepeda MD     • acetaminophen  650 mg Oral Q4H PRN Redd Zepeda MD     • acetaminophen  975 mg Oral Q6H PRN Redd Zepeda MD     • albuterol  2 puff Inhalation Q4H PRN Redd Zepeda MD     • aluminum-magnesium hydroxide-simethicone  30 mL Oral Q4H PRN Redd Zepeda MD     • benztropine  1 mg Intramuscular BID PRN Freddy Cleveland MD     • benztropine  1 mg Oral Q4H PRN Max 6/day Redd Zepeda MD     • Diclofenac Sodium  2 g Topical 4x Daily Dmitriy Marrero PA-C     • divalproex sodium  750 mg Oral BID Juliette Webber MD     • fluticasone  1 spray Nasal Daily PRN Dmitriy Marrero PA-C     • glycopyrrolate  1 mg Oral TID Santiago Ramin, DO     • hydrOXYzine HCL  100 mg Oral Q6H PRN Max 4/day Thelma Solano DO      Or   • LORazepam  1 mg Intramuscular Q6H PRN Thelma Solano DO     • hydrOXYzine HCL  25 mg Oral Q6H PRN Max 4/day Redd Zepeda MD     • hydrOXYzine HCL  50 mg Oral Q6H PRN Max 4/day Redd Zepeda MD     • loxapine  120 mg Oral QPM Santiago Ramin, DO     • loxapine  50 mg Oral BID Santiago Ramin, DO     • melatonin  3 mg Oral HS PRN Thelma Solano DO     • nicotine polacrilex  4 mg Oral Q2H PRN Redd Zepeda MD     • OLANZapine  5 mg Oral Q3H PRN Max 6/day Juliette Webber MD      Or   • OLANZapine  5 mg Intramuscular Q3H PRN Max 6/day Juliette Webber MD     • OLANZapine  5 mg Intramuscular Q8H PRN Thelma Solano DO      Or   • OLANZapine  7.5 mg Oral Q8H PRN Thelma Solano DO     • OLANZapine  2.5 mg Oral Q3H PRN Max 8/day Juliette Webber MD     • ondansetron  4 mg Oral Q8H PRN Thelma Solano DO     • polyethylene glycol  17 g Oral Daily PRN Redd Zepeda MD     • pseudoephedrine  120 mg Oral BID PRN Timothy Lara MD     • senna-docusate sodium  1 tablet Oral Daily PRN Hedy TERRAZAS Kate Downey MD     • sterile water          • sterile water          • sterile water          • sterile water          • sterile water          • sterile water          • sterile water          • sterile water              Behavioral Health Medications: all current active meds have been reviewed. Changes as in plan section above. Laboratory results:  I have personally reviewed all pertinent laboratory/tests results. No results found for this or any previous visit (from the past 48 hour(s)).      Riccardo Block, DO

## 2023-08-06 NOTE — NURSING NOTE
Patient isolated to self and to room. Compliant with medications and meals. Denies SI, HI, A/V hallucinations. Appears withdrawn and c/o feeling tired.  Currently denies SI, HI, A/V hallucinations reporting "I just want to sleep."

## 2023-08-06 NOTE — NURSING NOTE
Patient is storming down the hallway cursing loudly - "you fucking bitch I'll fuck you up - that's rude." He was responding to internal stimuli but his affect was angry/impulsive. He was offered and accepted Atarax 100mgs and Melatonin 3mgs.

## 2023-08-07 LAB
BASOPHILS # BLD AUTO: 0.07 THOUSANDS/ÂΜL (ref 0–0.1)
BASOPHILS NFR BLD AUTO: 1 % (ref 0–1)
EOSINOPHIL # BLD AUTO: 0.7 THOUSAND/ÂΜL (ref 0–0.61)
EOSINOPHIL NFR BLD AUTO: 7 % (ref 0–6)
ERYTHROCYTE [DISTWIDTH] IN BLOOD BY AUTOMATED COUNT: 13.1 % (ref 11.6–15.1)
HCT VFR BLD AUTO: 44.1 % (ref 36.5–49.3)
HGB BLD-MCNC: 14.8 G/DL (ref 12–17)
IMM GRANULOCYTES # BLD AUTO: 0.14 THOUSAND/UL (ref 0–0.2)
IMM GRANULOCYTES NFR BLD AUTO: 2 % (ref 0–2)
LYMPHOCYTES # BLD AUTO: 3.72 THOUSANDS/ÂΜL (ref 0.6–4.47)
LYMPHOCYTES NFR BLD AUTO: 39 % (ref 14–44)
MCH RBC QN AUTO: 31.4 PG (ref 26.8–34.3)
MCHC RBC AUTO-ENTMCNC: 33.6 G/DL (ref 31.4–37.4)
MCV RBC AUTO: 94 FL (ref 82–98)
MONOCYTES # BLD AUTO: 1.42 THOUSAND/ÂΜL (ref 0.17–1.22)
MONOCYTES NFR BLD AUTO: 15 % (ref 4–12)
NEUTROPHILS # BLD AUTO: 3.42 THOUSANDS/ÂΜL (ref 1.85–7.62)
NEUTS SEG NFR BLD AUTO: 36 % (ref 43–75)
NRBC BLD AUTO-RTO: 0 /100 WBCS
PLATELET # BLD AUTO: 221 THOUSANDS/UL (ref 149–390)
PMV BLD AUTO: 10.9 FL (ref 8.9–12.7)
RBC # BLD AUTO: 4.71 MILLION/UL (ref 3.88–5.62)
WBC # BLD AUTO: 9.47 THOUSAND/UL (ref 4.31–10.16)

## 2023-08-07 PROCEDURE — 99232 SBSQ HOSP IP/OBS MODERATE 35: CPT | Performed by: PSYCHIATRY & NEUROLOGY

## 2023-08-07 PROCEDURE — 85025 COMPLETE CBC W/AUTO DIFF WBC: CPT

## 2023-08-07 RX ORDER — AMOXICILLIN 250 MG
1 CAPSULE ORAL 2 TIMES DAILY
Status: DISCONTINUED | OUTPATIENT
Start: 2023-08-07 | End: 2023-08-14

## 2023-08-07 RX ORDER — CLOZAPINE 25 MG/1
12.5 TABLET ORAL
Status: DISCONTINUED | OUTPATIENT
Start: 2023-08-07 | End: 2023-08-09

## 2023-08-07 RX ADMIN — LOXAPINE 50 MG: 50 CAPSULE ORAL at 08:12

## 2023-08-07 RX ADMIN — NICOTINE POLACRILEX 4 MG: 4 GUM, CHEWING BUCCAL at 20:23

## 2023-08-07 RX ADMIN — MELATONIN TAB 3 MG 3 MG: 3 TAB at 22:11

## 2023-08-07 RX ADMIN — LOXAPINE 120 MG: 50 CAPSULE ORAL at 21:02

## 2023-08-07 RX ADMIN — GLYCOPYRROLATE 1 MG: 1 TABLET ORAL at 20:24

## 2023-08-07 RX ADMIN — DIVALPROEX SODIUM 750 MG: 500 TABLET, DELAYED RELEASE ORAL at 08:12

## 2023-08-07 RX ADMIN — GLYCOPYRROLATE 1 MG: 1 TABLET ORAL at 17:00

## 2023-08-07 RX ADMIN — LOXAPINE 50 MG: 50 CAPSULE ORAL at 17:00

## 2023-08-07 RX ADMIN — GLYCOPYRROLATE 1 MG: 1 TABLET ORAL at 08:12

## 2023-08-07 RX ADMIN — CLOZAPINE 12.5 MG: 25 TABLET ORAL at 17:24

## 2023-08-07 RX ADMIN — NICOTINE POLACRILEX 4 MG: 4 GUM, CHEWING BUCCAL at 22:12

## 2023-08-07 RX ADMIN — DIVALPROEX SODIUM 750 MG: 500 TABLET, DELAYED RELEASE ORAL at 20:23

## 2023-08-07 RX ADMIN — SENNOSIDES AND DOCUSATE SODIUM 1 TABLET: 50; 8.6 TABLET ORAL at 17:24

## 2023-08-07 RX ADMIN — OLANZAPINE 5 MG: 5 TABLET, FILM COATED ORAL at 20:23

## 2023-08-07 NOTE — NURSING NOTE
Patient is asking if he can be given "a shot for irritation." He was educated about the rational for the medication he received.

## 2023-08-07 NOTE — PROGRESS NOTES
Progress Note - Behavioral Health   Gokul Gutierrez 26 y.o. male MRN: [id number]  Unit/Bed#: [facility] 343-01 Encounter: [id number]    Assessment/Plan   Principal Problem:    Schizoaffective disorder ([address])  Active Problems:    Legally blind    Hearing loss    Asthma    Medical clearance for psychiatric admission    Prolonged erection    Intellectual disability    Rib pain on left side      Recommended Treatment:   Begin Clozapine 12.5 mg at 6pm tonight  Continue to monitor ANC and WBC for agranulocytosis   Begin bowel regime with daily sennokot to prevent constipation  Continue Loxapine 50 mg twice daily and 120 mg at evening for psychosis  Continue Valproic Acid 750 mg twice daily for mood stabilization  Continue Glycopyrrolate 1 mg three times daily for sialorrhea  Encourage participation in group therapy     Risks, benefits, and possible side effects of medications have been explained to the patient, who verbalizes understanding    Current Legal Status: 201  Disposition: Coordinating with case management, likely [facility]  ------------------------------------------------------------    Subjective: Per nursing report yesterday, Gokul has been isolated and compliant with scheduled medications and meals. Yesterday afternoon, nursing noted he appeared withdrawn and felt tired. At that time he denied auditory and visual hallucinations. Later in the evening he requested PRNs for voices that are "annoying". He preferred IM route of administration. He was given zyprexa 5 mg IM. Reported that the PRN Zyprexa IM was "alright, it's okay ma'am" to nurse. Around 11:30 pm at night nursing reported him as loud and angrily responding to internal stimuli. He was found punching the wall outside of the nurses station. At that time he was offered and accepted zyprexa 7.5 mg and atarax 100 mg for agitation. Today, Gokul was seen and evaluated alongside attending physician. On evaluation, patient is calm, cooperative and resting in bed.  He reports feeling "relaxed a little" this morning. He rates his anxiety as a 3/10 and depression a 4/10. He states that he slept from 1am to 9am. He reports that he might have overslept. Says he has a good appetite, and eats his breakfast, lunch, and dinner. He attended a video game groups this weekend which he really enjoyed. He currently does not have any auditory or visual hallucination, and states that the voices start around 5pm in the evening. He was told he would begin a new medication to help control  his auditory hallucination and was in agreement with the plan to start cloazpine. He understands the need for blood draws with clozapine. His goals today are to shower and to "do things he wants to get done" such as clean his room, shower, and attend groups. Denies SI/HI at the time of interview and denies any plan or intent to harm herself or others and contracts for safety in the hospital.       Progress Toward Goals: slow improvement    Psychiatric Review of Systems:  Behavior over the last 24 hours: agitaited  Sleep: increased daytime sleeping  Appetite: adequate  Medication side effects: none verbalized  ROS: Complete review of systems is negative except as noted above.     Vital signs in last 24 hours:   Temp:  [97.6 °F (36.4 °C)] 97.6 °F (36.4 °C)  HR:  [107] 107  Resp:  [16] 16  BP: (108)/(66) 108/66    Mental Status Exam:  Appearance:  Overtly appearing  male, alert, minimal eye contact, appears older than stated age, dressed in a hospital gown, marginal grooming/hygiene and overweight   Behavior:  Guarded, drowsy, and laying in bed   Motor: no abnormal movements and normal gait and balance   Speech:  spontaneous, normal rate, slow, scant at times, normal volume and coherent   Mood:  "relaxed a little"   Affect:  blunted   Thought Process:  concrete with poverty of thought   Thought Content: no verbalized delusions or paranoia    Perceptual disturbances: denies current hallucinations, internally preoccupied and distracted, reports that he hears voices around evening time   Risk Potential: No active or passive suicidal or homicidal ideation was verbalized during interview   Cognition: oriented to self and situation, appears to be of average intelligence and cognition not formally tested   Insight:  Limited   Judgment: Limited     Current Medications:  Current Facility-Administered Medications   Medication Dose Route Frequency Provider Last Rate   • acetaminophen  650 mg Oral Q6H PRN Sergio Agarwal MD     • acetaminophen  650 mg Oral Q4H PRN Sergio Agarwal MD     • acetaminophen  975 mg Oral Q6H PRN Sergio Agarwal MD     • albuterol  2 puff Inhalation Q4H PRN Sergio Agarwal MD     • aluminum-magnesium hydroxide-simethicone  30 mL Oral Q4H PRN Sergio Agarwal MD     • benztropine  1 mg Intramuscular BID PRN Ruben Dior MD     • benztropine  1 mg Oral Q4H PRN Max 6/day Sergio Agarwal MD     • divalproex sodium  750 mg Oral BID Minetta Duane, MD     • fluticasone  1 spray Nasal Daily PRN Amena Krueger PA-C     • glycopyrrolate  1 mg Oral TID Sommer Monique, DO     • hydrOXYzine HCL  100 mg Oral Q6H PRN Max 4/day Verdene Bearded, DO      Or   • LORazepam  1 mg Intramuscular Q6H PRN Verdene Bearded, DO     • hydrOXYzine HCL  25 mg Oral Q6H PRN Max 4/day Sergio Agarwal MD     • hydrOXYzine HCL  50 mg Oral Q6H PRN Max 4/day Sergio Agarwal MD     • loxapine  120 mg Oral QPM Sommer Monique, DO     • loxapine  50 mg Oral BID Sommer Monique, DO     • melatonin  3 mg Oral HS PRN Verdene Bearded, DO     • nicotine polacrilex  4 mg Oral Q2H PRN Sergio Agarwal MD     • OLANZapine  5 mg Oral Q3H PRN Max 6/day Minetta Duane, MD      Or   • OLANZapine  5 mg Intramuscular Q3H PRN Max 6/day Minetta Duane, MD     • OLANZapine  5 mg Intramuscular Q8H PRN Verdene Bearded, DO      Or   • OLANZapine  7.5 mg Oral Q8H PRN Verdene Bearded, DO     • OLANZapine  2.5 mg Oral Q3H PRN Max 8/day Jorge Burton MD     • ondansetron  4 mg Oral Q8H PRN Rafael Hawthorne DO     • polyethylene glycol  17 g Oral Daily PRN Rosana Dennison MD     • pseudoephedrine  120 mg Oral BID PRN Willy Mcbride MD     • senna-docusate sodium  1 tablet Oral Daily PRN Rosana Dennison MD     • sterile water          • sterile water          • sterile water          • sterile water          • sterile water          • sterile water          • sterile water          • sterile water          • sterile water              Behavioral Health Medications: all current active meds have been reviewed. Changes as in plan section above. Laboratory results:  I have personally reviewed all pertinent laboratory/tests results.   Recent Results (from the past 48 hour(s))   CBC and differential    Collection Time: 08/07/23 12:24 PM   Result Value Ref Range    WBC 9.47 4.31 - 10.16 Thousand/uL    RBC 4.71 3.88 - 5.62 Million/uL    Hemoglobin 14.8 12.0 - 17.0 g/dL    Hematocrit 44.1 36.5 - 49.3 %    MCV 94 82 - 98 fL    MCH 31.4 26.8 - 34.3 pg    MCHC 33.6 31.4 - 37.4 g/dL    RDW 13.1 11.6 - 15.1 %    MPV 10.9 8.9 - 12.7 fL    Platelets 718 941 - 660 Thousands/uL    nRBC 0 /100 WBCs    Neutrophils Relative 36 (L) 43 - 75 %    Immat GRANS % 2 0 - 2 %    Lymphocytes Relative 39 14 - 44 %    Monocytes Relative 15 (H) 4 - 12 %    Eosinophils Relative 7 (H) 0 - 6 %    Basophils Relative 1 0 - 1 %    Neutrophils Absolute 3.42 1.85 - 7.62 Thousands/µL    Immature Grans Absolute 0.14 0.00 - 0.20 Thousand/uL    Lymphocytes Absolute 3.72 0.60 - 4.47 Thousands/µL    Monocytes Absolute 1.42 (H) 0.17 - 1.22 Thousand/µL    Eosinophils Absolute 0.70 (H) 0.00 - 0.61 Thousand/µL    Basophils Absolute 0.07 0.00 - 0.10 Thousands/µL        Ninoska Titus  MS IV

## 2023-08-07 NOTE — NURSING NOTE
Patient was calm upon approach. Patient is isolative to room. Patient denies SI/HI/AH/VH. Patient is compliant with meds and meals.

## 2023-08-07 NOTE — NURSING NOTE
Pt. Is sitting in the day area at a table, with his head on the table. Scant, guarded but calm and cooperative. Denies all psych symptoms at this time. Medication compliant.

## 2023-08-07 NOTE — PROGRESS NOTES
08/07/23 0891   Team Meeting   Meeting Type Daily Rounds   Team Members Present   Team Members Present Physician;Nurse;   Physician Team Member [id number] Wendie   Patient/Family Present   Patient Present No   Patient's Family Present No     Pt med/meal compliant. Isolative to his room. Pt present at nurses station requesting PRN medication for AH. Pt given IM Zyprexa for severe agitation and AH. Pt later loudly responding to internal stimuli, punching the wall, unreceptive of verbal redirection. Pt received PRNs for agitation and AH. Discharge pending EAC.

## 2023-08-07 NOTE — NURSING NOTE
Patient is loudly and angrily responding to internal stimuli. Pacing in front of nurses station but not responsive to nursing staff's verbal intervention. Punching wall outside of nurses station. He was offered and accepted Zyprexa 7.5mgs po prn and Atarax 100mgs po prn. “You can access the [facility] Patient Portal, offered by [facility], by registering with the following website: [email or web address]”

## 2023-08-08 PROCEDURE — 99232 SBSQ HOSP IP/OBS MODERATE 35: CPT | Performed by: PSYCHIATRY & NEUROLOGY

## 2023-08-08 RX ADMIN — NICOTINE POLACRILEX 4 MG: 4 GUM, CHEWING BUCCAL at 20:23

## 2023-08-08 RX ADMIN — OLANZAPINE 7.5 MG: 2.5 TABLET, FILM COATED ORAL at 23:50

## 2023-08-08 RX ADMIN — GLYCOPYRROLATE 1 MG: 1 TABLET ORAL at 09:41

## 2023-08-08 RX ADMIN — MELATONIN TAB 3 MG 3 MG: 3 TAB at 23:50

## 2023-08-08 RX ADMIN — GLYCOPYRROLATE 1 MG: 1 TABLET ORAL at 21:07

## 2023-08-08 RX ADMIN — NICOTINE POLACRILEX 4 MG: 4 GUM, CHEWING BUCCAL at 09:42

## 2023-08-08 RX ADMIN — SENNOSIDES AND DOCUSATE SODIUM 1 TABLET: 50; 8.6 TABLET ORAL at 09:41

## 2023-08-08 RX ADMIN — HYDROXYZINE HYDROCHLORIDE 100 MG: 50 TABLET, FILM COATED ORAL at 23:50

## 2023-08-08 RX ADMIN — LOXAPINE 50 MG: 50 CAPSULE ORAL at 16:30

## 2023-08-08 RX ADMIN — DIVALPROEX SODIUM 750 MG: 500 TABLET, DELAYED RELEASE ORAL at 09:41

## 2023-08-08 RX ADMIN — LOXAPINE 120 MG: 50 CAPSULE ORAL at 21:07

## 2023-08-08 RX ADMIN — SENNOSIDES AND DOCUSATE SODIUM 1 TABLET: 50; 8.6 TABLET ORAL at 17:17

## 2023-08-08 RX ADMIN — GLYCOPYRROLATE 1 MG: 1 TABLET ORAL at 16:30

## 2023-08-08 RX ADMIN — LOXAPINE 50 MG: 50 CAPSULE ORAL at 09:42

## 2023-08-08 RX ADMIN — NICOTINE POLACRILEX 4 MG: 4 GUM, CHEWING BUCCAL at 22:11

## 2023-08-08 RX ADMIN — CLOZAPINE 12.5 MG: 25 TABLET ORAL at 17:16

## 2023-08-08 RX ADMIN — DIVALPROEX SODIUM 750 MG: 500 TABLET, DELAYED RELEASE ORAL at 18:08

## 2023-08-08 NOTE — PROGRESS NOTES
08/08/23 0844   Team Meeting   Meeting Type Daily Rounds   Team Members Present   Team Members Present Physician;Nurse;   Physician Team Member [id number] Team Member Tori Management Team Member Wendie   Patient/Family Present   Patient Present No   Patient's Family Present No   Pt isolative during the day, responding to internal stimuli loudly. Punching door on the unit. PRN Zyprexa for AH and agitation. Pt started on Clozaril yesterday. Discharge to be determined.

## 2023-08-08 NOTE — PROGRESS NOTES
Progress Note - Behavioral Health   Gokul Roberts 26 y.o. male MRN: [id number]  Unit/Bed#: [facility] 343-01 Encounter: [id number]    Assessment/Plan   Principal Problem:    Schizoaffective disorder ([address])  Active Problems:    Legally blind    Hearing loss    Asthma    Medical clearance for psychiatric admission    Prolonged erection    Intellectual disability    Rib pain on left side      Recommended Treatment:   1. Continue Clozapine 12.5 mg in evening for psychosis, patient added to the clozapine registry  2. Continue Loxapine 50 mg in the AM, 50 mg in the afternoon and 120 mg HS for psychosis  3. Continue Depakote 750 mg daily for mood and stabilization  4. Continue Melatonin 3 mg at bedtime for insomnia  5. Weekly CBC to monitor ANC and WBC for agranulocytosis   6. Continue Robinul 1 mg three times daily for sialorrhea  7. Continue encouraging patient for group attendance   8. Continue medical recommendations per SLIM       Risks, benefits, and possible side effects of medications have been explained to the patient, who verbalizes understanding    Current Legal Status: 201  Disposition:  Coordinating with case management, likely EAC  ------------------------------------------------------------    Subjective: Per nursing report, Gokul has been is cooperative on the unit and is compliant with scheduled medications and meals. Yesterday in the morning he was isolated to room and denied SI/HI/AH/VH. In the evening around 8:33 pm he was pacing the [address] and per nursing and was kicking doors. At this time he accepted his partial HS meds and PRN Zyprexa without hesitation. After the PRN Zyprexa patient was no longer pacing or kicking doors. He required melatonin last night for sleep. On evaluation, patient is cooperative and drowsy. He reports feeling "okay" this morning. Patient states that his anxiety is a 5/10 and depression is 7/10. He reports falling asleep around 12:00 am last night.  He briefly woke up and fell back asleep an hour later. He then woke up at 7:00 AM. Gokul says he does not remember taking any meds last night and reports that his auditory hallucinations were less bothersome. He has a good appetite and is eating breakfast, lunch, and dinner. He did not drink any water and had a bowel movement yesterday. He denies priapism or painful erections. Currently does not have any auditory or visual hallucination, states that the auditory hallucinations begin around dinner time. Says his goals are to tidy his room and drink more water. He denies any side effects with the start of clozapine. Denies SI/HI/AVH at the time of interview and denies any plan or intent to harm herself or others and contracts for safety in the hospital.       Progress Toward Goals: slow improvement    Psychiatric Review of Systems:  Behavior over the last 24 hours: unchanged  Sleep: drowsiness, increased daytime sleeping  Appetite: adequate  Medication side effects: none verbalized  ROS: Complete review of systems is negative except as noted above.     Vital signs in last 24 hours:   HR:  [118] 118  BP: (144)/(70) 144/70    Mental Status Exam:  Appearance:  Overtly appearing  male resting in bed, drowsy, intermittent eye contact, appears older than stated age, dressed in paper scrubs, marginal grooming/hygiene and overweight   Behavior:  cooperative, guarded and laying in bed   Motor: no abnormal movements and normal gait and balance   Speech:  normal rate, normal volume, scant   Mood:  "okay"   Affect:  blunted   Thought Process:  poverty of thought, concrete   Thought Content: no verbalized delusions or overt paranoia   Perceptual disturbances: denies current hallucinations, does not appears to be responding to internal stimuli during interview but per nursing report he seemed to be, staff reported auditory hallucinations begin in the evening   Risk Potential: No active or passive suicidal or homicidal ideation was verbalized during interview   Cognition: oriented to self and situation, appears to be below average intelligence and cognition not formally tested   Insight:  Limited   Judgment: Limited     Current Medications:  Current Facility-Administered Medications   Medication Dose Route Frequency Provider Last Rate   • acetaminophen  650 mg Oral Q6H PRN Sharon Holter, MD     • acetaminophen  650 mg Oral Q4H PRN Sharon Holter, MD     • acetaminophen  975 mg Oral Q6H PRN Sharon Holter, MD     • albuterol  2 puff Inhalation Q4H PRN Sharon Holter, MD     • aluminum-magnesium hydroxide-simethicone  30 mL Oral Q4H PRN Sharon Holter, MD     • benztropine  1 mg Intramuscular BID PRN Emilia Kaplan MD     • benztropine  1 mg Oral Q4H PRN Max 6/day Sharon Holter, MD     • cloZAPine  12.5 mg Oral HS Paul Erickson, DO     • divalproex sodium  750 mg Oral BID Sarai Sanches MD     • fluticasone  1 spray Nasal Daily PRN Asael Matthew PA-C     • glycopyrrolate  1 mg Oral TID Paul Erickson, DO     • hydrOXYzine HCL  100 mg Oral Q6H PRN Max 4/day Zonia Selby DO      Or   • LORazepam  1 mg Intramuscular Q6H PRN Zonia Selby DO     • hydrOXYzine HCL  25 mg Oral Q6H PRN Max 4/day Sharon Holter, MD     • hydrOXYzine HCL  50 mg Oral Q6H PRN Max 4/day Sharon Holter, MD     • loxapine  120 mg Oral QPM Paul Hutchinso, DO     • loxapine  50 mg Oral BID Paul Erickson, DO     • melatonin  3 mg Oral HS PRN Zonia Selby DO     • nicotine polacrilex  4 mg Oral Q2H PRN Sharon Holter, MD     • OLANZapine  5 mg Oral Q3H PRN Max 6/day Sarai Sanches MD      Or   • OLANZapine  5 mg Intramuscular Q3H PRN Max 6/day Sarai Sanches MD     • OLANZapine  5 mg Intramuscular Q8H PRN Zonia Selby DO      Or   • OLANZapine  7.5 mg Oral Q8H PRN Zonia Selby DO     • OLANZapine  2.5 mg Oral Q3H PRN Max 8/day Sarai Sanches MD     • ondansetron  4 mg Oral Q8H PRN Zonia Selby DO     • polyethylene glycol 17 g Oral Daily PRN Sarina Green MD     • pseudoephedrine  120 mg Oral BID PRN Paige Murray MD     • senna-docusate sodium  1 tablet Oral Daily PRN Sarina Green MD     • senna-docusate sodium  1 tablet Oral BID Gaurang Carrasco DO     • sterile water          • sterile water          • sterile water          • sterile water          • sterile water          • sterile water          • sterile water          • sterile water          • sterile water              Behavioral Health Medications: all current active meds have been reviewed. Changes as in plan section above. Laboratory results:  I have personally reviewed all pertinent laboratory/tests results.   Recent Results (from the past 48 hour(s))   CBC and differential    Collection Time: 08/07/23 12:24 PM   Result Value Ref Range    WBC 9.47 4.31 - 10.16 Thousand/uL    RBC 4.71 3.88 - 5.62 Million/uL    Hemoglobin 14.8 12.0 - 17.0 g/dL    Hematocrit 44.1 36.5 - 49.3 %    MCV 94 82 - 98 fL    MCH 31.4 26.8 - 34.3 pg    MCHC 33.6 31.4 - 37.4 g/dL    RDW 13.1 11.6 - 15.1 %    MPV 10.9 8.9 - 12.7 fL    Platelets 083 712 - 023 Thousands/uL    nRBC 0 /100 WBCs    Neutrophils Relative 36 (L) 43 - 75 %    Immat GRANS % 2 0 - 2 %    Lymphocytes Relative 39 14 - 44 %    Monocytes Relative 15 (H) 4 - 12 %    Eosinophils Relative 7 (H) 0 - 6 %    Basophils Relative 1 0 - 1 %    Neutrophils Absolute 3.42 1.85 - 7.62 Thousands/µL    Immature Grans Absolute 0.14 0.00 - 0.20 Thousand/uL    Lymphocytes Absolute 3.72 0.60 - 4.47 Thousands/µL    Monocytes Absolute 1.42 (H) 0.17 - 1.22 Thousand/µL    Eosinophils Absolute 0.70 (H) 0.00 - 0.61 Thousand/µL    Basophils Absolute 0.07 0.00 - 0.10 Thousands/µL        Ninoska Titus  MS IV

## 2023-08-08 NOTE — NURSING NOTE
Attempted to wake patient up per request during med administration in AM. Patient fell back to sleep.

## 2023-08-08 NOTE — NURSING NOTE
Pt visible on unit pacing in hallways. Per MHTs on unit he is RTIS and kicking doors. Upon approach, pt is calm and cooperative and denies agitation and AH. Pt guarded in conversation but is pleasant and accepted partial HS meds and PRN Zyprexa without hesitation. Pt continues to walk in halls, 7 minute checks continue.

## 2023-08-08 NOTE — NURSING NOTE
Patient denies AVH/SI/HI at this present time, but reports during dinnertime the voices are noticeable. He reports no VH, but reports AH during that time period, as well as into the night. Patient is cooperative, compliant, and has had no behavioral issues to note this shift.

## 2023-08-09 LAB
ATRIAL RATE: 98 BPM
P AXIS: 54 DEGREES
PR INTERVAL: 134 MS
QRS AXIS: 66 DEGREES
QRSD INTERVAL: 90 MS
QT INTERVAL: 352 MS
QTC INTERVAL: 449 MS
T WAVE AXIS: 29 DEGREES
VENTRICULAR RATE: 98 BPM

## 2023-08-09 PROCEDURE — 93010 ELECTROCARDIOGRAM REPORT: CPT | Performed by: STUDENT IN AN ORGANIZED HEALTH CARE EDUCATION/TRAINING PROGRAM

## 2023-08-09 PROCEDURE — 99232 SBSQ HOSP IP/OBS MODERATE 35: CPT | Performed by: PSYCHIATRY & NEUROLOGY

## 2023-08-09 PROCEDURE — 93005 ELECTROCARDIOGRAM TRACING: CPT

## 2023-08-09 RX ORDER — CLOZAPINE 25 MG/1
25 TABLET ORAL
Status: DISCONTINUED | OUTPATIENT
Start: 2023-08-09 | End: 2023-08-11

## 2023-08-09 RX ADMIN — SENNOSIDES AND DOCUSATE SODIUM 1 TABLET: 50; 8.6 TABLET ORAL at 17:18

## 2023-08-09 RX ADMIN — DIVALPROEX SODIUM 750 MG: 500 TABLET, DELAYED RELEASE ORAL at 18:10

## 2023-08-09 RX ADMIN — LOXAPINE 50 MG: 50 CAPSULE ORAL at 08:26

## 2023-08-09 RX ADMIN — GLYCOPYRROLATE 1 MG: 1 TABLET ORAL at 21:32

## 2023-08-09 RX ADMIN — SENNOSIDES AND DOCUSATE SODIUM 1 TABLET: 50; 8.6 TABLET ORAL at 08:26

## 2023-08-09 RX ADMIN — LOXAPINE 50 MG: 50 CAPSULE ORAL at 17:18

## 2023-08-09 RX ADMIN — DIVALPROEX SODIUM 750 MG: 500 TABLET, DELAYED RELEASE ORAL at 08:26

## 2023-08-09 RX ADMIN — LOXAPINE 120 MG: 50 CAPSULE ORAL at 21:32

## 2023-08-09 RX ADMIN — NICOTINE POLACRILEX 4 MG: 4 GUM, CHEWING BUCCAL at 16:53

## 2023-08-09 RX ADMIN — MELATONIN TAB 3 MG 3 MG: 3 TAB at 21:32

## 2023-08-09 RX ADMIN — GLYCOPYRROLATE 1 MG: 1 TABLET ORAL at 17:18

## 2023-08-09 RX ADMIN — CLOZAPINE 25 MG: 25 TABLET ORAL at 17:32

## 2023-08-09 RX ADMIN — GLYCOPYRROLATE 1 MG: 1 TABLET ORAL at 08:26

## 2023-08-09 NOTE — PROGRESS NOTES
Progress Note - Behavioral Health   Gokul Smith Coma 26 y.o. male MRN: [id number]  Unit/Bed#: [facility] 343-01 Encounter: [id number]    Assessment/Plan   Principal Problem:    Schizoaffective disorder ([address])  Active Problems:    Legally blind    Hearing loss    Asthma    Medical clearance for psychiatric admission    Prolonged erection    Intellectual disability    Rib pain on left side      Recommended Treatment:   1. Increase Clozaril to 25 mg at 6 pm evening for psychosis, treatment day 3   2. Weekly CBC on mondays to monitor WBC and ANC for agranulocytosis (WBC on 8/7/2023 was 9.47 and absolute neutrophil count was 3.42)   3. EKG ordered to check for reflex tachycardia   4. Continue Loxapine 50mg AM and 50 mg every afternoon, 120 mg at PM for psychosis   6. Continue Depakote 750 mg twice daily for mood and stabilization  5. Continue Melatonin 3mg PM as needed insomnia  7. Continue Robinul 1 mg three times daily for sialorrhea  8. Continue encouraging patient to attend groups  9. Continue SLIM medical management     Risks, benefits, and possible side effects of medications have been explained to the patient, who verbalizes understanding    Current Legal Status: 201  Disposition: Coordinating with case management, likely EAC   ------------------------------------------------------------    Subjective: Per nursing report yesterday, at 8:30 pm Gokul was reported to be calm, quiet, and to self. He played ball toss with peers. He was pleasant and guarded on approach and denied needing PRN medication. Later in the night, patient was found to be loudly responding to internal stimuli in the hallway. He was agitated and appeared fearful. He endorsed having severe agitation, severe anxiety, hearing voices and insomnia. At 11:50 PM he was received prn zyprexa 7.5 mg, atarax 100 mg, and melatonin 3mg. The zyprexa was minimally effective at reducing AH, atarax was effective at reducing anxiety, and melatonin ineffective.      Today, Gokul was seen and evaluated alongside attending physician. On evaluation in the morning, patient is drowsy, cooperative, and answers interview questions while in and out of sleep. He reports feeling "good" this morning. Patient states that yesterday he "had an episode, wasn't anything bad" and feels "the same as yesterday". He rates his anxiety and depression as a "7-8/10". He slept from 2:26 AM to 7:30 AM. He says his energy is "alright". He has a good appetite and eats breakfast, lunch, and dinner. He had a bowel movement yesterday. He denies experiencing dizziness, eye pain, blurriness, constipation, priapism, or painful erections. His goals today are to drink water to to "keep up the positive streak". He agrees to increasing his clozapine dose for better control of his auditory hallucinations. Denies SI/HI/AVH at the time of interview and denies any plan or intent to harm herself or others and contracts for safety in the hospital.    Progress Toward Goals: slow improvement    Psychiatric Review of Systems:  Behavior over the last 24 hours: unchanged  Sleep: 5 hours, drowsiness, increased daytime sleeping  Appetite: adequate  Medication side effects: none verbalized  ROS: Complete review of systems is negative except as noted above.     Vital signs in last 24 hours:   Temp:  [97.5 °F (36.4 °C)] 97.5 °F (36.4 °C)  HR:  [92] 92  BP: (139)/(76) 139/76    Mental Status Exam:  Appearance:  Overtly appearing  male, drowsy, intermittent eye contact, appears older than stated age, dressed in hospital gown, marginal grooming/hygiene, overweight    Behavior:  cooperative, guarded and laying in bed   Motor: no abnormal movements and normal gait and balance   Speech:  normal rate, scant and poor articulation, soft    Mood:  "okay"   Affect:  blunted   Thought Process:  poverty of thought and concrete   Thought Content: no verbalized delusions or overt paranoia   Perceptual disturbances: Denies current hallucination, does not appear to be responding to internal stimuli during this interview but he was groggy.  Per nursing staff he seemed to be responding to internal stimuli and reported AH beginning in the evening    Risk Potential: No active or passive suicidal or homicidal ideation was verbalized during interview   Cognition: oriented to self and situation, appears to be below average intelligence and cognition not formally tested   Insight:  Limited   Judgment: Limited     Current Medications:  Current Facility-Administered Medications   Medication Dose Route Frequency Provider Last Rate   • acetaminophen  650 mg Oral Q6H PRN Ness Teresa MD     • acetaminophen  650 mg Oral Q4H PRN Ness Teresa MD     • acetaminophen  975 mg Oral Q6H PRN Ness Teresa MD     • albuterol  2 puff Inhalation Q4H PRN Ness Teresa MD     • aluminum-magnesium hydroxide-simethicone  30 mL Oral Q4H PRN Ness Teresa MD     • benztropine  1 mg Intramuscular BID PRN Luli Cooley MD     • benztropine  1 mg Oral Q4H PRN Max 6/day Ness Teresa MD     • cloZAPine  25 mg Oral HS Alisia Huynh DO     • divalproex sodium  750 mg Oral BID Claudia Koehler MD     • fluticasone  1 spray Nasal Daily PRN Sonal Castillo PA-C     • glycopyrrolate  1 mg Oral TID Alisia Huynh DO     • hydrOXYzine HCL  100 mg Oral Q6H PRN Max 4/day Tin Corrales DO      Or   • LORazepam  1 mg Intramuscular Q6H PRN Tin Corrales DO     • hydrOXYzine HCL  25 mg Oral Q6H PRN Max 4/day Ness Teresa MD     • hydrOXYzine HCL  50 mg Oral Q6H PRN Max 4/day Ness Teresa MD     • loxapine  120 mg Oral QPM Alisia Navarrorum, DO     • loxapine  50 mg Oral BID Alisia Huynh, DO     • melatonin  3 mg Oral HS PRN Tin Corrales DO     • nicotine polacrilex  4 mg Oral Q2H PRN Ness Teresa MD     • OLANZapine  5 mg Oral Q3H PRN Max 6/day Claudia Koehler MD      Or   • OLANZapine  5 mg Intramuscular Q3H PRN Max 6/day Aguila  La Nena Sow MD     • OLANZapine  5 mg Intramuscular Q8H PRN Estefany Ames DO      Or   • OLANZapine  7.5 mg Oral Q8H PRN Estefany Ames DO     • OLANZapine  2.5 mg Oral Q3H PRN Max 8/day Lois Guerrero MD     • ondansetron  4 mg Oral Q8H PRN Estefany Ames DO     • polyethylene glycol  17 g Oral Daily PRN Jose Juan Sanchez MD     • pseudoephedrine  120 mg Oral BID PRN Anson Owusu MD     • senna-docusate sodium  1 tablet Oral Daily PRN Jose Juan Sanchez MD     • senna-docusate sodium  1 tablet Oral BID Herbert Ramirez DO     • sterile water          • sterile water          • sterile water          • sterile water          • sterile water          • sterile water          • sterile water          • sterile water          • sterile water              Behavioral Health Medications: all current active meds have been reviewed. Changes as in plan section above. Laboratory results:  I have personally reviewed all pertinent laboratory/tests results.   Recent Results (from the past 48 hour(s))   CBC and differential    Collection Time: 08/07/23 12:24 PM   Result Value Ref Range    WBC 9.47 4.31 - 10.16 Thousand/uL    RBC 4.71 3.88 - 5.62 Million/uL    Hemoglobin 14.8 12.0 - 17.0 g/dL    Hematocrit 44.1 36.5 - 49.3 %    MCV 94 82 - 98 fL    MCH 31.4 26.8 - 34.3 pg    MCHC 33.6 31.4 - 37.4 g/dL    RDW 13.1 11.6 - 15.1 %    MPV 10.9 8.9 - 12.7 fL    Platelets 070 590 - 252 Thousands/uL    nRBC 0 /100 WBCs    Neutrophils Relative 36 (L) 43 - 75 %    Immat GRANS % 2 0 - 2 %    Lymphocytes Relative 39 14 - 44 %    Monocytes Relative 15 (H) 4 - 12 %    Eosinophils Relative 7 (H) 0 - 6 %    Basophils Relative 1 0 - 1 %    Neutrophils Absolute 3.42 1.85 - 7.62 Thousands/µL    Immature Grans Absolute 0.14 0.00 - 0.20 Thousand/uL    Lymphocytes Absolute 3.72 0.60 - 4.47 Thousands/µL    Monocytes Absolute 1.42 (H) 0.17 - 1.22 Thousand/µL    Eosinophils Absolute 0.70 (H) 0.00 - 0.61 Thousand/µL    Basophils Absolute 0. 07 0.00 - 0.10 Thousands/µL        Ninoska Titus  MS IV

## 2023-08-09 NOTE — NURSING NOTE
Pt loudly responding to internal stimulus in hallway. Pt agitated and appears fearful. Pt grabbing his head and covering ears. Pt endorses voices, severe agitation, severe anxiety and insomina. PRN zyprexa 7.5mg PO, atarax 100mg, and melatonin 3mg given @ 23:50; zyprexa minimally effective at reducing AH, atarax effective at reducing anxiety, and melatonin ineffective.

## 2023-08-09 NOTE — NURSING NOTE
Pt up for medications and breakfast but quickly went back to bed despite encouragement to remain on the unit and participate in groups. Pt denies psych symptoms at this time by stating "no ma'am." Pt appears depressed. Denies any unmet needs or complaints at this time.

## 2023-08-09 NOTE — NURSING NOTE
Pt visible on unit in dayroom. Calm, quiet and to self. Played ball toss with peer at HS. Pleasant and guarded on approach. Denies SI/HI and hallucinations at this time. Denies need for PRN medication. Requests nicotine gum, given. Compliant with unit routines and care.

## 2023-08-09 NOTE — PROGRESS NOTES
08/09/23 0833   Team Meeting   Meeting Type Daily Rounds   Team Members Present   Team Members Present Physician;Nurse;   Physician Team Member [address] Team Member Tori Management Team Member Wendie   Patient/Family Present   Patient Present No   Patient's Family Present No   Pt internally preoccupied, responding to internal stimuli. Appears more agitated and fearful. Received PRN Zyprexa, Atarax and melatonin. Pt's Clozaril to be increased today. Discharge pending EAC.

## 2023-08-10 PROCEDURE — 99232 SBSQ HOSP IP/OBS MODERATE 35: CPT | Performed by: PSYCHIATRY & NEUROLOGY

## 2023-08-10 RX ADMIN — OLANZAPINE 5 MG: 5 TABLET, FILM COATED ORAL at 23:25

## 2023-08-10 RX ADMIN — DIVALPROEX SODIUM 750 MG: 500 TABLET, DELAYED RELEASE ORAL at 08:19

## 2023-08-10 RX ADMIN — LOXAPINE 120 MG: 50 CAPSULE ORAL at 21:12

## 2023-08-10 RX ADMIN — CLOZAPINE 25 MG: 25 TABLET ORAL at 17:23

## 2023-08-10 RX ADMIN — SENNOSIDES AND DOCUSATE SODIUM 1 TABLET: 50; 8.6 TABLET ORAL at 17:24

## 2023-08-10 RX ADMIN — GLYCOPYRROLATE 1 MG: 1 TABLET ORAL at 21:12

## 2023-08-10 RX ADMIN — GLYCOPYRROLATE 1 MG: 1 TABLET ORAL at 08:19

## 2023-08-10 RX ADMIN — DIVALPROEX SODIUM 750 MG: 500 TABLET, DELAYED RELEASE ORAL at 18:00

## 2023-08-10 RX ADMIN — NICOTINE POLACRILEX 4 MG: 4 GUM, CHEWING BUCCAL at 17:57

## 2023-08-10 RX ADMIN — OLANZAPINE 7.5 MG: 2.5 TABLET, FILM COATED ORAL at 20:11

## 2023-08-10 RX ADMIN — MELATONIN TAB 3 MG 3 MG: 3 TAB at 21:12

## 2023-08-10 RX ADMIN — LOXAPINE 50 MG: 50 CAPSULE ORAL at 15:10

## 2023-08-10 RX ADMIN — ACETAMINOPHEN 975 MG: 325 TABLET ORAL at 18:09

## 2023-08-10 RX ADMIN — SENNOSIDES AND DOCUSATE SODIUM 1 TABLET: 50; 8.6 TABLET ORAL at 08:19

## 2023-08-10 RX ADMIN — HYDROXYZINE HYDROCHLORIDE 100 MG: 50 TABLET, FILM COATED ORAL at 20:11

## 2023-08-10 RX ADMIN — LOXAPINE 50 MG: 50 CAPSULE ORAL at 08:18

## 2023-08-10 RX ADMIN — NICOTINE POLACRILEX 4 MG: 4 GUM, CHEWING BUCCAL at 20:08

## 2023-08-10 RX ADMIN — GLYCOPYRROLATE 1 MG: 1 TABLET ORAL at 15:10

## 2023-08-10 NOTE — NURSING NOTE
Pt isolative to room this morning, visible briefly for breakfast and then retreated to room. Appears depressed, uninterested, disheveled. Encouraged to shower and attend group. Pt denies SI/HI/AVH at this time, though heard RTIS quietly at times. Reports fair sleep. No behavioral issues to be noted. Q7 minute safety checks maintained.

## 2023-08-10 NOTE — NURSING NOTE
Patient out more this shift. Patient observed watching TV in patient lounge. Patient responds to internal stimuli from time to time, but not aggressively. Patient was compliant with scheduled medications. Patient c/o difficulty sleeping, PRN melatonin 3mg was administered at 2132. Patient still awake. Medication ineffective. Patient denies SI/ Staff to maintain continuous rounding for safety and support.

## 2023-08-10 NOTE — PROGRESS NOTES
Progress Note - Behavioral Health   Gokul Hernandez 26 y.o. male MRN: [id number]  Unit/Bed#: [facility] 343-01 Encounter: [id number]    Assessment/Plan   Principal Problem:    Schizoaffective disorder ([address])  Active Problems:    Legally blind    Hearing loss    Asthma    Medical clearance for psychiatric admission    Prolonged erection    Intellectual disability    Rib pain on left side      Recommended Treatment:     1. Continue Clozapine 25 mg at evening for psychosis, treatment day 4   2. CBC weekly on mondays to monitor WBC and ANC for agranulocytosis (WBC on 8/7/2023 was 9.47 and absolute neutrophil count was 3.42)   3. Continue Loxapine 50mg AM and 50 mg every afternoon, 120 mg at HS for psychosis   4. Continue Depakote 750 mg twice daily for mood and stabilization  5. Continue Melatonin 3mg PM as needed insomnia  6. Continue Robinul 1 mg 3 times daily for drooling  7. Continue encouraging patient to remain visible on floors  8. Continue medical recommendations per SLIM  9. Case Management  to continue care coordination for patient  10. Continue to await placement into [facility]    Risks, benefits, and possible side effects of medications have been explained to the patient, who verbalizes understanding    Current Legal Status: 201   Disposition: Coordinating with case management, likely [facility]  ------------------------------------------------------------    Subjective: Per nursing report, yesterday evening Gokul was seen out more and was observed watching TV in the patient lounge. Nursing reported that he was still responding to internal stimuli from time to time, but was not aggressive. He was compliant with scheduled medications. He did have difficulty sleeping and only required a PRN melatonin administered at 2132 which was ineffective. Today, Gokul was seen and evaluated alongside attending physician. On evaluation, he is cooperative and agrees to relocate to the meeting room.  He reports feeling "quiet" and "calm" this morning. Patient states that his current anxiety is "low" and rates it a "3/10". He rates his current depression a "4/10". He reports sleeping from around midnight to breakfast. His sleep was "off and on". He has a good appetite, and ate [employer], toast, sausage, and yogurt for breakfast. He says he drank water yesterday. He had one bowel movement yesterday and denies constipation. He attended a [employer] group and says he is walking around the unit. Yesterday, he spoke to his grandma. Reports that his grandma says he is doing better. Also reports that yesterday the voices came but were less bothersome. His goals today are to shower by lunchtime, walk a few laps, and to call his grandma. Denies SI/HI/AVH at the time of interview and denies any plan or intent to harm herself or others and contracts for safety in the hospital.      Progress Toward Goals: slow improvement    Psychiatric Review of Systems:  Behavior over the last 24 hours: improved  Sleep: frequent awakenings and difficulty falling asleep  Appetite: adequate  Medication side effects: none verbalized  ROS: Complete review of systems is negative except as noted above.     Vital signs in last 24 hours:   Temp:  [97.3 °F (36.3 °C)] 97.3 °F (36.3 °C)  HR:  [98] 98  Resp:  [16] 16  BP: (127)/(68) 127/68    Mental Status Exam:  Appearance:  Overtly appearing  man, drowsy, intermittent  eye contact, appears older than stated age, white tshirt and black sweat pants  dressed, poor grooming/hygiene and obese   Behavior:  cooperative and sitting comfortably   Motor: no abnormal movements and normal gait and balance   Speech:  normal rate, normal volume and scant, monotone, poorly articulated   Mood:  "quiet" "calm"   Affect:  blunted and was reactive to a joke   Thought Process:  poverty of thought, concrete   Thought Content: no verbalized delusions or overt paranoia   Perceptual disturbances: denies current hallucinations and had auditory hallucinations yesterday, was not responding to internal stimuli with physician and student, but was later observed by nursing staff to be    Risk Potential: No active or passive suicidal or homicidal ideation was verbalized during interview   Cognition: oriented to self and situation, appears to be below average intelligence and cognition not formally tested   Insight:  Limited   Judgment: Limited     Current Medications:  Current Facility-Administered Medications   Medication Dose Route Frequency Provider Last Rate   • acetaminophen  650 mg Oral Q6H PRN Bianca Villafana MD     • acetaminophen  650 mg Oral Q4H PRN Bianca Villafana MD     • acetaminophen  975 mg Oral Q6H PRN Bianca Villafana MD     • albuterol  2 puff Inhalation Q4H PRN Bianca Villafana MD     • aluminum-magnesium hydroxide-simethicone  30 mL Oral Q4H PRN Bianca Villafana MD     • benztropine  1 mg Intramuscular BID PRN Iram Cedeño MD     • benztropine  1 mg Oral Q4H PRN Max 6/day Bianca Villafana MD     • cloZAPine  25 mg Oral HS Dany Rojas DO     • divalproex sodium  750 mg Oral BID Josias Shah MD     • fluticasone  1 spray Nasal Daily PRN Erinn Sher PA-C     • glycopyrrolate  1 mg Oral TID Dany Rojas DO     • hydrOXYzine HCL  100 mg Oral Q6H PRN Max 4/day Sherice Chan DO      Or   • LORazepam  1 mg Intramuscular Q6H PRN Sherice Chan DO     • hydrOXYzine HCL  25 mg Oral Q6H PRN Max 4/day Bianca Villafana MD     • hydrOXYzine HCL  50 mg Oral Q6H PRN Max 4/day Bianca Villafana MD     • loxapine  120 mg Oral QPM Dany Rojas DO     • loxapine  50 mg Oral BID Dany Rojas DO     • melatonin  3 mg Oral HS PRN Sherice Chan DO     • nicotine polacrilex  4 mg Oral Q2H PRN Bianca Villafana MD     • OLANZapine  5 mg Oral Q3H PRN Max 6/day Josias Shah MD      Or   • OLANZapine  5 mg Intramuscular Q3H PRN Max 6/day Josias Shah MD     • OLANZapine  5 mg Intramuscular Q8H PRN Sherice Chan DO Or   • OLANZapine  7.5 mg Oral Q8H PRN Tin Corrales, DO     • OLANZapine  2.5 mg Oral Q3H PRN Max 8/day Claudia Koehler MD     • ondansetron  4 mg Oral Q8H PRN Tin Corrales DO     • polyethylene glycol  17 g Oral Daily PRN Ness Teresa MD     • pseudoephedrine  120 mg Oral BID PRN Eufemia Kothari MD     • senna-docusate sodium  1 tablet Oral Daily PRN Ness Teresa MD     • senna-docusate sodium  1 tablet Oral BID Alisia Huynh DO     • sterile water          • sterile water          • sterile water          • sterile water          • sterile water          • sterile water          • sterile water          • sterile water          • sterile water              Behavioral Health Medications: all current active meds have been reviewed. Changes as in plan section above. Laboratory results:  I have personally reviewed all pertinent laboratory/tests results.   Recent Results (from the past 48 hour(s))   ECG 12 lead    Collection Time: 08/09/23  4:27 PM   Result Value Ref Range    Ventricular Rate 98 BPM    Atrial Rate 98 BPM    MN Interval 134 ms    QRSD Interval 90 ms    QT Interval 352 ms    QTC Interval 449 ms    P [place] 54 degrees    QRS Axis 66 degrees    T Wave Axis 29 degrees        Ninoska Titus  MS IV

## 2023-08-10 NOTE — PROGRESS NOTES
08/10/23 0839   Team Meeting   Meeting Type Daily Rounds   Team Members Present   Team Members Present Physician;Nurse;   Physician Team Member [id number] [facility] Team Member Tori Management Team Member Wendie   Patient/Family Present   Patient Present No   Patient's Family Present No   Pt with no behavioral outbursts last night. Clozaril was increased last night. EKG completed yesterday. Pt med/meal compliant. Visible on the unit. Discharge pending EAC.

## 2023-08-10 NOTE — PLAN OF CARE
Problem: Ineffective Coping  Goal: Participates in unit activities  Description: Interventions:  - Provide therapeutic environment   - Provide required programming   - Redirect inappropriate behaviors   Outcome: Progressing     Problem: ANXIETY  Goal: Will report anxiety at manageable levels  Description: INTERVENTIONS:  - Administer medication as ordered  - Teach and encourage coping skills  - Provide emotional support  - Assess patient/family for anxiety and ability to cope  Outcome: Progressing  Goal: By discharge: Patient will verbalize 2 strategies to deal with anxiety  Description: Interventions:  - Identify any obvious source/trigger to anxiety  - Staff will assist patient in applying identified coping technique/skills  - Encourage attendance of scheduled groups and activities  Outcome: Progressing     Problem: SELF HARM/SUICIDALITY  Goal: Will have no self-injury during hospital stay  Description: INTERVENTIONS:  - Q 15 MINUTES: Routine safety checks  - Q WAKING SHIFT & PRN: Assess risk to determine if routine checks are adequate to maintain patient safety  - Encourage patient to participate actively in care by formulating a plan to combat response to suicidal ideation, identify supports and resources  Outcome: Progressing

## 2023-08-11 PROCEDURE — 99232 SBSQ HOSP IP/OBS MODERATE 35: CPT | Performed by: PSYCHIATRY & NEUROLOGY

## 2023-08-11 RX ORDER — CLOZAPINE 25 MG/1
37.5 TABLET ORAL
Status: DISCONTINUED | OUTPATIENT
Start: 2023-08-11 | End: 2023-08-11

## 2023-08-11 RX ORDER — CLOZAPINE 25 MG/1
37.5 TABLET ORAL DAILY
Status: DISCONTINUED | OUTPATIENT
Start: 2023-08-11 | End: 2023-08-14

## 2023-08-11 RX ADMIN — DIVALPROEX SODIUM 750 MG: 500 TABLET, DELAYED RELEASE ORAL at 18:01

## 2023-08-11 RX ADMIN — GLYCOPYRROLATE 1 MG: 1 TABLET ORAL at 08:29

## 2023-08-11 RX ADMIN — LOXAPINE 50 MG: 50 CAPSULE ORAL at 08:29

## 2023-08-11 RX ADMIN — CLOZAPINE 37.5 MG: 25 TABLET ORAL at 18:01

## 2023-08-11 RX ADMIN — DIVALPROEX SODIUM 750 MG: 500 TABLET, DELAYED RELEASE ORAL at 08:29

## 2023-08-11 RX ADMIN — MELATONIN TAB 3 MG 3 MG: 3 TAB at 21:14

## 2023-08-11 RX ADMIN — GLYCOPYRROLATE 1 MG: 1 TABLET ORAL at 17:47

## 2023-08-11 RX ADMIN — SENNOSIDES AND DOCUSATE SODIUM 1 TABLET: 50; 8.6 TABLET ORAL at 17:41

## 2023-08-11 RX ADMIN — GLYCOPYRROLATE 1 MG: 1 TABLET ORAL at 21:14

## 2023-08-11 RX ADMIN — SENNOSIDES AND DOCUSATE SODIUM 1 TABLET: 50; 8.6 TABLET ORAL at 08:29

## 2023-08-11 RX ADMIN — LOXAPINE 120 MG: 50 CAPSULE ORAL at 21:18

## 2023-08-11 RX ADMIN — LOXAPINE 50 MG: 50 CAPSULE ORAL at 17:47

## 2023-08-11 NOTE — PLAN OF CARE
MITALI sent to [facility] to obtain LOC assessment and discharge summary to aid in determining pt's baseline per [facility]'s request.

## 2023-08-11 NOTE — NURSING NOTE
Pt continues RTIS, laughing and talking loudly to self.  Pt came to NS requesting PRN medication for "Irritation," Zyprexa 5mg po given for "the voices are still bothering me."

## 2023-08-11 NOTE — NURSING NOTE
Pt visible on unit and noted to be RTIS while walking in halls. Offered PRN and refused. A little while longer, pt walking halls and hit wall with elbow, states frustrated by voices but they are not commanding him to hit things or hurt self or others. Pt ofter struggles with voices later in evening and requires PRN. Pt accepted Zyprexa [id number] po and is sitting quietly talking with MHT in pt lounge eating snack and talking. Staff will continue to provide support.

## 2023-08-11 NOTE — NURSING NOTE
Gokul was observed yelling in his bathroom with the lights off. This writer asked if there was any reason the lights were off, and patient responded with a shrug. Pt went on to explain that typically evenings and nights were especially hard "in [my] head", because he sees shadows at times when lights are dim or off. This writer discussed coping with the voices by turning on the lights, as well as coming to staff if he needs help coping with voices or feelings. Pt verbalized understanding.

## 2023-08-11 NOTE — PROGRESS NOTES
Progress Note - Behavioral Health   Gokul Cleopatra Holstein 26 y.o. male MRN: [id number]  Unit/Bed#: [facility] 343-01 Encounter: [id number]    Assessment/Plan   Principal Problem:    Schizoaffective disorder ([address])  Active Problems:    Legally blind    Hearing loss    Asthma    Medical clearance for psychiatric admission    Prolonged erection    Intellectual disability    Rib pain on left side      Recommended Treatment:   1. Increase Clozapine to 37.5 mg at evening for psychosis, treatment day 5  2. Scheduled CBC on mondays to monitor WBC and ANC for agranulocytosis (WBC on 8/7/2023 was 9.47 and absolute neutrophil count was 3.42)   3. Continue Loxapine 50mg AM and 50 mg every afternoon, 120 mg at PM for psychosis   4. Continue Depakote 750 mg twice daily for mood and stabilization  5. Continue Robinul 1 mg 3 times daily for drooling  6. Continue Melatonin 3mg PM as needed insomnia  7. Encourage patient to remain visible on floors, attend groups, and exercise during the day   8. Continue medical recommendations per SLIM  9. Case Management  to continue care coordination for patient  10. Continue to await placement into [facility]    Continue with group therapy, milieu therapy and occupational therapy. Continue frequent safety checks and vitals per unit protocol. Case discussed with treatment team.  Risks, benefits and possible side effects of Medications: Risks, benefits, and possible side effects of medications have been explained to the patient, who verbalizes understanding    Current Legal Status: 201  Disposition: Coordinating with case management, likely [facility]  ------------------------------------------------------------    Subjective: Per nursing report, yesterday morning pt was isolated to his room, visible briefly for breakfast, and then retreated to room. Nursing noted he appeared depressed, uninterested, and disheveled. He had a headache that he rated a 7/10 on pain scale and received prn Tylenol 975 mg.  No behavioral issues were noted. He denied SI/HI/AVH at that time, though heard responding to internal stimuli quietly at times. Later in the evening nursing noted he continued to respond to internal stimuli and was laughing and talking loudly to self. He initially refused the PRN and later accepted Zyprexa  7.5 mg and atarax 100 mg po per prn med documentation. In the night time, he was observed yelling in the bathroom with his lights off. Pt went on to explain that typically evenings and nights were especially hard "in [my] head", because he sees shadows at times when lights are dim or off. He received an additional dose Zyprexa which was effective. He was found resting quietly in bed after midnight. Today, Gokul was seen and evaluated alongside attending physician. On evaluation, patient is "okay" and was resting in bed with the covers over his head. He easily agrees to sit up for the interview. He reports feeling "alright" this morning. Patient states he "needs to have his medications adjusted". He reports falling asleep around "1:51 am" until breakfast. He ate his breakfast and has a good appetite. He drank coffee today and will try to drink water today. He reports a bowel movement last evening and denies constipation. Yesterday he spoke with his sister and watched television. He confirms having worsening auditory hallucinations in the evening and that yesterday the voices were bothersome. He was advised on ways to stay awake during the day time such as walking laps, attending groups, and showering. Gokul has a goal of walking ten laps this morning and will attend afternoon groups. He also states that he prefers to shower in the evening time. He agrees to an increase in his night time clozapine medication. Denies any bluriness, eye pain, abdominal pain, priapism, or painful ejections.      Denies SI/HI/AVH at the time of interview and denies any plan or intent to harm herself or others and contracts for safety in the [facility].    Progress Toward Goals: slow improvement    Psychiatric Review of Systems:  Behavior over the last 24 hours: unchanged  Sleep: difficulty falling asleep  Appetite: adequate  Medication side effects: none verbalized  ROS: headache    Vital signs in last 24 hours:   Temp:  [97.3 °F (36.3 °C)] 97.3 °F (36.3 °C)  HR:  [103] 103  Resp:  [16] 16  BP: (135)/(76) 135/76    Mental Status Exam:  Appearance:  Overtly appearing  male, drowsy, intermittent eye contact, appears older than stated age, white t shit and black joggers dressed, marginal grooming/hygiene and obese   Behavior:  calm, cooperative and guarded   Motor: no abnormal movements and normal gait and balance   Speech:  normal rate, soft and scant, monotone   Mood:  ""okay""   Affect:  blunted   Thought Process:  poverty of thought   Thought Content: no verbalized delusions or overt paranoia   Perceptual disturbances: denies current hallucinations, does not appear to be responding to internal stimuli at this time and per nursing report and the patient, auditory hallucinations occur around dinner time and not during the morning    Risk Potential: No active or passive suicidal or homicidal ideation was verbalized during interview   Cognition: oriented to self and situation, appears to be below average intelligence and cognition not formally tested   Insight:  Limited   Judgment: Limited     Current Medications:  Current Facility-Administered Medications   Medication Dose Route Frequency Provider Last Rate   • acetaminophen  650 mg Oral Q6H PRN Jori Bumpers, MD     • acetaminophen  650 mg Oral Q4H PRN Jori Bumpers, MD     • acetaminophen  975 mg Oral Q6H PRN Jori Bumpers, MD     • albuterol  2 puff Inhalation Q4H PRN Jori Bumpers, MD     • aluminum-magnesium hydroxide-simethicone  30 mL Oral Q4H PRN Jori Bumpers, MD     • benztropine  1 mg Intramuscular BID PRN Laine Tyler MD     • benztropine  1 mg Oral Q4H PRN Max 6/day Jewell Keita MD     • cloZAPine  25 mg Oral HS Arianna Mas, DO     • divalproex sodium  750 mg Oral BID Dada Jones MD     • fluticasone  1 spray Nasal Daily PRN Trinidad Chavis PA-C     • glycopyrrolate  1 mg Oral TID Arianna Mas, DO     • hydrOXYzine HCL  100 mg Oral Q6H PRN Max 4/day Anuana Britt, DO      Or   • LORazepam  1 mg Intramuscular Q6H PRN Anuana Britt, DO     • hydrOXYzine HCL  25 mg Oral Q6H PRN Max 4/day Jewell Keita MD     • hydrOXYzine HCL  50 mg Oral Q6H PRN Max 4/day Jewell Keita MD     • loxapine  120 mg Oral QPM [place] Mas, DO     • loxapine  50 mg Oral BID Arianna Mas, DO     • melatonin  3 mg Oral HS PRN Anu Balwinder, DO     • nicotine polacrilex  4 mg Oral Q2H PRN Jewell Keita MD     • OLANZapine  5 mg Oral Q3H PRN Max 6/day Dada Jones MD      Or   • OLANZapine  5 mg Intramuscular Q3H PRN Max 6/day Dada Jones MD     • OLANZapine  5 mg Intramuscular Q8H PRN Anu Balwinder, DO      Or   • OLANZapine  7.5 mg Oral Q8H PRN Anu Balwinder, DO     • OLANZapine  2.5 mg Oral Q3H PRN Max 8/day Dada Jones MD     • ondansetron  4 mg Oral Q8H PRN Anu Balwinder, DO     • polyethylene glycol  17 g Oral Daily PRN Jewell Keita MD     • pseudoephedrine  120 mg Oral BID PRN Rosendo De La Cruz MD     • senna-docusate sodium  1 tablet Oral Daily PRN Jewell Keita MD     • senna-docusate sodium  1 tablet Oral BID [place] Mas, DO     • sterile water          • sterile water          • sterile water          • sterile water          • sterile water          • sterile water          • sterile water          • sterile water          • sterile water              Behavioral Health Medications: all current active meds have been reviewed. Changes as in plan section above. Laboratory results:  I have personally reviewed all pertinent laboratory/tests results.   Recent Results (from the past 48 hour(s))   ECG 12 lead Collection Time: 08/09/23  4:27 PM   Result Value Ref Range    Ventricular Rate 98 BPM    Atrial Rate 98 BPM    NH Interval 134 ms    QRSD Interval 90 ms    QT Interval 352 ms    QTC Interval 449 ms    P [place] 54 degrees    QRS Axis 66 degrees    T Wave Axis 29 degrees        Ninoska Ariela  MS IV

## 2023-08-11 NOTE — PROGRESS NOTES
08/11/23 0839   Team Meeting   Meeting Type Daily Rounds   Team Members Present   Team Members Present Physician;Nurse;   Physician Team Member [id number] Team Member [facility] Management Team Member Wendie   Patient/Family Present   Patient Present No   Patient's Family Present No     Pt awake all night, c/o AH. Increase in Clozapine to be discussed. Received PRN Zyprexa last night twice. Discharge pending EAC.

## 2023-08-11 NOTE — NURSING NOTE
Pt reported "voices" were better at 2110 following Zyprexa and Atarax PRN admin, "they are quieter." RN asked about the voices being worse in the evening and he states that's because he is sleeping during the day. At [address] [place] [address] pt again asked about voices by RN and he stated, "maybe I should take something just in case," though he denied voices at this time. He was given HS meds including Loxitane 120mg which is ordered q evening but scheduled at 2200 and additional PRN melatonin. Safety checks continue.

## 2023-08-11 NOTE — NURSING NOTE
Pt woken up multiple times throughout day and encouraged to participate in groups since he has trouble sleeping at night, but pt quickly goes back to sleep. Word searches and coloring pages provided to help occupy patient's time. Reports auditory hallucinations but says they are "not too loud" at this time. Denies SI/HI/VH. Out of room to eat breakfast. Medication compliant. Denies any unmet needs or complaints at this time.

## 2023-08-12 PROCEDURE — 99232 SBSQ HOSP IP/OBS MODERATE 35: CPT | Performed by: STUDENT IN AN ORGANIZED HEALTH CARE EDUCATION/TRAINING PROGRAM

## 2023-08-12 RX ORDER — WATER 10 ML/10ML
INJECTION INTRAMUSCULAR; INTRAVENOUS; SUBCUTANEOUS
Status: DISCONTINUED
Start: 2023-08-12 | End: 2023-10-19 | Stop reason: HOSPADM

## 2023-08-12 RX ADMIN — MELATONIN TAB 3 MG 3 MG: 3 TAB at 21:06

## 2023-08-12 RX ADMIN — LOXAPINE 50 MG: 50 CAPSULE ORAL at 15:28

## 2023-08-12 RX ADMIN — GLYCOPYRROLATE 1 MG: 1 TABLET ORAL at 15:28

## 2023-08-12 RX ADMIN — DIVALPROEX SODIUM 750 MG: 500 TABLET, DELAYED RELEASE ORAL at 18:19

## 2023-08-12 RX ADMIN — SENNOSIDES AND DOCUSATE SODIUM 1 TABLET: 50; 8.6 TABLET ORAL at 08:57

## 2023-08-12 RX ADMIN — SENNOSIDES AND DOCUSATE SODIUM 1 TABLET: 50; 8.6 TABLET ORAL at 17:02

## 2023-08-12 RX ADMIN — GLYCOPYRROLATE 1 MG: 1 TABLET ORAL at 08:57

## 2023-08-12 RX ADMIN — CLOZAPINE 37.5 MG: 25 TABLET ORAL at 17:02

## 2023-08-12 RX ADMIN — LOXAPINE 50 MG: 50 CAPSULE ORAL at 08:57

## 2023-08-12 RX ADMIN — OLANZAPINE 5 MG: 10 INJECTION, POWDER, LYOPHILIZED, FOR SOLUTION INTRAMUSCULAR at 17:43

## 2023-08-12 RX ADMIN — GLYCOPYRROLATE 1 MG: 1 TABLET ORAL at 21:06

## 2023-08-12 RX ADMIN — DIVALPROEX SODIUM 750 MG: 500 TABLET, DELAYED RELEASE ORAL at 08:58

## 2023-08-12 RX ADMIN — LOXAPINE 120 MG: 50 CAPSULE ORAL at 21:05

## 2023-08-12 NOTE — NURSING NOTE
Pt is sleeping in his room, attempted to wake up several times but pt reports he is very tired. Medication compliant. Denies SI/HI/AH/VH. Denies any unmet needs or complaints at this time.

## 2023-08-12 NOTE — NURSING NOTE
Pt approached this writer asking if he was allowed to have a shot for his voices. PRN zyprexa 5mg IM given in L deltoid at pt request for severe agitation. Pt reports at this time it to be effective.

## 2023-08-12 NOTE — PROGRESS NOTES
Progress Note - Behavioral Health   Gokul Hyatt Batch 26 y.o. male MRN: [id number]  Unit/Bed#: [facility] 343-01 Encounter: [id number]    Assessment/Plan   Principal Problem:    Schizoaffective disorder ([address])  Active Problems:    Legally blind    Hearing loss    Asthma    Medical clearance for psychiatric admission    Prolonged erection    Intellectual disability    Rib pain on left side    Recommended Treatment:   Continue medications at current doses as below. Encourage group therapy, milieu therapy and occupational therapy  All current active medications have been reviewed  Encourage group therapy, milieu therapy and occupational therapy  Behavioral Health checks every 7 minutes    ----------------------------------------      Subjective:   Per nursing report, patient slept well last night. He did not require any PRN medications. He has been in improved behavioral control. Patient is tired this morning but appears overall well. He is scant but calm and cooperative with interview. He reports tolerating medications well. He denies any issues with persistent or painful erections on current regimen. He denies any SI, HI, or AVH at this time. He denies any questions or concerns.     Behavior over the last 24 hours:  unchanged  Sleep: normal  Appetite: normal  Medication side effects: No  ROS: no complaints and all other systems are negative    Mental Status Evaluation:  Appearance:  disheveled, marginal hygiene, dressed in hospital attire   Behavior:  cooperative, calm, guarded   Speech:  slow, scant, delayed   Mood:  "fine"   Affect:  blunted   Thought Process:  goal directed, linear   Associations: concrete associations   Thought Content:  no overt delusions   Perceptual Disturbances: denies auditory or visual hallucinations when asked, does not appear responding to internal stimuli   Risk Potential: Suicidal ideation - None  Homicidal ideation - None  Potential for aggression - Not at present   Sensorium:  oriented to person, place and time/date   Memory:  recent and remote memory grossly intact   Consciousness:  alert and awake   Attention/Concentration: attention span and concentration are age appropriate   Insight:  limited   Judgment: limited   Gait/Station: normal gait/station, normal balance   Motor Activity: no abnormal movements     Medications: all current active meds have been reviewed.   Current Facility-Administered Medications   Medication Dose Route Frequency Provider Last Rate   • acetaminophen  650 mg Oral Q6H PRN Patrick Ibrahim MD     • acetaminophen  650 mg Oral Q4H PRN Patrick Ibrahim MD     • acetaminophen  975 mg Oral Q6H PRN Patrick Ibrahim MD     • albuterol  2 puff Inhalation Q4H PRN Patrick Ibrahim MD     • aluminum-magnesium hydroxide-simethicone  30 mL Oral Q4H PRN Patrick Ibrahim MD     • benztropine  1 mg Intramuscular BID PRN Jadiel Albright MD     • benztropine  1 mg Oral Q4H PRN Max 6/day Patrick Ibrahim MD     • cloZAPine  37.5 mg Oral Daily Farhana Urena MD     • divalproex sodium  750 mg Oral BID Farhana Urena MD     • fluticasone  1 spray Nasal Daily PRN Gabino Hamman, PA-C     • glycopyrrolate  1 mg Oral TID Beka Hutson, DO     • hydrOXYzine HCL  100 mg Oral Q6H PRN Max 4/day Philemon Decant, DO      Or   • LORazepam  1 mg Intramuscular Q6H PRN Philemon Decant, DO     • hydrOXYzine HCL  25 mg Oral Q6H PRN Max 4/day Patrick Ibrahim MD     • hydrOXYzine HCL  50 mg Oral Q6H PRN Max 4/day Patrick Ibrahim MD     • loxapine  120 mg Oral QPM Beka Hutson, DO     • loxapine  50 mg Oral BID Beka Hutson, DO     • melatonin  3 mg Oral HS PRN Philemon Decant, DO     • nicotine polacrilex  4 mg Oral Q2H PRN Patrick Ibrahim MD     • OLANZapine  5 mg Oral Q3H PRN Max 6/day Farhana Urena MD      Or   • OLANZapine  5 mg Intramuscular Q3H PRN Max 6/day Farhana Urena MD     • OLANZapine  5 mg Intramuscular Q8H PRN Philemon Decant, DO      Or   • OLANZapine  7.5 mg Oral Q8H PRN Shanti Zeng DO     • OLANZapine  2.5 mg Oral Q3H PRN Max 8/day Cheryl Johnson MD     • ondansetron  4 mg Oral Q8H PRN Shanti Zeng DO     • polyethylene glycol  17 g Oral Daily PRN Erasto Mejia MD     • pseudoephedrine  120 mg Oral BID PRN Hue Simon MD     • senna-docusate sodium  1 tablet Oral Daily PRN Erasto Mejia MD     • senna-docusate sodium  1 tablet Oral BID Alta Smith, DO     • sterile water          • sterile water          • sterile water          • sterile water          • sterile water          • sterile water          • sterile water          • sterile water          • sterile water              Labs: I have personally reviewed all pertinent laboratory/tests results  Most Recent Labs:   Lab Results   Component Value Date    WBC 9.47 08/07/2023    RBC 4.71 08/07/2023    HGB 14.8 08/07/2023    HCT 44.1 08/07/2023     08/07/2023    RDW 13.1 08/07/2023    NEUTROABS 3.42 08/07/2023    TOTANEUTABS 4.84 06/08/2023    SODIUM 140 08/04/2023    K 4.4 08/04/2023     08/04/2023    CO2 31 08/04/2023    BUN 16 08/04/2023    CREATININE 1.26 08/04/2023    GLUC 120 08/04/2023    CALCIUM 9.3 08/04/2023    AST 16 07/04/2023    ALT 7 07/04/2023    ALKPHOS 42 07/04/2023    TP 7.0 07/04/2023    ALB 3.9 07/04/2023    TBILI 0.45 07/04/2023    CHOLESTEROL 140 05/19/2023    HDL 59 05/19/2023    TRIG 106 05/19/2023    LDLCALC 60 05/19/2023    NONHDLC 81 05/19/2023    VALPROICTOT 91 06/12/2023    AMMONIA 22 10/09/2022    [id number] 1.841 05/19/2023    FREET4 1.16 10/09/2022    HGBA1C 5.2 06/16/2022     06/16/2022       Progress Toward Goals: progressing    Risks / Benefits of Treatment:    Risks, benefits, and possible side effects of medications explained to patient and patient verbalizes understanding and agreement for treatment. Counseling / Coordination of Care:    Patient's progress discussed with staff in treatment team meeting.   Medications, treatment progress and treatment plan reviewed with patient.     Yony Escalante MD 08/12/23

## 2023-08-12 NOTE — NURSING NOTE
Patient has been in the milieu for a period this evening and was interacting with peers appropriately. He was not overtly responding to internal stimuli but was preoccupied. He was cooperative with HS medications. He denies S.I.H.I.V/H currently.

## 2023-08-13 PROCEDURE — 99232 SBSQ HOSP IP/OBS MODERATE 35: CPT | Performed by: STUDENT IN AN ORGANIZED HEALTH CARE EDUCATION/TRAINING PROGRAM

## 2023-08-13 RX ORDER — WATER 10 ML/10ML
INJECTION INTRAMUSCULAR; INTRAVENOUS; SUBCUTANEOUS
Status: COMPLETED
Start: 2023-08-13 | End: 2023-08-13

## 2023-08-13 RX ADMIN — CLOZAPINE 37.5 MG: 25 TABLET ORAL at 18:34

## 2023-08-13 RX ADMIN — LOXAPINE 50 MG: 50 CAPSULE ORAL at 09:49

## 2023-08-13 RX ADMIN — DIVALPROEX SODIUM 750 MG: 500 TABLET, DELAYED RELEASE ORAL at 18:34

## 2023-08-13 RX ADMIN — HYDROXYZINE HYDROCHLORIDE 100 MG: 50 TABLET, FILM COATED ORAL at 20:46

## 2023-08-13 RX ADMIN — OLANZAPINE 5 MG: 10 INJECTION, POWDER, LYOPHILIZED, FOR SOLUTION INTRAMUSCULAR at 17:30

## 2023-08-13 RX ADMIN — SENNOSIDES AND DOCUSATE SODIUM 1 TABLET: 50; 8.6 TABLET ORAL at 09:49

## 2023-08-13 RX ADMIN — SENNOSIDES AND DOCUSATE SODIUM 1 TABLET: 50; 8.6 TABLET ORAL at 18:33

## 2023-08-13 RX ADMIN — DIVALPROEX SODIUM 750 MG: 500 TABLET, DELAYED RELEASE ORAL at 09:49

## 2023-08-13 RX ADMIN — LOXAPINE 50 MG: 50 CAPSULE ORAL at 15:50

## 2023-08-13 RX ADMIN — LOXAPINE 120 MG: 50 CAPSULE ORAL at 21:00

## 2023-08-13 RX ADMIN — GLYCOPYRROLATE 1 MG: 1 TABLET ORAL at 21:00

## 2023-08-13 RX ADMIN — MELATONIN TAB 3 MG 3 MG: 3 TAB at 21:00

## 2023-08-13 RX ADMIN — WATER 10 ML: 1 INJECTION, SOLUTION INTRAMUSCULAR; INTRAVENOUS; SUBCUTANEOUS at 17:30

## 2023-08-13 RX ADMIN — GLYCOPYRROLATE 1 MG: 1 TABLET ORAL at 15:50

## 2023-08-13 RX ADMIN — GLYCOPYRROLATE 1 MG: 1 TABLET ORAL at 09:49

## 2023-08-13 NOTE — PROGRESS NOTES
Progress Note - Behavioral Health   Gokul Hill 26 y.o. male MRN: [id number]  Unit/Bed#: [facility] 343-01 Encounter: [id number]    Assessment/Plan   Principal Problem:    Schizoaffective disorder ([address])  Active Problems:    Legally blind    Hearing loss    Asthma    Medical clearance for psychiatric admission    Prolonged erection    Intellectual disability    Rib pain on left side    Recommended Treatment:   Continue medications at current doses as below. Encourage group therapy, milieu therapy and occupational therapy  All current active medications have been reviewed  Encourage group therapy, milieu therapy and occupational therapy  Behavioral Health checks every 7 minutes    ----------------------------------------      Subjective:   Per nursing report, patient slept again last night. He had been doing well on the unit yesterday, though did receive as needed Zyprexa given as IM and due to worsening voices in the late afternoon/early evening. He has been without any acute behavioral issues. Patient reports that he feels good today. He reports tolerating current medications well and has not had any recent issues with persistent or painful erections as he had previously experienced. He reports that his sleep and appetite are currently good. He denies any SI, HI, or AVH. He appears to be logical and goal-oriented during interview. He does not appear to be responding to internal stimuli.     Behavior over the last 24 hours:  improved  Sleep: normal  Appetite: normal  Medication side effects: No  ROS: all other systems are negative    Mental Status Evaluation:  Appearance:  casually dressed, dressed appropriately, adequate grooming   Behavior:  pleasant, cooperative, calm   Speech:  slow, delayed   Mood:  euthymic   Affect:  constricted   Thought Process:  goal directed, linear   Associations: concrete associations   Thought Content:  no overt delusions   Perceptual Disturbances: denies auditory or visual hallucinations when asked, does not appear responding to internal stimuli   Risk Potential: Suicidal ideation - None  Homicidal ideation - None  Potential for aggression - Not at present   Sensorium:  oriented to person, place and time/date   Memory:  recent and remote memory grossly intact   Consciousness:  alert and awake   Attention/Concentration: attention span and concentration are age appropriate   Insight:  limited   Judgment: limited   Gait/Station: normal gait/station   Motor Activity: no abnormal movements     Medications: all current active meds have been reviewed.   Current Facility-Administered Medications   Medication Dose Route Frequency Provider Last Rate   • sterile water          • acetaminophen  650 mg Oral Q6H PRN Corrina Hinojosa MD     • acetaminophen  650 mg Oral Q4H PRN Corrinatameka Hinojosa MD     • acetaminophen  975 mg Oral Q6H PRN Corrinatameka Hinojosa MD     • albuterol  2 puff Inhalation Q4H PRN Corrina Hinojosa MD     • aluminum-magnesium hydroxide-simethicone  30 mL Oral Q4H PRN Corrina Hinojosa MD     • benztropine  1 mg Intramuscular BID PRN Loreto Vasquez MD     • benztropine  1 mg Oral Q4H PRN Max 6/day Corrina Hinojosa MD     • cloZAPine  37.5 mg Oral Daily Lexie Novak MD     • divalproex sodium  750 mg Oral BID Lexie Novak MD     • fluticasone  1 spray Nasal Daily PRN Zuri Mcgowan PA-C     • glycopyrrolate  1 mg Oral TID Sunita Soler, DO     • hydrOXYzine HCL  100 mg Oral Q6H PRN Max 4/day Venora Alexander, DO      Or   • LORazepam  1 mg Intramuscular Q6H PRN Venora Alexander, DO     • hydrOXYzine HCL  25 mg Oral Q6H PRN Max 4/day Corrina Hinojosa MD     • hydrOXYzine HCL  50 mg Oral Q6H PRN Max 4/day Corrina Hinojosa MD     • loxapine  120 mg Oral QPM Sunita Soler, DO     • loxapine  50 mg Oral BID Sunita Soler, DO     • melatonin  3 mg Oral HS PRN Venora Alexander, DO     • nicotine polacrilex  4 mg Oral Q2H PRN Corrina Hinojosa MD     • OLANZapine  5 mg Oral Q3H PRN Max 6/day Alfred Qureshi MD      Or   • OLANZapine  5 mg Intramuscular Q3H PRN Max 6/day Alfred Qureshi MD     • OLANZapine  5 mg Intramuscular Q8H PRN Tess Rasmussen DO      Or   • OLANZapine  7.5 mg Oral Q8H PRN Tess Rasmussen, DO     • OLANZapine  2.5 mg Oral Q3H PRN Max 8/day Alfred Qureshi MD     • ondansetron  4 mg Oral Q8H PRN Tess Rasmussen DO     • polyethylene glycol  17 g Oral Daily PRN Anastasia Andres MD     • pseudoephedrine  120 mg Oral BID PRN Hero Self MD     • senna-docusate sodium  1 tablet Oral Daily PRN Anastasia Andres MD     • senna-docusate sodium  1 tablet Oral BID Sandra Carter DO     • sterile water          • sterile water          • sterile water          • sterile water          • sterile water          • sterile water          • sterile water          • sterile water          • sterile water              Labs:  I have personally reviewed all pertinent laboratory/tests results  Most Recent Labs:   Lab Results   Component Value Date    WBC 9.47 08/07/2023    RBC 4.71 08/07/2023    HGB 14.8 08/07/2023    HCT 44.1 08/07/2023     08/07/2023    RDW 13.1 08/07/2023    NEUTROABS 3.42 08/07/2023    TOTANEUTABS 4.84 06/08/2023    SODIUM 140 08/04/2023    K 4.4 08/04/2023     08/04/2023    CO2 31 08/04/2023    BUN 16 08/04/2023    CREATININE 1.26 08/04/2023    GLUC 120 08/04/2023    CALCIUM 9.3 08/04/2023    AST 16 07/04/2023    ALT 7 07/04/2023    ALKPHOS 42 07/04/2023    TP 7.0 07/04/2023    ALB 3.9 07/04/2023    TBILI 0.45 07/04/2023    CHOLESTEROL 140 05/19/2023    HDL 59 05/19/2023    TRIG 106 05/19/2023    LDLCALC 60 05/19/2023    NONHDLC 81 05/19/2023    VALPROICTOT 91 06/12/2023    AMMONIA 22 10/09/2022    VSM4XPNRVRKG 1.841 05/19/2023    FREET4 1.16 10/09/2022    HGBA1C 5.2 06/16/2022     06/16/2022       Progress Toward Goals: progressing    Risks / Benefits of Treatment:    Risks, benefits, and possible side effects of medications explained to patient and patient verbalizes understanding and agreement for treatment. Counseling / Coordination of Care:    Patient's progress discussed with staff in treatment team meeting. Medications, treatment progress and treatment plan reviewed with patient.     Yony Escalante MD 08/13/23

## 2023-08-13 NOTE — NURSING NOTE
Patient was observed cursing, yelling, and responding to internal stimuli. Patient stated, "I have this feeling and the voices are telling people are fucking with me and my family." Patient was banging head on wall but stopped. Given 5mg IM zyrexa for severe agitation. 18:30: Patient no longer banging head, cursing, or speaking loudly. Patient resting in bed. Patient reports medication effective for severe agitation.

## 2023-08-13 NOTE — NURSING NOTE
Patient received HS medications which included 120mgs Loxitane at 2105 - he had been quietly responding to internal stimuliat that time. He is now more loudly responding - will continue to monitor and give prn medication if need be. He is not agitated at this time.

## 2023-08-13 NOTE — NURSING NOTE
Patient denies SI/HI. He reports not hearing voices currently. He reports "I'm seeing shadows" currently while in the dayroom. He is pleasant, although flat in affect. Patient is medication compliant, and cooperative. More visible and awake today than previous days.

## 2023-08-13 NOTE — NURSING NOTE
Patient is walking in the hallway with a peer. He said "I need to be leaving here soon.  I've been here too long."

## 2023-08-14 VITALS
HEART RATE: 85 BPM | BODY MASS INDEX: 31.47 KG/M2 | OXYGEN SATURATION: 99 % | TEMPERATURE: 97.7 F | WEIGHT: 177.6 LBS | RESPIRATION RATE: 17 BRPM | HEIGHT: 63 IN | DIASTOLIC BLOOD PRESSURE: 65 MMHG | SYSTOLIC BLOOD PRESSURE: 136 MMHG

## 2023-08-14 LAB
BASOPHILS # BLD AUTO: 0.08 THOUSANDS/ÂΜL (ref 0–0.1)
BASOPHILS NFR BLD AUTO: 1 % (ref 0–1)
EOSINOPHIL # BLD AUTO: 0.8 THOUSAND/ÂΜL (ref 0–0.61)
EOSINOPHIL NFR BLD AUTO: 8 % (ref 0–6)
ERYTHROCYTE [DISTWIDTH] IN BLOOD BY AUTOMATED COUNT: 12.9 % (ref 11.6–15.1)
HCT VFR BLD AUTO: 43.5 % (ref 36.5–49.3)
HGB BLD-MCNC: 14.6 G/DL (ref 12–17)
IMM GRANULOCYTES # BLD AUTO: 0.09 THOUSAND/UL (ref 0–0.2)
IMM GRANULOCYTES NFR BLD AUTO: 1 % (ref 0–2)
LYMPHOCYTES # BLD AUTO: 4.46 THOUSANDS/ÂΜL (ref 0.6–4.47)
LYMPHOCYTES NFR BLD AUTO: 44 % (ref 14–44)
MCH RBC QN AUTO: 31.2 PG (ref 26.8–34.3)
MCHC RBC AUTO-ENTMCNC: 33.6 G/DL (ref 31.4–37.4)
MCV RBC AUTO: 93 FL (ref 82–98)
MONOCYTES # BLD AUTO: 1.18 THOUSAND/ÂΜL (ref 0.17–1.22)
MONOCYTES NFR BLD AUTO: 12 % (ref 4–12)
NEUTROPHILS # BLD AUTO: 3.35 THOUSANDS/ÂΜL (ref 1.85–7.62)
NEUTS SEG NFR BLD AUTO: 34 % (ref 43–75)
NRBC BLD AUTO-RTO: 0 /100 WBCS
PLATELET # BLD AUTO: 223 THOUSANDS/UL (ref 149–390)
PMV BLD AUTO: 11.4 FL (ref 8.9–12.7)
RBC # BLD AUTO: 4.68 MILLION/UL (ref 3.88–5.62)
WBC # BLD AUTO: 9.96 THOUSAND/UL (ref 4.31–10.16)

## 2023-08-14 PROCEDURE — 99232 SBSQ HOSP IP/OBS MODERATE 35: CPT | Performed by: PSYCHIATRY & NEUROLOGY

## 2023-08-14 PROCEDURE — 85025 COMPLETE CBC W/AUTO DIFF WBC: CPT

## 2023-08-14 RX ORDER — CLOZAPINE 25 MG/1
50 TABLET ORAL DAILY
Status: DISCONTINUED | OUTPATIENT
Start: 2023-08-14 | End: 2023-08-15

## 2023-08-14 RX ORDER — WATER 10 ML/10ML
INJECTION INTRAMUSCULAR; INTRAVENOUS; SUBCUTANEOUS
Status: DISCONTINUED
Start: 2023-08-14 | End: 2023-10-19 | Stop reason: HOSPADM

## 2023-08-14 RX ORDER — AMOXICILLIN 250 MG
2 CAPSULE ORAL 2 TIMES DAILY
Status: DISCONTINUED | OUTPATIENT
Start: 2023-08-14 | End: 2023-10-19 | Stop reason: HOSPADM

## 2023-08-14 RX ADMIN — SENNOSIDES AND DOCUSATE SODIUM 1 TABLET: 50; 8.6 TABLET ORAL at 08:24

## 2023-08-14 RX ADMIN — GLYCOPYRROLATE 1 MG: 1 TABLET ORAL at 16:23

## 2023-08-14 RX ADMIN — DIVALPROEX SODIUM 750 MG: 500 TABLET, DELAYED RELEASE ORAL at 08:24

## 2023-08-14 RX ADMIN — LOXAPINE 50 MG: 50 CAPSULE ORAL at 16:23

## 2023-08-14 RX ADMIN — DIVALPROEX SODIUM 750 MG: 500 TABLET, DELAYED RELEASE ORAL at 18:17

## 2023-08-14 RX ADMIN — CLOZAPINE 50 MG: 25 TABLET ORAL at 18:17

## 2023-08-14 RX ADMIN — OLANZAPINE 7.5 MG: 2.5 TABLET, FILM COATED ORAL at 16:42

## 2023-08-14 RX ADMIN — GLYCOPYRROLATE 1 MG: 1 TABLET ORAL at 08:24

## 2023-08-14 RX ADMIN — GLYCOPYRROLATE 1 MG: 1 TABLET ORAL at 21:10

## 2023-08-14 RX ADMIN — LOXAPINE 50 MG: 50 CAPSULE ORAL at 08:24

## 2023-08-14 RX ADMIN — HYDROXYZINE HYDROCHLORIDE 100 MG: 50 TABLET, FILM COATED ORAL at 19:46

## 2023-08-14 RX ADMIN — LOXAPINE 120 MG: 50 CAPSULE ORAL at 21:09

## 2023-08-14 RX ADMIN — OLANZAPINE 7.5 MG: 2.5 TABLET, FILM COATED ORAL at 00:16

## 2023-08-14 RX ADMIN — SENNOSIDES AND DOCUSATE SODIUM 2 TABLET: 8.6; 5 TABLET ORAL at 18:18

## 2023-08-14 NOTE — NURSING NOTE
PT continue to respond to internal stimulus, PT stated "I am getting very anxious and agitated, I am beginning to have a headache." pacing the hallway, offered PT headphone but expressed not helpful at this time. PRN Atarax 100mg given at 1946 for severe anxiety.

## 2023-08-14 NOTE — NURSING NOTE
Pt requesting something for voices. PRN zyprexa 7.5 mg po given @ 1642. Reassessed at this time and somewhat effective.

## 2023-08-14 NOTE — NURSING NOTE
Patient is agitated and responding to internal stimuli. Staff directed him to his room and remained with him orienting him to a more reality based interaction and offering emotional support. He was offered and accepted Atarax 100 mgs po prn and given education about the expectation that it,  with his scheduled medication which he will be receiving shortly will decrease the intensity of the symptoms he is currently experiencing.

## 2023-08-14 NOTE — NURSING NOTE
Patient became less agitated and returned to his room and was asleep by approx 1am. He has slept throughout the night and is still asleep at this time.

## 2023-08-14 NOTE — NURSING NOTE
PRN Atarax was effective and patient no longer agitated. He has had his scheduled medications also and is no longer responding to internal stimuli.

## 2023-08-14 NOTE — PROGRESS NOTES
Progress Note - Behavioral Health   Gokul Cohn [place] 26 y.o. male MRN: [id number]  Unit/Bed#: [facility] 343-01 Encounter: [id number]    Assessment/Plan   Principal Problem:    Schizoaffective disorder ([address])  Active Problems:    Legally blind    Hearing loss    Asthma    Medical clearance for psychiatric admission    Prolonged erection    Intellectual disability    Rib pain on left side      Recommended Treatment:   1. Increase Clozapine to 50 mg HS for psychosis  2. Scheduled CBC on  to monitor WBC and ANC for agranulocytosis (WBC 9.96 and ANC 3.35 on 2023)  3. Loxapine 50 mg AM, 50 mg in the afternoon and 120 mg PM for psychosis  4. Continue Valproic Acid 750 mg twice daily for mood and stabilization  5. Senakot 50 mg two times daily for constipation   6. Continue Glycopyrrolate 1 mg 3 times daily for drooling  7. Continue Melatonin 3mg PM as needed insomnia   8. Encourage patient to remain visible on floors, attend groups, and exercise during the day   9. Continue medical recommendations per SLIM  10. Case Management  to continue care coordination for patient  6. Continue to await placement into [facility]    Risks, benefits and possible side effects of Medications: Risks, benefits, and possible side effects of medications have been explained to the patient, who verbalizes understanding    Current Legal Status: 201  Disposition:Coordinating with case management, likely [facility]  ------------------------------------------------------------    Subjective: Per nursing report, yesterday morning Gokul was described as more visible and awake today than previous days. He was compliant with his medications and cooperative. He reported "I'm seeing shadows" and denied hearing any voices. Around evening time, he was observed cursing, yelling, and responding to internal stimuli. Patient was banging head on wall but stopped. He was given 5mg IM zyrexa for severe agitation which was effective. He was later found resting in bed.  He had another episode of agitation and responding to internal stimuli later at night. Nursing staff at this time directed him to his room and remained with him orienting him to a more reality based interaction and offering emotional support. He was then given Atarax 100 mg PO PRN and received the rest of his scheduled nighttime medication. The medications were effective. Around midnight, approached the nursing station and asked for prn "for voices". At this time he appeared irritated and internally stimulated. He was given prn Zyprexa for the second time. Around 1 am patient became less agitated, returned to his room, and fell asleep. Today, Gokul was seen and evaluated alongside attending physician. On evaluation, patient is in room, cooperative, and sat up on his bed for interview. He reports feeling "tired". He rates his depression a "7/10" and anxiety "6/10". He states that he slept about 8 hours last night but does not recall the time he fell asleep. He had many night time awakenings and slept through breakfast this morning. He reports a decreased energy. He did hear voices last night that were "rough", and says that he does think that the new medication is helping with the voices. He has a good appetite and yesterday he ate breakfast, lunch, and dinner. He has not had a bowel movement in three days, and was encouraged to drink at least two cups of water today. He states the last time that he showered was last week. He denies constipation, dizziness, eye blurriness, and eye pain. He denies priapism or testicular pain. He was not able to talk to family this weekend. His goals today are to eat lunch, to shower, and to attend a group. Gokul agrees to an increase in clozapine to help with his auditory hallucinations and to adjust his current bowel regimen to prevent constipation.      Denies SI/HI/AVH at the time of interview and denies any plan or intent to harm herself or others and contracts for safety in the hospital.      Progress Toward Goals: slow improvement    Psychiatric Review of Systems:  Behavior over the last 24 hours: unchanged  Sleep: difficulty falling asleep  Appetite: adequate  Medication side effects: none verbalized  ROS: Complete review of systems is negative except as noted above.     Vital signs in last 24 hours:   Temp:  [98 °F (36.7 °C)] 98 °F (36.7 °C)  HR:  [109] 109  Resp:  [16] 16  BP: (137)/(85) 137/85    Mental Status Exam:  Appearance:  Overtly appearing dark brown skinned  male, drowsy, intermittent  eye contact, appears older than stated age, disheveled, wearing white t shirt and black joggers, and obese   Behavior:  cooperative and sitting comfortably   Motor: no abnormal movements and normal gait and balance   Speech:  soft, scant and delayed   Mood:  "tired"   Affect:  blunted   Thought Process:  poverty of thought, concrete associations    Thought Content: no verbalized delusions or overt paranoia   Perceptual disturbances: no current hallucinations,  appears to be responding to internal stimuli at this time and auditory hallucinations reported per nursing and patient begin after 5pm    Risk Potential: No active or passive suicidal or homicidal ideation was verbalized during interview   Cognition: oriented to self and situation, appears to be below average intelligence and cognition not formally tested   Insight:  Limited   Judgment: Limited     Current Medications:  Current Facility-Administered Medications   Medication Dose Route Frequency Provider Last Rate   • acetaminophen  650 mg Oral Q6H PRN Fariba Conte MD     • acetaminophen  650 mg Oral Q4H PRN Fariba Conte MD     • acetaminophen  975 mg Oral Q6H PRN Fariba Conte MD     • albuterol  2 puff Inhalation Q4H PRN Fariba Conte MD     • aluminum-magnesium hydroxide-simethicone  30 mL Oral Q4H PRN Fariba Conte MD     • benztropine  1 mg Intramuscular BID PRN Aide Cristina MD     • benztropine  1 mg Oral Q4H PRN Max 6/day Winifred Beckett MD     • cloZAPine  37.5 mg Oral Daily Yolande Esparza MD     • divalproex sodium  750 mg Oral BID Yolande Esparza MD     • fluticasone  1 spray Nasal Daily PRN Chad Jordan PA-C     • glycopyrrolate  1 mg Oral TID Lannette Charles, DO     • hydrOXYzine HCL  100 mg Oral Q6H PRN Max 4/day Genna Cooler, DO      Or   • LORazepam  1 mg Intramuscular Q6H PRN Genna Cooler, DO     • hydrOXYzine HCL  25 mg Oral Q6H PRN Max 4/day Winifred Beckett MD     • hydrOXYzine HCL  50 mg Oral Q6H PRN Max 4/day Winifred Beckett MD     • loxapine  120 mg Oral QPM Lannette Charles, DO     • loxapine  50 mg Oral BID Lannette Charles, DO     • melatonin  3 mg Oral HS PRN Genna Cooler, DO     • nicotine polacrilex  4 mg Oral Q2H PRN Winifred Beckett MD     • OLANZapine  5 mg Oral Q3H PRN Max 6/day Yolande Esparza MD      Or   • OLANZapine  5 mg Intramuscular Q3H PRN Max 6/day Yolande Esparza MD     • OLANZapine  5 mg Intramuscular Q8H PRN Genna Cooler, DO      Or   • OLANZapine  7.5 mg Oral Q8H PRN Genna Cooler, DO     • OLANZapine  2.5 mg Oral Q3H PRN Max 8/day Yolande Esparza MD     • ondansetron  4 mg Oral Q8H PRN Genna Cooler, DO     • polyethylene glycol  17 g Oral Daily PRN Winifred Beckett MD     • pseudoephedrine  120 mg Oral BID PRN Melissa Vivas MD     • senna-docusate sodium  1 tablet Oral Daily PRN Winifred Beckett MD     • senna-docusate sodium  2 tablet Oral BID Yolande Esparza MD     • sterile water          • sterile water          • sterile water          • sterile water          • sterile water          • sterile water          • sterile water          • sterile water          • sterile water          • sterile water              Behavioral Health Medications: all current active meds have been reviewed. Changes as in plan section above.     Laboratory results:  I have personally reviewed all pertinent laboratory/tests results.   Recent Results (from the past 48 hour(s))   CBC and differential    Collection Time: 08/14/23  6:35 AM   Result Value Ref Range    WBC 9.96 4.31 - 10.16 Thousand/uL    RBC 4.68 3.88 - 5.62 Million/uL    Hemoglobin 14.6 12.0 - 17.0 g/dL    Hematocrit 43.5 36.5 - 49.3 %    MCV 93 82 - 98 fL    MCH 31.2 26.8 - 34.3 pg    MCHC 33.6 31.4 - 37.4 g/dL    RDW 12.9 11.6 - 15.1 %    MPV 11.4 8.9 - 12.7 fL    Platelets 071 579 - 642 Thousands/uL    nRBC 0 /100 WBCs    Neutrophils Relative 34 (L) 43 - 75 %    Immat GRANS % 1 0 - 2 %    Lymphocytes Relative 44 14 - 44 %    Monocytes Relative 12 4 - 12 %    Eosinophils Relative 8 (H) 0 - 6 %    Basophils Relative 1 0 - 1 %    Neutrophils Absolute 3.35 1.85 - 7.62 Thousands/µL    Immature Grans Absolute 0.09 0.00 - 0.20 Thousand/uL    Lymphocytes Absolute 4.46 0.60 - 4.47 Thousands/µL    Monocytes Absolute 1.18 0.17 - 1.22 Thousand/µL    Eosinophils Absolute 0.80 (H) 0.00 - 0.61 Thousand/µL    Basophils Absolute 0.08 0.00 - 0.10 Thousands/µL        Ninoska Titus  MS IV

## 2023-08-14 NOTE — PROGRESS NOTES
08/14/23 0854   Team Meeting   Meeting Type Daily Rounds   Team Members Present   Team Members Present Physician;Nurse;   Physician Team Member [id number] [facility] Team [facility] Team Member Wendie   Patient/Family Present   Patient Present No   Patient's Family Present No     Pt med/meal compliant. Seclusive to his room, requested IM for AH. Received IM Zyprexa. Pt heard responding to internal stimuli. Pt reported VH of shadows. Yelling, screaming in the hallways, responding to internal stimuli, attempting to bang head. Received PRN Zyprexa. PRN Zyprexa numerous times over the weekend for [facility], responding to internal stimuli. Discharge pending EAC.

## 2023-08-14 NOTE — NURSING NOTE
Patient has been seclusive to his room. In bed sleeping. Scant and guarded on approach. Compliant with morning medications. Currently denying AH.

## 2023-08-14 NOTE — NURSING NOTE
Patient is awake and at nurses station asking for prn "for voices."  He is appearing irritated and preoccupied/internally stimulated. Zyprexa 7.5mgs po prn given.

## 2023-08-14 NOTE — PLAN OF CARE
Problem: Ineffective Coping  Goal: Participates in unit activities  Description: Interventions:  - Provide therapeutic environment   - Provide required programming   - Redirect inappropriate behaviors   8/14/2023 0444 by Narciso Alberto RN  Outcome: Progressing  8/13/2023 1824 by Narciso Alberto RN  Outcome: Progressing     Problem: SELF HARM/SUICIDALITY  Goal: Will have no self-injury during hospital stay  Description: INTERVENTIONS:  - Q 15 MINUTES: Routine safety checks  - Q WAKING SHIFT & PRN: Assess risk to determine if routine checks are adequate to maintain patient safety  - Encourage patient to participate actively in care by formulating a plan to combat response to suicidal ideation, identify supports and resources  8/14/2023 0444 by Narciso Alberto RN  Outcome: Progressing  8/13/2023 1824 by Narciso Alberto RN  Outcome: Progressing     Problem: ANXIETY  Goal: Will report anxiety at manageable levels  Description: INTERVENTIONS:  - Administer medication as ordered  - Teach and encourage coping skills  - Provide emotional support  - Assess patient/family for anxiety and ability to cope  8/14/2023 0444 by Narciso Alberto RN  Outcome: Progressing  8/13/2023 1824 by Narciso Alberto RN  Outcome: Progressing  Goal: By discharge: Patient will verbalize 2 strategies to deal with anxiety  Description: Interventions:  - Identify any obvious source/trigger to anxiety  - Staff will assist patient in applying identified coping technique/skills  - Encourage attendance of scheduled groups and activities  8/14/2023 0444 by Narciso Alberto RN  Outcome: Progressing  8/13/2023 1824 by Narciso Alberto RN  Outcome: Progressing     Problem: SUBSTANCE USE/ABUSE  Goal: Will have no detox symptoms and will verbalize plan for changing substance-related behavior  Description: INTERVENTIONS:  - Monitor physical status and assess for symptoms of withdrawal  - Administer medication as ordered  - Provide emotional support with 1 on 1 interaction with staff  - Encourage recovery focused program/ addiction education  - Assess for verbalization of changing behaviors related to substance abuse  - Initiate consults and referrals as appropriate (Case Management, Spiritual Care, etc.)  8/14/2023 0444 by Siomara Jones RN  Outcome: Progressing  8/13/2023 1824 by Siomara Jones RN  Outcome: Progressing  Goal: By discharge, will develop insight into their chemical dependency and sustain motivation to continue in recovery  Description: INTERVENTIONS:  - Attends all daily group sessions and scheduled AA groups  - Actively practices coping skills through participation in the therapeutic community and adherence to program rules  - Reviews and completes assignments from individual treatment plan  - Assist patient development of understanding of their personal cycle of addiction and relapse triggers  8/14/2023 0444 by Siomara Jones RN  Outcome: Progressing  8/13/2023 1824 by Siomara Jones RN  Outcome: Progressing  Goal: By discharge, patient will have ongoing treatment plan addressing chemical dependency  Description: INTERVENTIONS:  - Assist patient with resources and/or appointments for ongoing recovery based living  8/14/2023 0444 by Siomara Jones RN  Outcome: Progressing  8/13/2023 1824 by Siomara Jones RN  Outcome: Progressing     Problem: DISCHARGE PLANNING  Goal: Discharge to home or other facility with appropriate resources  Description: INTERVENTIONS:  - Identify barriers to discharge w/patient and caregiver  - Arrange for needed discharge resources and transportation as appropriate  - Identify discharge learning needs (meds, wound care, etc.)  - Arrange for interpretive services to assist at discharge as needed  - Refer to Case Management Department for coordinating discharge planning if the patient needs post-hospital services based on physician/advanced practitioner order or complex needs related to functional status, cognitive ability, or social support system  8/14/2023 0444 by Mireya Reese RN  Outcome: Progressing  8/13/2023 1824 by Mireya Reese RN  Outcome: Progressing

## 2023-08-15 PROCEDURE — 99232 SBSQ HOSP IP/OBS MODERATE 35: CPT | Performed by: PSYCHIATRY & NEUROLOGY

## 2023-08-15 RX ORDER — CLOZAPINE 25 MG/1
75 TABLET ORAL DAILY
Status: DISCONTINUED | OUTPATIENT
Start: 2023-08-15 | End: 2023-08-16

## 2023-08-15 RX ADMIN — SENNOSIDES AND DOCUSATE SODIUM 2 TABLET: 8.6; 5 TABLET ORAL at 18:07

## 2023-08-15 RX ADMIN — SENNOSIDES AND DOCUSATE SODIUM 2 TABLET: 8.6; 5 TABLET ORAL at 08:15

## 2023-08-15 RX ADMIN — DIVALPROEX SODIUM 750 MG: 500 TABLET, DELAYED RELEASE ORAL at 08:15

## 2023-08-15 RX ADMIN — LOXAPINE 120 MG: 50 CAPSULE ORAL at 21:15

## 2023-08-15 RX ADMIN — MELATONIN TAB 3 MG 3 MG: 3 TAB at 01:40

## 2023-08-15 RX ADMIN — GLYCOPYRROLATE 1 MG: 1 TABLET ORAL at 08:15

## 2023-08-15 RX ADMIN — LOXAPINE 50 MG: 50 CAPSULE ORAL at 16:14

## 2023-08-15 RX ADMIN — DIVALPROEX SODIUM 750 MG: 500 TABLET, DELAYED RELEASE ORAL at 18:07

## 2023-08-15 RX ADMIN — OLANZAPINE 7.5 MG: 2.5 TABLET, FILM COATED ORAL at 18:07

## 2023-08-15 RX ADMIN — LOXAPINE 50 MG: 50 CAPSULE ORAL at 08:15

## 2023-08-15 RX ADMIN — GLYCOPYRROLATE 1 MG: 1 TABLET ORAL at 21:15

## 2023-08-15 RX ADMIN — HYDROXYZINE HYDROCHLORIDE 100 MG: 50 TABLET, FILM COATED ORAL at 01:40

## 2023-08-15 RX ADMIN — OLANZAPINE 5 MG: 10 INJECTION, POWDER, LYOPHILIZED, FOR SOLUTION INTRAMUSCULAR at 01:14

## 2023-08-15 RX ADMIN — ACETAMINOPHEN 975 MG: 325 TABLET ORAL at 22:00

## 2023-08-15 RX ADMIN — CLOZAPINE 75 MG: 25 TABLET ORAL at 18:07

## 2023-08-15 RX ADMIN — GLYCOPYRROLATE 1 MG: 1 TABLET ORAL at 16:14

## 2023-08-15 NOTE — NURSING NOTE
PRN atarax 100mg and melatonin 3mg was effective. Patient reports decreased anxiety and observed resting in bed.

## 2023-08-15 NOTE — NURSING NOTE
Patient has been seclusive to his room; in bed sleeping. Did come out for breakfast tray and morning medications. Currently denying AH. Guarded during interaction but pleasant.

## 2023-08-15 NOTE — PROGRESS NOTES
Progress Note - Behavioral Health   Gokul Lea 26 y.o. male MRN: [id number]  Unit/Bed#: [facility] 343-01 Encounter: [id number]    Assessment/Plan   Principal Problem:    Schizoaffective disorder ([address])  Active Problems:    Legally blind    Hearing loss    Asthma    Medical clearance for psychiatric admission    Prolonged erection    Intellectual disability    Rib pain on left side      Recommended Treatment:   1. Increase Clozaril to 75 mg HS for psychosis  2. Weekly CBC on mondays to monitor WBC and ANC for agranulocytosis (WBC 9.96 and ANC 3.35 on 8/14/2023)  3. Loxapine 50 mg at morning, 50 mg in the afternoon and 120 mg HS for psychosis  4. Continue Depakote 750 mg twice daily for mood and stabilization  5. Senakot 50 mg twice daily for constipation   6. Continue Robinul 1 mg 3 times daily for drooling  7. Continue Melatonin 3mg PM as needed insomnia   8. Encourage patient to remain visible on floors, attend groups, hydrate, and exercise during the day   9. Continue medical recommendations per SLIM  10. Case Management to continue care coordination for patient  6. Continue to await placement into EAC        Risks, benefits and possible side effects of Medications: Risks, benefits, and possible side effects of medications have been explained to the patient, who verbalizes understanding    Current Legal Status: 201  Disposition: Coordinating with case management, likely [facility]  ------------------------------------------------------------    Subjective: Per nursing report yesterday, Gokul requested a prn zyprexa in the evening for voices. He continued to respond to internal stimuli and stated "I am getting very anxious and agitated, I am beginning to have a headache." He was found pacing the hallways. He was given a prn atarax 100 mg for severe anxiety. He was noted to be visible in the unit, responding to self, and compliant with scheduled medications and meals.  In the night time, he again reported auditory hallucination causing moderate agitation. He responded aggressively in nature while pacing. PRN IM zyprexa 5mg was administered. He later returned to the nursing station and stated "The voices are really starting to piss me off and I can't sleep." PRN atarax 100mg and melatonin 3mg were administered and was effective. Patient reported decreased anxiety and observed resting in bed for the remainder of nursing shift. Today, Gokul was seen and evaluated alongside attending physician. On evaluation, patient is "excited" because "it's going to be a good day"  He reports that yesterday evening he heard "less voices" but there is one voice that is particularly "bothersome". The one voice talks about his family which makes him angry. As a response to the voice, he punches the wall and asks for more medications. He says that when he punches the wall it hurts him, and it does not make the voices go away. This morning, Gokul rates his anxiety as a 7/10 and currently is not feeling depressed. He slept for about 8 hours last night, from about 1 am until breakfast. He describes his energy as "okay." He has a good appetite, and ate his breakfast this morning. He did have multiple bowel movements yesterday after lunch and reports not drinking any water. He was advised to drink more water today. Gokul attended groups yesterday and has a plan to go to groups today. He has not spoken to his grandma for "a minute," and was encouraged to call his family. He denies any constipation, seizures, dizziness when standing, eye pain, eye blurriness, priapism, or testicular pain. His goal today are to participate in group, shower, and to exercise. Gokul agrees to an increase in his clozapine to help with the evening auditory hallucinations.      Denies current SI/HI/AVH at the time of interview and denies any plan or intent to harm herself or others and contracts for safety in the hospital.    Progress Toward Goals: slow improvement    Psychiatric Review of Systems:  Behavior over the last 24 hours: unchanged  Sleep: difficulty falling asleep  Appetite: adequate  Medication side effects: none verbalized  ROS: Complete review of systems is negative except as noted above.     Vital signs in last 24 hours:   Temp:  [97.7 °F (36.5 °C)] 97.7 °F (36.5 °C)  HR:  [85] 85  Resp:  [17] 17  BP: (136)/(65) 136/65    Mental Status Exam:  Appearance:  Overtly appearing  male, drowsy, intermittent eye contact, appears older than stated age, casually dressed, disheveled and obese   Behavior:  calm and cooperative, guarded at times   Motor: no abnormal movements and normal gait and balance   Speech:  normal rate, soft, scant and coherent   Mood:  "excited"   Affect:  blunted and was noted to smile once in response to a joke   Thought Process:  poverty of thought   Thought Content: no verbalized delusions or overt paranoia   Perceptual disturbances: denies current hallucinations, auditory hallucinations reported at 4:42 pm per nursing note, currently does appears to be responding to internal stimuli, appears to be distracted and internally preoccupied    Risk Potential: No active or passive suicidal or homicidal ideation was verbalized during interview   Cognition: oriented to self and situation, appears to be below average intelligence and cognition not formally tested   Insight:  Limited   Judgment: Limited     Current Medications:  Current Facility-Administered Medications   Medication Dose Route Frequency Provider Last Rate   • acetaminophen  650 mg Oral Q6H PRN Venita Nissen, MD     • acetaminophen  650 mg Oral Q4H PRN Venita Nissen, MD     • acetaminophen  975 mg Oral Q6H PRN Venita Nissen, MD     • albuterol  2 puff Inhalation Q4H PRN Venita Nissen, MD     • aluminum-magnesium hydroxide-simethicone  30 mL Oral Q4H PRN Venita Nissen, MD     • benztropine  1 mg Intramuscular BID PRN Karma Brower MD     • benztropine  1 mg Oral Q4H PRN Max 6/day Martha Hennessy MD     • cloZAPine  50 mg Oral Daily Wilbur Cranker, DO     • divalproex sodium  750 mg Oral BID Mary Alberto MD     • fluticasone  1 spray Nasal Daily PRN Elias Monday, JONO     • glycopyrrolate  1 mg Oral TID Wilbur Cranker, DO     • hydrOXYzine HCL  100 mg Oral Q6H PRN Max 4/day Gerald Prader, DO      Or   • LORazepam  1 mg Intramuscular Q6H PRN Gerald Prader, DO     • hydrOXYzine HCL  25 mg Oral Q6H PRN Max 4/day Martha Hennessy MD     • hydrOXYzine HCL  50 mg Oral Q6H PRN Max 4/day Martha Hennessy MD     • loxapine  120 mg Oral QPM Wilbur Cranker, DO     • loxapine  50 mg Oral BID Wilbur Cranker, DO     • melatonin  3 mg Oral HS PRN Gerald Prader, DO     • nicotine polacrilex  4 mg Oral Q2H PRN Martha Hennessy MD     • OLANZapine  5 mg Oral Q3H PRN Max 6/day Mary Alberto MD      Or   • OLANZapine  5 mg Intramuscular Q3H PRN Max 6/day Mary Alberto MD     • OLANZapine  5 mg Intramuscular Q8H PRN Gerald Prader, DO      Or   • OLANZapine  7.5 mg Oral Q8H PRN Gerald Prader, DO     • OLANZapine  2.5 mg Oral Q3H PRN Max 8/day Mary Alberto MD     • ondansetron  4 mg Oral Q8H PRN Gerald Prader, DO     • polyethylene glycol  17 g Oral Daily PRN Martha Hennessy MD     • pseudoephedrine  120 mg Oral BID PRN Jessica Massey MD     • senna-docusate sodium  1 tablet Oral Daily PRN Martha Hennessy MD     • senna-docusate sodium  2 tablet Oral BID Mary Alberto MD     • sterile water          • sterile water          • sterile water          • sterile water          • sterile water          • sterile water          • sterile water          • sterile water          • sterile water          • sterile water          • sterile water              Behavioral Health Medications: all current active meds have been reviewed. Changes as in plan section above.     Laboratory results:  I have personally reviewed all pertinent laboratory/tests results.   Recent Results (from the past 48 hour(s))   CBC and differential    Collection Time: 08/14/23  6:35 AM   Result Value Ref Range    WBC 9.96 4.31 - 10.16 Thousand/uL    RBC 4.68 3.88 - 5.62 Million/uL    Hemoglobin 14.6 12.0 - 17.0 g/dL    Hematocrit 43.5 36.5 - 49.3 %    MCV 93 82 - 98 fL    MCH 31.2 26.8 - 34.3 pg    MCHC 33.6 31.4 - 37.4 g/dL    RDW 12.9 11.6 - 15.1 %    MPV 11.4 8.9 - 12.7 fL    Platelets 009 038 - 832 Thousands/uL    nRBC 0 /100 WBCs    Neutrophils Relative 34 (L) 43 - 75 %    Immat GRANS % 1 0 - 2 %    Lymphocytes Relative 44 14 - 44 %    Monocytes Relative 12 4 - 12 %    Eosinophils Relative 8 (H) 0 - 6 %    Basophils Relative 1 0 - 1 %    Neutrophils Absolute 3.35 1.85 - 7.62 Thousands/µL    Immature Grans Absolute 0.09 0.00 - 0.20 Thousand/uL    Lymphocytes Absolute 4.46 0.60 - 4.47 Thousands/µL    Monocytes Absolute 1.18 0.17 - 1.22 Thousand/µL    Eosinophils Absolute 0.80 (H) 0.00 - 0.61 Thousand/µL    Basophils Absolute 0.08 0.00 - 0.10 Thousands/µL        Ninoska Titus  MS IV

## 2023-08-15 NOTE — NURSING NOTE
Re-assessed PT still slightly anxious, talking to self, standing on the hallway stirring away without responding to staff member for about a few minutes acknowledge feeling okay.

## 2023-08-15 NOTE — NURSING NOTE
PT continue to be visible in the unit, responding to self, compliant with scheduled meds and meal. Denies pain/discomfort.

## 2023-08-15 NOTE — NURSING NOTE
Patient came back to nurse's station stating "The voices are really starting to piss me off and I can't sleep." PRN atarax 100mg and melatonin 3mg was administered. Will monitor for effectiveness.

## 2023-08-15 NOTE — NURSING NOTE
Patient reports AH causing moderate agitation. Patient responding aggressively in nature while pacing. PRN IM zyprexa 5mg was administered. Will monitor for effectiveness.

## 2023-08-15 NOTE — PROGRESS NOTES
08/15/23 1257   Team Meeting   Meeting Type Daily Rounds   Team Members Present   Team Members Present Physician;Nurse;   Physician Team Member [id number] [facility] Team Member Wendie   Patient/Family Present   Patient Present No   Patient's Family Present No     Pt is med/meal compliant. Remains isolative to his room. Sleeping most of the day and stating he is tired. Discharge pending [facility].

## 2023-08-15 NOTE — NURSING NOTE
Patient remained isolated to room and has been sleeping most of this shift. Out for evening meal and is cooperative with medication administration. Denies SI, HI, and VH. Patient does however continue to endorse AH of voices mumbling and c/o triggers and stimulated by milieu. 1807 - PRN zyprexa 7.5mg PO administered.

## 2023-08-16 PROCEDURE — 99232 SBSQ HOSP IP/OBS MODERATE 35: CPT | Performed by: PSYCHIATRY & NEUROLOGY

## 2023-08-16 RX ORDER — CLOZAPINE 100 MG/1
100 TABLET ORAL DAILY
Status: DISCONTINUED | OUTPATIENT
Start: 2023-08-16 | End: 2023-08-18

## 2023-08-16 RX ORDER — LOXAPINE SUCCINATE 50 MG/1
100 TABLET ORAL EVERY EVENING
Status: DISCONTINUED | OUTPATIENT
Start: 2023-08-16 | End: 2023-08-17

## 2023-08-16 RX ORDER — HALOPERIDOL 5 MG/ML
5 INJECTION INTRAMUSCULAR ONCE
Status: COMPLETED | OUTPATIENT
Start: 2023-08-16 | End: 2023-08-16

## 2023-08-16 RX ADMIN — OLANZAPINE 7.5 MG: 2.5 TABLET, FILM COATED ORAL at 19:55

## 2023-08-16 RX ADMIN — SENNOSIDES AND DOCUSATE SODIUM 2 TABLET: 8.6; 5 TABLET ORAL at 17:15

## 2023-08-16 RX ADMIN — CLOZAPINE 100 MG: 100 TABLET ORAL at 17:14

## 2023-08-16 RX ADMIN — LOXAPINE 100 MG: 50 CAPSULE ORAL at 21:21

## 2023-08-16 RX ADMIN — GLYCOPYRROLATE 1 MG: 1 TABLET ORAL at 08:20

## 2023-08-16 RX ADMIN — GLYCOPYRROLATE 1 MG: 1 TABLET ORAL at 17:15

## 2023-08-16 RX ADMIN — LOXAPINE 50 MG: 50 CAPSULE ORAL at 17:14

## 2023-08-16 RX ADMIN — DIVALPROEX SODIUM 750 MG: 500 TABLET, DELAYED RELEASE ORAL at 08:20

## 2023-08-16 RX ADMIN — LOXAPINE 50 MG: 50 CAPSULE ORAL at 08:20

## 2023-08-16 RX ADMIN — DIVALPROEX SODIUM 750 MG: 500 TABLET, DELAYED RELEASE ORAL at 18:11

## 2023-08-16 RX ADMIN — GLYCOPYRROLATE 1 MG: 1 TABLET ORAL at 21:21

## 2023-08-16 RX ADMIN — SENNOSIDES AND DOCUSATE SODIUM 2 TABLET: 8.6; 5 TABLET ORAL at 08:20

## 2023-08-16 RX ADMIN — HYDROXYZINE HYDROCHLORIDE 100 MG: 50 TABLET, FILM COATED ORAL at 18:31

## 2023-08-16 RX ADMIN — MELATONIN TAB 3 MG 3 MG: 3 TAB at 00:58

## 2023-08-16 RX ADMIN — HALOPERIDOL LACTATE 5 MG: 5 INJECTION, SOLUTION INTRAMUSCULAR at 21:39

## 2023-08-16 RX ADMIN — OLANZAPINE 5 MG: 5 TABLET, FILM COATED ORAL at 01:24

## 2023-08-16 NOTE — NURSING NOTE
[address] came to the nurses station c/o of severe agitation. Given PRN zyrexa 7.5 mg PO.   2055- Pt with continued complaints of  and VH. On call psych MD Nery Matthews contacted and made aware pt swatting at the air around him and hitting self on the head. New order received for Haldol IM.    2139- one time dose of Haldol 5 mg IM  administered for severe agitation due to [facility] and VH.    2239- Pt states "its starting to take effect". Pt observed sitting in dayroom watching television.

## 2023-08-16 NOTE — PROGRESS NOTES
08/16/23 0843   Team Meeting   Meeting Type Daily Rounds   Team Members Present   Team Members Present Physician;Nurse;   Physician Team Member [id number] [facility] Team Member Wendie   Patient/Family Present   Patient Present No   Patient's Family Present No     Pt med/meal compliant. Seclusive to his room. Awake for breakfast this morning. Yesterday responding to internal stimuli, c/o AH. Received PRN Zyprexa. Responding loudly at 0100, received PRNs which were effective. Discharge pending EAC.

## 2023-08-16 NOTE — NURSING NOTE
Patient sitting in patient lounge responding to internal stimuli.  Patient c/o voices and given PRN Zyprexa 5mg PO at 0124am.

## 2023-08-16 NOTE — NURSING NOTE
Pt visible throughout evening, stated PRN zyprexa administered @ 18:07 ineffective at reducing agitation upon 19:07 reassessment; however, pt does not show visible SS of agitation or appear to be RTIS at this time.

## 2023-08-16 NOTE — PROGRESS NOTES
Progress Note - Behavioral Health   Gokul Yang 26 y.o. male MRN: [id number]  Unit/Bed#: [facility] 343-01 Encounter: [id number]    Assessment/Plan   Principal Problem:    Schizoaffective disorder ([address])  Active Problems:    Legally blind    Hearing loss    Asthma    Medical clearance for psychiatric admission    Prolonged erection    Intellectual disability    Rib pain on left side      Recommended Treatment:   1. Increase Clozapine to 100 mg HS for psychosis  2. Weekly CBC on mondays to monitor WBC and ANC for agranulocytosis (WBC 9.96 and ANC 3.35 on 8/14/2023)  3. Titrate Loxapine PM to 100 mg HS for psychosis. Continue Loxapine 50 mg at morning, 50 mg in the afternoon   4. Continue Depakote 750 mg twice daily for mood and stabilization  5. Senakot 50 mg twice daily for constipation   6. Continue Glycopyrrolate 1 mg 3 times daily for drooling  7. Continue Melatonin 3mg PM as needed insomnia   8. Encourage patient to remain visible on floors, attend groups, hydrate, and exercise during the day   9. Continue medical recommendations per SLIM  10. Case Management to continue care coordination for patient  6. Continue to await placement into EAC    Risks, benefits and possible side effects of Medications: Risks, benefits, and possible side effects of medications have been explained to the patient, who verbalizes understanding    Current Legal Status: 201  Disposition: Coordinating with case management, likely [facility]  ------------------------------------------------------------    Subjective: Per nursing report, yesterday afternoon Gokul was found sleeping most of the day and stated he was tired. He remained isolated to his room. He was medication and meal compliant. Around dinner time he was still reported to be isolated to his room and sleeping most of the shift. He was out for evening meal and cooperative to medication. He endorsed AH of voices mumbling and c/o triggers and stimulated by milieu.  At 1807 PRN Zyprexa 7.5 PO was administered. He said it was ineffective but nursing reported that he did not show visible signs of agitation or appeared to be responding to internal stimuli. He had a headache rated 8/10 which prn tylenol was given at 2200 and was effective. Later in the night he was found sitting in patient lounge and responding to internal stimuli. He was given PRN Zyprexa at 0124 am and was effective. Today, Gokul was seen and evaluated alongside resident. On evaluation, patient reports feeling "calm". He was seen in the morning eating breakfast with another patient. He is interviewed in bed and is cooperative. He states that the voices are quiet now and were better yesterday than the previous night. He states he still required medicine last night. He says his anxiety is "so, so" and rates his depression a "4/10". His energy is the "same as usual". He slept for about 7-8 hours. He fell asleep around 1 am and woke up for vitals. He experienced no night time awakenings. He has a good appetite and drank no water yesterday. Says he will try to drink water today. Gokul showered last evening and changed his clothes. He reports having a bowel movement yesterday. He was sleepy throughout the day yesterday and was not able to attend groups. He has a goal to attend groups today, and was seen sitting outside for lunch time. He spoke to his sister yesterday which "felt good".       Gokul denies current SI/HI/AVH at the time of interview and denies any plan or intent to harm herself or others and contracts for safety in the hospital.  Progress Toward Goals: slow improvement    Psychiatric Review of Systems:  Behavior over the last 24 hours: improved  Sleep: normal  Appetite: adequate  Medication side effects: none verbalized  ROS: headache    Vital signs in last 24 hours:   Temp:  [97.9 °F (36.6 °C)] 97.9 °F (36.6 °C)  HR:  [97] 97  Resp:  [16] 16  BP: (126)/(58) 126/58    Mental Status Exam:  Appearance:  Overtly appearing  male, drowsy, minimal  eye contact, appears older than stated age, casually dressed in navy blue top and black pants, marginal grooming and hygiene and obese   Behavior:  Cooperative, less guarded and sitting up in bed   Motor: no abnormal movements and normal gait and balance   Speech:  normal rate, soft, scant and coherent   Mood:  "calm"   Affect:  blunted   Thought Process:  poverty of thought   Thought Content: no verbalized delusions or overt paranoia   Perceptual disturbances: no reported hallucinations,   does not appear to be responding to internal stimuli at during morning interview, was observed by this writer at 1:30 PM to be responding to internal stimuli while sitting in the common area  yesterday PRN for auditory hallucinations given at 1807    Risk Potential: No active or passive suicidal or homicidal ideation was verbalized during interview   Cognition: oriented to self and situation, appears to be below average intelligence and cognition not formally tested   Insight:  Limited   Judgment: Limited     Current Medications:  Current Facility-Administered Medications   Medication Dose Route Frequency Provider Last Rate   • acetaminophen  650 mg Oral Q6H PRN Albina Morse MD     • acetaminophen  650 mg Oral Q4H PRN Albina Morse MD     • acetaminophen  975 mg Oral Q6H PRN Albina Morse MD     • albuterol  2 puff Inhalation Q4H PRN Albina Morse MD     • aluminum-magnesium hydroxide-simethicone  30 mL Oral Q4H PRN Albina Morse MD     • benztropine  1 mg Intramuscular BID PRN Ranjan Kennedy MD     • benztropine  1 mg Oral Q4H PRN Max 6/day Albina Morse MD     • cloZAPine  75 mg Oral Daily Sarai Parks DO     • divalproex sodium  750 mg Oral BID Melonie Shaikh MD     • fluticasone  1 spray Nasal Daily PRN Nabil Wiley PA-C     • glycopyrrolate  1 mg Oral TID Sarai Parks DO     • hydrOXYzine HCL  100 mg Oral Q6H PRN Max 4/day Sami Pierson DO Or   • LORazepam  1 mg Intramuscular Q6H PRN Gerald Prader, DO     • hydrOXYzine HCL  25 mg Oral Q6H PRN Max 4/day Martha Hennessy MD     • hydrOXYzine HCL  50 mg Oral Q6H PRN Max 4/day Martha Hennessy MD     • loxapine  120 mg Oral QPM Lyons Cranker, DO     • loxapine  50 mg Oral BID Vladislav Cranker, DO     • melatonin  3 mg Oral HS PRN Gerald Prader, DO     • nicotine polacrilex  4 mg Oral Q2H PRN Martha Hennessy MD     • OLANZapine  5 mg Oral Q3H PRN Max 6/day Mary Alberto MD      Or   • OLANZapine  5 mg Intramuscular Q3H PRN Max 6/day Mary Alberto MD     • OLANZapine  5 mg Intramuscular Q8H PRN Gerald Prader, DO      Or   • OLANZapine  7.5 mg Oral Q8H PRN Gerald Prader, DO     • OLANZapine  2.5 mg Oral Q3H PRN Max 8/day Mary Alberto MD     • ondansetron  4 mg Oral Q8H PRN Gerald Prader, DO     • polyethylene glycol  17 g Oral Daily PRN Martha Hennessy MD     • pseudoephedrine  120 mg Oral BID PRN Jessica Massey MD     • senna-docusate sodium  1 tablet Oral Daily PRN Martha Hennessy MD     • senna-docusate sodium  2 tablet Oral BID Mary Alberto MD     • sterile water          • sterile water          • sterile water          • sterile water          • sterile water          • sterile water          • sterile water          • sterile water          • sterile water          • sterile water          • sterile water              Behavioral Health Medications: all current active meds have been reviewed. Changes as in plan section above. Laboratory results:  I have personally reviewed all pertinent laboratory/tests results. No results found for this or any previous visit (from the past 48 hour(s)).      Ewa Garcia  MS IV

## 2023-08-16 NOTE — NURSING NOTE
In bed sleeping the entire shift. Only out for breakfast and morning medications. Pleasant but scant during interaction. Denying AH at this time.

## 2023-08-16 NOTE — NURSING NOTE
Pt out of room observed socializing in dayroom other peers, and ate evening meal. Pt spoke with grandmother on the phone and appears bright. Denies SI, HI, A/V hallucinations stated "I'm fine." returned to room after meal. Offered no complaints. No behaviors noted.

## 2023-08-16 NOTE — NURSING NOTE
Patient at nursing station reporting anxiety. Patient states, "It came on suddenly because they are irritating me." Writer asked patient to clarify who is "they" and stated, "you know, them up there." Writer offered PRN medication for agitation but patient adamant about getting PRN medication for anxiety. Patient did not appear in any distress, no sob noted and was observed with headphones listening to music and singing out loud. 1831 - PRN atarax 100 mg administered for patient reported anxiety. HAM score 25.    1931 - Patient out in milieu with head phones on pacing about. . Continues to endorse AH and are bothersome.

## 2023-08-16 NOTE — TREATMENT PLAN
TREATMENT PLAN REVIEW - [address] 26 y.o. 2000 male MRN: [id number]    [address] [facility] Room / Bed: [place] [address]/[facility] 133-45 Encounter: [id number]        Admit Date/Time:  5/18/2023  2:45 PM    Treatment Team: Attending Provider: Adilia Thakkar MD; Consulting Physician: Lory Seymour MD; : Sathish Teixeira, PhD; : Elvia Taylor; Resident: Jona Hill DO; Nurse Practitioner: ELA Burgos; Consulting Physician: Reddy Hunt DPM; Licensed Practical Nurse: Vida Price LPN; Care Manager: Matt Pabon; Patient Care Assistant: Elisabeth Mckeon;  Registered Nurse: Carrington Trinh RN    Diagnosis: Principal Problem:    Schizoaffective disorder ([address])  Active Problems:    Legally blind    Hearing loss    Asthma    Medical clearance for psychiatric admission    Prolonged erection    Intellectual disability    Rib pain on left side      Patient Strengths/Assets: compliant with medication, good physical health, negotiates basic needs, patient is on a voluntary commitment      Patient Barriers/Limitations: chronic mental illness, limited insight, poor self-care    Short Term Goals: decrease in psychotic symptoms, decrease in frequency of self abusive behaviors, decrease in level of agitation, decrease in frequency of aggressive outbursts, improvement in ability to express basic needs, improvement in self care, improvement in impulse control, sleep improvement, tolerate medications, increase in group attendance, increase in group participation, increase in socialization with peers on the unit    Long Term Goals: no self abusive behavior, resolution of psychotic symptoms, improved impulse control, no agitation on the unit, no aggressive behavior on the unit, able to express basic needs, adequate sleep, adequate appetite, appropriate interaction with peers    Progress Towards Goals: continue psychiatric medications as prescribed    Recommended Treatment: medication management, patient medication education, group therapy, milieu therapy, continued Behavioral Health psychiatric evaluation/assessment process     Treatment Frequency: daily medication monitoring, group and milieu therapy daily, monitoring through interdisciplinary rounds, monitoring through weekly patient care conferences    Expected Discharge Date: TBD    Discharge Plan: referral to Extended Acute Care unit for a long term psychiatric treatment    Treatment Plan Created/Updated By: Sarai Parks DO

## 2023-08-17 PROCEDURE — 99232 SBSQ HOSP IP/OBS MODERATE 35: CPT | Performed by: PSYCHIATRY & NEUROLOGY

## 2023-08-17 RX ADMIN — LOXAPINE 50 MG: 50 CAPSULE ORAL at 08:11

## 2023-08-17 RX ADMIN — MELATONIN TAB 3 MG 3 MG: 3 TAB at 21:05

## 2023-08-17 RX ADMIN — DIVALPROEX SODIUM 750 MG: 500 TABLET, DELAYED RELEASE ORAL at 08:11

## 2023-08-17 RX ADMIN — SENNOSIDES AND DOCUSATE SODIUM 2 TABLET: 8.6; 5 TABLET ORAL at 17:25

## 2023-08-17 RX ADMIN — CLOZAPINE 100 MG: 100 TABLET ORAL at 17:25

## 2023-08-17 RX ADMIN — GLYCOPYRROLATE 1 MG: 1 TABLET ORAL at 08:11

## 2023-08-17 RX ADMIN — LOXAPINE 50 MG: 50 CAPSULE ORAL at 16:55

## 2023-08-17 RX ADMIN — OLANZAPINE 5 MG: 5 TABLET, FILM COATED ORAL at 18:40

## 2023-08-17 RX ADMIN — LOXAPINE 80 MG: 25 CAPSULE ORAL at 21:04

## 2023-08-17 RX ADMIN — POLYETHYLENE GLYCOL 3350 17 G: 17 POWDER, FOR SOLUTION ORAL at 17:27

## 2023-08-17 RX ADMIN — DIVALPROEX SODIUM 750 MG: 500 TABLET, DELAYED RELEASE ORAL at 18:40

## 2023-08-17 RX ADMIN — SENNOSIDES AND DOCUSATE SODIUM 2 TABLET: 8.6; 5 TABLET ORAL at 08:11

## 2023-08-17 RX ADMIN — GLYCOPYRROLATE 1 MG: 1 TABLET ORAL at 16:55

## 2023-08-17 RX ADMIN — GLYCOPYRROLATE 1 MG: 1 TABLET ORAL at 21:05

## 2023-08-17 RX ADMIN — LORAZEPAM 1 MG: 2 INJECTION INTRAMUSCULAR; INTRAVENOUS at 21:40

## 2023-08-17 NOTE — NURSING NOTE
Pt reported he felt as if someone was flicking him in the ear while he was watching TV alone in the small tv room. Pt appeared slightly bothered. Pt had been observed on multiple earlier occasions to be responding to internal stimuli while visible on the milieu. Pt was given PRN ZYPREXA 5mg PO for moderate agitation. Will continue to monitor.

## 2023-08-17 NOTE — SOCIAL WORK
Pt asked pt about d/c plan for him and when he will be discharged. SW reminded pt of plan for him to go to [facility] and progress towards acceptance. Pt stated he would like to go home rather than go to [facility]. [facility] advised tx team will discuss further with pt as his medication is titrated.

## 2023-08-17 NOTE — NURSING NOTE
PRN PO zyprexa 5mg was effective. Patient reports decreased agitation. Although, the tactile hallucinations are still there, but not as bad.

## 2023-08-17 NOTE — PROGRESS NOTES
08/17/23 0842   Team Meeting   Meeting Type Daily Rounds   Team Members Present   Team Members Present Physician;Nurse;   Physician Team Member [id number] Wendie   Patient/Family Present   Patient Present No   Patient's Family Present No   Pt denying AH during the day yesterday. Around 1830, c/o anxiety d/t AH and received PRN Atarax. Later c/o severe agitation and received PRN Zyprexa. Remains agitated and received PRN Haldol. Discharge pending EAC.

## 2023-08-17 NOTE — NURSING NOTE
Patient visible in the milieu at the start of the shift. Sitting at the table in the dayroom falling asleep while waiting for breakfast. Became seclusive after eating breakfast. Pleasant. Cooperative. Denies AH. No complaints.

## 2023-08-17 NOTE — PROGRESS NOTES
Progress Note - Behavioral Health   Gokul Dong Day 26 y.o. male MRN: [id number]  Unit/Bed#: [facility] 343-01 Encounter: [id number]    Assessment/Plan   Principal Problem:    Schizoaffective disorder ([address])  Active Problems:    Legally blind    Hearing loss    Asthma    Medical clearance for psychiatric admission    Prolonged erection    Intellectual disability    Rib pain on left side      Recommended Treatment:   1. Taper Loxapine PM to 80 mg HS for psychosis. Continue Loxapine 50 mg AM, 50 mg in the afternoon  2. Continue Clozaril to 100 mg HS for psychosis, consider titration tomorrow  3. Weekly CBC on mondays to monitor WBC and ANC for agranulocytosis (WBC 9.96 and ANC 3.35 on 8/14/2023)  4. Continue Divalproex Sodium 750 mg twice daily for mood and stabilization  5. Continue Senakot 50 mg twice daily for constipation,   6. Continue Robinul 1 mg 3 times daily for drooling  7. Continue Melatonin 3mg PM as needed insomnia   8. Continue to encourage group participation  9. Continue SLIM medical recommendations  10. Continue to await placement into EAC    Risks, benefits and possible side effects of Medications: Risks, benefits, and possible side effects of medications have been explained to the patient, who verbalizes understanding    Current Legal Status: 201  Disposition:  Coordinating with case management, [facility]  ------------------------------------------------------------    Subjective: Per nursing report, yesterday evening Gokul was observed socializing in the day room with other peers. He ate evening meals. He spoke with his grandmother on the phone and appeared bright. Denied SI, HI, A/V hallucinations. He returned to room after meal. Offered no complaints. Around 1831 PRN atarax 100 mg was administered for anxiety.  At the nursing station he had auditory hallucinations, he stated "It came on suddenly because they are irritating me." Later in the evening, he returned to the nursing station again because of severe agitation. He was given PRN zyrexa 7.5 mg PO. He still complained of AH and VH. Dr. Joe Spear, the on call psychiatrist was contacted and made aware that Gokul was hitting himself on the head and swatting the air around him. At 2139, a one time dose of Haldol 5 mg IM was administered for severe agitation due to [facility] and VH. About an hour later, Gokul said that "its starting to take effect". He was observed sitting in dayroom watching television afterwards    Today, Gokul was seen and evaluated alongside attending physician. On evaluation, patient is resting in bed. He is cooperative and easily relocates to meeting room for interview. He reports feeling "okay" this morning. Patient states he heard the first voice around 7 PM yesterday evening. States the voice were "talking about me and saying mean things". Gokul reported feeling better yesterday after he took the "shot". He states his energy is "fine". He has no depression and rates his anxiety a "7-8/10". He slept around "11 PM" and it was "soundfully". He slept for eight hours. He woke up for breakfast this morning and was seen by this writer in the common area briefly. He has a good appetite. He states that he has not had bowel movement for "two days". He denies constipation, headache, eye blurriness or eye pain. He denies excessive drooling on his pillow or dry mouth. He reports no testicular pain or priapism. He denies muscle rigidity. Gokul attended groups yesterday and spoke to his grandma and sister on the phone. He says the groups were "awesome" and that the phone call with his family was "good". He plans on calling them again today and attending afternoon groups. His goal for today is to "keep a positive energy". Later in the afternoon, around 2:30 PM Gokul still did not have a bowel movement. Gokul agrees to taper the loxapine antipsychotic today.      Gokul denies current SI/HI/AVH at the time of interview and denies any plan or intent to harm herself or others and contracts for safety in the hospital.       Progress Toward Goals: slow improvement    Psychiatric Review of Systems:  Behavior over the last 24 hours: unchanged  Sleep: hypersomnia and difficulty falling asleep  Appetite: adequate  Medication side effects: none verbalized  ROS: Complete review of systems is negative except as noted above.     Vital signs in last 24 hours:   Temp:  [97.4 °F (36.3 °C)-98.2 °F (36.8 °C)] 97.4 °F (36.3 °C)  HR:  [105-122] 105  Resp:  [16] 16  BP: (125-128)/(72-76) 128/76    Mental Status Exam:  Appearance:  Overtly Appearing dark skinned  male, drowsy, minimal eye contact, appears older than stated age, casually dressed in navy t shirt and black joggers, marginal grooming/hygiene and obese   Behavior:  calm, cooperative and less guarded   Motor: no abnormal movements and normal gait and balance   Speech:  slow, soft, scant and coherent   Mood:  "okay"   Affect:  blunted and reactive to a joke at the end of interview, seen smiling   Thought Process:  poverty of thought   Thought Content: no verbalized delusions or overt paranoia   Perceptual disturbances: denies current hallucinations,   auditory hallucinations reported at 6-7 PM last night  appears to be responding to internal stimuli      Risk Potential: No active or passive suicidal or homicidal ideation was verbalized during interview   Cognition: oriented to self and situation, appears to be below average intelligence and cognition not formally tested   Insight:  Limited   Judgment: Limited     Current Medications:  Current Facility-Administered Medications   Medication Dose Route Frequency Provider Last Rate   • acetaminophen  650 mg Oral Q6H SULEMA Montero MD     • acetaminophen  650 mg Oral Q4H SULEMA Montero MD     • acetaminophen  975 mg Oral Q6H SULEMA Montero MD     • albuterol  2 puff Inhalation Q4H SULEMA Montero MD     • aluminum-magnesium hydroxide-simethicone  30 mL Oral Q4H PRN Faith Rosenthal MD     • benztropine  1 mg Intramuscular BID PRN Emma Estrada MD     • benztropine  1 mg Oral Q4H PRN Max 6/day Faith Rosenthal MD     • cloZAPine  100 mg Oral Daily Shoaib Bellow, DO     • divalproex sodium  750 mg Oral BID Jose Antonio Putnam MD     • fluticasone  1 spray Nasal Daily PRN Connie Brooks PA-C     • glycopyrrolate  1 mg Oral TID Shoaibne Bellow, DO     • hydrOXYzine HCL  100 mg Oral Q6H PRN Max 4/day Rolin Callow, DO      Or   • LORazepam  1 mg Intramuscular Q6H PRN Rolin Callow, DO     • hydrOXYzine HCL  25 mg Oral Q6H PRN Max 4/day Faith Rosenthal MD     • hydrOXYzine HCL  50 mg Oral Q6H PRN Max 4/day Faith Rosenthal MD     • loxapine  100 mg Oral QPM Shoaibne Bellow, DO     • loxapine  50 mg Oral BID Shoaibne Bellow, DO     • melatonin  3 mg Oral HS PRN Rolin Callow, DO     • nicotine polacrilex  4 mg Oral Q2H PRN Faith Rosenthal MD     • OLANZapine  5 mg Oral Q3H PRN Max 6/day Jose Antonio Putnam MD      Or   • OLANZapine  5 mg Intramuscular Q3H PRN Max 6/day Jose Antonio Putnam MD     • OLANZapine  5 mg Intramuscular Q8H PRN Rolin Callow, DO      Or   • OLANZapine  7.5 mg Oral Q8H PRN Rolin Callow, DO     • OLANZapine  2.5 mg Oral Q3H PRN Max 8/day Jose Antonio Putnam MD     • ondansetron  4 mg Oral Q8H PRN Rolin Callow, DO     • polyethylene glycol  17 g Oral Daily PRN Faith Rosenthal MD     • pseudoephedrine  120 mg Oral BID PRN Duane Aguas, MD     • senna-docusate sodium  1 tablet Oral Daily PRN Faith Rosenthal MD     • senna-docusate sodium  2 tablet Oral BID Jose Antonio Putnam MD     • sterile water          • sterile water          • sterile water          • sterile water          • sterile water          • sterile water          • sterile water          • sterile water          • sterile water          • sterile water          • sterile water              Behavioral Health Medications: all current active meds have been reviewed. Changes as in plan section above. Laboratory results:  I have personally reviewed all pertinent laboratory/tests results. No results found for this or any previous visit (from the past 48 hour(s)).      Kristi Murray  MS IV

## 2023-08-17 NOTE — PROGRESS NOTES
08/17/23 1514   Team Meeting   Meeting Type Tx Team Meeting   Initial Conference Date 08/17/23   Team Members Present   Team Members Present Physician;Nurse;   Physician Team Member Dionicio Samuel Final Team Member Higinio   Care Management Team Member Wendie   Patient/Family Present   Patient Present Yes   Patient's Family Present No     90 day Tx plan was reviewed and discussed with Pt. Pt was encouraged to attend groups. Medication was discussed with Pt. Pt signed tx plan.

## 2023-08-18 PROCEDURE — 99232 SBSQ HOSP IP/OBS MODERATE 35: CPT | Performed by: PSYCHIATRY & NEUROLOGY

## 2023-08-18 RX ORDER — LOXAPINE SUCCINATE 50 MG/1
50 TABLET ORAL EVERY EVENING
Status: DISCONTINUED | OUTPATIENT
Start: 2023-08-18 | End: 2023-08-21

## 2023-08-18 RX ADMIN — MELATONIN TAB 3 MG 3 MG: 3 TAB at 02:30

## 2023-08-18 RX ADMIN — HYDROXYZINE HYDROCHLORIDE 100 MG: 50 TABLET, FILM COATED ORAL at 23:01

## 2023-08-18 RX ADMIN — HYDROXYZINE HYDROCHLORIDE 100 MG: 50 TABLET, FILM COATED ORAL at 16:31

## 2023-08-18 RX ADMIN — DIVALPROEX SODIUM 750 MG: 500 TABLET, DELAYED RELEASE ORAL at 18:11

## 2023-08-18 RX ADMIN — SENNOSIDES AND DOCUSATE SODIUM 2 TABLET: 8.6; 5 TABLET ORAL at 08:03

## 2023-08-18 RX ADMIN — OLANZAPINE 7.5 MG: 2.5 TABLET, FILM COATED ORAL at 02:30

## 2023-08-18 RX ADMIN — LOXAPINE 50 MG: 50 CAPSULE ORAL at 16:57

## 2023-08-18 RX ADMIN — DIVALPROEX SODIUM 750 MG: 500 TABLET, DELAYED RELEASE ORAL at 08:04

## 2023-08-18 RX ADMIN — LOXAPINE 50 MG: 50 CAPSULE ORAL at 08:04

## 2023-08-18 RX ADMIN — GLYCOPYRROLATE 1 MG: 1 TABLET ORAL at 21:11

## 2023-08-18 RX ADMIN — OLANZAPINE 7.5 MG: 2.5 TABLET, FILM COATED ORAL at 21:19

## 2023-08-18 RX ADMIN — GLYCOPYRROLATE 1 MG: 1 TABLET ORAL at 16:57

## 2023-08-18 RX ADMIN — ACETAMINOPHEN 975 MG: 325 TABLET ORAL at 20:35

## 2023-08-18 RX ADMIN — NICOTINE POLACRILEX 4 MG: 4 GUM, CHEWING BUCCAL at 17:00

## 2023-08-18 RX ADMIN — LOXAPINE 50 MG: 50 CAPSULE ORAL at 21:11

## 2023-08-18 RX ADMIN — GLYCOPYRROLATE 1 MG: 1 TABLET ORAL at 08:03

## 2023-08-18 RX ADMIN — OLANZAPINE 5 MG: 5 TABLET, FILM COATED ORAL at 16:57

## 2023-08-18 RX ADMIN — CLOZAPINE 125 MG: 100 TABLET ORAL at 17:01

## 2023-08-18 RX ADMIN — MELATONIN TAB 3 MG 3 MG: 3 TAB at 23:02

## 2023-08-18 RX ADMIN — SENNOSIDES AND DOCUSATE SODIUM 2 TABLET: 8.6; 5 TABLET ORAL at 17:01

## 2023-08-18 NOTE — PROGRESS NOTES
Psychiatric Progress Note - Department of [facility] [address] 26 y.o. male MRN: [id number]  Unit/Bed#: [facility] 343-01 Encounter: [id number]    ASSESSMENT & PLAN     Principal Problem:    Schizoaffective disorder ([address])  Active Problems:    Legally blind    Hearing loss    Asthma    Medical clearance for psychiatric admission    Prolonged erection    Intellectual disability    Rib pain on left side    • Continue to promote patient participation in therapeutic milieu. • Continue medical management per medicine. • Continue previously prescribed psychotropic medication regimen; see below. o Continue cross taper Loxipine to 50 mg b.i.d. and 80 mg to 50 mg q.p.m. Raf Negus in lieu of increase in Clozaril 100 mg to 125 mg q.d. to confer mood stability in addition to addressing psychosis. • Continue behavioral health checks q.7 minutes. • Continue vitals per behavioral health unit protocol. • Discharge date per primary team; 201 commitment status. SUBJECTIVE     Patient evaluated this a.m. for continuity of care. Patient was discussed at length with nursing and treatment team. Per nursing, patient remains predominantly calm, cooperative, although agitated at times requiring use of p.r.n. Zyprexa and Ativan, isolative in the milieu, appearing responsive to internal stimuli in said milieu. No acute distress is noted throughout evaluation. Mere Jarquin denies suicidal/homicidal ideation; contracts for safety. Patient is scant, sparse and minimally interactive involving this writer, denying any/all complaints/concerns, appearing suspicious, superficial, paranoid, internally preoccupied and distracted.      PSYCHIATRIC REVIEW OF SYSTEMS     Behavior over the last 24 hours:  unchanged  Sleep: adequate  Appetite: adequate  Medication side effects: No    REVIEW OF SYSTEMS   Review of systems: no complaints    OBJECTIVE     Vital Signs in Past 24 Hours:  Temp:  [97.2 °F (36.2 °C)] 97.2 °F (36.2 °C)  HR:  [106] 106  Resp:  [18] 18  BP: (132)/(63) 132/63    Intake/Output in Past 24 hours:  No intake/output data recorded. I/O this shift: In: 480 [P.O.:480]  Out: -         Laboratory Results:    I have personally reviewed all pertinent laboratory/tests results. Most Recent Labs:   Lab Results   Component Value Date    WBC 9.96 08/14/2023    RBC 4.68 08/14/2023    HGB 14.6 08/14/2023    HCT 43.5 08/14/2023     08/14/2023    RDW 12.9 08/14/2023    TOTANEUTABS 4.84 06/08/2023    NEUTROABS 3.35 08/14/2023    SODIUM 140 08/04/2023    K 4.4 08/04/2023     08/04/2023    CO2 31 08/04/2023    BUN 16 08/04/2023    CREATININE 1.26 08/04/2023    GLUC 120 08/04/2023    GLUF 120 (H) 08/04/2023    CALCIUM 9.3 08/04/2023    AST 16 07/04/2023    ALT 7 07/04/2023    ALKPHOS 42 07/04/2023    TP 7.0 07/04/2023    ALB 3.9 07/04/2023    TBILI 0.45 07/04/2023    CHOLESTEROL 140 05/19/2023    HDL 59 05/19/2023    TRIG 106 05/19/2023    LDLCALC 60 05/19/2023    NONHDLC 81 05/19/2023    VALPROICTOT 91 06/12/2023    AMMONIA 22 10/09/2022    UWW3TXLCCAUR 1.841 05/19/2023    FREET4 1.16 10/09/2022    HGBA1C 5.2 06/16/2022     06/16/2022     Labs in last 72 hours: No results for input(s): "WBC", "RBC", "HGB", "HCT", "PLT", "RDW", "TOTANEUTABS", "NEUTROABS", "SODIUM", "K", "CL", "CO2", "BUN", "CREATININE", "GLUC", "GLUF", "CALCIUM", "AST", "ALT", "ALKPHOS", "TP", "ALB", "TBILI", "CHOLESTEROL", "HDL", "TRIG", "LDLCALC", "VALPROICTOT", "CARBAMAZEPIN", "LITHIUM", "AMMONIA", "[id number]", "Kai Romp", "T3FREE", "PREGTESTUR", "PREGSERUM", "HCG", "HCGQUANT", "RPR" in the last 72 hours. Admission Labs:   No results displayed because visit has over 200 results.         Clozapine: No results found for: "CLOZAPINE", "NCLOZIP"    Behavioral Health Medications:   all current active meds have been reviewed, continue current psychiatric medications, current meds:   Current Facility-Administered Medications   Medication Dose Route Frequency   • acetaminophen (TYLENOL) tablet 650 mg  650 mg Oral Q6H PRN   • acetaminophen (TYLENOL) tablet 650 mg  650 mg Oral Q4H PRN   • acetaminophen (TYLENOL) tablet 975 mg  975 mg Oral Q6H PRN   • albuterol (PROVENTIL HFA,VENTOLIN HFA) inhaler 2 puff  2 puff Inhalation Q4H PRN   • aluminum-magnesium hydroxide-simethicone (MYLANTA) oral suspension 30 mL  30 mL Oral Q4H PRN   • benztropine (COGENTIN) injection 1 mg  1 mg Intramuscular BID PRN   • benztropine (COGENTIN) tablet 1 mg  1 mg Oral Q4H PRN Max 6/day   • cloZAPine (CLOZARIL) tablet 125 mg  125 mg Oral Daily   • divalproex sodium (DEPAKOTE) DR tablet 750 mg  750 mg Oral BID   • fluticasone (FLONASE) 50 mcg/act nasal spray 1 spray  1 spray Nasal Daily PRN   • glycopyrrolate (ROBINUL) tablet 1 mg  1 mg Oral TID   • hydrOXYzine HCL (ATARAX) tablet 100 mg  100 mg Oral Q6H PRN Max 4/day    Or   • LORazepam (ATIVAN) injection 1 mg  1 mg Intramuscular Q6H PRN   • hydrOXYzine HCL (ATARAX) tablet 25 mg  25 mg Oral Q6H PRN Max 4/day   • hydrOXYzine HCL (ATARAX) tablet 50 mg  50 mg Oral Q6H PRN Max 4/day   • loxapine (LOXITANE) capsule 50 mg  50 mg Oral BID   • loxapine (LOXITANE) capsule 50 mg  50 mg Oral QPM   • melatonin tablet 3 mg  3 mg Oral HS PRN   • nicotine polacrilex (NICORETTE) gum 4 mg  4 mg Oral Q2H PRN   • OLANZapine (ZyPREXA) tablet 5 mg  5 mg Oral Q3H PRN Max 6/day    Or   • OLANZapine (ZyPREXA) IM injection 5 mg  5 mg Intramuscular Q3H PRN Max 6/day   • OLANZapine (ZyPREXA) IM injection 5 mg  5 mg Intramuscular Q8H PRN    Or   • OLANZapine (ZyPREXA) tablet 7.5 mg  7.5 mg Oral Q8H PRN   • OLANZapine (ZyPREXA) tablet 2.5 mg  2.5 mg Oral Q3H PRN Max 8/day   • ondansetron (ZOFRAN-ODT) dispersible tablet 4 mg  4 mg Oral Q8H PRN   • polyethylene glycol (MIRALAX) packet 17 g  17 g Oral Daily PRN   • pseudoephedrine (SUDAFED) tablet 120 mg  120 mg Oral BID PRN   • senna-docusate sodium (SENOKOT S) 8.6-50 mg per tablet 1 tablet  1 tablet Oral Daily PRN   • senna-docusate sodium (SENOKOT S) 8.6-50 mg per tablet 2 tablet  2 tablet Oral BID   • sterile water injection **ADS Override Pull**       • sterile water injection **ADS Override Pull**       • sterile water injection **ADS Override Pull**       • sterile water injection **ADS Override Pull**       • sterile water injection **ADS Override Pull**       • sterile water injection **ADS Override Pull**       • sterile water injection **ADS Override Pull**       • sterile water injection **ADS Override Pull**       • sterile water injection **ADS Override Pull**       • sterile water injection **ADS Override Pull**       • sterile water injection **ADS Override Pull**            Current Facility-Administered Medications   Medication Dose Route Frequency Provider Last Rate   • acetaminophen  650 mg Oral Q6H PRN Yan Hernandez MD     • acetaminophen  650 mg Oral Q4H PRN Yan Hernandez MD     • acetaminophen  975 mg Oral Q6H PRN Yan Hernandez MD     • albuterol  2 puff Inhalation Q4H PRN Yan Hernandez MD     • aluminum-magnesium hydroxide-simethicone  30 mL Oral Q4H PRN Yan Hernandez MD     • benztropine  1 mg Intramuscular BID PRN Lucy Paul MD     • benztropine  1 mg Oral Q4H PRN Max 6/day Yan Hernandez MD     • cloZAPine  100 mg Oral Daily Hedda Schaumann, DO     • divalproex sodium  750 mg Oral BID Niraj Cruz MD     • fluticasone  1 spray Nasal Daily PRN Main Duffy PA-C     • glycopyrrolate  1 mg Oral TID Hedda Schaumann, DO     • hydrOXYzine HCL  100 mg Oral Q6H PRN Max 4/day Wendy Boston, DO      Or   • LORazepam  1 mg Intramuscular Q6H PRN Wendy Sabael, DO     • hydrOXYzine HCL  25 mg Oral Q6H PRN Max 4/day Yan Hernandez MD     • hydrOXYzine HCL  50 mg Oral Q6H PRN Max 4/day Yan Hernandez MD     • loxapine  50 mg Oral BID Hedda Schaumann, DO     • loxapine  80 mg Oral QPM Albina Littlejohn DO     • melatonin  3 mg Oral HS PRN Wendy Boston, DO     • nicotine polacrilex  4 mg Oral Q2H PRN Shaw Sue MD     • OLANZapine  5 mg Oral Q3H PRN Max 6/day Dennis Samayoa MD      Or   • OLANZapine  5 mg Intramuscular Q3H PRN Max 6/day eDnnis Samayoa MD     • OLANZapine  5 mg Intramuscular Q8H PRN Jessie Betancourt, DO      Or   • OLANZapine  7.5 mg Oral Q8H PRN Jessie Betancourt, DO     • OLANZapine  2.5 mg Oral Q3H PRN Max 8/day Dennis Samayoa MD     • ondansetron  4 mg Oral Q8H PRN Jessie Betancourt, DO     • polyethylene glycol  17 g Oral Daily PRN Shaw Sue MD     • pseudoephedrine  120 mg Oral BID PRN Ly Stone MD     • senna-docusate sodium  1 tablet Oral Daily PRN Shaw Sue MD     • senna-docusate sodium  2 tablet Oral BID Dennis Samayoa MD     • sterile water          • sterile water          • sterile water          • sterile water          • sterile water          • sterile water          • sterile water          • sterile water          • sterile water          • sterile water          • sterile water              Risks, benefits and possible side effects of Medications:   Risks, benefits, and possible side effects of medications explained to patient and patient verbalizes understanding.       Dual Antipsychotic Rationale: not applicable     Mental Status Evaluation:  Appearance:  age appropriate, casually dressed, disheveled and marginal grooming/hygiene   Behavior:  psychomotor retardation, calm, minimally interactive possessing poor eye contact   Speech:  soft and scant   Mood:  euthymic; "fine"   Affect:  blunted   Language sparse   Thought Process:  poverty of thought   Thought Content:  no overt obsessions or delusions, appears paranoid   Perceptual Disturbances: None appearing internally preoccupied and distracted   Risk Potential: Suicidal Ideations none, Homicidal Ideations none and Potential for Aggression Yes previous instances of agitation/aggression   Sensorium:  person, place and situation   Cognition:  recent and remote memory grossly intact   Consciousness:  alert and awake    Attention: attention span appeared shorter than expected for age   Insight:  limited   Judgment: limited   Intellect    Gait/Station: not assessed; sitting in bed   Motor Activity: no abnormal movements     Memory: Short and long term memory  fair     Progress Toward Goals: unchanged, as evidenced by their participation (or lack thereof) in individual, social and therapeutic milieu in addition to adherence to their medication regimen. Recommended Treatment:   See above for assessment and plan. Inpatient Psychiatric Certification: Estimated length of stay: More than 2 midnights. Counseling/Coordination of Care    Total unit time spent today ws greater than 15 minutes. Greater than 50% of total time was spent with the patient and/or patient's relatives and/or coordination of patient's care. Patient's rights, confidentiality, exceptions to confidentiality, use of electronic medical record including appropriate staff access to medical record regarding behavioral health services and consent to treatment were reviewed. Note Share: This note was not shared with the patient due to reasonable likelihood of causing patient harm     This note has been constructed using a voice recognition system. There may be translation, syntax, or grammatical errors. If you have any questions, please contact the dictating provider.     [address] Hol[place], DO

## 2023-08-18 NOTE — PROGRESS NOTES
08/18/23 0846   Team Meeting   Meeting Type Daily Rounds   Team Members Present   Team Members Present Physician;Nurse;   [facility]   [facility] Wendie   Patient/Family Present   Patient Present No   Patient's Family Present No     Pt med/meal compliant. Responding to internal stimuli yesterday. PRN IM Ativan. Overnight c/o agitation and received PRN medication. Discharge to be determined.

## 2023-08-18 NOTE — NURSING NOTE
Pt approached nurses station asking for PRN for severe anxiety. PRN Atarax 100mg administered at 1631. Pt came back to nurses station again with c/o bothersome AH of angry voices, stating "theyre getting to me" and reporting agitation. PRN Zyprexa 5mg PO administered at 1657.    1800: PRN Zyprexa and PRN Atarax somewhat effective, per patient. Pt calmly resting in bed at this time, listening to music. Q7 minute safety checks maintained.

## 2023-08-18 NOTE — NURSING NOTE
Pt c/o severe agitation 7/10 and insomnia, Pt given PRN Zyprexa 7.5mg and Melatonin 3mg, Will reassess and monitor    Pt reassessed at 0330 and pt was sleeping, PRNs effective

## 2023-08-18 NOTE — NURSING NOTE
Patient is in and out of the community. Patient responds loudly to internal stimuli at times. Patient requests medications to go to sleep. Scheduled medications were administered at 2105 and patient was compliant. PRN melatonin 3mg was administered. At 2105 and ineffective. After 40 mins after HS medications were administered, Patient started yelling, "I'm going to punch you in the face." Patient reported the voices are really bad and saying bad things about me." PRN IM ativan 1mg was administered at 2140 in left deltoid. Will monitor for effectiveness.

## 2023-08-18 NOTE — NURSING NOTE
Sitting in the dayroom falling asleep at the start of the shift. Ate breakfast then went back to bed where he has been ever since. Compliant with medications. Denies symptoms at this time.

## 2023-08-18 NOTE — SOCIAL WORK
SW provided update on Pt to [facility] at their request. SW advised of cross taper to Clozapine.

## 2023-08-18 NOTE — SOCIAL WORK
Pt remains isolative to his room, sleeping for most of the day. When approached by SW, pt denies any CM related needs. Pt verbalized understanding of plan to continue with EAC placement.

## 2023-08-18 NOTE — NURSING NOTE
PRN IM ativan 1mg was effective. Patient is much calmer and not responding loudly to internal stimuli. Patient is watching TV quietly.

## 2023-08-19 PROCEDURE — 99232 SBSQ HOSP IP/OBS MODERATE 35: CPT | Performed by: PSYCHIATRY & NEUROLOGY

## 2023-08-19 RX ADMIN — LOXAPINE 50 MG: 50 CAPSULE ORAL at 21:02

## 2023-08-19 RX ADMIN — HYDROXYZINE HYDROCHLORIDE 100 MG: 50 TABLET, FILM COATED ORAL at 14:52

## 2023-08-19 RX ADMIN — HYDROXYZINE HYDROCHLORIDE 100 MG: 50 TABLET, FILM COATED ORAL at 22:41

## 2023-08-19 RX ADMIN — LOXAPINE 50 MG: 50 CAPSULE ORAL at 15:32

## 2023-08-19 RX ADMIN — DIVALPROEX SODIUM 750 MG: 500 TABLET, DELAYED RELEASE ORAL at 08:53

## 2023-08-19 RX ADMIN — SENNOSIDES AND DOCUSATE SODIUM 2 TABLET: 8.6; 5 TABLET ORAL at 08:55

## 2023-08-19 RX ADMIN — GLYCOPYRROLATE 1 MG: 1 TABLET ORAL at 15:32

## 2023-08-19 RX ADMIN — OLANZAPINE 7.5 MG: 2.5 TABLET, FILM COATED ORAL at 22:42

## 2023-08-19 RX ADMIN — ACETAMINOPHEN 975 MG: 325 TABLET ORAL at 14:52

## 2023-08-19 RX ADMIN — SENNOSIDES AND DOCUSATE SODIUM 1 TABLET: 50; 8.6 TABLET ORAL at 08:53

## 2023-08-19 RX ADMIN — SENNOSIDES AND DOCUSATE SODIUM 2 TABLET: 8.6; 5 TABLET ORAL at 17:13

## 2023-08-19 RX ADMIN — DIVALPROEX SODIUM 750 MG: 500 TABLET, DELAYED RELEASE ORAL at 18:17

## 2023-08-19 RX ADMIN — LOXAPINE 50 MG: 50 CAPSULE ORAL at 08:56

## 2023-08-19 RX ADMIN — GLYCOPYRROLATE 1 MG: 1 TABLET ORAL at 21:02

## 2023-08-19 RX ADMIN — GLYCOPYRROLATE 1 MG: 1 TABLET ORAL at 08:53

## 2023-08-19 RX ADMIN — CLOZAPINE 125 MG: 100 TABLET ORAL at 17:13

## 2023-08-19 NOTE — PROGRESS NOTES
Progress Note - Behavioral Health   Gokul Stockton Hams 26 y.o. male MRN: [id number]  Unit/Bed#: [facility] 343-01 Encounter: [id number]    Assessment/Plan   Principal Problem:    Schizoaffective disorder ([address])  Active Problems:    Legally blind    Hearing loss    Asthma    Medical clearance for psychiatric admission    Prolonged erection    Intellectual disability    Rib pain on left side      Recommended Treatment:     1. No psychopharmacologic changes necessary at this time; will continue to assess for further optimization. 2. Continue with current medications:  a. Loxapine 50 mg twice daily, 50 mg at bedtime for psychosis. b. Clozaril 125 mg daily for psychosis and mood symptoms. i. Senokot 2 tablets twice daily for standing bowel regimen. ii. Robinul 1 mg 3 times daily for sialorrhea.  c. Depakote 750 mg twice daily for mood stabilization. 3. Continue with group therapy, milieu therapy and occupational therapy. 4. Continue frequent safety checks and vitals per unit protocol. 5. Continue with SLIM medical management as indicated  6. Continue coordinating with case management regarding disposition    Legal Status: 201  Disposition: To be determined, coordinating with case management, likely EAC    Case discussed with treatment team.  Risks, benefits and possible side effects of Medications: Risks, benefits, and possible side effects of medications have previously been explained. No new medications at this time.     ------------------------------------------------------------    Subjective: Patient's chart was reviewed, and patient's progress and plan was discussed with treatment team. Per nursing report, Gokul has been mostly isolative to his room, sleeping most of the day, then approaching the nursing station in the afternoon reporting anxiety and receiving as needed Atarax 100 mg which was effective but then the patient returned complaining of auditory hallucinations of angry voices and receiving as needed Zyprexa 5 mg p.o. which was effective, the patient was then later seen approaching nursing staff stating he was very angry at which time he received Zyprexa 7.5 mg p.o. as needed which was ineffective but he was ultimately cooperative on the unit and compliant with medications. Overnight, the patient continued to report bothersome voices with difficulty falling asleep at which time he received as needed Atarax 100 mg and melatonin 3 mg p.o. which was effective. The patient was able to retire to his room for the rest of the evening without incident. Gokul was evaluated this morning for continuity of care. On examination, Gokul is drowsy, laying in bed, initially snoring but able to be awakened, he is dressed in hospital attire and falls asleep throughout the interview requiring multiple redirections. He states his mood is " bad." He does not report on his sleep or energy level but does appear significantly sedated. His appetite has been good and he endorses eating dinner last evening without difficulty. He continues to endorse sialorrhea secondary to Clozaril but states the Robinul seems to be helping and denies any other medication side effects. His goals for today are continue catching up on rest and remain in behavioral control. Today, Gokul is yenifer for safety on the unit. He denies suicidal ideation, homicidal ideation and visual hallucinations but endorses significant auditory hallucinations earlier this morning, though he states they are not as bad currently. He currently does not want as needed medication for the voices and would like to continue sleeping. He has no questions, comments, or concerns at this time.     VS: Reviewed, within normal limits    Progress Toward Goals: Slow improvement    Psychiatric Review of Systems:  Behavior over the last 24 hours: unchanged  Sleep: hypersomnia  Appetite: adequate  Medication side effects: Sialorrhea and sedation  Medical ROS: Complete review of systems is negative except as noted above.     Vital signs in last 24 hours:  Temp:  [97.5 °F (36.4 °C)] 97.5 °F (36.4 °C)  HR:  [118] 118  Resp:  [16] 16  BP: (138)/(77) 138/77    Mental Status Exam:    Appearance:  drowsy, Minimal to no eye contact, appears older than stated age, Hospital attire dressed, disheveled, overweight, Cropped dark curly hair and bearded   Behavior:  calm, limited cooperativity and laying in bed   Speech:  delayed initiation, dysarthric, soft and poverty of content   Mood:  "Bad"   Affect:  flat   Thought Process:  poverty of thought   Associations: unable to assess - does not answer   Thought Content:  no verbalized delusions or overt paranoia, apparent poverty of thought   Perceptual Disturbances: does not appear to be responding to internal stimuli at this time and Denies visual hallucinations but reports auditory hallucinations of mumbling voices, less intense than previously   Risk Potential: Suicidal ideation - None at present  Homicidal ideation - None at present  Potential for aggression - Yes, due to acute psychosis   Sensorium:  does not answer   Memory:  recent and remote memory: unable to assess due to lack of cooperation   Consciousness:  sedated   Attention/Concentration: attention span and concentration appear shorter than expected for age   Insight:  limited   Judgment: limited   Gait/Station: unable to assess   Motor Activity: no abnormal movements     Current Medications:  Current Facility-Administered Medications   Medication Dose Route Frequency Provider Last Rate   • acetaminophen  650 mg Oral Q6H PRN Taryn Sheehan MD     • acetaminophen  650 mg Oral Q4H PRN Taryn Sheehan MD     • acetaminophen  975 mg Oral Q6H PRN Taryn Sheehan MD     • albuterol  2 puff Inhalation Q4H PRN Taryn Sheehan MD     • aluminum-magnesium hydroxide-simethicone  30 mL Oral Q4H PRN Taryn Sheehan MD     • benztropine  1 mg Intramuscular BID PRN Melisa White MD     • benztropine  1 mg Oral Q4H PRN Max 6/day Prcindy Magaña MD     • cloZAPine  125 mg Oral Daily Georgia C Holter, DO     • divalproex sodium  750 mg Oral BID Sam Covarrubias MD     • fluticasone  1 spray Nasal Daily PRN Hellen Hawk PA-C     • glycopyrrolate  1 mg Oral TID Bimal Diver, DO     • hydrOXYzine HCL  100 mg Oral Q6H PRN Max 4/day Gerald Richards, DO      Or   • LORazepam  1 mg Intramuscular Q6H PRN Gerald Richards, DO     • hydrOXYzine HCL  25 mg Oral Q6H PRN Max 4/day Lona Magaña MD     • hydrOXYzine HCL  50 mg Oral Q6H PRN Max 4/day Lona Magaña MD     • loxapine  50 mg Oral BID Bimal Diver, DO     • loxapine  50 mg Oral QPM Dominic C Holter, DO     • melatonin  3 mg Oral HS PRN Gerald Richards, DO     • nicotine polacrilex  4 mg Oral Q2H PRN Lona Magaña MD     • OLANZapine  5 mg Oral Q3H PRN Max 6/day Sam Covarrubias MD      Or   • OLANZapine  5 mg Intramuscular Q3H PRN Max 6/day Sam Covarrubias MD     • OLANZapine  5 mg Intramuscular Q8H PRN Gerald Richards DO      Or   • OLANZapine  7.5 mg Oral Q8H PRN Gerald Richards, DO     • OLANZapine  2.5 mg Oral Q3H PRN Max 8/day Sam Covarrubias MD     • ondansetron  4 mg Oral Q8H PRN Gerald Richards DO     • polyethylene glycol  17 g Oral Daily PRN Lona Magaña MD     • pseudoephedrine  120 mg Oral BID PRN Deana Campuzano MD     • senna-docusate sodium  1 tablet Oral Daily PRN Lona Magaña MD     • senna-docusate sodium  2 tablet Oral BID Sam Covarrubias MD     • sterile water          • sterile water          • sterile water          • sterile water          • sterile water          • sterile water          • sterile water          • sterile water          • sterile water          • sterile water          • sterile water              Behavioral Health Medications: all current active meds have been reviewed. Changes as in plan section above.     Laboratory results:  I have personally reviewed all pertinent laboratory/tests results. No results found for this or any previous visit (from the past 48 hour(s)). This note has been constructed using a voice recognition system. There may be translation, syntax, or grammatical errors. If you have any questions, please contact the dictating author.     Vasu Darden DO  Psychiatry Residency, PGY-2

## 2023-08-19 NOTE — NURSING NOTE
1552 - Patient reports 5/10 pain after receiving PRN acetaminophen 975 mg PO for left ear pain     Patient denying anxiety and was observed with headphones on sitting quietly in dayroom.

## 2023-08-19 NOTE — NURSING NOTE
Patient approached nursing staff and asked if he could have medicine "for angriness." He was internally stimulated at the time and therefore he was offered and accepted Zyprexa 7.5mgs po prn.

## 2023-08-19 NOTE — PROGRESS NOTES
08/19/23 1300   Activity/Group Checklist   Group Other (Comment)  (OPEN STUDIO Art Therapy/Social Group)   Attendance Attended   Attendance Duration (min) 16-30   Interactions Interacted appropriately   Affect/Mood Appropriate   Goals Achieved Able to listen to others; Able to engage in interactions

## 2023-08-19 NOTE — NURSING NOTE
Patient sleeping most of shift and awakened by staff for meals. Appears sedated and drowsy. Denies SI, HI, and VH. Endorses AV that are irritating and states "bad voices" and requested PRN medication immediately after receiving scheduled medication. Patient fell asleep and remained sleeping until afternoon meal. Patient encouraged and observed attending art group therapy appearing bright and not as drowsy. Patient endorses feeling better and getting good sleep, however did have c/o 8/10 headache and anxiety following group therapy. 1452 - Patient administered acetaminophen  975 mg PO and atarax 100 mg PO.

## 2023-08-19 NOTE — NURSING NOTE
Patient reports decreased pain to 4/10 and Tylenol has been effective in the treatment of his headache.

## 2023-08-19 NOTE — NURSING NOTE
Patient reports he is still bothered by voices and can't get to sleep - he was offered and accepted Atarax 100mgs and Melatonin 3mgs po prn.

## 2023-08-20 LAB
ANION GAP SERPL CALCULATED.3IONS-SCNC: 5 MMOL/L
BETA-HYDROXYBUTYRATE: 0.1 MMOL/L
BUN SERPL-MCNC: 16 MG/DL (ref 5–25)
CALCIUM SERPL-MCNC: 9.3 MG/DL (ref 8.4–10.2)
CHLORIDE SERPL-SCNC: 98 MMOL/L (ref 96–108)
CO2 SERPL-SCNC: 31 MMOL/L (ref 21–32)
CREAT SERPL-MCNC: 1.34 MG/DL (ref 0.6–1.3)
GFR SERPL CREATININE-BSD FRML MDRD: 74 ML/MIN/1.73SQ M
GLUCOSE SERPL-MCNC: 262 MG/DL (ref 65–140)
GLUCOSE SERPL-MCNC: 327 MG/DL (ref 65–140)
GLUCOSE SERPL-MCNC: 334 MG/DL (ref 65–140)
GLUCOSE SERPL-MCNC: 430 MG/DL (ref 65–140)
POTASSIUM SERPL-SCNC: 5.1 MMOL/L (ref 3.5–5.3)
SODIUM SERPL-SCNC: 134 MMOL/L (ref 135–147)

## 2023-08-20 PROCEDURE — 80048 BASIC METABOLIC PNL TOTAL CA: CPT | Performed by: PHYSICIAN ASSISTANT

## 2023-08-20 PROCEDURE — 83036 HEMOGLOBIN GLYCOSYLATED A1C: CPT | Performed by: PHYSICIAN ASSISTANT

## 2023-08-20 PROCEDURE — 99232 SBSQ HOSP IP/OBS MODERATE 35: CPT | Performed by: PSYCHIATRY & NEUROLOGY

## 2023-08-20 PROCEDURE — 82010 KETONE BODYS QUAN: CPT | Performed by: PHYSICIAN ASSISTANT

## 2023-08-20 PROCEDURE — 82948 REAGENT STRIP/BLOOD GLUCOSE: CPT

## 2023-08-20 PROCEDURE — 93005 ELECTROCARDIOGRAM TRACING: CPT

## 2023-08-20 RX ADMIN — LOXAPINE 50 MG: 50 CAPSULE ORAL at 21:08

## 2023-08-20 RX ADMIN — GLYCOPYRROLATE 1 MG: 1 TABLET ORAL at 21:09

## 2023-08-20 RX ADMIN — SENNOSIDES AND DOCUSATE SODIUM 2 TABLET: 8.6; 5 TABLET ORAL at 08:24

## 2023-08-20 RX ADMIN — CLOZAPINE 125 MG: 100 TABLET ORAL at 17:18

## 2023-08-20 RX ADMIN — ACETAMINOPHEN 650 MG: 325 TABLET ORAL at 22:10

## 2023-08-20 RX ADMIN — DIVALPROEX SODIUM 750 MG: 500 TABLET, DELAYED RELEASE ORAL at 18:17

## 2023-08-20 RX ADMIN — GLYCOPYRROLATE 1 MG: 1 TABLET ORAL at 08:24

## 2023-08-20 RX ADMIN — LOXAPINE 50 MG: 50 CAPSULE ORAL at 15:39

## 2023-08-20 RX ADMIN — LOXAPINE 50 MG: 50 CAPSULE ORAL at 08:24

## 2023-08-20 RX ADMIN — MELATONIN TAB 3 MG 3 MG: 3 TAB at 00:05

## 2023-08-20 RX ADMIN — ALUMINUM HYDROXIDE, MAGNESIUM HYDROXIDE, AND DIMETHICONE 30 ML: 200; 20; 200 SUSPENSION ORAL at 08:24

## 2023-08-20 RX ADMIN — DIVALPROEX SODIUM 750 MG: 500 TABLET, DELAYED RELEASE ORAL at 08:24

## 2023-08-20 RX ADMIN — GLYCOPYRROLATE 1 MG: 1 TABLET ORAL at 15:39

## 2023-08-20 RX ADMIN — OLANZAPINE 7.5 MG: 2.5 TABLET, FILM COATED ORAL at 14:40

## 2023-08-20 RX ADMIN — SENNOSIDES AND DOCUSATE SODIUM 2 TABLET: 8.6; 5 TABLET ORAL at 17:19

## 2023-08-20 NOTE — PLAN OF CARE
Problem: Ineffective Coping  Goal: Participates in unit activities  Description: Interventions:  - Provide therapeutic environment   - Provide required programming   - Redirect inappropriate behaviors   Outcome: Progressing     Problem: SELF HARM/SUICIDALITY  Goal: Will have no self-injury during hospital stay  Description: INTERVENTIONS:  - Q 15 MINUTES: Routine safety checks  - Q WAKING SHIFT & PRN: Assess risk to determine if routine checks are adequate to maintain patient safety  - Encourage patient to participate actively in care by formulating a plan to combat response to suicidal ideation, identify supports and resources  Outcome: Progressing     Problem: ANXIETY  Goal: Will report anxiety at manageable levels  Description: INTERVENTIONS:  - Administer medication as ordered  - Teach and encourage coping skills  - Provide emotional support  - Assess patient/family for anxiety and ability to cope  Outcome: Progressing

## 2023-08-20 NOTE — NURSING NOTE
Patient requesting medication for "angry voices, being irritated." He is responding to internal stimuli, cursing, shouting "I'm not fucking doing that"- +A/H, denies V/H S.I.H.I  Patient was given Atarax 100mgs and Zyprexa 7.5mgs po prn

## 2023-08-20 NOTE — NURSING NOTE
Patient noted out of room in eating area, social with peers. Compliant with med's , offers no complaints of pain or discomfort at this time. Currently hearing voices passively stated " I Am trying to control it and think I'm ok now". Walking on the unit watching television for periods of time. Denied HI/VH/SI.

## 2023-08-20 NOTE — PROGRESS NOTES
Progress Note - Behavioral Health   Gokul Rubio 26 y.o. male MRN: [id number]  Unit/Bed#: [facility] 343-01 Encounter: [id number]    Assessment/Plan   Principal Problem:    Schizoaffective disorder ([address])  Active Problems:    Legally blind    Hearing loss    Asthma    Medical clearance for psychiatric admission    Prolonged erection    Intellectual disability    Rib pain on left side      Recommended Treatment:     1. No psychopharmacologic changes necessary at this time; will continue to assess for further optimization. 2. Continue with current medications:  a. Loxapine 50 mg twice daily, 50 mg at bedtime for psychosis. b. Clozaril 125 mg daily for psychosis and mood symptoms. i. Senokot 2 tablets twice daily for standing bowel regimen. ii. Robinul 1 mg 3 times daily for sialorrhea.  c. Depakote 750 mg twice daily for mood stabilization. 3. Continue with group therapy, milieu therapy and occupational therapy. 4. Continue frequent safety checks and vitals per unit protocol. 5. Continue with [facility] medical management as indicated  6. Continue coordinating with case management regarding disposition    Legal Status: 201  Disposition: To be determined, coordinating with case management, Likely EAC    Case discussed with treatment team.  Risks, benefits and possible side effects of Medications: Risks, benefits, and possible side effects of medications have previously been explained. No new medications at this time.     ------------------------------------------------------------    Subjective: Patient's chart was reviewed, and patient's progress and plan was discussed with treatment team. Per nursing report, Gokul has been lethargic for most of the shift, but able to be awakened by staff for meals, reporting auditory hallucinations of "bad voices" and requesting PRN medication, later in the evening he was noted to be out in the eating area and social with peers, and later in the evening he was noted to be responding to internal stimuli, shouting stating "I am not fucking doing that," at which time he received as needed Atarax 100 mg and as needed Zyprexa 7.5 mg which was effective and he was ultimately cooperative on the unit and compliant with medications. Overnight, the patient received melatonin 3 mg after midnight for insomnia, before 1 AM he returned to the nursing station asking for more as needed medication at which time he was encouraged to use coping skills and he was able to retire to his room without incident. Gokul was evaluated this morning for continuity of care. On examination, Gokul is less drowsy than previously, however he continues to snore throughout the interview but he is able to more easily attend to the interview today, he is dressed casually and laying in bed. He states his mood is " the same." He reports sleeping well, endorsing around 10 hours of sleep with no difficulty falling asleep or staying asleep or reported nightmares despite what was previously reported and his energy level today is low. His appetite has been good and he endorses eating breakfast without difficulty, he also notes a bowel movement yesterday. He denies any adverse effects from medications. His goals for today are to remain in behavioral control and medication compliant. Today, Gokul is yenifer for safety on the unit. He denies suicidal ideation, homicidal ideation, auditory hallucinations, and visual hallucinations. Although Gokul was more attentive to interview today he remains scant with apparent poverty of thought. No questions, comments, or concerns at this time.     VS: Reviewed, Noted to be hypertensive with systolic BP of 885J and some tachycardia that is likely secondary to clozaril initiation, otherwise within normal limits    Progress Toward Goals: Slow improvement    Psychiatric Review of Systems:  Behavior over the last 24 hours: improved  Sleep: hypersomnia  Appetite: adequate  Medication side effects: Drowsiness  Medical ROS: Complete review of systems is negative except as noted above.     Vital signs in last 24 hours:  Temp:  [97.6 °F (36.4 °C)-97.9 °F (36.6 °C)] 97.9 °F (36.6 °C)  HR:  [105-114] 114  Resp:  [16] 16  BP: (114-134)/(55-77) 134/77    Mental Status Exam:    Appearance:  alert, Minimal to no eye contact, appears older than stated age, casually dressed, disheveled, obese, Short dark curly hair and bearded   Behavior:  calm, limited cooperativity and laying in bed   Speech:  delayed initiation, slow, soft, scant and coherent   Mood:  "The same"   Affect:  flat   Thought Process:  poverty of thought   Associations: unable to assess - does not answer   Thought Content:  no verbalized delusions or overt paranoia   Perceptual Disturbances: denies current hallucinations and Appears to be internally preoccupied   Risk Potential: Suicidal ideation - None at present  Homicidal ideation - None at present  Potential for aggression - Yes, due to acute psychosis   Sensorium:  oriented to person and place   Memory:  recent memory impaired   Consciousness:  awake but drowsy   Attention/Concentration: attention span and concentration appear shorter than expected for age   Insight:  limited   Judgment: limited   Gait/Station: normal gait/station   Motor Activity: no abnormal movements     Current Medications:  Current Facility-Administered Medications   Medication Dose Route Frequency Provider Last Rate   • acetaminophen  650 mg Oral Q6H PRN Esperanza Rivera MD     • acetaminophen  650 mg Oral Q4H PRN Esperanza Rivera MD     • acetaminophen  975 mg Oral Q6H PRN Esperanza Rivera MD     • albuterol  2 puff Inhalation Q4H PRN Esperanza Rivera MD     • aluminum-magnesium hydroxide-simethicone  30 mL Oral Q4H PRN Esperanza Rivera MD     • benztropine  1 mg Intramuscular BID PRN Jostin Patel MD     • benztropine  1 mg Oral Q4H PRN Max 6/day Esperanza Rivera MD     • cloZAPine  125 mg Oral Daily Suleman [facility] C Holter, DO     • divalproex sodium  750 mg Oral BID Sam Covarrubias MD     • fluticasone  1 spray Nasal Daily PRN Hellen Hawk PA-C     • glycopyrrolate  1 mg Oral TID Biaml Diver, DO     • hydrOXYzine HCL  100 mg Oral Q6H PRN Max 4/day Wylene Locker, DO      Or   • LORazepam  1 mg Intramuscular Q6H PRN Wylene Locker, DO     • hydrOXYzine HCL  25 mg Oral Q6H PRN Max 4/day Prcindy Magaña MD     • hydrOXYzine HCL  50 mg Oral Q6H PRN Max 4/day Prcindy Magaña MD     • loxapine  50 mg Oral BID Bimal Diver, DO     • loxapine  50 mg Oral QPM Jordan C Holter, DO     • melatonin  3 mg Oral HS PRN Wylene Locker, DO     • nicotine polacrilex  4 mg Oral Q2H PRN Prcindy Magaña MD     • OLANZapine  5 mg Oral Q3H PRN Max 6/day Sam Covarrubias MD      Or   • OLANZapine  5 mg Intramuscular Q3H PRN Max 6/day Sam Covarrubias MD     • OLANZapine  5 mg Intramuscular Q8H PRN Wylene Locker, DO      Or   • OLANZapine  7.5 mg Oral Q8H PRN Wylene Locker, DO     • OLANZapine  2.5 mg Oral Q3H PRN Max 8/day Sam Covarrubias MD     • ondansetron  4 mg Oral Q8H PRN Wylene Locker, DO     • polyethylene glycol  17 g Oral Daily PRN Lona Magaña MD     • pseudoephedrine  120 mg Oral BID PRN Deana Campuzano MD     • senna-docusate sodium  1 tablet Oral Daily PRN Lona Magaña MD     • senna-docusate sodium  2 tablet Oral BID Sam Covarrubias MD     • sterile water          • sterile water          • sterile water          • sterile water          • sterile water          • sterile water          • sterile water          • sterile water          • sterile water          • sterile water          • sterile water              Behavioral Health Medications: all current active meds have been reviewed. Changes as in plan section above. Laboratory results:  I have personally reviewed all pertinent laboratory/tests results. No results found for this or any previous visit (from the past 48 hour(s)). This note has been constructed using a voice recognition system. There may be translation, syntax, or grammatical errors. If you have any questions, please contact the dictating author.     Juan Milan,   Psychiatry Residency, PGY-2

## 2023-08-20 NOTE — NURSING NOTE
Patient  received Melatonin 3mgs a little after midnight. Before 1am he had returned to nurses station and was requesting another prn for "being irritable." All prn's had been given at this time and he was supported in using coping skills and allowing the medication he had already used to take effect. He returned to his room soon after this and has slept the remainder of the night.

## 2023-08-20 NOTE — NURSING NOTE
Pt remains isolative to his room. He is observed resting without any signs of distress. Breathing is unlabored and even. Pt compliant with breakfast but did not come out of his room for lunch despite encouragement from staff. He continues to endorse AH of "whispers" but does not elaborate any further. He denies any SI/HI/VH. Staff availability reinforced.

## 2023-08-20 NOTE — NURSING NOTE
Pt requesting PRN Mylanta for heartburn. Medication administered at [phone]. During reassessment at [phone], pt states that PRN medication was effective.

## 2023-08-20 NOTE — NURSING NOTE
Pt observed to be less agitated. He is visible on the unit and appropriately social with peers. He reports that PRN Zyprexa was effective.

## 2023-08-20 NOTE — NURSING NOTE
Pt approached nurses station increasingly agitated. He reports hearing voices that are "really bothering" him. Reassurance and emotional support provided. PRN Zyprexa 7.5mg PO administered at 1440.

## 2023-08-20 NOTE — NURSING NOTE
Pt reporting new onset of dizziness. Vital signs:   98.8  135 BPM  143/92  97%  , rechechekd immediately after and reading 334. Medical provider contacted via TT at [phone].

## 2023-08-21 LAB
ATRIAL RATE: 122 BPM
BASOPHILS # BLD AUTO: 0.08 THOUSANDS/ÂΜL (ref 0–0.1)
BASOPHILS NFR BLD AUTO: 1 % (ref 0–1)
EOSINOPHIL # BLD AUTO: 0.98 THOUSAND/ÂΜL (ref 0–0.61)
EOSINOPHIL NFR BLD AUTO: 10 % (ref 0–6)
ERYTHROCYTE [DISTWIDTH] IN BLOOD BY AUTOMATED COUNT: 12.7 % (ref 11.6–15.1)
EST. AVERAGE GLUCOSE BLD GHB EST-MCNC: 148 MG/DL
GLUCOSE SERPL-MCNC: 138 MG/DL (ref 65–140)
GLUCOSE SERPL-MCNC: 90 MG/DL (ref 65–140)
HBA1C MFR BLD: 6.8 %
HCT VFR BLD AUTO: 42.2 % (ref 36.5–49.3)
HGB BLD-MCNC: 14 G/DL (ref 12–17)
IMM GRANULOCYTES # BLD AUTO: 0.11 THOUSAND/UL (ref 0–0.2)
IMM GRANULOCYTES NFR BLD AUTO: 1 % (ref 0–2)
LYMPHOCYTES # BLD AUTO: 4.32 THOUSANDS/ÂΜL (ref 0.6–4.47)
LYMPHOCYTES NFR BLD AUTO: 42 % (ref 14–44)
MCH RBC QN AUTO: 30.8 PG (ref 26.8–34.3)
MCHC RBC AUTO-ENTMCNC: 33.2 G/DL (ref 31.4–37.4)
MCV RBC AUTO: 93 FL (ref 82–98)
MONOCYTES # BLD AUTO: 1.2 THOUSAND/ÂΜL (ref 0.17–1.22)
MONOCYTES NFR BLD AUTO: 12 % (ref 4–12)
NEUTROPHILS # BLD AUTO: 3.51 THOUSANDS/ÂΜL (ref 1.85–7.62)
NEUTS SEG NFR BLD AUTO: 34 % (ref 43–75)
NRBC BLD AUTO-RTO: 0 /100 WBCS
P AXIS: 43 DEGREES
PLATELET # BLD AUTO: 216 THOUSANDS/UL (ref 149–390)
PMV BLD AUTO: 11.1 FL (ref 8.9–12.7)
PR INTERVAL: 144 MS
QRS AXIS: 76 DEGREES
QRSD INTERVAL: 86 MS
QT INTERVAL: 308 MS
QTC INTERVAL: 438 MS
RBC # BLD AUTO: 4.54 MILLION/UL (ref 3.88–5.62)
T WAVE AXIS: 20 DEGREES
VENTRICULAR RATE: 122 BPM
WBC # BLD AUTO: 10.2 THOUSAND/UL (ref 4.31–10.16)

## 2023-08-21 PROCEDURE — 85025 COMPLETE CBC W/AUTO DIFF WBC: CPT

## 2023-08-21 PROCEDURE — 82948 REAGENT STRIP/BLOOD GLUCOSE: CPT

## 2023-08-21 PROCEDURE — 99232 SBSQ HOSP IP/OBS MODERATE 35: CPT | Performed by: PSYCHIATRY & NEUROLOGY

## 2023-08-21 PROCEDURE — 93010 ELECTROCARDIOGRAM REPORT: CPT | Performed by: INTERNAL MEDICINE

## 2023-08-21 RX ORDER — INSULIN LISPRO 100 [IU]/ML
1-6 INJECTION, SOLUTION INTRAVENOUS; SUBCUTANEOUS
Status: DISCONTINUED | OUTPATIENT
Start: 2023-08-21 | End: 2023-08-29

## 2023-08-21 RX ADMIN — DIVALPROEX SODIUM 750 MG: 500 TABLET, DELAYED RELEASE ORAL at 18:05

## 2023-08-21 RX ADMIN — SENNOSIDES AND DOCUSATE SODIUM 2 TABLET: 8.6; 5 TABLET ORAL at 08:59

## 2023-08-21 RX ADMIN — LOXAPINE 40 MG: 25 CAPSULE ORAL at 17:14

## 2023-08-21 RX ADMIN — OLANZAPINE 7.5 MG: 2.5 TABLET, FILM COATED ORAL at 19:18

## 2023-08-21 RX ADMIN — MELATONIN TAB 3 MG 3 MG: 3 TAB at 23:33

## 2023-08-21 RX ADMIN — CLOZAPINE 150 MG: 100 TABLET ORAL at 17:13

## 2023-08-21 RX ADMIN — LOXAPINE 50 MG: 50 CAPSULE ORAL at 08:59

## 2023-08-21 RX ADMIN — SENNOSIDES AND DOCUSATE SODIUM 2 TABLET: 8.6; 5 TABLET ORAL at 17:14

## 2023-08-21 RX ADMIN — GLYCOPYRROLATE 1 MG: 1 TABLET ORAL at 17:13

## 2023-08-21 RX ADMIN — GLYCOPYRROLATE 1 MG: 1 TABLET ORAL at 08:59

## 2023-08-21 RX ADMIN — DIVALPROEX SODIUM 750 MG: 500 TABLET, DELAYED RELEASE ORAL at 08:59

## 2023-08-21 RX ADMIN — GLYCOPYRROLATE 1 MG: 1 TABLET ORAL at 21:16

## 2023-08-21 RX ADMIN — ACETAMINOPHEN 975 MG: 325 TABLET ORAL at 18:23

## 2023-08-21 RX ADMIN — LOXAPINE 40 MG: 25 CAPSULE ORAL at 21:15

## 2023-08-21 NOTE — PLAN OF CARE
Problem: Ineffective Coping  Goal: Participates in unit activities  Description: Interventions:  - Provide therapeutic environment   - Provide required programming   - Redirect inappropriate behaviors   Outcome: Progressing     Problem: SELF HARM/SUICIDALITY  Goal: Will have no self-injury during hospital stay  Description: INTERVENTIONS:  - Q 15 MINUTES: Routine safety checks  - Q WAKING SHIFT & PRN: Assess risk to determine if routine checks are adequate to maintain patient safety  - Encourage patient to participate actively in care by formulating a plan to combat response to suicidal ideation, identify supports and resources  Outcome: Progressing     Problem: ANXIETY  Goal: Will report anxiety at manageable levels  Description: INTERVENTIONS:  - Administer medication as ordered  - Teach and encourage coping skills  - Provide emotional support  - Assess patient/family for anxiety and ability to cope  Outcome: Progressing  Goal: By discharge: Patient will verbalize 2 strategies to deal with anxiety  Description: Interventions:  - Identify any obvious source/trigger to anxiety  - Staff will assist patient in applying identified coping technique/skills  - Encourage attendance of scheduled groups and activities  Outcome: Progressing     Problem: SUBSTANCE USE/ABUSE  Goal: Will have no detox symptoms and will verbalize plan for changing substance-related behavior  Description: INTERVENTIONS:  - Monitor physical status and assess for symptoms of withdrawal  - Administer medication as ordered  - Provide emotional support with 1 on 1 interaction with staff  - Encourage recovery focused program/ addiction education  - Assess for verbalization of changing behaviors related to substance abuse  - Initiate consults and referrals as appropriate (Case Management, Spiritual Care, etc.)  Outcome: Progressing  Goal: By discharge, will develop insight into their chemical dependency and sustain motivation to continue in recovery  Description: INTERVENTIONS:  - Attends all daily group sessions and scheduled AA groups  - Actively practices coping skills through participation in the therapeutic community and adherence to program rules  - Reviews and completes assignments from individual treatment plan  - Assist patient development of understanding of their personal cycle of addiction and relapse triggers  Outcome: Progressing  Goal: By discharge, patient will have ongoing treatment plan addressing chemical dependency  Description: INTERVENTIONS:  - Assist patient with resources and/or appointments for ongoing recovery based living  Outcome: Progressing     Problem: SUBSTANCE USE/ABUSE  Goal: Will have no detox symptoms and will verbalize plan for changing substance-related behavior  Description: INTERVENTIONS:  - Monitor physical status and assess for symptoms of withdrawal  - Administer medication as ordered  - Provide emotional support with 1 on 1 interaction with staff  - Encourage recovery focused program/ addiction education  - Assess for verbalization of changing behaviors related to substance abuse  - Initiate consults and referrals as appropriate (Case Management, Spiritual Care, etc.)  Outcome: Progressing  Goal: By discharge, will develop insight into their chemical dependency and sustain motivation to continue in recovery  Description: INTERVENTIONS:  - Attends all daily group sessions and scheduled AA groups  - Actively practices coping skills through participation in the therapeutic community and adherence to program rules  - Reviews and completes assignments from individual treatment plan  - Assist patient development of understanding of their personal cycle of addiction and relapse triggers  Outcome: Progressing  Goal: By discharge, patient will have ongoing treatment plan addressing chemical dependency  Description: INTERVENTIONS:  - Assist patient with resources and/or appointments for ongoing recovery based living  Outcome: Progressing     Problem: DISCHARGE PLANNING  Goal: Discharge to home or other facility with appropriate resources  Description: INTERVENTIONS:  - Identify barriers to discharge w/patient and caregiver  - Arrange for needed discharge resources and transportation as appropriate  - Identify discharge learning needs (meds, wound care, etc.)  - Arrange for interpretive services to assist at discharge as needed  - Refer to Case Management Department for coordinating discharge planning if the patient needs post-hospital services based on physician/advanced practitioner order or complex needs related to functional status, cognitive ability, or social support system  Outcome: Progressing

## 2023-08-21 NOTE — NURSING NOTE
Patient approached nurses station requesting something for agitation. Patient given zyprexa 7.5 mg at [facility] will reassess. Currently walking on unit visibly responding to internal stimuli.

## 2023-08-21 NOTE — PROGRESS NOTES
08/21/23 [id number]   Team Meeting   Meeting Type Daily Rounds   Team Members Present   Team Members Present Physician;Nurse;   Physician Team Member [id number] [facility] Wendie   Patient/Family Present   Patient Present No   Patient's Family Present No     Pt med/meal compliant. Isolative to his room. Pt's Clozapine was increased at the end of last week. Cross taper from Loxapine to Clozapine to continue. Pt c/o dizziness, blood sugar was in the 400's over the weekend, medical consulted and pt was changed to carb control diet. Discharge to be determined.

## 2023-08-21 NOTE — NURSING NOTE
EKG done, labwork done and in lab pending results, Accucheck 327. Patient may have a little water to drink and all BHT's made aware of this.

## 2023-08-21 NOTE — PROGRESS NOTES
Progress Note - Behavioral Health   Gokul Stevens 26 y.o. male MRN: [id number]  Unit/Bed#: [facility] 343-01 Encounter: [id number]    Assessment/Plan   Principal Problem:    Schizoaffective disorder ([address])  Active Problems:    Legally blind    Hearing loss    Asthma    Medical clearance for psychiatric admission    Prolonged erection    Intellectual disability    Rib pain on left side      Recommended Treatment:     Continue medications as below:   Increase Clozapine 150 mg qhs for psychosis (8/21/23 WBC 10.2 and ANC 3.51)  Taper Loxapine to 40 mg TID for lack of efficacy   Depakote 750 mg BID for agitation/mood (Depakote level was 91 on 6/12/23)  Melatonin 3 mg qhs prn insomnia   Robinul 1 mg TID for drooling     Continue with pharmacotherapy, group therapy, milieu therapy and occupational therapy. Monitor for adverse medication side effects. Safety checks and vitals per unit protocol. CM/SW recommendations   Continue medical management by medical team.  Case discussed with treatment team.    Legal Status: voluntary 201  ------------------------------------------------------------    Subjective: All documentation including nursing notes, medication history to ensure medication adherence on the unit, labs, and vitals were reviewed. Gokul was evaluated this morning for continuity of care and no acute distress noted throughout the evaluation. Over the past 24 hours per nursing report, Gokul has been cooperative on the unit and compliant with medications. On 8/19: Patient was cooperative on the unit and medication compliant. He was noted to be sleeping throughout much of the day and awakening for meals. He continued to endorse AH. Patient received Tylenol 975 mg po for HA at 1452. He later received Tylenol 975mg po at 1552 for L ear pain. He also received Atarax 100 mg po at 1452.  Later in evening, per nursing, "Patient requesting medication for "angry voices, being irritated." He is responding to internal stimuli, cursing, shouting "I'm not fucking doing that"- +A/H, denies V/H S.I.H.I. Patient was given Atarax 100mgs and Zyprexa 7.5mgs po prn". Otherwise, no acute events. On 8/20: Per nursing, patient was cooperative on the unit and in good behavioral control. Patient received Melatonin 3 mg for insomnia, which was effective. He also received Mylanta for heartburn at 0824, which was effective. Later per nursing, "Pt approached nurses station increasingly agitated. He reports hearing voices that are "really bothering" him. Reassurance and emotional support provided. PRN Zyprexa 7.5mg PO administered at 1440." Later in evening, patient c/o dizziness. His HR was noted to be 135 and BS elevated at 430, and BS recheck was 334. Patient had EKG and labwork completed and medicine was contacted, he was encouraged to hydrate and new dietary orders were placed by medicine. Upon evaluation today, patient is seen laying in bed, with sheets covering face. He is cooperative with interview and remains scant. Today, Gokul is consenting for safety on the unit. Gokul reports feeling "good." Gokul notes having good sleep and states he slept through the night. He states his energy is tired today. Gokul states having a good appetite. Gokul has been taking the medications as prescribed and reporting no side effects. He denies priapism. He states he did attend groups over the weekend and did activities like painting. He states he has been interacting with other residents on the unit. He states he has no goals for today. He states he did shower 2 days ago. Gokul denies suicidal ideations. Gokul denies homicidal ideations. Regarding hallucinations, Gokul denies visual hallucinations. Today, he denies auditory hallucinations and states he last heard them "a few days ago". He denies any further complaints.      PRNs overnight: Tylenol  VS: Patient refused     Progress Toward Goals: slow improvement    Psychiatric Review of Systems:  Behavior over the last 24 hours:  unchanged  Sleep: hypersomnia  Appetite: normal  Medication side effects: No   ROS: all other systems are negative    Vital signs in last 24 hours:  Temp:  [98.8 °F (37.1 °C)] 98.8 °F (37.1 °C)  HR:  [135] 135  Resp:  [18] 18  BP: (143-156)/(92) 156/92    Laboratory results:  I have personally reviewed all pertinent laboratory/tests results. Recent Results (from the past 48 hour(s))   Fingerstick Glucose (POCT)    Collection Time: 08/20/23  6:49 PM   Result Value Ref Range    POC Glucose 430 (H) 65 - 140 mg/dl   Fingerstick Glucose (POCT)    Collection Time: 08/20/23  6:50 PM   Result Value Ref Range    POC Glucose 334 (H) 65 - 140 mg/dl   ECG 12 lead    Collection Time: 08/20/23  7:47 PM   Result Value Ref Range    Ventricular Rate 122 BPM    Atrial Rate 122 BPM    NE Interval 144 ms    QRSD Interval 86 ms    QT Interval 308 ms    QTC Interval 438 ms    P Axis 43 degrees    QRS Axis 76 degrees    T Wave Axis 20 degrees   Fingerstick Glucose (POCT)    Collection Time: 08/20/23  7:56 PM   Result Value Ref Range    POC Glucose 327 (H) 65 - 140 mg/dl   Hemoglobin A1C    Collection Time: 08/20/23  7:57 PM   Result Value Ref Range    Hemoglobin A1C 6.8 (H) Normal 4.0-5.6%; PreDiabetic 5.7-6.4%;  Diabetic >=6.5%; Glycemic control for adults with diabetes <7.0% %     mg/dl   Basic metabolic panel    Collection Time: 08/20/23  7:57 PM   Result Value Ref Range    Sodium 134 (L) 135 - 147 mmol/L    Potassium 5.1 3.5 - 5.3 mmol/L    Chloride 98 96 - 108 mmol/L    CO2 31 21 - 32 mmol/L    ANION GAP 5 mmol/L    BUN 16 5 - 25 mg/dL    Creatinine 1.34 (H) 0.60 - 1.30 mg/dL    Glucose 262 (H) 65 - 140 mg/dL    Calcium 9.3 8.4 - 10.2 mg/dL    eGFR 74 ml/min/1.73sq m   Beta Hydroxybutyrate    Collection Time: 08/20/23  7:57 PM   Result Value Ref Range    BETA-HYDROXYBUTYRATE 0.1 <0.6 mmol/L   CBC and differential    Collection Time: 08/21/23  5:30 AM   Result Value Ref Range    WBC 10.20 (H) 4.31 - 10.16 Thousand/uL    RBC 4.54 3.88 - 5.62 Million/uL    Hemoglobin 14.0 12.0 - 17.0 g/dL    Hematocrit 42.2 36.5 - 49.3 %    MCV 93 82 - 98 fL    MCH 30.8 26.8 - 34.3 pg    MCHC 33.2 31.4 - 37.4 g/dL    RDW 12.7 11.6 - 15.1 %    MPV 11.1 8.9 - 12.7 fL    Platelets 293 106 - 169 Thousands/uL    nRBC 0 /100 WBCs    Neutrophils Relative 34 (L) 43 - 75 %    Immat GRANS % 1 0 - 2 %    Lymphocytes Relative 42 14 - 44 %    Monocytes Relative 12 4 - 12 %    Eosinophils Relative 10 (H) 0 - 6 %    Basophils Relative 1 0 - 1 %    Neutrophils Absolute 3.51 1.85 - 7.62 Thousands/µL    Immature Grans Absolute 0.11 0.00 - 0.20 Thousand/uL    Lymphocytes Absolute 4.32 0.60 - 4.47 Thousands/µL    Monocytes Absolute 1.20 0.17 - 1.22 Thousand/µL    Eosinophils Absolute 0.98 (H) 0.00 - 0.61 Thousand/µL    Basophils Absolute 0.08 0.00 - 0.10 Thousands/µL         Mental Status Evaluation:    Appearance:  marginal hygiene, looks older than stated age, overtly  appearing male, laying in bed   Behavior:  cooperative, guarded   Speech:  coherent, decreased rate, scant, decreased volume   Mood:  "good"   Affect:  blunted   Thought Process:  poverty of thought   Associations: concrete associations   Thought Content:  no overt delusions   Perceptual Disturbances: Reports auditory hallucinations, Appears to be internally preoccupied and Does not appear to be responding to internal stimuli   Risk Potential: Suicidal ideation - None at present, contracts for safety on the unit, would talk to staff if not feeling safe on the unit  Homicidal ideation - None at present  Potential for aggression - Not at present   Sensorium:  grossly oriented to situation   Memory:  recent and remote memory grossly intact   Consciousness:  awake and drowsy   Attention/Concentration: attention span and concentration appear shorter than expected for age   Insight:  limited   Judgment: limited   Gait/Station: normal gait/station   Motor Activity: no abnormal movements       Current Medications:  Current Facility-Administered Medications   Medication Dose Route Frequency Provider Last Rate   • acetaminophen  650 mg Oral Q6H PRN Therese Hall MD     • acetaminophen  650 mg Oral Q4H PRN Therese Hall MD     • acetaminophen  975 mg Oral Q6H PRN Therese Hall MD     • albuterol  2 puff Inhalation Q4H PRN Therese Hall MD     • aluminum-magnesium hydroxide-simethicone  30 mL Oral Q4H PRN Therese Hall MD     • benztropine  1 mg Intramuscular BID PRN Dilan Chopra MD     • benztropine  1 mg Oral Q4H PRN Max 6/day Therese Hall MD     • cloZAPine  150 mg Oral Daily Allison Oppenheim, DO     • divalproex sodium  750 mg Oral BID Patrick Dotson MD     • fluticasone  1 spray Nasal Daily PRN Yelitza Jain PA-C     • glycopyrrolate  1 mg Oral TID Joanna Hannah DO     • hydrOXYzine HCL  100 mg Oral Q6H PRN Max 4/day Ariel Roman, DO      Or   • LORazepam  1 mg Intramuscular Q6H PRN Ariel Roman, DO     • hydrOXYzine HCL  25 mg Oral Q6H PRN Max 4/day Therese Hall MD     • hydrOXYzine HCL  50 mg Oral Q6H PRN Max 4/day Therese Hall MD     • loxapine  40 mg Oral BID Allison Oppenheim, DO     • loxapine  40 mg Oral QPM Allison Oppenheim, DO     • melatonin  3 mg Oral HS PRN Ariel Roman, DO     • nicotine polacrilex  4 mg Oral Q2H PRN Therese Hall MD     • OLANZapine  5 mg Oral Q3H PRN Max 6/day Patrick Dotson MD      Or   • OLANZapine  5 mg Intramuscular Q3H PRN Max 6/day Patrick Dotson MD     • OLANZapine  5 mg Intramuscular Q8H PRN Ariel Roman, DO      Or   • OLANZapine  7.5 mg Oral Q8H PRN Ariel Roman, DO     • OLANZapine  2.5 mg Oral Q3H PRN Max 8/day Patrick Dotson MD     • ondansetron  4 mg Oral Q8H PRN Ariel Roman, DO     • polyethylene glycol  17 g Oral Daily PRN Therese Hall MD     • pseudoephedrine  120 mg Oral BID PRN Felicia Samuel MD • senna-docusate sodium  1 tablet Oral Daily PRN Yan Hernandez MD     • senna-docusate sodium  2 tablet Oral BID Niraj Cruz MD     • sterile water          • sterile water          • sterile water          • sterile water          • sterile water          • sterile water          • sterile water          • sterile water          • sterile water          • sterile water          • sterile water              Behavioral Health Medications: All current active meds have been reviewed. Changes as in plan section above. Risks, benefits and possible side effects of Medications:   Risks, benefits, and possible side effects of medications explained to patient and patient verbalizes understanding. Counseling / Coordination of Care:  Patient's progress discussed with staff in treatment team meeting. Medications, treatment progress and treatment plan reviewed with patient. Yobany Lam DO  Psychiatry Resident, PGY-I    This note was completed in part utilizing Dragon dictation Software. Grammatical, translation, syntax errors, random word insertions, spelling mistakes, and incomplete sentences may be an occasional consequence of this system secondary to software limitations with voice recognition, ambient noise, and hardware issues. If you have any questions or concerns about the content, text, or information contained within the body of this dictation, please contact the provider for clarification.

## 2023-08-21 NOTE — NURSING NOTE
Pt denies SI/HI/AH/VH. Pt isolative to room and self but was sleeping prior to taking medication. Medication and meal compliant. Pt appears depressed. No further concerns reported at this time. Plan of care ongoing.

## 2023-08-21 NOTE — NURSING NOTE
Patient is permitted to eat and drink as per medical provider but to stick to diet drinks or water. He has been agreeable with this. New dietary orders. You can access the FollowMyHealth Patient Portal offered by [facility] by registering at the following website: [email or web address]. By joining [facility]’s FollowMyHealth portal, you will also be able to view your health information using other applications (apps) compatible with our system.

## 2023-08-22 LAB
GLUCOSE SERPL-MCNC: 132 MG/DL (ref 65–140)
GLUCOSE SERPL-MCNC: 137 MG/DL (ref 65–140)
GLUCOSE SERPL-MCNC: 156 MG/DL (ref 65–140)
GLUCOSE SERPL-MCNC: 230 MG/DL (ref 65–140)

## 2023-08-22 PROCEDURE — 99232 SBSQ HOSP IP/OBS MODERATE 35: CPT | Performed by: PSYCHIATRY & NEUROLOGY

## 2023-08-22 PROCEDURE — 82948 REAGENT STRIP/BLOOD GLUCOSE: CPT

## 2023-08-22 RX ADMIN — LOXAPINE 30 MG: 25 CAPSULE ORAL at 21:19

## 2023-08-22 RX ADMIN — MELATONIN TAB 3 MG 3 MG: 3 TAB at 23:21

## 2023-08-22 RX ADMIN — SENNOSIDES AND DOCUSATE SODIUM 2 TABLET: 8.6; 5 TABLET ORAL at 08:25

## 2023-08-22 RX ADMIN — GLYCOPYRROLATE 1 MG: 1 TABLET ORAL at 16:06

## 2023-08-22 RX ADMIN — SENNOSIDES AND DOCUSATE SODIUM 2 TABLET: 8.6; 5 TABLET ORAL at 17:10

## 2023-08-22 RX ADMIN — INSULIN LISPRO 1 UNITS: 100 INJECTION, SOLUTION INTRAVENOUS; SUBCUTANEOUS at 12:11

## 2023-08-22 RX ADMIN — HYDROXYZINE HYDROCHLORIDE 50 MG: 50 TABLET, FILM COATED ORAL at 18:14

## 2023-08-22 RX ADMIN — DIVALPROEX SODIUM 750 MG: 500 TABLET, DELAYED RELEASE ORAL at 08:25

## 2023-08-22 RX ADMIN — GLYCOPYRROLATE 1 MG: 1 TABLET ORAL at 08:25

## 2023-08-22 RX ADMIN — LOXAPINE 40 MG: 25 CAPSULE ORAL at 08:25

## 2023-08-22 RX ADMIN — GLYCOPYRROLATE 1 MG: 1 TABLET ORAL at 21:19

## 2023-08-22 RX ADMIN — INSULIN LISPRO 3 UNITS: 100 INJECTION, SOLUTION INTRAVENOUS; SUBCUTANEOUS at 17:00

## 2023-08-22 RX ADMIN — DIVALPROEX SODIUM 750 MG: 500 TABLET, DELAYED RELEASE ORAL at 21:19

## 2023-08-22 RX ADMIN — CLOZAPINE 150 MG: 100 TABLET ORAL at 17:10

## 2023-08-22 RX ADMIN — LOXAPINE 30 MG: 25 CAPSULE ORAL at 16:06

## 2023-08-22 NOTE — PLAN OF CARE
Problem: Ineffective Coping  Goal: Participates in unit activities  Description: Interventions:  - Provide therapeutic environment   - Provide required programming   - Redirect inappropriate behaviors   Outcome: Progressing     Problem: SELF HARM/SUICIDALITY  Goal: Will have no self-injury during hospital stay  Description: INTERVENTIONS:  - Q 15 MINUTES: Routine safety checks  - Q WAKING SHIFT & PRN: Assess risk to determine if routine checks are adequate to maintain patient safety  - Encourage patient to participate actively in care by formulating a plan to combat response to suicidal ideation, identify supports and resources  Outcome: Progressing     Problem: ANXIETY  Goal: Will report anxiety at manageable levels  Description: INTERVENTIONS:  - Administer medication as ordered  - Teach and encourage coping skills  - Provide emotional support  - Assess patient/family for anxiety and ability to cope  Outcome: Progressing  Goal: By discharge: Patient will verbalize 2 strategies to deal with anxiety  Description: Interventions:  - Identify any obvious source/trigger to anxiety  - Staff will assist patient in applying identified coping technique/skills  - Encourage attendance of scheduled groups and activities  Outcome: Not Progressing     Problem: SUBSTANCE USE/ABUSE  Goal: Will have no detox symptoms and will verbalize plan for changing substance-related behavior  Description: INTERVENTIONS:  - Monitor physical status and assess for symptoms of withdrawal  - Administer medication as ordered  - Provide emotional support with 1 on 1 interaction with staff  - Encourage recovery focused program/ addiction education  - Assess for verbalization of changing behaviors related to substance abuse  - Initiate consults and referrals as appropriate (Case Management, Spiritual Care, etc.)  Outcome: Not Progressing  Goal: By discharge, will develop insight into their chemical dependency and sustain motivation to continue in recovery  Description: INTERVENTIONS:  - Attends all daily group sessions and scheduled AA groups  - Actively practices coping skills through participation in the therapeutic community and adherence to program rules  - Reviews and completes assignments from individual treatment plan  - Assist patient development of understanding of their personal cycle of addiction and relapse triggers  Outcome: Not Progressing  Goal: By discharge, patient will have ongoing treatment plan addressing chemical dependency  Description: INTERVENTIONS:  - Assist patient with resources and/or appointments for ongoing recovery based living  Outcome: Progressing

## 2023-08-22 NOTE — SOCIAL WORK
SW spoke with Pt's grandmother who stated pt sounds better when he is on the phone with her but still has significant episodes of responding to internal stimuli and internal preoccupation. Grandmother stated she does not feel the report was accurate from the ED as pt was not "living on any porch, he has a room at my house", despite previously confirming to SW that pt had been kicked out. Grandmother states she is in agreement with [facility] placement but would like pt to return to her house with community supports once he completes EAC. Grandmother requested pt not work with [facility] as she does not feel they were helpful to pt before and exacerbated his illness with the continuous switching of providers.

## 2023-08-22 NOTE — NURSING NOTE
Pt denies SI/HI/AH/VH but at times appears internally preoccupied. Pt mostly isolative to his room but is present for meal. Medication and meal compliant. No further concerns at this time. Plan of care ongoing.

## 2023-08-22 NOTE — NURSING NOTE
Visible at the start of the shift but seclusive to room after eating breakfast. Denies AH as of this time. No complaints. Compliant with medications.

## 2023-08-22 NOTE — PROGRESS NOTES
Progress Note - Behavioral Health   Gokul Altamirano Sober 26 y.o. male MRN: [id number]  Unit/Bed#: [facility] 343-01 Encounter: [id number]    Assessment/Plan   Principal Problem:    Schizoaffective disorder ([address])  Active Problems:    Legally blind    Hearing loss    Asthma    Medical clearance for psychiatric admission    Prolonged erection    Intellectual disability    Rib pain on left side      Recommended Treatment:     Continue medications as below:   Taper Loxapine to 30 mg TID due to lack of efficacy   Clozaril 150 mg qhs for psychosis (8/21/23 WBC 10.2 and ANC 3.51)  Depakote 750 mg BID for agitation/mood (Depakote level was 91 on 6/12/23)  Melatonin 3 mg qhs prn insomnia   Robinul 1 mg TID for drooling     Continue with pharmacotherapy, group therapy, milieu therapy and occupational therapy. Monitor for adverse medication side effects. Safety checks and vitals per unit protocol. CM/SW recommendations   Continue medical management by medical team.  Case discussed with treatment team.    Legal Status: voluntary 201  ------------------------------------------------------------    Subjective: All documentation including nursing notes, medication history to ensure medication adherence on the unit, labs, and vitals were reviewed. Gokul was evaluated this morning for continuity of care and no acute distress noted throughout the evaluation. Over the past 24 hours per nursing report, Gokul has been cooperative on the unit and compliant with medications. Patient did approach nurses station yesterday evening requesting a prn for agitation, he was given Zyprexa 7.5 mg at [address] [place] [address] which was effective. Per nursing, patient was observed responding to internal stimuli in evening. He also received Melatonin 3mg at 2333, which was effective. Otherwise, no acute events. Upon evaluation today, patient is seen sleeping in bed. He is cooperative with interview, and displays minimal eye contact.  Today, Gokul is consenting for safety on the unit. Gokul reports feeling "okay." Gokul notes having good sleep, about 7-8 hours. Gokul states having a good appetite and states he finished his breakfast. States his energy is "focused". Gokul has been taking the medications as prescribed and reporting no side effects. He denies any side effects of priapism. He states he last had a BM yesterday. He denies feeling constipated. He denies abdominal pain. Gokul denies suicidal ideations. Gokul denies homicidal ideations. Regarding hallucinations, Gokul denies auditory or visual hallucinations. He states he last heard voices "a few days ago". He denies other complaints. PRNs overnight: Zyprexa, Melatonin   VS: Reviewed, within normal limits    Progress Toward Goals: slow improvement    Psychiatric Review of Systems:  Behavior over the last 24 hours:  unchanged  Sleep: hypersomnia  Appetite: normal  Medication side effects: No   ROS: all other systems are negative    Vital signs in last 24 hours:  Temp:  [97.5 °F (36.4 °C)-97.9 °F (36.6 °C)] 97.5 °F (36.4 °C)  HR:  [] 95  Resp:  [16-18] 18  BP: (132-141)/(74-81) 141/74    Laboratory results:  I have personally reviewed all pertinent laboratory/tests results.   Recent Results (from the past 48 hour(s))   Fingerstick Glucose (POCT)    Collection Time: 08/20/23  6:49 PM   Result Value Ref Range    POC Glucose 430 (H) 65 - 140 mg/dl   Fingerstick Glucose (POCT)    Collection Time: 08/20/23  6:50 PM   Result Value Ref Range    POC Glucose 334 (H) 65 - 140 mg/dl   ECG 12 lead    Collection Time: 08/20/23  7:47 PM   Result Value Ref Range    Ventricular Rate 122 BPM    Atrial Rate 122 BPM    IN Interval 144 ms    QRSD Interval 86 ms    QT Interval 308 ms    QTC Interval 438 ms    P Axis 43 degrees    QRS Axis 76 degrees    T Wave Axis 20 degrees   Fingerstick Glucose (POCT)    Collection Time: 08/20/23  7:56 PM   Result Value Ref Range    POC Glucose 327 (H) 65 - 140 mg/dl   Hemoglobin A1C Collection Time: 08/20/23  7:57 PM   Result Value Ref Range    Hemoglobin A1C 6.8 (H) Normal 4.0-5.6%; PreDiabetic 5.7-6.4%;  Diabetic >=6.5%; Glycemic control for adults with diabetes <7.0% %     mg/dl   Basic metabolic panel    Collection Time: 08/20/23  7:57 PM   Result Value Ref Range    Sodium 134 (L) 135 - 147 mmol/L    Potassium 5.1 3.5 - 5.3 mmol/L    Chloride 98 96 - 108 mmol/L    CO2 31 21 - 32 mmol/L    ANION GAP 5 mmol/L    BUN 16 5 - 25 mg/dL    Creatinine 1.34 (H) 0.60 - 1.30 mg/dL    Glucose 262 (H) 65 - 140 mg/dL    Calcium 9.3 8.4 - 10.2 mg/dL    eGFR 74 ml/min/1.73sq m   Beta Hydroxybutyrate    Collection Time: 08/20/23  7:57 PM   Result Value Ref Range    BETA-HYDROXYBUTYRATE 0.1 <0.6 mmol/L   CBC and differential    Collection Time: 08/21/23  5:30 AM   Result Value Ref Range    WBC 10.20 (H) 4.31 - 10.16 Thousand/uL    RBC 4.54 3.88 - 5.62 Million/uL    Hemoglobin 14.0 12.0 - 17.0 g/dL    Hematocrit 42.2 36.5 - 49.3 %    MCV 93 82 - 98 fL    MCH 30.8 26.8 - 34.3 pg    MCHC 33.2 31.4 - 37.4 g/dL    RDW 12.7 11.6 - 15.1 %    MPV 11.1 8.9 - 12.7 fL    Platelets 440 574 - 363 Thousands/uL    nRBC 0 /100 WBCs    Neutrophils Relative 34 (L) 43 - 75 %    Immat GRANS % 1 0 - 2 %    Lymphocytes Relative 42 14 - 44 %    Monocytes Relative 12 4 - 12 %    Eosinophils Relative 10 (H) 0 - 6 %    Basophils Relative 1 0 - 1 %    Neutrophils Absolute 3.51 1.85 - 7.62 Thousands/µL    Immature Grans Absolute 0.11 0.00 - 0.20 Thousand/uL    Lymphocytes Absolute 4.32 0.60 - 4.47 Thousands/µL    Monocytes Absolute 1.20 0.17 - 1.22 Thousand/µL    Eosinophils Absolute 0.98 (H) 0.00 - 0.61 Thousand/µL    Basophils Absolute 0.08 0.00 - 0.10 Thousands/µL   Fingerstick Glucose (POCT)    Collection Time: 08/21/23  5:04 PM   Result Value Ref Range    POC Glucose 90 65 - 140 mg/dl   Fingerstick Glucose (POCT)    Collection Time: 08/21/23  8:39 PM   Result Value Ref Range    POC Glucose 138 65 - 140 mg/dl   Fingerstick Glucose (POCT)    Collection Time: 08/22/23  7:34 AM   Result Value Ref Range    POC Glucose 132 65 - 140 mg/dl         Mental Status Evaluation:    Appearance:  marginal hygiene, looks older than stated age, overtly  appearing male, laying in bed, minimal eye contact   Behavior:  cooperative, guarded   Speech:  coherent, decreased rate, scant, decreased volume   Mood:  "okay"   Affect:  blunted   Thought Process:  poverty of thought   Associations: concrete associations   Thought Content:  no overt delusions   Perceptual Disturbances: Denies auditory or visual hallucinations, Appears to be internally preoccupied and Does not appear to be responding to internal stimuli   Risk Potential: Suicidal ideation - None at present, contracts for safety on the unit, would talk to staff if not feeling safe on the unit  Homicidal ideation - None at present  Potential for aggression - Not at present   Sensorium:  unable to assess   Memory:  recent and remote memory grossly intact   Consciousness:  awake and drowsy   Attention/Concentration: attention span and concentration appear shorter than expected for age   Insight:  limited   Judgment: limited   Gait/Station: normal gait/station   Motor Activity: no abnormal movements       Current Medications:  Current Facility-Administered Medications   Medication Dose Route Frequency Provider Last Rate   • acetaminophen  650 mg Oral Q6H PRN Nagi Boykin MD     • acetaminophen  650 mg Oral Q4H PRN Nagi Boykin MD     • acetaminophen  975 mg Oral Q6H PRN Nagi Boykin MD     • albuterol  2 puff Inhalation Q4H PRN Nagi Boykin MD     • aluminum-magnesium hydroxide-simethicone  30 mL Oral Q4H PRN Nagi Boykin MD     • benztropine  1 mg Intramuscular BID PRN Nelly Muller MD     • benztropine  1 mg Oral Q4H PRN Max 6/day Nagi Boykin MD     • cloZAPine  150 mg Oral Daily Farzaneh Mays DO     • divalproex sodium  750 mg Oral BID Aguila Nila Humphrey MD     • fluticasone  1 spray Nasal Daily PRN Christelle Evangelista PA-C     • glycopyrrolate  1 mg Oral TID Rosalinda Ramirez DO     • hydrOXYzine HCL  100 mg Oral Q6H PRN Max 4/day Ajit Hobbs DO      Or   • LORazepam  1 mg Intramuscular Q6H PRN Ajit Hobbs DO     • hydrOXYzine HCL  25 mg Oral Q6H PRN Max 4/day Salma Elena MD     • hydrOXYzine HCL  50 mg Oral Q6H PRN Max 4/day Salma Elena MD     • insulin lispro  1-6 Units Subcutaneous TID AC ELA Wise     • insulin lispro  1-6 Units Subcutaneous HS ELA Wise     • loxapine  40 mg Oral BID Harlon Big, DO     • loxapine  40 mg Oral QPM Jose Jackson, DO     • melatonin  3 mg Oral HS PRN Ajit Hobbs DO     • nicotine polacrilex  4 mg Oral Q2H PRN Salma Elena MD     • OLANZapine  5 mg Oral Q3H PRN Max 6/day Ellen Downey MD      Or   • OLANZapine  5 mg Intramuscular Q3H PRN Max 6/day Ellen Downey MD     • OLANZapine  5 mg Intramuscular Q8H PRN Ajit Hobbs DO      Or   • OLANZapine  7.5 mg Oral Q8H PRN Ajit Hobbs DO     • OLANZapine  2.5 mg Oral Q3H PRN Max 8/day Ellen Downey MD     • ondansetron  4 mg Oral Q8H PRN Ajit Hobbs DO     • polyethylene glycol  17 g Oral Daily PRN Salma Elena MD     • pseudoephedrine  120 mg Oral BID PRN Talat Tyler MD     • senna-docusate sodium  1 tablet Oral Daily PRN Salma Elena MD     • senna-docusate sodium  2 tablet Oral BID Ellen Downey MD     • sterile water          • sterile water          • sterile water          • sterile water          • sterile water          • sterile water          • sterile water          • sterile water          • sterile water          • sterile water          • sterile water              Behavioral Health Medications: All current active meds have been reviewed. Changes as in plan section above.   Risks, benefits and possible side effects of Medications:   Risks, benefits, and possible side effects of medications explained to patient and patient verbalizes understanding. Counseling / Coordination of Care:  Patient's progress discussed with staff in treatment team meeting. Medications, treatment progress and treatment plan reviewed with patient. Rich Art, DO  Psychiatry Resident, PGY-I    This note was completed in part utilizing Dragon dictation Software. Grammatical, translation, syntax errors, random word insertions, spelling mistakes, and incomplete sentences may be an occasional consequence of this system secondary to software limitations with voice recognition, ambient noise, and hardware issues. If you have any questions or concerns about the content, text, or information contained within the body of this dictation, please contact the provider for clarification.

## 2023-08-22 NOTE — NURSING NOTE
Pt currently bed at this time, no s/s of Patient responding to internal  Stimuli.  Prn zyprexa effective

## 2023-08-22 NOTE — PROGRESS NOTES
08/22/23 0803   Team Meeting   Meeting Type Daily Rounds   Team Members Present   Team Members Present Physician;Nurse;   Physician Team Member [id number]   Care Management Team Member Wendie   Patient/Family Present   Patient Present No   Patient's Family Present No   Pt's A1C came back at 6.8, blood sugar to be monitored. Pt c/o agitation, requested PRN for same. Pt later requesting melatonin for sleep. Pleasant and cooperative. Discharge to be determined.

## 2023-08-23 LAB
GLUCOSE SERPL-MCNC: 101 MG/DL (ref 65–140)
GLUCOSE SERPL-MCNC: 126 MG/DL (ref 65–140)
GLUCOSE SERPL-MCNC: 142 MG/DL (ref 65–140)
GLUCOSE SERPL-MCNC: 227 MG/DL (ref 65–140)

## 2023-08-23 PROCEDURE — 99232 SBSQ HOSP IP/OBS MODERATE 35: CPT | Performed by: PSYCHIATRY & NEUROLOGY

## 2023-08-23 PROCEDURE — 82948 REAGENT STRIP/BLOOD GLUCOSE: CPT

## 2023-08-23 RX ORDER — LOXAPINE SUCCINATE 10 MG/1
20 TABLET ORAL EVERY EVENING
Status: DISCONTINUED | OUTPATIENT
Start: 2023-08-23 | End: 2023-08-24

## 2023-08-23 RX ORDER — LOXAPINE SUCCINATE 10 MG/1
20 TABLET ORAL 2 TIMES DAILY
Status: DISCONTINUED | OUTPATIENT
Start: 2023-08-23 | End: 2023-08-24

## 2023-08-23 RX ADMIN — HYDROXYZINE HYDROCHLORIDE 100 MG: 50 TABLET, FILM COATED ORAL at 20:11

## 2023-08-23 RX ADMIN — LOXAPINE 20 MG: 10 CAPSULE ORAL at 21:25

## 2023-08-23 RX ADMIN — HYDROXYZINE HYDROCHLORIDE 50 MG: 50 TABLET, FILM COATED ORAL at 00:15

## 2023-08-23 RX ADMIN — GLYCOPYRROLATE 1 MG: 1 TABLET ORAL at 21:24

## 2023-08-23 RX ADMIN — SENNOSIDES AND DOCUSATE SODIUM 2 TABLET: 8.6; 5 TABLET ORAL at 08:28

## 2023-08-23 RX ADMIN — SENNOSIDES AND DOCUSATE SODIUM 2 TABLET: 8.6; 5 TABLET ORAL at 17:44

## 2023-08-23 RX ADMIN — MELATONIN TAB 3 MG 3 MG: 3 TAB at 23:10

## 2023-08-23 RX ADMIN — DIVALPROEX SODIUM 750 MG: 500 TABLET, DELAYED RELEASE ORAL at 08:28

## 2023-08-23 RX ADMIN — LOXAPINE 30 MG: 25 CAPSULE ORAL at 08:29

## 2023-08-23 RX ADMIN — DIVALPROEX SODIUM 750 MG: 500 TABLET, DELAYED RELEASE ORAL at 19:20

## 2023-08-23 RX ADMIN — GLYCOPYRROLATE 1 MG: 1 TABLET ORAL at 17:44

## 2023-08-23 RX ADMIN — LOXAPINE 20 MG: 10 CAPSULE ORAL at 17:44

## 2023-08-23 RX ADMIN — INSULIN LISPRO 2 UNITS: 100 INJECTION, SOLUTION INTRAVENOUS; SUBCUTANEOUS at 12:17

## 2023-08-23 RX ADMIN — METFORMIN HYDROCHLORIDE 500 MG: 500 TABLET ORAL at 17:45

## 2023-08-23 RX ADMIN — GLYCOPYRROLATE 1 MG: 1 TABLET ORAL at 08:29

## 2023-08-23 RX ADMIN — NICOTINE POLACRILEX 4 MG: 4 GUM, CHEWING BUCCAL at 19:38

## 2023-08-23 RX ADMIN — CLOZAPINE 150 MG: 100 TABLET ORAL at 17:45

## 2023-08-23 NOTE — QUICK NOTE
Patient with elevated blood sugars, with isolated elevations in the 200s and 300s. Newly diagnosed diabetic with Hemoglobin A1c on 8/20/2023 at 6.8. Will begin metformin 500 mg twice daily. SLIM will continue to monitor and adjust hypoglycemic regimen as needed.

## 2023-08-23 NOTE — NURSING NOTE
Received patient at 1500. Thw patient is currently visible int he unit and has made a couple of phone calls to his grandmother and a friend. The patient denies any SI,or HI . Patient compliant with schedule medication.  Patient remain Q 7 min check

## 2023-08-23 NOTE — NURSING NOTE
Pt requested PRN Melatonin 3mg for Insomnia at 2321, Pt given PRN Melatonin will reassess and monitor     Pt reassessed at 0021 Pt still awake PRN not effective

## 2023-08-23 NOTE — PROGRESS NOTES
08/23/23 0525   Team Meeting   Meeting Type Daily Rounds   Team Members Present   Team Members Present Physician;Nurse;   Physician Team Member [id number] [facility] Wendie   Patient/Family Present   Patient Present No   Patient's Family Present No     Pt med/meal compliant. PRN Atarax for anxiety overnight. Pt's crosstaper from Loxapine to Clozaril continues. Remains scant, guarded in conversation. Discharge to be determined.

## 2023-08-23 NOTE — PROGRESS NOTES
Progress Note - Behavioral Health   Gokul Coles [facility] 26 y.o. male MRN: [id number]  Unit/Bed#: [facility] 343-01 Encounter: [id number]    Assessment/Plan   Principal Problem:    Schizoaffective disorder ([address])  Active Problems:    Legally blind    Hearing loss    Asthma    Medical clearance for psychiatric admission    Prolonged erection    Intellectual disability    Rib pain on left side      Recommended Treatment:     Continue medications as below:   Taper Loxapine to 20 TID due to lack of efficacy   Clozapine 150 mg qhs for psychosis (08/21/23 WBC 10.2 and ANC 3.51)  Depakote 750 mg BID for agitation/mood (Depakote level was 91 on 06/12/23)  Robinul 1 mg TID for drooling   Melatonin 3 mg qhs prn insomnia     Continue with pharmacotherapy, group therapy, milieu therapy and occupational therapy. Monitor for adverse medication side effects. Safety checks and vitals per unit protocol. CM/SW recommendations   Continue medical management by medical team.  Case discussed with treatment team.    Legal Status: voluntary 201   ------------------------------------------------------------    Subjective: All documentation including nursing notes, medication history to ensure medication adherence on the unit, labs, and vitals were reviewed. Gokul was evaluated this morning for continuity of care and no acute distress noted throughout the evaluation. Over the past 24 hours per nursing report, Gokul has been cooperative on the unit and compliant with medications. Patient did receive Atarax 50 mg for anxiety at 1814, which was effective. He was noted by staff to be watching TV in the evening and in good behavioral control. He did also receive Melatonin 3 mg for insomnia at 2321, which was not effective. He was subsequently c/o anxiety due to insomnia and given Atarax 50 mg at 0015, which was not effective per report. Otherwise no acute events.      Today, Gokul is evaluated in interview room with treatment team. He is more alert and cooperative. Today, Gokul is consenting for safety on the unit. Gokul reports feeling "fine." Gokul notes having good sleep, about 9 hours. Gokul states having an "alright" appetite. Gokul has been taking the medications as prescribed and reporting no side effects. He denies priapism. He states he last had a BM yesterday and states it was normal. He states he has been attending groups and was coloring and playing video games with other peers. He states his goal for today is to take a shower. Gokul denies suicidal ideations. Gokul denies homicidal ideations. Regarding hallucinations, Gokul denies auditory or visual hallucinations. He reports last hearing "voices mumbling" a few days ago. He denies other complaints. PRNs overnight: Atarax, Melatonin    VS: Reviewed, , otherwise WNL     Progress Toward Goals: slow improvement    Psychiatric Review of Systems:  Behavior over the last 24 hours:  improved  Sleep: normal  Appetite: normal  Medication side effects: No   ROS: all other systems are negative    Vital signs in last 24 hours:  Temp:  [97.9 °F (36.6 °C)-98 °F (36.7 °C)] 98 °F (36.7 °C)  HR:  [117-121] 121  Resp:  [16] 16  BP: (126-149)/(75-82) 149/82    Laboratory results:  I have personally reviewed all pertinent laboratory/tests results.   Recent Results (from the past 48 hour(s))   Fingerstick Glucose (POCT)    Collection Time: 08/21/23  5:04 PM   Result Value Ref Range    POC Glucose 90 65 - 140 mg/dl   Fingerstick Glucose (POCT)    Collection Time: 08/21/23  8:39 PM   Result Value Ref Range    POC Glucose 138 65 - 140 mg/dl   Fingerstick Glucose (POCT)    Collection Time: 08/22/23  7:34 AM   Result Value Ref Range    POC Glucose 132 65 - 140 mg/dl   Fingerstick Glucose (POCT)    Collection Time: 08/22/23 11:45 AM   Result Value Ref Range    POC Glucose 156 (H) 65 - 140 mg/dl   Fingerstick Glucose (POCT)    Collection Time: 08/22/23  4:45 PM   Result Value Ref Range    POC Glucose 230 (H) 65 - 140 mg/dl   Fingerstick Glucose (POCT)    Collection Time: 08/22/23  8:03 PM   Result Value Ref Range    POC Glucose 137 65 - 140 mg/dl   Fingerstick Glucose (POCT)    Collection Time: 08/23/23  8:09 AM   Result Value Ref Range    POC Glucose 101 65 - 140 mg/dl   Fingerstick Glucose (POCT)    Collection Time: 08/23/23 11:18 AM   Result Value Ref Range    POC Glucose 227 (H) 65 - 140 mg/dl         Mental Status Evaluation:    Appearance:  casually dressed, marginal hygiene, looks older than stated age, overtly  appearing male, minimal eye contact   Behavior:  pleasant, cooperative, calm, less guarded   Speech:  coherent, decreased rate, scant, decreased volume   Mood:  "fine"   Affect:  less blunted   Thought Process:  poverty of thought   Associations: concrete associations   Thought Content:  no overt delusions   Perceptual Disturbances: Denies auditory or visual hallucinations, Appears to be internally preoccupied and Does not appear to be responding to internal stimuli, appears distracted   Risk Potential: Suicidal ideation - None at present, contracts for safety on the unit, would talk to staff if not feeling safe on the unit  Homicidal ideation - None at present  Potential for aggression - Not at present   Sensorium:  grossly oriented to situation   Memory:  recent and remote memory grossly intact   Consciousness:  alert and awake   Attention/Concentration: attention span and concentration appear shorter than expected for age   Insight:  limited   Judgment: limited   Gait/Station: normal gait/station   Motor Activity: no abnormal movements       Current Medications:  Current Facility-Administered Medications   Medication Dose Route Frequency Provider Last Rate   • acetaminophen  650 mg Oral Q6H PRN Jany Fraser MD     • acetaminophen  650 mg Oral Q4H PRN Jany Fraser MD     • acetaminophen  975 mg Oral Q6H PRN Jany Fraser MD     • albuterol  2 puff Inhalation Q4H PRN Urbano Lowe MD     • aluminum-magnesium hydroxide-simethicone  30 mL Oral Q4H PRN Urbano Lowe MD     • benztropine  1 mg Intramuscular BID PRN Tony Hurt MD     • benztropine  1 mg Oral Q4H PRN Max 6/day Urbano Lowe MD     • cloZAPine  150 mg Oral Daily Monica Nail, DO     • divalproex sodium  750 mg Oral BID Cesar Rodgers MD     • fluticasone  1 spray Nasal Daily PRN Molly Parikh PA-C     • glycopyrrolate  1 mg Oral TID Mika Lewis, DO     • hydrOXYzine HCL  100 mg Oral Q6H PRN Max 4/day Demaris Lamp, DO      Or   • LORazepam  1 mg Intramuscular Q6H PRN Demaris Lamp, DO     • hydrOXYzine HCL  25 mg Oral Q6H PRN Max 4/day Urbano Lowe MD     • hydrOXYzine HCL  50 mg Oral Q6H PRN Max 4/day Urbano Lowe MD     • insulin lispro  1-6 Units Subcutaneous TID AC ELA Wise     • insulin lispro  1-6 Units Subcutaneous HS ELA Wise     • loxapine  20 mg Oral BID Monica Nail, DO     • loxapine  20 mg Oral QPM Monica Nail, DO     • melatonin  3 mg Oral HS PRN Demaris Lamp, DO     • metFORMIN  500 mg Oral BID With Meals Molly Parikh PA-C     • nicotine polacrilex  4 mg Oral Q2H PRN Urbano Lowe MD     • OLANZapine  5 mg Oral Q3H PRN Max 6/day Cesar Rodgers MD      Or   • OLANZapine  5 mg Intramuscular Q3H PRN Max 6/day Cesar Rodgers MD     • OLANZapine  5 mg Intramuscular Q8H PRN Demaris Lamp, DO      Or   • OLANZapine  7.5 mg Oral Q8H PRN Demaris Lamp, DO     • OLANZapine  2.5 mg Oral Q3H PRN Max 8/day Cesar Rodgers MD     • ondansetron  4 mg Oral Q8H PRN Demaris Lamp, DO     • polyethylene glycol  17 g Oral Daily PRN Urbano Lowe MD     • pseudoephedrine  120 mg Oral BID PRN Marciano Naranjo MD     • senna-docusate sodium  1 tablet Oral Daily PRN Urbano Lowe MD     • senna-docusate sodium  2 tablet Oral BID Cesar Rodgers MD     • sterile water          • sterile water          • sterile water          • sterile water          • sterile water          • sterile water          • sterile water          • sterile water          • sterile water          • sterile water          • sterile water              Behavioral Health Medications: All current active meds have been reviewed. Changes as in plan section above. Risks, benefits and possible side effects of Medications:   Risks, benefits, and possible side effects of medications explained to patient and patient verbalizes understanding. Counseling / Coordination of Care:  Patient's progress discussed with staff in treatment team meeting. Medications, treatment progress and treatment plan reviewed with patient. May Buitrago DO  Psychiatry Resident, PGY-I    This note was completed in part utilizing Dragon dictation Software. Grammatical, translation, syntax errors, random word insertions, spelling mistakes, and incomplete sentences may be an occasional consequence of this system secondary to software limitations with voice recognition, ambient noise, and hardware issues. If you have any questions or concerns about the content, text, or information contained within the body of this dictation, please contact the provider for clarification.

## 2023-08-23 NOTE — NURSING NOTE
Pt c/o anxiety 6/10 due to insomnia, Pt given Atarax 50mg at 0015, Will reassess and monitor     Pt reassessed at 0115 and pt was still awake in room and pacing anthony at times, PRN not effective

## 2023-08-23 NOTE — NURSING NOTE
PT visible watching TV in Patient lounge , less respond to internal stimulin, PT denies SI/HI, compliant to scheduled meds and unit routine. No pain/discomfort reported.

## 2023-08-23 NOTE — NURSING NOTE
Patient only out of bed for breakfast and morning medications. Denies symptoms at this time. No complaints. Waiting EAC placement.

## 2023-08-24 LAB
GLUCOSE SERPL-MCNC: 114 MG/DL (ref 65–140)
GLUCOSE SERPL-MCNC: 177 MG/DL (ref 65–140)
GLUCOSE SERPL-MCNC: 200 MG/DL (ref 65–140)
GLUCOSE SERPL-MCNC: 80 MG/DL (ref 65–140)

## 2023-08-24 PROCEDURE — 82948 REAGENT STRIP/BLOOD GLUCOSE: CPT

## 2023-08-24 PROCEDURE — 93005 ELECTROCARDIOGRAM TRACING: CPT

## 2023-08-24 PROCEDURE — 99232 SBSQ HOSP IP/OBS MODERATE 35: CPT | Performed by: PSYCHIATRY & NEUROLOGY

## 2023-08-24 RX ORDER — LOXAPINE SUCCINATE 10 MG/1
10 TABLET ORAL 2 TIMES DAILY
Status: DISCONTINUED | OUTPATIENT
Start: 2023-08-24 | End: 2023-08-25

## 2023-08-24 RX ORDER — LOXAPINE SUCCINATE 10 MG/1
10 TABLET ORAL EVERY EVENING
Status: DISCONTINUED | OUTPATIENT
Start: 2023-08-24 | End: 2023-08-25

## 2023-08-24 RX ADMIN — MELATONIN TAB 3 MG 3 MG: 3 TAB at 23:36

## 2023-08-24 RX ADMIN — LOXAPINE 20 MG: 10 CAPSULE ORAL at 08:23

## 2023-08-24 RX ADMIN — GLYCOPYRROLATE 1 MG: 1 TABLET ORAL at 21:11

## 2023-08-24 RX ADMIN — SENNOSIDES AND DOCUSATE SODIUM 2 TABLET: 8.6; 5 TABLET ORAL at 17:01

## 2023-08-24 RX ADMIN — METFORMIN HYDROCHLORIDE 500 MG: 500 TABLET ORAL at 17:02

## 2023-08-24 RX ADMIN — INSULIN LISPRO 2 UNITS: 100 INJECTION, SOLUTION INTRAVENOUS; SUBCUTANEOUS at 21:09

## 2023-08-24 RX ADMIN — SENNOSIDES AND DOCUSATE SODIUM 2 TABLET: 8.6; 5 TABLET ORAL at 08:23

## 2023-08-24 RX ADMIN — INSULIN LISPRO 1 UNITS: 100 INJECTION, SOLUTION INTRAVENOUS; SUBCUTANEOUS at 12:33

## 2023-08-24 RX ADMIN — DIVALPROEX SODIUM 750 MG: 500 TABLET, DELAYED RELEASE ORAL at 08:23

## 2023-08-24 RX ADMIN — GLYCOPYRROLATE 1 MG: 1 TABLET ORAL at 17:00

## 2023-08-24 RX ADMIN — GLYCOPYRROLATE 1 MG: 1 TABLET ORAL at 08:23

## 2023-08-24 RX ADMIN — DIVALPROEX SODIUM 750 MG: 500 TABLET, DELAYED RELEASE ORAL at 18:13

## 2023-08-24 RX ADMIN — LOXAPINE 10 MG: 10 CAPSULE ORAL at 17:01

## 2023-08-24 RX ADMIN — HYDROXYZINE HYDROCHLORIDE 50 MG: 50 TABLET, FILM COATED ORAL at 23:36

## 2023-08-24 RX ADMIN — LOXAPINE 10 MG: 10 CAPSULE ORAL at 21:11

## 2023-08-24 RX ADMIN — CLOZAPINE 150 MG: 100 TABLET ORAL at 17:00

## 2023-08-24 RX ADMIN — OLANZAPINE 7.5 MG: 2.5 TABLET, FILM COATED ORAL at 19:53

## 2023-08-24 RX ADMIN — METFORMIN HYDROCHLORIDE 500 MG: 500 TABLET ORAL at 08:23

## 2023-08-24 NOTE — NURSING NOTE
Patient approached this nurse requesting prn for agitation due to hearing voices.  zyprexa 7.5 mg prn administered

## 2023-08-24 NOTE — NURSING NOTE
Only out of bed for breakfast and morning medications. Scant and guarded during interaction. Cooperative. Denies symptoms. No complaints.

## 2023-08-24 NOTE — PROGRESS NOTES
08/24/23 0824   Team Meeting   Meeting Type Daily Rounds   Team Members Present   Team Members Present Physician;Nurse;   Physician Team Member [id number] Dev   Patient/Family Present   Patient Present No   Patient's Family Present No   No behavioral issues. Pt more conversational and awake yesterday. PRN atarax and melatonin yesterday, effective. Tapering loxapine. Repeat EKG today. Med/meal compliant. DC tbd.

## 2023-08-24 NOTE — NURSING NOTE
The Patient approached the nurse station requesting anxiety medication due to feeling anxious about the unit's acuity.  The patient was administered Atarax for his anxiety   21:11 Patient reported atarax was effective

## 2023-08-24 NOTE — PROGRESS NOTES
Progress Note - Behavioral Health   Gokul Lanier 26 y.o. male MRN: [id number]  Unit/Bed#: [facility] 343-01 Encounter: [id number]    Assessment/Plan   Principal Problem:    Schizoaffective disorder ([address])  Active Problems:    Legally blind    Hearing loss    Asthma    Medical clearance for psychiatric admission    Prolonged erection    Intellectual disability    Rib pain on left side      Recommended Treatment:   Patient started on Metformin 500 mg BID for DM  Continue tapering loxapine: decrease to 10 mg TID due to lack of efficacy  Continue Clozapine 150 mg qHS for psychosis  Continue Depakote 750 mg twice daily for agitation/mood (VPA level 91 on 6/12/2023)  Continue Robinul 1 mg 3 times daily for drooling  F/U EKG    Continue with group therapy, milieu therapy and occupational therapy. Continue frequent safety checks and vitals per unit protocol. Case discussed with treatment team.  Risks, benefits and possible side effects of Medications: Risks, benefits, and possible side effects of medications have been explained to the patient, who verbalizes understanding    Current Legal Status: 201  Disposition: likely EAC  ------------------------------------------------------------    Subjective: Per nursing report, Gokul has been cooperative on the unit and compliant with scheduled medications. Patient was seen making phone calls to grandmother and a friend. PRNs: Atarax 100 mg p.o. at 2011, melatonin 3 mg at 2310, nicotine. Today, Gokul was seen and evaluated for continuity of care. On evaluation, patient is blunted in affect with some poverty of thought, more alert but scant in speech. He reports feeling "good" this morning. Patient states he is unsure him years of sleep he got overnight but fell asleep around midnight and was sleeping until interview at 10:18 AM.  However, he reports he was out of bed for breakfast. He reports energy levels are "okay" this morning.   Patient reports tolerating current medication regimen well and reports a bowel movement yesterday. He currently denies any side effects and denies feeling constipated. Patient shares he called his grandmother yesterday and she told him she would cook him something whenever he comes home. Patient reports he showered yesterday and it was "refreshing". Patient asks about discharge and this writer discussed likely discharge plan with patient, to which he was agreeable. Patient replies "I don't know" when asked about plans for later today. He was encouraged to get some physical activity today. Patient denies SI/HI/AVH. Progress Toward Goals: slow improvement    Psychiatric Review of Systems:  Behavior over the last 24 hours: improving slowly  Sleep: improving  Appetite: adequate  Medication side effects: none verbalized  ROS: Complete review of systems is negative except as noted above.     Vital signs in last 24 hours:   Temp:  [97.4 °F (36.3 °C)-97.7 °F (36.5 °C)] 97.4 °F (36.3 °C)  HR:  [110-115] 110  Resp:  [18] 18  BP: (129-140)/(68-75) 129/68    Mental Status Exam:  Appearance:  Overtly appearing AA male, more alert, intermittent eye contact, appears stated age, marginal grooming/hygiene and Dressed in hospital attire, no acute distress   Behavior:  calm, cooperative and laying in bed   Motor: no abnormal movements and Unable to assess gait/balance as patient was laying in bed   Speech:  spontaneous, clear, normal rate, normal volume, scant and coherent   Mood:  "good"   Affect:  blunted   Thought Process:  logical, some poverty of thought   Thought Content: no verbalized delusions or overt paranoia   Perceptual disturbances: no reported hallucinations and does not appear to be responding to internal stimuli at this time; appears distracted   Risk Potential: No active or passive suicidal or homicidal ideation was verbalized during interview   Cognition: oriented to self and situation, appears to be of average intelligence and cognition not formally tested   Insight:  Limited   Judgment: Limited     Current Medications:  Current Facility-Administered Medications   Medication Dose Route Frequency Provider Last Rate   • acetaminophen  650 mg Oral Q6H PRN Rivera Johnson MD     • acetaminophen  650 mg Oral Q4H PRN Rivera Johnson MD     • acetaminophen  975 mg Oral Q6H PRN Rivera Johnson MD     • albuterol  2 puff Inhalation Q4H PRN Rivera Johnson MD     • aluminum-magnesium hydroxide-simethicone  30 mL Oral Q4H PRN Rivera Johnson MD     • benztropine  1 mg Intramuscular BID PRN Jillian Huerta MD     • benztropine  1 mg Oral Q4H PRN Max 6/day Rivera Johnson MD     • cloZAPine  150 mg Oral Daily Hayley Marine, DO     • divalproex sodium  750 mg Oral BID Barbie Damico MD     • fluticasone  1 spray Nasal Daily PRN Carlyle Goodman PA-C     • glycopyrrolate  1 mg Oral TID Tru Sick, DO     • hydrOXYzine HCL  100 mg Oral Q6H PRN Max 4/day Margot Minor, DO      Or   • LORazepam  1 mg Intramuscular Q6H PRN Margot Minor, DO     • hydrOXYzine HCL  25 mg Oral Q6H PRN Max 4/day Rivera Johnson MD     • hydrOXYzine HCL  50 mg Oral Q6H PRN Max 4/day Rivera Johnson MD     • insulin lispro  1-6 Units Subcutaneous TID AC ELA Wise     • insulin lispro  1-6 Units Subcutaneous HS ELA Wise     • loxapine  10 mg Oral QPM Tru Sick, DO     • loxapine  10 mg Oral BID Tru Sick, DO     • melatonin  3 mg Oral HS PRN Margot Minor, DO     • metFORMIN  500 mg Oral BID With Meals Carlyle Goodman PA-C     • nicotine polacrilex  4 mg Oral Q2H PRN Rivera Johnson MD     • OLANZapine  5 mg Oral Q3H PRN Max 6/day Barbie Damico MD      Or   • OLANZapine  5 mg Intramuscular Q3H PRN Max 6/day Barbie Damico MD     • OLANZapine  5 mg Intramuscular Q8H PRN Margot Minor, DO      Or   • OLANZapine  7.5 mg Oral Q8H PRN Margot Minor, DO     • OLANZapine  2.5 mg Oral Q3H PRN Max 8/day Aguila Marielos Mathis MD     • ondansetron  4 mg Oral Q8H PRN Constantine Lewis DO     • polyethylene glycol  17 g Oral Daily PRN Katie Sorensen MD     • pseudoephedrine  120 mg Oral BID PRN Arya Philippe MD     • senna-docusate sodium  1 tablet Oral Daily PRN Katie Sorensen MD     • senna-docusate sodium  2 tablet Oral BID Bret Platt MD     • sterile water          • sterile water          • sterile water          • sterile water          • sterile water          • sterile water          • sterile water          • sterile water          • sterile water          • sterile water          • sterile water              Behavioral Health Medications: all current active meds have been reviewed. Changes as in plan section above. Laboratory results:  I have personally reviewed all pertinent laboratory/tests results.   Recent Results (from the past 48 hour(s))   Fingerstick Glucose (POCT)    Collection Time: 08/22/23  4:45 PM   Result Value Ref Range    POC Glucose 230 (H) 65 - 140 mg/dl   Fingerstick Glucose (POCT)    Collection Time: 08/22/23  8:03 PM   Result Value Ref Range    POC Glucose 137 65 - 140 mg/dl   Fingerstick Glucose (POCT)    Collection Time: 08/23/23  8:09 AM   Result Value Ref Range    POC Glucose 101 65 - 140 mg/dl   Fingerstick Glucose (POCT)    Collection Time: 08/23/23 11:18 AM   Result Value Ref Range    POC Glucose 227 (H) 65 - 140 mg/dl   Fingerstick Glucose (POCT)    Collection Time: 08/23/23  4:23 PM   Result Value Ref Range    POC Glucose 142 (H) 65 - 140 mg/dl   Fingerstick Glucose (POCT)    Collection Time: 08/23/23  8:33 PM   Result Value Ref Range    POC Glucose 126 65 - 140 mg/dl   Fingerstick Glucose (POCT)    Collection Time: 08/24/23  8:27 AM   Result Value Ref Range    POC Glucose 114 65 - 140 mg/dl   Fingerstick Glucose (POCT)    Collection Time: 08/24/23 12:21 PM   Result Value Ref Range    POC Glucose 177 (H) 65 - 140 mg/dl        Demetria Jones DO

## 2023-08-24 NOTE — NURSING NOTE
Patient compliant with med's , out of room to day room socializing with peers. Offers no complaint of pain or discomfort. Denies SI/HI/AH/VH at this time.

## 2023-08-25 LAB
ATRIAL RATE: 100 BPM
ATRIAL RATE: 124 BPM
GLUCOSE SERPL-MCNC: 116 MG/DL (ref 65–140)
GLUCOSE SERPL-MCNC: 129 MG/DL (ref 65–140)
GLUCOSE SERPL-MCNC: 140 MG/DL (ref 65–140)
GLUCOSE SERPL-MCNC: 193 MG/DL (ref 65–140)
P AXIS: 50 DEGREES
P AXIS: 54 DEGREES
PR INTERVAL: 130 MS
PR INTERVAL: 136 MS
QRS AXIS: 100 DEGREES
QRS AXIS: 72 DEGREES
QRSD INTERVAL: 88 MS
QRSD INTERVAL: 90 MS
QT INTERVAL: 302 MS
QT INTERVAL: 344 MS
QTC INTERVAL: 433 MS
QTC INTERVAL: 443 MS
T WAVE AXIS: 12 DEGREES
T WAVE AXIS: 25 DEGREES
VENTRICULAR RATE: 100 BPM
VENTRICULAR RATE: 124 BPM

## 2023-08-25 PROCEDURE — 93005 ELECTROCARDIOGRAM TRACING: CPT

## 2023-08-25 PROCEDURE — 99232 SBSQ HOSP IP/OBS MODERATE 35: CPT | Performed by: PSYCHIATRY & NEUROLOGY

## 2023-08-25 PROCEDURE — 82948 REAGENT STRIP/BLOOD GLUCOSE: CPT

## 2023-08-25 PROCEDURE — 93010 ELECTROCARDIOGRAM REPORT: CPT | Performed by: STUDENT IN AN ORGANIZED HEALTH CARE EDUCATION/TRAINING PROGRAM

## 2023-08-25 RX ADMIN — LOXAPINE 10 MG: 10 CAPSULE ORAL at 08:13

## 2023-08-25 RX ADMIN — GLYCOPYRROLATE 1 MG: 1 TABLET ORAL at 08:14

## 2023-08-25 RX ADMIN — GLYCOPYRROLATE 1 MG: 1 TABLET ORAL at 16:16

## 2023-08-25 RX ADMIN — METFORMIN HYDROCHLORIDE 500 MG: 500 TABLET ORAL at 08:13

## 2023-08-25 RX ADMIN — ACETAMINOPHEN 975 MG: 325 TABLET ORAL at 08:34

## 2023-08-25 RX ADMIN — INSULIN LISPRO 2 UNITS: 100 INJECTION, SOLUTION INTRAVENOUS; SUBCUTANEOUS at 17:23

## 2023-08-25 RX ADMIN — DIVALPROEX SODIUM 750 MG: 500 TABLET, DELAYED RELEASE ORAL at 08:13

## 2023-08-25 RX ADMIN — SENNOSIDES AND DOCUSATE SODIUM 2 TABLET: 8.6; 5 TABLET ORAL at 08:14

## 2023-08-25 RX ADMIN — SENNOSIDES AND DOCUSATE SODIUM 2 TABLET: 8.6; 5 TABLET ORAL at 17:23

## 2023-08-25 RX ADMIN — METFORMIN HYDROCHLORIDE 500 MG: 500 TABLET ORAL at 17:23

## 2023-08-25 RX ADMIN — CLOZAPINE 150 MG: 100 TABLET ORAL at 17:23

## 2023-08-25 RX ADMIN — GLYCOPYRROLATE 1 MG: 1 TABLET ORAL at 21:07

## 2023-08-25 RX ADMIN — POLYETHYLENE GLYCOL 3350 17 G: 17 POWDER, FOR SOLUTION ORAL at 16:16

## 2023-08-25 RX ADMIN — DIVALPROEX SODIUM 750 MG: 500 TABLET, DELAYED RELEASE ORAL at 18:19

## 2023-08-25 NOTE — NURSING NOTE
Patient currently in room resting comfortably, no further complaints at this time . Prn atarax and melatonin effective.

## 2023-08-25 NOTE — PROGRESS NOTES
08/25/23 0842   Team Meeting   Meeting Type Daily Rounds   Team Members Present   Team Members Present Physician;Nurse;   Physician Team Member [id number] Team Member [facility] Team Member Dev   Patient/Family Present   Patient Present No   Patient's Family Present No     Seclusive during day, more visible at night. PRN zyprexa for agitation due to AH-effective. PRN atarax and melatonin for anxiety and insomnia-effective. Med/meal compliant. Loxapine to dc today, clozapine to increase. DC pending EAC.

## 2023-08-25 NOTE — NURSING NOTE
Visible at the start of the shift while waiting for breakfast. Compliant with morning medications. Denies AH. Scant, poor eye contact during interaction although pleasant.

## 2023-08-25 NOTE — PLAN OF CARE
Problem: Ineffective Coping  Goal: Participates in unit activities  Description: Interventions:  - Provide therapeutic environment   - Provide required programming   - Redirect inappropriate behaviors   Outcome: Progressing     Problem: SELF HARM/SUICIDALITY  Goal: Will have no self-injury during hospital stay  Description: INTERVENTIONS:  - Q 15 MINUTES: Routine safety checks  - Q WAKING SHIFT & PRN: Assess risk to determine if routine checks are adequate to maintain patient safety  - Encourage patient to participate actively in care by formulating a plan to combat response to suicidal ideation, identify supports and resources  Outcome: Progressing     Problem: ANXIETY  Goal: Will report anxiety at manageable levels  Description: INTERVENTIONS:  - Administer medication as ordered  - Teach and encourage coping skills  - Provide emotional support  - Assess patient/family for anxiety and ability to cope  Outcome: Not Progressing  Goal: By discharge: Patient will verbalize 2 strategies to deal with anxiety  Description: Interventions:  - Identify any obvious source/trigger to anxiety  - Staff will assist patient in applying identified coping technique/skills  - Encourage attendance of scheduled groups and activities  Outcome: Not Progressing     Problem: SUBSTANCE USE/ABUSE  Goal: Will have no detox symptoms and will verbalize plan for changing substance-related behavior  Description: INTERVENTIONS:  - Monitor physical status and assess for symptoms of withdrawal  - Administer medication as ordered  - Provide emotional support with 1 on 1 interaction with staff  - Encourage recovery focused program/ addiction education  - Assess for verbalization of changing behaviors related to substance abuse  - Initiate consults and referrals as appropriate (Case Management, Spiritual Care, etc.)  Outcome: Progressing  Goal: By discharge, will develop insight into their chemical dependency and sustain motivation to continue in recovery  Description: INTERVENTIONS:  - Attends all daily group sessions and scheduled AA groups  - Actively practices coping skills through participation in the therapeutic community and adherence to program rules  - Reviews and completes assignments from individual treatment plan  - Assist patient development of understanding of their personal cycle of addiction and relapse triggers  Outcome: Progressing  Goal: By discharge, patient will have ongoing treatment plan addressing chemical dependency  Description: INTERVENTIONS:  - Assist patient with resources and/or appointments for ongoing recovery based living  Outcome: Progressing

## 2023-08-25 NOTE — PROGRESS NOTES
Progress Note - Behavioral Health   Gokul Gann 26 y.o. male MRN: [id number]  Unit/Bed#: [facility] 343-01 Encounter: [id number]    Assessment/Plan   Principal Problem:    Schizoaffective disorder ([address])  Active Problems:    Legally blind    Hearing loss    Asthma    Medical clearance for psychiatric admission    Prolonged erection    Intellectual disability    Rib pain on left side      Recommended Treatment:   Discontinue Laxopine due to lack of efficacy  Continue Clozapine 150mg qHS for psychosis (CBC 8/21/23: WBC 10.2, ANC 3.51)  Continue Valproic Acid 750mg BID for agitation/mood (Depakote level 91 on 6/12/23)  Continue Glycopyrrolate 1 mg 3 times daily for drooling   Continue Metformin 500mg BID for Diabetes Mellitus   Continue Melatonin 3mg daily as needed for insomnia   Senokot S 2 tablets 2 times daily and Miralax 17g as needed for bowel regimen    Continue with group therapy, milieu therapy and occupational therapy. Continue frequent safety checks and vitals per unit protocol. Case discussed with treatment team.  Risks, benefits and possible side effects of Medications: Risks, benefits, and possible side effects of medications have been explained to the patient, who verbalizes understanding    Current Legal Status: 201  Disposition: patient awaiting placement to [facility]  ------------------------------------------------------------    Subjective: Per nursing report, Gokul has been cooperative on the unit. He was more conversational and awake yesterday. Patient has been compliant with scheduled medications. PRNs: Atarax 50mg at 2336 on 08/24, melatonin 3mg at 2336 on 08/24, Zyprexa 7.5mg at [address] on 08/24    Today, Gokul was seen and evaluated alongside attending physician. On evaluation, patient was awoken from sleep; he was laying in bed wrapped in blankets but sat up in bed to speak to providers when asked to. The patient is blunted with scant, soft speech and poverty of thought.  He reports feeling "good" this morning. Patient states he got 8 hours of sleep last night. He reports he went to bed around 1am due to voices that were "doing whatever they felt like". Patient is unable to report what the voices were saying but denies them giving him any commands. Once he was able to fall asleep, he reports he was able to sleep until breakfast and then went back to sleep after eating. Patient reports the voices stopped this morning and denies any current auditory hallucinations. Patient describes his appetite as "okay", saying he has been able to finish all his meals. He says his energy is "alright" and that he was not able to go to any groups yesterday due to "sleeping through them". Patient plans to attend "Whatever groups they have today". He has no goals today but wants to call his Grandma. When asked when his last bowel movement was patient replied "its been a minute" and when pressed for a time estimated 4-5 days ago. He was counseled to try and drink more water and was agreeable to taking Miralax today. Results of the EKG were discussed with patient. Patient denies any suicidal or homicidal ideation, intent, or plan. He reports he has been seeing "shadows" that are not in any particular shape or form. He reports last seeing them a day ago and that this has been going on since 16. The shadows have not been getting better or worse. Progress Toward Goals: slow improvement    Psychiatric Review of Systems:  Behavior over the last 24 hours: cooperative  Sleep: difficulty falling asleep  Appetite: adequate  Medication side effects: none verbalized  ROS: Complete review of systems is negative except as noted above.     Vital signs in last 24 hours:   Temp:  [97 °F (36.1 °C)-97.2 °F (36.2 °C)] 97 °F (36.1 °C)  HR:  [110-117] 110  Resp:  [16] 16  BP: (122-149)/(70-87) 149/87    Mental Status Exam:  Appearance:  Overtly appearing  male, in no acute distress, drowsy, poor eye contact, appears stated age, fair grooming/hygiene, dressed in hospital attire, patient laying in bed wrapped in blankets   Behavior:  calm, cooperative    Motor: no abnormal movements and unable to assess gait and balance as patient laying in bed   Speech:  spontaneous, clear, slow, normal volume, scant and coherent   Mood:  "good"   Affect:  blunted   Thought Process:  poverty of thought   Thought Content: no verbalized delusions or overt paranoia   Perceptual disturbances: Patient reports hearing voices last night which stopped this morning. He also reports seeing shadows which he last saw a day ago.  Patient does not seem to be responding to internal stimuli, appears to be internally preoccupied and distracted   Risk Potential: No active or passive suicidal or homicidal ideation was verbalized during interview   Cognition: oriented to self and situation, appears to be of average intelligence and cognition not formally tested   Insight:  Limited   Judgment: Limited     Current Medications:  Current Facility-Administered Medications   Medication Dose Route Frequency Provider Last Rate   • acetaminophen  650 mg Oral Q6H PRN Massimo Cabello MD     • acetaminophen  650 mg Oral Q4H PRN Massimo Cabello MD     • acetaminophen  975 mg Oral Q6H PRN Massimo Cabello MD     • albuterol  2 puff Inhalation Q4H PRN Massimo Cabello MD     • aluminum-magnesium hydroxide-simethicone  30 mL Oral Q4H PRN Massimo Cabello MD     • benztropine  1 mg Intramuscular BID PRN Annabella Hedrick MD     • benztropine  1 mg Oral Q4H PRN Max 6/day Massimo Cabello MD     • cloZAPine  150 mg Oral Daily Tom Fontanez DO     • divalproex sodium  750 mg Oral BID Pedro Arriola MD     • fluticasone  1 spray Nasal Daily PRN Olayinka Maddox PA-C     • glycopyrrolate  1 mg Oral TID Junito Velasquez DO     • hydrOXYzine HCL  100 mg Oral Q6H PRN Max 4/day Grant Baum DO      Or   • LORazepam  1 mg Intramuscular Q6H PRN Grant Baum DO     • hydrOXYzine HCL  25 mg Oral Q6H PRN Max 4/day Keshav Gamboa MD     • hydrOXYzine HCL  50 mg Oral Q6H PRN Max 4/day Keshav Gamboa MD     • insulin lispro  1-6 Units Subcutaneous TID AC ELA Wise     • insulin lispro  1-6 Units Subcutaneous HS ELA Wise     • loxapine  10 mg Oral QPM Norm Meline, DO     • loxapine  10 mg Oral BID Norm Meline, DO     • melatonin  3 mg Oral HS PRN Serjio Sahu DO     • metFORMIN  500 mg Oral BID With Meals Dimitri Paiz PA-C     • nicotine polacrilex  4 mg Oral Q2H PRN Keshav Gamboa MD     • OLANZapine  5 mg Oral Q3H PRN Max 6/day Jorje Monroe MD      Or   • OLANZapine  5 mg Intramuscular Q3H PRN Max 6/day Jorje Monroe MD     • OLANZapine  5 mg Intramuscular Q8H PRN Serjio Sahu DO      Or   • OLANZapine  7.5 mg Oral Q8H PRN Serjio Sahu DO     • OLANZapine  2.5 mg Oral Q3H PRN Max 8/day Jorje Monroe MD     • ondansetron  4 mg Oral Q8H PRN Serjio Sahu DO     • polyethylene glycol  17 g Oral Daily PRN Keshav Gamboa MD     • pseudoephedrine  120 mg Oral BID PRN Kemar Osullivan MD     • senna-docusate sodium  1 tablet Oral Daily PRN Keshav Gamboa MD     • senna-docusate sodium  2 tablet Oral BID Jorje Monroe MD     • sterile water          • sterile water          • sterile water          • sterile water          • sterile water          • sterile water          • sterile water          • sterile water          • sterile water          • sterile water          • sterile water              Behavioral Health Medications: all current active meds have been reviewed. Changes as in plan section above. Laboratory results:  I have personally reviewed all pertinent laboratory/tests results.   Recent Results (from the past 48 hour(s))   Fingerstick Glucose (POCT)    Collection Time: 08/23/23 11:18 AM   Result Value Ref Range    POC Glucose 227 (H) 65 - 140 mg/dl   Fingerstick Glucose (POCT)    Collection Time: 08/23/23  4:23 PM   Result Value Ref Range    POC Glucose 142 (H) 65 - 140 mg/dl   Fingerstick Glucose (POCT)    Collection Time: 08/23/23  8:33 PM   Result Value Ref Range    POC Glucose 126 65 - 140 mg/dl   Fingerstick Glucose (POCT)    Collection Time: 08/24/23  8:27 AM   Result Value Ref Range    POC Glucose 114 65 - 140 mg/dl   Fingerstick Glucose (POCT)    Collection Time: 08/24/23 12:21 PM   Result Value Ref Range    POC Glucose 177 (H) 65 - 140 mg/dl   Fingerstick Glucose (POCT)    Collection Time: 08/24/23  4:48 PM   Result Value Ref Range    POC Glucose 80 65 - 140 mg/dl   Fingerstick Glucose (POCT)    Collection Time: 08/24/23  8:31 PM   Result Value Ref Range    POC Glucose 200 (H) 65 - 140 mg/dl   Fingerstick Glucose (POCT)    Collection Time: 08/25/23  8:09 AM   Result Value Ref Range    POC Glucose 129 65 - 140 mg/dl        David Hill  MS3

## 2023-08-25 NOTE — NURSING NOTE
Reassessed patient for agitation, no visible s/s of agitation calmly walking on unit with no further complaints. Prn zyprexa effective.

## 2023-08-25 NOTE — NURSING NOTE
Pt out of room asking for food, given peanut butter and jelly sandwhich. Affect is blunted, denies SI/HI/AVH, depression or anxiety and does not appear internally preoccupied. Miralax given for constipation at 1616. Now watching tv in dayroom.

## 2023-08-26 LAB
GLUCOSE SERPL-MCNC: 104 MG/DL (ref 65–140)
GLUCOSE SERPL-MCNC: 121 MG/DL (ref 65–140)
GLUCOSE SERPL-MCNC: 123 MG/DL (ref 65–140)
GLUCOSE SERPL-MCNC: 168 MG/DL (ref 65–140)

## 2023-08-26 PROCEDURE — 82948 REAGENT STRIP/BLOOD GLUCOSE: CPT

## 2023-08-26 PROCEDURE — 99232 SBSQ HOSP IP/OBS MODERATE 35: CPT | Performed by: STUDENT IN AN ORGANIZED HEALTH CARE EDUCATION/TRAINING PROGRAM

## 2023-08-26 RX ADMIN — METFORMIN HYDROCHLORIDE 500 MG: 500 TABLET ORAL at 17:14

## 2023-08-26 RX ADMIN — OLANZAPINE 7.5 MG: 2.5 TABLET, FILM COATED ORAL at 22:33

## 2023-08-26 RX ADMIN — SENNOSIDES AND DOCUSATE SODIUM 2 TABLET: 8.6; 5 TABLET ORAL at 17:14

## 2023-08-26 RX ADMIN — SENNOSIDES AND DOCUSATE SODIUM 2 TABLET: 8.6; 5 TABLET ORAL at 08:37

## 2023-08-26 RX ADMIN — DIVALPROEX SODIUM 750 MG: 500 TABLET, DELAYED RELEASE ORAL at 08:37

## 2023-08-26 RX ADMIN — GLYCOPYRROLATE 1 MG: 1 TABLET ORAL at 21:30

## 2023-08-26 RX ADMIN — METFORMIN HYDROCHLORIDE 500 MG: 500 TABLET ORAL at 08:37

## 2023-08-26 RX ADMIN — HYDROXYZINE HYDROCHLORIDE 50 MG: 50 TABLET, FILM COATED ORAL at 00:21

## 2023-08-26 RX ADMIN — GLYCOPYRROLATE 1 MG: 1 TABLET ORAL at 17:14

## 2023-08-26 RX ADMIN — DIVALPROEX SODIUM 750 MG: 500 TABLET, DELAYED RELEASE ORAL at 19:29

## 2023-08-26 RX ADMIN — INSULIN LISPRO 1 UNITS: 100 INJECTION, SOLUTION INTRAVENOUS; SUBCUTANEOUS at 21:31

## 2023-08-26 RX ADMIN — GLYCOPYRROLATE 1 MG: 1 TABLET ORAL at 08:37

## 2023-08-26 RX ADMIN — CLOZAPINE 175 MG: 100 TABLET ORAL at 17:14

## 2023-08-26 RX ADMIN — OLANZAPINE 5 MG: 10 INJECTION, POWDER, LYOPHILIZED, FOR SOLUTION INTRAMUSCULAR at 01:22

## 2023-08-26 RX ADMIN — MELATONIN TAB 3 MG 3 MG: 3 TAB at 00:21

## 2023-08-26 RX ADMIN — HYDROXYZINE HYDROCHLORIDE 100 MG: 50 TABLET, FILM COATED ORAL at 22:32

## 2023-08-26 NOTE — NURSING NOTE
Staff heard pt banging his head in the doorway of his room, once inside the room pt tried to punch himself in the head but staff stopped him, Pt stated "Im hearing these voices and they're fucking with me, I can't take it they're fucking with me", Pt given IM Zyprexa 5mg in left Deltoid at 0122, No signs of injuries and pt denied any pain, Will monitor and reassess    Pt reassessed at [facility] and pt was sleeping in his room, PRNs effective

## 2023-08-26 NOTE — NURSING NOTE
Pt has been pleasant and cooperative throughout the day. He is more visible on the unit and selectively social with peers. He continues to endorse AH but states that they are "not bothering" him. He denies any issues with sleep or appetite. He denies any SI/HI/VH. Staff availability reinforced.

## 2023-08-26 NOTE — NURSING NOTE
Pt came up to nursing station c/o Anxiety 6/10 and Insomnia at 0021, Pt given Atarax 50mg and Melatonin 3mg, Will reassess and and monitor    Pt reassessed at 0121 and PRN not effective

## 2023-08-26 NOTE — PROGRESS NOTES
Progress Note - Behavioral Health   Gokul Lea 26 y.o. male MRN: [id number]  Unit/Bed#: [facility] 343-01 Encounter: [id number]    Assessment/Plan   Principal Problem:    Schizoaffective disorder ([address])  Active Problems:    Legally blind    Hearing loss    Asthma    Medical clearance for psychiatric admission    Prolonged erection    Intellectual disability    Rib pain on left side    Recommended Treatment:   Increase Clozapine to 175 mg daily. Continue all other medications at current doses as below. Encourage group therapy, milieu therapy and occupational therapy  All current active medications have been reviewed  Encourage group therapy, milieu therapy and occupational therapy  Behavioral Health checks every 7 minutes    ----------------------------------------      Subjective:   Per nursing report, patient had been doing well during the day but had an episode of agitation and worsening hallucinations patient responded well to as needed medications. Patient then slept well overnight. Patient did have a bowel movement yesterday. Patient reports today that he feels well. He states that he slept well after he received medications last night. Patient reports that the auditory hallucinations are worsening yesterday and they were distressing to him. He denies any auditory or visual hallucinations this morning. He denies any SI or HI. Explained to patient need for increasing dose of clozapine patient expressed understanding was agreeable. He denies any questions or concerns.     Behavior over the last 24 hours:  unchanged  Sleep: normal  Appetite: normal  Medication side effects: No  ROS: all other systems are negative    Mental Status Evaluation:  Appearance:  casually dressed, dressed appropriately, marginal hygiene   Behavior:  cooperative, calm   Speech:  slow, delayed, soft   Mood:  "alright"   Affect:  constricted   Thought Process:  goal directed, linear   Associations: concrete associations   Thought Content:  no overt delusions   Perceptual Disturbances: denies auditory or visual hallucinations when asked, does not appear responding to internal stimuli   Risk Potential: Suicidal ideation - None  Homicidal ideation - None  Potential for aggression - Not at present   Sensorium:  oriented to person, place and time/date   Memory:  recent and remote memory grossly intact   Consciousness:  alert and awake   Attention/Concentration: attention span and concentration are age appropriate   Insight:  limited   Judgment: limited   Gait/Station: normal gait/station   Motor Activity: no abnormal movements     Medications: all current active meds have been reviewed.   Current Facility-Administered Medications   Medication Dose Route Frequency Provider Last Rate   • acetaminophen  650 mg Oral Q6H PRN Henok Chan MD     • acetaminophen  650 mg Oral Q4H PRN Henok Chan MD     • acetaminophen  975 mg Oral Q6H PRN Henok Chan MD     • albuterol  2 puff Inhalation Q4H PRN Henok Chan MD     • aluminum-magnesium hydroxide-simethicone  30 mL Oral Q4H PRN Henok Chan MD     • benztropine  1 mg Intramuscular BID PRN Cosmo Jain MD     • benztropine  1 mg Oral Q4H PRN Max 6/day Henok Chan MD     • cloZAPine  150 mg Oral Daily Erica Gonzales DO     • divalproex sodium  750 mg Oral BID Carrington Gonzales MD     • fluticasone  1 spray Nasal Daily PRN Talat Alcantar PA-C     • glycopyrrolate  1 mg Oral TID Perez Dalton DO     • hydrOXYzine HCL  100 mg Oral Q6H PRN Max 4/day Carolyn Womack DO      Or   • LORazepam  1 mg Intramuscular Q6H PRN Carolyn Womack DO     • hydrOXYzine HCL  25 mg Oral Q6H PRN Max 4/day Henok Chan MD     • hydrOXYzine HCL  50 mg Oral Q6H PRN Max 4/day Henok Chan MD     • insulin lispro  1-6 Units Subcutaneous TID AC ELA Wise     • insulin lispro  1-6 Units Subcutaneous HS ELA Wise     • melatonin  3 mg Oral HS PRN Yoni Kim, DO     • metFORMIN  500 mg Oral BID With Meals Jinny Jules PA-C     • nicotine polacrilex  4 mg Oral Q2H PRN Megan Man MD     • OLANZapine  5 mg Oral Q3H PRN Max 6/day Isabel Fraire MD      Or   • OLANZapine  5 mg Intramuscular Q3H PRN Max 6/day Isabel Fraire MD     • OLANZapine  5 mg Intramuscular Q8H PRN Yoni Kim, DO      Or   • OLANZapine  7.5 mg Oral Q8H PRN Yoni Kim, DO     • OLANZapine  2.5 mg Oral Q3H PRN Max 8/day Isabel Fraire MD     • ondansetron  4 mg Oral Q8H PRN Yoni Kim, DO     • polyethylene glycol  17 g Oral Daily PRN Megan Man MD     • pseudoephedrine  120 mg Oral BID PRN Jose Juan Domingo MD     • senna-docusate sodium  1 tablet Oral Daily PRN Megan Man MD     • senna-docusate sodium  2 tablet Oral BID Isabel Fraire MD     • sterile water          • sterile water          • sterile water          • sterile water          • sterile water          • sterile water          • sterile water          • sterile water          • sterile water          • sterile water          • sterile water              Labs:  I have personally reviewed all pertinent laboratory/tests results  Most Recent Labs:   Lab Results   Component Value Date    WBC 10.20 (H) 08/21/2023    RBC 4.54 08/21/2023    HGB 14.0 08/21/2023    HCT 42.2 08/21/2023     08/21/2023    RDW 12.7 08/21/2023    NEUTROABS 3.51 08/21/2023    TOTANEUTABS 4.84 06/08/2023    SODIUM 134 (L) 08/20/2023    K 5.1 08/20/2023    CL 98 08/20/2023    CO2 31 08/20/2023    BUN 16 08/20/2023    CREATININE 1.34 (H) 08/20/2023    GLUC 262 (H) 08/20/2023    CALCIUM 9.3 08/20/2023    AST 16 07/04/2023    ALT 7 07/04/2023    ALKPHOS 42 07/04/2023    TP 7.0 07/04/2023    ALB 3.9 07/04/2023    TBILI 0.45 07/04/2023    CHOLESTEROL 140 05/19/2023    HDL 59 05/19/2023    TRIG 106 05/19/2023    LDLCALC 60 05/19/2023    NONHDLC 81 05/19/2023    VALPROICTOT 91 06/12/2023    AMMONIA 22 10/09/2022    RPK9LTVWGJQX 1.841 05/19/2023    FREET4 1.16 10/09/2022    HGBA1C 6.8 (H) 08/20/2023     08/20/2023       Progress Toward Goals: progressing    Risks / Benefits of Treatment:    Risks, benefits, and possible side effects of medications explained to patient and patient verbalizes understanding and agreement for treatment. Counseling / Coordination of Care:    Patient's progress discussed with staff in treatment team meeting. Medications, treatment progress and treatment plan reviewed with patient.     Karen Carpenter MD 08/26/23

## 2023-08-27 LAB
GLUCOSE SERPL-MCNC: 114 MG/DL (ref 65–140)
GLUCOSE SERPL-MCNC: 132 MG/DL (ref 65–140)
GLUCOSE SERPL-MCNC: 148 MG/DL (ref 65–140)

## 2023-08-27 PROCEDURE — 99232 SBSQ HOSP IP/OBS MODERATE 35: CPT | Performed by: STUDENT IN AN ORGANIZED HEALTH CARE EDUCATION/TRAINING PROGRAM

## 2023-08-27 PROCEDURE — 82948 REAGENT STRIP/BLOOD GLUCOSE: CPT

## 2023-08-27 RX ADMIN — SENNOSIDES AND DOCUSATE SODIUM 2 TABLET: 8.6; 5 TABLET ORAL at 17:16

## 2023-08-27 RX ADMIN — MELATONIN TAB 3 MG 3 MG: 3 TAB at 23:44

## 2023-08-27 RX ADMIN — GLYCOPYRROLATE 1 MG: 1 TABLET ORAL at 08:41

## 2023-08-27 RX ADMIN — METFORMIN HYDROCHLORIDE 500 MG: 500 TABLET ORAL at 08:41

## 2023-08-27 RX ADMIN — CLOZAPINE 175 MG: 100 TABLET ORAL at 17:16

## 2023-08-27 RX ADMIN — DIVALPROEX SODIUM 750 MG: 500 TABLET, DELAYED RELEASE ORAL at 08:41

## 2023-08-27 RX ADMIN — METFORMIN HYDROCHLORIDE 500 MG: 500 TABLET ORAL at 17:16

## 2023-08-27 RX ADMIN — GLYCOPYRROLATE 1 MG: 1 TABLET ORAL at 21:07

## 2023-08-27 RX ADMIN — GLYCOPYRROLATE 1 MG: 1 TABLET ORAL at 17:16

## 2023-08-27 RX ADMIN — SENNOSIDES AND DOCUSATE SODIUM 2 TABLET: 8.6; 5 TABLET ORAL at 08:40

## 2023-08-27 RX ADMIN — DIVALPROEX SODIUM 750 MG: 500 TABLET, DELAYED RELEASE ORAL at 21:10

## 2023-08-27 NOTE — NURSING NOTE
Patient was sleeping comfortably, RR even and unlabored. Denies all psych symptoms  at this time. Medication compliant.

## 2023-08-27 NOTE — NURSING NOTE
Pt has been isolative to his room. He is visible only for meals to make his needs known. He endorses AH. Pt denies any SI/HI/VH. He is med and meal compliant. Staff availability reinforced.

## 2023-08-27 NOTE — PROGRESS NOTES
Progress Note - Behavioral Health   Gokul Raymond Channel 26 y.o. male MRN: [id number]  Unit/Bed#: [facility] 343-01 Encounter: [id number]    Assessment/Plan   Principal Problem:    Schizoaffective disorder ([address])  Active Problems:    Legally blind    Hearing loss    Asthma    Medical clearance for psychiatric admission    Prolonged erection    Intellectual disability    Rib pain on left side    Recommended Treatment:   Continue medications at current doses as below. Encourage group therapy, milieu therapy and occupational therapy  All current active medications have been reviewed  Encourage group therapy, milieu therapy and occupational therapy  Behavioral Health checks every 7 minutes    ----------------------------------------      Subjective:   Per nursing report, patient did have an episode of auditory hallucinations last night. However, he has been medication compliant and has been in fair behavioral control. Patient reports that he feels well today. He is tired and guarded but generally cooperative with interview. He reports tolerating medications well. He denies any side effects from increased dose of clozapine. He denies any problems with sleep or appetite. He states that voices were occurring yesterday but did not bother him as much. He denies any current SI, HI, or AVH at time of assessment.     Behavior over the last 24 hours:  improved  Sleep: normal  Appetite: normal  Medication side effects: No  ROS: no complaints and all other systems are negative    Mental Status Evaluation:  Appearance:  casually dressed, dressed appropriately, adequate grooming   Behavior:  cooperative, calm, guarded   Speech:  slow, scant, delayed, soft   Mood:  euthymic   Affect:  blunted, mood-incongruent   Thought Process:  goal directed, linear   Associations: concrete associations   Thought Content:  no overt delusions   Perceptual Disturbances: Endorses auditory hallucinations last night but denies any visual hallucinations and denies any auditory or visual hallucinations at time of assessment. Risk Potential: Suicidal ideation - None  Homicidal ideation - None  Potential for aggression - Not at present   Sensorium:  oriented to person, place and time/date   Memory:  recent and remote memory grossly intact   Consciousness:  alert and awake   Attention/Concentration: attention span and concentration are age appropriate   Insight:  limited   Judgment: limited   Gait/Station: normal gait/station   Motor Activity: no abnormal movements     Medications: all current active meds have been reviewed.   Current Facility-Administered Medications   Medication Dose Route Frequency Provider Last Rate   • acetaminophen  650 mg Oral Q6H PRN Kailash Rebolledo MD     • acetaminophen  650 mg Oral Q4H PRN Kailash Rebolledo MD     • acetaminophen  975 mg Oral Q6H PRN Kailash Rebolledo MD     • albuterol  2 puff Inhalation Q4H PRN Kailash Rebolledo MD     • aluminum-magnesium hydroxide-simethicone  30 mL Oral Q4H PRN Kailash Rebolledo MD     • benztropine  1 mg Intramuscular BID PRN Danielle Reece MD     • benztropine  1 mg Oral Q4H PRN Max 6/day Kailash Rebolledo MD     • cloZAPine  175 mg Oral Daily Felipa Rudd MD     • divalproex sodium  750 mg Oral BID Adilia Thakkar MD     • fluticasone  1 spray Nasal Daily PRN Padilla Shaffer PA-C     • glycopyrrolate  1 mg Oral TID Graciela Shultz DO     • hydrOXYzine HCL  100 mg Oral Q6H PRN Max 4/day Vinay Abrams DO      Or   • LORazepam  1 mg Intramuscular Q6H PRN Vinay Abrams DO     • hydrOXYzine HCL  25 mg Oral Q6H PRN Max 4/day Kailash Rebolledo MD     • hydrOXYzine HCL  50 mg Oral Q6H PRN Max 4/day Kailash Rebolledo MD     • insulin lispro  1-6 Units Subcutaneous TID AC ELA Wise     • insulin lispro  1-6 Units Subcutaneous HS ELA Wise     • melatonin  3 mg Oral HS PRN Vinay Abrams DO     • metFORMIN  500 mg Oral BID With Meals Padilla Shaffer JONO     • nicotine polacrilex  4 mg Oral Q2H PRN Esperanza Rivera MD     • OLANZapine  5 mg Oral Q3H PRN Max 6/day Elliott Eisenberg MD      Or   • OLANZapine  5 mg Intramuscular Q3H PRN Max 6/day Elliott Eisenberg MD     • OLANZapine  5 mg Intramuscular Q8H PRN Keke Plain, DO      Or   • OLANZapine  7.5 mg Oral Q8H PRN Keke Plain, DO     • OLANZapine  2.5 mg Oral Q3H PRN Max 8/day Elliott Eisenberg MD     • ondansetron  4 mg Oral Q8H PRN Keke Plain, DO     • polyethylene glycol  17 g Oral Daily PRN Esperanza Rivera MD     • pseudoephedrine  120 mg Oral BID PRN Darien Morris MD     • senna-docusate sodium  1 tablet Oral Daily PRN Esperanza Rivera MD     • senna-docusate sodium  2 tablet Oral BID Elliott Eisenberg MD     • sterile water          • sterile water          • sterile water          • sterile water          • sterile water          • sterile water          • sterile water          • sterile water          • sterile water          • sterile water          • sterile water              Labs:  I have personally reviewed all pertinent laboratory/tests results  Most Recent Labs:   Lab Results   Component Value Date    WBC 10.20 (H) 08/21/2023    RBC 4.54 08/21/2023    HGB 14.0 08/21/2023    HCT 42.2 08/21/2023     08/21/2023    RDW 12.7 08/21/2023    NEUTROABS 3.51 08/21/2023    TOTANEUTABS 4.84 06/08/2023    SODIUM 134 (L) 08/20/2023    K 5.1 08/20/2023    CL 98 08/20/2023    CO2 31 08/20/2023    BUN 16 08/20/2023    CREATININE 1.34 (H) 08/20/2023    GLUC 262 (H) 08/20/2023    CALCIUM 9.3 08/20/2023    AST 16 07/04/2023    ALT 7 07/04/2023    ALKPHOS 42 07/04/2023    TP 7.0 07/04/2023    ALB 3.9 07/04/2023    TBILI 0.45 07/04/2023    CHOLESTEROL 140 05/19/2023    HDL 59 05/19/2023    TRIG 106 05/19/2023    LDLCALC 60 05/19/2023    NONHDLC 81 05/19/2023    VALPROICTOT 91 06/12/2023    AMMONIA 22 10/09/2022    [id number] 1.841 05/19/2023    FREET4 1.16 10/09/2022    HGBA1C 6.8 (H) 08/20/2023     08/20/2023       Progress Toward Goals: progressing    Risks / Benefits of Treatment:    Risks, benefits, and possible side effects of medications explained to patient and patient verbalizes understanding and agreement for treatment. Counseling / Coordination of Care:    Patient's progress discussed with staff in treatment team meeting. Medications, treatment progress and treatment plan reviewed with patient.     Jahaira Frye MD 08/27/23

## 2023-08-27 NOTE — NURSING NOTE
Patient approached nurses station and asked for something to settle him down. Patient seemed severely agitated. Patient was given Po prn Atarax 100mg and Zyprexa 7.5 mg without incident. Will reassess.

## 2023-08-27 NOTE — PROGRESS NOTES
[facility] Group Note     08/27/23 1400   Activity/Group Checklist   Group Life Skills  (Teamwork and Communication)   Attendance Attended   Attendance Duration (min) Greater than 60   Interactions Did not interact   Affect/Mood Constricted  (Slept)   Goals Achieved Able to listen to others  (benefited from social presence of the group)

## 2023-08-27 NOTE — PLAN OF CARE
Problem: Ineffective Coping  Goal: Participates in unit activities  Description: Interventions:  - Provide therapeutic environment   - Provide required programming   - Redirect inappropriate behaviors   8/27/2023 0507 by Tri Anders RN  Outcome: Progressing  8/27/2023 0506 by Tri Anders RN  Outcome: Progressing     Problem: SELF HARM/SUICIDALITY  Goal: Will have no self-injury during hospital stay  Description: INTERVENTIONS:  - Q 15 MINUTES: Routine safety checks  - Q WAKING SHIFT & PRN: Assess risk to determine if routine checks are adequate to maintain patient safety  - Encourage patient to participate actively in care by formulating a plan to combat response to suicidal ideation, identify supports and resources  8/27/2023 0507 by Tri Anders RN  Outcome: Progressing  8/27/2023 0506 by Tri Anders RN  Outcome: Progressing     Problem: ANXIETY  Goal: Will report anxiety at manageable levels  Description: INTERVENTIONS:  - Administer medication as ordered  - Teach and encourage coping skills  - Provide emotional support  - Assess patient/family for anxiety and ability to cope  8/27/2023 0507 by Tri Anders RN  Outcome: Progressing  8/27/2023 0506 by Tri Anders RN  Outcome: Progressing  Goal: By discharge: Patient will verbalize 2 strategies to deal with anxiety  Description: Interventions:  - Identify any obvious source/trigger to anxiety  - Staff will assist patient in applying identified coping technique/skills  - Encourage attendance of scheduled groups and activities  8/27/2023 0507 by Tri Anders RN  Outcome: Progressing  8/27/2023 0506 by Tri Anders RN  Outcome: Progressing     Problem: ANXIETY  Goal: By discharge: Patient will verbalize 2 strategies to deal with anxiety  Description: Interventions:  - Identify any obvious source/trigger to anxiety  - Staff will assist patient in applying identified coping technique/skills  - Encourage attendance of scheduled groups and activities  8/27/2023 0507 by Daniele Umaña RN  Outcome: Progressing  8/27/2023 0506 by Daniele Umaña RN  Outcome: Progressing     Problem: ANXIETY  Goal: By discharge: Patient will verbalize 2 strategies to deal with anxiety  Description: Interventions:  - Identify any obvious source/trigger to anxiety  - Staff will assist patient in applying identified coping technique/skills  - Encourage attendance of scheduled groups and activities  8/27/2023 0507 by Daniele Umaña RN  Outcome: Progressing  8/27/2023 0506 by Daniele Umaña RN  Outcome: Progressing     Problem: SUBSTANCE USE/ABUSE  Goal: Will have no detox symptoms and will verbalize plan for changing substance-related behavior  Description: INTERVENTIONS:  - Monitor physical status and assess for symptoms of withdrawal  - Administer medication as ordered  - Provide emotional support with 1 on 1 interaction with staff  - Encourage recovery focused program/ addiction education  - Assess for verbalization of changing behaviors related to substance abuse  - Initiate consults and referrals as appropriate (Case Management, Spiritual Care, etc.)  8/27/2023 0507 by Daniele Umaña RN  Outcome: Progressing  8/27/2023 0506 by Daniele Umaña RN  Outcome: Progressing  Goal: By discharge, will develop insight into their chemical dependency and sustain motivation to continue in recovery  Description: INTERVENTIONS:  - Attends all daily group sessions and scheduled AA groups  - Actively practices coping skills through participation in the therapeutic community and adherence to program rules  - Reviews and completes assignments from individual treatment plan  - Assist patient development of understanding of their personal cycle of addiction and relapse triggers  8/27/2023 0507 by Daniele Umaña RN  Outcome: Progressing  8/27/2023 0506 by Daniele Umaña RN  Outcome: Progressing  Goal: By discharge, patient will have ongoing treatment plan addressing chemical dependency  Description: INTERVENTIONS:  - Assist patient with resources and/or appointments for ongoing recovery based living  8/27/2023 0507 by Judith Barcenas RN  Outcome: Progressing  8/27/2023 0506 by Judith Barcenas RN  Outcome: Progressing     Problem: DISCHARGE PLANNING  Goal: Discharge to home or other facility with appropriate resources  Description: INTERVENTIONS:  - Identify barriers to discharge w/patient and caregiver  - Arrange for needed discharge resources and transportation as appropriate  - Identify discharge learning needs (meds, wound care, etc.)  - Arrange for interpretive services to assist at discharge as needed  - Refer to Case Management Department for coordinating discharge planning if the patient needs post-hospital services based on physician/advanced practitioner order or complex needs related to functional status, cognitive ability, or social support system  8/27/2023 0507 by Judith Barcenas RN  Outcome: Progressing  8/27/2023 0506 by Judith Barcenas RN  Outcome: Progressing

## 2023-08-28 LAB
BASOPHILS # BLD AUTO: 0.09 THOUSANDS/ÂΜL (ref 0–0.1)
BASOPHILS NFR BLD AUTO: 1 % (ref 0–1)
BNP SERPL-MCNC: 6 PG/ML (ref 0–100)
CARDIAC TROPONIN I PNL SERPL HS: 3 NG/L (ref 8–18)
CRP SERPL QL: 1.1 MG/L
EOSINOPHIL # BLD AUTO: 0.67 THOUSAND/ÂΜL (ref 0–0.61)
EOSINOPHIL NFR BLD AUTO: 7 % (ref 0–6)
ERYTHROCYTE [DISTWIDTH] IN BLOOD BY AUTOMATED COUNT: 12.7 % (ref 11.6–15.1)
GLUCOSE SERPL-MCNC: 103 MG/DL (ref 65–140)
GLUCOSE SERPL-MCNC: 113 MG/DL (ref 65–140)
GLUCOSE SERPL-MCNC: 113 MG/DL (ref 65–140)
GLUCOSE SERPL-MCNC: 124 MG/DL (ref 65–140)
GLUCOSE SERPL-MCNC: 134 MG/DL (ref 65–140)
HCT VFR BLD AUTO: 41.5 % (ref 36.5–49.3)
HGB BLD-MCNC: 14.2 G/DL (ref 12–17)
IMM GRANULOCYTES # BLD AUTO: 0.1 THOUSAND/UL (ref 0–0.2)
IMM GRANULOCYTES NFR BLD AUTO: 1 % (ref 0–2)
LYMPHOCYTES # BLD AUTO: 4.53 THOUSANDS/ÂΜL (ref 0.6–4.47)
LYMPHOCYTES NFR BLD AUTO: 46 % (ref 14–44)
MCH RBC QN AUTO: 31.6 PG (ref 26.8–34.3)
MCHC RBC AUTO-ENTMCNC: 34.2 G/DL (ref 31.4–37.4)
MCV RBC AUTO: 92 FL (ref 82–98)
MONOCYTES # BLD AUTO: 1.18 THOUSAND/ÂΜL (ref 0.17–1.22)
MONOCYTES NFR BLD AUTO: 12 % (ref 4–12)
NEUTROPHILS # BLD AUTO: 3.15 THOUSANDS/ÂΜL (ref 1.85–7.62)
NEUTS SEG NFR BLD AUTO: 33 % (ref 43–75)
NRBC BLD AUTO-RTO: 0 /100 WBCS
PLATELET # BLD AUTO: 195 THOUSANDS/UL (ref 149–390)
PMV BLD AUTO: 11.6 FL (ref 8.9–12.7)
RBC # BLD AUTO: 4.49 MILLION/UL (ref 3.88–5.62)
WBC # BLD AUTO: 9.72 THOUSAND/UL (ref 4.31–10.16)

## 2023-08-28 PROCEDURE — 85025 COMPLETE CBC W/AUTO DIFF WBC: CPT

## 2023-08-28 PROCEDURE — 84484 ASSAY OF TROPONIN QUANT: CPT | Performed by: PSYCHIATRY & NEUROLOGY

## 2023-08-28 PROCEDURE — 86140 C-REACTIVE PROTEIN: CPT | Performed by: PSYCHIATRY & NEUROLOGY

## 2023-08-28 PROCEDURE — 82948 REAGENT STRIP/BLOOD GLUCOSE: CPT

## 2023-08-28 PROCEDURE — 83880 ASSAY OF NATRIURETIC PEPTIDE: CPT | Performed by: PSYCHIATRY & NEUROLOGY

## 2023-08-28 PROCEDURE — 99232 SBSQ HOSP IP/OBS MODERATE 35: CPT | Performed by: PSYCHIATRY & NEUROLOGY

## 2023-08-28 RX ORDER — MAGNESIUM HYDROXIDE/ALUMINUM HYDROXICE/SIMETHICONE 120; 1200; 1200 MG/30ML; MG/30ML; MG/30ML
30 SUSPENSION ORAL EVERY 4 HOURS PRN
Status: DISCONTINUED | OUTPATIENT
Start: 2023-08-28 | End: 2023-08-28

## 2023-08-28 RX ADMIN — GLYCOPYRROLATE 1 MG: 1 TABLET ORAL at 21:27

## 2023-08-28 RX ADMIN — OLANZAPINE 7.5 MG: 2.5 TABLET, FILM COATED ORAL at 11:07

## 2023-08-28 RX ADMIN — GLYCOPYRROLATE 1 MG: 1 TABLET ORAL at 08:27

## 2023-08-28 RX ADMIN — DIVALPROEX SODIUM 750 MG: 500 TABLET, DELAYED RELEASE ORAL at 19:38

## 2023-08-28 RX ADMIN — HYDROXYZINE HYDROCHLORIDE 100 MG: 50 TABLET, FILM COATED ORAL at 11:07

## 2023-08-28 RX ADMIN — ALUMINUM HYDROXIDE, MAGNESIUM HYDROXIDE, AND DIMETHICONE 30 ML: 200; 20; 200 SUSPENSION ORAL at 23:38

## 2023-08-28 RX ADMIN — HYDROXYZINE HYDROCHLORIDE 100 MG: 50 TABLET, FILM COATED ORAL at 19:37

## 2023-08-28 RX ADMIN — GLYCOPYRROLATE 1 MG: 1 TABLET ORAL at 17:36

## 2023-08-28 RX ADMIN — METFORMIN HYDROCHLORIDE 500 MG: 500 TABLET ORAL at 08:27

## 2023-08-28 RX ADMIN — METFORMIN HYDROCHLORIDE 500 MG: 500 TABLET ORAL at 17:33

## 2023-08-28 RX ADMIN — SENNOSIDES AND DOCUSATE SODIUM 2 TABLET: 8.6; 5 TABLET ORAL at 17:33

## 2023-08-28 RX ADMIN — CLOZAPINE 175 MG: 100 TABLET ORAL at 17:33

## 2023-08-28 RX ADMIN — SENNOSIDES AND DOCUSATE SODIUM 2 TABLET: 8.6; 5 TABLET ORAL at 08:27

## 2023-08-28 RX ADMIN — DIVALPROEX SODIUM 750 MG: 500 TABLET, DELAYED RELEASE ORAL at 08:27

## 2023-08-28 NOTE — NURSING NOTE
Patient is lying in bed resting comfortably, RR even and unlabored. Patient denies any psych symptoms at this time. Flat affect but cooperative. Medication compliant.

## 2023-08-28 NOTE — NURSING NOTE
Patient was observed on the unit wearing headphones. Suddenly the patient began yelling while walking in the hallway. subsequently , the patient behavior escalated becoming increasingly loud and agitated. Patient exhibited signs of sever anxiety and had difficulty maintaining focus . As a result, the decision was made to administer Atarax to addresses the anxiety .  Patient remain Q 7 min check   @20:37 atarax was effective

## 2023-08-28 NOTE — NURSING NOTE
Patient has been about the unit. Listening to music and doing laps in the [place]. Laughing to self although denying AH at this time. Compliant with medications.

## 2023-08-28 NOTE — PROGRESS NOTES
08/28/23 0829   Team Meeting   Meeting Type Daily Rounds   Team Members Present   Team Members Present Physician;Nurse;   Physician Team Member [id number]   Care Management Team Member Dev   Patient/Family Present   Patient Present No   Patient's Family Present No   Saturday evening increasingly agitated and anxious, PRN atarax PO-effective. SundayDerrel Alpers, med/meal compliant. Blood work done this morning. DC pending EAC.

## 2023-08-28 NOTE — NURSING NOTE
Patient pacing the halls loudly hallucinating. Shouting at times as well as cursing. Given PRN of Atarax 100 mg po for severe anxiety and Zyprexa 7.5 mg po for AH. Will monitor effectiveness.

## 2023-08-28 NOTE — PROGRESS NOTES
Progress Note - Behavioral Health   Gokul Peguero 26 y.o. male MRN: [id number]  Unit/Bed#: [facility] 343-01 Encounter: [id number]    Assessment/Plan   Principal Problem:    Schizoaffective disorder ([address])  Active Problems:    Legally blind    Hearing loss    Asthma    Medical clearance for psychiatric admission    Prolonged erection    Intellectual disability    Rib pain on left side      Recommended Treatment:   Continue Depakote 750mg BID for agitation/mood (Depakote level 6/12/23: 91)  Continue Clozaril 175mg qHS for psychosis (CBC 8/28/23: WBC 9.72, ANC 3.15)   Senokot S 2 tablets BID for bowel regime   Continue Robinul 1mg TID for drooling  Continue Melatonin 3mg daily as needed for insomnia   Continue Metformin 500mg BID with meals for Diabetes Mellitus       Continue with group therapy, milieu therapy and occupational therapy. Continue frequent safety checks and vitals per unit protocol. Case discussed with treatment team.  Risks, benefits and possible side effects of Medications: Risks, benefits, and possible side effects of medications have been explained to the patient, who verbalizes understanding    Current Legal Status: 201  Disposition: awaiting placement to [facility]  ------------------------------------------------------------    Subjective: Per nursing report, on 8/26 Gokul was heard banging his head in the doorway and trying to punch himself in the head at 1:26am. Staff stopped him and patient reported hearing the voices. Patient ewas given 5mg  Zyprexa IM which was effective. The next morning patient was pleasant and cooperative, socially selective with peers and more visible on the unit. He continued to endorse auditory hallucinations but states they were no longer bothering him. Patient was agitated at 2200 and requested Atarax from nurses station. Patient was medication compliant throughout the day. On 8/27 patient was isolated to his room, he endorsed auditory hallucinations.  He was med and meal compliant. On 8/28 patient was seen listening to music and walking in hallway. He was laughing to self but denying auditory hallucination. PRNs Atarax 50mg and Melatonin 3mg at 0021 on 8/26/23, Atarax 100mg at 2232 on 08/26 and at 1107 08/28. Melatonin 3mg at 2344 on 08/27. Zyprexa 7.5mg at 2233 on 08/26 and at 1107 on 08/28. Today, Gokul was seen and evaluated alongside attending physician. Patient was seen walking around the unit and sitting in the common areas listening to music. He reports feeling "okay" this morning. Patient states he was able to sleep 8 hours last night because he heard no voices. He reports the last time he heard voices was a few days ago although nursing notes shows he reported AH yesterday and was seen laughing to self this morning. He says the voices do not say anything in particular. Patient described his appetite as "alright" and says he was able to finish all of breakfast. He states his energy level is "alright, better". He elaborated that he has recently been able to "let some stuff go" and when asked what replied "some past trauma" but did not elaborate further. He has been attending some groups which he reports are "helping me cope with the voices and depression". Patient was encouraged to continue to attend groups. He reports no side effects to his medications and when asked how to Clozapine was working replied "okay, it helps" and "I think its working". The patients goals for today are to shower and get his clothes washed. His long term goals are to do more exercises. Patient was counciled about calling family to which he was agreeable and states he wants to call his grandma later. He reports having a bowel movement this morning. Patient denies and suicidal or homicidal ideation, plan, or intent. He denies any current auditory or visual hallucinations.      Progress Toward Goals: improvement    Psychiatric Review of Systems:  Behavior over the last 24 hours: calm and cooperative  Sleep: 8 hours, no difficulty falling or staying asleep  Appetite: adequate, "alright"  Medication side effects: none verbalized  ROS: Complete review of systems is negative except as noted above. Vital signs in last 24 hours:   Temp:  [97.3 °F (36.3 °C)] 97.3 °F (36.3 °C)  HR:  [105] 105  Resp:  [16] 16  BP: (135)/(88) 135/88    Mental Status Exam:  Appearance:  Overtly  [place] male, in no acute distress, alert, patient smiled and made good eye contact, appears stated age, dressed in hospital attire and appropriate grooming and hygiene   Behavior:  calm, cooperative, sitting comfortably and walking around unit listening to music, patient smiled occasionally and laughed appropriately, less guarded    Motor: no abnormal movements and normal gait and balance   Speech:  More spontaneous and talkative, clear, normal rate, normal volume and coherent   Mood:  "okay"   Affect:  less blunted, more reactive   Thought Process:  goal directed, less poverty of thought, concrete    Thought Content: no verbalized delusions or overt paranoia   Perceptual disturbances: Patient seen laughing to self on unit but denies any current auditory or visual hallucination. Appears to be internally preoccupied and distracted.  Not observed responding to internal stimuli at the time of interview   Risk Potential: No active or passive suicidal or homicidal ideation was verbalized during interview   Cognition: oriented to self and situation, appears to be of average intelligence and cognition not formally tested   Insight:  Limited   Judgment: Limited     Current Medications:  Current Facility-Administered Medications   Medication Dose Route Frequency Provider Last Rate   • acetaminophen  650 mg Oral Q6H SULEMA Montero MD     • acetaminophen  650 mg Oral Q4H SULEMA Montero MD     • acetaminophen  975 mg Oral Q6H SULEMA Montero MD     • albuterol  2 puff Inhalation Q4H SULEMA Montero MD     • aluminum-magnesium hydroxide-simethicone  30 mL Oral Q4H PRN Celena Dhillon MD     • benztropine  1 mg Intramuscular BID PRN Iam Davis MD     • benztropine  1 mg Oral Q4H PRN Max 6/day Celena Dhillon MD     • cloZAPine  175 mg Oral Daily Jahaira Frye MD     • divalproex sodium  750 mg Oral BID Tonia Trinidad MD     • fluticasone  1 spray Nasal Daily PRN Dejah Lopez PA-C     • glycopyrrolate  1 mg Oral TID Junior Jason DO     • hydrOXYzine HCL  100 mg Oral Q6H PRN Max 4/day Nikki Fret, DO      Or   • LORazepam  1 mg Intramuscular Q6H PRN Greenwood Fret, DO     • hydrOXYzine HCL  25 mg Oral Q6H PRN Max 4/day Celena Dhillon MD     • hydrOXYzine HCL  50 mg Oral Q6H PRN Max 4/day Celena Dhillon MD     • insulin lispro  1-6 Units Subcutaneous TID AC ELA Wise     • insulin lispro  1-6 Units Subcutaneous HS ELA Wise     • melatonin  3 mg Oral HS PRN Nikki Fret, DO     • metFORMIN  500 mg Oral BID With Meals Dejah Lopez PA-C     • nicotine polacrilex  4 mg Oral Q2H PRN Celena Dhillon MD     • OLANZapine  5 mg Oral Q3H PRN Max 6/day Tonia Trinidad MD      Or   • OLANZapine  5 mg Intramuscular Q3H PRN Max 6/day Tonia Trinidad MD     • OLANZapine  5 mg Intramuscular Q8H PRN Greenwood Fret, DO      Or   • OLANZapine  7.5 mg Oral Q8H PRN Nikki Fret, DO     • OLANZapine  2.5 mg Oral Q3H PRN Max 8/day Tonia Trinidad MD     • ondansetron  4 mg Oral Q8H PRN Greenwood Fret, DO     • polyethylene glycol  17 g Oral Daily PRN Celena Dhillon MD     • pseudoephedrine  120 mg Oral BID PRN Lesli Macias MD     • senna-docusate sodium  1 tablet Oral Daily PRN Celena Dhillon MD     • senna-docusate sodium  2 tablet Oral BID Tonia Trinidad MD     • sterile water          • sterile water          • sterile water          • sterile water          • sterile water          • sterile water          • sterile water • sterile water          • sterile water          • sterile water          • sterile water              Behavioral Health Medications: all current active meds have been reviewed. Changes as in plan section above. Laboratory results:  I have personally reviewed all pertinent laboratory/tests results.   Recent Results (from the past 48 hour(s))   Fingerstick Glucose (POCT)    Collection Time: 08/26/23  5:00 PM   Result Value Ref Range    POC Glucose 121 65 - 140 mg/dl   Fingerstick Glucose (POCT)    Collection Time: 08/26/23  8:37 PM   Result Value Ref Range    POC Glucose 168 (H) 65 - 140 mg/dl   Fingerstick Glucose (POCT)    Collection Time: 08/27/23  8:19 AM   Result Value Ref Range    POC Glucose 132 65 - 140 mg/dl   Fingerstick Glucose (POCT)    Collection Time: 08/27/23 11:51 AM   Result Value Ref Range    POC Glucose 148 (H) 65 - 140 mg/dl   Fingerstick Glucose (POCT)    Collection Time: 08/27/23  5:08 PM   Result Value Ref Range    POC Glucose 114 65 - 140 mg/dl   Fingerstick Glucose (POCT)    Collection Time: 08/27/23  7:55 PM   Result Value Ref Range    POC Glucose 134 65 - 140 mg/dl   CBC and differential    Collection Time: 08/28/23  5:26 AM   Result Value Ref Range    WBC 9.72 4.31 - 10.16 Thousand/uL    RBC 4.49 3.88 - 5.62 Million/uL    Hemoglobin 14.2 12.0 - 17.0 g/dL    Hematocrit 41.5 36.5 - 49.3 %    MCV 92 82 - 98 fL    MCH 31.6 26.8 - 34.3 pg    MCHC 34.2 31.4 - 37.4 g/dL    RDW 12.7 11.6 - 15.1 %    MPV 11.6 8.9 - 12.7 fL    Platelets 443 985 - 838 Thousands/uL    nRBC 0 /100 WBCs    Neutrophils Relative 33 (L) 43 - 75 %    Immat GRANS % 1 0 - 2 %    Lymphocytes Relative 46 (H) 14 - 44 %    Monocytes Relative 12 4 - 12 %    Eosinophils Relative 7 (H) 0 - 6 %    Basophils Relative 1 0 - 1 %    Neutrophils Absolute 3.15 1.85 - 7.62 Thousands/µL    Immature Grans Absolute 0.10 0.00 - 0.20 Thousand/uL    Lymphocytes Absolute 4.53 (H) 0.60 - 4.47 Thousands/µL    Monocytes Absolute 1.18 0.17 - 1.22 Thousand/µL    Eosinophils Absolute 0.67 (H) 0.00 - 0.61 Thousand/µL    Basophils Absolute 0.09 0.00 - 0.10 Thousands/µL   C-reactive protein    Collection Time: 08/28/23  5:26 AM   Result Value Ref Range    CRP 1.1 <3.0 mg/L   B-Type Natriuretic Peptide(BNP)    Collection Time: 08/28/23  5:26 AM   Result Value Ref Range    BNP 6 0 - 100 pg/mL   High Sensitivity Troponin I Random    Collection Time: 08/28/23  5:26 AM   Result Value Ref Range    HS TnI random 3 (L) 8 - 18 ng/L   Fingerstick Glucose (POCT)    Collection Time: 08/28/23  8:13 AM   Result Value Ref Range    POC Glucose 124 65 - 140 mg/dl   Fingerstick Glucose (POCT)    Collection Time: 08/28/23 12:07 PM   Result Value Ref Range    POC Glucose 113 65 - 140 mg/dl        Narda Hernandez   MS3

## 2023-08-28 NOTE — NURSING NOTE
Patient appears less anxious but continues to pace the halls hallucinating, but not as loud. PRN of Atarax 100 mg po effective. PRN of Zyprexa 7.5 mg moderately effective.

## 2023-08-29 LAB
GLUCOSE SERPL-MCNC: 114 MG/DL (ref 65–140)
GLUCOSE SERPL-MCNC: 209 MG/DL (ref 65–140)
GLUCOSE SERPL-MCNC: 98 MG/DL (ref 65–140)

## 2023-08-29 PROCEDURE — 99232 SBSQ HOSP IP/OBS MODERATE 35: CPT | Performed by: PSYCHIATRY & NEUROLOGY

## 2023-08-29 PROCEDURE — 82948 REAGENT STRIP/BLOOD GLUCOSE: CPT

## 2023-08-29 RX ORDER — CLOZAPINE 200 MG/1
200 TABLET ORAL DAILY
Status: DISCONTINUED | OUTPATIENT
Start: 2023-08-29 | End: 2023-09-05

## 2023-08-29 RX ADMIN — METFORMIN HYDROCHLORIDE 500 MG: 500 TABLET ORAL at 08:15

## 2023-08-29 RX ADMIN — GLYCOPYRROLATE 1 MG: 1 TABLET ORAL at 17:31

## 2023-08-29 RX ADMIN — SENNOSIDES AND DOCUSATE SODIUM 2 TABLET: 8.6; 5 TABLET ORAL at 17:25

## 2023-08-29 RX ADMIN — GLYCOPYRROLATE 1 MG: 1 TABLET ORAL at 22:06

## 2023-08-29 RX ADMIN — METFORMIN HYDROCHLORIDE 500 MG: 500 TABLET ORAL at 17:25

## 2023-08-29 RX ADMIN — CLOZAPINE 200 MG: 200 TABLET ORAL at 17:25

## 2023-08-29 RX ADMIN — GLYCOPYRROLATE 1 MG: 1 TABLET ORAL at 08:15

## 2023-08-29 RX ADMIN — DIVALPROEX SODIUM 750 MG: 500 TABLET, DELAYED RELEASE ORAL at 08:15

## 2023-08-29 RX ADMIN — DIVALPROEX SODIUM 750 MG: 500 TABLET, DELAYED RELEASE ORAL at 22:06

## 2023-08-29 RX ADMIN — NICOTINE POLACRILEX 4 MG: 4 GUM, CHEWING BUCCAL at 00:30

## 2023-08-29 RX ADMIN — MELATONIN TAB 3 MG 3 MG: 3 TAB at 00:30

## 2023-08-29 RX ADMIN — SENNOSIDES AND DOCUSATE SODIUM 2 TABLET: 8.6; 5 TABLET ORAL at 08:15

## 2023-08-29 NOTE — PROGRESS NOTES
Progress Note - Behavioral Health   Gokul Coles Corporal 26 y.o. male MRN: [id number]  Unit/Bed#: [facility] 343-01 Encounter: [id number]    Assessment/Plan   Principal Problem:    Schizoaffective disorder ([address])  Active Problems:    Legally blind    Hearing loss    Asthma    Medical clearance for psychiatric admission    Prolonged erection    Intellectual disability    Rib pain on left side      Recommended Treatment:   Increase Clozapine to 200mg qHS for psychosis (CBC 8/28/23: WBC 9.72, AND 3/15)  Continue Valproic Acid 750mg twice daily for mood and agitation (Depakote level 6/12/23: 91)  Continue Melatonin 3mg HS as needed for insomnia  Continue Metformin 500mg twice daily with meals for Diabetes Mellitus   Continue Senokot S 2 tablets BID for bowel regimen      Continue with group therapy, milieu therapy and occupational therapy. Continue frequent safety checks and vitals per unit protocol. Case discussed with treatment team.  Risks, benefits and possible side effects of Medications: Risks, benefits, and possible side effects of medications have been explained to the patient, who verbalizes understanding    Current Legal Status: 201  Disposition: awaiting placement to [facility]  ------------------------------------------------------------    Subjective: Per nursing report, Gokul has been pacing the unit loud, agitated, and hallucinating. At 1937 he was observed walking around the unit wearing headphones when he began yelling and becoming increasingly agitated. Due to signs of severe anxiety nurses administered Atarax which was effective. He has been compliant with scheduled medications. PRNs Atarax 100mg at 1937 on 08/28, melatonin 3mg at [address] on 08/29. Today, Gokul was seen and evaluated alongside attending physician. Patient was found laying in bed asleep; he sat up to talk to the providers when asked to. He reports feeling "the same as yesterday" this morning which he describes as "good".  Patient states he slept 8-10 hours last night but had difficulty falling asleep; he was not able to fall asleep until about 1am. Patient reports the voices "did not keep me up I just wasn't tired." He described his appetite as "good", elaborating he was able to finish both dinner last night and breakfast this morning. Patient appeared to be drowsy and still waking up during interview but said his energy has been "alright" over the last 24 hours. He was able to go to groups yesterday and attended one "where they listened to music" which patient described as "fun". Patient reports he wants to go to more groups today. His goals for the day are to "do laundry" which he was not able to complete yesterday because he "got distracted by having fun". He also recalled calling his grandma yesterday and having a good conversation. When asked about his episode of agitation yesterday patient was unable to recall what happened, he states he does remember getting medications IM but could not elaborate on why. Patient states his last bowel movement was 2 days ago but he does not endorse feeling constipated. Providers counciled him to drink more water and that Miralax could be given tomorrow if he does not have a bowel movement today tp which patient expressed agreement and understanding. Patient reports no suicidal or homicidial plan, ideation, or intent. He denies any current auditory or visual hallucinations and reports his last AH was "a few days ago". Progress Toward Goals: slow improvement    Psychiatric Review of Systems:  Behavior over the last 24 hours: regressed  Sleep: difficulty falling asleep, 8-10 hours  Appetite: adequate  Medication side effects: none verbalized  ROS: Complete review of systems is negative except as noted above.     Vital signs in last 24 hours:   Temp:  [97.5 °F (36.4 °C)] 97.5 °F (36.4 °C)  HR:  [107-122] 107  Resp:  [16] 16  BP: (127-142)/(75-79) 127/75    Mental Status Exam:  Appearance:  Overtly AA appearing male, in no acute distress, drowsy, no eye contact, appears stated age, dressed in hospital attire, wrapped in blankets, and fair grooming and hygiene. Behavior:  calm, cooperative, guarded and sleeping in bed. Patient sat up for interview. He stared at the bed/floor while talking to providers, mumbled, and had to be asked questions several times before answering.  Patient appeared distracted    Motor: no abnormal movements and unable to assess gait in balance as patient remained in bed   Speech:  delayed initiation, slow, soft and scant   Mood:  "same as yesterday, good"   Affect:  blunted   Thought Process:  poverty of thought   Thought Content: no verbalized delusions or overt paranoia   Perceptual disturbances: no reported hallucinations, last heard auditory hallucinations "a few days ago" according to patient; does not appear to be responding to internal stimuli at this time, patient appears to be internally preoccupied and distracted   Risk Potential: No active or passive suicidal or homicidal ideation was verbalized during interview   Cognition: oriented to self and situation, appears to be of average intelligence and cognition not formally tested   Insight:  Limited   Judgment: Limited     Current Medications:  Current Facility-Administered Medications   Medication Dose Route Frequency Provider Last Rate   • acetaminophen  650 mg Oral Q6H PRN Codi Paul MD     • acetaminophen  650 mg Oral Q4H PRN Codi Paul MD     • acetaminophen  975 mg Oral Q6H PRN Codi Paul MD     • albuterol  2 puff Inhalation Q4H PRN Codi Paul MD     • aluminum-magnesium hydroxide-simethicone  30 mL Oral Q4H PRN Codi Paul MD     • benztropine  1 mg Intramuscular BID PRN Douglas Naranjo MD     • benztropine  1 mg Oral Q4H PRN Max 6/day Codi Paul MD     • cloZAPine  175 mg Oral Daily Michelle Torres MD     • divalproex sodium  750 mg Oral BID Arsenio Faith MD     • fluticasone 1 spray Nasal Daily PRN Hellen Hawk PA-C     • glycopyrrolate  1 mg Oral TID Bimal Diver, DO     • hydrOXYzine HCL  100 mg Oral Q6H PRN Max 4/day Wylene Locker, DO      Or   • LORazepam  1 mg Intramuscular Q6H PRN Wylene Locker, DO     • hydrOXYzine HCL  25 mg Oral Q6H PRN Max 4/day Lona Magaña MD     • hydrOXYzine HCL  50 mg Oral Q6H PRN Max 4/day Lona Magaña MD     • insulin lispro  1-6 Units Subcutaneous TID AC ELA Wise     • insulin lispro  1-6 Units Subcutaneous HS ELA Wise     • melatonin  3 mg Oral HS PRN Wylene Locker, DO     • metFORMIN  500 mg Oral BID With Meals Hellen Hawk PA-C     • nicotine polacrilex  4 mg Oral Q2H PRN Lona Magaña MD     • OLANZapine  5 mg Oral Q3H PRN Max 6/day Sam Covarrubias MD      Or   • OLANZapine  5 mg Intramuscular Q3H PRN Max 6/day Sam Covarrubias MD     • OLANZapine  5 mg Intramuscular Q8H PRN Wylene Locker, DO      Or   • OLANZapine  7.5 mg Oral Q8H PRN Gerald Borgeser, DO     • OLANZapine  2.5 mg Oral Q3H PRN Max 8/day Sam Covarrubias MD     • ondansetron  4 mg Oral Q8H PRN Wylene Locker, DO     • polyethylene glycol  17 g Oral Daily PRN Lona Magaña MD     • pseudoephedrine  120 mg Oral BID PRN Deana Campuzano MD     • senna-docusate sodium  1 tablet Oral Daily PRN Lona Magaña MD     • senna-docusate sodium  2 tablet Oral BID Sam Covarrubias MD     • sterile water          • sterile water          • sterile water          • sterile water          • sterile water          • sterile water          • sterile water          • sterile water          • sterile water          • sterile water          • sterile water              Behavioral Health Medications: all current active meds have been reviewed. Changes as in plan section above. Laboratory results:  I have personally reviewed all pertinent laboratory/tests results.   Recent Results (from the past 48 hour(s)) Fingerstick Glucose (POCT)    Collection Time: 08/27/23 11:51 AM   Result Value Ref Range    POC Glucose 148 (H) 65 - 140 mg/dl   Fingerstick Glucose (POCT)    Collection Time: 08/27/23  5:08 PM   Result Value Ref Range    POC Glucose 114 65 - 140 mg/dl   Fingerstick Glucose (POCT)    Collection Time: 08/27/23  7:55 PM   Result Value Ref Range    POC Glucose 134 65 - 140 mg/dl   CBC and differential    Collection Time: 08/28/23  5:26 AM   Result Value Ref Range    WBC 9.72 4.31 - 10.16 Thousand/uL    RBC 4.49 3.88 - 5.62 Million/uL    Hemoglobin 14.2 12.0 - 17.0 g/dL    Hematocrit 41.5 36.5 - 49.3 %    MCV 92 82 - 98 fL    MCH 31.6 26.8 - 34.3 pg    MCHC 34.2 31.4 - 37.4 g/dL    RDW 12.7 11.6 - 15.1 %    MPV 11.6 8.9 - 12.7 fL    Platelets 122 770 - 347 Thousands/uL    nRBC 0 /100 WBCs    Neutrophils Relative 33 (L) 43 - 75 %    Immat GRANS % 1 0 - 2 %    Lymphocytes Relative 46 (H) 14 - 44 %    Monocytes Relative 12 4 - 12 %    Eosinophils Relative 7 (H) 0 - 6 %    Basophils Relative 1 0 - 1 %    Neutrophils Absolute 3.15 1.85 - 7.62 Thousands/µL    Immature Grans Absolute 0.10 0.00 - 0.20 Thousand/uL    Lymphocytes Absolute 4.53 (H) 0.60 - 4.47 Thousands/µL    Monocytes Absolute 1.18 0.17 - 1.22 Thousand/µL    Eosinophils Absolute 0.67 (H) 0.00 - 0.61 Thousand/µL    Basophils Absolute 0.09 0.00 - 0.10 Thousands/µL   C-reactive protein    Collection Time: 08/28/23  5:26 AM   Result Value Ref Range    CRP 1.1 <3.0 mg/L   B-Type Natriuretic Peptide(BNP)    Collection Time: 08/28/23  5:26 AM   Result Value Ref Range    BNP 6 0 - 100 pg/mL   High Sensitivity Troponin I Random    Collection Time: 08/28/23  5:26 AM   Result Value Ref Range    HS TnI random 3 (L) 8 - 18 ng/L   Fingerstick Glucose (POCT)    Collection Time: 08/28/23  8:13 AM   Result Value Ref Range    POC Glucose 124 65 - 140 mg/dl   Fingerstick Glucose (POCT)    Collection Time: 08/28/23 12:07 PM   Result Value Ref Range    POC Glucose 113 65 - 140 mg/dl   Fingerstick Glucose (POCT)    Collection Time: 08/28/23  5:24 PM   Result Value Ref Range    POC Glucose 103 65 - 140 mg/dl   Fingerstick Glucose (POCT)    Collection Time: 08/28/23  8:24 PM   Result Value Ref Range    POC Glucose 113 65 - 140 mg/dl   Fingerstick Glucose (POCT)    Collection Time: 08/29/23  7:54 AM   Result Value Ref Range    POC Glucose 98 65 - 140 mg/dl        Inna Art  MS3

## 2023-08-29 NOTE — NURSING NOTE
Received patient at 1500 . Patient isolative to his room, only comes out for needs . Patient denies voices at this time .  Patient remain Q 7 min check

## 2023-08-29 NOTE — PLAN OF CARE
Problem: SELF HARM/SUICIDALITY  Goal: Will have no self-injury during hospital stay  Description: INTERVENTIONS:  - Q 15 MINUTES: Routine safety checks  - Q WAKING SHIFT & PRN: Assess risk to determine if routine checks are adequate to maintain patient safety  - Encourage patient to participate actively in care by formulating a plan to combat response to suicidal ideation, identify supports and resources  Outcome: Progressing     Problem: ANXIETY  Goal: Will report anxiety at manageable levels  Description: INTERVENTIONS:  - Administer medication as ordered  - Teach and encourage coping skills  - Provide emotional support  - Assess patient/family for anxiety and ability to cope  Outcome: Progressing     Problem: Ineffective Coping  Goal: Participates in unit activities  Description: Interventions:  - Provide therapeutic environment   - Provide required programming   - Redirect inappropriate behaviors   Outcome: Not Progressing

## 2023-08-29 NOTE — NURSING NOTE
Patient has been seclusive to his room. Sleeping soundly. Currently denying AH. Compliant with medications.

## 2023-08-29 NOTE — PROGRESS NOTES
08/29/23 0859   Team Meeting   Meeting Type Daily Rounds   Team Members Present   Team Members Present Physician;Nurse;   Physician Team Member [id number] [facility] Team Member [facility] Management Team Member Wendie   Patient/Family Present   Patient Present No   Patient's Family Present No   Pt responding to internal stimuli loudly, PRN Atarax and Zyprexa. Moderately effective. Pt on evening shift yelling and walking in hallway, increasingly loud, appeared anxious. PRN Atarax given. C/O insomnia, received PRN Melatonin. Discharge pending EAC.

## 2023-08-30 LAB
GLUCOSE SERPL-MCNC: 86 MG/DL (ref 65–140)
GLUCOSE SERPL-MCNC: 98 MG/DL (ref 65–140)

## 2023-08-30 PROCEDURE — 99233 SBSQ HOSP IP/OBS HIGH 50: CPT | Performed by: PSYCHIATRY & NEUROLOGY

## 2023-08-30 PROCEDURE — 82948 REAGENT STRIP/BLOOD GLUCOSE: CPT

## 2023-08-30 RX ORDER — POLYETHYLENE GLYCOL 3350 17 G/17G
17 POWDER, FOR SOLUTION ORAL
Status: CANCELLED | OUTPATIENT
Start: 2023-08-30

## 2023-08-30 RX ADMIN — DIVALPROEX SODIUM 750 MG: 500 TABLET, DELAYED RELEASE ORAL at 08:15

## 2023-08-30 RX ADMIN — GLYCOPYRROLATE 1 MG: 1 TABLET ORAL at 17:43

## 2023-08-30 RX ADMIN — SENNOSIDES AND DOCUSATE SODIUM 2 TABLET: 8.6; 5 TABLET ORAL at 17:43

## 2023-08-30 RX ADMIN — GLYCOPYRROLATE 1 MG: 1 TABLET ORAL at 21:04

## 2023-08-30 RX ADMIN — DIVALPROEX SODIUM 750 MG: 500 TABLET, DELAYED RELEASE ORAL at 20:00

## 2023-08-30 RX ADMIN — METFORMIN HYDROCHLORIDE 1000 MG: 500 TABLET, FILM COATED ORAL at 08:15

## 2023-08-30 RX ADMIN — GLYCOPYRROLATE 1 MG: 1 TABLET ORAL at 08:15

## 2023-08-30 RX ADMIN — MELATONIN TAB 3 MG 3 MG: 3 TAB at 01:05

## 2023-08-30 RX ADMIN — SENNOSIDES AND DOCUSATE SODIUM 2 TABLET: 8.6; 5 TABLET ORAL at 08:15

## 2023-08-30 RX ADMIN — METFORMIN HYDROCHLORIDE 1000 MG: 500 TABLET, FILM COATED ORAL at 17:43

## 2023-08-30 RX ADMIN — CLOZAPINE 200 MG: 200 TABLET ORAL at 17:43

## 2023-08-30 RX ADMIN — SENNOSIDES AND DOCUSATE SODIUM 1 TABLET: 50; 8.6 TABLET ORAL at 22:14

## 2023-08-30 NOTE — PROGRESS NOTES
08/30/23 0801   Team Meeting   Meeting Type Daily Rounds   Team Members Present   Team Members Present Physician;Nurse;   Physician Team Member [id number] [facility] Team [facility] Member Wednie   Patient/Family Present   Patient Present No   Patient's Family Present No   Pt's blood sugars changed to BID and taken off insulin. Pt seclusive to his room. Pt c/o insomnia overnight, received PRN Melatonin which was effective. Pt c/o constipation and will receive PRN miralax today. Discharge to be determined.

## 2023-08-30 NOTE — PROGRESS NOTES
Progress Note - Behavioral Health   Gokul Yang 26 y.o. male MRN: [id number]  Unit/Bed#: [facility] 343-01 Encounter: [id number]    Assessment/Plan   Principal Problem:    Schizoaffective disorder ([address])  Active Problems:    Legally blind    Hearing loss    Asthma    Medical clearance for psychiatric admission    Prolonged erection    Intellectual disability    Rib pain on left side      Recommended Treatment:   Metformin increased to 1000mg BID with meals for Diabetes Mellitus   Continue Clozaril 200mg HS for psychosis (CBC 08/28/23: WBC 9.72, ANC 3.15)  Continue Valproic Acid 750mg BID for mood/agitation (Depakote level: 91 on 6/12/23)  Continue Senokot S 2 tablets BID for bowel regimen   Continue glycopyrrolate 1mg TID for drooling    Continue with group therapy, milieu therapy and occupational therapy. Continue frequent safety checks and vitals per unit protocol. Case discussed with treatment team.  Risks, benefits and possible side effects of Medications: Risks, benefits, and possible side effects of medications have been explained to the patient, who verbalizes understanding    Current Legal Status: 201  Disposition: awaiting placement to [facility]   ------------------------------------------------------------    Subjective: Per nursing report, Gokul has been seclusive to room and slept most of the day yesterday. He has been compliant with scheduled medications. PRNs melatonin 3mg at [address] [place] [address] on 08/30. Today, Gokul was seen and evaluated alongside attending physician. Patient was found sleeping in bed and was difficult to awaken. He was agreeable to coming to a group room to speak with the team. He reports feeling "good, just tired" this morning. Patient states he slept "alright" and got about 8 hours. He was able to elaborate that he couldn't sleep last night because he "wanted to stay up" and "wasn't tired". He reports his energy is "good".  Providers counciled patient on trying to stay awake during the day so he could sleep better at night. When asked why he slept all day patient responded "I don't have nothing to do". Providers suggested attending groups during the day but patient replied he "don't want to get involved" and that he is "not a coloring person". He reports he did not attend any groups yesterday for these reasons. Patient did talk to his grandma yesterday and says he had a good conversation. Patient described his appetite and "okay" and endorsed finishing all meals in the last 24 hours; however nursing report states patient declined breakfast this morning. His goals for today and to do laundry, providers worked with him to establish a plan to get this goal completed. Patient endorses having a bowel movement yesterday evening. He reports not drinking any water and was counseled that it is important to drink water to maintain normal bowel movements. Patient denies any medication side effects and thinks the increase in Clozaril "was helpful". Patient denies any current suicidal or homicidal plan, ideation, or intent. He reports no auditory or visual hallucination and says thew last time he heard voices was "4 days ago". Progress Toward Goals: slow improvement    Psychiatric Review of Systems:  Behavior over the last 24 hours: unchanged  Sleep: difficulty falling asleep, 8 hours  Appetite: adequate  Medication side effects: none verbalized  ROS: Complete review of systems is negative except as noted above. Vital signs in last 24 hours:   Temp:  [97.1 °F (36.2 °C)-97.2 °F (36.2 °C)] 97.1 °F (36.2 °C)  HR:  [107-109] 109  Resp:  [16] 16  BP: (128-129)/(62-82) 128/82    Mental Status Exam:  Appearance:  Overtly AA appearing male, in no acute distress, drowsy, minimal eye contact, appears stated age, dressed in hosptial shirt and stained black sweat pants, marginal grooming/hygiene.    Behavior:  calm, cooperative   Motor: no abnormal movements and psychomotor retardation   Speech:  delayed initiation, slow, normal volume, scant and mumbled   Mood:  "good, just tired"   Affect:  blunted   Thought Process:  poverty of thought   Thought Content: no verbalized delusions or overt paranoia   Perceptual disturbances: no reported hallucinations, reports last hearing voices "4 days ago", does not appear to be responding to internal stimuli at this time, appears internally preoccupied, distracted   Risk Potential: No active or passive suicidal or homicidal ideation was verbalized during interview   Cognition: oriented to self and situation, appears to be of average intelligence and cognition not formally tested   Insight:  Limited   Judgment: Limited     Current Medications:  Current Facility-Administered Medications   Medication Dose Route Frequency Provider Last Rate   • acetaminophen  650 mg Oral Q6H PRN Ladora Olszewski, MD     • acetaminophen  650 mg Oral Q4H PRN Ladora Olszewski, MD     • acetaminophen  975 mg Oral Q6H PRN Ladora Olszewski, MD     • albuterol  2 puff Inhalation Q4H PRN Ladora Olszewski, MD     • aluminum-magnesium hydroxide-simethicone  30 mL Oral Q4H PRN Ladora Olszewski, MD     • benztropine  1 mg Intramuscular BID PRN Mary Jenkins MD     • benztropine  1 mg Oral Q4H PRN Max 6/day Ladora Olszewski, MD     • cloZAPine  200 mg Oral Daily Giancarlo Mejia DO     • divalproex sodium  750 mg Oral BID Elba Ramos MD     • fluticasone  1 spray Nasal Daily PRN Monie Dalton PA-C     • glycopyrrolate  1 mg Oral TID Tony Owusu DO     • hydrOXYzine HCL  100 mg Oral Q6H PRN Max 4/day Peggi Buerger, DO      Or   • LORazepam  1 mg Intramuscular Q6H PRN Peggi Buerger, DO     • hydrOXYzine HCL  25 mg Oral Q6H PRN Max 4/day Ladora Olszewski, MD     • hydrOXYzine HCL  50 mg Oral Q6H PRN Max 4/day Ladora Olszewski, MD     • melatonin  3 mg Oral HS PRN Peggi Buerger, DO     • metFORMIN  1,000 mg Oral BID With Meals ELA Wise     • nicotine polacrilex  4 mg Oral Q2H PRN Hedy Elliot Werner MD     • OLANZapine  5 mg Oral Q3H PRN Max 6/day Carrington Gonzales MD      Or   • OLANZapine  5 mg Intramuscular Q3H PRN Max 6/day Carrington Gonzales MD     • OLANZapine  5 mg Intramuscular Q8H PRN Sallyann [place], DO      Or   • OLANZapine  7.5 mg Oral Q8H PRN Sallyann [place], DO     • OLANZapine  2.5 mg Oral Q3H PRN Max 8/day Carrington Gonzales MD     • ondansetron  4 mg Oral Q8H PRN Sallyann [place], DO     • polyethylene glycol  17 g Oral Daily PRN Henok Chan MD     • pseudoephedrine  120 mg Oral BID PRN Elissa Akhtar MD     • senna-docusate sodium  1 tablet Oral Daily PRN Henok Chan MD     • senna-docusate sodium  2 tablet Oral BID Carrington Gonzales MD     • sterile water          • sterile water          • sterile water          • sterile water          • sterile water          • sterile water          • sterile water          • sterile water          • sterile water          • sterile water          • sterile water              Behavioral Health Medications: all current active meds have been reviewed. Changes as in plan section above. Laboratory results:  I have personally reviewed all pertinent laboratory/tests results.   Recent Results (from the past 48 hour(s))   Fingerstick Glucose (POCT)    Collection Time: 08/28/23 12:07 PM   Result Value Ref Range    POC Glucose 113 65 - 140 mg/dl   Fingerstick Glucose (POCT)    Collection Time: 08/28/23  5:24 PM   Result Value Ref Range    POC Glucose 103 65 - 140 mg/dl   Fingerstick Glucose (POCT)    Collection Time: 08/28/23  8:24 PM   Result Value Ref Range    POC Glucose 113 65 - 140 mg/dl   Fingerstick Glucose (POCT)    Collection Time: 08/29/23  7:54 AM   Result Value Ref Range    POC Glucose 98 65 - 140 mg/dl   Fingerstick Glucose (POCT)    Collection Time: 08/29/23 11:46 AM   Result Value Ref Range    POC Glucose 114 65 - 140 mg/dl   Fingerstick Glucose (POCT)    Collection Time: 08/29/23  4:52 PM   Result Value Ref Range    POC Glucose 209 (H) 65 - 140 mg/dl   Fingerstick Glucose (POCT)    Collection Time: 08/30/23  8:07 AM   Result Value Ref Range    POC Glucose 98 65 - 140 mg/dl        Miguel Tinajero  MS3

## 2023-08-30 NOTE — NURSING NOTE
Patient has been seclusive to his room; in bed sleeping. Declined breakfast. Compliant with medications. Currently denying AH.

## 2023-08-30 NOTE — NURSING NOTE
Pt quiet, pleasant and cooperative. Denies SI, HI and currently denies hallucinations. Compliant with medications and meal. Pt observed on the unit social with select peers. Denies any unmet needs at this time. Q 7 min safety checks maintained.

## 2023-08-30 NOTE — PLAN OF CARE
Problem: Ineffective Coping  Goal: Participates in unit activities  Description: Interventions:  - Provide therapeutic environment   - Provide required programming   - Redirect inappropriate behaviors   Outcome: Not Progressing     Problem: SELF HARM/SUICIDALITY  Goal: Will have no self-injury during hospital stay  Description: INTERVENTIONS:  - Q 15 MINUTES: Routine safety checks  - Q WAKING SHIFT & PRN: Assess risk to determine if routine checks are adequate to maintain patient safety  - Encourage patient to participate actively in care by formulating a plan to combat response to suicidal ideation, identify supports and resources  Outcome: Progressing     Problem: ANXIETY  Goal: Will report anxiety at manageable levels  Description: INTERVENTIONS:  - Administer medication as ordered  - Teach and encourage coping skills  - Provide emotional support  - Assess patient/family for anxiety and ability to cope  Outcome: Progressing  Goal: By discharge: Patient will verbalize 2 strategies to deal with anxiety  Description: Interventions:  - Identify any obvious source/trigger to anxiety  - Staff will assist patient in applying identified coping technique/skills  - Encourage attendance of scheduled groups and activities  Outcome: Not Progressing     Problem: SUBSTANCE USE/ABUSE  Goal: Will have no detox symptoms and will verbalize plan for changing substance-related behavior  Description: INTERVENTIONS:  - Monitor physical status and assess for symptoms of withdrawal  - Administer medication as ordered  - Provide emotional support with 1 on 1 interaction with staff  - Encourage recovery focused program/ addiction education  - Assess for verbalization of changing behaviors related to substance abuse  - Initiate consults and referrals as appropriate (Case Management, Spiritual Care, etc.)  Outcome: Progressing  Goal: By discharge, will develop insight into their chemical dependency and sustain motivation to continue in recovery  Description: INTERVENTIONS:  - Attends all daily group sessions and scheduled AA groups  - Actively practices coping skills through participation in the therapeutic community and adherence to program rules  - Reviews and completes assignments from individual treatment plan  - Assist patient development of understanding of their personal cycle of addiction and relapse triggers  Outcome: Progressing  Goal: By discharge, patient will have ongoing treatment plan addressing chemical dependency  Description: INTERVENTIONS:  - Assist patient with resources and/or appointments for ongoing recovery based living  Outcome: Progressing

## 2023-08-31 LAB
GLUCOSE SERPL-MCNC: 112 MG/DL (ref 65–140)
GLUCOSE SERPL-MCNC: 90 MG/DL (ref 65–140)

## 2023-08-31 PROCEDURE — 82948 REAGENT STRIP/BLOOD GLUCOSE: CPT

## 2023-08-31 PROCEDURE — 99232 SBSQ HOSP IP/OBS MODERATE 35: CPT | Performed by: PSYCHIATRY & NEUROLOGY

## 2023-08-31 RX ADMIN — METFORMIN HYDROCHLORIDE 1000 MG: 500 TABLET, FILM COATED ORAL at 17:22

## 2023-08-31 RX ADMIN — CLOZAPINE 200 MG: 200 TABLET ORAL at 17:21

## 2023-08-31 RX ADMIN — METFORMIN HYDROCHLORIDE 1000 MG: 500 TABLET, FILM COATED ORAL at 08:21

## 2023-08-31 RX ADMIN — SENNOSIDES AND DOCUSATE SODIUM 2 TABLET: 8.6; 5 TABLET ORAL at 08:21

## 2023-08-31 RX ADMIN — OLANZAPINE 7.5 MG: 2.5 TABLET, FILM COATED ORAL at 00:45

## 2023-08-31 RX ADMIN — DIVALPROEX SODIUM 750 MG: 500 TABLET, DELAYED RELEASE ORAL at 21:13

## 2023-08-31 RX ADMIN — HYDROXYZINE HYDROCHLORIDE 100 MG: 50 TABLET, FILM COATED ORAL at 00:45

## 2023-08-31 RX ADMIN — GLYCOPYRROLATE 1 MG: 1 TABLET ORAL at 21:13

## 2023-08-31 RX ADMIN — GLYCOPYRROLATE 1 MG: 1 TABLET ORAL at 08:21

## 2023-08-31 RX ADMIN — SENNOSIDES AND DOCUSATE SODIUM 1 TABLET: 50; 8.6 TABLET ORAL at 23:18

## 2023-08-31 RX ADMIN — SENNOSIDES AND DOCUSATE SODIUM 2 TABLET: 8.6; 5 TABLET ORAL at 17:21

## 2023-08-31 RX ADMIN — DIVALPROEX SODIUM 750 MG: 500 TABLET, DELAYED RELEASE ORAL at 08:21

## 2023-08-31 RX ADMIN — GLYCOPYRROLATE 1 MG: 1 TABLET ORAL at 17:22

## 2023-08-31 RX ADMIN — POLYETHYLENE GLYCOL 3350 17 G: 17 POWDER, FOR SOLUTION ORAL at 13:44

## 2023-08-31 RX ADMIN — NICOTINE POLACRILEX 4 MG: 4 GUM, CHEWING BUCCAL at 00:45

## 2023-08-31 NOTE — PROGRESS NOTES
08/31/23 0824   Team Meeting   Meeting Type Daily Rounds   Team Members Present   Team Members Present Physician;Nurse;   Physician Team Member [id number]   Care Management Team Member Wendie   Patient/Family Present   Patient Present No   Patient's Family Present No   Pt is compliant with meds. Refused breakfast and lunch yesterday. Sleeping for most of the day. Seclusive to his room. Requesting meds for constipation and received Senna. Responding to internal stimuli overnight, agitated, pacing the halls, anxious, received PRN Zyprexa. Discharge pending EAC.

## 2023-08-31 NOTE — NURSING NOTE
Received patient at 1500. Patient isolative to his room , only comes out for needs.  Patient remain in continual observation

## 2023-08-31 NOTE — NURSING NOTE
Patient reporting that scheduled Senokot has not been effective. C/o constipation. Given PRN of Miralax. Will monitor effectiveness.

## 2023-08-31 NOTE — NURSING NOTE
The patient has been isolative to his room and has been in bed since 7 PM.  He denies SI, HI, AH and VH. His only complaint is that he is tired. Did not come out of bed for snack. Fluids were encouraged.

## 2023-08-31 NOTE — NURSING NOTE
Pt rested in room quietly for a little while but is out on the unit again and frequently at NS. Requested and given a book and calmly sitting in pt lounge area looking at it. No longer responding. Safety checks continue.

## 2023-08-31 NOTE — PROGRESS NOTES
[facility] Group Note     08/31/23 1400   Activity/Group Checklist   Group Life Skills  (Personal Albino)   Attendance Attended   Attendance Duration (min) 0-15  (only attended group briefly)   Interactions Interacted appropriately   Affect/Mood Appropriate  (Somewhat anxious)   Goals Achieved Able to listen to others; Able to recieve feedback; Able to give feedback to another  (received resources/benefited from social presence of group)

## 2023-08-31 NOTE — NURSING NOTE
Patient has been seclusive to his room. Encouraged OOB as well as to shower and eat breakfast. Currently denying AH. Compliant with all morning medications.

## 2023-08-31 NOTE — PROGRESS NOTES
Progress Note - Behavioral Health   Gokul Hamilton 26 y.o. male MRN: [id number]  Unit/Bed#: [facility] 343-01 Encounter: [id number]    Assessment/Plan   Principal Problem:    Schizoaffective disorder ([address])  Active Problems:    Legally blind    Hearing loss    Asthma    Medical clearance for psychiatric admission    Prolonged erection    Intellectual disability    Rib pain on left side       Recommended Treatment:   Continue Clozaril 200mg HS for psychosis (CBC 08/28/23: WBC 9.72, ANC 3.15). Consider dose titration, however monitor patient's day to day sedation  Continue Depakote DR 750mg BID for mood/agitation (Depakote level: 91 on 6/12/23)  Continue Senokot S 2 tablets BID for bowel regimen   Continue Glycopyrrolate 1mg TID for drooling, a likely side effect of Clozaril  Continue Metformin 1000mg BID with meals for Diabetes Mellitus   Continue PRN Zyprexa for agitation, PRN Atarax for anxiety, and PRN Miralax for constipation     Continue with group therapy, milieu therapy and occupational therapy. Continue frequent safety checks and vitals per unit protocol. Case discussed with treatment team.  Risks, benefits and possible side effects of Medications: Risks, benefits, and possible side effects of medications have been explained to the patient, who verbalizes understanding    Current Legal Status: 201  Disposition: awaiting placement to [facility]   ------------------------------------------------------------    Subjective: Per nursing report, Gokul has been seclusive to room and was noted to be sleeping most of the day yesterday, adding that he was "knocked out snoring". He has been compliant with scheduled medications. Nurse reported he refused breakfast and lunch yesterday, as well as breakfast today, and that he reported "boredom" around 1am last night that appeared to be a response to internal stimuli.  They add that he stated he was hearing voices, and was making threats out loud towards said voices along with the physical action of slapping his hands together. Patient received PRN Atarax 100 mg and PRN Zyprexa 7.5 mg for this. Today, Gokul was seen bedside and evaluated alongside attending physician. Patient was found sleeping in bed and was difficult to awaken. He was not agreeable to coming to a group room to speak with the team, and initially asserted that he would "not talk to anyone this morning". He reports feeling "okay" this morning. Patient states he slept "alright" and got about 8 hours. He reports that his energy is at a "7-8/10", adding that he feels "drowsy but its manageable". When asked about goals for the day, patient reports he intends on eating lunch and contacting his grandma, whom he says he is close with. Patient described his appetite as "fine" and did not endorse any discomfort from missing recent meals. Patient discusses that his last bowel movement was yesterday, and that he would like a medicine to help with this. Patient denies any medication side effects. He adds that he hasn't had any AH for "a couple of days" now. He also denies any current suicidal or homicidal plan, ideation, or intent. Later in the day, patient was observed seated in the activity room, with a blanket covering his whole body. He initially denied being open to eating lunch, saying he would be open if they served "beefsteak for dinner", but then later was amenable to eating peanut butter crackers around 12:30PM. Patient was also helped by providers and nursing in getting his laundry done today. Progress Toward Goals: slow improvement    Psychiatric Review of Systems:  Behavior over the last 24 hours: unchanged  Sleep: reports 8 hours, but continues to have daytime sedation  Appetite: decreased from baseline, refused breakfast and lunch per nurse yesterday  Medication side effects: none verbalized  ROS: Complete review of systems is negative except as noted above.     Vital signs in last 24 hours:   Temp:  [98 °F (36.7 °C)] 98 °F (36.7 °C)  HR:  [99] 99  Resp:  [16] 16  BP: (108)/(59) 108/59    Mental Status Exam:  Appearance:  Overtly  appearing male, in no acute distress, drowsy, poor eye contact, appears stated age, unable to assess gait or dress due to patient lying in bed under covers, marginal grooming/hygiene   Behavior:  Calm, withdrawn, uncooperative to interview initially however was more open after prompting   Motor: no abnormal movements and psychomotor retardation   Speech:  delayed initiation, slow, low volume, scant and mumbled   Mood:  "okay"   Affect:  blunted   Thought Process:  poverty of thought   Thought Content: no verbalized delusions or overt paranoia   Perceptual disturbances: no reported hallucinations, reports last hearing voices "a couple of days ago", does not appear to be responding to internal stimuli at this time, appears internally preoccupied, distracted   Risk Potential: No active or passive suicidal or homicidal ideation was verbalized during interview   Cognition: oriented to self and situation, appears to be of average intelligence and cognition not formally tested   Insight:  Limited   Judgment: Limited     Current Medications:  Current Facility-Administered Medications   Medication Dose Route Frequency Provider Last Rate   • acetaminophen  650 mg Oral Q6H PRN Brian Yeung MD     • acetaminophen  650 mg Oral Q4H PRN Brian Yeung MD     • acetaminophen  975 mg Oral Q6H PRN Brian Yeung MD     • albuterol  2 puff Inhalation Q4H PRN Brian Yeung MD     • aluminum-magnesium hydroxide-simethicone  30 mL Oral Q4H PRN Brian Yeung MD     • benztropine  1 mg Intramuscular BID PRN Adam Vasques MD     • benztropine  1 mg Oral Q4H PRN Max 6/day Brian Yeung MD     • cloZAPine  200 mg Oral Daily Perley Cheadle, DO     • divalproex sodium  750 mg Oral BID Tone Jones MD     • fluticasone  1 spray Nasal Daily PRN Madeline Walters PA-C     • glycopyrrolate  1 mg Oral TID Vick Watsonie, DO     • hydrOXYzine HCL  100 mg Oral Q6H PRN Max 4/day Yoana Pound, DO      Or   • LORazepam  1 mg Intramuscular Q6H PRN Yoana Pound, DO     • hydrOXYzine HCL  25 mg Oral Q6H PRN Max 4/day Venita Nissen, MD     • hydrOXYzine HCL  50 mg Oral Q6H PRN Max 4/day Venita Nissen, MD     • melatonin  3 mg Oral HS PRN Yoana Rosenhayn, DO     • metFORMIN  1,000 mg Oral BID With Meals ELA Wise     • nicotine polacrilex  4 mg Oral Q2H PRN Venita Nissen, MD     • OLANZapine  5 mg Oral Q3H PRN Max 6/day Zechariah Rivero MD      Or   • OLANZapine  5 mg Intramuscular Q3H PRN Max 6/day Zechariah Rivero MD     • OLANZapine  5 mg Intramuscular Q8H PRN Yoana Pound, DO      Or   • OLANZapine  7.5 mg Oral Q8H PRN Yoana Pound, DO     • OLANZapine  2.5 mg Oral Q3H PRN Max 8/day Zechariah Rivero MD     • ondansetron  4 mg Oral Q8H PRN Yoana Pound, DO     • polyethylene glycol  17 g Oral Daily PRN Venita Nissen, MD     • pseudoephedrine  120 mg Oral BID PRN Aditi Garcia MD     • senna-docusate sodium  1 tablet Oral Daily PRN Venita Nissen, MD     • senna-docusate sodium  2 tablet Oral BID Zechariah Rivero MD     • sterile water          • sterile water          • sterile water          • sterile water          • sterile water          • sterile water          • sterile water          • sterile water          • sterile water          • sterile water          • sterile water              Behavioral Health Medications: all current active meds have been reviewed. Changes as in plan section above. Laboratory results:  I have personally reviewed all pertinent laboratory/tests results.   Recent Results (from the past 48 hour(s))   Fingerstick Glucose (POCT)    Collection Time: 08/29/23 11:46 AM   Result Value Ref Range    POC Glucose 114 65 - 140 mg/dl   Fingerstick Glucose (POCT)    Collection Time: 08/29/23  4:52 PM   Result Value Ref Range POC Glucose 209 (H) 65 - 140 mg/dl   Fingerstick Glucose (POCT)    Collection Time: 08/30/23  8:07 AM   Result Value Ref Range    POC Glucose 98 65 - 140 mg/dl   Fingerstick Glucose (POCT)    Collection Time: 08/30/23  4:26 PM   Result Value Ref Range    POC Glucose 86 65 - 140 mg/dl   Fingerstick Glucose (POCT)    Collection Time: 08/31/23  8:39 AM   Result Value Ref Range    POC Glucose 112 65 - 140 mg/dl        Chris Tolentino, MS-IV

## 2023-08-31 NOTE — NURSING NOTE
Pt awake at nurses station c/o being bored. He then went and sat in pt lounge area and began RTIS loudly and making threats out loud that were towards voices he was hearing; slapping hands together. Offered medication for same and pt stated "yes please." Pt pacing in anthony and anxious while waiting for meds. PRN atarax 100mg and Zyprexa 7.5mg po given per orders. Pt returned to room. Safety checks continued.

## 2023-09-01 LAB
GLUCOSE SERPL-MCNC: 83 MG/DL (ref 65–140)
GLUCOSE SERPL-MCNC: 90 MG/DL (ref 65–140)

## 2023-09-01 PROCEDURE — 99232 SBSQ HOSP IP/OBS MODERATE 35: CPT | Performed by: PSYCHIATRY & NEUROLOGY

## 2023-09-01 PROCEDURE — 82948 REAGENT STRIP/BLOOD GLUCOSE: CPT

## 2023-09-01 RX ADMIN — DIVALPROEX SODIUM 750 MG: 500 TABLET, DELAYED RELEASE ORAL at 08:05

## 2023-09-01 RX ADMIN — GLYCOPYRROLATE 1 MG: 1 TABLET ORAL at 17:02

## 2023-09-01 RX ADMIN — CLOZAPINE 200 MG: 200 TABLET ORAL at 17:04

## 2023-09-01 RX ADMIN — GLYCOPYRROLATE 1 MG: 1 TABLET ORAL at 21:13

## 2023-09-01 RX ADMIN — DIVALPROEX SODIUM 750 MG: 500 TABLET, DELAYED RELEASE ORAL at 20:23

## 2023-09-01 RX ADMIN — SENNOSIDES AND DOCUSATE SODIUM 2 TABLET: 8.6; 5 TABLET ORAL at 17:04

## 2023-09-01 RX ADMIN — GLYCOPYRROLATE 1 MG: 1 TABLET ORAL at 08:05

## 2023-09-01 RX ADMIN — SENNOSIDES AND DOCUSATE SODIUM 2 TABLET: 8.6; 5 TABLET ORAL at 08:05

## 2023-09-01 RX ADMIN — METFORMIN HYDROCHLORIDE 1000 MG: 500 TABLET, FILM COATED ORAL at 17:04

## 2023-09-01 RX ADMIN — NICOTINE POLACRILEX 4 MG: 4 GUM, CHEWING BUCCAL at 02:29

## 2023-09-01 RX ADMIN — METFORMIN HYDROCHLORIDE 1000 MG: 500 TABLET, FILM COATED ORAL at 08:05

## 2023-09-01 NOTE — PROGRESS NOTES
09/01/23 0982   Team Meeting   Meeting Type Daily Rounds   Team Members Present   Team Members Present Physician;Nurse;   Physician Team Member [id number] Team Member [facility] Management Team Member Wendie   Patient/Family Present   Patient Present No   Patient's Family Present No   Pt c/o constipation overnight, received PRN Senna which was effective. Pt awake this morning, social with staff. Pt participated in meeting with county yesterday. Discharge pending EAC.

## 2023-09-01 NOTE — NURSING NOTE
Patient awake at the start of the shift. In dayroom watching cartoons. Quiet and to himself. Currently denying AH. Compliant with medications. Reports having a large bowel movement last night.

## 2023-09-01 NOTE — NURSING NOTE
Patient sleeping at beginning of shift ,awake for meals and med's . Patient pleasant and cooperative currently out walking the unit. Denies SI/HI/AH/VH at this time.

## 2023-09-01 NOTE — NURSING NOTE
Caregiver faxed regarding patients medication intake for Dr Friedman's review. Patient is refusing to take her Preser-vision AREDS saying \"she is sick to her stomach\" after. They've tried giving patient food and it did not help. Please advise.      Phone Number: [phone]  Fax Number: [phone]    Fax in [employer] mailbox    The patient complained about constipation and was given senna prn per his request.  He has had no BM today. Fluids were encouraged.

## 2023-09-01 NOTE — PROGRESS NOTES
Progress Note - Behavioral Health   Gokul Rubio 26 y.o. male MRN: [id number]  Unit/Bed#: [facility] 343-01 Encounter: [id number]    Assessment/Plan   Principal Problem:    Schizoaffective disorder ([address])  Active Problems:    Legally blind    Hearing loss    Asthma    Medical clearance for psychiatric admission    Prolonged erection    Intellectual disability    Rib pain on left side       Recommended Treatment:   Continue Clozapine 200mg HS for psychosis (CBC 08/28/23: WBC 9.72, ANC 3.15). Consider dose titration, however monitor patient's day to day sedation  Continue Depakote DR 750mg twice daily for mood/agitation (Depakote level: 91 on 6/12/23). Consider changing to XR once daily at bedtime  Continue Senokot S 2 tablets BID for bowel regimen. Monitor patient's day to day bowel movements   Continue Glycopyrrolate 1mg TID for drooling, a likely side effect of Clozapine  Continue Metformin 1000mg BID with meals for Diabetes Mellitus   Lipid panel scheduled for tomorrow morning, 09/02/23, to ensure patient's medical conditions are well addressed  Continue PRN Miralax and PRN Senna for constipation      Continue with group therapy, milieu therapy and occupational therapy. Continue frequent safety checks and vitals per unit protocol. Case discussed with treatment team.  Risks, benefits and possible side effects of Medications: Risks, benefits, and possible side effects of medications have been explained to the patient, who verbalizes understanding    Current Legal Status: 201  Disposition: awaiting placement to [facility]   ------------------------------------------------------------    Subjective: Per nursing report, Gokul has been observed out in milieu and in the activity room more often since yesterday. He has been compliant with scheduled medications.  Nursing reports that he experienced constipation throughout yesterday afternoon and evening, which required PRN Miralax at 1:44 PM and PRN Senna at 11:18 PM. According to nursing, patient had a bowel movement between 11PM and 12AM last night. Occupational Therapy reports that he attended the [facility] group briefly yesterday as well. Today, Gokul was interviewed in the activity room. He was observed seated with his bed blanket draped over his whole body. Upon approaching and asking to speak with him, patient was agreeable to conversation. He reports feeling better this morning, adding that he finds his mood "sophisticated". He also denies any feelings of depression. Patient states he slept "alright" and got about 8 hours. He reports that his energy is "alright" as well. When asked about goals for the future, patient reports he intends on getting his GED, becoming a , and making video games. He adds that his favorite video game is [employer], and that he would like to be closer to his 9year old nephew who is also a fan of video games. Patient described his appetite as "better", saying he had steak for dinner last night and intends on eating breakfast this morning. Patient confirms that his last bowel movement was yesterday around midnight. Patient denies any medication side effects. He adds that he hasn't had any AH for "awhile" now. Per previous progress notes, last AH was likely on 8/26/23. Patient also denies any current suicidal or homicidal plan, ideation, or intent. When asked about if he will attend more groups, he says he will try, adding that he left the previous group he attended early because he's still getting used to expressing himself. Progress Toward Goals: slow improvement    Psychiatric Review of Systems:  Behavior over the last 24 hours: improved  Sleep: reports 8 hours, with less daytime sedation than in previous days  Appetite: improving, amenable to eating snacks and dinner  Medication side effects: none verbalized  ROS: Complete review of systems is negative except as noted above.     Vital signs in last 24 hours:   Temp: [97.5 °F (36.4 °C)] 97.5 °F (36.4 °C)  HR:  [112-122] 112  BP: (127-148)/(81-85) 148/81    Mental Status Exam:  Appearance:  Overtly  appearing male, in no acute distress, fair eye contact, appears stated age, unable to assess gait due to patient seated in chair under covers, dressed in hospital gown, marginal grooming/hygiene   Behavior:  Calm, more cooperative to interview than in previous days   Motor: no abnormal movements and psychomotor retardation   Speech:  delayed initiation, less slowing of speech, appropriate volume, with moments of mumbling   Mood:  "alright, but better than yesterday"   Affect:  blunted, with moments of reactivity when discussing video games   Thought Process:  poverty of thought   Thought Content: no verbalized delusions or overt paranoia   Perceptual disturbances: no reported hallucinations, reports last hearing voices "awhile ago", does not appear to be responding to internal stimuli at this time, appears less internally preoccupied, but remains distracted    Risk Potential: No active or passive suicidal or homicidal ideation was verbalized during interview   Cognition: oriented to self and situation, appears to be of average intelligence and cognition not formally tested   Insight:  Improving   Judgment: Limited     Current Medications:  Current Facility-Administered Medications   Medication Dose Route Frequency Provider Last Rate   • acetaminophen  650 mg Oral Q6H PRN Yan Hernandez MD     • acetaminophen  650 mg Oral Q4H PRN Yan Hernandez MD     • acetaminophen  975 mg Oral Q6H PRN Yan Hernandez MD     • albuterol  2 puff Inhalation Q4H PRN Yan Hernandez MD     • aluminum-magnesium hydroxide-simethicone  30 mL Oral Q4H PRN Yan Hernandez MD     • benztropine  1 mg Intramuscular BID PRN Lucy Paul MD     • benztropine  1 mg Oral Q4H PRN Max 6/day Yan Hernandez MD     • cloZAPine  200 mg Oral Daily Ángel Taylor DO     • divalproex sodium  750 mg Oral BID Jose Antonio Putnam MD     • fluticasone  1 spray Nasal Daily PRN Connie Brokos PA-C     • glycopyrrolate  1 mg Oral TID Shoaib Pandey, DO     • hydrOXYzine HCL  100 mg Oral Q6H PRN Max 4/day Rolin Callow, DO      Or   • LORazepam  1 mg Intramuscular Q6H PRN Rolin Callow, DO     • hydrOXYzine HCL  25 mg Oral Q6H PRN Max 4/day Faith Rosenthal MD     • hydrOXYzine HCL  50 mg Oral Q6H PRN Max 4/day Faith Rosenthal MD     • melatonin  3 mg Oral HS PRN Rolin Callow, DO     • metFORMIN  1,000 mg Oral BID With Meals ELA Wise     • nicotine polacrilex  4 mg Oral Q2H PRN Faith Rosenthal MD     • OLANZapine  5 mg Oral Q3H PRN Max 6/day Jose Antonio Putnam MD      Or   • OLANZapine  5 mg Intramuscular Q3H PRN Max 6/day Jose Antonio Putnam MD     • OLANZapine  5 mg Intramuscular Q8H PRN Rolin Callow, DO      Or   • OLANZapine  7.5 mg Oral Q8H PRN Rolin Callow, DO     • OLANZapine  2.5 mg Oral Q3H PRN Max 8/day Jose Antonio Putnam MD     • ondansetron  4 mg Oral Q8H PRN Rolin Callow, DO     • polyethylene glycol  17 g Oral Daily PRN Faith Rosenthal MD     • pseudoephedrine  120 mg Oral BID PRN Duane Aguas, MD     • senna-docusate sodium  1 tablet Oral Daily PRN Faith Rosenthal MD     • senna-docusate sodium  2 tablet Oral BID Jose Antonio Putnam MD     • sterile water          • sterile water          • sterile water          • sterile water          • sterile water          • sterile water          • sterile water          • sterile water          • sterile water          • sterile water          • sterile water              Behavioral Health Medications: all current active meds have been reviewed. Changes as in plan section above. Laboratory results:  I have personally reviewed all pertinent laboratory/tests results.   Recent Results (from the past 48 hour(s))   Fingerstick Glucose (POCT)    Collection Time: 08/30/23  4:26 PM   Result Value Ref Range    POC Glucose 86 65 - 140 mg/dl   Fingerstick Glucose (POCT)    Collection Time: 08/31/23  8:39 AM   Result Value Ref Range    POC Glucose 112 65 - 140 mg/dl   Fingerstick Glucose (POCT)    Collection Time: 08/31/23  4:50 PM   Result Value Ref Range    POC Glucose 90 65 - 140 mg/dl   Fingerstick Glucose (POCT)    Collection Time: 09/01/23  8:33 AM   Result Value Ref Range    POC Glucose 90 65 - 140 mg/dl        Davidson Chacko, MS-IV

## 2023-09-02 LAB
CHOLEST SERPL-MCNC: 167 MG/DL
GLUCOSE SERPL-MCNC: 100 MG/DL (ref 65–140)
GLUCOSE SERPL-MCNC: 95 MG/DL (ref 65–140)
HDLC SERPL-MCNC: 53 MG/DL
LDLC SERPL CALC-MCNC: 91 MG/DL (ref 0–100)
NONHDLC SERPL-MCNC: 114 MG/DL
TRIGL SERPL-MCNC: 113 MG/DL

## 2023-09-02 PROCEDURE — 82948 REAGENT STRIP/BLOOD GLUCOSE: CPT

## 2023-09-02 PROCEDURE — 99232 SBSQ HOSP IP/OBS MODERATE 35: CPT | Performed by: PSYCHIATRY & NEUROLOGY

## 2023-09-02 PROCEDURE — 80061 LIPID PANEL: CPT

## 2023-09-02 RX ADMIN — SENNOSIDES AND DOCUSATE SODIUM 2 TABLET: 8.6; 5 TABLET ORAL at 08:54

## 2023-09-02 RX ADMIN — GLYCOPYRROLATE 1 MG: 1 TABLET ORAL at 08:54

## 2023-09-02 RX ADMIN — METFORMIN HYDROCHLORIDE 1000 MG: 500 TABLET, FILM COATED ORAL at 17:31

## 2023-09-02 RX ADMIN — DIVALPROEX SODIUM 750 MG: 500 TABLET, DELAYED RELEASE ORAL at 08:54

## 2023-09-02 RX ADMIN — SENNOSIDES AND DOCUSATE SODIUM 2 TABLET: 8.6; 5 TABLET ORAL at 17:31

## 2023-09-02 RX ADMIN — GLYCOPYRROLATE 1 MG: 1 TABLET ORAL at 17:31

## 2023-09-02 RX ADMIN — DIVALPROEX SODIUM 750 MG: 500 TABLET, DELAYED RELEASE ORAL at 21:28

## 2023-09-02 RX ADMIN — CLOZAPINE 200 MG: 200 TABLET ORAL at 17:31

## 2023-09-02 RX ADMIN — GLYCOPYRROLATE 1 MG: 1 TABLET ORAL at 21:28

## 2023-09-02 RX ADMIN — METFORMIN HYDROCHLORIDE 1000 MG: 500 TABLET, FILM COATED ORAL at 08:54

## 2023-09-02 NOTE — PROGRESS NOTES
Progress Note - Behavioral Health   Gokul Lanier 26 y.o. male MRN: [id number]  Unit/Bed#: -01 Encounter: [id number]    Assessment/Plan   Principal Problem:    Schizoaffective disorder ([address])  Active Problems:    Legally blind    Hearing loss    Asthma    Medical clearance for psychiatric admission    Prolonged erection    Intellectual disability    Rib pain on left side      Recommended Treatment:     1. No psychopharmacologic changes necessary at this time; will continue to assess for further optimization. 2. Continue with current medications:  a. Clozaril 200 mg at bedtime for psychosis. i. Robinul 1 mg 3 times daily for sialorrhea.  ii. Senokot 2 tablets twice daily for standing bowel regimen.  iii. Metformin 1000 mg twice daily with meals for cardiometabolic syndrome. b. Depakote 750 mg twice daily for mood stabilization. 3. Continue with group therapy, milieu therapy and occupational therapy. 4. Continue frequent safety checks and vitals per unit protocol. 5. Continue with SLIM medical management as indicated  6. Continue coordinating with case management regarding disposition    Legal Status: 201  Disposition: To be determined, coordinating with case management    Case discussed with treatment team.  Risks, benefits and possible side effects of Medications: Risks, benefits, and possible side effects of medications have previously been explained. No new medications at this time. ------------------------------------------------------------    Subjective: Patient's chart was reviewed, and patient's progress and plan was discussed with treatment team. Per nursing report, Gokul has been quiet but seen awake and in the day room, reporting a large bowel movement in the evening, cooperative on the unit and compliant with medications. Patient has remained in behavioral control for the last 24 hours. Last night patient was documented to have slept throughout the night.      Gokul was evaluated this morning for continuity of care. On examination, Gokul is calm, pleasant, cooperative, dressed in hospital attire and brighter appearing than previously. He states his mood is " feeling good." He reports sleeping well, denying any difficulty falling asleep or staying asleep and endorsing 8 hours and his energy level today is good. His appetite has been good and he denies any difficulty with breakfast, he states he would like more food at which time education was provided on appropriate caloric intake given his risk for worsening cardiometabolic syndrome secondary to Clozaril. He denies any adverse effects from medications. His goals for today are to remain in behavioral control, medication compliant, at which time he smiles at this writer and states "I want to make someone smile today." Gokul is yenifer for safety on the unit. He denies suicidal ideation, homicidal ideation, auditory hallucinations, and visual hallucinations. VS: Reviewed, Tachycardia likely secondary to Clozaril, hypertension also noted with systolic 114, otherwise within normal limits    Progress Toward Goals: Slow improvement    Psychiatric Review of Systems:  Behavior over the last 24 hours: improved  Sleep: hypersomnia  Appetite: increased from baseline  Medication side effects: none verbalized  Medical ROS: Complete review of systems is negative except as noted above.     Vital signs in last 24 hours:       Mental Status Exam:    Appearance:  alert, Intermittent eye contact, appears older than stated age, Hospital attire dressed, disheveled, obese, Short dark curly hair and smiling   Behavior:  calm, cooperative and sitting comfortably   Speech:  spontaneous, clear, normal rate, normal volume and coherent   Mood:  "Feeling good"   Affect:  constricted, slightly brighter   Thought Process:  Linear, goal-directed   Associations: concrete associations   Thought Content:  no verbalized delusions or overt paranoia   Perceptual Disturbances: no reported hallucinations and Appears internally preoccupied at times   Risk Potential: Suicidal ideation - None at present  Homicidal ideation - None at present  Potential for aggression - Not at present   Sensorium:  oriented to person and place   Memory:  recent and remote memory grossly intact   Consciousness:  alert and awake   Attention/Concentration: attention span and concentration appear shorter than expected for age   Insight:  limited   Judgment: limited   Gait/Station: normal gait/station   Motor Activity: no abnormal movements     Current Medications:  Current Facility-Administered Medications   Medication Dose Route Frequency Provider Last Rate   • acetaminophen  650 mg Oral Q6H PRN Therese Hall MD     • acetaminophen  650 mg Oral Q4H PRN Therese Hall MD     • acetaminophen  975 mg Oral Q6H PRN Therese Hall MD     • albuterol  2 puff Inhalation Q4H PRN Therese Hall MD     • aluminum-magnesium hydroxide-simethicone  30 mL Oral Q4H PRN Therese Hall MD     • benztropine  1 mg Intramuscular BID PRN Dilan Chopra MD     • benztropine  1 mg Oral Q4H PRN Max 6/day Therese Hall MD     • cloZAPine  200 mg Oral Daily Flor Becker DO     • divalproex sodium  750 mg Oral BID Patrick Dotson MD     • fluticasone  1 spray Nasal Daily PRN Yelitza Jain PA-C     • glycopyrrolate  1 mg Oral TID Joanna Hannah DO     • hydrOXYzine HCL  100 mg Oral Q6H PRN Max 4/day Ariel Roman, DO      Or   • LORazepam  1 mg Intramuscular Q6H PRN Ariel Hundred, DO     • hydrOXYzine HCL  25 mg Oral Q6H PRN Max 4/day Therese Hall MD     • hydrOXYzine HCL  50 mg Oral Q6H PRN Max 4/day Therese Hall MD     • melatonin  3 mg Oral HS PRN Ariel Hundred, DO     • metFORMIN  1,000 mg Oral BID With Meals ELA Wise     • nicotine polacrilex  4 mg Oral Q2H PRN Therese Hall MD     • OLANZapine  5 mg Oral Q3H PRN Max 6/day Patrick Dotson MD      Or   • OLANZapine  5 mg Intramuscular Q3H PRN Max 6/day Ellen Downey MD     • OLANZapine  5 mg Intramuscular Q8H PRN Ajit Hobbs DO      Or   • OLANZapine  7.5 mg Oral Q8H PRN Ajit Hobbs DO     • OLANZapine  2.5 mg Oral Q3H PRN Max 8/day Ellen Downey MD     • ondansetron  4 mg Oral Q8H PRN Ajit Hobbs DO     • polyethylene glycol  17 g Oral Daily PRN Salma Elena MD     • pseudoephedrine  120 mg Oral BID PRN Talat Tyler MD     • senna-docusate sodium  1 tablet Oral Daily PRN Salma Elena MD     • senna-docusate sodium  2 tablet Oral BID Ellen Downey MD     • sterile water          • sterile water          • sterile water          • sterile water          • sterile water          • sterile water          • sterile water          • sterile water          • sterile water          • sterile water          • sterile water              Behavioral Health Medications: all current active meds have been reviewed. Changes as in plan section above. Laboratory results:  I have personally reviewed all pertinent laboratory/tests results.    Recent Results (from the past 48 hour(s))   Fingerstick Glucose (POCT)    Collection Time: 08/31/23  4:50 PM   Result Value Ref Range    POC Glucose 90 65 - 140 mg/dl   Fingerstick Glucose (POCT)    Collection Time: 09/01/23  8:33 AM   Result Value Ref Range    POC Glucose 90 65 - 140 mg/dl   Fingerstick Glucose (POCT)    Collection Time: 09/01/23  4:59 PM   Result Value Ref Range    POC Glucose 83 65 - 140 mg/dl   Lipid panel    Collection Time: 09/02/23  5:59 AM   Result Value Ref Range    Cholesterol 167 See Comment mg/dL    Triglycerides 113 See Comment mg/dL    HDL, Direct 53 >=40 mg/dL    LDL Calculated 91 0 - 100 mg/dL    Non-HDL-Chol (CHOL-HDL) 114 mg/dl   Fingerstick Glucose (POCT)    Collection Time: 09/02/23  8:47 AM   Result Value Ref Range    POC Glucose 100 65 - 140 mg/dl        This note has been constructed using a voice recognition system. There may be translation, syntax, or grammatical errors. If you have any questions, please contact the dictating author.     Jud Gonzales, DO  Psychiatry Residency, PGY-2

## 2023-09-02 NOTE — NURSING NOTE
Pt is alert and able to make needs known. Up early this morning for breakfast and medications then remained in room asleep on bed. Denies all psych symptoms at this time. Some responding to internal stimuli observed. No c/o pain/discomfort noted. Cooperative and calm this morning. Q7 min safety checks continued.

## 2023-09-03 LAB
GLUCOSE SERPL-MCNC: 86 MG/DL (ref 65–140)
GLUCOSE SERPL-MCNC: 87 MG/DL (ref 65–140)

## 2023-09-03 PROCEDURE — 99232 SBSQ HOSP IP/OBS MODERATE 35: CPT | Performed by: PSYCHIATRY & NEUROLOGY

## 2023-09-03 PROCEDURE — 82948 REAGENT STRIP/BLOOD GLUCOSE: CPT

## 2023-09-03 RX ADMIN — DIVALPROEX SODIUM 750 MG: 500 TABLET, DELAYED RELEASE ORAL at 21:04

## 2023-09-03 RX ADMIN — GLYCOPYRROLATE 1 MG: 1 TABLET ORAL at 17:24

## 2023-09-03 RX ADMIN — CLOZAPINE 200 MG: 200 TABLET ORAL at 17:24

## 2023-09-03 RX ADMIN — DIVALPROEX SODIUM 750 MG: 500 TABLET, DELAYED RELEASE ORAL at 08:54

## 2023-09-03 RX ADMIN — GLYCOPYRROLATE 1 MG: 1 TABLET ORAL at 21:04

## 2023-09-03 RX ADMIN — MELATONIN TAB 3 MG 3 MG: 3 TAB at 01:26

## 2023-09-03 RX ADMIN — GLYCOPYRROLATE 1 MG: 1 TABLET ORAL at 08:54

## 2023-09-03 RX ADMIN — METFORMIN HYDROCHLORIDE 1000 MG: 500 TABLET, FILM COATED ORAL at 08:54

## 2023-09-03 RX ADMIN — HYDROXYZINE HYDROCHLORIDE 100 MG: 50 TABLET, FILM COATED ORAL at 01:25

## 2023-09-03 RX ADMIN — SENNOSIDES AND DOCUSATE SODIUM 2 TABLET: 8.6; 5 TABLET ORAL at 17:23

## 2023-09-03 RX ADMIN — SENNOSIDES AND DOCUSATE SODIUM 2 TABLET: 8.6; 5 TABLET ORAL at 08:54

## 2023-09-03 RX ADMIN — METFORMIN HYDROCHLORIDE 1000 MG: 500 TABLET, FILM COATED ORAL at 17:24

## 2023-09-03 NOTE — PROGRESS NOTES
Progress Note - Behavioral Health   Gokul Bearden Been 26 y.o. male MRN: [id number]  Unit/Bed#: [facility] 343-01 Encounter: [id number]    Assessment/Plan   Principal Problem:    Schizoaffective disorder ([address])  Active Problems:    Legally blind    Hearing loss    Asthma    Medical clearance for psychiatric admission    Prolonged erection    Intellectual disability    Rib pain on left side      Recommended Treatment:     1. No psychopharmacologic changes necessary at this time; will continue to assess for further optimization. 2. Continue with current medications:  a. Clozaril 200 mg at bedtime for psychosis. i. Robinul 1 mg 3 times daily for sialorrhea.  ii. Senokot 2 tablets twice daily for standing bowel regimen.  iii. Metformin 1000 mg twice daily with meals for cardiometabolic syndrome. b. Depakote 750 mg twice daily for mood stabilization. 3. Continue with group therapy, milieu therapy and occupational therapy. 4. Continue frequent safety checks and vitals per unit protocol. 5. Continue with SLIM medical management as indicated  6. Continue coordinating with case management regarding disposition    Legal Status: 201  Disposition: To be determined, coordinating with case management    Case discussed with treatment team.  Risks, benefits and possible side effects of Medications: Risks, benefits, and possible side effects of medications have previously been explained. No new medications at this time. ------------------------------------------------------------    Subjective: Patient's chart was reviewed, and patient's progress and plan was discussed with treatment team. Per nursing report, Gokul has been flat, calm, cooperative on the unit and compliant with medications. Patient has remained in behavioral control for the last 24 hours. Last night patient was documented to have slept throughout the night. Gokul was evaluated this morning for continuity of care.  On examination, Gokul is flat, calm, initially pretending asleep by snoring after this interviewer asks questions but redirected to answer. He denies any difficulties with sleep, appetite, or medication side effects and reports his last bowel movement to be yesterday afternoon. He denies suicidal ideation, homicidal ideation, auditory hallucinations, and visual hallucinations. His goals for today are remain in behavioral control and medication compliant. VS: Reviewed, within normal limits    Progress Toward Goals: Progressing improvement    Psychiatric Review of Systems:  Behavior over the last 24 hours: unchanged  Sleep: normal  Appetite: adequate  Medication side effects: none verbalized  Medical ROS: Complete review of systems is negative except as noted above.     Vital signs in last 24 hours:  Temp:  [97.5 °F (36.4 °C)-97.7 °F (36.5 °C)] 97.7 °F (36.5 °C)  HR:  [] 97  Resp:  [16] 16  BP: (131-137)/(87-93) 131/87    Mental Status Exam:    Appearance:  alert, Minimal to no eye contact, appears older than stated age, Hospital attire dressed, disheveled, obese, Cropped dark curly hair and bearded   Behavior:  calm, limited cooperativity and laying in bed   Speech:  delayed initiation, slow, normal volume, poverty of content and coherent   Mood:  "Fine"   Affect:  flat   Thought Process:  poverty of thought   Associations: concrete associations   Thought Content:  no verbalized delusions or overt paranoia, apparent poverty of thought   Perceptual Disturbances: no reported hallucinations and does not appear to be responding to internal stimuli at this time   Risk Potential: Suicidal ideation - None at present  Homicidal ideation - None at present  Potential for aggression - Not at present   Sensorium:  does not answer   Memory:  patient does not answer   Consciousness:  awake and not sedated   Attention/Concentration: poor concentration and poor attention span   Insight:  limited   Judgment: limited   Gait/Station: normal gait/station   Motor Activity: no abnormal movements     Current Medications:  Current Facility-Administered Medications   Medication Dose Route Frequency Provider Last Rate   • acetaminophen  650 mg Oral Q6H PRN Lona Magaña MD     • acetaminophen  650 mg Oral Q4H PRN Lona Magaañ MD     • acetaminophen  975 mg Oral Q6H PRN Lona Magaña MD     • albuterol  2 puff Inhalation Q4H PRN Lona Magaña MD     • aluminum-magnesium hydroxide-simethicone  30 mL Oral Q4H PRN Lona Magaña MD     • benztropine  1 mg Intramuscular BID PRN Christofer Navarro MD     • benztropine  1 mg Oral Q4H PRN Max 6/day Lona Magaña MD     • cloZAPine  200 mg Oral Daily Toyinrialissa Beckham, DO     • divalproex sodium  750 mg Oral BID Sam Covarrubias MD     • fluticasone  1 spray Nasal Daily PRN Hellen Hawk PA-C     • glycopyrrolate  1 mg Oral TID Bimal Kenyon DO     • hydrOXYzine HCL  100 mg Oral Q6H PRN Max 4/day Gerald Richards, DO      Or   • LORazepam  1 mg Intramuscular Q6H PRN Gerald Richards, DO     • hydrOXYzine HCL  25 mg Oral Q6H PRN Max 4/day Lona Magaña MD     • hydrOXYzine HCL  50 mg Oral Q6H PRN Max 4/day Lona Magaña MD     • melatonin  3 mg Oral HS PRN Gerald Richards, DO     • metFORMIN  1,000 mg Oral BID With Meals ELA Wise     • nicotine polacrilex  4 mg Oral Q2H PRN Lona Magaña MD     • OLANZapine  5 mg Oral Q3H PRN Max 6/day Sam Covarrubias MD      Or   • OLANZapine  5 mg Intramuscular Q3H PRN Max 6/day Sam Covarrubias MD     • OLANZapine  5 mg Intramuscular Q8H PRN Gerald Richards DO      Or   • OLANZapine  7.5 mg Oral Q8H PRN Gerald Richards DO     • OLANZapine  2.5 mg Oral Q3H PRN Max 8/day Sam Covarrubias MD     • ondansetron  4 mg Oral Q8H PRN Gerald Richards DO     • polyethylene glycol  17 g Oral Daily PRN Lona Magaña MD     • pseudoephedrine  120 mg Oral BID PRN Deana Campuzano MD     • senna-docusate sodium  1 tablet Oral Daily PRN Hedy TERRAZAS Abigail Garcia MD     • senna-docusate sodium  2 tablet Oral BID Madeleine Palafox MD     • sterile water          • sterile water          • sterile water          • sterile water          • sterile water          • sterile water          • sterile water          • sterile water          • sterile water          • sterile water          • sterile water              Behavioral Health Medications: all current active meds have been reviewed. Changes as in plan section above. Laboratory results:  I have personally reviewed all pertinent laboratory/tests results. Recent Results (from the past 48 hour(s))   Fingerstick Glucose (POCT)    Collection Time: 09/01/23  4:59 PM   Result Value Ref Range    POC Glucose 83 65 - 140 mg/dl   Lipid panel    Collection Time: 09/02/23  5:59 AM   Result Value Ref Range    Cholesterol 167 See Comment mg/dL    Triglycerides 113 See Comment mg/dL    HDL, Direct 53 >=40 mg/dL    LDL Calculated 91 0 - 100 mg/dL    Non-HDL-Chol (CHOL-HDL) 114 mg/dl   Fingerstick Glucose (POCT)    Collection Time: 09/02/23  8:47 AM   Result Value Ref Range    POC Glucose 100 65 - 140 mg/dl   Fingerstick Glucose (POCT)    Collection Time: 09/02/23  5:16 PM   Result Value Ref Range    POC Glucose 95 65 - 140 mg/dl   Fingerstick Glucose (POCT)    Collection Time: 09/03/23  8:22 AM   Result Value Ref Range    POC Glucose 86 65 - 140 mg/dl        This note has been constructed using a voice recognition system. There may be translation, syntax, or grammatical errors. If you have any questions, please contact the dictating author.     Meri Tim,   Psychiatry Residency, PGY-2

## 2023-09-03 NOTE — NURSING NOTE
Patient reported increased anxiety related to intrusive thoughts. Atarax 100 mg po prn given. Melatonin 3mg po prn given for insomnia.

## 2023-09-03 NOTE — NURSING NOTE
Patient had a flat affect but was calm and cooperative. Denies all psych symtoms at this time. Medication compliant.

## 2023-09-03 NOTE — NURSING NOTE
Pt is alert and able to make needs known. Denies c/o pain/discomfort at this time. Continued observance of responding to internal stimuli during the day. Denies SI/HI. Medications administered and tolerated. Q7 min safety checks continued.

## 2023-09-03 NOTE — NURSING NOTE
Patient is awake and pacing around. He is responding to internal stimuli and requested prn medication. Atarax 100mgs po prn and Melatonin 3mgs po prn given.

## 2023-09-04 LAB
BASOPHILS # BLD AUTO: 0.1 THOUSANDS/ÂΜL (ref 0–0.1)
BASOPHILS NFR BLD AUTO: 1 % (ref 0–1)
EOSINOPHIL # BLD AUTO: 0.79 THOUSAND/ÂΜL (ref 0–0.61)
EOSINOPHIL NFR BLD AUTO: 8 % (ref 0–6)
ERYTHROCYTE [DISTWIDTH] IN BLOOD BY AUTOMATED COUNT: 12.3 % (ref 11.6–15.1)
GLUCOSE SERPL-MCNC: 130 MG/DL (ref 65–140)
GLUCOSE SERPL-MCNC: 89 MG/DL (ref 65–140)
HCT VFR BLD AUTO: 42.7 % (ref 36.5–49.3)
HGB BLD-MCNC: 14.7 G/DL (ref 12–17)
IMM GRANULOCYTES # BLD AUTO: 0.07 THOUSAND/UL (ref 0–0.2)
IMM GRANULOCYTES NFR BLD AUTO: 1 % (ref 0–2)
LYMPHOCYTES # BLD AUTO: 4.52 THOUSANDS/ÂΜL (ref 0.6–4.47)
LYMPHOCYTES NFR BLD AUTO: 45 % (ref 14–44)
MCH RBC QN AUTO: 32 PG (ref 26.8–34.3)
MCHC RBC AUTO-ENTMCNC: 34.4 G/DL (ref 31.4–37.4)
MCV RBC AUTO: 93 FL (ref 82–98)
MONOCYTES # BLD AUTO: 1.18 THOUSAND/ÂΜL (ref 0.17–1.22)
MONOCYTES NFR BLD AUTO: 12 % (ref 4–12)
NEUTROPHILS # BLD AUTO: 3.23 THOUSANDS/ÂΜL (ref 1.85–7.62)
NEUTS SEG NFR BLD AUTO: 33 % (ref 43–75)
NRBC BLD AUTO-RTO: 0 /100 WBCS
PLATELET # BLD AUTO: 221 THOUSANDS/UL (ref 149–390)
PMV BLD AUTO: 11.3 FL (ref 8.9–12.7)
RBC # BLD AUTO: 4.6 MILLION/UL (ref 3.88–5.62)
WBC # BLD AUTO: 9.89 THOUSAND/UL (ref 4.31–10.16)

## 2023-09-04 PROCEDURE — 85025 COMPLETE CBC W/AUTO DIFF WBC: CPT

## 2023-09-04 PROCEDURE — 82948 REAGENT STRIP/BLOOD GLUCOSE: CPT

## 2023-09-04 PROCEDURE — 99232 SBSQ HOSP IP/OBS MODERATE 35: CPT | Performed by: PSYCHIATRY & NEUROLOGY

## 2023-09-04 RX ADMIN — SENNOSIDES AND DOCUSATE SODIUM 2 TABLET: 8.6; 5 TABLET ORAL at 08:25

## 2023-09-04 RX ADMIN — DIVALPROEX SODIUM 750 MG: 500 TABLET, DELAYED RELEASE ORAL at 08:25

## 2023-09-04 RX ADMIN — CLOZAPINE 200 MG: 200 TABLET ORAL at 17:07

## 2023-09-04 RX ADMIN — DIVALPROEX SODIUM 750 MG: 500 TABLET, DELAYED RELEASE ORAL at 18:56

## 2023-09-04 RX ADMIN — GLYCOPYRROLATE 1 MG: 1 TABLET ORAL at 08:25

## 2023-09-04 RX ADMIN — GLYCOPYRROLATE 1 MG: 1 TABLET ORAL at 21:09

## 2023-09-04 RX ADMIN — METFORMIN HYDROCHLORIDE 1000 MG: 500 TABLET, FILM COATED ORAL at 17:21

## 2023-09-04 RX ADMIN — METFORMIN HYDROCHLORIDE 1000 MG: 500 TABLET, FILM COATED ORAL at 08:24

## 2023-09-04 RX ADMIN — GLYCOPYRROLATE 1 MG: 1 TABLET ORAL at 17:07

## 2023-09-04 RX ADMIN — MELATONIN TAB 3 MG 3 MG: 3 TAB at 00:15

## 2023-09-04 RX ADMIN — SENNOSIDES AND DOCUSATE SODIUM 2 TABLET: 8.6; 5 TABLET ORAL at 17:07

## 2023-09-04 NOTE — NURSING NOTE
Patient presented sleeping in room beginning of shift. This nurse encouraged patient to get up for meals and med's. Patient out of room for med's and meals pleasant and lethargic. Denies SI/HI/AH/VH at this time.

## 2023-09-04 NOTE — NURSING NOTE
Pt was isolative to his room; in and out of sleep. He reports his day as "good" and feeling tired most of the day. Pt states, "I did try to attend some groups today." Denies SI/HI/AVH. No problems observed at this time. Q 7 min checks maintained for safety.

## 2023-09-04 NOTE — NURSING NOTE
Patient only out of bed as of this time for breakfast and morning medication. Scant, guarded, eye contact poor. Currently denying AH.

## 2023-09-04 NOTE — PROGRESS NOTES
Progress Note - Behavioral Health   Gokul Lanier 26 y.o. male MRN: [id number]  Unit/Bed#: U 343-01 Encounter: [id number]    Assessment/Plan   Principal Problem:    Schizoaffective disorder ([address])  Active Problems:    Legally blind    Hearing loss    Asthma    Medical clearance for psychiatric admission    Prolonged erection    Intellectual disability    Rib pain on left side      Recommended Treatment:     1. No psychopharmacologic changes necessary at this time; will continue to assess for further optimization. 2. Continue with current medications:  a. Clozaril 200 mg at bedtime for psychosis. i. Robinul 1 mg 3 times daily for sialorrhea.  ii. Senokot 2 tablets twice daily for standing bowel regimen.  iii. Metformin 1000 mg twice daily with meals for cardiometabolic syndrome. b. Depakote 750 mg twice daily for mood stabilization. 3. Continue with group therapy, milieu therapy and occupational therapy. 4. Continue frequent safety checks and vitals per unit protocol. 5. Continue with [facility] medical management as indicated  6. Continue coordinating with case management regarding disposition    Legal Status: 201  Disposition: To be determined, coordinating with case management    Case discussed with treatment team.  Risks, benefits and possible side effects of Medications: Risks, benefits, and possible side effects of medications have previously been explained. No new medications at this time. ------------------------------------------------------------    Subjective: Patient's chart was reviewed, and patient's progress and plan was discussed with treatment team. Per nursing report, Gokul has been isolative, hypersomniac, but later in the day seen out on the unit, cooperative on the unit and compliant with medications. Patient has remained in behavioral control for the last 24 hours. Last night patient was documented to have slept throughout the night.     Gokul was evaluated this morning for continuity of care. On examination, Gokul is calm, pleasant, cooperative, laying in bed for interview. He states his mood is " tired." He reports sleeping well endorsing 8 hours of sleep and his energy level today is adequate. His appetite has been good and he endorses eating breakfast. He denies adverse effects from medications. He denies suicidal ideation, homicidal ideation, auditory hallucinations, and visual hallucinations and states his last auditory hallucination was about a week ago. His goals for today are to remain in behavioral control, medication compliant, and continue to try getting up to walk around the unit. VS: Reviewed, Tachycardia of 114, likely due to Clozaril, otherwise within normal limits    Progress Toward Goals: Progressing improvement    Psychiatric Review of Systems:  Behavior over the last 24 hours: unchanged  Sleep: hypersomnia  Appetite: adequate  Medication side effects: none verbalized  Medical ROS: Complete review of systems is negative except as noted above.     Vital signs in last 24 hours:  Temp:  [97.1 °F (36.2 °C)] 97.1 °F (36.2 °C)  HR:  [114] 114  Resp:  [16] 16  BP: (129)/(78) 129/78    Mental Status Exam:    Appearance:  alert, Minimal to no eye contact, appears stated age, casually dressed, marginal grooming/hygiene, obese, Short cropped dark hair and bearded   Behavior:  calm, cooperative and laying in bed   Speech:  spontaneous, clear, normal rate, normal volume, scant and coherent   Mood:  "Tired"   Affect:  flat   Thought Process:  poverty of thought   Associations: concrete associations   Thought Content:  mild paranoia, apparent poverty of thought   Perceptual Disturbances: no reported hallucinations and does not appear to be responding to internal stimuli at this time   Risk Potential: Suicidal ideation - None at present  Homicidal ideation - None at present  Potential for aggression - Not at present   Sensorium:  oriented to person, place and situation   Memory:  recent and remote memory grossly intact   Consciousness:  alert and awake   Attention/Concentration: attention span and concentration appear shorter than expected for age   Insight:  limited   Judgment: limited   Gait/Station: normal gait/station   Motor Activity: no abnormal movements     Current Medications:  Current Facility-Administered Medications   Medication Dose Route Frequency Provider Last Rate   • acetaminophen  650 mg Oral Q6H PRN Codi Paul MD     • acetaminophen  650 mg Oral Q4H PRN Codi Paul MD     • acetaminophen  975 mg Oral Q6H PRN Codi Paul MD     • albuterol  2 puff Inhalation Q4H PRN Codi Paul MD     • aluminum-magnesium hydroxide-simethicone  30 mL Oral Q4H PRN Codi Paul MD     • benztropine  1 mg Intramuscular BID PRN Douglas Naranjo MD     • benztropine  1 mg Oral Q4H PRN Max 6/day Codi Paul MD     • cloZAPine  200 mg Oral Daily Kimberlyn Garcia, DO     • divalproex sodium  750 mg Oral BID Arsenio aFith MD     • fluticasone  1 spray Nasal Daily PRN Zeke Boyer PA-C     • glycopyrrolate  1 mg Oral TID Symone Howard, DO     • hydrOXYzine HCL  100 mg Oral Q6H PRN Max 4/day Faiza Menendez DO      Or   • LORazepam  1 mg Intramuscular Q6H PRN Faiza Menendez DO     • hydrOXYzine HCL  25 mg Oral Q6H PRN Max 4/day Codi Paul MD     • hydrOXYzine HCL  50 mg Oral Q6H PRN Max 4/day Codi Paul MD     • melatonin  3 mg Oral HS PRN Faiza Menendez DO     • metFORMIN  1,000 mg Oral BID With Meals ELA Wise     • nicotine polacrilex  4 mg Oral Q2H PRN Codi Paul MD     • OLANZapine  5 mg Oral Q3H PRN Max 6/day Arsenio Fatih MD      Or   • OLANZapine  5 mg Intramuscular Q3H PRN Max 6/day Arsenio Faith MD     • OLANZapine  5 mg Intramuscular Q8H PRN Faiza Menendez DO      Or   • OLANZapine  7.5 mg Oral Q8H PRN Faiza Menendez DO     • OLANZapine  2.5 mg Oral Q3H PRN Max 8/day Arsenio Faith MD     • ondansetron  4 mg Oral Q8H PRN Gerald Richards DO     • polyethylene glycol  17 g Oral Daily PRN Lona Magaña MD     • pseudoephedrine  120 mg Oral BID PRN Deana Campuzano MD     • senna-docusate sodium  1 tablet Oral Daily PRN Lona Magaña MD     • senna-docusate sodium  2 tablet Oral BID Sam Covarrubias MD     • sterile water          • sterile water          • sterile water          • sterile water          • sterile water          • sterile water          • sterile water          • sterile water          • sterile water          • sterile water          • sterile water              Behavioral Health Medications: all current active meds have been reviewed. Changes as in plan section above. Laboratory results:  I have personally reviewed all pertinent laboratory/tests results.    Recent Results (from the past 48 hour(s))   Fingerstick Glucose (POCT)    Collection Time: 09/02/23  5:16 PM   Result Value Ref Range    POC Glucose 95 65 - 140 mg/dl   Fingerstick Glucose (POCT)    Collection Time: 09/03/23  8:22 AM   Result Value Ref Range    POC Glucose 86 65 - 140 mg/dl   Fingerstick Glucose (POCT)    Collection Time: 09/03/23  4:54 PM   Result Value Ref Range    POC Glucose 87 65 - 140 mg/dl   CBC and differential    Collection Time: 09/04/23  5:30 AM   Result Value Ref Range    WBC 9.89 4.31 - 10.16 Thousand/uL    RBC 4.60 3.88 - 5.62 Million/uL    Hemoglobin 14.7 12.0 - 17.0 g/dL    Hematocrit 42.7 36.5 - 49.3 %    MCV 93 82 - 98 fL    MCH 32.0 26.8 - 34.3 pg    MCHC 34.4 31.4 - 37.4 g/dL    RDW 12.3 11.6 - 15.1 %    MPV 11.3 8.9 - 12.7 fL    Platelets 789 661 - 418 Thousands/uL    nRBC 0 /100 WBCs    Neutrophils Relative 33 (L) 43 - 75 %    Immat GRANS % 1 0 - 2 %    Lymphocytes Relative 45 (H) 14 - 44 %    Monocytes Relative 12 4 - 12 %    Eosinophils Relative 8 (H) 0 - 6 %    Basophils Relative 1 0 - 1 %    Neutrophils Absolute 3.23 1.85 - 7.62 Thousands/µL    Immature Grans Absolute 0.07 0.00 - 0.20 Thousand/uL    Lymphocytes Absolute 4.52 (H) 0.60 - 4.47 Thousands/µL    Monocytes Absolute 1.18 0.17 - 1.22 Thousand/µL    Eosinophils Absolute 0.79 (H) 0.00 - 0.61 Thousand/µL    Basophils Absolute 0.10 0.00 - 0.10 Thousands/µL   Fingerstick Glucose (POCT)    Collection Time: 09/04/23  7:40 AM   Result Value Ref Range    POC Glucose 89 65 - 140 mg/dl        This note has been constructed using a voice recognition system. There may be translation, syntax, or grammatical errors. If you have any questions, please contact the dictating author.     Summer Hanks,   Psychiatry Residency, PGY-2

## 2023-09-05 LAB
GLUCOSE SERPL-MCNC: 102 MG/DL (ref 65–140)
GLUCOSE SERPL-MCNC: 68 MG/DL (ref 65–140)
GLUCOSE SERPL-MCNC: 88 MG/DL (ref 65–140)
GLUCOSE SERPL-MCNC: 98 MG/DL (ref 65–140)
GLUCOSE SERPL-MCNC: <20 MG/DL (ref 65–140)

## 2023-09-05 PROCEDURE — 82948 REAGENT STRIP/BLOOD GLUCOSE: CPT

## 2023-09-05 PROCEDURE — 99232 SBSQ HOSP IP/OBS MODERATE 35: CPT | Performed by: PSYCHIATRY & NEUROLOGY

## 2023-09-05 RX ADMIN — GLYCOPYRROLATE 1 MG: 1 TABLET ORAL at 21:09

## 2023-09-05 RX ADMIN — SENNOSIDES AND DOCUSATE SODIUM 2 TABLET: 8.6; 5 TABLET ORAL at 18:08

## 2023-09-05 RX ADMIN — METFORMIN HYDROCHLORIDE 1000 MG: 500 TABLET, FILM COATED ORAL at 08:20

## 2023-09-05 RX ADMIN — CLOZAPINE 225 MG: 25 TABLET ORAL at 18:08

## 2023-09-05 RX ADMIN — SENNOSIDES AND DOCUSATE SODIUM 2 TABLET: 8.6; 5 TABLET ORAL at 08:20

## 2023-09-05 RX ADMIN — DIVALPROEX SODIUM 750 MG: 500 TABLET, DELAYED RELEASE ORAL at 08:20

## 2023-09-05 RX ADMIN — METFORMIN HYDROCHLORIDE 1000 MG: 500 TABLET, FILM COATED ORAL at 18:08

## 2023-09-05 RX ADMIN — DIVALPROEX SODIUM 750 MG: 500 TABLET, DELAYED RELEASE ORAL at 21:09

## 2023-09-05 RX ADMIN — GLYCOPYRROLATE 1 MG: 1 TABLET ORAL at 08:20

## 2023-09-05 RX ADMIN — GLYCOPYRROLATE 1 MG: 1 TABLET ORAL at 18:08

## 2023-09-05 NOTE — NURSING NOTE
Patient sleeping soundly and only out of bed for breakfast and morning medications. Continues to deny AH. No complaints. Waiting EAC placement.

## 2023-09-05 NOTE — PROGRESS NOTES
Progress Note - Behavioral Health   Gokul Peguero 26 y.o. male MRN: [id number]  Unit/Bed#: [facility] 343-01 Encounter: [id number]    Assessment/Plan   Principal Problem:    Schizoaffective disorder ([address])  Active Problems:    Legally blind    Hearing loss    Asthma    Medical clearance for psychiatric admission    Prolonged erection    Intellectual disability    Rib pain on left side      Recommended Treatment:   Increase Clozapine from 200mg to 225mg HS for psychosis (CBC 08/28/23: WBC 9.72, ANC 3.15). Monitor patient's day to day sedation and bowel movements. Plan for Clozapine level blood draw on Monday, 9/11/23 to confirm adequate plasma level    Continue Glycopyrrolate 1 mg TID for sialorrhea   Continue Depakote DR 750mg BID for mood/agitation (Depakote level: 91 on 6/12/23). Consider changing to XR once daily at bedtime  Continue Senokot S 2 tablets BID for bowel regimen. Monitor patient's day to day bowel movements   Continue Metformin 1000mg BID with meals for Diabetes Mellitus and Cardiometabolic syndrome     Continue with group therapy, milieu therapy and occupational therapy. Continue frequent safety checks and vitals per unit protocol. Case discussed with treatment team.  Risks, benefits and possible side effects of Medications: Risks, benefits, and possible side effects of medications have previously been explained. No new medications at this time. Legal Status: 201  Disposition: To be determined, coordinating with case management  ------------------------------------------------------------    Subjective: Per nursing report, Gokul has been observed responding to internal stimuli and being awake from time to time throughout the night. Nursing also states that while he is still isolated to his room often, he has been compliant with scheduled medications, and is out of his room when it is time for scheduled medications and meals.  Nursing reports that Gokul expressed being upset after hearing this grandmother plans to move from her current home. Nursing adds that the grandmother's move is to a safer place and that she has stated that she intends for Gokul to live with her in the future. Today, Gokul was interviewed bedside. He was observed lying in bed with a blanket draped over his whole body. Upon approaching and asking to speak with him, patient was agreeable to removing the blanket from atop his face, sitting up, and having a conversation. He reports feeling "good" this morning, adding that he finds his mood "okay". He also denies any feelings of depression. Patient states he slept "alright" and got about 9-10 hours. He reports that his energy is "okay" as well. When asked about goals for the future, he states "none". However, when asked if he remembers the goals discussed during conversation last Friday, he discusses becoming a  and making video games. He adds that he would like to be with his grandma upon discharge, saying he is no longer upset about the news that she is moving to a different area. Patient described his appetite as "better", saying he had "pancakes, sausage, bagels, and eggs" for breakfast this morning. Patient confirms that his last bowel movement was yesterday, however cannot remember the time. He denies any pain with his bowel movements at this time. Patient also denies any medication side effects. He adds that he hasn't had any AH for "awhile" now. Patient also denies any current suicidal or homicidal plan, ideation, or intent. When asked about if he attended more groups this past weekend, he states he attended the video game one and enjoyed playing "[employer]." Upon leaving patient's room and closing the door, patient was heard speaking out loud and responding to internal stimuli. Content of discussion was unclear, however patient was heard laughing inappropriately and referring to himself in the third person.     Progress Toward Goals: Slow improvement    Psychiatric Review of Systems:  Behavior over the last 24 hours: unchanged  Sleep: hypersomnia, reports 9-10 hours  Appetite: improving, particularly in eating whole meals at breakfast and lunch compared to last week  Medication side effects: none verbalized  Medical ROS: Complete review of systems is negative except as noted above.     Vital signs in last 24 hours:       Mental Status Exam:    Appearance:  Alert, overtly  appearing male, in no acute distress, minimal eye contact, appears stated age, dressed in hospital gown, marginal grooming/hygiene, obese, short cropped dark hair and bearded   Behavior:  calm, cooperative and laying in bed   Motor: no abnormal movements and psychomotor retardation   Speech:  delayed initiation, some slowing of speech, appropriate volume, with moments of mumbling   Mood:  "okay"   Affect:  blunted, with moments of reactivity when talking about video games   Thought Process:  poverty of thought   Thought Content:  no verbalized delusions or overt paranoia   Perceptual Disturbances: no reported hallucinations, reports last hearing voices "awhile ago", has been observed responding to internal stimuli in his bedroom regularly by nursing and shortly after the time of this writer's interview, appears internally preoccupied and distracted   Risk Potential: no active or passive suicidal or homicidal ideation was verbalized during interview   Cognition:  oriented to self and situation, appears to be of average intelligence, cognition not formally tested   Insight:  limited   Judgment: limited     Current Medications:  Current Facility-Administered Medications   Medication Dose Route Frequency Provider Last Rate   • acetaminophen  650 mg Oral Q6H PRN Esperanza Rivera MD     • acetaminophen  650 mg Oral Q4H PRN Esperanza Rivera MD     • acetaminophen  975 mg Oral Q6H PRN Esperanza Rivera MD     • albuterol  2 puff Inhalation Q4H PRN Esperanza Rivera MD     • aluminum-magnesium hydroxide-simethicone  30 mL Oral Q4H PRN Salma Elena MD     • benztropine  1 mg Intramuscular BID PRN Tan Portillo MD     • benztropine  1 mg Oral Q4H PRN Max 6/day Salma Elena MD     • cloZAPine  200 mg Oral Daily Josiahberto Gosselin, DO     • divalproex sodium  750 mg Oral BID Ellen Downey MD     • fluticasone  1 spray Nasal Daily PRN Christelle Evangelista PA-C     • glycopyrrolate  1 mg Oral TID Rosalinda Ramirez DO     • hydrOXYzine HCL  100 mg Oral Q6H PRN Max 4/day Ajit Hobbs DO      Or   • LORazepam  1 mg Intramuscular Q6H PRN Ajit Hobbs DO     • hydrOXYzine HCL  25 mg Oral Q6H PRN Max 4/day Salma Elena MD     • hydrOXYzine HCL  50 mg Oral Q6H PRN Max 4/day Salma Elena MD     • melatonin  3 mg Oral HS PRN Ajit Hobbs DO     • metFORMIN  1,000 mg Oral BID With Meals ELA Wise     • nicotine polacrilex  4 mg Oral Q2H PRN Salma Elena MD     • OLANZapine  5 mg Oral Q3H PRN Max 6/day Ellen Downey MD      Or   • OLANZapine  5 mg Intramuscular Q3H PRN Max 6/day Ellen Downey MD     • OLANZapine  5 mg Intramuscular Q8H PRN Ajit Hobbs DO      Or   • OLANZapine  7.5 mg Oral Q8H PRN Ajit Hobbs DO     • OLANZapine  2.5 mg Oral Q3H PRN Max 8/day Ellen Downey MD     • ondansetron  4 mg Oral Q8H PRN Ajit Hobbs DO     • polyethylene glycol  17 g Oral Daily PRN Salma Elena MD     • pseudoephedrine  120 mg Oral BID PRN Talat Tyler MD     • senna-docusate sodium  1 tablet Oral Daily PRN Salma Elena MD     • senna-docusate sodium  2 tablet Oral BID Ellen Downey MD     • sterile water          • sterile water          • sterile water          • sterile water          • sterile water          • sterile water          • sterile water          • sterile water          • sterile water          • sterile water          • sterile water              Recent Results (from the past 48 hour(s)) Fingerstick Glucose (POCT)    Collection Time: 09/03/23  4:54 PM   Result Value Ref Range    POC Glucose 87 65 - 140 mg/dl   CBC and differential    Collection Time: 09/04/23  5:30 AM   Result Value Ref Range    WBC 9.89 4.31 - 10.16 Thousand/uL    RBC 4.60 3.88 - 5.62 Million/uL    Hemoglobin 14.7 12.0 - 17.0 g/dL    Hematocrit 42.7 36.5 - 49.3 %    MCV 93 82 - 98 fL    MCH 32.0 26.8 - 34.3 pg    MCHC 34.4 31.4 - 37.4 g/dL    RDW 12.3 11.6 - 15.1 %    MPV 11.3 8.9 - 12.7 fL    Platelets 012 135 - 700 Thousands/uL    nRBC 0 /100 WBCs    Neutrophils Relative 33 (L) 43 - 75 %    Immat GRANS % 1 0 - 2 %    Lymphocytes Relative 45 (H) 14 - 44 %    Monocytes Relative 12 4 - 12 %    Eosinophils Relative 8 (H) 0 - 6 %    Basophils Relative 1 0 - 1 %    Neutrophils Absolute 3.23 1.85 - 7.62 Thousands/µL    Immature Grans Absolute 0.07 0.00 - 0.20 Thousand/uL    Lymphocytes Absolute 4.52 (H) 0.60 - 4.47 Thousands/µL    Monocytes Absolute 1.18 0.17 - 1.22 Thousand/µL    Eosinophils Absolute 0.79 (H) 0.00 - 0.61 Thousand/µL    Basophils Absolute 0.10 0.00 - 0.10 Thousands/µL   Fingerstick Glucose (POCT)    Collection Time: 09/04/23  7:40 AM   Result Value Ref Range    POC Glucose 89 65 - 140 mg/dl   Fingerstick Glucose (POCT)    Collection Time: 09/04/23  4:30 PM   Result Value Ref Range    POC Glucose 130 65 - 140 mg/dl   Fingerstick Glucose (POCT)    Collection Time: 09/05/23  8:36 AM   Result Value Ref Range    POC Glucose <20 (LL) 65 - 140 mg/dl   Fingerstick Glucose (POCT)    Collection Time: 09/05/23  8:44 AM   Result Value Ref Range    POC Glucose 98 65 - 140 mg/dl        Behavioral Health Medications: all current active meds have been reviewed. Changes as in plan section above. Laboratory results:  I have personally reviewed all pertinent laboratory/tests results.      Nancyann Landau, MS-IV

## 2023-09-05 NOTE — NURSING NOTE
Pt expresses being upset that his grandmother is "moving out of the area" as pt reports he found out recently. Pt states he is "workong on getting discharged" so that he can see her before she moves. Pt counseled.

## 2023-09-05 NOTE — PROGRESS NOTES
09/05/23 0848   Team Meeting   Meeting Type Daily Rounds   Team Members Present   Team Members Present Physician;Nurse;   Physician Team Member [id number] Member Wendie   Patient/Family Present   Patient Present No   Patient's Family Present No   Pt med/meal compliant. Pleasant, cooperative. Visible for breakfast but slept most of the day. Pt denying symptoms. Discharge pending EAC.

## 2023-09-05 NOTE — NURSING NOTE
@ 16:57 Patient blood glucose was 68. Patient was given his dinner tray and blood glucose was recheck @ 17:25 and the result was 88. The  Patient has been asleep for the most of the evening the writer had to weak up the the patient for dinner. Making multiple attempts. The patient vitals WNL .  Patient remain Q 7 min check

## 2023-09-06 LAB
GLUCOSE SERPL-MCNC: 80 MG/DL (ref 65–140)
GLUCOSE SERPL-MCNC: 86 MG/DL (ref 65–140)

## 2023-09-06 PROCEDURE — 82948 REAGENT STRIP/BLOOD GLUCOSE: CPT

## 2023-09-06 PROCEDURE — 99232 SBSQ HOSP IP/OBS MODERATE 35: CPT | Performed by: PSYCHIATRY & NEUROLOGY

## 2023-09-06 RX ADMIN — METFORMIN HYDROCHLORIDE 1000 MG: 500 TABLET, FILM COATED ORAL at 17:18

## 2023-09-06 RX ADMIN — DIVALPROEX SODIUM 750 MG: 500 TABLET, DELAYED RELEASE ORAL at 20:23

## 2023-09-06 RX ADMIN — SENNOSIDES AND DOCUSATE SODIUM 2 TABLET: 8.6; 5 TABLET ORAL at 17:18

## 2023-09-06 RX ADMIN — METFORMIN HYDROCHLORIDE 1000 MG: 500 TABLET, FILM COATED ORAL at 08:22

## 2023-09-06 RX ADMIN — GLYCOPYRROLATE 1 MG: 1 TABLET ORAL at 21:16

## 2023-09-06 RX ADMIN — SENNOSIDES AND DOCUSATE SODIUM 2 TABLET: 8.6; 5 TABLET ORAL at 08:23

## 2023-09-06 RX ADMIN — CLOZAPINE 225 MG: 25 TABLET ORAL at 17:18

## 2023-09-06 RX ADMIN — GLYCOPYRROLATE 1 MG: 1 TABLET ORAL at 17:18

## 2023-09-06 RX ADMIN — DIVALPROEX SODIUM 750 MG: 500 TABLET, DELAYED RELEASE ORAL at 08:22

## 2023-09-06 RX ADMIN — GLYCOPYRROLATE 1 MG: 1 TABLET ORAL at 08:23

## 2023-09-06 NOTE — PROGRESS NOTES
09/06/23 0850   Team Meeting   Meeting Type Daily Rounds   Team Members Present   Team Members Present Physician;Nurse;   Physician Team Member [id number] [facility] Wendie   Patient/Family Present   Patient Present No   Patient's Family Present No     Pt is med/meal compliant. Sleeping this morning, difficult to arouse. Pt with irritable edge this morning. Denying AH during the day but appears to be responding to internal stimuli. Discharge pending EAC.

## 2023-09-06 NOTE — PROGRESS NOTES
Progress Note - Behavioral Health   Gokul Angeles 26 y.o. male MRN: [id number]  Unit/Bed#: [facility] 343-01 Encounter: [id number]    Assessment/Plan   Principal Problem:    Schizoaffective disorder ([address])  Active Problems:    Legally blind    Hearing loss    Asthma    Medical clearance for psychiatric admission    Prolonged erection    Intellectual disability    Rib pain on left side      Recommended Treatment:   Continue Clozapine 225mg HS for psychosis (CBC 09/04/23: WBC 9.89, ANC 3.23). Monitor patient's day to day sedation and bowel movements. Plan for Clozapine level blood draw on Monday, 9/11/23 to confirm adequate plasma level    Continue Glycopyrrolate 1 mg TID for sialorrhea   Continue Depakote DR 750mg BID for mood/agitation (Depakote level: 91 on 6/12/23). Consider changing to XR once daily at bedtime  Continue Senokot S 2 tablets BID for bowel regimen. Monitor patient's day to day bowel movements   Continue Metformin 1000mg BID with meals for Diabetes Mellitus and Cardiometabolic syndrome. Monitor patient's blood glucose levels daily    Continue with group therapy, milieu therapy and occupational therapy. Continue frequent safety checks and vitals per unit protocol. Case discussed with treatment team.  Risks, benefits and possible side effects of Medications: Risks, benefits, and possible side effects of medications have previously been explained. No new medications at this time. Legal Status: 201  Disposition: To be determined, coordinating with case management  ------------------------------------------------------------    Subjective: Per nursing report, Gokul has been observed sleeping throughout most of previous evening and remaining isolated in his room. Nursing notes that he had a blood glucose of 68 before dinner, 88 after dinner last night, and add that he denied any symptoms from this.  Nursing states that while he is still isolated to his room often, he has been compliant with scheduled medications and meals. Nursing does add, however, that he demonstrates some initial irritability anytime he is asked to wake up. Today, Gokul was interviewed bedside. He was observed lying in bed with a blanket draped over his whole body. Upon approaching and asking to speak with him, patient was agreeable to removing the blanket from atop his face, sitting up, and having a conversation. He reports that his mood is "okay" this morning, adding that his energy is "stable, not high or low, but stable". He also denies any feelings of depression or sadness. Patient states he slept a "full night" and got about 9 hours. When asked about goals for the day, he states "calling my grandmother." He goes on to discuss goals of "making music" and "listening to [employer]" for the future. Patient described his appetite as "alright", saying he had "pancakes, sausage, bagels, and eggs" for breakfast again this morning. Patient confirms that his last bowel movement was around 10:30 PM last night, and denies any pain or difficulty with this. Patient denies any medication side effects. He adds that he hasn't had any AH for "awhile" now. Patient also denies any current suicidal or homicidal plan, ideation, or intent. When asked about if he will attend any groups today, he asserts that groups are "not for me." When asked about patient's responding to internal stimuli that was observed yesterday, he states that doing this makes him feel "happy." He elaborates that he does this to express "positive thoughts, and to hype myself up." He denies hearing voices when he does this, saying that he knows he is talking to himself and not to anyone else.     Progress Toward Goals: Slow improvement    Psychiatric Review of Systems:  Behavior over the last 24 hours: unchanged  Sleep: normal, reports 9 hours  Appetite: improving, particularly in eating whole meals at breakfast and lunch compared to last week  Medication side effects: none verbalized  Medical ROS: Complete review of systems is negative except as noted above.     Vital signs in last 24 hours:  Temp:  [98.6 °F (37 °C)] 98.6 °F (37 °C)  HR:  [100-114] 100  Resp:  [16] 16  BP: (135-137)/(69-75) 135/75    Mental Status Exam:    Appearance:  Alert, overtly  appearing male, in no acute distress, fair eye contact, appears stated age, shirtless with hospital gown pants, marginal grooming/hygiene, obese, short cropped dark hair and bearded   Behavior:  calm, cooperative and laying in bed   Motor: no abnormal movements and psychomotor retardation   Speech:  delayed initiation, some slowing of speech, appropriate volume, with moments of mumbling   Mood:  "okay"   Affect:  blunted, with moments of reactivity when talking about his grandmother   Thought Process:  poverty of thought   Thought Content:  no verbalized delusions or overt paranoia   Perceptual Disturbances: no reported hallucinations, reports last hearing voices "awhile ago", was observed responding to internal stimuli in his bedroom yesterday but does not appear to be at the time of interview, appears internally preoccupied and distracted    Risk Potential: no active or passive suicidal or homicidal ideation was verbalized during interview   Cognition:  oriented to self and situation, appears to be of average intelligence, cognition not formally tested   Insight:  limited   Judgment: limited     Current Medications:  Current Facility-Administered Medications   Medication Dose Route Frequency Provider Last Rate   • acetaminophen  650 mg Oral Q6H PRN Lulu Essex, MD     • acetaminophen  650 mg Oral Q4H PRN Lulu Essex, MD     • acetaminophen  975 mg Oral Q6H PRN Lulu Essex, MD     • albuterol  2 puff Inhalation Q4H PRN Lulu Essex, MD     • aluminum-magnesium hydroxide-simethicone  30 mL Oral Q4H PRN Lulu Essex, MD     • benztropine  1 mg Intramuscular BID PRN Fredi Cruz MD     • benztropine  1 mg Oral Q4H PRN Max 6/day Nelson Stovall MD     • cloZAPine  225 mg Oral Daily Crystal Carmichael, DO     • divalproex sodium  750 mg Oral BID Zeke Granado MD     • fluticasone  1 spray Nasal Daily PRN Bradley Horton PA-C     • glycopyrrolate  1 mg Oral TID Vidal mSart, DO     • hydrOXYzine HCL  100 mg Oral Q6H PRN Max 4/day Ulysses Cisneros DO      Or   • LORazepam  1 mg Intramuscular Q6H PRN Ulysses Cisneros DO     • hydrOXYzine HCL  25 mg Oral Q6H PRN Max 4/day Nelson Stovall MD     • hydrOXYzine HCL  50 mg Oral Q6H PRN Max 4/day Nelson Stovall MD     • melatonin  3 mg Oral HS PRN Ulysses Cisneros DO     • metFORMIN  1,000 mg Oral BID With Meals ELA Wise     • nicotine polacrilex  4 mg Oral Q2H PRN Nelson Stovall MD     • OLANZapine  5 mg Oral Q3H PRN Max 6/day Zeke Granado MD      Or   • OLANZapine  5 mg Intramuscular Q3H PRN Max 6/day Zeke Granado MD     • OLANZapine  5 mg Intramuscular Q8H PRN Ulysses Cisneros DO      Or   • OLANZapine  7.5 mg Oral Q8H PRN Ulysses Cisneros DO     • OLANZapine  2.5 mg Oral Q3H PRN Max 8/day Zeke Granado MD     • ondansetron  4 mg Oral Q8H PRN Ulysses Cisneros DO     • polyethylene glycol  17 g Oral Daily PRN Nelson Stovall MD     • pseudoephedrine  120 mg Oral BID PRN Kel Ladd MD     • senna-docusate sodium  1 tablet Oral Daily PRN Nelson Stovall MD     • senna-docusate sodium  2 tablet Oral BID Zeke Granado MD     • sterile water          • sterile water          • sterile water          • sterile water          • sterile water          • sterile water          • sterile water          • sterile water          • sterile water          • sterile water          • sterile water              Recent Results (from the past 48 hour(s))   Fingerstick Glucose (POCT)    Collection Time: 09/04/23  4:30 PM   Result Value Ref Range    POC Glucose 130 65 - 140 mg/dl   Fingerstick Glucose (POCT) Collection Time: 09/05/23  8:36 AM   Result Value Ref Range    POC Glucose <20 (LL) 65 - 140 mg/dl   Fingerstick Glucose (POCT)    Collection Time: 09/05/23  8:44 AM   Result Value Ref Range    POC Glucose 98 65 - 140 mg/dl   Fingerstick Glucose (POCT)    Collection Time: 09/05/23  4:57 PM   Result Value Ref Range    POC Glucose 68 65 - 140 mg/dl   Fingerstick Glucose (POCT)    Collection Time: 09/05/23  5:25 PM   Result Value Ref Range    POC Glucose 88 65 - 140 mg/dl   Fingerstick Glucose (POCT)    Collection Time: 09/05/23  8:12 PM   Result Value Ref Range    POC Glucose 102 65 - 140 mg/dl   Fingerstick Glucose (POCT)    Collection Time: 09/06/23  8:25 AM   Result Value Ref Range    POC Glucose 86 65 - 140 mg/dl        Behavioral Health Medications: all current active meds have been reviewed. Changes as in plan section above. Laboratory results:  I have personally reviewed all pertinent laboratory/tests results.      Madyson Condon, MS-IV

## 2023-09-06 NOTE — NURSING NOTE
The patient has remained isolated in his room throughout the evening resting quietly in bed in room and awake. the patient appears to be depressed and is not engaging with the staff. The patient denies any SI, or HI.  Patient remain Q 7 min check

## 2023-09-06 NOTE — SOCIAL WORK
Pt remains seclusive to his room. Pt is difficult to wake. SW checked in with pt who denies any current needs.

## 2023-09-06 NOTE — NURSING NOTE
Patient woke for breakfast and morning medications and went immediately back to bed after that. Irritable edge. Disheveled; encouraged shower. Scant. Denying symptoms.

## 2023-09-06 NOTE — NURSING NOTE
Received patient at 1500. The patient has been asleep for the most of the evening . The patient denies any SI, or HI.  Patient remain Q 7 min check

## 2023-09-07 LAB
GLUCOSE SERPL-MCNC: 127 MG/DL (ref 65–140)
GLUCOSE SERPL-MCNC: 74 MG/DL (ref 65–140)

## 2023-09-07 PROCEDURE — 99232 SBSQ HOSP IP/OBS MODERATE 35: CPT | Performed by: PSYCHIATRY & NEUROLOGY

## 2023-09-07 PROCEDURE — 82948 REAGENT STRIP/BLOOD GLUCOSE: CPT

## 2023-09-07 RX ADMIN — DIVALPROEX SODIUM 750 MG: 500 TABLET, DELAYED RELEASE ORAL at 20:35

## 2023-09-07 RX ADMIN — SENNOSIDES AND DOCUSATE SODIUM 2 TABLET: 8.6; 5 TABLET ORAL at 08:21

## 2023-09-07 RX ADMIN — GLYCOPYRROLATE 1 MG: 1 TABLET ORAL at 21:08

## 2023-09-07 RX ADMIN — METFORMIN HYDROCHLORIDE 1000 MG: 500 TABLET, FILM COATED ORAL at 17:14

## 2023-09-07 RX ADMIN — GLYCOPYRROLATE 1 MG: 1 TABLET ORAL at 17:14

## 2023-09-07 RX ADMIN — METFORMIN HYDROCHLORIDE 1000 MG: 500 TABLET, FILM COATED ORAL at 08:21

## 2023-09-07 RX ADMIN — GLYCOPYRROLATE 1 MG: 1 TABLET ORAL at 08:21

## 2023-09-07 RX ADMIN — CLOZAPINE 225 MG: 25 TABLET ORAL at 17:14

## 2023-09-07 RX ADMIN — SENNOSIDES AND DOCUSATE SODIUM 2 TABLET: 8.6; 5 TABLET ORAL at 17:14

## 2023-09-07 RX ADMIN — DIVALPROEX SODIUM 750 MG: 500 TABLET, DELAYED RELEASE ORAL at 08:21

## 2023-09-07 NOTE — PROGRESS NOTES
Progress Note - Behavioral Health   Gokul Lanier 26 y.o. male MRN: [id number]  Unit/Bed#: [facility] 343-01 Encounter: [id number]    Assessment/Plan   Principal Problem:    Schizoaffective disorder ([address])  Active Problems:    Legally blind    Hearing loss    Asthma    Medical clearance for psychiatric admission    Prolonged erection    Intellectual disability    Rib pain on left side      Recommended Treatment:   Continue Clozapine 225mg once daily for psychosis (CBC 09/04/23: WBC 9.89, ANC 3.23). Monitor patient's day to day sedation and bowel movements. Plan for Clozapine level blood draw on Monday, 9/11/23 to confirm adequate plasma level    Continue Glycopyrrolate 1 mg three times daily for sialorrhea   Continue Depakote DR 750mg twice daily for mood/agitation (Depakote level: 91 on 6/12/23). Consider changing to XR once daily at bedtime  Continue Senokot S 2 tablets twice daily for bowel regimen. Monitor patient's day to day bowel movements   Continue Metformin 1000mg twice daily with meals for Diabetes Mellitus and Cardiometabolic syndrome. Monitor patient's blood glucose levels daily     Continue with group therapy, milieu therapy and occupational therapy. Continue frequent safety checks and vitals per unit protocol. Case discussed with treatment team.  Risks, benefits and possible side effects of Medications: Risks, benefits, and possible side effects of medications have previously been explained. No new medications at this time. Legal Status: 201  Disposition: To be determined, coordinating with case management  ------------------------------------------------------------    Subjective: Per nursing report, Gokul has been observed sleeping throughout most of previous evening and remaining isolated in his room. Nursing states that while he is still isolated to his room often, he has been compliant with scheduled medications and meals.  Nursing does add that patient has been observed quickly falling back asleep and snoring when being approached in his room by staff. Today, Gokul was interviewed bedside. He was observed lying in bed with a blanket draped over his whole body. Upon approaching and asking to speak with him, patient was initially agreeable to removing the blanket from atop his face and opening his eyes. Shortly into the interview, patient was observed falling back asleep, and snoring with eyes closed. When asked questions about energy and goals, patient continued to fall in and out of sleep with continuing snoring. When asked about the amount of sleep received in previous night as well as about mood, he answered "12 and a half hours" and "okay" before quickly falling back asleep. Patient was able to wake up to describe his appetite as "pretty good", and to say that he had 2 bowel movements since yesterday. He denies any pain or difficulty with going to the bathroom. Patient shakes his head "no" when asked about medication side effects, AH, VH, SI, HI. Progress Toward Goals: unchanged    Psychiatric Review of Systems:  Behavior over the last 24 hours: unchanged  Sleep: hypersomnia, reports 12 and a half hours  Appetite: adequate  Medication side effects: none verbalized  Medical ROS: Complete review of systems is negative except as noted above.     Vital signs in last 24 hours:  HR:  [118] 118  Resp:  [17] 17  BP: (112)/(64) 112/64    Mental Status Exam:    Appearance:  Alert, overtly  appearing male, in no acute distress, poor eye contact, appears stated age, disheveled, in  hospital gown, marginal grooming/hygiene, obese, short cropped dark hair and bearded   Behavior:  calm, limited cooperativity and laying in bed   Motor: no abnormal movements and psychomotor retardation   Speech:  delayed initiation, slowing of speech, low volume, with moments of mumbling   Mood:  "okay"   Affect:  blunted   Thought Process:  poverty of thought   Thought Content:  no verbalized delusions or overt paranoia   Perceptual Disturbances: no reported hallucinations, reports last hearing voices "awhile ago", was observed responding to internal stimuli in his bedroom two days ago but does not appear to be since then and at the time of interview, appears internally preoccupied and distracted    Risk Potential: no active or passive suicidal or homicidal ideation was verbalized during interview   Cognition:  oriented to self and situation, appears to be of average intelligence, cognition not formally tested   Insight:  limited   Judgment: limited     Current Medications:  Current Facility-Administered Medications   Medication Dose Route Frequency Provider Last Rate   • acetaminophen  650 mg Oral Q6H PRN Alburtis Situ, MD     • acetaminophen  650 mg Oral Q4H PRN Alburtis Situ, MD     • acetaminophen  975 mg Oral Q6H PRN Pablito Zaman MD     • albuterol  2 puff Inhalation Q4H PRN Pablitomargarito Zaman MD     • aluminum-magnesium hydroxide-simethicone  30 mL Oral Q4H PRN Alburtis Situ, MD     • benztropine  1 mg Intramuscular BID PRN Mary Coles MD     • benztropine  1 mg Oral Q4H PRN Max 6/day Pablito Zaman MD     • cloZAPine  225 mg Oral Daily Sarah Casey DO     • divalproex sodium  750 mg Oral BID Ricardo Mast MD     • fluticasone  1 spray Nasal Daily PRN Edward Meneses PA-C     • glycopyrrolate  1 mg Oral TID Luis Hernandez DO     • hydrOXYzine HCL  100 mg Oral Q6H PRN Max 4/day Eluterio Halter, DO      Or   • LORazepam  1 mg Intramuscular Q6H PRN Eluterio Halter, DO     • hydrOXYzine HCL  25 mg Oral Q6H PRN Max 4/day Pablito Zaman MD     • hydrOXYzine HCL  50 mg Oral Q6H PRN Max 4/day Pablito Zaman MD     • melatonin  3 mg Oral HS PRN Eluterio Halter, DO     • metFORMIN  1,000 mg Oral BID With Meals ELA Wise     • nicotine polacrilex  4 mg Oral Q2H PRN Pablito Zaman MD     • OLANZapine  5 mg Oral Q3H PRN Max 6/day Ricardo Mast MD      Or   • OLANZapine 5 mg Intramuscular Q3H PRN Max 6/day Pedro Arriola MD     • OLANZapine  5 mg Intramuscular Q8H PRN Grant Gossor, DO      Or   • OLANZapine  7.5 mg Oral Q8H PRN Grant Baum, DO     • OLANZapine  2.5 mg Oral Q3H PRN Max 8/day Pedro Arriola MD     • ondansetron  4 mg Oral Q8H PRN Grant Baum, DO     • polyethylene glycol  17 g Oral Daily PRN Massimo Cabello MD     • pseudoephedrine  120 mg Oral BID PRN Cj Colon MD     • senna-docusate sodium  1 tablet Oral Daily PRN Massimo Cabello MD     • senna-docusate sodium  2 tablet Oral BID Pedro Arriola MD     • sterile water          • sterile water          • sterile water          • sterile water          • sterile water          • sterile water          • sterile water          • sterile water          • sterile water          • sterile water          • sterile water              Recent Results (from the past 48 hour(s))   Fingerstick Glucose (POCT)    Collection Time: 09/05/23  4:57 PM   Result Value Ref Range    POC Glucose 68 65 - 140 mg/dl   Fingerstick Glucose (POCT)    Collection Time: 09/05/23  5:25 PM   Result Value Ref Range    POC Glucose 88 65 - 140 mg/dl   Fingerstick Glucose (POCT)    Collection Time: 09/05/23  8:12 PM   Result Value Ref Range    POC Glucose 102 65 - 140 mg/dl   Fingerstick Glucose (POCT)    Collection Time: 09/06/23  8:25 AM   Result Value Ref Range    POC Glucose 86 65 - 140 mg/dl   Fingerstick Glucose (POCT)    Collection Time: 09/06/23  5:08 PM   Result Value Ref Range    POC Glucose 80 65 - 140 mg/dl   Fingerstick Glucose (POCT)    Collection Time: 09/07/23  8:33 AM   Result Value Ref Range    POC Glucose 74 65 - 140 mg/dl        Behavioral Health Medications: all current active meds have been reviewed. Changes as in plan section above. Laboratory results:  I have personally reviewed all pertinent laboratory/tests results.      Carla Patel, MS-IV

## 2023-09-07 NOTE — NURSING NOTE
Patient in bed sleeping this nurse encouraged patient to get out of bed for medication and meals. Patient denies SI/HI/AH/VH at this time.

## 2023-09-07 NOTE — PLAN OF CARE
Problem: SELF HARM/SUICIDALITY  Goal: Will have no self-injury during hospital stay  Description: INTERVENTIONS:  - Q 15 MINUTES: Routine safety checks  - Q WAKING SHIFT & PRN: Assess risk to determine if routine checks are adequate to maintain patient safety  - Encourage patient to participate actively in care by formulating a plan to combat response to suicidal ideation, identify supports and resources  Outcome: Progressing     Problem: ANXIETY  Goal: Will report anxiety at manageable levels  Description: INTERVENTIONS:  - Administer medication as ordered  - Teach and encourage coping skills  - Provide emotional support  - Assess patient/family for anxiety and ability to cope  Outcome: Progressing  Goal: By discharge: Patient will verbalize 2 strategies to deal with anxiety  Description: Interventions:  - Identify any obvious source/trigger to anxiety  - Staff will assist patient in applying identified coping technique/skills  - Encourage attendance of scheduled groups and activities  Outcome: Progressing     Problem: DISCHARGE PLANNING  Goal: Discharge to home or other facility with appropriate resources  Description: INTERVENTIONS:  - Identify barriers to discharge w/patient and caregiver  - Arrange for needed discharge resources and transportation as appropriate  - Identify discharge learning needs (meds, wound care, etc.)  - Arrange for interpretive services to assist at discharge as needed  - Refer to Case Management Department for coordinating discharge planning if the patient needs post-hospital services based on physician/advanced practitioner order or complex needs related to functional status, cognitive ability, or social support system  Outcome: Progressing     Problem: SUBSTANCE USE/ABUSE  Goal: Will have no detox symptoms and will verbalize plan for changing substance-related behavior  Description: INTERVENTIONS:  - Monitor physical status and assess for symptoms of withdrawal  - Administer medication as ordered  - Provide emotional support with 1 on 1 interaction with staff  - Encourage recovery focused program/ addiction education  - Assess for verbalization of changing behaviors related to substance abuse  - Initiate consults and referrals as appropriate (Case Management, Spiritual Care, etc.)  Outcome: Progressing  Goal: By discharge, will develop insight into their chemical dependency and sustain motivation to continue in recovery  Description: INTERVENTIONS:  - Attends all daily group sessions and scheduled AA groups  - Actively practices coping skills through participation in the therapeutic community and adherence to program rules  - Reviews and completes assignments from individual treatment plan  - Assist patient development of understanding of their personal cycle of addiction and relapse triggers  Outcome: Progressing  Goal: By discharge, patient will have ongoing treatment plan addressing chemical dependency  Description: INTERVENTIONS:  - Assist patient with resources and/or appointments for ongoing recovery based living  Outcome: Progressing

## 2023-09-07 NOTE — NURSING NOTE
Only out of bed for breakfast. Encouraged  patient to come out for lunch but continued to stay in bed sleeping. Denies symptoms at this time. Compliant with medications. No complaints.

## 2023-09-07 NOTE — PROGRESS NOTES
09/07/23 0847   Team Meeting   Meeting Type Daily Rounds   Team Members Present   Team Members Present Physician;Nurse;   Physician Team Member [id number] [facility] Wendie   Patient/Family Present   Patient Present No   Patient's Family Present No   Pt sleeping and seclusive to his room. Pt med compliant. Requires prompting to eat meals. Discharge pending EAC.

## 2023-09-08 LAB
GLUCOSE SERPL-MCNC: 105 MG/DL (ref 65–140)
GLUCOSE SERPL-MCNC: 77 MG/DL (ref 65–140)
GLUCOSE SERPL-MCNC: 84 MG/DL (ref 65–140)

## 2023-09-08 PROCEDURE — 82948 REAGENT STRIP/BLOOD GLUCOSE: CPT

## 2023-09-08 PROCEDURE — 99232 SBSQ HOSP IP/OBS MODERATE 35: CPT | Performed by: PSYCHIATRY & NEUROLOGY

## 2023-09-08 RX ADMIN — GLYCOPYRROLATE 1 MG: 1 TABLET ORAL at 08:19

## 2023-09-08 RX ADMIN — DIVALPROEX SODIUM 750 MG: 500 TABLET, DELAYED RELEASE ORAL at 08:18

## 2023-09-08 RX ADMIN — CLOZAPINE 225 MG: 25 TABLET ORAL at 17:17

## 2023-09-08 RX ADMIN — GLYCOPYRROLATE 1 MG: 1 TABLET ORAL at 16:36

## 2023-09-08 RX ADMIN — METFORMIN HYDROCHLORIDE 1000 MG: 500 TABLET, FILM COATED ORAL at 16:36

## 2023-09-08 RX ADMIN — SENNOSIDES AND DOCUSATE SODIUM 2 TABLET: 8.6; 5 TABLET ORAL at 08:18

## 2023-09-08 RX ADMIN — SENNOSIDES AND DOCUSATE SODIUM 2 TABLET: 8.6; 5 TABLET ORAL at 17:17

## 2023-09-08 RX ADMIN — GLYCOPYRROLATE 1 MG: 1 TABLET ORAL at 21:14

## 2023-09-08 RX ADMIN — DIVALPROEX SODIUM 750 MG: 500 TABLET, DELAYED RELEASE ORAL at 19:40

## 2023-09-08 RX ADMIN — METFORMIN HYDROCHLORIDE 1000 MG: 500 TABLET, FILM COATED ORAL at 08:18

## 2023-09-08 NOTE — PROGRESS NOTES
Progress Note - Behavioral Health   Gokul Wiley 26 y.o. male MRN: [id number]  Unit/Bed#: [facility] 343-01 Encounter: [id number]    Assessment/Plan   Principal Problem:    Schizoaffective disorder ([address])  Active Problems:    Legally blind    Hearing loss    Asthma    Medical clearance for psychiatric admission    Prolonged erection    Intellectual disability    Rib pain on left side      Recommended Treatment:   Continue Clozaril 225mg once daily for psychosis (CBC 09/04/23: WBC 9.89, ANC 3.23). Monitor patient's day to day sedation and bowel movements. Plan for Clozapine level and up to date CBC blood draw on Monday, 9/11/23 to confirm adequate plasma level of Clozapine and healthy hematological numbers   Continue Glycopyrrolate 1 mg three times daily for sialorrhea   Continue Valproate DR 750mg twice daily for mood/agitation (Depakote level: 91 on 6/12/23). Consider changing to XR once daily at bedtime  Continue Senokot S 2 tablets twice daily for bowel regimen. Monitor patient's day to day bowel movements   Continue Metformin 1000mg twice daily with meals for Diabetes Mellitus and Cardiometabolic syndrome. Monitor patient's blood glucose levels daily     Continue with group therapy, milieu therapy and occupational therapy. Continue frequent safety checks and vitals per unit protocol. Case discussed with treatment team.  Risks, benefits and possible side effects of Medications: Risks, benefits, and possible side effects of medications have previously been explained. No new medications at this time. Legal Status: 201  Disposition: To be determined, coordinating with case management  ------------------------------------------------------------    Subjective: Per nursing report, Gokul has been observed sleeping throughout most of previous evening and remaining isolated in his room. Nursing states that while he is still isolated to his room often, he has been compliant with scheduled medications.  Nursing notes that patient skipped lunch yesterday, however add that he has been meal compliant for every other meal this week. Nursing reports state that patient has been responding to internal stimuli all night, and that he was up all night because of this. Today, Gokul was interviewed bedside. He was observed lying in bed with a blanket draped over his whole body. Shortly before this writer's interview, patient was observed singing out loud to himself from within his bedroom. Upon approaching and asking to speak with him, patient was agreeable to removing the blanket from atop his face and having a conversation with fair eye contact. He reports that his mood is "okay" this morning, adding that his energy is "good. " He also denies any feelings of anxiety or sadness. Patient states he slept about 7-8 hours last night, adding that he is "not tried at all." When asked about goals for the day, he states "calling his sister to catch up." He goes on to discuss goals of "getting through the day" and "going to groups" today. Patient described his appetite as "okay", saying that he skipped lunch yesterday because he wasn't hungry but did eat "chicken and rice" for dinner last night and intends on eating breakfast and lunch today. Patient confirms that his last bowel movement was last night, and denies any pain or difficulty with this. He adds that he's had 2 bowel movements total since this writer's last interview. Patient denies any medication side effects. He adds that he hasn't had any AH for "awhile" now. Patient also denies any current suicidal or homicidal plan, ideation, or intent. When asked about patient's responding to internal stimuli that was observed by nursing throughout the night, he denies responding "to anyone", saying that he is singing to himself and the he enjoys doing this. He denies hearing voices when he does this, saying that he knows he is interacting with himself and not to anyone else.      Progress Toward Goals: unchanged, continues to await EAC placement    Psychiatric Review of Systems:  Behavior over the last 24 hours: unchanged  Sleep: per nursing, was awake for most of the night. However, reports 7-8 hours  Appetite: adequate, missed lunch yesterday but ate dinner last night and breakfast this morning   Medication side effects: none verbalized  Medical ROS: Complete review of systems is negative except as noted above.     Vital signs in last 24 hours:  Temp:  [97.1 °F (36.2 °C)] 97.1 °F (36.2 °C)  HR:  [102-104] 102  Resp:  [16] 16  BP: (133-137)/() 137/100    Mental Status Exam:    Appearance:  Alert, overtly  appearing male, in no acute distress, fair eye contact, appears stated age, disheveled, in  hospital gown, marginal grooming/hygiene, obese, short cropped dark hair and bearded   Behavior:  calm, cooperative and laying in bed under covers with head showing   Motor: no abnormal movements and psychomotor retardation   Speech:  delayed initiation, slowing of speech, low volume, with moments of mumbling   Mood:  "okay"   Affect:  blunted   Thought Process:  poverty of thought   Thought Content:  no verbalized delusions or overt paranoia   Perceptual Disturbances: no reported hallucinations, reports last hearing voices "awhile ago", was observed responding to internal stimuli and laughing in his bedroom throughout the night by nursing, patient denied this saying that he was "singing to himself throughout the night" during this writer's interview, appears internally preoccupied and distracted, did not appear to be overtly responding to internal stimuli at time of interview    Risk Potential: no active or passive suicidal or homicidal ideation was verbalized during interview   Cognition:  oriented to self and situation, appears to be of average intelligence, cognition not formally tested   Insight:  limited   Judgment: limited     Current Medications:  Current Facility-Administered Medications   Medication Dose Route Frequency Provider Last Rate   • acetaminophen  650 mg Oral Q6H PRN Lona Magaña MD     • acetaminophen  650 mg Oral Q4H PRN Lona Magaña MD     • acetaminophen  975 mg Oral Q6H PRN Lona Magaña MD     • albuterol  2 puff Inhalation Q4H PRN Lona Magaña MD     • aluminum-magnesium hydroxide-simethicone  30 mL Oral Q4H PRN Lona Magaña MD     • benztropine  1 mg Intramuscular BID PRN Christofer Navarro MD     • benztropine  1 mg Oral Q4H PRN Max 6/day Prcindy Magaña MD     • cloZAPine  225 mg Oral Daily Glorialissa Balkailashrine, DO     • divalproex sodium  750 mg Oral BID Sam Covarrubias MD     • fluticasone  1 spray Nasal Daily PRN Hellen Hawk PA-C     • glycopyrrolate  1 mg Oral TID Bimal Kenyon, DO     • hydrOXYzine HCL  100 mg Oral Q6H PRN Max 4/day Gerald Richards DO      Or   • LORazepam  1 mg Intramuscular Q6H PRN Gerald Richards DO     • hydrOXYzine HCL  25 mg Oral Q6H PRN Max 4/day Lona Magaña MD     • hydrOXYzine HCL  50 mg Oral Q6H PRN Max 4/day Lona Magaña MD     • melatonin  3 mg Oral HS PRN Gerald Richards DO     • metFORMIN  1,000 mg Oral BID With Meals ELA Wise     • nicotine polacrilex  4 mg Oral Q2H PRN Lona Magaña MD     • OLANZapine  5 mg Oral Q3H PRN Max 6/day Sam Covarrubias MD      Or   • OLANZapine  5 mg Intramuscular Q3H PRN Max 6/day Sam Covarrubias MD     • OLANZapine  5 mg Intramuscular Q8H PRN Gerald Richards DO      Or   • OLANZapine  7.5 mg Oral Q8H PRN Gerald Richards DO     • OLANZapine  2.5 mg Oral Q3H PRN Max 8/day Sam Covarrubias MD     • ondansetron  4 mg Oral Q8H PRN Gerald Richards DO     • polyethylene glycol  17 g Oral Daily PRN Lona Magaña MD     • pseudoephedrine  120 mg Oral BID PRN Deana Campuzano MD     • senna-docusate sodium  1 tablet Oral Daily PRN Lona Magaña MD     • senna-docusate sodium  2 tablet Oral BID Sam Covarrubias MD     • sterile water          • sterile water          • sterile water          • sterile water          • sterile water          • sterile water          • sterile water          • sterile water          • sterile water          • sterile water          • sterile water              Recent Results (from the past 48 hour(s))   Fingerstick Glucose (POCT)    Collection Time: 09/06/23  5:08 PM   Result Value Ref Range    POC Glucose 80 65 - 140 mg/dl   Fingerstick Glucose (POCT)    Collection Time: 09/07/23  8:33 AM   Result Value Ref Range    POC Glucose 74 65 - 140 mg/dl   Fingerstick Glucose (POCT)    Collection Time: 09/07/23  5:11 PM   Result Value Ref Range    POC Glucose 127 65 - 140 mg/dl   Fingerstick Glucose (POCT)    Collection Time: 09/08/23  8:43 AM   Result Value Ref Range    POC Glucose 105 65 - 140 mg/dl        Behavioral Health Medications: all current active meds have been reviewed. Changes as in plan section above. Laboratory results:  I have personally reviewed all pertinent laboratory/tests results.      Dayami Gastelum, MS-IV

## 2023-09-08 NOTE — NURSING NOTE
Patient visible at the start of the shift for breakfast and medications. In bed soundly sleeping after that. Observed talking/laughing to self when coming out for his meal. No PRN medications required.

## 2023-09-08 NOTE — NURSING NOTE
Patient stayed in room, but overheard responded to internal stimuli all night. Patient did not sleep.

## 2023-09-08 NOTE — NURSING NOTE
Patient in room sleeping, this nurse encouraged patient to get up for meals. Patient not compliant with meals. Compliant with medications. Denies SI/HI/AH/VH at this time.

## 2023-09-08 NOTE — SOCIAL WORK
Pt remains isolative to his room. Sleeping for most of the day. SW checked in with pt this morning and pt denies any CM related needs.

## 2023-09-08 NOTE — PROGRESS NOTES
09/08/23 0914   Team Meeting   Meeting Type Daily Rounds   Team Members Present   Team Members Present Physician;Nurse;   Physician Team Member [id number] Wendie   Patient/Family Present   Patient Present No   Patient's Family Present No     Pt med/meal compliant. Seclusive to his room, sleeping throughout the day. Pt awake this morning for breakfast. Responding to internal stimuli, laughing inappropriately. Did not require any PRN's overnight but remained awake overnight. Discharge pending EAC.

## 2023-09-08 NOTE — QUICK NOTE
Patients blood sugars appear to be well controlled. He has not required an sliding scale insulin since his metformin was increased. Continue Metformin 1000mg bid. Will discontinue bid accuchecks. Recheck A1C at the end of November.

## 2023-09-09 PROCEDURE — 99232 SBSQ HOSP IP/OBS MODERATE 35: CPT | Performed by: PSYCHIATRY & NEUROLOGY

## 2023-09-09 RX ADMIN — SENNOSIDES AND DOCUSATE SODIUM 2 TABLET: 8.6; 5 TABLET ORAL at 17:23

## 2023-09-09 RX ADMIN — DIVALPROEX SODIUM 750 MG: 500 TABLET, DELAYED RELEASE ORAL at 08:41

## 2023-09-09 RX ADMIN — MELATONIN TAB 3 MG 3 MG: 3 TAB at 23:01

## 2023-09-09 RX ADMIN — GLYCOPYRROLATE 1 MG: 1 TABLET ORAL at 23:01

## 2023-09-09 RX ADMIN — DIVALPROEX SODIUM 750 MG: 500 TABLET, DELAYED RELEASE ORAL at 18:07

## 2023-09-09 RX ADMIN — SENNOSIDES AND DOCUSATE SODIUM 2 TABLET: 8.6; 5 TABLET ORAL at 08:41

## 2023-09-09 RX ADMIN — METFORMIN HYDROCHLORIDE 1000 MG: 500 TABLET, FILM COATED ORAL at 08:41

## 2023-09-09 RX ADMIN — GLYCOPYRROLATE 1 MG: 1 TABLET ORAL at 17:00

## 2023-09-09 RX ADMIN — GLYCOPYRROLATE 1 MG: 1 TABLET ORAL at 08:41

## 2023-09-09 RX ADMIN — CLOZAPINE 225 MG: 25 TABLET ORAL at 17:23

## 2023-09-09 RX ADMIN — METFORMIN HYDROCHLORIDE 1000 MG: 500 TABLET, FILM COATED ORAL at 17:27

## 2023-09-09 NOTE — NURSING NOTE
Patient approached nurses station, RTIS, screaming at the voices he was hearing, had been pacing. Was given Atarax 100 mg Po prn. Will reassess.

## 2023-09-09 NOTE — PROGRESS NOTES
Progress Note - Behavioral Health   Gokul Finch 26 y.o. male MRN: [id number]  Unit/Bed#: [facility] 343-01 Encounter: [id number]    Assessment/Plan   Principal Problem:    Schizoaffective disorder ([address])  Active Problems:    Legally blind    Hearing loss    Asthma    Medical clearance for psychiatric admission    Prolonged erection    Intellectual disability    Rib pain on left side      Recommended Treatment:   Continue current scheduled medication regimen. No medication changes at this time. Continue with group therapy, milieu therapy and occupational therapy. Continue frequent safety checks and vitals per unit protocol. Case discussed with treatment team.  Continue with SLIM medical management as indicated  Continue coordinating with case management regarding disposition  Risks, benefits and possible side effects of Medications: Risks, benefits, and possible side effects of medications have been explained to the patient, who verbalizes understanding    Legal Status: 201  ------------------------------------------------------------    Subjective: Per nursing report, Gokul has been pleasant, cooperative, meal and medication compliant with no acute changes. Patient has been observed responding to internal stimuli and laughing to self. Today, patient describes his mood is "sleepy" and reports adequate sleep overnight and adequate appetite during the day. Patient does report decreased energy during the day secondary to feeling tired. Patient denies any current medication side effects related to the clozapine and Depakote denies any other acute complaints. Patient states that he believes his last auditory hallucination occurred a few days ago. Patient denies any current suicidal ideation, homicidal ideation, auditory or visual hallucinations.     PRNs overnight: None  VS: Reviewed, P: 106, otherwise within normal limits    Progress Toward Goals: slow improvement    Psychiatric Review of Systems:  Behavior over the last 24 hours: unchanged  Sleep: hypersomnia  Appetite: adequate   Medication side effects: none verbalized  ROS: Complete review of systems is negative except as noted above. Vital signs in last 24 hours:  Temp:  [97.5 °F (36.4 °C)] 97.5 °F (36.4 °C)  HR:  [106] 106  Resp:  [18] 18  BP: (118)/(84) 118/84    Mental Status Exam:  Appearance:  Poor eye contact, lying in bed under covers with only face showing   Behavior:  cooperative, calm   Speech:  decreased volume, normal rate   Mood:  "Sleepy"   Affect:  blunted   Thought Process:  coherent, goal directed   Associations: concrete associations   Thought Content:  no overt delusions   Perceptual Disturbances: Denies current auditory or visual hallucinations. Does not appear to be responding to internal stimuli at this time.    Risk Potential: Suicidal ideation - None  Homicidal ideation - None  Potential for aggression - No   Sensorium:  oriented to person, place and time/date   Memory:  recent and remote memory grossly intact   Consciousness:  alert and awake   Attention/Concentration: attention span and concentration appear shorter than expected for age   Insight:  limited   Judgment: limited   Gait/Station: in bed   Motor Activity: no abnormal movements     Current Medications:  Current Facility-Administered Medications   Medication Dose Route Frequency Provider Last Rate   • acetaminophen  650 mg Oral Q6H PRN Garth Cárdenas MD     • acetaminophen  650 mg Oral Q4H PRN Garth Cárdenas MD     • acetaminophen  975 mg Oral Q6H PRN Garth Cárdenas MD     • albuterol  2 puff Inhalation Q4H PRN Garth Cárdenas MD     • aluminum-magnesium hydroxide-simethicone  30 mL Oral Q4H PRN Garth Cárdenas MD     • benztropine  1 mg Intramuscular BID PRN Melita Veliz MD     • benztropine  1 mg Oral Q4H PRN Max 6/day Garth Cárdenas MD     • cloZAPine  225 mg Oral Daily Felipe Arredondo DO     • divalproex sodium  750 mg Oral BID José Miguel Nunez MD • fluticasone  1 spray Nasal Daily PRN Keena Sullivan PA-C     • glycopyrrolate  1 mg Oral TID Froy Napoles, DO     • hydrOXYzine HCL  100 mg Oral Q6H PRN Max 4/day Jamia Jose L, DO      Or   • LORazepam  1 mg Intramuscular Q6H PRN Jamia Jose L, DO     • hydrOXYzine HCL  25 mg Oral Q6H PRN Max 4/day Janneth Mcgraw MD     • hydrOXYzine HCL  50 mg Oral Q6H PRN Max 4/day Janneth Mcgraw MD     • melatonin  3 mg Oral HS PRN Jamia Jose L, DO     • metFORMIN  1,000 mg Oral BID With Meals ELA Wise     • nicotine polacrilex  4 mg Oral Q2H PRN Janneth Mcgraw MD     • OLANZapine  5 mg Oral Q3H PRN Max 6/day Tai Peralta MD      Or   • OLANZapine  5 mg Intramuscular Q3H PRN Max 6/day Tai Peralta MD     • OLANZapine  5 mg Intramuscular Q8H PRN Jamia Jose L, DO      Or   • OLANZapine  7.5 mg Oral Q8H PRN Jamia Jose L, DO     • OLANZapine  2.5 mg Oral Q3H PRN Max 8/day Tai Peralta MD     • ondansetron  4 mg Oral Q8H PRN Jamia Jose L, DO     • polyethylene glycol  17 g Oral Daily PRN Janneth Mcgraw MD     • pseudoephedrine  120 mg Oral BID PRN Марина Farris MD     • senna-docusate sodium  1 tablet Oral Daily PRN Janneth Mcgraw MD     • senna-docusate sodium  2 tablet Oral BID Tai Peralta MD     • sterile water          • sterile water          • sterile water          • sterile water          • sterile water          • sterile water          • sterile water          • sterile water          • sterile water          • sterile water          • sterile water              Behavioral Health Medications: all current active meds have been reviewed. Changes as in plan section above. Laboratory results:  I have personally reviewed all pertinent laboratory/tests results.   Recent Results (from the past 48 hour(s))   Fingerstick Glucose (POCT)    Collection Time: 09/07/23  5:11 PM   Result Value Ref Range    POC Glucose 127 65 - 140 mg/dl   Fingerstick Glucose (POCT)    Collection Time: 09/08/23  8:43 AM   Result Value Ref Range    POC Glucose 105 65 - 140 mg/dl   Fingerstick Glucose (POCT)    Collection Time: 09/08/23  7:33 PM   Result Value Ref Range    POC Glucose 77 65 - 140 mg/dl        This note has been constructed using a voice recognition system. There may be translation, syntax, or grammatical errors. If you have any questions, please contact the dictating author.     Salvador Israel MD PGY2

## 2023-09-09 NOTE — NURSING NOTE
Spent most of this shift sleeping. Encouraged to wake up for breakfast, not effective. Took all scheduled meds. Denies HI/SI/AH/VH or any unmet needs.

## 2023-09-10 VITALS
DIASTOLIC BLOOD PRESSURE: 69 MMHG | WEIGHT: 200 LBS | HEIGHT: 63 IN | SYSTOLIC BLOOD PRESSURE: 111 MMHG | OXYGEN SATURATION: 94 % | TEMPERATURE: 97.5 F | RESPIRATION RATE: 16 BRPM | BODY MASS INDEX: 35.44 KG/M2 | HEART RATE: 94 BPM

## 2023-09-10 PROCEDURE — 99232 SBSQ HOSP IP/OBS MODERATE 35: CPT | Performed by: PSYCHIATRY & NEUROLOGY

## 2023-09-10 RX ADMIN — MELATONIN TAB 3 MG 3 MG: 3 TAB at 20:56

## 2023-09-10 RX ADMIN — GLYCOPYRROLATE 1 MG: 1 TABLET ORAL at 21:41

## 2023-09-10 RX ADMIN — SENNOSIDES AND DOCUSATE SODIUM 2 TABLET: 8.6; 5 TABLET ORAL at 08:28

## 2023-09-10 RX ADMIN — GLYCOPYRROLATE 1 MG: 1 TABLET ORAL at 17:15

## 2023-09-10 RX ADMIN — HYDROXYZINE HYDROCHLORIDE 100 MG: 50 TABLET, FILM COATED ORAL at 01:19

## 2023-09-10 RX ADMIN — OLANZAPINE 7.5 MG: 2.5 TABLET, FILM COATED ORAL at 01:18

## 2023-09-10 RX ADMIN — DIVALPROEX SODIUM 750 MG: 500 TABLET, DELAYED RELEASE ORAL at 20:56

## 2023-09-10 RX ADMIN — METFORMIN HYDROCHLORIDE 1000 MG: 500 TABLET, FILM COATED ORAL at 08:28

## 2023-09-10 RX ADMIN — GLYCOPYRROLATE 1 MG: 1 TABLET ORAL at 08:28

## 2023-09-10 RX ADMIN — CLOZAPINE 225 MG: 25 TABLET ORAL at 17:15

## 2023-09-10 RX ADMIN — DIVALPROEX SODIUM 750 MG: 500 TABLET, DELAYED RELEASE ORAL at 08:28

## 2023-09-10 RX ADMIN — METFORMIN HYDROCHLORIDE 1000 MG: 500 TABLET, FILM COATED ORAL at 17:15

## 2023-09-10 RX ADMIN — SENNOSIDES AND DOCUSATE SODIUM 2 TABLET: 8.6; 5 TABLET ORAL at 17:15

## 2023-09-10 NOTE — NURSING NOTE
Patient isolative to room throughout the evening. Patient needed to be woken for scheduled HS medications and was compliant. Ate dinner. Patient in bed snoring at 2100 and did not wake to his name being called. Arlene rajput.

## 2023-09-10 NOTE — NURSING NOTE
Patient is still awake but is no longer loudly responding to internal stimuli. PRN has been effective in decreasing agitation.

## 2023-09-10 NOTE — NURSING NOTE
Patient is in his room loudly shouting in response o internal stimuli. He is also agitated and unable to sleep. He was offered and accepted Zyprexa 7.5mgs po prn hallucinations and Atarax 100mgs po prn agitation.

## 2023-09-10 NOTE — PROGRESS NOTES
Progress Note - Behavioral Health   Gokul Mckee 26 y.o. male MRN: [id number]  Unit/Bed#: [facility] 343-01 Encounter: [id number]    Assessment/Plan   Principal Problem:    Schizoaffective disorder ([address])  Active Problems:    Legally blind    Hearing loss    Asthma    Medical clearance for psychiatric admission    Prolonged erection    Intellectual disability    Rib pain on left side      Recommended Treatment:   Continue current scheduled medication regimen. No medication changes at this time. Continue with group therapy, milieu therapy and occupational therapy. Continue frequent safety checks and vitals per unit protocol. Case discussed with treatment team.  Continue with SLIM medical management as indicated  Continue coordinating with case management regarding disposition  Risks, benefits and possible side effects of Medications: Risks, benefits, and possible side effects of medications have been explained to the patient, who verbalizes understanding    Legal Status: 201  ------------------------------------------------------------    Subjective: Per nursing report, Gokul has been isolative to room, meal and scheduled medication compliant. Later, patient was observed in his room loudly responding to internal stimuli, agitated and unable to sleep. Patient was given Atarax 100 mg p.o. and Zyprexa 7.5 mg p.o., which appeared effective. Today, patient described his mood as "good" and reports adequate appetite during the day. Per report, patient decreased sleep overnight and has been sleeping more during the day. Patient reports experiencing auditory hallucinations last night but states that he is not experience them this morning. Patient denies any current medication side effects or acute complaints. Patient denies any current suicidal ideation, homicidal ideation or visual hallucinations.     PRNs overnight: Atarax 100 mg p.o., melatonin 3 mg p.o., Zyprexa 7.5 mg p.o.  VS: Reviewed, within normal limits    Progress Toward Goals: slow improvement    Psychiatric Review of Systems:  Behavior over the last 24 hours: unchanged  Sleep: Insomnia/decreased sleep overnight, with increased sleep during the day  Appetite: adequate  Medication side effects: none verbalized  ROS: Complete review of systems is negative except as noted above. Vital signs in last 24 hours:  Temp:  [97.5 °F (36.4 °C)] 97.5 °F (36.4 °C)  HR:  [94] 94  Resp:  [16] 16  BP: (111)/(69) 111/69    Mental Status Exam:  Appearance:  disheveled, marginal hygiene, lying in bed with blanket covered up to face   Behavior:  cooperative, calm   Speech:  increased latency of response, soft, decreased volume   Mood:  "Good"   Affect:  blunted   Thought Process:  coherent, goal directed, mild slowing of thoughts   Associations: concrete associations   Thought Content:  no overt delusions expressed at this time   Perceptual Disturbances: Reports Recent auditory hallucinations last night but denies any  AH currently. Does not appear to be internally responding at this time.    Risk Potential: Suicidal ideation - None  Homicidal ideation - None  Potential for aggression - No   Sensorium:  oriented to person, place and time/date   Memory:  recent and remote memory grossly intact   Consciousness:  awake   Attention/Concentration: attention span and concentration appear shorter than expected for age   Insight:  limited   Judgment: limited   Gait/Station: in bed   Motor Activity: no abnormal movements     Current Medications:  Current Facility-Administered Medications   Medication Dose Route Frequency Provider Last Rate   • acetaminophen  650 mg Oral Q6H PRN Dmitry Roche MD     • acetaminophen  650 mg Oral Q4H PRN Dimtry Roche MD     • acetaminophen  975 mg Oral Q6H PRN Dmitry Roche MD     • albuterol  2 puff Inhalation Q4H PRASHOK Roche MD     • aluminum-magnesium hydroxide-simethicone  30 mL Oral Q4H PRN Dmitry Roche MD     • benztropine  1 mg Intramuscular BID PRN Francetta Gitelman, MD     • benztropine  1 mg Oral Q4H PRN Max 6/day Chauncey Montero MD     • cloZAPine  225 mg Oral Daily Saira Caballero, DO     • divalproex sodium  750 mg Oral BID Andrew Gaytan MD     • fluticasone  1 spray Nasal Daily PRN Breezy Cho PA-C     • glycopyrrolate  1 mg Oral TID William Lovelace, DO     • hydrOXYzine HCL  100 mg Oral Q6H PRN Max 4/day Bobtown Bers, DO      Or   • LORazepam  1 mg Intramuscular Q6H PRN Kevin Bers, DO     • hydrOXYzine HCL  25 mg Oral Q6H PRN Max 4/day Chauncey Montero MD     • hydrOXYzine HCL  50 mg Oral Q6H PRN Max 4/day Chauncey Montero MD     • melatonin  3 mg Oral HS PRN [place] Bers, DO     • metFORMIN  1,000 mg Oral BID With Meals ELA Wise     • nicotine polacrilex  4 mg Oral Q2H PRN Chauncey Montero MD     • OLANZapine  5 mg Oral Q3H PRN Max 6/day Andrew Gaytan MD      Or   • OLANZapine  5 mg Intramuscular Q3H PRN Max 6/day Andrew Gaytan MD     • OLANZapine  5 mg Intramuscular Q8H PRN Kevin Bers, DO      Or   • OLANZapine  7.5 mg Oral Q8H PRN Kevin Bers, DO     • OLANZapine  2.5 mg Oral Q3H PRN Max 8/day Andrew Gaytan MD     • ondansetron  4 mg Oral Q8H PRN [place] Bers, DO     • polyethylene glycol  17 g Oral Daily PRN Chauncey Montero MD     • pseudoephedrine  120 mg Oral BID PRN Lynn Chahal MD     • senna-docusate sodium  1 tablet Oral Daily PRN Chauncey Montero MD     • senna-docusate sodium  2 tablet Oral BID Andrew Gaytan MD     • sterile water          • sterile water          • sterile water          • sterile water          • sterile water          • sterile water          • sterile water          • sterile water          • sterile water          • sterile water          • sterile water              Behavioral Health Medications: all current active meds have been reviewed. Changes as in plan section above. Laboratory results:   I have personally reviewed all pertinent laboratory/tests results. Recent Results (from the past 48 hour(s))   Fingerstick Glucose (POCT)    Collection Time: 09/08/23  7:33 PM   Result Value Ref Range    POC Glucose 77 65 - 140 mg/dl        This note has been constructed using a voice recognition system. There may be translation, syntax, or grammatical errors. If you have any questions, please contact the dictating author.     Deliica Abrams MD PGY2

## 2023-09-10 NOTE — NURSING NOTE
Pt awake at start of shift, seated in dayroom watching TV. Compliant with scheduled medications and stayed out on unit for breakfast. Pt reports poor sleep, states he was awake all night because he slept all day. Encouraged to remain OOB and awake today to improve quality of sleep. Pt agreeable, however returned to bed following breakfast. Denies SI/HI/AVH at this time, but observed RTIS at times. Encouraged shower and group attendance. Denies any unmet needs. Q7 minute safety checks maintained.

## 2023-09-11 LAB
BASOPHILS # BLD AUTO: 0.07 THOUSANDS/ÂΜL (ref 0–0.1)
BASOPHILS NFR BLD AUTO: 1 % (ref 0–1)
EOSINOPHIL # BLD AUTO: 0.9 THOUSAND/ÂΜL (ref 0–0.61)
EOSINOPHIL NFR BLD AUTO: 10 % (ref 0–6)
ERYTHROCYTE [DISTWIDTH] IN BLOOD BY AUTOMATED COUNT: 12.3 % (ref 11.6–15.1)
GLUCOSE SERPL-MCNC: 104 MG/DL (ref 65–140)
HCT VFR BLD AUTO: 41.7 % (ref 36.5–49.3)
HGB BLD-MCNC: 13.8 G/DL (ref 12–17)
IMM GRANULOCYTES # BLD AUTO: 0.06 THOUSAND/UL (ref 0–0.2)
IMM GRANULOCYTES NFR BLD AUTO: 1 % (ref 0–2)
LYMPHOCYTES # BLD AUTO: 4.09 THOUSANDS/ÂΜL (ref 0.6–4.47)
LYMPHOCYTES NFR BLD AUTO: 46 % (ref 14–44)
MCH RBC QN AUTO: 31.2 PG (ref 26.8–34.3)
MCHC RBC AUTO-ENTMCNC: 33.1 G/DL (ref 31.4–37.4)
MCV RBC AUTO: 94 FL (ref 82–98)
MONOCYTES # BLD AUTO: 1.02 THOUSAND/ÂΜL (ref 0.17–1.22)
MONOCYTES NFR BLD AUTO: 11 % (ref 4–12)
NEUTROPHILS # BLD AUTO: 2.78 THOUSANDS/ÂΜL (ref 1.85–7.62)
NEUTS SEG NFR BLD AUTO: 31 % (ref 43–75)
NRBC BLD AUTO-RTO: 0 /100 WBCS
PLATELET # BLD AUTO: 216 THOUSANDS/UL (ref 149–390)
PMV BLD AUTO: 11.2 FL (ref 8.9–12.7)
RBC # BLD AUTO: 4.42 MILLION/UL (ref 3.88–5.62)
WBC # BLD AUTO: 8.92 THOUSAND/UL (ref 4.31–10.16)

## 2023-09-11 PROCEDURE — 99232 SBSQ HOSP IP/OBS MODERATE 35: CPT | Performed by: PSYCHIATRY & NEUROLOGY

## 2023-09-11 PROCEDURE — 85025 COMPLETE CBC W/AUTO DIFF WBC: CPT

## 2023-09-11 PROCEDURE — 82948 REAGENT STRIP/BLOOD GLUCOSE: CPT

## 2023-09-11 PROCEDURE — 86480 TB TEST CELL IMMUN MEASURE: CPT

## 2023-09-11 PROCEDURE — 80159 DRUG ASSAY CLOZAPINE: CPT

## 2023-09-11 RX ORDER — WATER 10 ML/10ML
INJECTION INTRAMUSCULAR; INTRAVENOUS; SUBCUTANEOUS
Status: DISCONTINUED
Start: 2023-09-11 | End: 2023-10-19 | Stop reason: HOSPADM

## 2023-09-11 RX ADMIN — GLYCOPYRROLATE 1 MG: 1 TABLET ORAL at 21:28

## 2023-09-11 RX ADMIN — DIVALPROEX SODIUM 750 MG: 500 TABLET, DELAYED RELEASE ORAL at 08:12

## 2023-09-11 RX ADMIN — METFORMIN HYDROCHLORIDE 1000 MG: 500 TABLET, FILM COATED ORAL at 08:12

## 2023-09-11 RX ADMIN — SENNOSIDES AND DOCUSATE SODIUM 2 TABLET: 8.6; 5 TABLET ORAL at 17:15

## 2023-09-11 RX ADMIN — CLOZAPINE 225 MG: 25 TABLET ORAL at 17:15

## 2023-09-11 RX ADMIN — GLYCOPYRROLATE 1 MG: 1 TABLET ORAL at 17:14

## 2023-09-11 RX ADMIN — SENNOSIDES AND DOCUSATE SODIUM 2 TABLET: 8.6; 5 TABLET ORAL at 08:12

## 2023-09-11 RX ADMIN — DIVALPROEX SODIUM 750 MG: 500 TABLET, DELAYED RELEASE ORAL at 19:12

## 2023-09-11 RX ADMIN — GLYCOPYRROLATE 1 MG: 1 TABLET ORAL at 08:12

## 2023-09-11 RX ADMIN — OLANZAPINE 5 MG: 10 INJECTION, POWDER, LYOPHILIZED, FOR SOLUTION INTRAMUSCULAR at 06:57

## 2023-09-11 RX ADMIN — METFORMIN HYDROCHLORIDE 1000 MG: 500 TABLET, FILM COATED ORAL at 17:15

## 2023-09-11 NOTE — NURSING NOTE
Patient sleeping most of shift,this nurse encouraged patient out of bed to dayroom for meals with success. Patient compliant with medications. Denies SI/HI/AH/VH at this time.

## 2023-09-11 NOTE — NURSING NOTE
Visible in the milieu at the start of the shift. Sitting in dayroom watching cartoons. Ate breakfast but refused lunch. In bed sleeping after breakfast. Denies symptoms at this time but did  report having AH "really early in the morning".

## 2023-09-11 NOTE — NURSING NOTE
Patient has been resting in bed throughout the evening. Awake for snack and scheduled HS medications. Patient given melatonin prn. Retreated back to his room without issue.

## 2023-09-11 NOTE — PROGRESS NOTES
09/11/23 0842   Team Meeting   Meeting Type Daily Rounds   Team Members Present   Team Members Present Physician;Nurse;   Physician Team Member [id number] [facility] Team Member Wendie   Patient/Family Present   Patient Present No   Patient's Family Present No   Pt's bloodwork drawn this morning. Pt med/meal compliant. Remains seclusive to his room, sleeping for most of the day. Overnight agitated, responding to internal stimuli, received PRN medication. This morning agitated, responding to internal stimuli, punched a wall, received PRN Zyprexa. Discharge pending EAC.

## 2023-09-11 NOTE — PROGRESS NOTES
Progress Note - Behavioral Health   Gokul Mckee 26 y.o. male MRN: [id number]  Unit/Bed#: [facility] 343-01 Encounter: [id number]    Assessment/Plan   Principal Problem:    Schizoaffective disorder ([address])  Active Problems:    Legally blind    Hearing loss    Asthma    Medical clearance for psychiatric admission    Prolonged erection    Intellectual disability    Rib pain on left side      Recommended Treatment:   Continue Clozapine 225mg once daily for psychosis (CBC 09/11/23: WBC 8.92, ANC 2.78). Monitor patient's day to day sedation and bowel movements. Plan for up to date ECG and Clozapine level today. Due to recent episodes of agitation, consider dose titration should agitation and need for PRNs for agitation increase. Continue Robinul 1 mg TID for sialorrhea    Continue Depakote DR 750mg BID for mood/agitation (Depakote level: 91 on 6/12/23). Consider changing to XR once daily at bedtime  Continue Senokot S 2 tablets BID for bowel regimen. Monitor patient's day to day bowel movements   Continue Metformin 1000mg BID with meals for Diabetes Mellitus and Cardiometabolic syndrome. Monitor patient's blood glucose levels daily     Continue with group therapy, milieu therapy and occupational therapy. Continue frequent safety checks and vitals per unit protocol. Case discussed with treatment team.  Risks, benefits and possible side effects of Medications: Risks, benefits, and possible side effects of medications have previously been explained. No new medications at this time. Legal Status: 201  Disposition: To be determined, coordinating with case management  ------------------------------------------------------------    Subjective: Per nursing report, Gokul has been observed loudly shouting out loud and responding to internal stimuli.  Nursing states that while he is still isolated to his room from time to time, he has been more visible in milieu than in previous days, as well as compliant with scheduled medications and meals. Nursing reports add that patient received PRN melatonin for sleep last night, which was effective, and that he received PRN Zyprexa 5 mg IM this morning after he demonstrated agitation and punched a wall. Nursing notes that the PRN Zyprexa was effective as well. Today, Gokul was interviewed bedside. He was observed lying in bed with a blanket draped over his whole body. Upon approaching and asking to speak with him, patient was agreeable to removing the blanket from atop his face, sitting up, and having a conversation with fair eye contact. He reports that his mood is "okay" this morning, adding that his energy is "okay" as well. He also denies any feelings of anxiety or sadness. Patient states he slept "8 hours" last night. When asked about goals for the day, he states "calling his sister" and "maybe Grandma" but adds that it's hard to get in touch with them because they work long hours. Patient described his appetite as "good", saying that he does not intend to skip any meals today. Patient confirms that his last bowel movement was last night, and denies any pain or difficulty with this. He adds that he's had 2 bowel movements total since this time yesterday. Patient denies any medication side effects. He adds that he hasn't had any AH for "awhile" now. Patient also denies any current suicidal or homicidal plan, ideation, or intent. When asked about patient's responding to internal stimuli, loud shouting, and agitation that was reported by nursing, he denies responding "to anyone." He states that he was "talking myself up" and that this usually makes him happy. He adds that this morning he felt angry after the self talk, and that that's why he punched the wall. He also discusses that the self talk this morning was "inappropriate", elaborating that he had his first homosexual thought and that this made him mad at himself.  He denies hearing voices when he "self talks", saying that he knows he is interacting with himself and not to anyone else. Progress Toward Goals: unchanged, continues to await EAC placement    Psychiatric Review of Systems:  Behavior over the last 24 hours: unchanged, with moments of agitation  Sleep: reports 8 hours  Appetite: adequate   Medication side effects: none verbalized  Medical ROS: Complete review of systems is negative except as noted above.     Vital signs in last 24 hours:  Temp:  [97.9 °F (36.6 °C)] 97.9 °F (36.6 °C)  HR:  [99] 99  BP: (142)/(84) 142/84    Mental Status Exam:    Appearance:  Alert, overtly  appearing male, in no acute distress, fair eye contact, appears stated age, disheveled, shirtless with hospital gown pants, marginal grooming/hygiene, obese, short cropped dark hair and bearded   Behavior:  calm, cooperative and sitting up in bed, wrapped around with covers with head showing, guarded on approach which improved as interview progressed   Motor: no abnormal movements and psychomotor retardation   Speech:  delayed initiation, slowing of speech, low volume, with moments of mumbling   Mood:  "okay"   Affect:  blunted, dysphoric at times   Thought Process:  poverty of thought, concrete at times   Thought Content:  no verbalized delusions or overt paranoia   Perceptual Disturbances: no reported hallucinations, reports last hearing voices "awhile ago", was observed responding to internal stimuli throughout last night, as well as shouting out loud in his bedroom and punching a wall by nursing, patient denies any of this was due to voices during this writer's interview, adding that he was "talking himself up" and had an "inappropriate thought of homosexuality", appears internally preoccupied and distracted, did not appear to be overtly responding to internal stimuli at time of interview    Risk Potential: no active or passive suicidal or homicidal ideation was verbalized during interview   Cognition:  oriented to self and situation, appears to be of average intelligence, cognition not formally tested   Insight:  limited   Judgment: limited     Current Medications:  Current Facility-Administered Medications   Medication Dose Route Frequency Provider Last Rate   • acetaminophen  650 mg Oral Q6H PRN Rivera Johnson MD     • acetaminophen  650 mg Oral Q4H PRN Rivera Johnson MD     • acetaminophen  975 mg Oral Q6H PRN Rivera Johnson MD     • albuterol  2 puff Inhalation Q4H PRN Rivera Johnson MD     • aluminum-magnesium hydroxide-simethicone  30 mL Oral Q4H PRN Rivera Johnson MD     • benztropine  1 mg Intramuscular BID PRN Jillian Huerta MD     • benztropine  1 mg Oral Q4H PRN Max 6/day Rivera Johnson MD     • cloZAPine  225 mg Oral Daily Serge Shahid, DO     • divalproex sodium  750 mg Oral BID Barbie Damico MD     • fluticasone  1 spray Nasal Daily PRN Carlyle Goodman PA-C     • glycopyrrolate  1 mg Oral TID Tru Sick, DO     • hydrOXYzine HCL  100 mg Oral Q6H PRN Max 4/day Margot Minor, DO      Or   • LORazepam  1 mg Intramuscular Q6H PRN Margot Minor, DO     • hydrOXYzine HCL  25 mg Oral Q6H PRN Max 4/day Rivera Johnson MD     • hydrOXYzine HCL  50 mg Oral Q6H PRN Max 4/day Rivera Johnson MD     • melatonin  3 mg Oral HS PRN Margot Minor, DO     • metFORMIN  1,000 mg Oral BID With Meals ELA Wise     • nicotine polacrilex  4 mg Oral Q2H PRN Rivera Johnson MD     • OLANZapine  5 mg Oral Q3H PRN Max 6/day Barbie Damico MD      Or   • OLANZapine  5 mg Intramuscular Q3H PRN Max 6/day Barbie Damico MD     • OLANZapine  5 mg Intramuscular Q8H PRN Margot Minor, DO      Or   • OLANZapine  7.5 mg Oral Q8H PRN Margot Minor, DO     • OLANZapine  2.5 mg Oral Q3H PRN Max 8/day Barbie Damico MD     • ondansetron  4 mg Oral Q8H PRN Margot Minor, DO     • polyethylene glycol  17 g Oral Daily PRN Rivera Johnson MD     • pseudoephedrine  120 mg Oral BID PRN Carvel Conception, MD     • senna-docusate sodium  1 tablet Oral Daily PRASHOK Man MD     • senna-docusate sodium  2 tablet Oral BID Isabel Fraire MD     • sterile water          • sterile water          • sterile water          • sterile water          • sterile water          • sterile water          • sterile water          • sterile water          • sterile water          • sterile water          • sterile water          • sterile water              Recent Results (from the past 48 hour(s))   CBC and differential    Collection Time: 09/11/23  5:45 AM   Result Value Ref Range    WBC 8.92 4.31 - 10.16 Thousand/uL    RBC 4.42 3.88 - 5.62 Million/uL    Hemoglobin 13.8 12.0 - 17.0 g/dL    Hematocrit 41.7 36.5 - 49.3 %    MCV 94 82 - 98 fL    MCH 31.2 26.8 - 34.3 pg    MCHC 33.1 31.4 - 37.4 g/dL    RDW 12.3 11.6 - 15.1 %    MPV 11.2 8.9 - 12.7 fL    Platelets 504 042 - 442 Thousands/uL    nRBC 0 /100 WBCs    Neutrophils Relative 31 (L) 43 - 75 %    Immat GRANS % 1 0 - 2 %    Lymphocytes Relative 46 (H) 14 - 44 %    Monocytes Relative 11 4 - 12 %    Eosinophils Relative 10 (H) 0 - 6 %    Basophils Relative 1 0 - 1 %    Neutrophils Absolute 2.78 1.85 - 7.62 Thousands/µL    Immature Grans Absolute 0.06 0.00 - 0.20 Thousand/uL    Lymphocytes Absolute 4.09 0.60 - 4.47 Thousands/µL    Monocytes Absolute 1.02 0.17 - 1.22 Thousand/µL    Eosinophils Absolute 0.90 (H) 0.00 - 0.61 Thousand/µL    Basophils Absolute 0.07 0.00 - 0.10 Thousands/µL        Behavioral Health Medications: all current active meds have been reviewed. Changes as in plan section above. Laboratory results:  I have personally reviewed all pertinent laboratory/tests results.      Lilian Loen, MS-IV

## 2023-09-12 LAB
GAMMA INTERFERON BACKGROUND BLD IA-ACNC: 0.05 IU/ML
M TB IFN-G BLD-IMP: NEGATIVE
M TB IFN-G CD4+ BCKGRND COR BLD-ACNC: -0.01 IU/ML
M TB IFN-G CD4+ BCKGRND COR BLD-ACNC: -0.01 IU/ML
MITOGEN IGNF BCKGRD COR BLD-ACNC: >10 IU/ML

## 2023-09-12 PROCEDURE — 99232 SBSQ HOSP IP/OBS MODERATE 35: CPT | Performed by: PSYCHIATRY & NEUROLOGY

## 2023-09-12 RX ADMIN — SENNOSIDES AND DOCUSATE SODIUM 2 TABLET: 8.6; 5 TABLET ORAL at 08:04

## 2023-09-12 RX ADMIN — GLYCOPYRROLATE 1 MG: 1 TABLET ORAL at 21:08

## 2023-09-12 RX ADMIN — METFORMIN HYDROCHLORIDE 1000 MG: 500 TABLET, FILM COATED ORAL at 17:20

## 2023-09-12 RX ADMIN — NICOTINE POLACRILEX 4 MG: 4 GUM, CHEWING BUCCAL at 08:24

## 2023-09-12 RX ADMIN — GLYCOPYRROLATE 1 MG: 1 TABLET ORAL at 08:04

## 2023-09-12 RX ADMIN — DIVALPROEX SODIUM 750 MG: 500 TABLET, DELAYED RELEASE ORAL at 18:04

## 2023-09-12 RX ADMIN — DIVALPROEX SODIUM 750 MG: 500 TABLET, DELAYED RELEASE ORAL at 08:04

## 2023-09-12 RX ADMIN — GLYCOPYRROLATE 1 MG: 1 TABLET ORAL at 17:20

## 2023-09-12 RX ADMIN — METFORMIN HYDROCHLORIDE 1000 MG: 500 TABLET, FILM COATED ORAL at 08:04

## 2023-09-12 RX ADMIN — SENNOSIDES AND DOCUSATE SODIUM 2 TABLET: 8.6; 5 TABLET ORAL at 17:19

## 2023-09-12 RX ADMIN — CLOZAPINE 250 MG: 25 TABLET ORAL at 17:20

## 2023-09-12 NOTE — NURSING NOTE
Patient found  to be lethargic and sleeping. This nurse encouraged patient to come out of room for meals and shower with success. Patient compliant with medication denies SI/HI/AH/VH at this time.

## 2023-09-12 NOTE — SOCIAL WORK
SW received voicemail from pt's grandmother, Melissa Chung ([phone]). SW returned call and provided update on pt's progress. Grandmother requested to come visit pt.  CHICO confirmed and scheduled visit for 9/14 at 1:30pm

## 2023-09-12 NOTE — PROGRESS NOTES
Progress Note - Behavioral Health   Gokul [facility] 26 y.o. male MRN: [id number]  Unit/Bed#: [facility] 343-01 Encounter: [id number]    Assessment/Plan   Principal Problem:    Schizoaffective disorder ([address])  Active Problems:    Legally blind    Hearing loss    Asthma    Medical clearance for psychiatric admission    Prolonged erection    Intellectual disability    Rib pain on left side      Recommended Treatment:   Increase Clozaril from 225 mg to 250 mg once daily for psychosis (CBC 09/11/23: WBC 8.92, ANC 2.78). Anticipate result of ECG and Clozapine level soon. Ordered cardiac marker (BNP, CK-MB, Troponin) blood draw for Monday, 9/18/23. Monitor patient's day to day sedation and bowel movements. Due to recent episodes of agitation, continue to consider dose titration should agitation and need for PRNs for agitation increase. Continue Glycopyrrolate 1 mg three times a day for sialorrhea    Continue Valproate DR 750mg twice daily for mood/agitation (Depakote level: 91 on 6/12/23). Consider changing to XR once daily at bedtime  Continue Senokot S 2 tablets twice daily for bowel regimen. Monitor patient's day to day bowel movements   Continue Metformin 1000mg twice daily with meals for Diabetes Mellitus and Cardiometabolic syndrome. Monitor patient's blood glucose levels daily     Continue with group therapy, milieu therapy and occupational therapy. Continue frequent safety checks and vitals per unit protocol. Case discussed with treatment team.  Risks, benefits and possible side effects of Medications: Risks, benefits, and possible side effects of medications have previously been explained. No new medications at this time. Legal Status: 201  Disposition: To be determined, coordinating with case management  ------------------------------------------------------------    Subjective: Per nursing report, Gokul has been observed continuing to respond to internal stimuli.  Nursing states that while he is still isolated to his room from time to time, he has been more visible in the day room than in previous days, as well as compliant with scheduled medications. Nursing states that patient is mostly meal compliant, with the exception of lunch yesterday. Nursing reports add that patient had an auditory hallucination in the afternoon yesterday. Today, Gokul was interviewed bedside. He was observed lying in bed with a blanket draped over his whole body. Upon approaching and asking to speak with him, patient was agreeable to removing the blanket from atop his face, and having a conversation with fair eye contact. He reports that his mood is "fine" this morning, adding that his energy is "good. " He also denies any feelings of anxiety or sadness. Patient states he slept "8-9 hours" last night. When asked about goals for the day, he states "showering", "calling his sister" and "maybe calling Grandma." Patient described his appetite as "okay", saying that he does not intend to skip any meals today. He also denies skipping any meals yesterday, saying that he had "a chicken salad for lunch" and "beef stroganoff for dinner." Patient confirms that his last bowel movement was last night, and denies any pain or difficulty with this. He adds that he's had 2 bowel movements total since this time yesterday. Patient denies any medication side effects. He adds that he hasn't had any AH for "awhile" now. Patient also denies any current suicidal or homicidal plan, ideation, or intent. When asked about patient's responding to internal stimuli and auditory hallucination that was reported by nursing, he denies responding "to anyone." He states that he was "talking myself up" and that this usually makes him happy. He denies hearing voices when he "self talks", saying that he knows he is interacting with himself and not to anyone else.      Progress Toward Goals: unchanged, continues to await [facility] placement    Psychiatric Review of Systems:  Behavior over the last 24 hours: mildly regressed, responding to internal stimuli and auditory hallucinations reported per nursing  Sleep: reports 8-9 hours  Appetite: adequate   Medication side effects: none verbalized  Medical ROS: Complete review of systems is negative except as noted above.     Vital signs in last 24 hours:  Temp:  [97.7 °F (36.5 °C)] 97.7 °F (36.5 °C)  HR:  [95] 95  Resp:  [16] 16  BP: (142)/(93) 142/93    Mental Status Exam:    Appearance:  Alert, overtly  appearing male, in no acute distress, fair eye contact, appears stated age, disheveled, drowsy, shirtless with hospital gown pants, marginal grooming/hygiene, obese, short cropped dark hair and bearded   Behavior:  calm, cooperative and lying in bed, under covers with head showing, guarded at times   Motor: no abnormal movements and psychomotor retardation   Speech:  delayed initiation, slowing of speech, soft, with moments of mumbling   Mood:  "fine"   Affect:  blunted   Thought Process:  poverty of thought, concrete at times   Thought Content:  no verbalized delusions or overt paranoia   Perceptual Disturbances: no reported hallucinations, reports last hearing voices "awhile ago", was observed responding to internal stimuli and experiencing AH yesterday and today per nursing, patient denies any AH or responding to internal stimuli occurring yesterday or during this writer's interview, appears internally preoccupied and distracted, did not appear to be overtly responding to internal stimuli at time of interview    Risk Potential: no active or passive suicidal or homicidal ideation was verbalized during interview   Cognition:  oriented to self and situation, appears to be of average intelligence, cognition not formally tested   Insight:  limited   Judgment: limited     Current Medications:  Current Facility-Administered Medications   Medication Dose Route Frequency Provider Last Rate   • acetaminophen  650 mg Oral Q6H PRN Tyronne Holstein MD     • acetaminophen  650 mg Oral Q4H PRN Bob Lugo MD     • acetaminophen  975 mg Oral Q6H PRN Bob Lugo MD     • albuterol  2 puff Inhalation Q4H PRN Bob Lugo MD     • aluminum-magnesium hydroxide-simethicone  30 mL Oral Q4H PRN Bob Lugo MD     • benztropine  1 mg Intramuscular BID PRN Keke Rogers MD     • benztropine  1 mg Oral Q4H PRN Max 6/day Bob Lugo MD     • cloZAPine  225 mg Oral Daily Saman Griffin DO     • divalproex sodium  750 mg Oral BID Geraldo Loera MD     • fluticasone  1 spray Nasal Daily PRN Tonja Garner PA-C     • glycopyrrolate  1 mg Oral TID Chidi Richards DO     • hydrOXYzine HCL  100 mg Oral Q6H PRN Max 4/day Jess Christine DO      Or   • LORazepam  1 mg Intramuscular Q6H PRN Jess Christine DO     • hydrOXYzine HCL  25 mg Oral Q6H PRN Max 4/day Bob Lugo MD     • hydrOXYzine HCL  50 mg Oral Q6H PRN Max 4/day Bob Lugo MD     • melatonin  3 mg Oral HS PRN Jess Christine DO     • metFORMIN  1,000 mg Oral BID With Meals ELA Wise     • nicotine polacrilex  4 mg Oral Q2H PRN Bob Lugo MD     • OLANZapine  5 mg Oral Q3H PRN Max 6/day Geraldo Loera MD      Or   • OLANZapine  5 mg Intramuscular Q3H PRN Max 6/day Geraldo Loera MD     • OLANZapine  5 mg Intramuscular Q8H PRN Jess Christine DO      Or   • OLANZapine  7.5 mg Oral Q8H PRN Jess Christine DO     • OLANZapine  2.5 mg Oral Q3H PRN Max 8/day Geraldo Loera MD     • ondansetron  4 mg Oral Q8H PRN Jess Christine DO     • polyethylene glycol  17 g Oral Daily PRN Bob Lugo MD     • pseudoephedrine  120 mg Oral BID PRN Ethan Henry MD     • senna-docusate sodium  1 tablet Oral Daily PRN Bob Lugo MD     • senna-docusate sodium  2 tablet Oral BID Geraldo Loera MD     • sterile water          • sterile water          • sterile water          • sterile water          • sterile water          • sterile water          • sterile water          • sterile water          • sterile water          • sterile water          • sterile water          • sterile water              Recent Results (from the past 48 hour(s))   CBC and differential    Collection Time: 09/11/23  5:45 AM   Result Value Ref Range    WBC 8.92 4.31 - 10.16 Thousand/uL    RBC 4.42 3.88 - 5.62 Million/uL    Hemoglobin 13.8 12.0 - 17.0 g/dL    Hematocrit 41.7 36.5 - 49.3 %    MCV 94 82 - 98 fL    MCH 31.2 26.8 - 34.3 pg    MCHC 33.1 31.4 - 37.4 g/dL    RDW 12.3 11.6 - 15.1 %    MPV 11.2 8.9 - 12.7 fL    Platelets 502 472 - 665 Thousands/uL    nRBC 0 /100 WBCs    Neutrophils Relative 31 (L) 43 - 75 %    Immat GRANS % 1 0 - 2 %    Lymphocytes Relative 46 (H) 14 - 44 %    Monocytes Relative 11 4 - 12 %    Eosinophils Relative 10 (H) 0 - 6 %    Basophils Relative 1 0 - 1 %    Neutrophils Absolute 2.78 1.85 - 7.62 Thousands/µL    Immature Grans Absolute 0.06 0.00 - 0.20 Thousand/uL    Lymphocytes Absolute 4.09 0.60 - 4.47 Thousands/µL    Monocytes Absolute 1.02 0.17 - 1.22 Thousand/µL    Eosinophils Absolute 0.90 (H) 0.00 - 0.61 Thousand/µL    Basophils Absolute 0.07 0.00 - 0.10 Thousands/µL   Fingerstick Glucose (POCT)    Collection Time: 09/11/23 11:48 AM   Result Value Ref Range    POC Glucose 104 65 - 140 mg/dl        Behavioral Health Medications: all current active meds have been reviewed. Changes as in plan section above. Laboratory results:  I have personally reviewed all pertinent laboratory/tests results.      Suri Wong, MS-IV

## 2023-09-12 NOTE — PROGRESS NOTES
09/12/23 0841   Team Meeting   Meeting Type Daily Rounds   Team Members Present   Team Members Present Physician;Nurse;   Physician Team Member [id number] [facility] Team Member Wendie   Patient/Family Present   Patient Present No   Patient's Family Present No     Pt med/meal compliant. Awake this morning, responding to internal stimuli. Yesterday sleeping for most of the day, denying symptoms. Did not require any PRN medications. Discharge pending [facility].

## 2023-09-12 NOTE — NURSING NOTE
Patient visible at the start of the shift. Watching t/v in the dayroom. Talking and laughing to self at times although denieds AH. After eating breakfast patient went back to room and has been in bed sleeping.

## 2023-09-13 LAB
CLOZAPINE SERPL-MCNC: 266 NG/ML (ref 350–600)
CLOZAPINE+NOR SERPL-MCNC: 347 NG/ML
NORCLOZAPINE SERPL-MCNC: 81 NG/ML

## 2023-09-13 PROCEDURE — 99232 SBSQ HOSP IP/OBS MODERATE 35: CPT | Performed by: PSYCHIATRY & NEUROLOGY

## 2023-09-13 RX ADMIN — DIVALPROEX SODIUM 750 MG: 500 TABLET, DELAYED RELEASE ORAL at 08:25

## 2023-09-13 RX ADMIN — SENNOSIDES AND DOCUSATE SODIUM 2 TABLET: 8.6; 5 TABLET ORAL at 08:25

## 2023-09-13 RX ADMIN — DIVALPROEX SODIUM 750 MG: 500 TABLET, DELAYED RELEASE ORAL at 21:05

## 2023-09-13 RX ADMIN — GLYCOPYRROLATE 1 MG: 1 TABLET ORAL at 21:05

## 2023-09-13 RX ADMIN — METFORMIN HYDROCHLORIDE 1000 MG: 500 TABLET, FILM COATED ORAL at 17:16

## 2023-09-13 RX ADMIN — CLOZAPINE 250 MG: 25 TABLET ORAL at 17:17

## 2023-09-13 RX ADMIN — GLYCOPYRROLATE 1 MG: 1 TABLET ORAL at 17:16

## 2023-09-13 RX ADMIN — METFORMIN HYDROCHLORIDE 1000 MG: 500 TABLET, FILM COATED ORAL at 08:25

## 2023-09-13 RX ADMIN — NICOTINE POLACRILEX 4 MG: 4 GUM, CHEWING BUCCAL at 22:27

## 2023-09-13 RX ADMIN — GLYCOPYRROLATE 1 MG: 1 TABLET ORAL at 08:25

## 2023-09-13 RX ADMIN — SENNOSIDES AND DOCUSATE SODIUM 2 TABLET: 8.6; 5 TABLET ORAL at 17:17

## 2023-09-13 NOTE — PROGRESS NOTES
09/13/23 0842   Team Meeting   Meeting Type Daily Rounds   Team Members Present   Team Members Present Physician;Nurse;   Physician Team Member [id number] [facility] Team Member Thelmapeggy   Care Management Team Member Wendie   Patient/Family Present   Patient Present No   Patient's Family Present No   Pt in bed, sleeping for most of the day. Refusing to get up for vitals. Med/meal compliant. Grandmother to visit tomorrow. Discharge pending [facility]

## 2023-09-13 NOTE — NURSING NOTE
Patient has been seclusive to his room. In bed sleeping. Refused vitals this morning as well as breakfast. Currently denying AH. Scant and guarded during interaction.

## 2023-09-13 NOTE — PROGRESS NOTES
Progress Note - Behavioral Health   Gokul Hugo 26 y.o. male MRN: [id number]  Unit/Bed#: [facility] 343-01 Encounter: [id number]    Assessment/Plan   Principal Problem:    Schizoaffective disorder ([address])  Active Problems:    Legally blind    Hearing loss    Asthma    Medical clearance for psychiatric admission    Prolonged erection    Intellectual disability    Rib pain on left side      Recommended Treatment:   Continue Clozapine 250 mg once daily for psychosis (CBC 09/11/23: WBC 8.92, ANC 2.78). Anticipate result of ECG soon. Anticipate cardiac marker (BNP, CK-MB, Troponin) and CBC blood draw on Monday, 9/18/23. Monitor patient's day to day sedation and bowel movements. 9/11/23 Clozapine level: 266, note this was likely from the 225 mg Clozapine dose. Due to reported episodes of agitation on 9/11/23, continue to consider dose titration should agitation and need for PRNs for agitation increase. Continue Robinul 1 mg TID for sialorrhea    Continue Valproate DR 750mg BID for mood/agitation (Depakote level: 91 on 6/12/23). Consider changing to XR once daily at bedtime  Continue Senokot S 2 tablets BID for bowel regimen. Monitor patient's day to day bowel movements   Continue Metformin 1000mg BID with meals for Diabetes Mellitus and Cardiometabolic syndrome. Monitor patient's blood glucose levels daily    Encouraged patient to shower more often and prioritize self care as best he can. Continue with group therapy, milieu therapy and occupational therapy. Continue frequent safety checks and vitals per unit protocol. Case discussed with treatment team.  Risks, benefits and possible side effects of Medications: Risks, benefits, and possible side effects of medications have previously been explained. No new medications at this time. Legal Status: 201  Disposition:  To be determined, coordinating with case management about EAC placement  ------------------------------------------------------------    Subjective: Per nursing report, Gokul has been observed to be lethargic and sleeping in his bedroom for most of yesterday evening. Nursing adds that patient did successfully shower last night, and that he has been compliant with scheduled medications and meals. Nursing also says that patient can be difficult to wake up for scheduled vitals and medications, but that he is compliant after prompting. Today, Gokul was interviewed bedside. He was observed lying in bed with a blanket draped over his whole body. Upon approaching and asking to speak with him, patient was initially reluctant, but shortly after agreeable to removing the blanket from atop his face, and having a conversation with poor eye contact. He reports that his mood is "okay" this morning, adding that his energy is "same as yesterday, good. " He also denies any feelings of anxiety or sadness. Patient states he slept "8 hours" last night. When asked about goals for the day, he shakes his head "no". He does agree to showering again after hearing from this writer that his grandmother will come visit him tomorrow, 9/14/23. Patient described his appetite as "okay", saying that he does not intend to skip any meals today. Patient confirms that his last bowel movement was this morning, and that the one before this one was last night. He denies any pain or difficulty with this. He adds that he's had 2 bowel movements total since this time yesterday. Patient denies any medication side effects. He adds that he hasn't had any AH for "awhile" now. Patient also denies any current suicidal or homicidal plan, ideation, or intent. When asked about patient's responding to internal stimuli and auditory hallucination that was reported by nursing in previous days, he denies this occurring now, and also denies this occurring in previous days.      Progress Toward Goals: slow improvement, continues to await EAC placement    Psychiatric Review of Systems:  Behavior over the last 24 hours: mildly improved, no responding to internal stimuli or auditory hallucinations reported by nursing throughout yesterday evening  Sleep: reports 8 hours  Appetite: adequate   Medication side effects: none verbalized  Medical ROS: Complete review of systems is negative except as noted above.     Vital signs in last 24 hours:  Temp:  [97 °F (36.1 °C)] 97 °F (36.1 °C)  HR:  [100] 100  BP: (140)/(96) 140/96    Mental Status Exam:    Appearance:  Alert, overtly  appearing male, in no acute distress, poor eye contact, appears stated age, disheveled, drowsy, unable to ascertain dress due to patient being under covers in bed, marginal grooming/hygiene, obese, short cropped dark hair and bearded   Behavior:  calm, cooperative and lying in bed, under covers with head showing, guarded    Motor: no abnormal movements and psychomotor retardation   Speech:  delayed initiation, slowing of speech, soft, with moments of mumbling   Mood:  "okay"   Affect:  blunted   Thought Process:  poverty of thought, concrete at times   Thought Content:  no verbalized delusions or overt paranoia   Perceptual Disturbances: no reported hallucinations, reports last hearing voices "awhile ago", was observed responding to internal stimuli and experiencing AH in previous days by nursing, however not yesterday or this morning per nursing, patient denies any AH or responding to internal stimuli occurring in previous days or during this writer's interview, appears internally preoccupied and distracted, did not appear to be overtly responding to internal stimuli at time of interview    Risk Potential: no active or passive suicidal or homicidal ideation was verbalized during interview   Cognition:  oriented to self and situation, appears to be of average intelligence, cognition not formally tested   Insight:  limited   Judgment: limited     Current Medications:  Current Facility-Administered Medications   Medication Dose Route Frequency Provider Last Rate   • acetaminophen  650 mg Oral Q6H PRN Yan Hernandez MD     • acetaminophen  650 mg Oral Q4H PRN Yan Hernandez MD     • acetaminophen  975 mg Oral Q6H PRN Yan Hernandez MD     • albuterol  2 puff Inhalation Q4H PRN Yan Hernandez MD     • aluminum-magnesium hydroxide-simethicone  30 mL Oral Q4H PRN Yan Hernandez MD     • benztropine  1 mg Intramuscular BID PRN Lucy Paul MD     • benztropine  1 mg Oral Q4H PRN Max 6/day Yan Hernandez MD     • cloZAPine  250 mg Oral Daily Niraj Cruz MD     • divalproex sodium  750 mg Oral BID Niraj Cruz MD     • fluticasone  1 spray Nasal Daily PRN Main Duffy PA-C     • glycopyrrolate  1 mg Oral TID Hedda Schaumann, DO     • hydrOXYzine HCL  100 mg Oral Q6H PRN Max 4/day Wendy Boston DO      Or   • LORazepam  1 mg Intramuscular Q6H PRN Wendy Boston DO     • hydrOXYzine HCL  25 mg Oral Q6H PRN Max 4/day Yan Hernandez MD     • hydrOXYzine HCL  50 mg Oral Q6H PRN Max 4/day Yan Hernandez MD     • melatonin  3 mg Oral HS PRN Wendy Boston DO     • metFORMIN  1,000 mg Oral BID With Meals ELA Wise     • nicotine polacrilex  4 mg Oral Q2H PRN Yan Hernandez MD     • OLANZapine  5 mg Oral Q3H PRN Max 6/day Niraj Cruz MD      Or   • OLANZapine  5 mg Intramuscular Q3H PRN Max 6/day Niraj Cruz MD     • OLANZapine  5 mg Intramuscular Q8H PRN Wendy Boston DO      Or   • OLANZapine  7.5 mg Oral Q8H PRN Wendy Boston DO     • OLANZapine  2.5 mg Oral Q3H PRN Max 8/day Niraj Cruz MD     • ondansetron  4 mg Oral Q8H PRN Wendy Boston DO     • polyethylene glycol  17 g Oral Daily PRN Yan Hernandez MD     • pseudoephedrine  120 mg Oral BID PRN William Farris MD     • senna-docusate sodium  1 tablet Oral Daily PRN Yan Hernandez MD     • senna-docusate sodium  2 tablet Oral BID Niraj Cruz MD     • sterile water          • sterile water          • sterile water          • sterile water          • sterile water          • sterile water          • sterile water          • sterile water          • sterile water          • sterile water          • sterile water          • sterile water              Recent Results (from the past 48 hour(s))   Fingerstick Glucose (POCT)    Collection Time: 09/11/23 11:48 AM   Result Value Ref Range    POC Glucose 104 65 - 140 mg/dl        Behavioral Health Medications: all current active meds have been reviewed. Changes as in plan section above. Laboratory results:  I have personally reviewed all pertinent laboratory/tests results.      Mimi Venegas, MS-IV

## 2023-09-14 PROCEDURE — 99232 SBSQ HOSP IP/OBS MODERATE 35: CPT | Performed by: PSYCHIATRY & NEUROLOGY

## 2023-09-14 RX ADMIN — GLYCOPYRROLATE 1 MG: 1 TABLET ORAL at 08:24

## 2023-09-14 RX ADMIN — METFORMIN HYDROCHLORIDE 1000 MG: 500 TABLET, FILM COATED ORAL at 15:44

## 2023-09-14 RX ADMIN — DIVALPROEX SODIUM 750 MG: 500 TABLET, DELAYED RELEASE ORAL at 19:37

## 2023-09-14 RX ADMIN — SENNOSIDES AND DOCUSATE SODIUM 2 TABLET: 8.6; 5 TABLET ORAL at 08:24

## 2023-09-14 RX ADMIN — SENNOSIDES AND DOCUSATE SODIUM 2 TABLET: 8.6; 5 TABLET ORAL at 17:11

## 2023-09-14 RX ADMIN — METFORMIN HYDROCHLORIDE 1000 MG: 500 TABLET, FILM COATED ORAL at 08:25

## 2023-09-14 RX ADMIN — GLYCOPYRROLATE 1 MG: 1 TABLET ORAL at 15:26

## 2023-09-14 RX ADMIN — CLOZAPINE 250 MG: 25 TABLET ORAL at 17:11

## 2023-09-14 RX ADMIN — GLYCOPYRROLATE 1 MG: 1 TABLET ORAL at 21:26

## 2023-09-14 RX ADMIN — DIVALPROEX SODIUM 750 MG: 500 TABLET, DELAYED RELEASE ORAL at 08:25

## 2023-09-14 NOTE — TREATMENT PLAN
TREATMENT PLAN REVIEW - [address] 26 y.o. 2000 male MRN: [id number]    200 [facility] 300 [facility] Room / Bed: [address] [place] [address]/[facility] 343-01 Encounter: [id number]        Admit Date/Time:  5/18/2023  2:45 PM    Treatment Team: Attending Provider: Jose Antonio Putnam MD; Consulting Physician: Garret Mcmillan MD; : Severiano Pitch, PhD; : Edy Marcus; Resident: Yin Bansal DO; Nurse Practitioner: ELA Conroy; Consulting Physician: Víctor Rob DPM; Care Manager: Martita Joe; Patient Care Technician: Liset Martines; Registered Nurse: Shanita Moe RN; Patient Care Assistant: Farhan Jones; Patient Care Assistant: Angel Escobar    Diagnosis: Principal Problem:    Schizoaffective disorder [facility])  Active Problems:    Legally blind    Hearing loss    Asthma    Medical clearance for psychiatric admission    Prolonged erection    Intellectual disability    Rib pain on left side      Patient Strengths/Assets: compliant with medication, family ties, good past treatment response, negotiates basic needs, patient is on a voluntary commitment, supportive family      Patient Barriers/Limitations: chronic mental illness, limited motivation, poor insight, poor self-care    Short Term Goals: decrease in paranoid thoughts, decrease in psychotic symptoms, decrease in level of agitation, decrease in frequency of aggressive outbursts, ability to stay safe on the unit, ability to stay free from restraints, improvement in insight, improvement in self care, improvement in impulse control, tolerate medications    Long Term Goals: resolution of psychotic symptoms, improved reality testing, improved impulse control, improved insight, no agitation on the unit, no aggressive behavior on the unit, adequate self care, adequate sleep, stable living arrangements upon discharge    Progress Towards Goals: continue psychiatric medications as prescribed    Recommended Treatment: medication management, patient medication education, group therapy, milieu therapy, continued Behavioral Health psychiatric evaluation/assessment process     Treatment Frequency: daily medication monitoring, group and milieu therapy daily, monitoring through interdisciplinary rounds, monitoring through weekly patient care conferences    Expected Discharge Date: TBD    Discharge Plan: referral to Extended Acute Care unit for a long term psychiatric treatment, referrals as indicated    Treatment Plan Created/Updated By: Sam Covarrubias MD

## 2023-09-14 NOTE — PROGRESS NOTES
09/14/23 0841   Team Meeting   Meeting Type Daily Rounds   Team Members Present   Team Members Present Physician;Nurse;   Physician Team Member [id number] Wendie   Patient/Family Present   Patient Present No   Patient's Family Present No   Pt looking forward to visit with grandmother today. Denies symptoms. Seclusive to his room, sleeping for most of the day. Discharge pending EAC.

## 2023-09-14 NOTE — NURSING NOTE
Patient out of room for meals and medications. Pleasant and in good spirits post family visit. Denies SI/HI/AH/VH at this time.

## 2023-09-14 NOTE — NURSING NOTE
Patient has been seclusive to his room. Only out for breakfast and morning medications. Denies symptoms. Scant and guarded.

## 2023-09-14 NOTE — PROGRESS NOTES
Progress Note - Behavioral Health   Gokul Hugo 26 y.o. male MRN: [id number]  Unit/Bed#: [facility] 343-01 Encounter: [id number]    Assessment/Plan   Principal Problem:    Schizoaffective disorder ([address])  Active Problems:    Legally blind    Hearing loss    Asthma    Medical clearance for psychiatric admission    Prolonged erection    Intellectual disability    Rib pain on left side      Recommended Treatment:   Continue Clozaril 250 mg once daily for psychosis (CBC 09/11/23: WBC 8.92, ANC 2.78). Anticipate result of ECG soon. Anticipate cardiac marker (BNP, CK-MB, Troponin) and CBC blood draw on Monday, 9/18/23. Monitor patient's day to day sedation and bowel movements. 9/11/23 Clozapine level: 266, note this was likely from the 225 mg Clozapine dose. Due to reported episodes of agitation on 9/11/23, continue to consider dose titration should agitation and need for PRNs for agitation increase. Continue glycopyrrolate 1 mg three times daily for sialorrhea    Continue Depakote DR 750mg twice daily for mood/agitation (Depakote level: 91 on 6/12/23). Consider changing to XR once daily at bedtime  Continue Senokot S 2 tablets twice daily for bowel regimen. Monitor patient's day to day bowel movements   Continue Metformin 1000mg twice daily with meals for Diabetes Mellitus and Cardiometabolic syndrome. Monitor patient's blood glucose levels daily    Encouraged patient to shower more often and prioritize self care as best he can. Continue with group therapy, milieu therapy and occupational therapy. Continue frequent safety checks and vitals per unit protocol. Case discussed with treatment team.  Risks, benefits and possible side effects of Medications: Risks, benefits, and possible side effects of medications have previously been explained. No new medications at this time. Legal Status: 201  Disposition:  To be determined, coordinating with case management about EAC placement  ------------------------------------------------------------    Subjective: Per nursing report, Gokul has been observed to be lethargic and sleeping in his bedroom for most of yesterday evening. Nursing adds that patient did express optimism about grandmother's visit today, and that he has been compliant with scheduled medications and meals, including asking for snacks from time to time. Today, Gokul was interviewed bedside. He was observed lying in bed with a blanket draped over his whole body. Upon approaching and asking to speak with him, patient was initially reluctant, but shortly after agreeable to removing the blanket from atop his face, and having a conversation with fair eye contact. He reports that his mood is "good" this morning, adding that his energy is "okay. " He also denies any feelings of anxiety or sadness. Patient states he slept "7-8 hours" last night. When asked about goals for the day, he states he will shower before he "sees grandma around 1." He does add that he is looking forward to this visit, and that he hopes he can get in touch with sister in coming days too. He also says that he enjoys listening to music during his time here, adding that his favorite artists are "Evlyn Lakisha", "Haylie New", and "Emerson Liutyk." Patient describes his appetite as "okay" today. TM DQOA CHELY his last bowel movement was yesterday. He denies any pain or difficulty with this. He adds that he's had one bowel movement total since this time yesterday. Patient denies any medication side effects. He adds that he hasn't had any AH for "awhile" now. Patient also denies any current suicidal or homicidal plan, ideation, or intent. When asked about patient's responding to internal stimuli and auditory hallucination that was reported by nursing in previous days, he denies this occurring now, and also denies this occurring in previous days.  He goes on to characterize talking out loud as preparing himself for the "outside", saying that he is working on his mood, motivation, and attitude through "positive self talk." He adds that he likes making others smile and laugh, and looks forward to doing more of this in the future. Progress Toward Goals: slow improvement, continues to await EAC placement    Psychiatric Review of Systems:  Behavior over the last 24 hours: improved, no responding to internal stimuli or auditory hallucinations reported by nursing since 9/10/23  Sleep: reports 7-8 hours  Appetite: adequate   Medication side effects: none verbalized  Medical ROS: Complete review of systems is negative except as noted above.     Vital signs in last 24 hours:  Temp:  [97.7 °F (36.5 °C)] 97.7 °F (36.5 °C)  HR:  [103] 103  Resp:  [16] 16  BP: (144)/(95) 144/95    Mental Status Exam:    Appearance:  Alert, overtly  appearing male, in no acute distress, fair eye contact, appears stated age, disheveled, drowsy, unable to ascertain dress due to patient being under covers in bed, marginal grooming/hygiene, obese, short cropped dark hair and bearded   Behavior:  calm, cooperative and lying in bed, under covers with head showing, guarded    Motor: no abnormal movements and psychomotor retardation   Speech:  delayed initiation, slowing of speech, soft, with moments of mumbling   Mood:  "good"   Affect:  blunted with moments of reactivity when talking about grandmother   Thought Process:  poverty of thought, concrete at times   Thought Content:  no verbalized delusions or overt paranoia   Perceptual Disturbances: no reported hallucinations, reports last hearing voices "awhile ago", patient denies any AH and was not observed responding to internal stimuli overnight by staff or during this writer's interview, appears internally preoccupied and distracted   Risk Potential: no active or passive suicidal or homicidal ideation was verbalized during interview   Cognition:  oriented to self and situation, appears to be of average intelligence, cognition not formally tested   Insight:  limited   Judgment: limited     Current Medications:  Current Facility-Administered Medications   Medication Dose Route Frequency Provider Last Rate   • acetaminophen  650 mg Oral Q6H PRN Albina Morse MD     • acetaminophen  650 mg Oral Q4H PRN Albina Morse MD     • acetaminophen  975 mg Oral Q6H PRN Albina Morse MD     • albuterol  2 puff Inhalation Q4H PRN Albina Morse MD     • aluminum-magnesium hydroxide-simethicone  30 mL Oral Q4H PRN Albina Morse MD     • benztropine  1 mg Intramuscular BID PRN Ranjan Kennedy MD     • benztropine  1 mg Oral Q4H PRN Max 6/day Albina Morse MD     • cloZAPine  250 mg Oral Daily Melonie Shaikh MD     • divalproex sodium  750 mg Oral BID Melonie Shaikh MD     • fluticasone  1 spray Nasal Daily PRN Nabil Wiley PA-C     • glycopyrrolate  1 mg Oral TID Sarai Parks DO     • hydrOXYzine HCL  100 mg Oral Q6H PRN Max 4/day Sami Pierson DO      Or   • LORazepam  1 mg Intramuscular Q6H PRN Sami Pierson DO     • hydrOXYzine HCL  25 mg Oral Q6H PRN Max 4/day Albina Morse MD     • hydrOXYzine HCL  50 mg Oral Q6H PRN Max 4/day Albina Morse MD     • melatonin  3 mg Oral HS PRN Sami Pierson DO     • metFORMIN  1,000 mg Oral BID With Meals ELA Wise     • nicotine polacrilex  4 mg Oral Q2H PRN Albina Morse MD     • OLANZapine  5 mg Oral Q3H PRN Max 6/day Melonie Shaikh MD      Or   • OLANZapine  5 mg Intramuscular Q3H PRN Max 6/day Melonie Shaikh MD     • OLANZapine  5 mg Intramuscular Q8H PRN Sami Pierson DO      Or   • OLANZapine  7.5 mg Oral Q8H PRN Sami Pierson DO     • OLANZapine  2.5 mg Oral Q3H PRN Max 8/day Melonie Shaikh MD     • ondansetron  4 mg Oral Q8H PRN Sami Pierson DO     • polyethylene glycol  17 g Oral Daily PRN Albina Morse MD     • pseudoephedrine  120 mg Oral BID PRN Tamia Shaffer MD     • senna-docusate sodium  1 tablet Oral Daily PRN Baron Corina MD     • senna-docusate sodium  2 tablet Oral BID Mary Ann Jarrell MD     • sterile water          • sterile water          • sterile water          • sterile water          • sterile water          • sterile water          • sterile water          • sterile water          • sterile water          • sterile water          • sterile water          • sterile water            Behavioral Health Medications: all current active meds have been reviewed. Changes as in plan section above. Laboratory results:  I have personally reviewed all pertinent laboratory/tests results. No results found for this or any previous visit (from the past 48 hour(s)).      Kathy Reese, MS-IV

## 2023-09-14 NOTE — NURSING NOTE
Pt is isolative to his room this evening. Pt was encouraged to get oob. He was seen up for snack, then retrieved to his room. Denies SI/HI/AVH. No problems observed or reported. Q 7 min checks maintained for safety.

## 2023-09-15 PROCEDURE — 99232 SBSQ HOSP IP/OBS MODERATE 35: CPT | Performed by: PSYCHIATRY & NEUROLOGY

## 2023-09-15 RX ADMIN — GLYCOPYRROLATE 1 MG: 1 TABLET ORAL at 16:56

## 2023-09-15 RX ADMIN — DIVALPROEX SODIUM 750 MG: 500 TABLET, DELAYED RELEASE ORAL at 09:59

## 2023-09-15 RX ADMIN — DIVALPROEX SODIUM 750 MG: 500 TABLET, DELAYED RELEASE ORAL at 20:38

## 2023-09-15 RX ADMIN — GLYCOPYRROLATE 1 MG: 1 TABLET ORAL at 09:59

## 2023-09-15 RX ADMIN — METFORMIN HYDROCHLORIDE 1000 MG: 500 TABLET, FILM COATED ORAL at 16:56

## 2023-09-15 RX ADMIN — GLYCOPYRROLATE 1 MG: 1 TABLET ORAL at 21:03

## 2023-09-15 RX ADMIN — METFORMIN HYDROCHLORIDE 1000 MG: 500 TABLET, FILM COATED ORAL at 09:59

## 2023-09-15 RX ADMIN — CLOZAPINE 250 MG: 25 TABLET ORAL at 17:03

## 2023-09-15 RX ADMIN — SENNOSIDES AND DOCUSATE SODIUM 2 TABLET: 8.6; 5 TABLET ORAL at 09:59

## 2023-09-15 RX ADMIN — POLYETHYLENE GLYCOL 3350 17 G: 17 POWDER, FOR SOLUTION ORAL at 17:03

## 2023-09-15 RX ADMIN — SENNOSIDES AND DOCUSATE SODIUM 2 TABLET: 8.6; 5 TABLET ORAL at 17:02

## 2023-09-15 NOTE — PROGRESS NOTES
09/15/23 1524   Team Meeting   Meeting Type Tx Team Meeting   Initial Conference Date 09/15/23   Team Members Present   Team Members Present Physician;Nurse;   Physician Team Member [id number] Team Member [facility] Management Team Member Wendie   Patient/Family Present   Patient Present Yes   Patient's Family Present No     Tx plan was reviewed and discussed with Pt. Pt was encouraged to attend groups. Medication was discussed with Pt. Pt signed tx plan.

## 2023-09-15 NOTE — PROGRESS NOTES
09/15/23 0837   Team Meeting   Meeting Type Daily Rounds   Team Members Present   Team Members Present Physician;Nurse;   Physician Team Member [id number] [facility] Member Wendie   Patient/Family Present   Patient Present No   Patient's Family Present No   Pt med/meal compliant. Pleasant and cooperative. Appropriate with family visit. Sleeping in bed most of the day. Discharge pending EAC.

## 2023-09-15 NOTE — PLAN OF CARE
Problem: SELF HARM/SUICIDALITY  Goal: Will have no self-injury during hospital stay  Description: INTERVENTIONS:  - Q 15 MINUTES: Routine safety checks  - Q WAKING SHIFT & PRN: Assess risk to determine if routine checks are adequate to maintain patient safety  - Encourage patient to participate actively in care by formulating a plan to combat response to suicidal ideation, identify supports and resources  Outcome: Progressing     Problem: Ineffective Coping  Goal: Participates in unit activities  Description: Interventions:  - Provide therapeutic environment   - Provide required programming   - Redirect inappropriate behaviors   Outcome: Not Progressing     Problem: ANXIETY  Goal: Will report anxiety at manageable levels  Description: INTERVENTIONS:  - Administer medication as ordered  - Teach and encourage coping skills  - Provide emotional support  - Assess patient/family for anxiety and ability to cope  Outcome: Not Progressing

## 2023-09-15 NOTE — NURSING NOTE
Patient has been seclusive to his room. In bed sleeping. Declined breakfast, compliant with medications. Denies symptoms at this time. [phone] to schedule these tests.    KRISTEN Gamboa MD

## 2023-09-15 NOTE — PROGRESS NOTES
Progress Note - Behavioral Health   Gokul Lanier 26 y.o. male MRN: [id number]  Unit/Bed#: [facility] 343-01 Encounter: [id number]    Assessment/Plan   Principal Problem:    Schizoaffective disorder ([address])  Active Problems:    Legally blind    Hearing loss    Asthma    Medical clearance for psychiatric admission    Prolonged erection    Intellectual disability    Rib pain on left side      Recommended Treatment:   Continue Clozapine 250 mg once daily for psychosis (CBC 09/11/23: WBC 8.92, ANC 2.78). Anticipate result of ECG soon. Anticipate cardiac marker (BNP, CK-MB, Troponin) and CBC blood draw on Monday, 9/18/23. Monitor patient's day to day sedation and bowel movements. 9/11/23 Clozapine level: 266, note this was likely from the 225 mg Clozapine dose. Due to reported episodes of agitation on 9/11/23, continue to consider dose titration should agitation and need for PRNs for agitation increase. Continue Robinul 1 mg TID for sialorrhea    Continue Depakote DR 750mg BID for mood/agitation (Depakote level: 91 on 6/12/23). Consider changing to XR once daily at bedtime  Continue Senokot S 2 tablets BID for bowel regimen. Monitor patient's day to day bowel movements   Continue Metformin 1000mg BID with meals for Diabetes Mellitus and Cardiometabolic syndrome. Monitor patient's blood glucose levels daily    Encouraged patient to shower more often and prioritize self care as best he can. Continue with group therapy, milieu therapy and occupational therapy. Continue frequent safety checks and vitals per unit protocol. Case discussed with treatment team.  Risks, benefits and possible side effects of Medications: Risks, benefits, and possible side effects of medications have previously been explained. No new medications at this time. Legal Status: 201  Disposition:  To be determined, coordinating with case management about EAC placement  ------------------------------------------------------------    Subjective: Per nursing report, Gokul has been observed to be lethargic and sleeping in his bedroom for most of yesterday evening and this morning. Nursing adds that patient was observed to be in good spirits after grandmother and grandmother's friend came to visit yesterday afternoon, and that he has been compliant with scheduled medications and meals. Today, Gokul was interviewed bedside. He was observed lying in bed with a blanket draped over his whole body. Upon approaching and asking to speak with him, patient was initially reluctant, but shortly after agreeable to removing the blanket from atop his face, and having a conversation with poor eye contact. When asked if patient had a good visit with Grandmother yesterday, patient responds "yes", adding that grandmother's friend was also there and that they talked about the importance of patient taking his medicines. He reports that his mood is "okay, stable" this morning, adding that his energy is "okay, stable" as well. He also denies any feelings of anxiety or sadness. Patient states he slept "12 hours" last night. When asked about goals for the day, he initially states "none", and then mentions "calling his sister." He also says that he enjoys listening to music during his time here, adding that he has recently been enjoying listening to the music group "[employer] T[employer]." Patient describes his appetite as "okay" today. SE IGYZ SBUM he has not had a bowel movement since the time of this writer's last interview, but adds that he does not feel constipated. He says he will let nursing know should he continue to not having bowel movements throughout today. Patient denies any medication side effects. He adds that he hasn't had any AH for "awhile" now. Patient also denies any VH, current suicidal or homicidal plan, ideation, or intent.       Progress Toward Goals: mostly unchanged, continues to await EAC placement    Psychiatric Review of Systems:  Behavior over the last 24 hours: unchanged, no responding to internal stimuli or auditory hallucinations reported by nursing since 9/10/23  Sleep: reports 12 hours  Appetite: adequate   Medication side effects: none verbalized  Medical ROS: Complete review of systems is negative except as noted above.     Vital signs in last 24 hours:  Temp:  [97.5 °F (36.4 °C)] 97.5 °F (36.4 °C)  HR:  [117] 117  Resp:  [16] 16  BP: (128)/(82) 128/82    Mental Status Exam:    Appearance:  Alert, overtly  appearing male, in no acute distress, poor eye contact, appears stated age, disheveled, drowsy, unable to ascertain dress due to patient being under covers in bed, marginal grooming/hygiene, obese, short cropped dark hair and bearded   Behavior:  calm, cooperative and lying in bed, under covers with head showing, guarded    Motor: no abnormal movements and psychomotor retardation   Speech:  delayed initiation, slowing of speech, soft, with moments of mumbling   Mood:  "okay, stable"   Affect:  blunted    Thought Process:  poverty of thought, concrete    Thought Content:  no verbalized delusions or overt paranoia   Perceptual Disturbances: no reported hallucinations, reports last hearing voices "awhile ago", patient denies any AH and was not observed responding to internal stimuli overnight by staff or during this writer's interview, appears internally preoccupied and distracted   Risk Potential: no active or passive suicidal or homicidal ideation was verbalized during interview   Cognition:  oriented to self and situation, appears to be of average intelligence, cognition not formally tested   Insight:  limited   Judgment: limited     Current Medications:  Current Facility-Administered Medications   Medication Dose Route Frequency Provider Last Rate   • acetaminophen  650 mg Oral Q6H PRASHOK Sue MD     • acetaminophen  650 mg Oral Q4H PRASHOK Sue MD     • acetaminophen  975 mg Oral Q6H PRASHOK Sue MD     • albuterol  2 puff Inhalation Q4H PRN Massimo Cabello MD     • aluminum-magnesium hydroxide-simethicone  30 mL Oral Q4H PRN Massimo Cabello MD     • benztropine  1 mg Intramuscular BID PRN Annabella Hedrick MD     • benztropine  1 mg Oral Q4H PRN Max 6/day Massimo Cabello MD     • cloZAPine  250 mg Oral Daily Pedro Arriola MD     • divalproex sodium  750 mg Oral BID Pedro Arriola MD     • fluticasone  1 spray Nasal Daily PRN Olayinka Maddox PA-C     • glycopyrrolate  1 mg Oral TID Junito Velasquez, DO     • hydrOXYzine HCL  100 mg Oral Q6H PRN Max 4/day Grant Baum, DO      Or   • LORazepam  1 mg Intramuscular Q6H PRN Grant Gossor, DO     • hydrOXYzine HCL  25 mg Oral Q6H PRN Max 4/day Massimo Cabello MD     • hydrOXYzine HCL  50 mg Oral Q6H PRN Max 4/day Massimo Cabello MD     • melatonin  3 mg Oral HS PRN Grant Gossor, DO     • metFORMIN  1,000 mg Oral BID With Meals ELA Wise     • nicotine polacrilex  4 mg Oral Q2H PRN Massimo Cabello MD     • OLANZapine  5 mg Oral Q3H PRN Max 6/day Pedro Arriola MD      Or   • OLANZapine  5 mg Intramuscular Q3H PRN Max 6/day Pedro Arriola MD     • OLANZapine  5 mg Intramuscular Q8H PRN Grant Baum, DO      Or   • OLANZapine  7.5 mg Oral Q8H PRN Grant Baum, DO     • OLANZapine  2.5 mg Oral Q3H PRN Max 8/day Pedro Arriola MD     • ondansetron  4 mg Oral Q8H PRN Grant Gossor, DO     • polyethylene glycol  17 g Oral Daily PRN Massimo Cabello MD     • pseudoephedrine  120 mg Oral BID PRN Cj Colon MD     • senna-docusate sodium  1 tablet Oral Daily PRN Massimo Cabello MD     • senna-docusate sodium  2 tablet Oral BID Pedro Arriola MD     • sterile water          • sterile water          • sterile water          • sterile water          • sterile water          • sterile water          • sterile water          • sterile water          • sterile water          • sterile water          • sterile water          • sterile water            Behavioral Health Medications: all current active meds have been reviewed. Changes as in plan section above. Laboratory results:  I have personally reviewed all pertinent laboratory/tests results. No results found for this or any previous visit (from the past 48 hour(s)).      Dayami Gastelum, MS-IV

## 2023-09-15 NOTE — NURSING NOTE
Pt reports no BM for several days, reports no discomfort. PRN Miralax administered at 1703. Fluids encouraged. Will monitor for effectiveness.

## 2023-09-16 PROCEDURE — 99232 SBSQ HOSP IP/OBS MODERATE 35: CPT | Performed by: STUDENT IN AN ORGANIZED HEALTH CARE EDUCATION/TRAINING PROGRAM

## 2023-09-16 RX ADMIN — GLYCOPYRROLATE 1 MG: 1 TABLET ORAL at 21:05

## 2023-09-16 RX ADMIN — METFORMIN HYDROCHLORIDE 1000 MG: 500 TABLET, FILM COATED ORAL at 08:21

## 2023-09-16 RX ADMIN — GLYCOPYRROLATE 1 MG: 1 TABLET ORAL at 08:21

## 2023-09-16 RX ADMIN — METFORMIN HYDROCHLORIDE 1000 MG: 500 TABLET, FILM COATED ORAL at 17:32

## 2023-09-16 RX ADMIN — DIVALPROEX SODIUM 750 MG: 500 TABLET, DELAYED RELEASE ORAL at 20:00

## 2023-09-16 RX ADMIN — SENNOSIDES AND DOCUSATE SODIUM 2 TABLET: 8.6; 5 TABLET ORAL at 17:32

## 2023-09-16 RX ADMIN — SENNOSIDES AND DOCUSATE SODIUM 2 TABLET: 8.6; 5 TABLET ORAL at 08:21

## 2023-09-16 RX ADMIN — MELATONIN TAB 3 MG 3 MG: 3 TAB at 21:05

## 2023-09-16 RX ADMIN — GLYCOPYRROLATE 1 MG: 1 TABLET ORAL at 17:32

## 2023-09-16 RX ADMIN — CLOZAPINE 250 MG: 25 TABLET ORAL at 17:32

## 2023-09-16 RX ADMIN — DIVALPROEX SODIUM 750 MG: 500 TABLET, DELAYED RELEASE ORAL at 08:21

## 2023-09-16 NOTE — NURSING NOTE
Pt calm and cooperative. Denies si.hi. avh. visible on the unit. Bright on approach. Compliant with evening meds.  Continued q7m checks for safety

## 2023-09-16 NOTE — PROGRESS NOTES
Progress Note - 100[address] Pauline Hopson 26 y.o. male MRN: [id number]   Unit/Bed#: [facility] 343-01 Encounter: [id number]    Behavior over the last 24 hours: slowly improving. Gokul seen today for follow-up, in his room. States he is feeling "alright". States he is tolerating the medications, and feels they help "with the voices'. States she does not hear them as frequently, is not hearing them now, and tolerating the medications well. Is receptive to education on medication. States he is better able to ignore the voices; denies visual hallucinations. Denies thoughts to hurt himself or anyone else. States he did get up this morning for breakfast, but is "resting". Denies side effects from medictions. States he talked with his grandmother and his twin sister. States he likes to play video games with his grandmother, and remarks that he and his twin sister share a birthday with Dr. Mike Moreno and Nereida Rodriguez, patient identifies as a  fan. States he had a bowel movement yesterday, encouraged to let staff know if he does not have one today, educated on risks of constipation from clozaril. Per nursing: Limited participation in milieu. Compliant with medications. Follows directions appropriately. Still seen at times responding to internal stimuli.      Sleep: normal  Appetite: normal  Medication side effects: No   ROS: all other systems are negative    Mental Status Evaluation:    Appearance:  age appropriate, dressed in hospital attire, improved grooming, looks stated age   Behavior:  calm, still at times guarded   Speech:  normal rate and volume   Mood:  euthymic   Affect:  flat   Thought Process:  concrete   Associations: concrete associations   Thought Content:  no overt delusions, poverty of thought   Perceptual Disturbances: no auditory hallucinations, no visual hallucinations, denies when asked, but talks to self at times   Risk Potential: Suicidal ideation - None at present  Homicidal ideation - None  Potential for aggression - No   Sensorium:  oriented to person, place and time/date   Memory:  recent and remote memory grossly intact   Consciousness:  alert and awake   Attention/Concentration: attention span and concentration are age appropriate   Insight:  limited   Judgment: limited   Gait/Station: normal gait/station   Motor Activity: no abnormal movements     Vital signs in last 24 hours:    Temp:  [97.5 °F (36.4 °C)] 97.5 °F (36.4 °C)  HR:  [116] 116  Resp:  [16] 16  BP: (134)/(74) 134/74    Laboratory results: I have personally reviewed all pertinent laboratory/tests results    Results from the past 24 hours: No results found for this or any previous visit (from the past 24 hour(s)).     Suicide/Homicide Risk Assessment:    Risk of Harm to Self:   Nursing Suicide Risk Assessment Last 24 hours: C-SSRS Risk (Since Last Contact)  Calculated C-SSRS Risk Score (Since Last Contact): No Risk Indicated  Based on today's assessment, Gokul presents the following risk of harm to self: low    Risk of Harm to Others:  Nursing Homicide Risk Assessment: Violence Risk to Others: Yes- Within the last 6 months  Current Specific Risk Factors include: current psychotic symptoms  Protective Factors: no current homicidal ideation, able to communicate with staff on the unit, willing to continue psychiatric treatment, supportive family  Based on today's assessment, Gokul presents the following risk of harm to others: none    The following interventions are recommended: behavioral checks every 7 minutes, continued hospitalization on locked unit    Progress Toward Goals: progressing slowly    Assessment/Plan   Principal Problem:    Schizoaffective disorder ([address])  Active Problems:    Legally blind    Hearing loss    Asthma    Medical clearance for psychiatric admission    Prolonged erection    Intellectual disability    Rib pain on left side      Recommended Treatment:     Planned medication and treatment changes: All current active medications have been reviewed  Encourage group therapy, milieu therapy and occupational therapy  Behavioral Health checks every 7 minutes  Status of Admission Status of Admission  Status of Admission: 201  Continue current medications, including Clozapine 250mg daily for psychosis; next CBC due on 9/18/23. Continue current dose, has not had any worsening agitation. Continue depakote DR 750mg BID, last level 6/12 and 91, WNL. Continue senokot two tabs BID for bowel reigmen. Continue metformin 1000mg BID for diabetes and cardiometabolic syndrome.       Current Facility-Administered Medications   Medication Dose Route Frequency Provider Last Rate   • acetaminophen  650 mg Oral Q6H PRN Yan Hernandez MD     • acetaminophen  650 mg Oral Q4H PRN Yan Hernandez MD     • acetaminophen  975 mg Oral Q6H PRN Yan Hernandez MD     • albuterol  2 puff Inhalation Q4H PRN Yan Hernandez MD     • aluminum-magnesium hydroxide-simethicone  30 mL Oral Q4H PRN Yan Hernandez MD     • benztropine  1 mg Intramuscular BID PRN Lucy Paul MD     • benztropine  1 mg Oral Q4H PRN Max 6/day Yan Hernandez MD     • cloZAPine  250 mg Oral Daily Niraj Cruz MD     • divalproex sodium  750 mg Oral BID Niraj Cruz MD     • fluticasone  1 spray Nasal Daily PRN Main Duffy PA-C     • glycopyrrolate  1 mg Oral TID Hedda Schaumann, DO     • hydrOXYzine HCL  100 mg Oral Q6H PRN Max 4/day Wendy Boston DO      Or   • LORazepam  1 mg Intramuscular Q6H PRN Wendy Boston DO     • hydrOXYzine HCL  25 mg Oral Q6H PRN Max 4/day Yan Hernandez MD     • hydrOXYzine HCL  50 mg Oral Q6H PRN Max 4/day Yan Hernandez MD     • melatonin  3 mg Oral HS PRN Wendy Boston DO     • metFORMIN  1,000 mg Oral BID With Meals ELA Wise     • nicotine polacrilex  4 mg Oral Q2H PRN Yan Hernandez MD     • OLANZapine  5 mg Oral Q3H PRN Max 6/day Niraj Cruz MD Or   • OLANZapine  5 mg Intramuscular Q3H PRN Max 6/day Cesar Rodgers MD     • OLANZapine  5 mg Intramuscular Q8H PRN Demaris Lamp, DO      Or   • OLANZapine  7.5 mg Oral Q8H PRN Demaris Lamp, DO     • OLANZapine  2.5 mg Oral Q3H PRN Max 8/day Cesar Rodgers MD     • ondansetron  4 mg Oral Q8H PRN Demaris Lamp, DO     • polyethylene glycol  17 g Oral Daily PRN Urbano Lowe MD     • pseudoephedrine  120 mg Oral BID PRN Marciano Naranjo MD     • senna-docusate sodium  1 tablet Oral Daily PRN Urbano Lowe MD     • senna-docusate sodium  2 tablet Oral BID Cesar Rodgers MD     • sterile water          • sterile water          • sterile water          • sterile water          • sterile water          • sterile water          • sterile water          • sterile water          • sterile water          • sterile water          • sterile water          • sterile water            Risks / Benefits of Treatment:    Risks, benefits, and possible side effects of medications explained to patient and patient verbalizes understanding and agreement for treatment. Counseling / Coordination of Care: Total floor / unit time spent today 25 minutes. Greater than 50% of total time was spent with the patient and / or family counseling and / or coordination of care. A description of counseling / coordination of care:  Patient's progress discussed with staff in treatment team meeting. Medications, treatment progress and treatment plan reviewed with patient. Medication education provided to patient. Educated on importance of medication and treatment compliance. Encouraged participation in milieu and group therapy on the unit.     Citlaly Iglesias DO 09/16/23

## 2023-09-16 NOTE — NURSING NOTE
Pt is alert and able to make needs known. Up early this morning, ate breakfast and watched tv in the dayroom, shortly after went back to room to sleep. No c/o pain/discomfort noted. Denies all psych symptoms. Some responding to internal stimuli noted but pt remained calm and was quiet. Medications administered and tolerated. Q7 min safety checks continued.

## 2023-09-17 VITALS
WEIGHT: 200 LBS | OXYGEN SATURATION: 99 % | HEART RATE: 115 BPM | HEIGHT: 63 IN | BODY MASS INDEX: 35.44 KG/M2 | SYSTOLIC BLOOD PRESSURE: 152 MMHG | RESPIRATION RATE: 16 BRPM | DIASTOLIC BLOOD PRESSURE: 97 MMHG | TEMPERATURE: 98.1 F

## 2023-09-17 PROCEDURE — 83521 IG LIGHT CHAINS FREE EACH: CPT | Performed by: NURSE PRACTITIONER

## 2023-09-17 PROCEDURE — 84165 PROTEIN E-PHORESIS SERUM: CPT | Performed by: NURSE PRACTITIONER

## 2023-09-17 PROCEDURE — 99232 SBSQ HOSP IP/OBS MODERATE 35: CPT | Performed by: STUDENT IN AN ORGANIZED HEALTH CARE EDUCATION/TRAINING PROGRAM

## 2023-09-17 RX ADMIN — DIVALPROEX SODIUM 750 MG: 500 TABLET, DELAYED RELEASE ORAL at 20:00

## 2023-09-17 RX ADMIN — CLOZAPINE 250 MG: 25 TABLET ORAL at 17:29

## 2023-09-17 RX ADMIN — GLYCOPYRROLATE 1 MG: 1 TABLET ORAL at 21:04

## 2023-09-17 RX ADMIN — METFORMIN HYDROCHLORIDE 1000 MG: 500 TABLET, FILM COATED ORAL at 08:30

## 2023-09-17 RX ADMIN — MELATONIN TAB 3 MG 3 MG: 3 TAB at 21:04

## 2023-09-17 RX ADMIN — METFORMIN HYDROCHLORIDE 1000 MG: 500 TABLET, FILM COATED ORAL at 17:29

## 2023-09-17 RX ADMIN — DIVALPROEX SODIUM 750 MG: 500 TABLET, DELAYED RELEASE ORAL at 08:50

## 2023-09-17 RX ADMIN — SENNOSIDES AND DOCUSATE SODIUM 2 TABLET: 8.6; 5 TABLET ORAL at 08:50

## 2023-09-17 RX ADMIN — GLYCOPYRROLATE 1 MG: 1 TABLET ORAL at 08:50

## 2023-09-17 RX ADMIN — SENNOSIDES AND DOCUSATE SODIUM 2 TABLET: 8.6; 5 TABLET ORAL at 17:29

## 2023-09-17 RX ADMIN — GLYCOPYRROLATE 1 MG: 1 TABLET ORAL at 17:29

## 2023-09-17 NOTE — NURSING NOTE
Patient mostly isolative to room throughout the evening. Denied any unmet needs. HS medication compliant. Melatonin prn given.

## 2023-09-17 NOTE — NURSING NOTE
Pt visible but isolated to self on the milieu, obeserved to be possibly responding to internal stimuli both in the hallway and in his room while alone. Pt often observed in his room talking to himself under his sheets,as well as carrying a bed sheet over his head and body while in the small TV room. Pt none-the-less denies AVH, as well as HI & SI.  Pt compliant with meds and meals,  Observed wearing hospital attire. Pleasant and cooperative.

## 2023-09-17 NOTE — PROGRESS NOTES
Progress Note - 1001 Pauline Hopson 26 y.o. male MRN: [id number]   Unit/Bed#: [facility] 343-01 Encounter: [id number]    Behavior over the last 24 hours: slowly improving. Gokul seen today for follow-up, in his room. States he is "fine". States he has been sleeping today, but denies feeling tired. 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 He denies any thoughts to himself or anyone else, denies any hallucinations, states that he has not heard any auditory hallucinations for "a while ". Denies any side effects from his medications, states that he is eating and sleeping well. Denies any other acute concerns. Per nursing: Limited participation in milieu. Compliant with medications. Follows directions appropriately. Still seen at times responding to internal stimuli.      Sleep: normal  Appetite: normal  Medication side effects: No   ROS: all other systems are negative    Mental Status Evaluation:    Appearance:  age appropriate, dressed in hospital attire, improved grooming, looks stated age   Behavior:  calm, still at times guarded   Speech:  normal rate and volume   Mood:  euthymic   Affect:  constricted, slightly brighter   Thought Process:  concrete   Associations: concrete associations   Thought Content:  no overt delusions, poverty of thought   Perceptual Disturbances: no auditory hallucinations, no visual hallucinations, denies when asked, but talks to self at times   Risk Potential: Suicidal ideation - None at present  Homicidal ideation - None  Potential for aggression - No   Sensorium:  oriented to person, place and time/date   Memory:  recent and remote memory grossly intact   Consciousness:  alert and awake   Attention/Concentration: attention span and concentration are age appropriate   Insight:  limited   Judgment: limited   Gait/Station: normal gait/station   Motor Activity: no abnormal movements     Vital signs in last 24 hours:         Laboratory results: I have personally reviewed all pertinent laboratory/tests results    Results from the past 24 hours: No results found for this or any previous visit (from the past 24 hour(s)). Suicide/Homicide Risk Assessment:    Risk of Harm to Self:   Nursing Suicide Risk Assessment Last 24 hours: C-SSRS Risk (Since Last Contact)  Calculated C-SSRS Risk Score (Since Last Contact): No Risk Indicated  Based on today's assessment, Gokul presents the following risk of harm to self: low    Risk of Harm to Others:  Nursing Homicide Risk Assessment: Violence Risk to Others: Yes- Within the last 6 months  Current Specific Risk Factors include: current psychotic symptoms  Protective Factors: no current homicidal ideation, able to communicate with staff on the unit, willing to continue psychiatric treatment, supportive family  Based on today's assessment, Gokul presents the following risk of harm to others: none    The following interventions are recommended: behavioral checks every 7 minutes, continued hospitalization on locked unit    Progress Toward Goals: progressing slowly    Assessment/Plan   Principal Problem:    Schizoaffective disorder ([address])  Active Problems:    Legally blind    Hearing loss    Asthma    Medical clearance for psychiatric admission    Prolonged erection    Intellectual disability    Rib pain on left side  Has not changed over the weekend, but remained isolative in room. Did come out to watch football, and is appropriate on interactions. Still seems preoccupied at times. Recommended Treatment:     Planned medication and treatment changes:     All current active medications have been reviewed  Encourage group therapy, milieu therapy and occupational therapy  Behavioral Health checks every 7 minutes  Status of Admission Status of Admission  Status of Admission: 201  Continue current medications, including Clozapine 250mg daily for psychosis; next CBC due on 9/18/23. Continue current dose, has not had any worsening agitation. Continue depakote DR 750mg BID, last level 6/12 and 91, WNL. Continue senokot two tabs BID for bowel reigmen. Continue metformin 1000mg BID for diabetes and cardiometabolic syndrome.       Current Facility-Administered Medications   Medication Dose Route Frequency Provider Last Rate   • acetaminophen  650 mg Oral Q6H PRN Brian Yeung MD     • acetaminophen  650 mg Oral Q4H PRN Brian Yeung MD     • acetaminophen  975 mg Oral Q6H PRN Brian Yeung MD     • albuterol  2 puff Inhalation Q4H PRN Brian Yeung MD     • aluminum-magnesium hydroxide-simethicone  30 mL Oral Q4H PRN Brian Yeung MD     • benztropine  1 mg Intramuscular BID PRN Adam Vasques MD     • benztropine  1 mg Oral Q4H PRN Max 6/day Brian Yeung MD     • cloZAPine  250 mg Oral Daily Tone Jones MD     • divalproex sodium  750 mg Oral BID Tone Jones MD     • fluticasone  1 spray Nasal Daily PRN Madeline Walters PA-C     • glycopyrrolate  1 mg Oral TID Katya Boyle DO     • hydrOXYzine HCL  100 mg Oral Q6H PRN Max 4/day Jason Ca DO      Or   • LORazepam  1 mg Intramuscular Q6H PRN Jason Ca DO     • hydrOXYzine HCL  25 mg Oral Q6H PRN Max 4/day Brian Yeung MD     • hydrOXYzine HCL  50 mg Oral Q6H PRN Max 4/day Brian Yeung MD     • melatonin  3 mg Oral HS PRN Jason Ca DO     • metFORMIN  1,000 mg Oral BID With Meals ELA Wise     • nicotine polacrilex  4 mg Oral Q2H PRN Brian Yeung MD     • OLANZapine  5 mg Oral Q3H PRN Max 6/day Tone Jones MD      Or   • OLANZapine  5 mg Intramuscular Q3H PRN Max 6/day Tone Jones MD     • OLANZapine  5 mg Intramuscular Q8H PRN Jason Ca DO      Or   • OLANZapine  7.5 mg Oral Q8H PRN Jason Rickers, DO     • OLANZapine  2.5 mg Oral Q3H PRN Max 8/day Tone Jones MD     • ondansetron  4 mg Oral Q8H PRN Jason Ca DO • polyethylene glycol  17 g Oral Daily PRN Bob Lugo MD     • pseudoephedrine  120 mg Oral BID PRN Ethan Henry MD     • senna-docusate sodium  1 tablet Oral Daily PRN Bob Lugo MD     • senna-docusate sodium  2 tablet Oral BID Geraldo Loera MD     • sterile water          • sterile water          • sterile water          • sterile water          • sterile water          • sterile water          • sterile water          • sterile water          • sterile water          • sterile water          • sterile water          • sterile water            Risks / Benefits of Treatment:    Risks, benefits, and possible side effects of medications explained to patient and patient verbalizes understanding and agreement for treatment. Counseling / Coordination of Care: Total floor / unit time spent today 25 minutes. Greater than 50% of total time was spent with the patient and / or family counseling and / or coordination of care. A description of counseling / coordination of care:  Patient's progress discussed with staff in treatment team meeting. Medications, treatment progress and treatment plan reviewed with patient. Educated on importance of medication and treatment compliance. Encouraged participation in milieu and group therapy on the unit.     Britt Lovell DO 09/17/23

## 2023-09-18 LAB
ALBUMIN SERPL ELPH-MCNC: 3.69 G/DL (ref 3.2–5.1)
ALBUMIN SERPL ELPH-MCNC: 54.2 % (ref 48–70)
ALPHA1 GLOB SERPL ELPH-MCNC: 0.24 G/DL (ref 0.15–0.47)
ALPHA1 GLOB SERPL ELPH-MCNC: 3.5 % (ref 1.8–7)
ALPHA2 GLOB SERPL ELPH-MCNC: 0.65 G/DL (ref 0.42–1.04)
ALPHA2 GLOB SERPL ELPH-MCNC: 9.6 % (ref 5.9–14.9)
BASOPHILS # BLD AUTO: 0.07 THOUSANDS/ÂΜL (ref 0–0.1)
BASOPHILS NFR BLD AUTO: 1 % (ref 0–1)
BETA GLOB ABNORMAL SERPL ELPH-MCNC: 0.32 G/DL (ref 0.31–0.57)
BETA1 GLOB SERPL ELPH-MCNC: 4.7 % (ref 4.7–7.7)
BETA2 GLOB SERPL ELPH-MCNC: 5.9 % (ref 3.1–7.9)
BETA2+GAMMA GLOB SERPL ELPH-MCNC: 0.4 G/DL (ref 0.2–0.58)
BNP SERPL-MCNC: 5 PG/ML (ref 0–100)
CARDIAC TROPONIN I PNL SERPL HS: 3 NG/L (ref 8–18)
CK MB SERPL-MCNC: 1.4 NG/ML (ref 0.6–6.3)
EOSINOPHIL # BLD AUTO: 0.59 THOUSAND/ÂΜL (ref 0–0.61)
EOSINOPHIL NFR BLD AUTO: 6 % (ref 0–6)
ERYTHROCYTE [DISTWIDTH] IN BLOOD BY AUTOMATED COUNT: 12.3 % (ref 11.6–15.1)
GAMMA GLOB ABNORMAL SERPL ELPH-MCNC: 1.5 G/DL (ref 0.4–1.66)
GAMMA GLOB SERPL ELPH-MCNC: 22.1 % (ref 6.9–22.3)
HCT VFR BLD AUTO: 41.6 % (ref 36.5–49.3)
HGB BLD-MCNC: 14.1 G/DL (ref 12–17)
IGG/ALB SER: 1.18 {RATIO} (ref 1.1–1.8)
IMM GRANULOCYTES # BLD AUTO: 0.04 THOUSAND/UL (ref 0–0.2)
IMM GRANULOCYTES NFR BLD AUTO: 0 % (ref 0–2)
LYMPHOCYTES # BLD AUTO: 4.17 THOUSANDS/ÂΜL (ref 0.6–4.47)
LYMPHOCYTES NFR BLD AUTO: 45 % (ref 14–44)
MCH RBC QN AUTO: 31.6 PG (ref 26.8–34.3)
MCHC RBC AUTO-ENTMCNC: 33.9 G/DL (ref 31.4–37.4)
MCV RBC AUTO: 93 FL (ref 82–98)
MONOCYTES # BLD AUTO: 0.98 THOUSAND/ÂΜL (ref 0.17–1.22)
MONOCYTES NFR BLD AUTO: 10 % (ref 4–12)
NEUTROPHILS # BLD AUTO: 3.61 THOUSANDS/ÂΜL (ref 1.85–7.62)
NEUTS SEG NFR BLD AUTO: 38 % (ref 43–75)
NRBC BLD AUTO-RTO: 0 /100 WBCS
PLATELET # BLD AUTO: 204 THOUSANDS/UL (ref 149–390)
PMV BLD AUTO: 11.2 FL (ref 8.9–12.7)
PROT PATTERN SERPL ELPH-IMP: NORMAL
PROT SERPL-MCNC: 6.8 G/DL (ref 6.4–8.4)
RBC # BLD AUTO: 4.46 MILLION/UL (ref 3.88–5.62)
WBC # BLD AUTO: 9.46 THOUSAND/UL (ref 4.31–10.16)

## 2023-09-18 PROCEDURE — 84484 ASSAY OF TROPONIN QUANT: CPT | Performed by: PSYCHIATRY & NEUROLOGY

## 2023-09-18 PROCEDURE — 83880 ASSAY OF NATRIURETIC PEPTIDE: CPT | Performed by: PSYCHIATRY & NEUROLOGY

## 2023-09-18 PROCEDURE — 99232 SBSQ HOSP IP/OBS MODERATE 35: CPT | Performed by: PSYCHIATRY & NEUROLOGY

## 2023-09-18 PROCEDURE — 85025 COMPLETE CBC W/AUTO DIFF WBC: CPT

## 2023-09-18 PROCEDURE — 82553 CREATINE MB FRACTION: CPT | Performed by: PSYCHIATRY & NEUROLOGY

## 2023-09-18 RX ADMIN — METFORMIN HYDROCHLORIDE 1000 MG: 500 TABLET, FILM COATED ORAL at 15:50

## 2023-09-18 RX ADMIN — GLYCOPYRROLATE 1 MG: 1 TABLET ORAL at 08:16

## 2023-09-18 RX ADMIN — METFORMIN HYDROCHLORIDE 1000 MG: 500 TABLET, FILM COATED ORAL at 08:16

## 2023-09-18 RX ADMIN — SENNOSIDES AND DOCUSATE SODIUM 2 TABLET: 8.6; 5 TABLET ORAL at 17:14

## 2023-09-18 RX ADMIN — DIVALPROEX SODIUM 750 MG: 500 TABLET, DELAYED RELEASE ORAL at 08:16

## 2023-09-18 RX ADMIN — GLYCOPYRROLATE 1 MG: 1 TABLET ORAL at 21:15

## 2023-09-18 RX ADMIN — DIVALPROEX SODIUM 750 MG: 500 TABLET, DELAYED RELEASE ORAL at 19:37

## 2023-09-18 RX ADMIN — CLOZAPINE 250 MG: 25 TABLET ORAL at 17:14

## 2023-09-18 RX ADMIN — SENNOSIDES AND DOCUSATE SODIUM 2 TABLET: 8.6; 5 TABLET ORAL at 08:16

## 2023-09-18 RX ADMIN — GLYCOPYRROLATE 1 MG: 1 TABLET ORAL at 15:50

## 2023-09-18 NOTE — NURSING NOTE
Patient remained isolative to room and bed throughout the evening. Compliant with scheduled HS medications. Melatonin prn given.

## 2023-09-18 NOTE — NURSING NOTE
Patient has been seclusive to his room. In bed sleeping. Refused breakfast. Scant, guarded. Currently denying AH. Compliant with medications.

## 2023-09-18 NOTE — PROGRESS NOTES
09/18/23 0829   Team Meeting   Meeting Type Daily Rounds   Team Members Present   Team Members Present Physician;Nurse;   Physician Team Member [id number]   Care Management Team Member Dev   Patient/Family Present   Patient Present No   Patient's Family Present No   Pt remains sleeping much during the day and seclusive to room. Pt responding to internal stim. Bloodwork completed. Med/meal compliant. DC pending EAC.

## 2023-09-18 NOTE — NURSING NOTE
Patient noted sleeping in room , this nurse encouraged patient to wake up and come out of room for meals with no success. Compliant with medications and vitals. Currently in bed resting. Denies SI/HI/AH/VH at this time.

## 2023-09-18 NOTE — PROGRESS NOTES
Progress Note - [address] 26 y.o. male MRN: [id number]  Unit/Bed#: [facility] 343-01 Encounter: [id number]    Patient was seen today for continuation of care, records reviewed and patient was discussed with the morning case review team.  Per staff, Gokul remains isolative to his room, he is scant and guarded. He has been observed responding to internal stimuli. He is meal and medication compliant. Gokul was seen today for psychiatric follow-up. On assessment today, Gokul was seen resting in his room. Gokul was mostly uncooperative with the interview today but did answer some questions with a lot of encouragement. Gokul states his mood is "fine" today. He denies anxiety and depression. He was agreeable to removing the blanket from over his head but he did not make any eye contact with this writer. He presents as dysphoric and blunted. He reports that his last BM was 9/15/2023. He denies any current stomach pains and states he is passing gas. Nursing will give Gokul a PRN dose of Miralax this evening for constipation. Gokul denies acute suicidal/self-harm ideation/intent/plan upon direct inquiry at this time. Gokul remains mostly isolative with minimal interactions with staff but no agitation or aggression noted on exam or in report. Gokul denies HI/AH/VH, but does appear internally preoccupied  No overt delusions or paranoia are verbalized but the interview was limited. Gokul remains adherent to his current psychotropic medication regimen and denies any side effects from medications, as well as none noted on exam.    Continue current medications Clozaril 250mg PO daily (todays labs acceptable ANC 4.17) and Depakote 750mg PO BID (last VPA 91 on 6/12/23) and Robinul 1mg TID for sialorrhea. PRN dose of Miralax to be given today for constipation.      Vitals:  Vitals:    09/18/23 0800   BP: 115/67   Pulse: 70   Resp: 16   Temp: 98.1 °F (36.7 °C)   SpO2: 98% Laboratory Results:    I have personally reviewed all pertinent laboratory/tests results. Most Recent Labs:   Lab Results   Component Value Date    WBC 9.46 09/18/2023    RBC 4.46 09/18/2023    HGB 14.1 09/18/2023    HCT 41.6 09/18/2023     09/18/2023    RDW 12.3 09/18/2023    TOTANEUTABS 4.84 06/08/2023    NEUTROABS 3.61 09/18/2023    SODIUM 134 (L) 08/20/2023    K 5.1 08/20/2023    CL 98 08/20/2023    CO2 31 08/20/2023    BUN 16 08/20/2023    CREATININE 1.34 (H) 08/20/2023    GLUC 262 (H) 08/20/2023    GLUF 120 (H) 08/04/2023    CALCIUM 9.3 08/20/2023    AST 16 07/04/2023    ALT 7 07/04/2023    ALKPHOS 42 07/04/2023    TP 7.0 07/04/2023    ALB 3.9 07/04/2023    TBILI 0.45 07/04/2023    CHOLESTEROL 167 09/02/2023    HDL 53 09/02/2023    TRIG 113 09/02/2023    LDLCALC 91 09/02/2023    NONHDLC 114 09/02/2023    VALPROICTOT 91 06/12/2023    AMMONIA 22 10/09/2022    [id number] 1.841 05/19/2023    FREET4 1.16 10/09/2022    HGBA1C 6.8 (H) 08/20/2023     08/20/2023       Psychiatric Review of Systems:  Behavior over the last 24 hours:  unchanged.    Sleep:  adequate  Appetite: adequate  Medication side effects: constipation  ROS: constipation, denies shortness of breath or chest pain and all other systems are negative for acute changes    Mental Status Evaluation:    Appearance:  disheveled, marginal hygiene   Behavior:  guarded, uncooperative   Speech:  scant, soft   Mood:  dysphoric   Affect:  blunted   Thought Process:  poverty of thought   Associations: unable to assess - does not answer   Thought Content:  no overt delusions   Perceptual Disturbances: denies auditory or visual hallucinations when asked, but appears preoccupied   Risk Potential: Suicidal ideation - None  Homicidal ideation - None  Potential for aggression - Not at present   Sensorium:  unable to assess, does not answer   Memory:  patient does not answer   Consciousness:  alert and awake   Attention/Concentration: attention span and concentration: unable to assess due to lack of cooperation   Insight:  impaired   Judgment: impaired   Gait/Station: in bed   Motor Activity: no abnormal movements     Progress Toward Goals:   Gokul is progressing towards goals of inpatient psychiatric treatment by continued medication compliance and is attending therapeutic modalities on the milieu. However, the patient continues to require inpatient psychiatric hospitalization for continued medication management and titration to optimize symptom reduction, improve sleep hygiene, and demonstrate adequate self-care. Risk of Harm to Self:   Nursing Suicide Risk Assessment Last 24 hours: C-SSRS Risk (Since Last Contact)  Calculated C-SSRS Risk Score (Since Last Contact): No Risk Indicated    Risk of Harm to Others:  Nursing Homicide Risk Assessment: Violence Risk to Others: Yes- Within the last 6 months    The following interventions are recommended: behavioral checks every 7 minutes, continued hospitalization on locked unit      Assessment/Plan   Principal Problem:    Schizoaffective disorder (HCC)  Active Problems:    Legally blind    Hearing loss    Asthma    Medical clearance for psychiatric admission    Prolonged erection    Intellectual disability    Rib pain on left side      Recommended Treatment: Treatment plan and medication changes discussed and per the attending physician the plan is: 1. Continue with group therapy, milieu therapy and occupational therapy  2. Behavioral Health checks every 7 minutes  3. Continue frequent safety checks and vitals per unit protocol  4. Continue with SLIM medical management as indicated  5. Continue with current medication regimen  6. Will review labs in the a.m. 7.Disposition Planning: Discharge planning and efforts remain ongoing    Behavioral Health Medications: all current active meds have been reviewed and continue current psychiatric medications.   Current Facility-Administered Medications   Medication Dose Route Frequency Provider Last Rate   • acetaminophen  650 mg Oral Q6H PRN Taryn Sheehan MD     • acetaminophen  650 mg Oral Q4H PRN Taryn Sheehan MD     • acetaminophen  975 mg Oral Q6H PRN Taryn Sheehan MD     • albuterol  2 puff Inhalation Q4H PRN Taryn Sheehan MD     • aluminum-magnesium hydroxide-simethicone  30 mL Oral Q4H PRN Taryn Sheehan MD     • benztropine  1 mg Intramuscular BID PRN Melisa White MD     • benztropine  1 mg Oral Q4H PRN Max 6/day Taryn Sheehan MD     • cloZAPine  250 mg Oral Daily Amber Vera MD     • divalproex sodium  750 mg Oral BID Amber Vera MD     • fluticasone  1 spray Nasal Daily PRN Ellen Fermin PA-C     • glycopyrrolate  1 mg Oral TID Deandra Amador DO     • hydrOXYzine HCL  100 mg Oral Q6H PRN Max 4/day Nate Christianson DO      Or   • LORazepam  1 mg Intramuscular Q6H PRN Nate Christianson DO     • hydrOXYzine HCL  25 mg Oral Q6H PRN Max 4/day Taryn Sheehan MD     • hydrOXYzine HCL  50 mg Oral Q6H PRN Max 4/day Taryn Sheehan MD     • melatonin  3 mg Oral HS PRN Nate Christianson DO     • metFORMIN  1,000 mg Oral BID With Meals ELA Wise     • nicotine polacrilex  4 mg Oral Q2H PRN Taryn Sheehan MD     • OLANZapine  5 mg Oral Q3H PRN Max 6/day Amber Vera MD      Or   • OLANZapine  5 mg Intramuscular Q3H PRN Max 6/day Amber Vear MD     • OLANZapine  5 mg Intramuscular Q8H PRN Nate Christianson DO      Or   • OLANZapine  7.5 mg Oral Q8H PRN Nate Christianson DO     • OLANZapine  2.5 mg Oral Q3H PRN Max 8/day Amber Vera MD     • ondansetron  4 mg Oral Q8H PRN Nate Christianson DO     • polyethylene glycol  17 g Oral Daily PRN Taryn Sheehan MD     • pseudoephedrine  120 mg Oral BID PRN Cyndi Mclain MD     • senna-docusate sodium  1 tablet Oral Daily PRN Taryn Sheehan MD     • senna-docusate sodium  2 tablet Oral BID Amber Vera MD     • sterile water          • sterile water • sterile water          • sterile water          • sterile water          • sterile water          • sterile water          • sterile water          • sterile water          • sterile water          • sterile water          • sterile water              Risks / Benefits of Treatment:  Risks, benefits, and possible side effects of medications explained to patient and patient verbalizes understanding and agreement for treatment. Counseling / Coordination of Care:  Patient's progress reviewed with nursing staff. Medications, treatment progress and treatment plan reviewed with patient. Supportive counseling provided to the patient. Total floor/unit time spent today 25 minutes. Greater than 50% of total time was spent with the patient and / or family counseling and / or coordination of care. A description of the counseling / coordination of care: medication education, treatment plan, supportive therapy.

## 2023-09-19 LAB
KAPPA LC FREE SER-MCNC: 42.9 MG/L (ref 3.3–19.4)
KAPPA LC FREE/LAMBDA FREE SER: 1.95 {RATIO} (ref 0.26–1.65)
LAMBDA LC FREE SERPL-MCNC: 22 MG/L (ref 5.7–26.3)

## 2023-09-19 PROCEDURE — 99232 SBSQ HOSP IP/OBS MODERATE 35: CPT | Performed by: PSYCHIATRY & NEUROLOGY

## 2023-09-19 RX ADMIN — GLYCOPYRROLATE 1 MG: 1 TABLET ORAL at 08:15

## 2023-09-19 RX ADMIN — DIVALPROEX SODIUM 750 MG: 500 TABLET, DELAYED RELEASE ORAL at 20:10

## 2023-09-19 RX ADMIN — SENNOSIDES AND DOCUSATE SODIUM 2 TABLET: 8.6; 5 TABLET ORAL at 08:14

## 2023-09-19 RX ADMIN — GLYCOPYRROLATE 1 MG: 1 TABLET ORAL at 16:25

## 2023-09-19 RX ADMIN — SENNOSIDES AND DOCUSATE SODIUM 2 TABLET: 8.6; 5 TABLET ORAL at 17:22

## 2023-09-19 RX ADMIN — CLOZAPINE 300 MG: 100 TABLET ORAL at 17:22

## 2023-09-19 RX ADMIN — GLYCOPYRROLATE 1 MG: 1 TABLET ORAL at 21:19

## 2023-09-19 RX ADMIN — METFORMIN HYDROCHLORIDE 1000 MG: 500 TABLET, FILM COATED ORAL at 08:15

## 2023-09-19 RX ADMIN — DIVALPROEX SODIUM 750 MG: 500 TABLET, DELAYED RELEASE ORAL at 08:15

## 2023-09-19 RX ADMIN — METFORMIN HYDROCHLORIDE 1000 MG: 500 TABLET, FILM COATED ORAL at 16:25

## 2023-09-19 NOTE — NURSING NOTE
Patient continues to remain in room and sleep most of the shift , this nurse encouraged patient to come out for meals with success. Compliant with medications. Denies SI/HI/AH/VH at this time.

## 2023-09-19 NOTE — NURSING NOTE
Patient was up at the start of the shift. In room laughing and talking to self. Scant during interaction. Compliant with medications. Patient reports have a large bowel movement last night. Waiting EAC placement.

## 2023-09-19 NOTE — PROGRESS NOTES
Progress Note - [address] 26 y.o. male MRN: [id number]  [facility] Encounter: [id number]    Patient was seen today for continuation of care, records reviewed and patient was discussed with the morning case review team.  Per staff, Gokul was observed this morning responding to internal stimuli and laughing to self, alone in room. He is meal and medication compliant. He had a BM last night. Gokul was seen today for psychiatric follow-up. On assessment today, Gokul was seen resting in bed in his room. He continues to present as dysphoric. He was slightly more cooperative today with the interview but overall scant and superficial.  He states he had a BM yesterday and this morning as well. He reports sleeping well overnight and having a good breakfast this morning. He continues to deny any depression or anxiety today. He reports that he is tired this morning. Gokul denies acute suicidal/self-harm ideation/intent/plan upon direct inquiry at this time. Gokul remains isolative to room with minimal interactions with staff but no agitation or aggression noted on exam or in report. Gokul denies HI/AH/VH, but appears internally preoccupied and is observed by staff responding to internal stimuli. No overt delusions or paranoia are verbalized today but again the interview was limited. Gokul remains adherent to his current psychotropic medication regimen and denies any side effects from medications, as well as none noted on exam.    We will increase Clozaril today to 300mg PO daily (yesterdays labs acceptable ANC 4.17, cardiac markers negative, last Clozapine level 266 on 9/11/2023 and patient is having bowel movements) for ongoing psychosis. Continue to monitor labs on Mondays. Continue Depakote 750mg PO BID (last VPA 91 on 6/12/23) and Robinul 1mg TID for sialorrhea.       Vitals:  Vitals:    09/19/23 0735   BP: 133/87   Pulse: 103   Resp: 16   Temp: 97.8 °F (36.6 °C)   SpO2: 97%       Laboratory Results:    I have personally reviewed all pertinent laboratory/tests results. Most Recent Labs:   Lab Results   Component Value Date    WBC 9.46 09/18/2023    RBC 4.46 09/18/2023    HGB 14.1 09/18/2023    HCT 41.6 09/18/2023     09/18/2023    RDW 12.3 09/18/2023    TOTANEUTABS 4.84 06/08/2023    NEUTROABS 3.61 09/18/2023    SODIUM 134 (L) 08/20/2023    K 5.1 08/20/2023    CL 98 08/20/2023    CO2 31 08/20/2023    BUN 16 08/20/2023    CREATININE 1.34 (H) 08/20/2023    GLUC 262 (H) 08/20/2023    GLUF 120 (H) 08/04/2023    CALCIUM 9.3 08/20/2023    AST 16 07/04/2023    ALT 7 07/04/2023    ALKPHOS 42 07/04/2023    TP 6.8 09/17/2023    ALB 3.9 07/04/2023    TBILI 0.45 07/04/2023    CHOLESTEROL 167 09/02/2023    HDL 53 09/02/2023    TRIG 113 09/02/2023    LDLCALC 91 09/02/2023    NONHDLC 114 09/02/2023    VALPROICTOT 91 06/12/2023    AMMONIA 22 10/09/2022    [id number] 1.841 05/19/2023    FREET4 1.16 10/09/2022    HGBA1C 6.8 (H) 08/20/2023     08/20/2023       Psychiatric Review of Systems:  Behavior over the last 24 hours:  unchanged. Sleep:  increased  Appetite: adequate  Medication side effects: denies and none observed.   ROS: no complaints, denies shortness of breath or chest pain and all other systems are negative for acute changes    Mental Status Evaluation:    Appearance:  disheveled, marginal hygiene   Behavior:  calm, guarded, no eye contact, slightly more cooperative   Speech:  scant, soft   Mood:  dysphoric   Affect:  blunted   Thought Process:  concrete, poverty of thought   Associations: concrete associations   Thought Content:  no overt delusions   Perceptual Disturbances: denies auditory or visual hallucinations when asked, but appears responding to internal stimuli   Risk Potential: Suicidal ideation - None  Homicidal ideation - None  Potential for aggression - Not at present   Sensorium:  oriented to person, place and situation   Memory:  recent memory intact   Consciousness:  alert and awake   Attention/Concentration: attention span and concentration appear shorter than expected for age   Insight:  impaired   Judgment: impaired   Gait/Station: in bed   Motor Activity: no abnormal movements     Progress Toward Goals:   Gokul is progressing towards goals of inpatient psychiatric treatment by continued medication compliance and is attending therapeutic modalities on the milieu. However, the patient continues to require inpatient psychiatric hospitalization for continued medication management and titration to optimize symptom reduction, improve sleep hygiene, and demonstrate adequate self-care. Risk of Harm to Self:   Nursing Suicide Risk Assessment Last 24 hours: C-SSRS Risk (Since Last Contact)  Calculated C-SSRS Risk Score (Since Last Contact): No Risk Indicated    Risk of Harm to Others:  Nursing Homicide Risk Assessment: Violence Risk to Others: Yes- Within the last 6 months    The following interventions are recommended: behavioral checks every 7 minutes, continued hospitalization on locked unit      Assessment/Plan   Principal Problem:    Schizoaffective disorder (HCC)  Active Problems:    Legally blind    Hearing loss    Asthma    Medical clearance for psychiatric admission    Prolonged erection    Intellectual disability    Rib pain on left side      Recommended Treatment: Treatment plan and medication changes discussed and per the attending physician the plan is: 1. Continue with group therapy, milieu therapy and occupational therapy  2. Behavioral Health checks every 7 minutes  3. Continue frequent safety checks and vitals per unit protocol  4. Continue with SLIM medical management as indicated  5. Continue with current medication regimen  6. Will review labs in the a.m.   7.Disposition Planning: Discharge planning and efforts remain ongoing    Behavioral Health Medications: all current active meds have been reviewed and continue current psychiatric medications.   Current Facility-Administered Medications   Medication Dose Route Frequency Provider Last Rate   • acetaminophen  650 mg Oral Q6H PRN Gem Cabral MD     • acetaminophen  650 mg Oral Q4H PRN Gem Cabral MD     • acetaminophen  975 mg Oral Q6H PRN Gem Cabral MD     • albuterol  2 puff Inhalation Q4H PRN Gem Cabral MD     • aluminum-magnesium hydroxide-simethicone  30 mL Oral Q4H PRN Gem Cabral MD     • benztropine  1 mg Intramuscular BID PRN Demetrio Lanier MD     • benztropine  1 mg Oral Q4H PRN Max 6/day Gem Cabral MD     • cloZAPine  300 mg Oral Daily ELA Villagomez     • divalproex sodium  750 mg Oral BID Magdalena Ghotra MD     • fluticasone  1 spray Nasal Daily PRN Niecy Foreman PA-C     • glycopyrrolate  1 mg Oral TID Estela Rubio DO     • hydrOXYzine HCL  100 mg Oral Q6H PRN Max 4/day Spicer Ralphs, DO      Or   • LORazepam  1 mg Intramuscular Q6H PRN Spicer Ralphs, DO     • hydrOXYzine HCL  25 mg Oral Q6H PRN Max 4/day Gem Cabral MD     • hydrOXYzine HCL  50 mg Oral Q6H PRN Max 4/day Gem Cabral MD     • melatonin  3 mg Oral HS PRN Spicer Ralphs, DO     • metFORMIN  1,000 mg Oral BID With Meals ELA Wise     • nicotine polacrilex  4 mg Oral Q2H PRN Gem Cabral MD     • OLANZapine  5 mg Oral Q3H PRN Max 6/day Magdalena Ghotra MD      Or   • OLANZapine  5 mg Intramuscular Q3H PRN Max 6/day Magdalena Ghotra MD     • OLANZapine  5 mg Intramuscular Q8H PRN Spicer Ralphs, DO      Or   • OLANZapine  7.5 mg Oral Q8H PRN Spicer Ralphs, DO     • OLANZapine  2.5 mg Oral Q3H PRN Max 8/day Magdalena Ghotra MD     • ondansetron  4 mg Oral Q8H PRN Spicer Ralphs, DO     • polyethylene glycol  17 g Oral Daily PRN Gem Cabral MD     • pseudoephedrine  120 mg Oral BID PRN Pam Kidd MD     • senna-docusate sodium  1 tablet Oral Daily PRN Gem Cabral MD     • senna-docusate sodium  2 tablet Oral BID Joanne Spicer MD     • sterile water          • sterile water          • sterile water          • sterile water          • sterile water          • sterile water          • sterile water          • sterile water          • sterile water          • sterile water          • sterile water          • sterile water              Risks / Benefits of Treatment:  Risks, benefits, and possible side effects of medications explained to patient and patient verbalizes understanding and agreement for treatment. Counseling / Coordination of Care:  Patient's progress reviewed with nursing staff. Medications, treatment progress and treatment plan reviewed with patient. Supportive counseling provided to the patient. Total floor/unit time spent today 25 minutes. Greater than 50% of total time was spent with the patient and / or family counseling and / or coordination of care. A description of the counseling / coordination of care: medication education, treatment plan, supportive therapy.

## 2023-09-19 NOTE — PROGRESS NOTES
09/19/23 0828   Team Meeting   Meeting Type Daily Rounds   Team Members Present   Team Members Present Physician;Nurse;   Physician Team Member [id number] Dev   Patient/Family Present   Patient Present No   Patient's Family Present No   Pt responding to internal stim and laughing alone in room. Med/meal compliant. Pt slept. BM last night. Clozapine to increase. Dc pending EAC.

## 2023-09-20 LAB
KAPPA LC UR-MCNC: 34.68 MG/L (ref 1.17–86.46)
KAPPA LC/LAMBDA UR: 12.89 {RATIO} (ref 1.83–14.26)
LAMBDA LC UR-MCNC: 2.69 MG/L (ref 0.27–15.21)

## 2023-09-20 PROCEDURE — 99232 SBSQ HOSP IP/OBS MODERATE 35: CPT | Performed by: PSYCHIATRY & NEUROLOGY

## 2023-09-20 RX ADMIN — METFORMIN HYDROCHLORIDE 1000 MG: 500 TABLET, FILM COATED ORAL at 08:24

## 2023-09-20 RX ADMIN — GLYCOPYRROLATE 1 MG: 1 TABLET ORAL at 21:15

## 2023-09-20 RX ADMIN — METFORMIN HYDROCHLORIDE 1000 MG: 500 TABLET, FILM COATED ORAL at 15:39

## 2023-09-20 RX ADMIN — SENNOSIDES AND DOCUSATE SODIUM 2 TABLET: 8.6; 5 TABLET ORAL at 17:29

## 2023-09-20 RX ADMIN — DIVALPROEX SODIUM 750 MG: 500 TABLET, DELAYED RELEASE ORAL at 08:24

## 2023-09-20 RX ADMIN — CLOZAPINE 300 MG: 100 TABLET ORAL at 17:29

## 2023-09-20 RX ADMIN — GLYCOPYRROLATE 1 MG: 1 TABLET ORAL at 08:24

## 2023-09-20 RX ADMIN — DIVALPROEX SODIUM 750 MG: 500 TABLET, DELAYED RELEASE ORAL at 19:22

## 2023-09-20 RX ADMIN — GLYCOPYRROLATE 1 MG: 1 TABLET ORAL at 15:39

## 2023-09-20 RX ADMIN — SENNOSIDES AND DOCUSATE SODIUM 2 TABLET: 8.6; 5 TABLET ORAL at 08:24

## 2023-09-20 NOTE — NURSING NOTE
Patient noted in room sleeping on arrival. Awake for medications with encouragement. Remains in room at this time Denies SI/HI/AH/VH at this time.

## 2023-09-20 NOTE — NURSING NOTE
Patient awake at the start of the shift. Sitting on his bed laughing and mumbling to self although denied AH. Scant and guarded. Compliant with medications. After eating breakfast, patient has been sleeping. Reports having a bowel movement last night.

## 2023-09-20 NOTE — PROGRESS NOTES
09/20/23 0822   Team Meeting   Meeting Type Daily Rounds   Team Members Present   Team Members Present Physician;Nurse;   Physician Team Member [id number]   Care Management Team Member Seb Coleman   Patient/Family Present   Patient Present No   Patient's Family Present No   Responding to internal stim and laughing to self. Seclusive to room. Possible BM last night. Med/meal compliant. Dc pending EAC. Clozaril increased last night.

## 2023-09-20 NOTE — PROGRESS NOTES
Progress Note - Behavioral Health   Gokul Peguero 26 y.o. male MRN: [id number]  Unit/Bed#: [facility] 343-01 Encounter: [id number]    Assessment/Plan   Principal Problem:    Schizoaffective disorder ([address])  Active Problems:    Legally blind    Hearing loss    Asthma    Medical clearance for psychiatric admission    Prolonged erection    Intellectual disability    Rib pain on left side      Recommended Treatment:   · Continue clozaril 300 mg daily for psychosis (WBC on 9/18/2023 was 9.46 and ANC was 3.61). Cozapine level on 9/11/23 was 266. · Continue Depakote 750 mg twice a day for mood stability (Depakote level: 91 on 6/12/23). · Continue glycopyrrolate 1 mg 3 times daily for sialorrhea    · Continue Senokot S 2 tablets BID for bowel regimen. Monitor patient's day to day bowel movements     Continue with group therapy, milieu therapy and occupational therapy. Continue frequent safety checks and vitals per unit protocol. Case discussed with treatment team.  Risks, benefits and possible side effects of Medications: Risks, benefits, and possible side effects of medications have been explained to the patient, who verbalizes understanding    Current Legal Status: 201  Disposition: Continues to await placement into [facility]    ------------------------------------------------------------    Subjective: Per nursing report, Gokul has been cooperative on the unit and compliant with scheduled medications. Over the last 24 hours, patient required no prn medications. Continued to responding to internal stimuli, laughing to self, secluding to room. Today, Gokul is consenting for safety on the unit. He reports that. Denies SI, HI, AVH, difficulties with sleep, difficulties with appetite, or any anxiety or depression. He states his last bowel movement was yesterday, and reports he has been having regular bowel movements.   He denies any medication side effects, feels mildly sedated, denies nausea, denies constipation, denies palpitations. Denies any difficulties with dizziness or lightheadedness. Denies difficulties with salivation. He understands the plan to go to [facility] moving forward and is in agreement. He understands that Clozaril was increased yesterday and is agreeable with continued titration as needed. Progress Toward Goals: slow improvement    Psychiatric Review of Systems:  Behavior over the last 24 hours: unchanged  Sleep:increase  Appetite: adequate  Medication side effects: none verbalized  ROS: Complete review of systems is negative except as noted above.     Vital signs in last 24 hours:  Temp:  [97.4 °F (36.3 °C)] 97.4 °F (36.3 °C)  HR:  [110-115] 110  Resp:  [17] 17  BP: (139)/(55-87) 139/55    Mental Status Exam:  Appearance:  under bedsheet throughout interview   Behavior:  calm and cooperative   Motor: no abnormal movements and normal gait and balance   Speech:  soft and scant   Mood:  "fine"   Affect:  blunted   Thought Process:  concrete, poverty of thought   Thought Content: no verbalized delusions or overt paranoia   Perceptual disturbances: no reported hallucinations and appears to be responding to internal stimuli   Risk Potential: No active or passive suicidal or homicidal ideation was verbalized during interview   Cognition: oriented to self and situation and cognition not formally tested   Insight:  Poor   Judgment: Poor     Current Medications:  Current Facility-Administered Medications   Medication Dose Route Frequency Provider Last Rate   • acetaminophen  650 mg Oral Q6H PRN Gem Cabral MD     • acetaminophen  650 mg Oral Q4H PRN Gem Cabral MD     • acetaminophen  975 mg Oral Q6H PRN Gem Cabral MD     • albuterol  2 puff Inhalation Q4H PRN Gem Cabral MD     • aluminum-magnesium hydroxide-simethicone  30 mL Oral Q4H PRN Gem Cabral MD     • benztropine  1 mg Intramuscular BID PRN Demetrio Lanier MD     • benztropine  1 mg Oral Q4H PRN Max 6/day Hedy TERRAZAS Denzel Willingham MD     • cloZAPine  300 mg Oral Daily ELA Lin     • divalproex sodium  750 mg Oral BID Zechariah Rivero MD     • fluticasone  1 spray Nasal Daily PRN Sena Ceballos PA-C     • glycopyrrolate  1 mg Oral TID Vick Rojas, DO     • hydrOXYzine HCL  100 mg Oral Q6H PRN Max 4/day Yoana Pound, DO      Or   • LORazepam  1 mg Intramuscular Q6H PRN Yoana Pound, DO     • hydrOXYzine HCL  25 mg Oral Q6H PRN Max 4/day Venita Nissen, MD     • hydrOXYzine HCL  50 mg Oral Q6H PRN Max 4/day Venita Nissen, MD     • melatonin  3 mg Oral HS PRN Yoana Croydon, DO     • metFORMIN  1,000 mg Oral BID With Meals ELA Wise     • nicotine polacrilex  4 mg Oral Q2H PRN Venita Nissen, MD     • OLANZapine  5 mg Oral Q3H PRN Max 6/day Zechariah Rivero MD      Or   • OLANZapine  5 mg Intramuscular Q3H PRN Max 6/day Zechariah Rivero MD     • OLANZapine  5 mg Intramuscular Q8H PRN Yoana Pound, DO      Or   • OLANZapine  7.5 mg Oral Q8H PRN Yoana Pound, DO     • OLANZapine  2.5 mg Oral Q3H PRN Max 8/day Zechariah Rivero MD     • ondansetron  4 mg Oral Q8H PRN Yoana Pound, DO     • polyethylene glycol  17 g Oral Daily PRN Venita Nissen, MD     • pseudoephedrine  120 mg Oral BID PRN Aditi Garcia MD     • senna-docusate sodium  1 tablet Oral Daily PRN Venita Nissen, MD     • senna-docusate sodium  2 tablet Oral BID Zechariah Rivero MD     • sterile water          • sterile water          • sterile water          • sterile water          • sterile water          • sterile water          • sterile water          • sterile water          • sterile water          • sterile water          • sterile water          • sterile water              Behavioral Health Medications: all current active meds have been reviewed. Changes as in plan section above. Laboratory results:  I have personally reviewed all pertinent laboratory/tests results.   No results found for this or any previous visit (from the past 48 hour(s)).      Cortney Ashraf MD

## 2023-09-21 PROCEDURE — 99232 SBSQ HOSP IP/OBS MODERATE 35: CPT | Performed by: PSYCHIATRY & NEUROLOGY

## 2023-09-21 RX ADMIN — GLYCOPYRROLATE 1 MG: 1 TABLET ORAL at 16:53

## 2023-09-21 RX ADMIN — CLOZAPINE 300 MG: 100 TABLET ORAL at 17:00

## 2023-09-21 RX ADMIN — GLYCOPYRROLATE 1 MG: 1 TABLET ORAL at 21:45

## 2023-09-21 RX ADMIN — OLANZAPINE 7.5 MG: 2.5 TABLET, FILM COATED ORAL at 23:48

## 2023-09-21 RX ADMIN — SENNOSIDES AND DOCUSATE SODIUM 2 TABLET: 8.6; 5 TABLET ORAL at 08:21

## 2023-09-21 RX ADMIN — METFORMIN HYDROCHLORIDE 1000 MG: 500 TABLET, FILM COATED ORAL at 16:53

## 2023-09-21 RX ADMIN — MELATONIN TAB 3 MG 3 MG: 3 TAB at 23:48

## 2023-09-21 RX ADMIN — DIVALPROEX SODIUM 750 MG: 500 TABLET, DELAYED RELEASE ORAL at 08:21

## 2023-09-21 RX ADMIN — DIVALPROEX SODIUM 750 MG: 500 TABLET, DELAYED RELEASE ORAL at 19:31

## 2023-09-21 RX ADMIN — SENNOSIDES AND DOCUSATE SODIUM 2 TABLET: 8.6; 5 TABLET ORAL at 17:00

## 2023-09-21 RX ADMIN — GLYCOPYRROLATE 1 MG: 1 TABLET ORAL at 08:21

## 2023-09-21 RX ADMIN — METFORMIN HYDROCHLORIDE 1000 MG: 500 TABLET, FILM COATED ORAL at 08:21

## 2023-09-21 NOTE — PROGRESS NOTES
09/21/23 0829   Team Meeting   Meeting Type Daily Rounds   Team Members Present   Team Members Present Physician;Nurse;   Physician Team Member [id number] Dev   Patient/Family Present   Patient Present No   Patient's Family Present No   Pt seclusive to room. Pt denies all, but responds to internal stim. Med/meal compliant. DC pending EAC.

## 2023-09-21 NOTE — NURSING NOTE
Seclusive to room. In bed sleeping. Currently denying AH. Compliant with medications. No complaints.

## 2023-09-22 PROCEDURE — 99232 SBSQ HOSP IP/OBS MODERATE 35: CPT | Performed by: PSYCHIATRY & NEUROLOGY

## 2023-09-22 RX ADMIN — SENNOSIDES AND DOCUSATE SODIUM 2 TABLET: 8.6; 5 TABLET ORAL at 18:25

## 2023-09-22 RX ADMIN — DIVALPROEX SODIUM 750 MG: 500 TABLET, DELAYED RELEASE ORAL at 20:00

## 2023-09-22 RX ADMIN — GLYCOPYRROLATE 1 MG: 1 TABLET ORAL at 18:25

## 2023-09-22 RX ADMIN — GLYCOPYRROLATE 1 MG: 1 TABLET ORAL at 08:24

## 2023-09-22 RX ADMIN — GLYCOPYRROLATE 1 MG: 1 TABLET ORAL at 21:06

## 2023-09-22 RX ADMIN — DIVALPROEX SODIUM 750 MG: 500 TABLET, DELAYED RELEASE ORAL at 08:24

## 2023-09-22 RX ADMIN — METFORMIN HYDROCHLORIDE 1000 MG: 500 TABLET, FILM COATED ORAL at 18:25

## 2023-09-22 RX ADMIN — SENNOSIDES AND DOCUSATE SODIUM 2 TABLET: 8.6; 5 TABLET ORAL at 08:24

## 2023-09-22 RX ADMIN — CLOZAPINE 300 MG: 100 TABLET ORAL at 18:25

## 2023-09-22 RX ADMIN — METFORMIN HYDROCHLORIDE 1000 MG: 500 TABLET, FILM COATED ORAL at 08:25

## 2023-09-22 NOTE — NURSING NOTE
Patient has been seclusive to his room sleeping all morning into the afternoon. He is med/meal compliant.

## 2023-09-22 NOTE — NURSING NOTE
Pt observed RTIS loudly in room, yelling and sounding frightened. Pt endorses AH of voices, appears internally preoccupied during conversation. Pt given PRN Zyprexa for severe agitation secondary to AH of  and PRN melatonin 3mg for sleep @ 02:37; pt resting quietly in bed. PRNs effective.

## 2023-09-22 NOTE — NURSING NOTE
Patient is visible on the unit, watching tv, and participating in group. Patient denies current SI/HI/AVH. Patient is calm, cooperative and compliant with medications. No complaints or unmet needs identified at this time. Q 7 minute safety check maintained.

## 2023-09-22 NOTE — SOCIAL WORK
CM reached out to Dorothe Gilmer at [facility] to inquire about wait list progress for [facility]. Awaiting response.

## 2023-09-22 NOTE — PROGRESS NOTES
09/22/23 0837   Team Meeting   Meeting Type Daily Rounds   Team Members Present   Team Members Present Physician;Nurse;   Physician Team Member [id number] [facility] Team Member [facility] Management Team Member Dev   Patient/Family Present   Patient Present No   Patient's Family Present No   Pt screaming in the middle of the night. PRNs given. Pt reports BM last night, none noted. Med/meal compliant. DC pending EAC.

## 2023-09-22 NOTE — PROGRESS NOTES
Progress Note - Behavioral Health   Gokul Gonzalez August 26 y.o. male MRN: [id number]  Unit/Bed#: [facility] 343-01 Encounter: [id number]    Assessment/Plan   Principal Problem:    Schizoaffective disorder ([address])  Active Problems:    Legally blind    Hearing loss    Asthma    Medical clearance for psychiatric admission    Prolonged erection    Intellectual disability    Rib pain on left side      Recommended Treatment:   No psychopharmacological changes indicated at this time, continue with current regimen outlined below:   A) Clozapine 300 mg daily for psychosis    - Clozapine level on 9/11 266    - Last CBC 9/18   B) Depakote 750 mg twice daily for mood stabilization   C) glycopyrrolate 1 mg 3 times daily for sialorrhea   D) Senokot 2 tablets twice a day for bowel regimen: Monitor daily bowel movements    Continue with group therapy, milieu therapy and occupational therapy. Obtain collateral information as indicated  Continue frequent safety checks and vitals per unit protocol. Case discussed with treatment team.  Risks, benefits and possible side effects of Medications: Risks, benefits, and possible side effects of medications have been explained to the patient, who verbalizes understanding      Legal status: 201    Disposition: Pending EAC placement      ~~~~~~~~~~~~~~~~~~~~~~~~~~~~~~~~~~~~~~~~~~    Subjective: Per nursing report, Gokul has been cooperative on the unit and compliant with scheduled medications. Over the past 24 hours, patient endorsed auditory hallucinations and was screaming in his room. Given Zyprexa 5 mg and melatonin which were effective. Patient reports that he did have a bowel movement last night. Gokul was seen and evaluated for continuity of care. He reports his mood is "good" and that he slept well last night. He reports adequate appetite and that he had a bowel movement during the night. Patient was guarded towards this writer and did not provide further information.   However, denied any medication side effects. Otherwise today, Gokul was able to contract for safety on the unit and denies active or passive suicidal or homicidal ideations. Denied any current auditory or visual hallucinations. Progress Toward Goals: slow improvement    Psychiatric Review of Systems:  Behavior over the last 24 hours: unchanged  Sleep: hypersomnia  Appetite: adequate  Medication side effects: none verbalized  ROS: Complete review of systems is negative except as noted above.     Vital signs in last 24 hours:  Temp:  [98.4 °F (36.9 °C)] 98.4 °F (36.9 °C)  HR:  [85] 85  Resp:  [16] 16  BP: (123)/(75) 123/75    Mental Status Exam:  Appearance:  alert, poor eye contact, appears stated age, casually dressed and disheveled, head poking out of covers   Behavior:  calm and cooperative   Motor: no abnormal movements and normal gait and balance   Speech:  soft, scant and coherent   Mood:  "good"   Affect:  blunted   Thought Process:  [place] and poverty of thought   Thought Content: no verbalized delusions or overt paranoia   Perceptual disturbances: no reported hallucinations and does not appear to be responding to internal stimuli at this time   Risk Potential: No active or passive suicidal or homicidal ideation was verbalized during interview   Cognition: oriented to self and situation, appears to be of average intelligence and cognition not formally tested   Insight:  Poor   Judgment: Poor     Current Medications:  Current Facility-Administered Medications   Medication Dose Route Frequency Provider Last Rate   • acetaminophen  650 mg Oral Q6H PRN Lona Magaña MD     • acetaminophen  650 mg Oral Q4H PRN Lona Magaña MD     • acetaminophen  975 mg Oral Q6H PRN Lona Magaña MD     • albuterol  2 puff Inhalation Q4H PRN Lona Magaña MD     • aluminum-magnesium hydroxide-simethicone  30 mL Oral Q4H PRN Lona Magaña MD     • benztropine  1 mg Intramuscular BID PRN Christofer Navarro MD • benztropine  1 mg Oral Q4H PRN Max 6/day Johanny Gibson MD     • cloZAPine  300 mg Oral Daily ELA Villalta     • divalproex sodium  750 mg Oral BID Micah Bowens MD     • fluticasone  1 spray Nasal Daily PRN Jennifer Wong PA-C     • glycopyrrolate  1 mg Oral TID Benjie Welch, DO     • hydrOXYzine HCL  100 mg Oral Q6H PRN Max 4/day Kishor Dorsey DO      Or   • LORazepam  1 mg Intramuscular Q6H PRN Kishor Dorsey, DO     • hydrOXYzine HCL  25 mg Oral Q6H PRN Max 4/day Johanny Gibson MD     • hydrOXYzine HCL  50 mg Oral Q6H PRN Max 4/day Johanny Gibson MD     • melatonin  3 mg Oral HS PRN Kishor Doresy, DO     • metFORMIN  1,000 mg Oral BID With Meals ELA Wise     • nicotine polacrilex  4 mg Oral Q2H PRN Johanny Gibson MD     • OLANZapine  5 mg Oral Q3H PRN Max 6/day Micah Bowens MD      Or   • OLANZapine  5 mg Intramuscular Q3H PRN Max 6/day Micah Bowens MD     • OLANZapine  5 mg Intramuscular Q8H PRN Kishor Dorsey DO      Or   • OLANZapine  7.5 mg Oral Q8H PRN Kishor Dorsey DO     • OLANZapine  2.5 mg Oral Q3H PRN Max 8/day Micah Bowens MD     • ondansetron  4 mg Oral Q8H PRN Kishor Dorsey DO     • polyethylene glycol  17 g Oral Daily PRN Johanny Gibson MD     • pseudoephedrine  120 mg Oral BID PRN Alejandro Mitchell MD     • senna-docusate sodium  1 tablet Oral Daily PRN Johanny Gibson MD     • senna-docusate sodium  2 tablet Oral BID Micah Bowens MD     • sterile water          • sterile water          • sterile water          • sterile water          • sterile water          • sterile water          • sterile water          • sterile water          • sterile water          • sterile water          • sterile water          • sterile water              Behavioral Health Medications: all current active meds have been reviewed. Changes as in plan section above.     Laboratory results:  I have personally reviewed all pertinent laboratory/tests results. No results found for this or any previous visit (from the past 48 hour(s)).      Larita Mortimer, DO  Psychiatry, PGY-1

## 2023-09-23 PROCEDURE — 99232 SBSQ HOSP IP/OBS MODERATE 35: CPT | Performed by: STUDENT IN AN ORGANIZED HEALTH CARE EDUCATION/TRAINING PROGRAM

## 2023-09-23 RX ADMIN — SENNOSIDES AND DOCUSATE SODIUM 2 TABLET: 8.6; 5 TABLET ORAL at 17:06

## 2023-09-23 RX ADMIN — CLOZAPINE 300 MG: 100 TABLET ORAL at 17:06

## 2023-09-23 RX ADMIN — METFORMIN HYDROCHLORIDE 1000 MG: 500 TABLET, FILM COATED ORAL at 10:32

## 2023-09-23 RX ADMIN — GLYCOPYRROLATE 1 MG: 1 TABLET ORAL at 21:07

## 2023-09-23 RX ADMIN — GLYCOPYRROLATE 1 MG: 1 TABLET ORAL at 17:06

## 2023-09-23 RX ADMIN — DIVALPROEX SODIUM 750 MG: 500 TABLET, DELAYED RELEASE ORAL at 10:28

## 2023-09-23 RX ADMIN — SENNOSIDES AND DOCUSATE SODIUM 2 TABLET: 8.6; 5 TABLET ORAL at 10:28

## 2023-09-23 RX ADMIN — METFORMIN HYDROCHLORIDE 1000 MG: 500 TABLET, FILM COATED ORAL at 17:06

## 2023-09-23 RX ADMIN — GLYCOPYRROLATE 1 MG: 1 TABLET ORAL at 10:28

## 2023-09-23 RX ADMIN — DIVALPROEX SODIUM 750 MG: 500 TABLET, DELAYED RELEASE ORAL at 19:06

## 2023-09-23 NOTE — PROGRESS NOTES
Progress Note - [address] 26 y.o. male MRN: [id number]  Unit/Bed#: [facility] 343-01 Encounter: [id number]    All documentation, nursing notes, labs, and vitals reviewed. The patient's medication reconciliation chart was also analyzed for medication adherence. I personally evaluated Vu Guthrie and discussed current care with treatment team. No acute events overnight. Gokul was resting peacefully on approach. He denies new onset concerns. He is compliant with his psychotropic medication regimen. He denies adverse medication side effects. His sleep and appetite are "fine". He describes his mood as "good" today. His energy and motivation are somewhat limited. He denies SI/HI. No problematic anxiety or panic symptoms. During today's examination, Gokul does not exhibit objective evidence of bina/hypomania or psychosis. Gokul subjectively denies A/V hallucinations, paranoia, ideas of reference, or delusional beliefs, although he appears internally preoccupied and is responding at times, as per staff. Gokul offers no further concerns.     Mental Status Evaluation:    Appearance:  disheveled, marginal hygiene, looks older than stated age   Behavior:  cooperative, poor eye contact   Speech:  slow, scant, soft   Mood:  "good"   Affect:  flat   Thought Process:  impaired abstract reasoning, concrete, poverty of thought   Associations: concrete associations   Thought Content:  no overt delusions   Perceptual Disturbances: no auditory hallucinations, no visual hallucinations, appears responding to internal stimuli   Risk Potential: Suicidal ideation - None at present  Homicidal ideation - None at present  Potential for aggression - No   Sensorium:  oriented to person, place and time/date   Memory:  recent and remote memory grossly intact   Consciousness:  alert and awake    Attention: attention span and concentration are age appropriate   Insight:  fair   Judgment: fair   Gait/Station: in bed Motor Activity: no abnormal movements       Assessment:     Principal Problem:    Schizoaffective disorder (HCC)  Active Problems:    Legally blind    Hearing loss    Asthma    Medical clearance for psychiatric admission    Prolonged erection    Intellectual disability    Rib pain on left side      Plan/Recommended Treatment:     - Continue with pharmacotherapy, group therapy, milieu therapy and occupational therapy. - Risks/benefits/alternatives to treatment discussed and Vu Guthrie continues to verbalize understanding   - No psychopharmacologic changes necessary at this juncture, continue scheduled psychotropic agents at current doses (see below)   - Will consider further optimization of psychotropic medication regimen as hospital course progresses   - Continue to assess for adverse medication side effects.  - Medical management as per SLIM recs  - Encourage Gokul Gann to participate in nonverbal forms of therapy including journaling and art/music therapy  - Continue precautionary Q7-minute safety checks. - Continue to engage case management/SW to assist with collateral information, discharge planning, and the implementation of an individualized, patient-centered plan of care.   - The patient will be maintained on the following medications:    Current Facility-Administered Medications   Medication Dose Route Frequency Provider Last Rate   • acetaminophen  650 mg Oral Q6H PRN Rivera Johnson MD     • acetaminophen  650 mg Oral Q4H PRN Rivera Johnson MD     • acetaminophen  975 mg Oral Q6H PRN Rivera Johnson MD     • albuterol  2 puff Inhalation Q4H PRN Rivera Johnson MD     • aluminum-magnesium hydroxide-simethicone  30 mL Oral Q4H PRN Rivera Johnson MD     • benztropine  1 mg Intramuscular BID PRN Jillian Huerta MD     • benztropine  1 mg Oral Q4H PRN Max 6/day Rivera Johnson MD     • cloZAPine  300 mg Oral Daily ELA Serrano     • divalproex sodium  750 mg Oral BID Keisha Rudd MD     • fluticasone  1 spray Nasal Daily PRN Nataliia Navarro PA-C     • glycopyrrolate  1 mg Oral TID Brittany Andres, DO     • hydrOXYzine HCL  100 mg Oral Q6H PRN Max 4/day Mozella Due, DO      Or   • LORazepam  1 mg Intramuscular Q6H PRN Mozella Due, DO     • hydrOXYzine HCL  25 mg Oral Q6H PRN Max 4/day Jany Fraser MD     • hydrOXYzine HCL  50 mg Oral Q6H PRN Max 4/day Jany Fraser MD     • melatonin  3 mg Oral HS PRN Mozella Due, DO     • metFORMIN  1,000 mg Oral BID With Meals ELA Wise     • nicotine polacrilex  4 mg Oral Q2H PRN Jany Fraser MD     • OLANZapine  5 mg Oral Q3H PRN Max 6/day Keisha Rudd MD      Or   • OLANZapine  5 mg Intramuscular Q3H PRN Max 6/day Keisha Rudd MD     • OLANZapine  5 mg Intramuscular Q8H PRN Mozella Due, DO      Or   • OLANZapine  7.5 mg Oral Q8H PRN Mozella Due, DO     • OLANZapine  2.5 mg Oral Q3H PRN Max 8/day Keisha Rudd MD     • ondansetron  4 mg Oral Q8H PRN Mozella Due, DO     • polyethylene glycol  17 g Oral Daily PRN Jany Fraser MD     • pseudoephedrine  120 mg Oral BID PRN Richy Winter MD     • senna-docusate sodium  1 tablet Oral Daily PRN Jany Fraser MD     • senna-docusate sodium  2 tablet Oral BID Keisha Rudd MD     • sterile water          • sterile water          • sterile water          • sterile water          • sterile water          • sterile water          • sterile water          • sterile water          • sterile water          • sterile water          • sterile water          • sterile water

## 2023-09-23 NOTE — PLAN OF CARE
Problem: Ineffective Coping  Goal: Participates in unit activities  Description: Interventions:  - Provide therapeutic environment   - Provide required programming   - Redirect inappropriate behaviors   Outcome: Not Progressing     Problem: SELF HARM/SUICIDALITY  Goal: Will have no self-injury during hospital stay  Description: INTERVENTIONS:  - Q 15 MINUTES: Routine safety checks  - Q WAKING SHIFT & PRN: Assess risk to determine if routine checks are adequate to maintain patient safety  - Encourage patient to participate actively in care by formulating a plan to combat response to suicidal ideation, identify supports and resources  Outcome: Progressing     Problem: ANXIETY  Goal: Will report anxiety at manageable levels  Description: INTERVENTIONS:  - Administer medication as ordered  - Teach and encourage coping skills  - Provide emotional support  - Assess patient/family for anxiety and ability to cope  Outcome: Not Progressing  Goal: By discharge: Patient will verbalize 2 strategies to deal with anxiety  Description: Interventions:  - Identify any obvious source/trigger to anxiety  - Staff will assist patient in applying identified coping technique/skills  - Encourage attendance of scheduled groups and activities  Outcome: Not Progressing     Problem: SUBSTANCE USE/ABUSE  Goal: Will have no detox symptoms and will verbalize plan for changing substance-related behavior  Description: INTERVENTIONS:  - Monitor physical status and assess for symptoms of withdrawal  - Administer medication as ordered  - Provide emotional support with 1 on 1 interaction with staff  - Encourage recovery focused program/ addiction education  - Assess for verbalization of changing behaviors related to substance abuse  - Initiate consults and referrals as appropriate (Case Management, Spiritual Care, etc.)  Outcome: Progressing  Goal: By discharge, will develop insight into their chemical dependency and sustain motivation to continue in recovery  Description: INTERVENTIONS:  - Attends all daily group sessions and scheduled AA groups  - Actively practices coping skills through participation in the therapeutic community and adherence to program rules  - Reviews and completes assignments from individual treatment plan  - Assist patient development of understanding of their personal cycle of addiction and relapse triggers  Outcome: Progressing  Goal: By discharge, patient will have ongoing treatment plan addressing chemical dependency  Description: INTERVENTIONS:  - Assist patient with resources and/or appointments for ongoing recovery based living  Outcome: Progressing

## 2023-09-23 NOTE — NURSING NOTE
Patient is calm and cooperative upon approach. Patient attending morning groups. Patient denies SI/HI/AH/VH. Patient is compliant with meds and meals.

## 2023-09-23 NOTE — NURSING NOTE
Received patient at 1500. The patient is currently isolated in their room, lying in bed with their head covered. When approached, the patient declined to uncover themselves and exhibited loud breathing.  Patient remain Q 7 min check

## 2023-09-24 VITALS
DIASTOLIC BLOOD PRESSURE: 81 MMHG | HEIGHT: 63 IN | SYSTOLIC BLOOD PRESSURE: 123 MMHG | HEART RATE: 101 BPM | OXYGEN SATURATION: 96 % | TEMPERATURE: 98.4 F | RESPIRATION RATE: 16 BRPM | WEIGHT: 200 LBS | BODY MASS INDEX: 35.44 KG/M2

## 2023-09-24 PROCEDURE — 99232 SBSQ HOSP IP/OBS MODERATE 35: CPT | Performed by: STUDENT IN AN ORGANIZED HEALTH CARE EDUCATION/TRAINING PROGRAM

## 2023-09-24 RX ADMIN — GLYCOPYRROLATE 1 MG: 1 TABLET ORAL at 17:38

## 2023-09-24 RX ADMIN — GLYCOPYRROLATE 1 MG: 1 TABLET ORAL at 21:43

## 2023-09-24 RX ADMIN — METFORMIN HYDROCHLORIDE 1000 MG: 500 TABLET, FILM COATED ORAL at 09:52

## 2023-09-24 RX ADMIN — METFORMIN HYDROCHLORIDE 1000 MG: 500 TABLET, FILM COATED ORAL at 17:39

## 2023-09-24 RX ADMIN — SENNOSIDES AND DOCUSATE SODIUM 2 TABLET: 8.6; 5 TABLET ORAL at 09:51

## 2023-09-24 RX ADMIN — MELATONIN TAB 3 MG 3 MG: 3 TAB at 21:43

## 2023-09-24 RX ADMIN — GLYCOPYRROLATE 1 MG: 1 TABLET ORAL at 09:51

## 2023-09-24 RX ADMIN — DIVALPROEX SODIUM 750 MG: 500 TABLET, DELAYED RELEASE ORAL at 09:51

## 2023-09-24 RX ADMIN — CLOZAPINE 300 MG: 100 TABLET ORAL at 17:38

## 2023-09-24 RX ADMIN — SENNOSIDES AND DOCUSATE SODIUM 2 TABLET: 8.6; 5 TABLET ORAL at 17:39

## 2023-09-24 RX ADMIN — DIVALPROEX SODIUM 750 MG: 500 TABLET, DELAYED RELEASE ORAL at 18:28

## 2023-09-24 NOTE — PROGRESS NOTES
Progress Note - [address] 26 y.o. male MRN: [id number]  Unit/Bed#: [facility] 343-01 Encounter: [id number]    All documentation, nursing notes, labs, and vitals reviewed. The patient's medication reconciliation chart was also analyzed for medication adherence. I personally evaluated Jamia Anguiano and discussed current care with treatment team. No acute events overnight. Gokul was resting upon approach. He denies new onset psychiatric concerns. He reports benefit and tolerability associated with his current regimen. Gokul describes his current state of MH as "good" today. He reports adequate sleep last evening and baseline appetite. He denies SI/HI when asked. His energy and motivation remain somewhat erratic. He was pleased to share that he spent some time outside his room yesterday while attending groups. Gokul states that his medications are "all stable". He is without hopelessness or lethality concerns. No pathologic anxiety or worry on examination. At the moment, Gokul does not exhibit objective evidence of bina/hypomania. Gokul denies perceptual disturbances, such as A/V hallucinations, paranoia, ideas of reference, or delusional beliefs. He remains adherent to Clozapine, states last bowel movement was "yesterday". Gokul denies further concerns.     Mental Status Evaluation:    Appearance:  disheveled, marginal hygiene, overweight   Behavior:  cooperative, calm, guarded   Speech:  slow, soft   Mood:  "good"   Affect:  blunted   Thought Process:  concrete, poverty of thought   Associations: concrete associations   Thought Content:  no overt delusions   Perceptual Disturbances: no auditory hallucinations, no visual hallucinations   Risk Potential: Suicidal ideation - None at present  Homicidal ideation - None at present  Potential for aggression - No   Sensorium:  oriented to person, place and time/date   Memory:  recent and remote memory: unable to assess due to lack of cooperation Consciousness:  alert and awake    Attention: attention span and concentration appear shorter than expected for age   Insight:  fair   Judgment: fair   Gait/Station: in bed   Motor Activity: no abnormal movements       Assessment:     Principal Problem:    Schizoaffective disorder (HCC)  Active Problems:    Legally blind    Hearing loss    Asthma    Medical clearance for psychiatric admission    Prolonged erection    Intellectual disability    Rib pain on left side      Plan/Recommended Treatment:     - Continue with pharmacotherapy, group therapy, milieu therapy and occupational therapy. - Risks/benefits/alternatives to treatment discussed and Mere Jarquin continues to verbalize understanding   - No psychopharmacologic changes necessary at this juncture, continue scheduled psychotropic agents at current doses (see below)   - Will consider further optimization of psychotropic medication regimen as hospital course progresses   - Continue to assess for adverse medication side effects.  - Medical management as per SLIM recs  - Encourage Gokul Kim to participate in nonverbal forms of therapy including journaling and art/music therapy  - Continue precautionary Q7-minute safety checks. - Continue to engage case management/SW to assist with collateral information, discharge planning, and the implementation of an individualized, patient-centered plan of care.   - The patient will be maintained on the following medications:    Current Facility-Administered Medications   Medication Dose Route Frequency Provider Last Rate   • acetaminophen  650 mg Oral Q6H PRN Lulu Essex, MD     • acetaminophen  650 mg Oral Q4H PRN Lulu Essex, MD     • acetaminophen  975 mg Oral Q6H PRN Lulu Essex, MD     • albuterol  2 puff Inhalation Q4H PRN Lulu Essex, MD     • aluminum-magnesium hydroxide-simethicone  30 mL Oral Q4H PRN Lulu Essex, MD     • benztropine  1 mg Intramuscular BID PRN James Durand Kalee Rodas MD     • benztropine  1 mg Oral Q4H PRN Max 6/day Aviva Gamez MD     • cloZAPine  300 mg Oral Daily ELA Sims     • divalproex sodium  750 mg Oral BID Saman Foreman MD     • fluticasone  1 spray Nasal Daily PRN Connie Oswald PA-C     • glycopyrrolate  1 mg Oral TID Monica Vo DO     • hydrOXYzine HCL  100 mg Oral Q6H PRN Max 4/day Benson Beth DO      Or   • LORazepam  1 mg Intramuscular Q6H PRN Benson Beth DO     • hydrOXYzine HCL  25 mg Oral Q6H PRN Max 4/day Aviva Gamez MD     • hydrOXYzine HCL  50 mg Oral Q6H PRN Max 4/day Aviva Gamez MD     • melatonin  3 mg Oral HS PRN Benson Beth DO     • metFORMIN  1,000 mg Oral BID With Meals ELA Wise     • nicotine polacrilex  4 mg Oral Q2H PRN Aviva Gamez MD     • OLANZapine  5 mg Oral Q3H PRN Max 6/day Saman Foreman MD      Or   • OLANZapine  5 mg Intramuscular Q3H PRN Max 6/day Saman Foreman MD     • OLANZapine  5 mg Intramuscular Q8H PRN Benson Beth DO      Or   • OLANZapine  7.5 mg Oral Q8H PRN Benson Beth DO     • OLANZapine  2.5 mg Oral Q3H PRN Max 8/day Saman Foreman MD     • ondansetron  4 mg Oral Q8H PRN Benson Beth DO     • polyethylene glycol  17 g Oral Daily PRN Aviva Gamez MD     • pseudoephedrine  120 mg Oral BID PRN Yana Antony MD     • senna-docusate sodium  1 tablet Oral Daily PRN Aviva Gamez MD     • senna-docusate sodium  2 tablet Oral BID Saman Foreman MD     • sterile water          • sterile water          • sterile water          • sterile water          • sterile water          • sterile water          • sterile water          • sterile water          • sterile water          • sterile water          • sterile water          • sterile water

## 2023-09-25 LAB
ATRIAL RATE: 103 BPM
ATRIAL RATE: 99 BPM
BASOPHILS # BLD AUTO: 0.09 THOUSANDS/ÂΜL (ref 0–0.1)
BASOPHILS NFR BLD AUTO: 1 % (ref 0–1)
EOSINOPHIL # BLD AUTO: 0.75 THOUSAND/ÂΜL (ref 0–0.61)
EOSINOPHIL NFR BLD AUTO: 7 % (ref 0–6)
ERYTHROCYTE [DISTWIDTH] IN BLOOD BY AUTOMATED COUNT: 12.2 % (ref 11.6–15.1)
HCT VFR BLD AUTO: 44.3 % (ref 36.5–49.3)
HGB BLD-MCNC: 14.6 G/DL (ref 12–17)
IMM GRANULOCYTES # BLD AUTO: 0.07 THOUSAND/UL (ref 0–0.2)
IMM GRANULOCYTES NFR BLD AUTO: 1 % (ref 0–2)
LYMPHOCYTES # BLD AUTO: 4.62 THOUSANDS/ÂΜL (ref 0.6–4.47)
LYMPHOCYTES NFR BLD AUTO: 45 % (ref 14–44)
MCH RBC QN AUTO: 30.9 PG (ref 26.8–34.3)
MCHC RBC AUTO-ENTMCNC: 33 G/DL (ref 31.4–37.4)
MCV RBC AUTO: 94 FL (ref 82–98)
MONOCYTES # BLD AUTO: 0.92 THOUSAND/ÂΜL (ref 0.17–1.22)
MONOCYTES NFR BLD AUTO: 9 % (ref 4–12)
NEUTROPHILS # BLD AUTO: 3.76 THOUSANDS/ÂΜL (ref 1.85–7.62)
NEUTS SEG NFR BLD AUTO: 37 % (ref 43–75)
NRBC BLD AUTO-RTO: 0 /100 WBCS
P AXIS: 44 DEGREES
P AXIS: 48 DEGREES
PLATELET # BLD AUTO: 243 THOUSANDS/UL (ref 149–390)
PMV BLD AUTO: 11.3 FL (ref 8.9–12.7)
PR INTERVAL: 144 MS
PR INTERVAL: 148 MS
QRS AXIS: 69 DEGREES
QRS AXIS: 73 DEGREES
QRSD INTERVAL: 96 MS
QRSD INTERVAL: 98 MS
QT INTERVAL: 328 MS
QT INTERVAL: 344 MS
QTC INTERVAL: 429 MS
QTC INTERVAL: 441 MS
RBC # BLD AUTO: 4.72 MILLION/UL (ref 3.88–5.62)
T WAVE AXIS: 27 DEGREES
T WAVE AXIS: 30 DEGREES
VENTRICULAR RATE: 103 BPM
VENTRICULAR RATE: 99 BPM
WBC # BLD AUTO: 10.21 THOUSAND/UL (ref 4.31–10.16)

## 2023-09-25 PROCEDURE — 85025 COMPLETE CBC W/AUTO DIFF WBC: CPT

## 2023-09-25 PROCEDURE — 99232 SBSQ HOSP IP/OBS MODERATE 35: CPT | Performed by: PSYCHIATRY & NEUROLOGY

## 2023-09-25 PROCEDURE — 80159 DRUG ASSAY CLOZAPINE: CPT

## 2023-09-25 PROCEDURE — 93010 ELECTROCARDIOGRAM REPORT: CPT | Performed by: STUDENT IN AN ORGANIZED HEALTH CARE EDUCATION/TRAINING PROGRAM

## 2023-09-25 PROCEDURE — 93005 ELECTROCARDIOGRAM TRACING: CPT

## 2023-09-25 RX ADMIN — METFORMIN HYDROCHLORIDE 1000 MG: 500 TABLET, FILM COATED ORAL at 08:23

## 2023-09-25 RX ADMIN — SENNOSIDES AND DOCUSATE SODIUM 2 TABLET: 8.6; 5 TABLET ORAL at 08:23

## 2023-09-25 RX ADMIN — METFORMIN HYDROCHLORIDE 1000 MG: 500 TABLET, FILM COATED ORAL at 17:27

## 2023-09-25 RX ADMIN — GLYCOPYRROLATE 1 MG: 1 TABLET ORAL at 21:16

## 2023-09-25 RX ADMIN — SENNOSIDES AND DOCUSATE SODIUM 2 TABLET: 8.6; 5 TABLET ORAL at 17:27

## 2023-09-25 RX ADMIN — CLOZAPINE 300 MG: 100 TABLET ORAL at 18:25

## 2023-09-25 RX ADMIN — GLYCOPYRROLATE 1 MG: 1 TABLET ORAL at 17:34

## 2023-09-25 RX ADMIN — DIVALPROEX SODIUM 750 MG: 500 TABLET, DELAYED RELEASE ORAL at 08:23

## 2023-09-25 RX ADMIN — GLYCOPYRROLATE 1 MG: 1 TABLET ORAL at 08:23

## 2023-09-25 RX ADMIN — DIVALPROEX SODIUM 750 MG: 500 TABLET, DELAYED RELEASE ORAL at 18:25

## 2023-09-25 NOTE — NURSING NOTE
Received patient at 1500. Patient isolated to his room during the evening. The patient agreed to have his blood work done. A blood sample was sent to the lab before administering the schedule Clozaril to the patient . Patient denies any unmeet needs. Patient remain Q 7 min check.

## 2023-09-25 NOTE — PROGRESS NOTES
09/25/23 0843   Team Meeting   Meeting Type Daily Rounds   Team Members Present   Team Members Present Physician;Nurse;   [id number] Member Dev   Patient/Family Present   Patient Present No   Patient's Family Present No   Calm, cooperative, attended a group over the weekend. Largely seclusive to room. PRN melatonin for sleep. Med/meal compliant. DC pending EAC.

## 2023-09-25 NOTE — NURSING NOTE
Patient has been in the his room all evening and although he was under the bed linens he immediately responded to his name being called. He was cooperative with HS scheduled medication and prn Melatonin at 2143 and he is currently asleep.

## 2023-09-25 NOTE — PROGRESS NOTES
Progress Note - Behavioral Health   Gokul Hawkins 26 y.o. male MRN: [id number]  Unit/Bed#: [facility] 343-01 Encounter: [id number]    Assessment/Plan   Principal Problem:    Schizoaffective disorder ([address])  Active Problems:    Legally blind    Hearing loss    Asthma    Medical clearance for psychiatric admission    Prolonged erection    Intellectual disability    Rib pain on left side      Recommended Treatment:   -Continue Clozapine 300 mg daily for psychosis  -Clozapine cardiac monitoring -- Cardiac labs, EKG, and clozapine levels for this evening ordered   -Continue Depakote 750 mg BID for mood stabilization  -glycopyrrolate 1 mg TID for sialorrhea  -Senokot 2 tablets BID  -Voluntary 201 inpatient status      Continue with group therapy, milieu therapy and occupational therapy. Continue frequent safety checks and vitals per unit protocol. Case discussed with treatment team.  Risks, benefits and possible side effects of Medications: Risks, benefits, and possible side effects of medications have been explained to the patient, who verbalizes understanding    ------------------------------------------------------------    Subjective: Per nursing report, Gokul has been calm and cooperative on the unit, compliant with scheduled medications, and seclusive to his room. No acute events have been reported overnight     Today, Gokul is consenting for safety on the unit. On exam, patient is guarded and scant, but calm, cooperative, and pleasant. Patient reports his mood as "alright" and displays a blunted affect. Patient denies any issues with sleep or appetite. Patient reports that his last bowel movement was yesterday evening. Denies any chest pain, arrhythmia, constipation, diarrhea, dizziness, or SOB. Denies any issues with current medication regimen, and denies any side effects. Reports that he has been attending groups and learning different coping techniques.  Says he spent his day yesterday watching TV and talking amongst his peers. Denies any SI, HI, or AVH. Patient with no complaints or concerns and is awaiting EAC placement. Progress Toward Goals: slow improvement    Psychiatric Review of Systems:  Behavior over the last 24 hours: unchanged  Sleep: normal  Appetite: adequate  Medication side effects: none verbalized  ROS: Complete review of systems is negative except as noted above.     Vital signs in last 24 hours:  Temp:  [97.1 °F (36.2 °C)] 97.1 °F (36.2 °C)  HR:  [98] 98  Resp:  [16] 16  BP: (126)/(72) 126/72     Mental Status Exam:  Appearance:  casually dressed, disheveled and appears older than stated age,    Behavior:  calm, cooperative and guarded   Motor: no abnormal movements and normal gait and balance   Speech:  delayed initiation, soft and scant   Mood:  "Alright"   Affect:  blunted   Thought Process:  poverty of thought, concrete   Thought Content: no verbalized delusions or overt paranoia   Perceptual disturbances: no reported hallucinations and does not appear to be responding to internal stimuli at this time   Risk Potential: No active or passive suicidal or homicidal ideation was verbalized during interview   Cognition: oriented to self and situation, appears to be of average intelligence and cognition not formally tested   Insight:  Poor   Judgment: Poor     Current Medications:  Current Facility-Administered Medications   Medication Dose Route Frequency Provider Last Rate   • acetaminophen  650 mg Oral Q6H PRN Janneth Mcgraw MD     • acetaminophen  650 mg Oral Q4H PRN Janneth Mcgraw MD     • acetaminophen  975 mg Oral Q6H PRN Janneth Mcgraw MD     • albuterol  2 puff Inhalation Q4H PRN Janneth Mcgraw MD     • aluminum-magnesium hydroxide-simethicone  30 mL Oral Q4H PRN Janneth Mcgraw MD     • benztropine  1 mg Intramuscular BID PRN Trellis Merlin, MD     • benztropine  1 mg Oral Q4H PRN Max 6/day Janneth Mcgraw MD     • cloZAPine  300 mg Oral Daily ELA Grace • divalproex sodium  750 mg Oral BID Juan Ferguson MD     • fluticasone  1 spray Nasal Daily PRN Teofilo Barber PA-C     • glycopyrrolate  1 mg Oral TID Isidro Roman DO     • hydrOXYzine HCL  100 mg Oral Q6H PRN Max 4/day Robson Ike, DO      Or   • LORazepam  1 mg Intramuscular Q6H PRN Robson Ike, DO     • hydrOXYzine HCL  25 mg Oral Q6H PRN Max 4/day Lulu Essex, MD     • hydrOXYzine HCL  50 mg Oral Q6H PRN Max 4/day Lulu Essex, MD     • melatonin  3 mg Oral HS PRN Robson Ike, DO     • metFORMIN  1,000 mg Oral BID With Meals ELA Wise     • nicotine polacrilex  4 mg Oral Q2H PRN Lulu Essex, MD     • OLANZapine  5 mg Oral Q3H PRN Max 6/day Juan Ferguson MD      Or   • OLANZapine  5 mg Intramuscular Q3H PRN Max 6/day Juan Ferguson MD     • OLANZapine  5 mg Intramuscular Q8H PRN Robson Ike, DO      Or   • OLANZapine  7.5 mg Oral Q8H PRN Robson Ike, DO     • OLANZapine  2.5 mg Oral Q3H PRN Max 8/day Juan Ferguson MD     • ondansetron  4 mg Oral Q8H PRN Robson Ike, DO     • polyethylene glycol  17 g Oral Daily PRN Lulu Essex, MD     • pseudoephedrine  120 mg Oral BID PRN Haylie Murphy MD     • senna-docusate sodium  1 tablet Oral Daily PRN Lulu Essex, MD     • senna-docusate sodium  2 tablet Oral BID Juan Ferguson MD     • sterile water          • sterile water          • sterile water          • sterile water          • sterile water          • sterile water          • sterile water          • sterile water          • sterile water          • sterile water          • sterile water          • sterile water              Behavioral Health Medications: all current active meds have been reviewed. Changes as in plan section above. Laboratory results:  I have personally reviewed all pertinent laboratory/tests results.   Recent Results (from the past 48 hour(s))   CBC and differential    Collection Time: 09/25/23  5:56 AM   Result Value Ref Range    WBC 10.21 (H) 4.31 - 10.16 Thousand/uL    RBC 4.72 3.88 - 5.62 Million/uL    Hemoglobin 14.6 12.0 - 17.0 g/dL    Hematocrit 44.3 36.5 - 49.3 %    MCV 94 82 - 98 fL    MCH 30.9 26.8 - 34.3 pg    MCHC 33.0 31.4 - 37.4 g/dL    RDW 12.2 11.6 - 15.1 %    MPV 11.3 8.9 - 12.7 fL    Platelets 042 598 - 015 Thousands/uL    nRBC 0 /100 WBCs    Neutrophils Relative 37 (L) 43 - 75 %    Immat GRANS % 1 0 - 2 %    Lymphocytes Relative 45 (H) 14 - 44 %    Monocytes Relative 9 4 - 12 %    Eosinophils Relative 7 (H) 0 - 6 %    Basophils Relative 1 0 - 1 %    Neutrophils Absolute 3.76 1.85 - 7.62 Thousands/µL    Immature Grans Absolute 0.07 0.00 - 0.20 Thousand/uL    Lymphocytes Absolute 4.62 (H) 0.60 - 4.47 Thousands/µL    Monocytes Absolute 0.92 0.17 - 1.22 Thousand/µL    Eosinophils Absolute 0.75 (H) 0.00 - 0.61 Thousand/µL    Basophils Absolute 0.09 0.00 - 0.10 Thousands/µL        Birdie Councilman, MD  PGY-I

## 2023-09-25 NOTE — NURSING NOTE
Patient visible at the start of the shift. Compliant with medications and eating breakfast. Became seclusive to room after eating. Currently denying AH/VH. Waiting EAC placement.

## 2023-09-26 LAB — BNP SERPL-MCNC: 5 PG/ML (ref 0–100)

## 2023-09-26 PROCEDURE — 99232 SBSQ HOSP IP/OBS MODERATE 35: CPT | Performed by: PSYCHIATRY & NEUROLOGY

## 2023-09-26 PROCEDURE — 83880 ASSAY OF NATRIURETIC PEPTIDE: CPT

## 2023-09-26 RX ADMIN — GLYCOPYRROLATE 1 MG: 1 TABLET ORAL at 15:52

## 2023-09-26 RX ADMIN — METFORMIN HYDROCHLORIDE 1000 MG: 500 TABLET, FILM COATED ORAL at 15:52

## 2023-09-26 RX ADMIN — SENNOSIDES AND DOCUSATE SODIUM 2 TABLET: 8.6; 5 TABLET ORAL at 09:59

## 2023-09-26 RX ADMIN — DIVALPROEX SODIUM 750 MG: 500 TABLET, DELAYED RELEASE ORAL at 09:59

## 2023-09-26 RX ADMIN — METFORMIN HYDROCHLORIDE 1000 MG: 500 TABLET, FILM COATED ORAL at 09:58

## 2023-09-26 RX ADMIN — DIVALPROEX SODIUM 750 MG: 500 TABLET, DELAYED RELEASE ORAL at 19:01

## 2023-09-26 RX ADMIN — GLYCOPYRROLATE 1 MG: 1 TABLET ORAL at 21:12

## 2023-09-26 RX ADMIN — CLOZAPINE 300 MG: 100 TABLET ORAL at 17:02

## 2023-09-26 RX ADMIN — SENNOSIDES AND DOCUSATE SODIUM 2 TABLET: 8.6; 5 TABLET ORAL at 17:03

## 2023-09-26 RX ADMIN — GLYCOPYRROLATE 1 MG: 1 TABLET ORAL at 09:59

## 2023-09-26 NOTE — NURSING NOTE
Patient has been seclusive to his room. In bed sleeping soundly. Only awake for morning medications Denying symptoms.

## 2023-09-26 NOTE — PROGRESS NOTES
09/26/23 0833   Team Meeting   Meeting Type Daily Rounds   Team Members Present   Team Members Present Physician;Nurse;   Physician Team Member [facility] Team Member [facility] Team Member Dev   Patient/Family Present   Patient Present No   Patient's Family Present No   Responding to internal stim. Med/meal compliant. DC pending EAC. Blood work still needs to be completed. Clozaril level completed last night-in process.

## 2023-09-26 NOTE — PROGRESS NOTES
Progress Note - Behavioral Health   Gokul [facility] 26 y.o. male MRN: [id number]  Unit/Bed#: [facility] 343-01 Encounter: [id number]    Assessment/Plan   Principal Problem:    Schizoaffective disorder ([address])  Active Problems:    Legally blind    Hearing loss    Asthma    Medical clearance for psychiatric admission    Prolonged erection    Intellectual disability    Rib pain on left side      • Continue medications as noted below. • Follow-up labs: Troponin 1, A1c, CMP, CK, CRP, BNP  • Follow-up Clozapine level which was drawn yesterday  • Continue to promote patient participation in group therapy, milieu therapy, occupational therapy  • Continue medical management by primary team.  • Discharge disposition:  Discharge pending EAC    Subjective: The patient was evaluated this morning for continuity of care and no acute distress noted throughout the evaluation. Over the past 24 hours staff noted patient has been withdrawn, isolated to his room. Patient has been medication adherent. Today on evaluation, Gokul reports that he feels "stable" in terms of his mood. Denies any acute issues at this time. Patient notes that he has slept well overnight, though feels tired at this time in the morning. Presents with blunted/flat affect and delayed. In terms of safety, Gokul Denies SI/HI. Gokul Denies auditory, visual hallucinations, though stares at this writer.      Psychiatric Review of Systems:  Behavior over the last 24 hours:  unchanged  Sleep: normal  Appetite: normal  Medication side effects: No   ROS: no complaints, all other systems are negative    Current Medications:  Current Facility-Administered Medications   Medication Dose Route Frequency Provider Last Rate   • acetaminophen  650 mg Oral Q6H PRN Janneth Mcgraw MD     • acetaminophen  650 mg Oral Q4H PRN Janneth Mcgraw MD     • acetaminophen  975 mg Oral Q6H PRN Janneth Mcgraw MD     • albuterol  2 puff Inhalation Q4H PRN Janneth Mcgraw MD     • aluminum-magnesium hydroxide-simethicone  30 mL Oral Q4H PRN Brian Yeung MD     • benztropine  1 mg Intramuscular BID PRN Adam Vasques MD     • benztropine  1 mg Oral Q4H PRN Max 6/day Brian Yeung MD     • cloZAPine  300 mg Oral Daily ELA Alonso     • divalproex sodium  750 mg Oral BID Tone Jones MD     • fluticasone  1 spray Nasal Daily PRN Madeline Walters PA-C     • glycopyrrolate  1 mg Oral TID Katya Boyle, DO     • hydrOXYzine HCL  100 mg Oral Q6H PRN Max 4/day Jason Ca DO      Or   • LORazepam  1 mg Intramuscular Q6H PRN Jason Ca DO     • hydrOXYzine HCL  25 mg Oral Q6H PRN Max 4/day Brian Yeung MD     • hydrOXYzine HCL  50 mg Oral Q6H PRN Max 4/day Brian Yeung MD     • melatonin  3 mg Oral HS PRN Jason Ca DO     • metFORMIN  1,000 mg Oral BID With Meals ELA Wise     • nicotine polacrilex  4 mg Oral Q2H PRN Brian Yeung MD     • OLANZapine  5 mg Oral Q3H PRN Max 6/day Tone Jones MD      Or   • OLANZapine  5 mg Intramuscular Q3H PRN Max 6/day Tone Jones MD     • OLANZapine  5 mg Intramuscular Q8H PRN Jason Ca DO      Or   • OLANZapine  7.5 mg Oral Q8H PRN Jason Ca DO     • OLANZapine  2.5 mg Oral Q3H PRN Max 8/day Tone Jones MD     • ondansetron  4 mg Oral Q8H PRN Jason Ca DO     • polyethylene glycol  17 g Oral Daily PRN Brian Yeung MD     • pseudoephedrine  120 mg Oral BID PRN Krista Crowley MD     • senna-docusate sodium  1 tablet Oral Daily PRN Brian Yeung MD     • senna-docusate sodium  2 tablet Oral BID Tone Jones MD     • sterile water          • sterile water          • sterile water          • sterile water          • sterile water          • sterile water          • sterile water          • sterile water          • sterile water          • sterile water          • sterile water          • sterile water              Behavioral Health Medications: all current active meds have been reviewed and continue current psychiatric medications. Vital signs in last 24 hours:       Laboratory results:    I have personally reviewed all pertinent laboratory/tests results.   Most Recent Labs:   Lab Results   Component Value Date    WBC 10.21 (H) 09/25/2023    RBC 4.72 09/25/2023    HGB 14.6 09/25/2023    HCT 44.3 09/25/2023     09/25/2023    RDW 12.2 09/25/2023    TOTANEUTABS 4.84 06/08/2023    NEUTROABS 3.76 09/25/2023    SODIUM 134 (L) 08/20/2023    K 5.1 08/20/2023    CL 98 08/20/2023    CO2 31 08/20/2023    BUN 16 08/20/2023    CREATININE 1.34 (H) 08/20/2023    GLUC 262 (H) 08/20/2023    GLUF 120 (H) 08/04/2023    CALCIUM 9.3 08/20/2023    AST 16 07/04/2023    ALT 7 07/04/2023    ALKPHOS 42 07/04/2023    TP 6.8 09/17/2023    ALB 3.9 07/04/2023    TBILI 0.45 07/04/2023    CHOLESTEROL 167 09/02/2023    HDL 53 09/02/2023    TRIG 113 09/02/2023    LDLCALC 91 09/02/2023    NONHDLC 114 09/02/2023    VALPROICTOT 91 06/12/2023    AMMONIA 22 10/09/2022    ECP3SLHGYNXV 1.841 05/19/2023    FREET4 1.16 10/09/2022    HGBA1C 6.8 (H) 08/20/2023     08/20/2023         Mental Status Evaluation:    Appearance:  disheveled, marginal hygiene   Behavior:  cooperative, calm, intermittent eye contact   Speech:  delayed, soft   Mood:  " Stable"   Affect:  blunted   Thought Process:  decreased rate of thoughts, poverty of thought   Associations: intact associations   Thought Content:  no overt delusions   Perceptual Disturbances: no auditory hallucinations, no visual hallucinations, does not appear responding to internal stimuli   Risk Potential: Suicidal ideation - None  Homicidal ideation - None  Potential for aggression - No   Sensorium:  oriented to person, place and time/date   Memory:  recent and remote memory grossly intact   Consciousness:  alert and awake   Attention/Concentration: attention span and concentration appear shorter than expected for age Insight:  poor   Judgment: poor   Gait/Station: normal gait/station   Motor Activity: no abnormal movements     Progress Toward Goals: Slow progress    Recommended Treatment:   See above for assessment and plan. Risks, benefits and possible side effects of Medications:   Risks, benefits, and possible side effects of medications explained to patient and patient verbalizes understanding. Treatment discussed with nursing staff. This note has been constructed using a voice recognition system. There may be translation, syntax, or grammatical errors. If you have any questions, please contact the dictating provider.     Alex Morales MD   Resident, PGY 4

## 2023-09-26 NOTE — NURSING NOTE
Patient out of room for vitals and meals, compliant with medications. Currently in small television room . Offers no complaint of pain or discomfort, denies SI/HI/AH/VH at this time.

## 2023-09-27 LAB
ALBUMIN SERPL BCP-MCNC: 4.2 G/DL (ref 3.5–5)
ALP SERPL-CCNC: 53 U/L (ref 34–104)
ALT SERPL W P-5'-P-CCNC: 23 U/L (ref 7–52)
ANION GAP SERPL CALCULATED.3IONS-SCNC: 9 MMOL/L
AST SERPL W P-5'-P-CCNC: 25 U/L (ref 13–39)
BILIRUB SERPL-MCNC: 0.36 MG/DL (ref 0.2–1)
BUN SERPL-MCNC: 14 MG/DL (ref 5–25)
CALCIUM SERPL-MCNC: 9.4 MG/DL (ref 8.4–10.2)
CARDIAC TROPONIN I PNL SERPL HS: 3 NG/L (ref 8–18)
CHLORIDE SERPL-SCNC: 100 MMOL/L (ref 96–108)
CK SERPL-CCNC: 406 U/L (ref 39–308)
CO2 SERPL-SCNC: 30 MMOL/L (ref 21–32)
CREAT SERPL-MCNC: 1.23 MG/DL (ref 0.6–1.3)
CRP SERPL QL: 4.5 MG/L
EST. AVERAGE GLUCOSE BLD GHB EST-MCNC: 134 MG/DL
GFR SERPL CREATININE-BSD FRML MDRD: 82 ML/MIN/1.73SQ M
GLUCOSE P FAST SERPL-MCNC: 88 MG/DL (ref 65–99)
GLUCOSE SERPL-MCNC: 88 MG/DL (ref 65–140)
HBA1C MFR BLD: 6.3 %
POTASSIUM SERPL-SCNC: 4 MMOL/L (ref 3.5–5.3)
PROT SERPL-MCNC: 7.4 G/DL (ref 6.4–8.4)
SODIUM SERPL-SCNC: 139 MMOL/L (ref 135–147)

## 2023-09-27 PROCEDURE — 99232 SBSQ HOSP IP/OBS MODERATE 35: CPT | Performed by: STUDENT IN AN ORGANIZED HEALTH CARE EDUCATION/TRAINING PROGRAM

## 2023-09-27 PROCEDURE — 84484 ASSAY OF TROPONIN QUANT: CPT

## 2023-09-27 PROCEDURE — 83036 HEMOGLOBIN GLYCOSYLATED A1C: CPT

## 2023-09-27 PROCEDURE — 80053 COMPREHEN METABOLIC PANEL: CPT

## 2023-09-27 PROCEDURE — 82550 ASSAY OF CK (CPK): CPT

## 2023-09-27 PROCEDURE — 86140 C-REACTIVE PROTEIN: CPT

## 2023-09-27 RX ADMIN — GLYCOPYRROLATE 1 MG: 1 TABLET ORAL at 17:06

## 2023-09-27 RX ADMIN — SENNOSIDES AND DOCUSATE SODIUM 2 TABLET: 8.6; 5 TABLET ORAL at 18:39

## 2023-09-27 RX ADMIN — GLYCOPYRROLATE 1 MG: 1 TABLET ORAL at 21:32

## 2023-09-27 RX ADMIN — CLOZAPINE 300 MG: 100 TABLET ORAL at 18:39

## 2023-09-27 RX ADMIN — METFORMIN HYDROCHLORIDE 1000 MG: 500 TABLET, FILM COATED ORAL at 17:06

## 2023-09-27 RX ADMIN — SENNOSIDES AND DOCUSATE SODIUM 2 TABLET: 8.6; 5 TABLET ORAL at 08:20

## 2023-09-27 RX ADMIN — DIVALPROEX SODIUM 750 MG: 500 TABLET, DELAYED RELEASE ORAL at 18:39

## 2023-09-27 RX ADMIN — METFORMIN HYDROCHLORIDE 1000 MG: 500 TABLET, FILM COATED ORAL at 08:20

## 2023-09-27 RX ADMIN — GLYCOPYRROLATE 1 MG: 1 TABLET ORAL at 08:20

## 2023-09-27 RX ADMIN — DIVALPROEX SODIUM 750 MG: 500 TABLET, DELAYED RELEASE ORAL at 08:20

## 2023-09-27 RX ADMIN — ACETAMINOPHEN 650 MG: 325 TABLET ORAL at 07:05

## 2023-09-27 NOTE — PLAN OF CARE
CM spoke with pt's grandmother. Grandmother not pleased with his stay and reports he is not participating. CM explained participation is largely the patient's responsibility. Grandmother reports she does not believe pt understands the dc plan for EAC. CM explained pt has been educated on numerous accounts. CM explained pt will be required to participate regularly while at [facility]. Grandmother displayed frustration with no resolve upon reasoning and education on EAC. Grandmother has scheduled visit with pt on 10/4 at 1300 and would like CM to attend part of the visit.

## 2023-09-27 NOTE — NURSING NOTE
Patient has been seclusive to his room. Sleeping in bed. Only out for breakfast; declined lunch. Compliant with morning medications. Denies symptoms. Waiting placement at [facility].

## 2023-09-27 NOTE — NURSING NOTE
Patient remains in room sleeping , encourage to get up for meals,vitals and shower patient refused. Compliant with medications. Will attempt again  at a later time. Denies SI/HI/AH/VH at this time.

## 2023-09-27 NOTE — PROGRESS NOTES
Progress Note - Behavioral Health   Gokul Muhammad 26 y.o. male MRN: [id number]  Unit/Bed#: [facility] 343-01 Encounter: [id number]    Assessment/Plan   Principal Problem:    Schizoaffective disorder ([address])  Active Problems:    Legally blind    Hearing loss    Asthma    Medical clearance for psychiatric admission    Prolonged erection    Intellectual disability    Rib pain on left side      • Continue medications as noted below. • F/u pending A1C, Clozapine level   • Continue to monitor for clinical signs of clozapine side effect  • Continue to promote patient participation in group therapy, milieu therapy, occupational therapy  • Continue medical management by primary team.  • Discharge disposition:  pending EAC placement    Subjective: The patient was evaluated this morning for continuity of care and no acute distress noted throughout the evaluation. Over the past 24 hours staff noted that patient has been seen out of his room at times, for vital checks and mealtimes. Mostly seclusive to room, resting or sleeping. Denies acute issues. Patient has been medication adherent. Today on evaluation, Gokul notes that overall he feels "ok" in terms of his mood and in terms of his overall physical state. He denies acute depressive symptoms and uncontrolled anxiety, though appears withdrawn guarded, with psychomotor slowing. In terms of safety, Gokul Denies SI/HI. Tierraer Denies auditory, visual hallucinations. Reports feeling safe on the unit, and does not exhibit overt delusions. Discussed labs with patient. He denies chest pain, shortness of breath, palpitations, malaise, fevers, chills, diaphoresis, sore throat, runny nose, cough, dizziness, pain anywhere.     Psychiatric Review of Systems:  Behavior over the last 24 hours:  unchanged  Sleep: normal  Appetite: normal  Medication side effects: No   ROS: no complaints, all other systems are negative    Current Medications:  Current Facility-Administered Medications   Medication Dose Route Frequency Provider Last Rate   • acetaminophen  650 mg Oral Q6H PRN Baron Corina MD     • acetaminophen  650 mg Oral Q4H PRN Baron Corina MD     • acetaminophen  975 mg Oral Q6H PRN Baron Corina MD     • albuterol  2 puff Inhalation Q4H PRN Baron Corina MD     • aluminum-magnesium hydroxide-simethicone  30 mL Oral Q4H PRN Baron Corina MD     • benztropine  1 mg Intramuscular BID PRN Marylou Villatoro MD     • benztropine  1 mg Oral Q4H PRN Max 6/day Baron Corina MD     • cloZAPine  300 mg Oral Daily ELA Cody     • divalproex sodium  750 mg Oral BID Mary Ann Jarrell MD     • fluticasone  1 spray Nasal Daily PRN Michaela Burnett PA-C     • glycopyrrolate  1 mg Oral TID Tawanda Shahid, DO     • hydrOXYzine HCL  100 mg Oral Q6H PRN Max 4/day Tollie March, DO      Or   • LORazepam  1 mg Intramuscular Q6H PRN Tollie March, DO     • hydrOXYzine HCL  25 mg Oral Q6H PRN Max 4/day Baron Corina MD     • hydrOXYzine HCL  50 mg Oral Q6H PRN Max 4/day Baron Corina MD     • melatonin  3 mg Oral HS PRN Tollie March, DO     • metFORMIN  1,000 mg Oral BID With Meals ELA Wise     • nicotine polacrilex  4 mg Oral Q2H PRN Baron Corina MD     • OLANZapine  5 mg Oral Q3H PRN Max 6/day Mary Ann Jarrell MD      Or   • OLANZapine  5 mg Intramuscular Q3H PRN Max 6/day Mary Ann Jarrell MD     • OLANZapine  5 mg Intramuscular Q8H PRN Tollie March, DO      Or   • OLANZapine  7.5 mg Oral Q8H PRN Tollie March, DO     • OLANZapine  2.5 mg Oral Q3H PRN Max 8/day Mary Ann Jarrell MD     • ondansetron  4 mg Oral Q8H PRN Tollie March, DO     • polyethylene glycol  17 g Oral Daily PRN Baron Corina MD     • pseudoephedrine  120 mg Oral BID PRN Nimesh Johnson MD     • senna-docusate sodium  1 tablet Oral Daily PRN Baron Corina MD     • senna-docusate sodium  2 tablet Oral BID Mary Ann Jarrell MD     • sterile water          • sterile water          • sterile water          • sterile water          • sterile water          • sterile water          • sterile water          • sterile water          • sterile water          • sterile water          • sterile water          • sterile water              Behavioral Health Medications: all current active meds have been reviewed and continue current psychiatric medications. Vital signs in last 24 hours:  Temp:  [97.1 °F (36.2 °C)] 97.1 °F (36.2 °C)  HR:  [110] 110  Resp:  [16] 16  BP: (125)/(75) 125/75    Laboratory results:    I have personally reviewed all pertinent laboratory/tests results. Most Recent Labs:   Lab Results   Component Value Date    WBC 10.21 (H) 09/25/2023    RBC 4.72 09/25/2023    HGB 14.6 09/25/2023    HCT 44.3 09/25/2023     09/25/2023    RDW 12.2 09/25/2023    TOTANEUTABS 4.84 06/08/2023    NEUTROABS 3.76 09/25/2023    SODIUM 139 09/27/2023    K 4.0 09/27/2023     09/27/2023    CO2 30 09/27/2023    BUN 14 09/27/2023    CREATININE 1.23 09/27/2023    GLUC 88 09/27/2023    GLUF 88 09/27/2023    CALCIUM 9.4 09/27/2023    AST 25 09/27/2023    ALT 23 09/27/2023    ALKPHOS 53 09/27/2023    TP 7.4 09/27/2023    ALB 4.2 09/27/2023    TBILI 0.36 09/27/2023    CHOLESTEROL 167 09/02/2023    HDL 53 09/02/2023    TRIG 113 09/02/2023    LDLCALC 91 09/02/2023    NONHDLC 114 09/02/2023    VALPROICTOT 91 06/12/2023    AMMONIA 22 10/09/2022    [id number] 1.841 05/19/2023    FREET4 1.16 10/09/2022    HGBA1C 6.8 (H) 08/20/2023     08/20/2023     Most recent ECG-12 reviewed.      Mental Status Evaluation:    Appearance:  disheveled, marginal hygiene, dressed in hospital attire   Behavior:  calm, guarded   Speech:  delayed, soft   Mood:  "ok"   Affect:  blunted   Thought Process:  goal directed, decreased rate of thoughts   Associations: intact associations   Thought Content:  no overt delusions   Perceptual Disturbances: no auditory hallucinations, no visual hallucinations, does not appear responding to internal stimuli   Risk Potential: Suicidal ideation - None  Homicidal ideation - None  Potential for aggression - No   Sensorium:  oriented to person, place and time/date   Memory:  recent and remote memory grossly intact   Consciousness:  alert and awake   Attention/Concentration: attention span and concentration appear shorter than expected for age   Insight:  poor   Judgment: poor   Gait/Station: normal gait/station   Motor Activity: no abnormal movements     Progress Toward Goals: slow progress    Recommended Treatment:   See above for assessment and plan. Risks, benefits and possible side effects of Medications:   Risks, benefits, and possible side effects of medications explained to patient and patient verbalizes understanding. Treatment discussed with nursing staff. This note has been constructed using a voice recognition system. There may be translation, syntax, or grammatical errors. If you have any questions, please contact the dictating provider.     Vanesa Pink MD  Resident, PGY-4

## 2023-09-27 NOTE — PROGRESS NOTES
09/27/23 0828   Team Meeting   Meeting Type Daily Rounds   Team Members Present   Team Members Present Physician;Nurse;   Physician Team Member [id number] [facility] Dev   Patient/Family Present   Patient Present No   Patient's Family Present No   Clozapine and A1C pending. Some other labs elevated. No complaints. Intermittently visible last night. DC pending EAC.

## 2023-09-28 LAB
CLOZAPINE SERPL-MCNC: 305 NG/ML (ref 350–600)
CLOZAPINE+NOR SERPL-MCNC: 392 NG/ML
NORCLOZAPINE SERPL-MCNC: 87 NG/ML

## 2023-09-28 PROCEDURE — 99232 SBSQ HOSP IP/OBS MODERATE 35: CPT | Performed by: STUDENT IN AN ORGANIZED HEALTH CARE EDUCATION/TRAINING PROGRAM

## 2023-09-28 RX ADMIN — CLOZAPINE 350 MG: 100 TABLET ORAL at 17:25

## 2023-09-28 RX ADMIN — DIVALPROEX SODIUM 750 MG: 500 TABLET, DELAYED RELEASE ORAL at 18:08

## 2023-09-28 RX ADMIN — GLYCOPYRROLATE 1 MG: 1 TABLET ORAL at 08:16

## 2023-09-28 RX ADMIN — ACETAMINOPHEN 975 MG: 325 TABLET ORAL at 20:37

## 2023-09-28 RX ADMIN — METFORMIN HYDROCHLORIDE 1000 MG: 500 TABLET, FILM COATED ORAL at 17:25

## 2023-09-28 RX ADMIN — METFORMIN HYDROCHLORIDE 1000 MG: 500 TABLET, FILM COATED ORAL at 08:16

## 2023-09-28 RX ADMIN — GLYCOPYRROLATE 1 MG: 1 TABLET ORAL at 17:25

## 2023-09-28 RX ADMIN — GLYCOPYRROLATE 1 MG: 1 TABLET ORAL at 21:10

## 2023-09-28 RX ADMIN — SENNOSIDES AND DOCUSATE SODIUM 2 TABLET: 8.6; 5 TABLET ORAL at 08:16

## 2023-09-28 RX ADMIN — ALUMINUM HYDROXIDE, MAGNESIUM HYDROXIDE, AND DIMETHICONE 30 ML: 200; 20; 200 SUSPENSION ORAL at 20:33

## 2023-09-28 RX ADMIN — DIVALPROEX SODIUM 750 MG: 500 TABLET, DELAYED RELEASE ORAL at 08:16

## 2023-09-28 RX ADMIN — SENNOSIDES AND DOCUSATE SODIUM 2 TABLET: 8.6; 5 TABLET ORAL at 17:25

## 2023-09-28 NOTE — PROGRESS NOTES
09/28/23 0827   Team Meeting   Meeting Type Daily Rounds   Team Members Present   Team Members Present Physician;Nurse;   Physician Team Member [id number] [facility] Member Dev   Patient/Family Present   Patient Present No   Patient's Family Present No   Seclusive to room. Pt needs to be encouraged to shower, pt malodorous. Med/meal compliant. DC pending EAC.

## 2023-09-28 NOTE — NURSING NOTE
Patient was received at 1500. Patient isolated to his room during the evening . Deniesany unmeet needs .  Patient remain Q 7 min check

## 2023-09-28 NOTE — PROGRESS NOTES
Progress Note - Behavioral Health   Dyphier Sherell Baumgarten 26 y.o. male MRN: [id number]  Unit/Bed#: [facility] 343-01 Encounter: [id number]    Assessment/Plan   Principal Problem:    Schizoaffective disorder ([address])  Active Problems:    Legally blind    Hearing loss    Asthma    Medical clearance for psychiatric admission    Prolonged erection    Intellectual disability    Rib pain on left side    Clozapine level of 305    • Increase Clozapine from 300 mg to 350 mg daily in the evening  • Continue other medications as noted below. • Continue to promote patient participation in group therapy, milieu therapy, occupational therapy  • Continue medical management by primary team.  • Discharge disposition:  pending EAC placement    Subjective: The patient was evaluated this morning for continuity of care and no acute distress noted throughout the evaluation. Over the past 24 hours staff noted that patient remained seclusive to his room, not taking appropriate self-care measures, malodorous, disheveled. Patient has been medication adherent. Today on evaluation, Gokul was seen lying in bed, with a blanket covering his head. Patient was awake, but appeared to be guarded, withdrawn during interaction. At this time, patient denies feeling down, depressed, and denies anxiety symptoms. Reports that he has been sleeping well, has "ok" energy levels. States that he wants to speak with grandmother over the phone. Denies physical issues at this time, including headaches, dizziness, shortness of breath, chest pain, palpitations, and pain. Reports regular BM. In terms of safety, Gokul Denies SI/HI. Tierraer denies auditory, visual hallucinations. Appears internally preoccupied on observation, but does not appear to be RIS.     Psychiatric Review of Systems:  Behavior over the last 24 hours:  unchanged  Sleep: normal  Appetite: normal  Medication side effects: No   ROS: no complaints, all other systems are negative    Current Medications:  Current Facility-Administered Medications   Medication Dose Route Frequency Provider Last Rate   • acetaminophen  650 mg Oral Q6H PRN Lona Magaña MD     • acetaminophen  650 mg Oral Q4H PRN Lona Magaña MD     • acetaminophen  975 mg Oral Q6H PRN Lona Magaña MD     • albuterol  2 puff Inhalation Q4H PRN Lona Magaña MD     • aluminum-magnesium hydroxide-simethicone  30 mL Oral Q4H PRN Lona Magaña MD     • benztropine  1 mg Intramuscular BID PRN Christofer Navarro MD     • benztropine  1 mg Oral Q4H PRN Max 6/day Lona Magaña MD     • cloZAPine  300 mg Oral Daily ELA Concepcion     • divalproex sodium  750 mg Oral BID Sam Covarrubias MD     • fluticasone  1 spray Nasal Daily PRN Hellen Hawk PA-C     • glycopyrrolate  1 mg Oral TID Bimal Kenyon DO     • hydrOXYzine HCL  100 mg Oral Q6H PRN Max 4/day Marniee Susie DO      Or   • LORazepam  1 mg Intramuscular Q6H PRN Gerald Richards, DO     • hydrOXYzine HCL  25 mg Oral Q6H PRN Max 4/day Lona Magaña MD     • hydrOXYzine HCL  50 mg Oral Q6H PRN Max 4/day Lona Magaña MD     • melatonin  3 mg Oral HS PRN Gerald Richards, DO     • metFORMIN  1,000 mg Oral BID With Meals ELA Wise     • nicotine polacrilex  4 mg Oral Q2H PRN Prcindy Magaña MD     • OLANZapine  5 mg Oral Q3H PRN Max 6/day Sam Covarrubias MD      Or   • OLANZapine  5 mg Intramuscular Q3H PRN Max 6/day Sam Covarrubias MD     • OLANZapine  5 mg Intramuscular Q8H PRN Gerald Richards DO      Or   • OLANZapine  7.5 mg Oral Q8H PRN Gerald Richards DO     • OLANZapine  2.5 mg Oral Q3H PRN Max 8/day Sam Covarrubias MD     • ondansetron  4 mg Oral Q8H PRN Gerald Richards DO     • polyethylene glycol  17 g Oral Daily PRN Prcindy Magaña MD     • pseudoephedrine  120 mg Oral BID PRN Praveena Thomas MD     • senna-docusate sodium  1 tablet Oral Daily PRN Lona Magaña MD     • senna-docusate sodium 2 tablet Oral BID Isabel Fraire MD     • sterile water          • sterile water          • sterile water          • sterile water          • sterile water          • sterile water          • sterile water          • sterile water          • sterile water          • sterile water          • sterile water          • sterile water              Behavioral Health Medications: all current active meds have been reviewed and continue current psychiatric medications. Vital signs in last 24 hours:  Temp:  [97.7 °F (36.5 °C)] 97.7 °F (36.5 °C)  HR:  [104-127] 127  Resp:  [16] 16  BP: (123-142)/(79-97) 142/97    Labs reviewed. Patient had increase in creatinine kinase to 406, CRP of 4.5, and normal troponin 1 levels. Patient at this time is not complaining of any physical issues. Laboratory results:    I have personally reviewed all pertinent laboratory/tests results.   Most Recent Labs:   Lab Results   Component Value Date    WBC 10.21 (H) 09/25/2023    RBC 4.72 09/25/2023    HGB 14.6 09/25/2023    HCT 44.3 09/25/2023     09/25/2023    RDW 12.2 09/25/2023    TOTANEUTABS 4.84 06/08/2023    NEUTROABS 3.76 09/25/2023    SODIUM 139 09/27/2023    K 4.0 09/27/2023     09/27/2023    CO2 30 09/27/2023    BUN 14 09/27/2023    CREATININE 1.23 09/27/2023    GLUC 88 09/27/2023    GLUF 88 09/27/2023    CALCIUM 9.4 09/27/2023    AST 25 09/27/2023    ALT 23 09/27/2023    ALKPHOS 53 09/27/2023    TP 7.4 09/27/2023    ALB 4.2 09/27/2023    TBILI 0.36 09/27/2023    CHOLESTEROL 167 09/02/2023    HDL 53 09/02/2023    TRIG 113 09/02/2023    LDLCALC 91 09/02/2023    NONHDLC 114 09/02/2023    VALPROICTOT 91 06/12/2023    AMMONIA 22 10/09/2022    [id number] 1.841 05/19/2023    FREET4 1.16 10/09/2022    HGBA1C 6.3 (H) 09/27/2023     09/27/2023       Mental Status Evaluation:    Appearance:  disheveled, poor hygiene   Behavior:  pleasant, cooperative, guarded   Speech:  delayed, soft   Mood:  "ok"   Affect:  blunted Thought Process:  goal directed, decreased rate of thoughts   Associations: intact associations   Thought Content:  no overt delusions   Perceptual Disturbances: no auditory hallucinations, no visual hallucinations, does not appear responding to internal stimuli, but appears distracted   Risk Potential: Suicidal ideation - None  Homicidal ideation - None  Potential for aggression - No   Sensorium:  oriented to person, place and time/date   Memory:  recent and remote memory grossly intact   Consciousness:  alert and awake   Attention/Concentration: attention span and concentration appear shorter than expected for age   Insight:  poor   Judgment: poor   Gait/Station: normal gait/station   Motor Activity: no abnormal movements     Progress Toward Goals: slow progress    Recommended Treatment:   See above for assessment and plan. Risks, benefits and possible side effects of Medications:   Risks, benefits, and possible side effects of medications explained to patient and patient verbalizes understanding. Treatment discussed with nursing staff. This note has been constructed using a voice recognition system. There may be translation, syntax, or grammatical errors. If you have any questions, please contact the dictating provider.     Raquel Monroe MD

## 2023-09-28 NOTE — NURSING NOTE
Patient was received at 1500 and has been under continual observation in close proximity. The patient has been cooperative with her schedule medication and mouth check. . throughout the evening , the patient was observed making multiple phone calls. She denies having any unmeet needs at this time.

## 2023-09-28 NOTE — NURSING NOTE
Patient only out of bed as of this time for breakfast. Compliant with morning medications. No complaints. Encouraged group and shower. Declined. Denies symptoms.

## 2023-09-28 NOTE — NURSING NOTE
Pt approached staff requesting dinner tray at this time which was discarded of earlier due to food safety risks. Pt given a snack and encouraged to present for meals in the future. Pt accepting.

## 2023-09-29 PROCEDURE — 99232 SBSQ HOSP IP/OBS MODERATE 35: CPT | Performed by: STUDENT IN AN ORGANIZED HEALTH CARE EDUCATION/TRAINING PROGRAM

## 2023-09-29 RX ADMIN — METFORMIN HYDROCHLORIDE 1000 MG: 500 TABLET, FILM COATED ORAL at 08:25

## 2023-09-29 RX ADMIN — GLYCOPYRROLATE 1 MG: 1 TABLET ORAL at 08:25

## 2023-09-29 RX ADMIN — GLYCOPYRROLATE 1 MG: 1 TABLET ORAL at 21:12

## 2023-09-29 RX ADMIN — METFORMIN HYDROCHLORIDE 1000 MG: 500 TABLET, FILM COATED ORAL at 17:33

## 2023-09-29 RX ADMIN — DIVALPROEX SODIUM 750 MG: 500 TABLET, DELAYED RELEASE ORAL at 08:25

## 2023-09-29 RX ADMIN — SENNOSIDES AND DOCUSATE SODIUM 2 TABLET: 8.6; 5 TABLET ORAL at 08:24

## 2023-09-29 RX ADMIN — CLOZAPINE 350 MG: 100 TABLET ORAL at 17:33

## 2023-09-29 RX ADMIN — GLYCOPYRROLATE 1 MG: 1 TABLET ORAL at 17:33

## 2023-09-29 RX ADMIN — DIVALPROEX SODIUM 750 MG: 500 TABLET, DELAYED RELEASE ORAL at 18:24

## 2023-09-29 RX ADMIN — SENNOSIDES AND DOCUSATE SODIUM 2 TABLET: 8.6; 5 TABLET ORAL at 17:34

## 2023-09-29 NOTE — PROGRESS NOTES
09/29/23 0915   Team Meeting   Meeting Type Daily Rounds   Team Members Present   Team Members Present Physician;Nurse;   Physician Team Member [place]   Nursing Team Member aguilar   Care Management Team Member Dev   Patient/Family Present   Patient Present No   Patient's Family Present No     Seclusive to room. Denies all. Responds to internal stim at times. Med/meal compliant. DC pending EAC.

## 2023-09-29 NOTE — NURSING NOTE
Patient declined breakfast and lunch. Seclusive to room and in bed sleeping. Encouraged OOB. Compliant with medications. Currently denying symptoms.

## 2023-09-29 NOTE — NURSING NOTE
Patient  appears quiet and isolative at baseline; resting in bed for prolonged period. The patient went out for dinner and then returned to his room. Currently, The patient is quietly resting in bed in his room and is awake.  Patient remain Q 7 min check

## 2023-09-29 NOTE — NURSING NOTE
Patient has been awake,  and visible intermittently out in the milieu. Patient agreed to shower with encouragement. Patient c/o indigestion and received Mylanta, patient also c/o headache and received tylenol.

## 2023-09-29 NOTE — PLAN OF CARE
Problem: SELF HARM/SUICIDALITY  Goal: Will have no self-injury during hospital stay  Description: INTERVENTIONS:  - Q 15 MINUTES: Routine safety checks  - Q WAKING SHIFT & PRN: Assess risk to determine if routine checks are adequate to maintain patient safety  - Encourage patient to participate actively in care by formulating a plan to combat response to suicidal ideation, identify supports and resources  Outcome: Progressing     Problem: Ineffective Coping  Goal: Participates in unit activities  Description: Interventions:  - Provide therapeutic environment   - Provide required programming   - Redirect inappropriate behaviors   Outcome: Not Progressing     Problem: ANXIETY  Goal: Will report anxiety at manageable levels  Description: INTERVENTIONS:  - Administer medication as ordered  - Teach and encourage coping skills  - Provide emotional support  - Assess patient/family for anxiety and ability to cope  Outcome: Not Progressing     Problem: SUBSTANCE USE/ABUSE  Goal: Will have no detox symptoms and will verbalize plan for changing substance-related behavior  Description: INTERVENTIONS:  - Monitor physical status and assess for symptoms of withdrawal  - Administer medication as ordered  - Provide emotional support with 1 on 1 interaction with staff  - Encourage recovery focused program/ addiction education  - Assess for verbalization of changing behaviors related to substance abuse  - Initiate consults and referrals as appropriate (Case Management, Spiritual Care, etc.)  Outcome: Not Progressing  Goal: By discharge, will develop insight into their chemical dependency and sustain motivation to continue in recovery  Description: INTERVENTIONS:  - Attends all daily group sessions and scheduled AA groups  - Actively practices coping skills through participation in the therapeutic community and adherence to program rules  - Reviews and completes assignments from individual treatment plan  - Assist patient development of understanding of their personal cycle of addiction and relapse triggers  Outcome: Not Progressing  Goal: By discharge, patient will have ongoing treatment plan addressing chemical dependency  Description: INTERVENTIONS:  - Assist patient with resources and/or appointments for ongoing recovery based living  Outcome: Not Progressing

## 2023-09-29 NOTE — PROGRESS NOTES
Progress Note - Behavioral Health   Gokul Dong Day 26 y.o. male MRN: [id number]  Unit/Bed#: U 343-01 Encounter: [id number]    Assessment/Plan   Principal Problem:    Schizoaffective disorder ([address])  Active Problems:    Legally blind    Hearing loss    Asthma    Medical clearance for psychiatric admission    Prolonged erection    Intellectual disability    Rib pain on left side    Recommended Treatment:     Plan:   Treatment plan, treatment progress and medication changes were reviewed with nursing staff, Pharmacy Service and Case Management in Treatment Team meeting  Continue current medications. Group, individual, milieu therapy. Continue behavioral health checks per routine. Medical management per SLIM. Discharge planning.        ----------------------------------------      Subjective: Per nursing report patient noted to be cooperative and pleasant in the milieu. Experienced indigestion last night and received as needed Mylanta/Tylenol. Effective. No issues or complaints. Seen lying in bed today. He is scant and guarded. Appears somewhat lethargic today. Did not wish to answer questions in great detail. Had no major complaints or concerns. Sleeping and eating well.     Behavior over the last 24 hours:  unchanged  Sleep: normal  Appetite: normal  Medication side effects: No  ROS: no complaints    Mental Status Evaluation:  Appearance:  disheveled   Behavior:  cooperative   Speech:  scant, soft   Mood:  euthymic   Affect:  blunted   Thought Process:  decreased rate of thoughts   Associations: intact associations   Thought Content:  poverty of thought   Perceptual Disturbances: no auditory hallucinations, no visual hallucinations, does not appear responding to internal stimuli   Risk Potential: Suicidal ideation - None at present  Homicidal ideation - None at present  Potential for aggression - No   Sensorium:  oriented to person, place and time/date   Memory:  recent and remote memory grossly intact Consciousness:  alert and awake   Attention/Concentration: decreased concentration   Insight:  limited   Judgment: limited   Gait/Station: in bed   Motor Activity: no abnormal movements     Medications: all current active meds have been reviewed.   Current Facility-Administered Medications   Medication Dose Route Frequency Provider Last Rate   • acetaminophen  650 mg Oral Q6H PRN Stephen Martínez MD     • acetaminophen  650 mg Oral Q4H PRN Stephen Martínez MD     • acetaminophen  975 mg Oral Q6H PRN Stephen Martínez MD     • albuterol  2 puff Inhalation Q4H PRN Stephen Martínez MD     • aluminum-magnesium hydroxide-simethicone  30 mL Oral Q4H PRN Stephen Martínez MD     • benztropine  1 mg Intramuscular BID PRN Agustina Dubin, MD     • benztropine  1 mg Oral Q4H PRN Max 6/day Stephen Martínez MD     • cloZAPine  350 mg Oral Daily Debora Luis MD     • divalproex sodium  750 mg Oral BID Nimesh Smith MD     • fluticasone  1 spray Nasal Daily PRN Valeria Sandoval PA-C     • glycopyrrolate  1 mg Oral TID Burton Howe DO     • hydrOXYzine HCL  100 mg Oral Q6H PRN Max 4/day Mariia Braros DO      Or   • LORazepam  1 mg Intramuscular Q6H PRN Mariia Barros DO     • hydrOXYzine HCL  25 mg Oral Q6H PRN Max 4/day Stephen Martínez MD     • hydrOXYzine HCL  50 mg Oral Q6H PRN Max 4/day Stephen Martínez MD     • melatonin  3 mg Oral HS PRN Mariia Barros DO     • metFORMIN  1,000 mg Oral BID With Meals ELA Wise     • nicotine polacrilex  4 mg Oral Q2H PRN Stephen Martínez MD     • OLANZapine  5 mg Oral Q3H PRN Max 6/day Nimesh Smith MD      Or   • OLANZapine  5 mg Intramuscular Q3H PRN Max 6/day Nimesh Smith MD     • OLANZapine  5 mg Intramuscular Q8H PRN Mariia Barros DO      Or   • OLANZapine  7.5 mg Oral Q8H PRN Mariia Barros DO     • OLANZapine  2.5 mg Oral Q3H PRN Max 8/day Nimesh Smith MD     • ondansetron  4 mg Oral Q8H PRN Mariia Barros DO     • polyethylene glycol  17 g Oral Daily PRN Codi Paul MD     • pseudoephedrine  120 mg Oral BID PRN Barney Lopes MD     • senna-docusate sodium  1 tablet Oral Daily PRN Codi Paul MD     • senna-docusate sodium  2 tablet Oral BID Arsenio Faith MD     • sterile water          • sterile water          • sterile water          • sterile water          • sterile water          • sterile water          • sterile water          • sterile water          • sterile water          • sterile water          • sterile water          • sterile water              Labs: I have personally reviewed all pertinent laboratory/tests results  Most Recent Labs:   Lab Results   Component Value Date    WBC 10.21 (H) 09/25/2023    RBC 4.72 09/25/2023    HGB 14.6 09/25/2023    HCT 44.3 09/25/2023     09/25/2023    RDW 12.2 09/25/2023    NEUTROABS 3.76 09/25/2023    TOTANEUTABS 4.84 06/08/2023    SODIUM 139 09/27/2023    K 4.0 09/27/2023     09/27/2023    CO2 30 09/27/2023    BUN 14 09/27/2023    CREATININE 1.23 09/27/2023    GLUC 88 09/27/2023    CALCIUM 9.4 09/27/2023    AST 25 09/27/2023    ALT 23 09/27/2023    ALKPHOS 53 09/27/2023    TP 7.4 09/27/2023    ALB 4.2 09/27/2023    TBILI 0.36 09/27/2023    CHOLESTEROL 167 09/02/2023    HDL 53 09/02/2023    TRIG 113 09/02/2023    LDLCALC 91 09/02/2023    NONHDLC 114 09/02/2023    VALPROICTOT 91 06/12/2023    AMMONIA 22 10/09/2022    [id number] 1.841 05/19/2023    FREET4 1.16 10/09/2022    HGBA1C 6.3 (H) 09/27/2023     09/27/2023       Progress Toward Goals: progressing    Risks / Benefits of Treatment:    Risks, benefits, and possible side effects of medications explained to patient and patient verbalizes understanding and agreement for treatment. Counseling / Coordination of Care: Total floor / unit time spent today 25 minutes.  Greater than 50% of total time was spent with the patient and / or family counseling and / or coordination of care. A description of counseling / coordination of care:  Patient's progress discussed with staff in treatment team meeting. Medications, treatment progress and treatment plan reviewed with patient. Reassurance and supportive therapy provided. Encouraged participation in milieu and group therapy on the unit.     Saturnino Garrido MD 09/29/23

## 2023-09-30 PROCEDURE — 99232 SBSQ HOSP IP/OBS MODERATE 35: CPT | Performed by: PSYCHIATRY & NEUROLOGY

## 2023-09-30 RX ADMIN — GLYCOPYRROLATE 1 MG: 1 TABLET ORAL at 08:58

## 2023-09-30 RX ADMIN — METFORMIN HYDROCHLORIDE 1000 MG: 500 TABLET, FILM COATED ORAL at 08:58

## 2023-09-30 RX ADMIN — METOPROLOL TARTRATE 12.5 MG: 25 TABLET, FILM COATED ORAL at 21:05

## 2023-09-30 RX ADMIN — DIVALPROEX SODIUM 750 MG: 500 TABLET, DELAYED RELEASE ORAL at 08:58

## 2023-09-30 RX ADMIN — METFORMIN HYDROCHLORIDE 1000 MG: 500 TABLET, FILM COATED ORAL at 17:38

## 2023-09-30 RX ADMIN — SENNOSIDES AND DOCUSATE SODIUM 2 TABLET: 8.6; 5 TABLET ORAL at 17:39

## 2023-09-30 RX ADMIN — CLOZAPINE 350 MG: 100 TABLET ORAL at 17:38

## 2023-09-30 RX ADMIN — GLYCOPYRROLATE 1 MG: 1 TABLET ORAL at 21:07

## 2023-09-30 RX ADMIN — GLYCOPYRROLATE 1 MG: 1 TABLET ORAL at 17:00

## 2023-09-30 RX ADMIN — DIVALPROEX SODIUM 750 MG: 500 TABLET, DELAYED RELEASE ORAL at 21:05

## 2023-09-30 RX ADMIN — SENNOSIDES AND DOCUSATE SODIUM 2 TABLET: 8.6; 5 TABLET ORAL at 08:58

## 2023-09-30 RX ADMIN — METOPROLOL TARTRATE 12.5 MG: 25 TABLET, FILM COATED ORAL at 10:38

## 2023-09-30 NOTE — NURSING NOTE
Pt denies SI/HI/AH/VH but at times appears internally preoccupied. Pt isolative to room and self. Scant/guarded with communication. Medication and meal compliant. Pt spent majority of morning sleeping. Pt remained isolative to room. No further concerns as of present. Plan of care ongoing.

## 2023-09-30 NOTE — NURSING NOTE
Patient is isolative to room, sleeping during the day. Patient is currently visible for dinner. Patient needed encouragement to wake up. Patient denies SI/HI/AH/VH. Patient compliant with meds and meals.

## 2023-09-30 NOTE — QUICK NOTE
Nursing brought it to my attention that patient's blood pressure has been slightly elevated as well as heart rate. I started patient on metoprolol 12.5 mg p.o. every 12 hours.

## 2023-10-01 PROCEDURE — 99232 SBSQ HOSP IP/OBS MODERATE 35: CPT | Performed by: PSYCHIATRY & NEUROLOGY

## 2023-10-01 RX ADMIN — METFORMIN HYDROCHLORIDE 1000 MG: 500 TABLET, FILM COATED ORAL at 17:12

## 2023-10-01 RX ADMIN — METOPROLOL TARTRATE 12.5 MG: 25 TABLET, FILM COATED ORAL at 08:33

## 2023-10-01 RX ADMIN — METFORMIN HYDROCHLORIDE 1000 MG: 500 TABLET, FILM COATED ORAL at 08:33

## 2023-10-01 RX ADMIN — DIVALPROEX SODIUM 750 MG: 500 TABLET, DELAYED RELEASE ORAL at 21:14

## 2023-10-01 RX ADMIN — SENNOSIDES AND DOCUSATE SODIUM 2 TABLET: 8.6; 5 TABLET ORAL at 17:12

## 2023-10-01 RX ADMIN — OLANZAPINE 7.5 MG: 2.5 TABLET, FILM COATED ORAL at 01:56

## 2023-10-01 RX ADMIN — METOPROLOL TARTRATE 12.5 MG: 25 TABLET, FILM COATED ORAL at 21:14

## 2023-10-01 RX ADMIN — DIVALPROEX SODIUM 750 MG: 500 TABLET, DELAYED RELEASE ORAL at 08:33

## 2023-10-01 RX ADMIN — CLOZAPINE 350 MG: 100 TABLET ORAL at 17:12

## 2023-10-01 RX ADMIN — GLYCOPYRROLATE 1 MG: 1 TABLET ORAL at 08:34

## 2023-10-01 RX ADMIN — GLYCOPYRROLATE 1 MG: 1 TABLET ORAL at 21:14

## 2023-10-01 RX ADMIN — SENNOSIDES AND DOCUSATE SODIUM 2 TABLET: 8.6; 5 TABLET ORAL at 08:33

## 2023-10-01 RX ADMIN — GLYCOPYRROLATE 1 MG: 1 TABLET ORAL at 17:00

## 2023-10-01 NOTE — NURSING NOTE
Pt denies SI/HI/AH/VH. Present in dayroom and milieu earlier this morning but was withdrawn to self. Medication and meal compliant. Scant/guarded with communication. Pt later remained isolative to his room. No further concerns as of present. Plan of care ongoing.

## 2023-10-01 NOTE — PROGRESS NOTES
BEHAVIORAL HEALTH SERVICE PROGRESS NOTE    Gokul Lea 26 y.o. male MRN: [id number]  Unit/Bed#: [facility] 343-01 Encounter: [id number]         ASSESSMENT/PLAN     Principal Problem:    Schizoaffective disorder ([address])  Active Problems:    Legally blind    Hearing loss    Asthma    Medical clearance for psychiatric admission    Prolonged erection    Intellectual disability    Rib pain on left side      Currently, no anticipated medication changes. Continue current regimen. Current Legal status: 201 voluntary commitment  Disposition: TBD    RECOMMENDED TREATMENT     Continue current medications:  Clozapine 350 mg daily for psychosis  Depakote  mg BID for mood stabilization  Robinul 1 mg TID for sialorrhea  Senokot D 8.6-50 mg BID for constipation  All current active medications have been reviewed  Encourage group therapy, milieu therapy and occupational therapy  Behavioral Health checks every 15 minutes    Risks / Benefits of Treatment: Risks, benefits, and possible side effects of medications explained to patient and patient verbalizes understanding and agreement for treatment. Counseling / Coordination of Care: Patient's progress discussed with staff in treatment team meeting. Medications, treatment progress and treatment plan reviewed with patient. SUBJECTIVE     Over the last 24 hours:  Per nursing note: " Pt visible but quiet. Scant and uninterested with shift assessment. Eye contact is poor. Pt disheveled and malodorous. When encouraged to shower pt states "I showered yesterday". Appears internally preoccupied. Denies si.hi. avh and unmet needs. Compliant with evening meds. Continued q7m checks for safety "  PRN medications: none  Behavior over the last 24 hours: unchanged. The patient was seen and evaluated today for continuity of care. On interview, the patient is laying in bed with head under covers. Patient reports "ok" mood. Patient endorses "ok" energy levels.  Patient reports "ok" sleep. Per patient, appetite is "ok". Patient reports sleeping the whole night without bad dreams or awakenings. Currently, patient denies auditory hallucinations and denies visual hallucinations. The patient denies current passive or active suicidal ideation, intent, or plan. Patient denies current passive or active homicidal ideation, intent, or plan. Patient reports  tolerating medications well and denies adverse effects at this time. Team discussed treatment plan, to which patient is amenable. Patient does not endorse additional questions or concerns at this time.      Progress Toward Goals: progressing    Review of Systems:  Sleep: normal  Appetite: normal  Medication side effects: No   ROS: no complaints, denies any headache, all other systems are negative      OBJECTIVE     Vital signs in last 24 hours:   Temp:  [97.4 °F (36.3 °C)] 97.4 °F (36.3 °C)  HR:  [103-115] 103  Resp:  [16-17] 16  BP: (124-133)/(83-84) 133/83    Mental Status Exam:  Appearance: alert, appears stated age, disheveled and covered by blanket, poor eye contact  Behavior: calm, slightly more cooperative  Motor: no abnormal movements  Gait/Station: in bed  Speech: normal rate, normal volume, normal pitch  Mood: "ok"  Affect: blunted  Thought process: poverty of thought  Thought content: no overt delusions  Perceptual disturbances: no auditory hallucinations, no visual hallucinations, appears distracted, does not appear responding to internal stimuli  Risk potential: No active or passive suicidal or homicidal ideation was verbalized during interview  Cognition: memory grossly intact and appears to be of average intelligence  Sensorium: oriented to person and place  Consciousness: alert and awake  Attention/Concentration: attention span and concentration appear shorter than expected for age  Insight: Limited  Judgment: Limited       Nutrition Assessment and Intervention:     Reviewed food recall journal      Emotional and Mental Well-being, Sleep, Connectedness Assessment and Intervention:    Sleep/stress assessment performed           ACTIVE MEDICATIONS     Current Medications:  Current Facility-Administered Medications   Medication Dose Route Frequency Provider Last Rate   • acetaminophen  650 mg Oral Q6H PRN Albina Morse MD     • acetaminophen  650 mg Oral Q4H PRN Albina Morse MD     • acetaminophen  975 mg Oral Q6H PRN Albina Morse MD     • albuterol  2 puff Inhalation Q4H PRN Albina Morse MD     • aluminum-magnesium hydroxide-simethicone  30 mL Oral Q4H PRN Albina Morse MD     • benztropine  1 mg Intramuscular BID PRN Ranjan Kennedy MD     • benztropine  1 mg Oral Q4H PRN Max 6/day Albina Morse MD     • cloZAPine  350 mg Oral Daily Debora Luis MD     • divalproex sodium  750 mg Oral BID Melonie Shaikh MD     • fluticasone  1 spray Nasal Daily PRN Nabil Wiley PA-C     • glycopyrrolate  1 mg Oral TID Sarai Parks DO     • hydrOXYzine HCL  100 mg Oral Q6H PRN Max 4/day Sami Pierson, DO      Or   • LORazepam  1 mg Intramuscular Q6H PRN Sami Pierson, DO     • hydrOXYzine HCL  25 mg Oral Q6H PRN Max 4/day Albina Morse MD     • hydrOXYzine HCL  50 mg Oral Q6H PRN Max 4/day Albina Morse MD     • melatonin  3 mg Oral HS PRN Sami Pierson, DO     • metFORMIN  1,000 mg Oral BID With Meals ELA Wise     • metoprolol tartrate  12.5 mg Oral Q12H [facility] ELA Wise     • nicotine polacrilex  4 mg Oral Q2H PRN Albina Morse MD     • OLANZapine  5 mg Oral Q3H PRN Max 6/day Melonie Shaikh MD      Or   • OLANZapine  5 mg Intramuscular Q3H PRN Max 6/day Melonie Shaikh MD     • OLANZapine  5 mg Intramuscular Q8H PRN Midwayflaquita Pierson, DO      Or   • OLANZapine  7.5 mg Oral Q8H PRN Sami Pierson, DO     • OLANZapine  2.5 mg Oral Q3H PRN Max 8/day Melonie Shaikh MD     • ondansetron  4 mg Oral Q8H PRN Sami Pierson DO     • polyethylene glycol  17 g Oral Daily PRN Codi Paul MD     • pseudoephedrine  120 mg Oral BID PRN Barney Lopes MD     • senna-docusate sodium  1 tablet Oral Daily PRN Codi Paul MD     • senna-docusate sodium  2 tablet Oral BID Arsenio Faith MD     • sterile water          • sterile water          • sterile water          • sterile water          • sterile water          • sterile water          • sterile water          • sterile water          • sterile water          • sterile water          • sterile water          • sterile water              Behavioral Health Medications: I have personally reviewed all current active medications. Changes as in plan section above. ADDITIONAL DATA     EKG Results: reviewed  Results for orders placed or performed during the hospital encounter of 05/18/23 (from the past 1000 hour(s))   ECG 12 lead    Collection Time: 09/25/23  1:37 PM   Result Value    Ventricular Rate 99    Atrial Rate 99    CT Interval 144    QRSD Interval 98    QT Interval 344    QTC Interval 441    P Axis 44    QRS Axis 69    T Wave Axis 30    Narrative    Normal sinus rhythm  Nonspecific T wave abnormality  Abnormal ECG  When compared with ECG of 25-SEP-2023 13:35, (unconfirmed)  No significant change was found  Confirmed by Alley Irizarry ([id number]) on 9/25/2023 1:43:06 PM     *Note: Due to a large number of results and/or encounters for the requested time period, some results have not been displayed. A complete set of results can be found in Results Review. Radiology Results: Reviewed, no new imaging  No results found. No Chest XR results available for this patient. Laboratory Results: I have personally reviewed all pertinent laboratory/tests results  Results from the past 24 hours: No results found for this or any previous visit (from the past 24 hour(s)).         This note was not shared with the patient due to reasonable likelihood of causing patient harm        Cortez Padgett MD 10/01/23  Department of Psychiatry and [facility]

## 2023-10-01 NOTE — NURSING NOTE
Pt visible but quiet. Scant and uninterested with shift assessment. Eye contact is poor. Pt disheveled and malodorous. When encouraged to shower pt states "I showered yesterday". Appears internally preoccupied. Denies si.hi. avh and unmet needs. Compliant with evening meds. Continued q7m checks for safety.

## 2023-10-02 PROCEDURE — 99232 SBSQ HOSP IP/OBS MODERATE 35: CPT | Performed by: PSYCHIATRY & NEUROLOGY

## 2023-10-02 RX ADMIN — METOPROLOL TARTRATE 12.5 MG: 25 TABLET, FILM COATED ORAL at 08:12

## 2023-10-02 RX ADMIN — METFORMIN HYDROCHLORIDE 1000 MG: 500 TABLET, FILM COATED ORAL at 08:12

## 2023-10-02 RX ADMIN — SENNOSIDES AND DOCUSATE SODIUM 2 TABLET: 8.6; 5 TABLET ORAL at 17:34

## 2023-10-02 RX ADMIN — CLOZAPINE 350 MG: 100 TABLET ORAL at 17:34

## 2023-10-02 RX ADMIN — GLYCOPYRROLATE 1 MG: 1 TABLET ORAL at 08:12

## 2023-10-02 RX ADMIN — GLYCOPYRROLATE 1 MG: 1 TABLET ORAL at 15:38

## 2023-10-02 RX ADMIN — GLYCOPYRROLATE 1 MG: 1 TABLET ORAL at 21:05

## 2023-10-02 RX ADMIN — METFORMIN HYDROCHLORIDE 1000 MG: 500 TABLET, FILM COATED ORAL at 15:37

## 2023-10-02 RX ADMIN — DIVALPROEX SODIUM 750 MG: 500 TABLET, DELAYED RELEASE ORAL at 08:12

## 2023-10-02 RX ADMIN — SENNOSIDES AND DOCUSATE SODIUM 2 TABLET: 8.6; 5 TABLET ORAL at 08:12

## 2023-10-02 RX ADMIN — DIVALPROEX SODIUM 750 MG: 500 TABLET, DELAYED RELEASE ORAL at 18:05

## 2023-10-02 NOTE — NURSING NOTE
Patient in room at beginning of shift. Continues to wish to remain in room sleeping. This nurse encouraged patient to get vitals taken with success. Compliant with medications. Denies SI/HI/AH/VH remain's  in room at this time.

## 2023-10-02 NOTE — NURSING NOTE
Patient has been seclusive to his room. Encouraged shower and groups. Declined. Asked writer if staff could bring him his breakfast too his room because he's "too tired". Writer informed patient that he needs to come out for his breakfast tray, which he did. Compliant with medications. Currently denying AH and all other symptoms.

## 2023-10-02 NOTE — PROGRESS NOTES
Progress Note - Behavioral Health   Gokul Rubio 26 y.o. male MRN: [id number]  Unit/Bed#: [facility] 343-01 Encounter: [id number]    Assessment/Plan   Principal Problem:    Schizoaffective disorder ([address])  Active Problems:    Legally blind    Hearing loss    Asthma    Medical clearance for psychiatric admission    Prolonged erection    Intellectual disability    Rib pain on left side      • Continue medications as noted below. • Continue to promote patient participation in group therapy, milieu therapy, occupational therapy  • Continue medical management by primary team.  • Discharge disposition:  pending EAC placement. Subjective: The patient was evaluated this morning for continuity of care and no acute distress noted throughout the evaluation. Over the past 24 hours staff noted that patient is withdrawn, malodorous, seclusive to room and in bed most of the day. Reported AH in mid-day Saturday night. Patient has been medication adherent. Today on evaluation, Gokul reports that he is feeling "alright" in terms of his mood. He denies feeling depressed, hopeless, sad at this time. He denies acute uncontrolled anxiety symptoms. He reports that he heard "voices "on Saturday were negative, but does not specify. Has not heard any voices since that time. No known inciting factor to have triggered the voices per patient. States that he feels physically well today, and denies any acute issues at this time. Denies shortness of breath, chest pain, dizziness, headaches, malaise, runny nose, sore throat, nasal congestion, pain elsewhere in the body. Reports bowel movements every 1 to 2 days. Denies feeling constipated. Tolerated the increase of clozapine to 350 mg without issues. In terms of safety, Gokul Denies SI/HI. Gokul Denies auditory, visual hallucinations. Denies delusional thought content, and reports feeling safe on the unit. Looking forward to discharge to [facility].     Psychiatric Review of Systems:  Behavior over the last 24 hours:  unchanged  Sleep: normal  Appetite: normal  Medication side effects: No   ROS: no complaints, all other systems are negative    Current Medications:  Current Facility-Administered Medications   Medication Dose Route Frequency Provider Last Rate   • acetaminophen  650 mg Oral Q6H PRN Rivera Johnson MD     • acetaminophen  650 mg Oral Q4H PRN Rivera Johnson MD     • acetaminophen  975 mg Oral Q6H PRN Rivera Johnson MD     • albuterol  2 puff Inhalation Q4H PRN Rivera Johnson MD     • aluminum-magnesium hydroxide-simethicone  30 mL Oral Q4H PRN Rivera Johnson MD     • benztropine  1 mg Intramuscular BID PRN Jillian Huerta MD     • benztropine  1 mg Oral Q4H PRN Max 6/day Rivera Johnson MD     • cloZAPine  350 mg Oral Daily Debora Luis MD     • divalproex sodium  750 mg Oral BID Barbie Damico MD     • fluticasone  1 spray Nasal Daily PRN Carlyle Goodman PA-C     • glycopyrrolate  1 mg Oral TID Tru Sick, DO     • hydrOXYzine HCL  100 mg Oral Q6H PRN Max 4/day Margot Minor, DO      Or   • LORazepam  1 mg Intramuscular Q6H PRN Margot Minor, DO     • hydrOXYzine HCL  25 mg Oral Q6H PRN Max 4/day Rivera Johnson MD     • hydrOXYzine HCL  50 mg Oral Q6H PRN Max 4/day Rivera Johnson MD     • melatonin  3 mg Oral HS PRN Margot Minor, DO     • metFORMIN  1,000 mg Oral BID With Meals ELA Wise     • metoprolol tartrate  12.5 mg Oral Q12H [address] ELA Wise     • nicotine polacrilex  4 mg Oral Q2H PRN Rivera Johnson MD     • OLANZapine  5 mg Oral Q3H PRN Max 6/day Barbie Damico MD      Or   • OLANZapine  5 mg Intramuscular Q3H PRN Max 6/day Barbie Damico MD     • OLANZapine  5 mg Intramuscular Q8H PRN Margot Minor, DO      Or   • OLANZapine  7.5 mg Oral Q8H PRN Margot Minor, DO     • OLANZapine  2.5 mg Oral Q3H PRN Max 8/day Barbie Damico MD     • ondansetron  4 mg Oral Q8H PRN Margot Minor, DO     • polyethylene glycol  17 g Oral Daily PRN Taryn Sheehan MD     • pseudoephedrine  120 mg Oral BID PRN Natanael Reyes MD     • senna-docusate sodium  1 tablet Oral Daily PRN Taryn Sheehan MD     • senna-docusate sodium  2 tablet Oral BID Amber Vera MD     • sterile water          • sterile water          • sterile water          • sterile water          • sterile water          • sterile water          • sterile water          • sterile water          • sterile water          • sterile water          • sterile water          • sterile water              Behavioral Health Medications: all current active meds have been reviewed and continue current psychiatric medications. Vital signs in last 24 hours:  Temp:  [97.6 °F (36.4 °C)-97.7 °F (36.5 °C)] 97.6 °F (36.4 °C)  HR:  [] 106  Resp:  [16] 16  BP: (128-147)/(60-83) 147/65    Laboratory results:    I have personally reviewed all pertinent laboratory/tests results.   Most Recent Labs:   Lab Results   Component Value Date    WBC 10.21 (H) 09/25/2023    RBC 4.72 09/25/2023    HGB 14.6 09/25/2023    HCT 44.3 09/25/2023     09/25/2023    RDW 12.2 09/25/2023    TOTANEUTABS 4.84 06/08/2023    NEUTROABS 3.76 09/25/2023    SODIUM 139 09/27/2023    K 4.0 09/27/2023     09/27/2023    CO2 30 09/27/2023    BUN 14 09/27/2023    CREATININE 1.23 09/27/2023    GLUC 88 09/27/2023    GLUF 88 09/27/2023    CALCIUM 9.4 09/27/2023    AST 25 09/27/2023    ALT 23 09/27/2023    ALKPHOS 53 09/27/2023    TP 7.4 09/27/2023    ALB 4.2 09/27/2023    TBILI 0.36 09/27/2023    CHOLESTEROL 167 09/02/2023    HDL 53 09/02/2023    TRIG 113 09/02/2023    LDLCALC 91 09/02/2023    NONHDLC 114 09/02/2023    VALPROICTOT 91 06/12/2023    AMMONIA 22 10/09/2022    [id number] 1.841 05/19/2023    FREET4 1.16 10/09/2022    HGBA1C 6.3 (H) 09/27/2023     09/27/2023         Mental Status Evaluation:    Appearance:  disheveled, marginal hygiene, -American male   Behavior:  guarded, Intermittent eye contact   Speech:  increased latency of response, soft    Mood:  "alright"   Affect:  blunted   Thought Process:  goal directed   Associations: intact associations   Thought Content:  no overt delusions   Perceptual Disturbances: no auditory hallucinations, no visual hallucinations, does not appear responding to internal stimuli   Risk Potential: Suicidal ideation - None  Homicidal ideation - None  Potential for aggression - No   Sensorium:  oriented to person and place   Memory:  recent and remote memory grossly intact   Consciousness:  alert and awake   Attention/Concentration: attention span and concentration appear shorter than expected for age   Insight:  limited   Judgment: limited   Gait/Station: normal gait/station, in bed   Motor Activity: no abnormal movements     Progress Toward Goals: slow progress    Recommended Treatment:   See above for assessment and plan. Risks, benefits and possible side effects of Medications:   Risks, benefits, and possible side effects of medications explained to patient and patient verbalizes understanding. Treatment discussed with nursing staff. This note has been constructed using a voice recognition system. There may be translation, syntax, or grammatical errors. If you have any questions, please contact the dictating provider.     Brayan España MD

## 2023-10-02 NOTE — NURSING NOTE
PT continues to be withdrawn from the milieu, in bed under the covers. Upon approach, PT responded "I am fine" when asked how his day was going? Took his meds without any issue.

## 2023-10-03 LAB
BASOPHILS # BLD AUTO: 0.06 THOUSANDS/ÂΜL (ref 0–0.1)
BASOPHILS NFR BLD AUTO: 1 % (ref 0–1)
BNP SERPL-MCNC: 8 PG/ML (ref 0–100)
CK SERPL-CCNC: 453 U/L (ref 39–308)
CRP SERPL QL: 2.2 MG/L
EOSINOPHIL # BLD AUTO: 0.62 THOUSAND/ÂΜL (ref 0–0.61)
EOSINOPHIL NFR BLD AUTO: 8 % (ref 0–6)
ERYTHROCYTE [DISTWIDTH] IN BLOOD BY AUTOMATED COUNT: 12.2 % (ref 11.6–15.1)
HCT VFR BLD AUTO: 46 % (ref 36.5–49.3)
HGB BLD-MCNC: 15.2 G/DL (ref 12–17)
IMM GRANULOCYTES # BLD AUTO: 0.05 THOUSAND/UL (ref 0–0.2)
IMM GRANULOCYTES NFR BLD AUTO: 1 % (ref 0–2)
LYMPHOCYTES # BLD AUTO: 2.42 THOUSANDS/ÂΜL (ref 0.6–4.47)
LYMPHOCYTES NFR BLD AUTO: 32 % (ref 14–44)
MCH RBC QN AUTO: 31.3 PG (ref 26.8–34.3)
MCHC RBC AUTO-ENTMCNC: 33 G/DL (ref 31.4–37.4)
MCV RBC AUTO: 95 FL (ref 82–98)
MONOCYTES # BLD AUTO: 0.61 THOUSAND/ÂΜL (ref 0.17–1.22)
MONOCYTES NFR BLD AUTO: 8 % (ref 4–12)
NEUTROPHILS # BLD AUTO: 3.92 THOUSANDS/ÂΜL (ref 1.85–7.62)
NEUTS SEG NFR BLD AUTO: 50 % (ref 43–75)
NRBC BLD AUTO-RTO: 0 /100 WBCS
PLATELET # BLD AUTO: 203 THOUSANDS/UL (ref 149–390)
PMV BLD AUTO: 11.9 FL (ref 8.9–12.7)
RBC # BLD AUTO: 4.86 MILLION/UL (ref 3.88–5.62)
WBC # BLD AUTO: 7.68 THOUSAND/UL (ref 4.31–10.16)

## 2023-10-03 PROCEDURE — 82550 ASSAY OF CK (CPK): CPT | Performed by: PSYCHIATRY & NEUROLOGY

## 2023-10-03 PROCEDURE — 99232 SBSQ HOSP IP/OBS MODERATE 35: CPT | Performed by: PSYCHIATRY & NEUROLOGY

## 2023-10-03 PROCEDURE — 86140 C-REACTIVE PROTEIN: CPT | Performed by: PSYCHIATRY & NEUROLOGY

## 2023-10-03 PROCEDURE — 85025 COMPLETE CBC W/AUTO DIFF WBC: CPT | Performed by: PSYCHIATRY & NEUROLOGY

## 2023-10-03 PROCEDURE — 83880 ASSAY OF NATRIURETIC PEPTIDE: CPT | Performed by: PSYCHIATRY & NEUROLOGY

## 2023-10-03 PROCEDURE — 80159 DRUG ASSAY CLOZAPINE: CPT | Performed by: PSYCHIATRY & NEUROLOGY

## 2023-10-03 RX ADMIN — GLYCOPYRROLATE 1 MG: 1 TABLET ORAL at 21:00

## 2023-10-03 RX ADMIN — METOPROLOL TARTRATE 12.5 MG: 25 TABLET, FILM COATED ORAL at 08:13

## 2023-10-03 RX ADMIN — METOPROLOL TARTRATE 12.5 MG: 25 TABLET, FILM COATED ORAL at 20:57

## 2023-10-03 RX ADMIN — GLYCOPYRROLATE 1 MG: 1 TABLET ORAL at 15:51

## 2023-10-03 RX ADMIN — SENNOSIDES AND DOCUSATE SODIUM 2 TABLET: 8.6; 5 TABLET ORAL at 08:13

## 2023-10-03 RX ADMIN — CLOZAPINE 350 MG: 100 TABLET ORAL at 18:05

## 2023-10-03 RX ADMIN — DIVALPROEX SODIUM 750 MG: 500 TABLET, DELAYED RELEASE ORAL at 18:05

## 2023-10-03 RX ADMIN — GLYCOPYRROLATE 1 MG: 1 TABLET ORAL at 08:14

## 2023-10-03 RX ADMIN — METFORMIN HYDROCHLORIDE 1000 MG: 500 TABLET, FILM COATED ORAL at 08:13

## 2023-10-03 RX ADMIN — SENNOSIDES AND DOCUSATE SODIUM 2 TABLET: 8.6; 5 TABLET ORAL at 18:05

## 2023-10-03 RX ADMIN — METFORMIN HYDROCHLORIDE 1000 MG: 500 TABLET, FILM COATED ORAL at 15:51

## 2023-10-03 RX ADMIN — DIVALPROEX SODIUM 750 MG: 500 TABLET, DELAYED RELEASE ORAL at 08:13

## 2023-10-03 NOTE — PROGRESS NOTES
10/03/23 0906   Team Meeting   Meeting Type Daily Rounds   Team Members Present   Team Members Present Physician;Nurse;   Physician Team Member [id number] [facility] Team Member [facility] Team Member Dev   Patient/Family Present   Patient Present No   Patient's Family Present No     Refused blood work this morning, attempt again later. Pt malodorous. Pt seclusive to room mostly. Pt scant, but pleasant. Denies all. Family visit tomorrow at 1300. Med/meal compliant. Dc pending EAC.

## 2023-10-03 NOTE — PROGRESS NOTES
Progress Note - Behavioral Health   Gokul Finch 26 y.o. male MRN: [id number]  Unit/Bed#: [facility] 343-01 Encounter: [id number]    Assessment/Plan   Principal Problem:    Schizoaffective disorder ([address])  Active Problems:    Legally blind    Hearing loss    Asthma    Medical clearance for psychiatric admission    Prolonged erection    Intellectual disability    Rib pain on left side    • Continue medications as noted below. • Check labs today: CRP, CBC, CK, BNP, Clozapine level  • Continue to promote patient participation in group therapy, milieu therapy, occupational therapy  • Continue medical management by primary team.  • Discharge disposition:  pending EAC placement    Subjective: The patient was evaluated this morning for continuity of care and no acute distress noted throughout the evaluation. Over the past 24 hours staff noted that patient is seclusive to his room mostly, malodorous, poor ADLs. However, pleasant on interaction with staff. Patient has been medication adherent. Today on evaluation, Gokul reports that he feels "fine" in terms of his mood, though withdrawn with blunted affect and lying in bed with bedsheets covering him. On assessment, patient denies feeling depressed, hopeless, sad. Denies feeling anxious. Reports that he is eating well, has good appetite. Reports sleeping well, though notes some fatigue and tiredness in the morning which improves after he gets up. Denies physical symptoms, including headaches, dizziness, rhinorrhea, congestion, sore throat, chest pain, shortness of breath, abdominal pain, diarrhea, constipation. Reports he had a bowel movement yesterday. Encouraged patient to adhere to ADLs, including showering. In terms of safety, Gokul Denies SI/HI. Tierraer denies perceptual disturbances such as auditory hallucinations and visual hallucinations. Denies symptomology consistent with bina/hypomania. Denies symptomology consistent with delusions.   Reports feeling safe on the unit.     Psychiatric Review of Systems:  Behavior over the last 24 hours:  unchanged  Sleep: normal  Appetite: normal  Medication side effects: No   ROS: no complaints, all other systems are negative    Current Medications:  Current Facility-Administered Medications   Medication Dose Route Frequency Provider Last Rate   • acetaminophen  650 mg Oral Q6H PRN Aviva Gamez MD     • acetaminophen  650 mg Oral Q4H PRN Aviva Gamez MD     • acetaminophen  975 mg Oral Q6H PRN Aviva Gamez MD     • albuterol  2 puff Inhalation Q4H PRN Aviva Gamez MD     • aluminum-magnesium hydroxide-simethicone  30 mL Oral Q4H PRN Aviva Gamez MD     • benztropine  1 mg Intramuscular BID PRN Carter Peralta MD     • benztropine  1 mg Oral Q4H PRN Max 6/day Aviva Gamez MD     • cloZAPine  350 mg Oral Daily Debora Luis MD     • divalproex sodium  750 mg Oral BID Saman Foreman MD     • fluticasone  1 spray Nasal Daily PRN Connie Oswald PA-C     • glycopyrrolate  1 mg Oral TID Monica Vo DO     • hydrOXYzine HCL  100 mg Oral Q6H PRN Max 4/day Benson Beth DO      Or   • LORazepam  1 mg Intramuscular Q6H PRN Benson Beth DO     • hydrOXYzine HCL  25 mg Oral Q6H PRN Max 4/day Aviva Gamez MD     • hydrOXYzine HCL  50 mg Oral Q6H PRN Max 4/day Aviva Gamez MD     • melatonin  3 mg Oral HS PRN Benson Beth DO     • metFORMIN  1,000 mg Oral BID With Meals ELA Wise     • metoprolol tartrate  12.5 mg Oral Q12H [address] ELA Wise     • nicotine polacrilex  4 mg Oral Q2H PRN Aviva Gamez MD     • OLANZapine  5 mg Oral Q3H PRN Max 6/day Saman Foreman MD      Or   • OLANZapine  5 mg Intramuscular Q3H PRN Max 6/day Saman Foreman MD     • OLANZapine  5 mg Intramuscular Q8H PRN Benson Beth DO      Or   • OLANZapine  7.5 mg Oral Q8H PRN Benson Beth DO     • OLANZapine  2.5 mg Oral Q3H PRN Max 8/day Saman Foreman MD • ondansetron  4 mg Oral Q8H PRN Peggi Buerger, DO     • polyethylene glycol  17 g Oral Daily PRN Ladora Olszewski, MD     • pseudoephedrine  120 mg Oral BID PRN Arianna Saavedra MD     • senna-docusate sodium  1 tablet Oral Daily PRN Ladora Olszewski, MD     • senna-docusate sodium  2 tablet Oral BID Elba Ramos MD     • sterile water          • sterile water          • sterile water          • sterile water          • sterile water          • sterile water          • sterile water          • sterile water          • sterile water          • sterile water          • sterile water          • sterile water              Behavioral Health Medications: all current active meds have been reviewed and continue current psychiatric medications. Vital signs in last 24 hours:  Temp:  [97 °F (36.1 °C)-97.9 °F (36.6 °C)] 97 °F (36.1 °C)  HR:  [] 90  Resp:  [16] 16  BP: (102-124)/(59-80) 118/75    Laboratory results:    I have personally reviewed all pertinent laboratory/tests results.   Most Recent Labs:   Lab Results   Component Value Date    WBC 10.21 (H) 09/25/2023    RBC 4.72 09/25/2023    HGB 14.6 09/25/2023    HCT 44.3 09/25/2023     09/25/2023    RDW 12.2 09/25/2023    TOTANEUTABS 4.84 06/08/2023    NEUTROABS 3.76 09/25/2023    SODIUM 139 09/27/2023    K 4.0 09/27/2023     09/27/2023    CO2 30 09/27/2023    BUN 14 09/27/2023    CREATININE 1.23 09/27/2023    GLUC 88 09/27/2023    GLUF 88 09/27/2023    CALCIUM 9.4 09/27/2023    AST 25 09/27/2023    ALT 23 09/27/2023    ALKPHOS 53 09/27/2023    TP 7.4 09/27/2023    ALB 4.2 09/27/2023    TBILI 0.36 09/27/2023    CHOLESTEROL 167 09/02/2023    HDL 53 09/02/2023    TRIG 113 09/02/2023    LDLCALC 91 09/02/2023    NONHDLC 114 09/02/2023    VALPROICTOT 91 06/12/2023    AMMONIA 22 10/09/2022    [id number] 1.841 05/19/2023    FREET4 1.16 10/09/2022    HGBA1C 6.3 (H) 09/27/2023     09/27/2023         Mental Status Evaluation:    Appearance:  disheveled, marginal hygiene, -American male   Behavior:  cooperative, guarded, slow responses   Speech:  increased latency of response, soft   Mood:  "fine"   Affect:  blunted   Thought Process:  goal directed   Associations: intact associations   Thought Content:  no overt delusions   Perceptual Disturbances: no auditory hallucinations, no visual hallucinations, does not appear responding to internal stimuli   Risk Potential: Suicidal ideation - None  Homicidal ideation - None  Potential for aggression - No   Sensorium:  oriented to person, place and time/date   Memory:  recent and remote memory grossly intact   Consciousness:  alert and awake   Attention/Concentration: attention span and concentration appear shorter than expected for age   Insight:  limited   Judgment: limited   Gait/Station: normal gait/station   Motor Activity: no abnormal movements     Progress Toward Goals: slow progress. Recommended Treatment:   See above for assessment and plan. Risks, benefits and possible side effects of Medications:   Risks, benefits, and possible side effects of medications explained to patient and patient verbalizes understanding. Treatment discussed with nursing staff. This note has been constructed using a voice recognition system. There may be translation, syntax, or grammatical errors. If you have any questions, please contact the dictating provider.     Elizabeth Haile MD  Psychiatry, PGY-4

## 2023-10-04 PROCEDURE — 93005 ELECTROCARDIOGRAM TRACING: CPT

## 2023-10-04 PROCEDURE — 99232 SBSQ HOSP IP/OBS MODERATE 35: CPT | Performed by: PSYCHIATRY & NEUROLOGY

## 2023-10-04 RX ADMIN — SENNOSIDES AND DOCUSATE SODIUM 2 TABLET: 8.6; 5 TABLET ORAL at 08:23

## 2023-10-04 RX ADMIN — GLYCOPYRROLATE 1 MG: 1 TABLET ORAL at 08:23

## 2023-10-04 RX ADMIN — CLOZAPINE 350 MG: 100 TABLET ORAL at 17:26

## 2023-10-04 RX ADMIN — DIVALPROEX SODIUM 750 MG: 500 TABLET, DELAYED RELEASE ORAL at 08:23

## 2023-10-04 RX ADMIN — NICOTINE POLACRILEX 4 MG: 4 GUM, CHEWING BUCCAL at 04:30

## 2023-10-04 RX ADMIN — DIVALPROEX SODIUM 750 MG: 500 TABLET, DELAYED RELEASE ORAL at 19:36

## 2023-10-04 RX ADMIN — SENNOSIDES AND DOCUSATE SODIUM 2 TABLET: 8.6; 5 TABLET ORAL at 17:26

## 2023-10-04 RX ADMIN — METOPROLOL TARTRATE 12.5 MG: 25 TABLET, FILM COATED ORAL at 08:23

## 2023-10-04 RX ADMIN — METOPROLOL TARTRATE 12.5 MG: 25 TABLET, FILM COATED ORAL at 21:17

## 2023-10-04 RX ADMIN — GLYCOPYRROLATE 1 MG: 1 TABLET ORAL at 21:17

## 2023-10-04 RX ADMIN — GLYCOPYRROLATE 1 MG: 1 TABLET ORAL at 17:26

## 2023-10-04 RX ADMIN — METFORMIN HYDROCHLORIDE 1000 MG: 500 TABLET, FILM COATED ORAL at 08:23

## 2023-10-04 RX ADMIN — METFORMIN HYDROCHLORIDE 1000 MG: 500 TABLET, FILM COATED ORAL at 17:26

## 2023-10-04 NOTE — NURSING NOTE
Patient in room at beginning of shift encouraged to shower and come out for vitals and meals with success. Patient continues to enjoy being in his room. Denies SI/HI/AH/VH at this time. Offers no complaint of pain or discomfort at this time.

## 2023-10-04 NOTE — NURSING NOTE
Patient seclusive to room as of this time. Showered, clothing changed, room cleaned up. Encouraged groups and breakfast; declined. Denies symptoms. compliant with medications.

## 2023-10-04 NOTE — PROGRESS NOTES
10/04/23 0834   Team Meeting   Meeting Type Daily Rounds   Team Members Present   Team Members Present Physician;Nurse;   Physician Team Member [id number] Dev   Patient/Family Present   Patient Present No   Patient's Family Present No   Clozaril level in process. Pt mostly seclusive to room. Pt has family meeting today. Pt malodorous. Denies all. Med/meal compliant. DC pending EAC.

## 2023-10-04 NOTE — PROGRESS NOTES
Progress Note - Behavioral Health   Goklu Rubio 26 y.o. male MRN: [id number]  Unit/Bed#: [facility] 343-01 Encounter: [id number]    Assessment/Plan   Principal Problem:    Schizoaffective disorder ([address])  Active Problems:    Legally blind    Hearing loss    Asthma    Medical clearance for psychiatric admission    Prolonged erection    Intellectual disability    Rib pain on left side    • Continue medications as noted below. • Follow-up labs  • Continue to promote patient participation in group therapy, milieu therapy, occupational therapy  • Continue medical management by primary team.  • Discharge disposition:  pending EAC placement    Subjective: The patient was evaluated this morning for continuity of care and no acute distress noted throughout the evaluation. Over the past 24 hours staff noted that patient has been mostly seclusive to room, continues to avoid ADLs, malodorous. Patient has been medication adherent. Today on evaluation, Gokul reports that he feels "okay "in terms of mood. Denies any acute issues at this time. States that his grandmother is coming today, and that he is going to take a shower prior to her coming. Denies feeling down, depressed, hopeless. Denies acute uncontrolled anxiety symptoms. Denies chest pain, shortness of breath, headaches, dizziness, malaise, rhinorrhea, nasal congestion, cough, abdominal pain. Reports having bowel movements yesterday. In terms of safety, Gokul Denies SI/HI. Gokul Denies auditory, visual hallucinations. Reports feeling safe on the unit.     Psychiatric Review of Systems:  Behavior over the last 24 hours:  unchanged  Sleep: normal  Appetite: normal  Medication side effects: No   ROS: no complaints, all other systems are negative    Current Medications:  Current Facility-Administered Medications   Medication Dose Route Frequency Provider Last Rate   • acetaminophen  650 mg Oral Q6H PRN Yan Hernandez MD     • acetaminophen  650 mg Oral Q4H PRN Katie Sorensen MD     • acetaminophen  975 mg Oral Q6H PRN Katie Sorensen MD     • albuterol  2 puff Inhalation Q4H PRN Katie Sorensen MD     • aluminum-magnesium hydroxide-simethicone  30 mL Oral Q4H PRN Katie Sorensen MD     • benztropine  1 mg Intramuscular BID PRN Sadie Ball MD     • benztropine  1 mg Oral Q4H PRN Max 6/day Katie Sorensen MD     • cloZAPine  350 mg Oral Daily Debora Luis MD     • divalproex sodium  750 mg Oral BID Bret Platt MD     • fluticasone  1 spray Nasal Daily PRN Brooke Soriano PA-C     • glycopyrrolate  1 mg Oral TID Demetria Jones, DO     • hydrOXYzine HCL  100 mg Oral Q6H PRN Max 4/day Fitch Bracket, DO      Or   • LORazepam  1 mg Intramuscular Q6H PRN Fitch Bracket, DO     • hydrOXYzine HCL  25 mg Oral Q6H PRN Max 4/day Katie Sorensen MD     • hydrOXYzine HCL  50 mg Oral Q6H PRN Max 4/day Katie Sorensen MD     • melatonin  3 mg Oral HS PRN Fitch Bracket, DO     • metFORMIN  1,000 mg Oral BID With Meals ELA Wise     • metoprolol tartrate  12.5 mg Oral Q12H [facility] ELA Wise     • nicotine polacrilex  4 mg Oral Q2H PRN Katie Sorensen MD     • OLANZapine  5 mg Oral Q3H PRN Max 6/day Bret Platt MD      Or   • OLANZapine  5 mg Intramuscular Q3H PRN Max 6/day Bret Platt MD     • OLANZapine  5 mg Intramuscular Q8H PRN Fitch Bracket, DO      Or   • OLANZapine  7.5 mg Oral Q8H PRN Fitch Bracket, DO     • OLANZapine  2.5 mg Oral Q3H PRN Max 8/day Bret Platt MD     • ondansetron  4 mg Oral Q8H PRN Fitch Bracket, DO     • polyethylene glycol  17 g Oral Daily PRN Katie Sorensen MD     • pseudoephedrine  120 mg Oral BID PRN Arya Philippe MD     • senna-docusate sodium  1 tablet Oral Daily PRASHOK Sorensen MD     • senna-docusate sodium  2 tablet Oral BID Bret Platt MD     • sterile water          • sterile water          • sterile water          • sterile water • sterile water          • sterile water          • sterile water          • sterile water          • sterile water          • sterile water          • sterile water          • sterile water              Behavioral Health Medications: all current active meds have been reviewed and continue current psychiatric medications. Vital signs in last 24 hours:  Temp:  [97.6 °F (36.4 °C)-98 °F (36.7 °C)] 98 °F (36.7 °C)  HR:  [102-108] 102  Resp:  [16-18] 16  BP: (112-142)/(58-96) 142/96    Laboratory results:    I have personally reviewed all pertinent laboratory/tests results.   Most Recent Labs:   Lab Results   Component Value Date    WBC 7.68 10/03/2023    RBC 4.86 10/03/2023    HGB 15.2 10/03/2023    HCT 46.0 10/03/2023     10/03/2023    RDW 12.2 10/03/2023    TOTANEUTABS 4.84 06/08/2023    NEUTROABS 3.92 10/03/2023    SODIUM 139 09/27/2023    K 4.0 09/27/2023     09/27/2023    CO2 30 09/27/2023    BUN 14 09/27/2023    CREATININE 1.23 09/27/2023    GLUC 88 09/27/2023    GLUF 88 09/27/2023    CALCIUM 9.4 09/27/2023    AST 25 09/27/2023    ALT 23 09/27/2023    ALKPHOS 53 09/27/2023    TP 7.4 09/27/2023    ALB 4.2 09/27/2023    TBILI 0.36 09/27/2023    CHOLESTEROL 167 09/02/2023    HDL 53 09/02/2023    TRIG 113 09/02/2023    LDLCALC 91 09/02/2023    NONHDLC 114 09/02/2023    VALPROICTOT 91 06/12/2023    AMMONIA 22 10/09/2022    [id number] 1.841 05/19/2023    FREET4 1.16 10/09/2022    HGBA1C 6.3 (H) 09/27/2023     09/27/2023         Mental Status Evaluation:    Appearance:  disheveled, marginal hygiene, dressed in hospital attire   Behavior:  cooperative, calm, guarded   Speech:  increased latency of response, soft   Mood:  "ok"   Affect:  blunted   Thought Process:  goal directed   Associations: intact associations   Thought Content:  no overt delusions   Perceptual Disturbances: no auditory hallucinations, no visual hallucinations, does not appear responding to internal stimuli   Risk Potential: Suicidal ideation - None  Homicidal ideation - None  Potential for aggression - No   Sensorium:  oriented to person, place and time/date   Memory:  recent and remote memory grossly intact   Consciousness:  alert and awake   Attention/Concentration: attention span and concentration appear shorter than expected for age   Insight:  limited   Judgment: limited   Gait/Station: normal gait/station   Motor Activity: no abnormal movements     Progress Toward Goals: Slow progress    Recommended Treatment:   See above for assessment and plan. Risks, benefits and possible side effects of Medications:   Risks, benefits, and possible side effects of medications explained to patient and patient verbalizes understanding. Treatment discussed with nursing staff. This note has been constructed using a voice recognition system. There may be translation, syntax, or grammatical errors. If you have any questions, please contact the dictating provider.     Lisy Gonzalez MD\  Psychiatry, PGY-4

## 2023-10-04 NOTE — NURSING NOTE
Received patient at 1500. The patient is sleeping during the evening and needs to be gently shaken to weak up for his schedule medication. Patient refused to come out for dinner. The patient agrees to eat snack with their schedule evening  medication. Patient remain Q 7 min  min check

## 2023-10-05 LAB
ATRIAL RATE: 96 BPM
ATRIAL RATE: 99 BPM
CLOZAPINE SERPL-MCNC: 335 NG/ML (ref 350–600)
CLOZAPINE+NOR SERPL-MCNC: 488 NG/ML
NORCLOZAPINE SERPL-MCNC: 153 NG/ML
P AXIS: 48 DEGREES
P AXIS: 49 DEGREES
PR INTERVAL: 148 MS
PR INTERVAL: 148 MS
QRS AXIS: 60 DEGREES
QRS AXIS: 62 DEGREES
QRSD INTERVAL: 102 MS
QRSD INTERVAL: 90 MS
QT INTERVAL: 346 MS
QT INTERVAL: 352 MS
QTC INTERVAL: 444 MS
QTC INTERVAL: 444 MS
T WAVE AXIS: 36 DEGREES
T WAVE AXIS: 44 DEGREES
VENTRICULAR RATE: 96 BPM
VENTRICULAR RATE: 99 BPM

## 2023-10-05 PROCEDURE — 93010 ELECTROCARDIOGRAM REPORT: CPT

## 2023-10-05 PROCEDURE — 99232 SBSQ HOSP IP/OBS MODERATE 35: CPT | Performed by: PSYCHIATRY & NEUROLOGY

## 2023-10-05 RX ADMIN — METOPROLOL TARTRATE 12.5 MG: 25 TABLET, FILM COATED ORAL at 09:58

## 2023-10-05 RX ADMIN — SENNOSIDES AND DOCUSATE SODIUM 2 TABLET: 8.6; 5 TABLET ORAL at 08:13

## 2023-10-05 RX ADMIN — CLOZAPINE 350 MG: 100 TABLET ORAL at 17:19

## 2023-10-05 RX ADMIN — GLYCOPYRROLATE 1 MG: 1 TABLET ORAL at 21:02

## 2023-10-05 RX ADMIN — DIVALPROEX SODIUM 750 MG: 500 TABLET, DELAYED RELEASE ORAL at 08:13

## 2023-10-05 RX ADMIN — METOPROLOL TARTRATE 12.5 MG: 25 TABLET, FILM COATED ORAL at 20:56

## 2023-10-05 RX ADMIN — GLYCOPYRROLATE 1 MG: 1 TABLET ORAL at 08:14

## 2023-10-05 RX ADMIN — METFORMIN HYDROCHLORIDE 1000 MG: 500 TABLET, FILM COATED ORAL at 17:19

## 2023-10-05 RX ADMIN — GLYCOPYRROLATE 1 MG: 1 TABLET ORAL at 17:19

## 2023-10-05 RX ADMIN — METFORMIN HYDROCHLORIDE 1000 MG: 500 TABLET, FILM COATED ORAL at 08:14

## 2023-10-05 RX ADMIN — SENNOSIDES AND DOCUSATE SODIUM 2 TABLET: 8.6; 5 TABLET ORAL at 17:19

## 2023-10-05 RX ADMIN — DIVALPROEX SODIUM 750 MG: 500 TABLET, DELAYED RELEASE ORAL at 19:21

## 2023-10-05 NOTE — PROGRESS NOTES
10/05/23 0828   Team Meeting   Meeting Type Daily Rounds   Team Members Present   Team Members Present Physician;Nurse;   Physician Team Member [address] Team Member [facility] Management Team Member mela   Patient/Family Present   Patient Present No   Patient's Family Present No   Clozaril pending. Pt refused to come out for dinner. EKG completed. Med/meal compliant. DC pending EAC.

## 2023-10-05 NOTE — NURSING NOTE
Patient was visible in the milieu at the start of the shift. Out for breakfast and medications. Scant during interaction but appropriate. Denies symptoms. Waiting EAC placement.

## 2023-10-05 NOTE — PROGRESS NOTES
Progress Note - Behavioral Health   Gokul Peguero 26 y.o. male MRN: [id number]  Unit/Bed#: [facility] 343-01 Encounter: [id number]    Assessment/Plan   Principal Problem:    Schizoaffective disorder ([address])  Active Problems:    Legally blind    Hearing loss    Asthma    Medical clearance for psychiatric admission    Prolonged erection    Intellectual disability    Rib pain on left side    • Continue medications as noted below. • Clozapine level pending  • Continue to promote patient participation in group therapy, milieu therapy, occupational therapy  • Continue medical management by primary team.  • Discharge disposition:  Pending placement in EAC    Subjective: The patient was evaluated this morning for continuity of care and no acute distress noted throughout the evaluation. Over the past 24 hours staff noted that yesterday patient refused to come out for dinner. Mostly withdrawn to his room, sleeping/resting throughout the day, but has not difficult to wake up. Patient has been medication adherent. Today on evaluation, Gokul states that he is feeling "okay "in terms of his mood. Denies feeling down depressed or hopeless. Denies feeling anxious. Denies feeling angry, irritable, mood swings. Denies any physical acute issues at this time, including shortness of breath, chest pain, headaches, dizziness, numbness, paresthesias, pain elsewhere. Reports no side effects from medications. In terms of safety, Gokul Denies SI/HI. Gokul Denies auditory, visual hallucinations.      Psychiatric Review of Systems:  Behavior over the last 24 hours:  unchanged  Sleep: normal  Appetite: normal  Medication side effects: No   ROS: no complaints, all other systems are negative    Current Medications:  Current Facility-Administered Medications   Medication Dose Route Frequency Provider Last Rate   • acetaminophen  650 mg Oral Q6H PRN Rosa Zaldivar MD     • acetaminophen  650 mg Oral Q4H PRN Rosa Zaldivar MD     • acetaminophen  975 mg Oral Q6H PRN Therese Hall MD     • albuterol  2 puff Inhalation Q4H PRN Therese Hall MD     • aluminum-magnesium hydroxide-simethicone  30 mL Oral Q4H PRN Therese Hall MD     • benztropine  1 mg Intramuscular BID PRN Dilan Chopra MD     • benztropine  1 mg Oral Q4H PRN Max 6/day Therese Hall MD     • cloZAPine  350 mg Oral Daily Debora Luis MD     • divalproex sodium  750 mg Oral BID Patrick Dotson MD     • fluticasone  1 spray Nasal Daily PRN Yelitza Jain PA-C     • glycopyrrolate  1 mg Oral TID Joanna Hannah DO     • hydrOXYzine HCL  100 mg Oral Q6H PRN Max 4/day Ariel Roman, DO      Or   • LORazepam  1 mg Intramuscular Q6H PRN Ariel Pilger, DO     • hydrOXYzine HCL  25 mg Oral Q6H PRN Max 4/day Therese Hall MD     • hydrOXYzine HCL  50 mg Oral Q6H PRN Max 4/day Therese Hall MD     • melatonin  3 mg Oral HS PRN Ariel Pilger, DO     • metFORMIN  1,000 mg Oral BID With Meals ELA Wise     • metoprolol tartrate  12.5 mg Oral Q12H [address] [facility] ELA Wise     • nicotine polacrilex  4 mg Oral Q2H PRN Therese Hall MD     • OLANZapine  5 mg Oral Q3H PRN Max 6/day Patrick Dotson MD      Or   • OLANZapine  5 mg Intramuscular Q3H PRN Max 6/day Patrick Dotson MD     • OLANZapine  5 mg Intramuscular Q8H PRN Ariel Roman, DO      Or   • OLANZapine  7.5 mg Oral Q8H PRN Ariel Pilger, DO     • OLANZapine  2.5 mg Oral Q3H PRN Max 8/day Patrick Dotson MD     • ondansetron  4 mg Oral Q8H PRN Ariel Pilger, DO     • polyethylene glycol  17 g Oral Daily PRN Therese Hall MD     • pseudoephedrine  120 mg Oral BID PRN Felicia Samuel MD     • senna-docusate sodium  1 tablet Oral Daily PRN Therese Hall MD     • senna-docusate sodium  2 tablet Oral BID Patrick Dotson MD     • sterile water          • sterile water          • sterile water          • sterile water          • sterile water          • sterile water          • sterile water          • sterile water          • sterile water          • sterile water          • sterile water          • sterile water              Behavioral Health Medications: all current active meds have been reviewed and continue current psychiatric medications. Vital signs in last 24 hours:  HR:  [] 104  Resp:  [17] 17  BP: (114-134)/(73-82) 134/82    Laboratory results:    I have personally reviewed all pertinent laboratory/tests results.   Most Recent Labs:   Lab Results   Component Value Date    WBC 7.68 10/03/2023    RBC 4.86 10/03/2023    HGB 15.2 10/03/2023    HCT 46.0 10/03/2023     10/03/2023    RDW 12.2 10/03/2023    TOTANEUTABS 4.84 06/08/2023    NEUTROABS 3.92 10/03/2023    SODIUM 139 09/27/2023    K 4.0 09/27/2023     09/27/2023    CO2 30 09/27/2023    BUN 14 09/27/2023    CREATININE 1.23 09/27/2023    GLUC 88 09/27/2023    GLUF 88 09/27/2023    CALCIUM 9.4 09/27/2023    AST 25 09/27/2023    ALT 23 09/27/2023    ALKPHOS 53 09/27/2023    TP 7.4 09/27/2023    ALB 4.2 09/27/2023    TBILI 0.36 09/27/2023    CHOLESTEROL 167 09/02/2023    HDL 53 09/02/2023    TRIG 113 09/02/2023    LDLCALC 91 09/02/2023    NONHDLC 114 09/02/2023    VALPROICTOT 91 06/12/2023    AMMONIA 22 10/09/2022    [id number] 1.841 05/19/2023    FREET4 1.16 10/09/2022    HGBA1C 6.3 (H) 09/27/2023     09/27/2023         Mental Status Evaluation:    Appearance:  disheveled, marginal hygiene, -American male   Behavior:  pleasant, cooperative, guarded   Speech:  increased latency of response, soft   Mood:  "Okay "   Affect:  blunted   Thought Process:  goal directed, but with poverty of thought   Associations: intact associations   Thought Content:  no overt delusions   Perceptual Disturbances: no auditory hallucinations, no visual hallucinations, does not appear responding to internal stimuli    Risk Potential: Suicidal ideation - None  Homicidal ideation - None  Potential for aggression - No   Sensorium:  oriented to person, place, day of week, month of year and year   Memory:  recent and remote memory grossly intact   Consciousness:  alert and awake   Attention/Concentration: attention span and concentration appear shorter than expected for age   Insight:  limited   Judgment: limited   Gait/Station: normal gait/station   Motor Activity:  Psychomotor slowing     Progress Toward Goals: Slow progression    Recommended Treatment:   See above for assessment and plan. Risks, benefits and possible side effects of Medications:   Risks, benefits, and possible side effects of medications explained to patient and patient verbalizes understanding. Treatment discussed with nursing staff. This note has been constructed using a voice recognition system. There may be translation, syntax, or grammatical errors. If you have any questions, please contact the dictating provider.     Katya Ahumada MD  Psychiatry, PGY 4

## 2023-10-05 NOTE — NURSING NOTE
Received patient at 1500. The patient remain isolated in his room  most of the evening. With considerable encouragement, the patient agreed to join for dinner. During a room search staff discovered a set of sheets saturated with urine in the patient room. When asked about any recent episodes of incontinence. the patient reported that it occurred last night when he weak up, experiencing an urgent need to void. Patient cooperating with schedule medication. Denies any unmeet needs.

## 2023-10-06 PROCEDURE — 99232 SBSQ HOSP IP/OBS MODERATE 35: CPT | Performed by: PSYCHIATRY & NEUROLOGY

## 2023-10-06 RX ADMIN — METFORMIN HYDROCHLORIDE 1000 MG: 500 TABLET, FILM COATED ORAL at 10:28

## 2023-10-06 RX ADMIN — DIVALPROEX SODIUM 750 MG: 500 TABLET, DELAYED RELEASE ORAL at 10:27

## 2023-10-06 RX ADMIN — GLYCOPYRROLATE 1 MG: 1 TABLET ORAL at 21:15

## 2023-10-06 RX ADMIN — SENNOSIDES AND DOCUSATE SODIUM 2 TABLET: 8.6; 5 TABLET ORAL at 17:45

## 2023-10-06 RX ADMIN — METFORMIN HYDROCHLORIDE 1000 MG: 500 TABLET, FILM COATED ORAL at 17:48

## 2023-10-06 RX ADMIN — METOPROLOL TARTRATE 12.5 MG: 25 TABLET, FILM COATED ORAL at 20:57

## 2023-10-06 RX ADMIN — NICOTINE POLACRILEX 4 MG: 4 GUM, CHEWING BUCCAL at 00:22

## 2023-10-06 RX ADMIN — OLANZAPINE 7.5 MG: 2.5 TABLET, FILM COATED ORAL at 00:22

## 2023-10-06 RX ADMIN — CLOZAPINE 350 MG: 100 TABLET ORAL at 17:46

## 2023-10-06 RX ADMIN — SENNOSIDES AND DOCUSATE SODIUM 2 TABLET: 8.6; 5 TABLET ORAL at 10:28

## 2023-10-06 RX ADMIN — MELATONIN TAB 3 MG 3 MG: 3 TAB at 00:22

## 2023-10-06 RX ADMIN — GLYCOPYRROLATE 1 MG: 1 TABLET ORAL at 10:27

## 2023-10-06 RX ADMIN — NICOTINE POLACRILEX 4 MG: 4 GUM, CHEWING BUCCAL at 21:58

## 2023-10-06 RX ADMIN — DIVALPROEX SODIUM 750 MG: 500 TABLET, DELAYED RELEASE ORAL at 18:38

## 2023-10-06 RX ADMIN — METOPROLOL TARTRATE 12.5 MG: 25 TABLET, FILM COATED ORAL at 10:28

## 2023-10-06 RX ADMIN — GLYCOPYRROLATE 1 MG: 1 TABLET ORAL at 17:46

## 2023-10-06 NOTE — NURSING NOTE
Pt came to the nurse's station stating he is agitated and requested an injection. He agreed to take Zyprexa 7.5 mg PO with encouragement. He also received Melatonin 3 mg. Currently he is RIS in the anthony. Will continue to monitor.

## 2023-10-06 NOTE — NURSING NOTE
Patient has been seclusive to his room. Refused to get out of bed for breakfast as well as vital signs. Eventually allowed writer to take his vital signs and was then given morning medications. Denies symptoms. Waiting placement.

## 2023-10-06 NOTE — NURSING NOTE
Throughout the evening the patient has been consistently visible, cooperative with the unit's routine. The patient denies any unmeet needs.

## 2023-10-06 NOTE — PROGRESS NOTES
10/06/23 0837   Team Meeting   Meeting Type Daily Rounds   Team Members Present   Team Members Present Physician;Nurse;   Physician Team Member [id number] [facility]   Care Management Team Member Dev   Patient/Family Present   Patient Present No   Patient's Family Present No   Refused vitals. Pt seclusive to room. Pt had difficult night and agitated due to AH. PRN zyprexa-effective. Med/meal compliant. Dc pending EAC.

## 2023-10-06 NOTE — NURSING NOTE
PRN zyprexa and melatonin effective; pt now resting peacefully in bed with eyes closed. No agitation observed.

## 2023-10-06 NOTE — PROGRESS NOTES
Progress Note - 1001 Pauline Hopson 26 y.o. male MRN: [id number]   Unit/Bed#: [facility] 343-01 Encounter: [id number]    Patient was seen and evaluated for continuity of care. Per nursing report yesterday afternoon, patient was out for breakfast and medications and was noted to be scant in interaction but remained in behavioral control. Per nursing report on evening shift, during room search, patient found a set of sheets saturated with urine in patient's room and when asked, patient reported that he had an episode of incontinence the night prior when he woke up. Overnight, patient reported agitation and took Zyprexa 7.5 mg p.o. with encouragement and received melatonin. He was noted to be responding to internal stimuli in the hallway overnight. As needed medications were noted to be effective. During the interview today, patient describes his mood as "okay."  He last reported a bowel movement 2 days ago. He states that he slept 8 hours and notes doing well overall in terms of his appetite. He denies SI/HI/AVH and denies any plan or intent to harm himself or others. He continues to await placement into EAC. He agrees to continue with his current medications as prescribed below. He denies any priapism or painful/prolonged erection. Patient last reported a bowel movement on 10/4/2023.     Sleep: slept 8 hours  Appetite: normal  Medication side effects: No   ROS: no complaints, all other systems are negative    Mental Status Evaluation:    Appearance:  -American male, poor hygiene, disheveled, no acute distress, drowsy   Behavior:  cooperative, guarded, no psychomotor slowing or agitation   Speech:  slow, scant, delayed   Mood:  "ok"   Affect:  blunted   Thought Process:  poverty of thought   Associations: concrete associations   Thought Content:  preoccupied   Perceptual Disturbances: no auditory hallucinations, no visual hallucinations, denies auditory hallucinations when asked, does not appear responding to internal stimuli at the time of interview, appears distracted, appears preoccupied, but was observed responding to internal stimuli overnight by staff   Risk Potential: Suicidal ideation - None  Homicidal ideation - None  Potential for aggression - No   Sensorium:  oriented to person, place and situation   Memory:  recent and remote memory grossly intact   Consciousness:  alert and awake   Attention/Concentration: attention span and concentration appear shorter than expected for age   Insight:  limited   Judgment: limited   Gait/Station: normal gait/station   Motor Activity: no abnormal movements     Vital signs in last 24 hours:    Temp:  [97.7 °F (36.5 °C)] 97.7 °F (36.5 °C)  HR:  [] 82  Resp:  [18] 18  BP: (125-148)/(69) 125/69    Laboratory results: I have personally reviewed all pertinent laboratory/tests results    Results from the past 24 hours: No results found for this or any previous visit (from the past 24 hour(s)).   Most Recent Labs:   Lab Results   Component Value Date    WBC 7.68 10/03/2023    RBC 4.86 10/03/2023    HGB 15.2 10/03/2023    HCT 46.0 10/03/2023     10/03/2023    RDW 12.2 10/03/2023    NEUTROABS 3.92 10/03/2023    TOTANEUTABS 4.84 06/08/2023    SODIUM 139 09/27/2023    K 4.0 09/27/2023     09/27/2023    CO2 30 09/27/2023    BUN 14 09/27/2023    CREATININE 1.23 09/27/2023    GLUC 88 09/27/2023    CALCIUM 9.4 09/27/2023    AST 25 09/27/2023    ALT 23 09/27/2023    ALKPHOS 53 09/27/2023    TP 7.4 09/27/2023    ALB 4.2 09/27/2023    TBILI 0.36 09/27/2023    CHOLESTEROL 167 09/02/2023    HDL 53 09/02/2023    TRIG 113 09/02/2023    LDLCALC 91 09/02/2023    NONHDLC 114 09/02/2023    VALPROICTOT 91 06/12/2023    AMMONIA 22 10/09/2022    VRW5KIIFGPGB 1.841 05/19/2023    FREET4 1.16 10/09/2022    HGBA1C 6.3 (H) 09/27/2023     09/27/2023       Progress Toward Goals: progressing slowly    Assessment/Plan   Principal Problem:    Schizoaffective disorder [facility])  Active Problems:    Legally blind    Hearing loss    Asthma    Medical clearance for psychiatric admission    Prolonged erection    Intellectual disability    Rib pain on left side      Recommended Treatment:     Planned medication and treatment changes: All current active medications have been reviewed  Encourage group therapy, milieu therapy and occupational therapy  Behavioral Health checks every 7 minutes    1) Continue Clozaril 350 mg daily for psychosis (WBC on 10/3/2023 was 7.68 and ANC was 3.92).   Cozapine level on 10/3/2023 was 335.  2) Continue Depakote 750 mg twice a day for mood stability (Depakote level 91 on 6/12/2023)  3) Continue glycopyrrolate 1 mg 3 times daily for sialorrhea  4) Continue Senokot 2 tablets twice a day for bowel regimen  5) Encourage groups  6) Encourage patient to visible on unit  7) CM to continue care coordination  8) Awaiting EAC placement    Current Facility-Administered Medications   Medication Dose Route Frequency Provider Last Rate   • acetaminophen  650 mg Oral Q6H PRN Yan Hernandez MD     • acetaminophen  650 mg Oral Q4H PRN Yan Hernandez MD     • acetaminophen  975 mg Oral Q6H PRN Yan Hernandez MD     • albuterol  2 puff Inhalation Q4H PRN Yan Hernandez MD     • aluminum-magnesium hydroxide-simethicone  30 mL Oral Q4H PRN Yan Hernandez MD     • benztropine  1 mg Intramuscular BID PRN Lucy Paul MD     • benztropine  1 mg Oral Q4H PRN Max 6/day Yan Hernandez MD     • cloZAPine  350 mg Oral Daily Debora Luis MD     • divalproex sodium  750 mg Oral BID Niraj Cruz MD     • fluticasone  1 spray Nasal Daily PRN Main Duffy PA-C     • glycopyrrolate  1 mg Oral TID Hedda Schaumann, DO     • hydrOXYzine HCL  100 mg Oral Q6H PRN Max 4/day Wendy Boston DO      Or   • LORazepam  1 mg Intramuscular Q6H PRN Wendyaron Boston DO     • hydrOXYzine HCL  25 mg Oral Q6H PRN Max 4/day aYn Hernandez MD     • hydrOXYzine HCL  50 mg Oral Q6H PRN Max 4/day Janneth Mcgraw MD     • melatonin  3 mg Oral HS PRN Jamia Jose L, DO     • metFORMIN  1,000 mg Oral BID With Meals ELA Wise     • metoprolol tartrate  12.5 mg Oral Q12H [address] ELA Wise     • nicotine polacrilex  4 mg Oral Q2H PRN Janneth Mcgraw MD     • OLANZapine  5 mg Oral Q3H PRN Max 6/day Tai Peralta MD      Or   • OLANZapine  5 mg Intramuscular Q3H PRN Max 6/day Tai Peralta MD     • OLANZapine  5 mg Intramuscular Q8H PRN Jamia Jose L, DO      Or   • OLANZapine  7.5 mg Oral Q8H PRN Jamia Jose L, DO     • OLANZapine  2.5 mg Oral Q3H PRN Max 8/day Tai Peralta MD     • ondansetron  4 mg Oral Q8H PRN Jamia Jose L, DO     • polyethylene glycol  17 g Oral Daily PRN Janneth Mcgraw MD     • pseudoephedrine  120 mg Oral BID PRN Марина Farris MD     • senna-docusate sodium  1 tablet Oral Daily PRN Janneth Mcgraw MD     • senna-docusate sodium  2 tablet Oral BID Tai Peralta MD     • sterile water          • sterile water          • sterile water          • sterile water          • sterile water          • sterile water          • sterile water          • sterile water          • sterile water          • sterile water          • sterile water          • sterile water            Risks / Benefits of Treatment:    Risks, benefits, and possible side effects of medications explained to patient. Patient has limited understanding of risks and benefits of treatment at this time, but agrees to take medications as prescribed. Counseling / Coordination of Care: Total floor / unit time spent today 20 minutes. Greater than 50% of total time was spent with the patient and / or family counseling and / or coordination of care. A description of counseling / coordination of care:  Patient's progress discussed with staff in treatment team meeting.   Medications, treatment progress and treatment plan reviewed with patient. Reassurance and supportive therapy provided. Encouraged participation in milieu and group therapy on the unit.     Cesar Rodgers MD 10/06/23

## 2023-10-07 PROCEDURE — 99232 SBSQ HOSP IP/OBS MODERATE 35: CPT | Performed by: PSYCHIATRY & NEUROLOGY

## 2023-10-07 RX ADMIN — SENNOSIDES AND DOCUSATE SODIUM 2 TABLET: 8.6; 5 TABLET ORAL at 17:46

## 2023-10-07 RX ADMIN — GLYCOPYRROLATE 1 MG: 1 TABLET ORAL at 21:14

## 2023-10-07 RX ADMIN — CLOZAPINE 350 MG: 100 TABLET ORAL at 17:46

## 2023-10-07 RX ADMIN — DIVALPROEX SODIUM 750 MG: 500 TABLET, DELAYED RELEASE ORAL at 08:51

## 2023-10-07 RX ADMIN — SENNOSIDES AND DOCUSATE SODIUM 2 TABLET: 8.6; 5 TABLET ORAL at 08:52

## 2023-10-07 RX ADMIN — GLYCOPYRROLATE 1 MG: 1 TABLET ORAL at 17:46

## 2023-10-07 RX ADMIN — METFORMIN HYDROCHLORIDE 1000 MG: 500 TABLET, FILM COATED ORAL at 08:51

## 2023-10-07 RX ADMIN — METOPROLOL TARTRATE 12.5 MG: 25 TABLET, FILM COATED ORAL at 21:14

## 2023-10-07 RX ADMIN — DIVALPROEX SODIUM 750 MG: 500 TABLET, DELAYED RELEASE ORAL at 18:32

## 2023-10-07 RX ADMIN — METFORMIN HYDROCHLORIDE 1000 MG: 500 TABLET, FILM COATED ORAL at 17:46

## 2023-10-07 RX ADMIN — GLYCOPYRROLATE 1 MG: 1 TABLET ORAL at 08:52

## 2023-10-07 RX ADMIN — METOPROLOL TARTRATE 12.5 MG: 25 TABLET, FILM COATED ORAL at 08:51

## 2023-10-07 NOTE — NURSING NOTE
Pt is isolative to room, pt refused to get up for meals and vitals. Pt is medication compliant. Pt denies SI, HI, AH, and VH. Pt denies any needs at this time.

## 2023-10-07 NOTE — PROGRESS NOTES
Progress Note - Behavioral Health   Gokul Gonzalez August 26 y.o. male MRN: [id number]  Unit/Bed#: [facility] 343-01 Encounter: [id number]    Assessment/Plan   Principal Problem:    Schizoaffective disorder ([address])  Active Problems:    Legally blind    Hearing loss    Asthma    Medical clearance for psychiatric admission    Prolonged erection    Intellectual disability    Rib pain on left side      Recommended Treatment:   No changes to psychiatric medications at this time. Continue medications as prescribed. Continue with group therapy, milieu therapy and occupational therapy. Continue frequent safety checks and vitals per unit protocol. Case discussed with treatment team.  Risks, benefits and possible side effects of Medications: Risks, benefits, and possible side effects of medications have been explained to the patient, who verbalizes understanding    Current Legal Status: 201  Disposition:   ------------------------------------------------------------    Subjective: Per nursing report, Gokul has been cooperative on the unit and compliant with scheduled medications. No PRNs required overnight. Today, Gokul was seen and evaluated for continuity of care. On evaluation, patient is drowsy and returns to sleep multiple times per assessment. Patient reports he is not tired when asked. He reports feeling "okay" this morning. Patient states he got an estimated 8-9 hrs of sleep overnight but did not awake for breakfast. Encouraged to speak to staff if desires a snack later and to get out of bed later in the day. He denies medication side effects, pain or SI/HI/AVH. He denies AH overnight or yesterday evening. He is unsure of when his last BM was but denies feeling constipated at this time.     Progress Toward Goals: slow improvement    Psychiatric Review of Systems:  Behavior over the last 24 hours: slow improvement  Sleep: excessive daytime sleeping  Appetite: adequate  Medication side effects: none verbalized  ROS: Complete review of systems is negative except as noted above.     Vital signs in last 24 hours:   Temp:  [97.4 °F (36.3 °C)-97.5 °F (36.4 °C)] 97.5 °F (36.4 °C)  HR:  [] 89  Resp:  [16] 16  BP: (112-121)/(60-84) 120/69    Mental Status Exam:  Appearance:  alert, minimal eye contact, appears stated age, marginal grooming/hygiene and disheveled   Behavior:  calm, cooperative and laying in bed   Motor: no abnormal movements and Unable to assess gait/balanc due to patient factors   Speech:  delayed initiation at times, slow, normal volume, scant and coherent   Mood:  "okay"   Affect:  blunted   Thought Process:  poverty of thought, concrete   Thought Content: no verbalized delusions or overt paranoia   Perceptual disturbances: no reported hallucinations and does not appear to be responding to internal stimuli at this time; appears internally preoccupied   Risk Potential: No active or passive suicidal or homicidal ideation was verbalized during interview   Cognition: oriented to self and situation, appears to be of average intelligence and cognition not formally tested   Insight:  Limited   Judgment: Limited     Current Medications:  Current Facility-Administered Medications   Medication Dose Route Frequency Provider Last Rate   • acetaminophen  650 mg Oral Q6H PRN Bob Lugo MD     • acetaminophen  650 mg Oral Q4H PRN Bob Lugo MD     • acetaminophen  975 mg Oral Q6H PRN Bob Lugo MD     • albuterol  2 puff Inhalation Q4H PRN Bob Lugo MD     • aluminum-magnesium hydroxide-simethicone  30 mL Oral Q4H PRN Bob Lugo MD     • benztropine  1 mg Intramuscular BID PRN Keke Rogers MD     • benztropine  1 mg Oral Q4H PRN Max 6/day Bob Lugo MD     • cloZAPine  350 mg Oral Daily Debora Luis MD     • divalproex sodium  750 mg Oral BID Geraldo Loera MD     • fluticasone  1 spray Nasal Daily PRN Tonja Garner PA-C     • glycopyrrolate  1 mg Oral TID Albina Eron,      • hydrOXYzine HCL  100 mg Oral Q6H PRN Max 4/day Sami Pierson DO      Or   • LORazepam  1 mg Intramuscular Q6H PRN Sami Pierson, DO     • hydrOXYzine HCL  25 mg Oral Q6H PRN Max 4/day Albina Morse MD     • hydrOXYzine HCL  50 mg Oral Q6H PRN Max 4/day Albina Morse MD     • melatonin  3 mg Oral HS PRN Sami Pierson, DO     • metFORMIN  1,000 mg Oral BID With Meals ELA Wise     • metoprolol tartrate  12.5 mg Oral Q12H [address] ELA Wise     • nicotine polacrilex  4 mg Oral Q2H PRN Albina Morse MD     • OLANZapine  5 mg Oral Q3H PRN Max 6/day Melonie Shaikh MD      Or   • OLANZapine  5 mg Intramuscular Q3H PRN Max 6/day Melonie Shaikh MD     • OLANZapine  5 mg Intramuscular Q8H PRN Sami Pierson, DO      Or   • OLANZapine  7.5 mg Oral Q8H PRN Sami Pierson, DO     • OLANZapine  2.5 mg Oral Q3H PRN Max 8/day Melonie Shaikh MD     • ondansetron  4 mg Oral Q8H PRN Sami Pierson, DO     • polyethylene glycol  17 g Oral Daily PRN Albina Morse MD     • pseudoephedrine  120 mg Oral BID PRN Tamia Shaffer MD     • senna-docusate sodium  1 tablet Oral Daily PRN Albina Morse MD     • senna-docusate sodium  2 tablet Oral BID Melonie Shaikh MD     • sterile water          • sterile water          • sterile water          • sterile water          • sterile water          • sterile water          • sterile water          • sterile water          • sterile water          • sterile water          • sterile water          • sterile water              Behavioral Health Medications: all current active meds have been reviewed. Changes as in plan section above. Laboratory results:  I have personally reviewed all pertinent laboratory/tests results. No results found for this or any previous visit (from the past 48 hour(s)).      Sarai Parks DO

## 2023-10-08 VITALS
HEART RATE: 113 BPM | TEMPERATURE: 98.1 F | BODY MASS INDEX: 33.81 KG/M2 | WEIGHT: 190.8 LBS | HEIGHT: 63 IN | OXYGEN SATURATION: 98 % | SYSTOLIC BLOOD PRESSURE: 134 MMHG | DIASTOLIC BLOOD PRESSURE: 80 MMHG | RESPIRATION RATE: 18 BRPM

## 2023-10-08 PROCEDURE — 99232 SBSQ HOSP IP/OBS MODERATE 35: CPT | Performed by: PSYCHIATRY & NEUROLOGY

## 2023-10-08 RX ADMIN — DIVALPROEX SODIUM 750 MG: 500 TABLET, DELAYED RELEASE ORAL at 18:05

## 2023-10-08 RX ADMIN — SENNOSIDES AND DOCUSATE SODIUM 2 TABLET: 8.6; 5 TABLET ORAL at 08:25

## 2023-10-08 RX ADMIN — METOPROLOL TARTRATE 12.5 MG: 25 TABLET, FILM COATED ORAL at 21:05

## 2023-10-08 RX ADMIN — METOPROLOL TARTRATE 12.5 MG: 25 TABLET, FILM COATED ORAL at 08:25

## 2023-10-08 RX ADMIN — DIVALPROEX SODIUM 750 MG: 500 TABLET, DELAYED RELEASE ORAL at 08:25

## 2023-10-08 RX ADMIN — GLYCOPYRROLATE 1 MG: 1 TABLET ORAL at 21:04

## 2023-10-08 RX ADMIN — CLOZAPINE 350 MG: 100 TABLET ORAL at 18:01

## 2023-10-08 RX ADMIN — METFORMIN HYDROCHLORIDE 1000 MG: 500 TABLET, FILM COATED ORAL at 16:52

## 2023-10-08 RX ADMIN — MELATONIN TAB 3 MG 3 MG: 3 TAB at 21:04

## 2023-10-08 RX ADMIN — GLYCOPYRROLATE 1 MG: 1 TABLET ORAL at 08:25

## 2023-10-08 RX ADMIN — SENNOSIDES AND DOCUSATE SODIUM 2 TABLET: 8.6; 5 TABLET ORAL at 18:02

## 2023-10-08 RX ADMIN — METFORMIN HYDROCHLORIDE 1000 MG: 500 TABLET, FILM COATED ORAL at 08:25

## 2023-10-08 RX ADMIN — ONDANSETRON 4 MG: 4 TABLET, ORALLY DISINTEGRATING ORAL at 18:23

## 2023-10-08 RX ADMIN — GLYCOPYRROLATE 1 MG: 1 TABLET ORAL at 16:52

## 2023-10-08 NOTE — NURSING NOTE
Pt visible on the unit watching tv in the small tv room. Pleasant upon approach. Compliant with medications. Denies SI, HI and hallucinations. Denies any unmet needs. Continuous safety checks maintained.

## 2023-10-08 NOTE — PROGRESS NOTES
Progress Note - Behavioral Health   Gokul Abrams 26 y.o. male MRN: [id number]  Unit/Bed#: [facility] 343-01 Encounter: [id number]    Assessment/Plan   Principal Problem:    Schizoaffective disorder ([address])  Active Problems:    Legally blind    Hearing loss    Asthma    Medical clearance for psychiatric admission    Prolonged erection    Intellectual disability    Rib pain on left side      Recommended Treatment:   No psychopharmacologic changes. Continue medications as prescribed. Continue with group therapy, milieu therapy and occupational therapy. Continue frequent safety checks and vitals per unit protocol. Case discussed with treatment team.  Risks, benefits and possible side effects of Medications: Risks, benefits, and possible side effects of medications have previously been explained. No new medications at this time. Current Legal Status: 201  ------------------------------------------------------------    Subjective: Per nursing report, Gokul has been cooperative on the unit and compliant with scheduled medications. PRNs: none     Today, Gokul was seen and evaluated for continuity of care. On evaluation, patient is seen with a blanket over his head throughout interview in the day room but appears more awake today. He reports feeling "okay" this morning. Patient states he received an estimated 8 hours of sleep overnight but was feeling cold in his room this morning which is why he is in the day room. He reports an adequate appetite for breakfast this morning including eggs, amaya, sausage, potatoes. He denies any medication side effects at this time, pain, SI/HI/AVH. He denies any hallucinations yesterday evening or overnight as well. Per chart, patient had 1 bowel movement yesterday afternoon.     Progress Toward Goals: slow improvement    Psychiatric Review of Systems:  Behavior over the last 24 hours: Improving slowly  Sleep: improving   Appetite: adequate  Medication side effects: none verbalized  ROS: Complete review of systems is negative except as noted above.     Vital signs in last 24 hours:   Temp:  [97.7 °F (36.5 °C)] 97.7 °F (36.5 °C)  HR:  [] 91  Resp:  [15-16] 15  BP: (118-124)/(60-68) 123/68    Mental Status Exam:  Appearance:  More alert, no eye contact, appears stated age and marginal grooming/hygiene, no acute distress   Behavior:  calm and cooperative   Motor: no abnormal movements and Unable to assess gait/balance   Speech:  spontaneous, clear, normal rate, normal volume, scant at times and coherent   Mood:  "Okay"   Affect:  Unable to assess due to patient having blanket over his head throughout interview   Thought Process:  Some poverty of thought, concrete   Thought Content: no verbalized delusions or overt paranoia   Perceptual disturbances: no reported hallucinations and does not appear to be responding to internal stimuli at this time; limited assessment due to patient keeping blanket over him throughout interview   Risk Potential: No active or passive suicidal or homicidal ideation was verbalized during interview   Cognition: oriented to self and situation, appears to be of average intelligence and cognition not formally tested   Insight:  Limited   Judgment: Limited     Current Medications:  Current Facility-Administered Medications   Medication Dose Route Frequency Provider Last Rate   • acetaminophen  650 mg Oral Q6H PRN Baron Corina MD     • acetaminophen  650 mg Oral Q4H PRN Baron Corina MD     • acetaminophen  975 mg Oral Q6H PRN Baron Corina MD     • albuterol  2 puff Inhalation Q4H PRN Baron Corina MD     • aluminum-magnesium hydroxide-simethicone  30 mL Oral Q4H PRN Baron Corina MD     • benztropine  1 mg Intramuscular BID PRN Marylou Villatoro MD     • benztropine  1 mg Oral Q4H PRN Max 6/day Baron Corina MD     • cloZAPine  350 mg Oral Daily Debora Luis MD     • divalproex sodium  750 mg Oral BID Mary Ann Jarrell MD     • fluticasone  1 spray Nasal Daily PRN Ellen Fermin PA-C     • glycopyrrolate  1 mg Oral TID Deandra Amador, DO     • hydrOXYzine HCL  100 mg Oral Q6H PRN Max 4/day Nate Christianson DO      Or   • LORazepam  1 mg Intramuscular Q6H PRN Nate Christianson DO     • hydrOXYzine HCL  25 mg Oral Q6H PRN Max 4/day Taryn Sheehan MD     • hydrOXYzine HCL  50 mg Oral Q6H PRN Max 4/day Taryn Sheehan MD     • melatonin  3 mg Oral HS PRN Nate Christiansno DO     • metFORMIN  1,000 mg Oral BID With Meals ELA Wise     • metoprolol tartrate  12.5 mg Oral Q12H [address] ELA Wise     • nicotine polacrilex  4 mg Oral Q2H PRN Taryn Sheehan MD     • OLANZapine  5 mg Oral Q3H PRN Max 6/day Amber Vera MD      Or   • OLANZapine  5 mg Intramuscular Q3H PRN Max 6/day Amber Vera MD     • OLANZapine  5 mg Intramuscular Q8H PRN Nate Christianson DO      Or   • OLANZapine  7.5 mg Oral Q8H PRN Nate Christianson DO     • OLANZapine  2.5 mg Oral Q3H PRN Max 8/day Amber Vera MD     • ondansetron  4 mg Oral Q8H PRN Nate Christianson DO     • polyethylene glycol  17 g Oral Daily PRN Taryn Sheehan MD     • pseudoephedrine  120 mg Oral BID PRN Natanael Reyes MD     • senna-docusate sodium  1 tablet Oral Daily PRN Taryn Sheehan MD     • senna-docusate sodium  2 tablet Oral BID Amber Vera MD     • sterile water          • sterile water          • sterile water          • sterile water          • sterile water          • sterile water          • sterile water          • sterile water          • sterile water          • sterile water          • sterile water          • sterile water              Behavioral Health Medications: all current active meds have been reviewed. Changes as in plan section above. Laboratory results:  I have personally reviewed all pertinent laboratory/tests results. No results found for this or any previous visit (from the past 48 hour(s)).      Terry Leung Tami Art

## 2023-10-08 NOTE — PLAN OF CARE
Problem: Ineffective Coping  Goal: Participates in unit activities  Description: Interventions:  - Provide therapeutic environment   - Provide required programming   - Redirect inappropriate behaviors   Outcome: Not Progressing     Problem: SELF HARM/SUICIDALITY  Goal: Will have no self-injury during hospital stay  Description: INTERVENTIONS:  - Q 15 MINUTES: Routine safety checks  - Q WAKING SHIFT & PRN: Assess risk to determine if routine checks are adequate to maintain patient safety  - Encourage patient to participate actively in care by formulating a plan to combat response to suicidal ideation, identify supports and resources  Outcome: Progressing     Problem: ANXIETY  Goal: Will report anxiety at manageable levels  Description: INTERVENTIONS:  - Administer medication as ordered  - Teach and encourage coping skills  - Provide emotional support  - Assess patient/family for anxiety and ability to cope  Outcome: Progressing  Goal: By discharge: Patient will verbalize 2 strategies to deal with anxiety  Description: Interventions:  - Identify any obvious source/trigger to anxiety  - Staff will assist patient in applying identified coping technique/skills  - Encourage attendance of scheduled groups and activities  Outcome: Progressing     Problem: SUBSTANCE USE/ABUSE  Goal: Will have no detox symptoms and will verbalize plan for changing substance-related behavior  Description: INTERVENTIONS:  - Monitor physical status and assess for symptoms of withdrawal  - Administer medication as ordered  - Provide emotional support with 1 on 1 interaction with staff  - Encourage recovery focused program/ addiction education  - Assess for verbalization of changing behaviors related to substance abuse  - Initiate consults and referrals as appropriate (Case Management, Spiritual Care, etc.)  Outcome: Progressing  Goal: By discharge, will develop insight into their chemical dependency and sustain motivation to continue in recovery  Description: INTERVENTIONS:  - Attends all daily group sessions and scheduled AA groups  - Actively practices coping skills through participation in the therapeutic community and adherence to program rules  - Reviews and completes assignments from individual treatment plan  - Assist patient development of understanding of their personal cycle of addiction and relapse triggers  Outcome: Progressing

## 2023-10-08 NOTE — NURSING NOTE
Pt is out of room today. Encouraged pt to be out in the milieu. Pt participated in group this morning. Pt is medication and meal compliant. Pt denies SI, HI, AH, and VH. Pt denies any needs at this time.

## 2023-10-08 NOTE — NURSING NOTE
Pt had an episode of emesis and diarrhea. PRN of 4mg Zofran given @1823. Will monitor for effectiveness.

## 2023-10-09 PROCEDURE — 99232 SBSQ HOSP IP/OBS MODERATE 35: CPT | Performed by: PSYCHIATRY & NEUROLOGY

## 2023-10-09 RX ADMIN — MELATONIN TAB 3 MG 3 MG: 3 TAB at 21:12

## 2023-10-09 RX ADMIN — GLYCOPYRROLATE 1 MG: 1 TABLET ORAL at 17:00

## 2023-10-09 RX ADMIN — METFORMIN HYDROCHLORIDE 1000 MG: 500 TABLET, FILM COATED ORAL at 08:39

## 2023-10-09 RX ADMIN — GLYCOPYRROLATE 1 MG: 1 TABLET ORAL at 08:39

## 2023-10-09 RX ADMIN — CLOZAPINE 350 MG: 100 TABLET ORAL at 17:03

## 2023-10-09 RX ADMIN — METFORMIN HYDROCHLORIDE 1000 MG: 500 TABLET, FILM COATED ORAL at 17:03

## 2023-10-09 RX ADMIN — METOPROLOL TARTRATE 12.5 MG: 25 TABLET, FILM COATED ORAL at 21:11

## 2023-10-09 RX ADMIN — DIVALPROEX SODIUM 750 MG: 500 TABLET, DELAYED RELEASE ORAL at 08:39

## 2023-10-09 RX ADMIN — SENNOSIDES AND DOCUSATE SODIUM 2 TABLET: 8.6; 5 TABLET ORAL at 17:03

## 2023-10-09 RX ADMIN — DIVALPROEX SODIUM 750 MG: 500 TABLET, DELAYED RELEASE ORAL at 20:00

## 2023-10-09 RX ADMIN — SENNOSIDES AND DOCUSATE SODIUM 2 TABLET: 8.6; 5 TABLET ORAL at 08:39

## 2023-10-09 RX ADMIN — GLYCOPYRROLATE 1 MG: 1 TABLET ORAL at 21:12

## 2023-10-09 RX ADMIN — METOPROLOL TARTRATE 12.5 MG: 25 TABLET, FILM COATED ORAL at 08:39

## 2023-10-09 NOTE — PROGRESS NOTES
Progress Note - Behavioral Health   Gokul Mckee 26 y.o. male MRN: [id number]  Unit/Bed#: [facility] 343-01 Encounter: [id number]    Assessment/Plan   Principal Problem:    Schizoaffective disorder ([address])  Active Problems:    Legally blind    Hearing loss    Asthma    Medical clearance for psychiatric admission    Prolonged erection    Intellectual disability    Rib pain on left side    • Clozapine level 335, no planned increase in dose at this time  • Continue medications as noted below. • Continue to promote patient participation in group therapy, milieu therapy, occupational therapy  • Continue medical management by primary team.  • Discharge disposition:  pending EAC placement    Subjective: The patient was evaluated this morning for continuity of care and no acute distress noted throughout the evaluation. Over the past 24 hours staff noted that patient has been isolative, staying in bed on repeated assessments. EAC interview yesterday. Went to group yesterday. Had episode of emesis yesterday in the dayroom. Medication adherent. Today on evaluation, Gokul states that he is doing "fine" in terms of his mood. Denies feeling depressed at this time. Denies feeling hopeless, down, and denies uncontrolled anxiety symptoms. He says that in terms of emesis yesterday, he says that he was feeling dizzy and then threw up, after which he felt better. He says that it was watery and did not see any blood. He says he had watery bowel movement at 1 PM yesterday. He says after emesis, he did not re-experience any physical issues. At this time, he notes that he feels physically well and denies any issues. In terms of safety, Gokul Denies SI/HI. Gokul Denies Auditory, visual hallucinations. Denies symptomology consistent with delusional thought content. Denies symptomology consistent with bnia/hypomania.     Psychiatric Review of Systems:  Behavior over the last 24 hours:  unchanged  Sleep: normal  Appetite: normal  Medication side effects: No   ROS: no complaints, all other systems are negative    Current Medications:  Current Facility-Administered Medications   Medication Dose Route Frequency Provider Last Rate   • acetaminophen  650 mg Oral Q6H PRN Faith Rosenthal MD     • acetaminophen  650 mg Oral Q4H PRN Faith Rosenthal MD     • acetaminophen  975 mg Oral Q6H PRN Faith Rosenthal MD     • albuterol  2 puff Inhalation Q4H PRN Faith Rosenthal MD     • aluminum-magnesium hydroxide-simethicone  30 mL Oral Q4H PRN Faith Rosenthal MD     • benztropine  1 mg Intramuscular BID PRN Emma Estrada MD     • benztropine  1 mg Oral Q4H PRN Max 6/day Faith Rosenthal MD     • cloZAPine  350 mg Oral Daily Debora Luis MD     • divalproex sodium  750 mg Oral BID Jose Antonio Putnam MD     • fluticasone  1 spray Nasal Daily PRN Connie Brooks PA-C     • glycopyrrolate  1 mg Oral TID Shoaib Pandey DO     • hydrOXYzine HCL  100 mg Oral Q6H PRN Max 4/day Rolin Callow, DO      Or   • LORazepam  1 mg Intramuscular Q6H PRN Rolin Callow, DO     • hydrOXYzine HCL  25 mg Oral Q6H PRN Max 4/day Faith Rosenthal MD     • hydrOXYzine HCL  50 mg Oral Q6H PRN Max 4/day Faith Rosenthal MD     • melatonin  3 mg Oral HS PRN Rolin Callow, DO     • metFORMIN  1,000 mg Oral BID With Meals ELA Wise     • metoprolol tartrate  12.5 mg Oral Q12H [address] ELA Wise     • nicotine polacrilex  4 mg Oral Q2H PRN Faith Rosenthal MD     • OLANZapine  5 mg Oral Q3H PRN Max 6/day Jose Antonio Putnam MD      Or   • OLANZapine  5 mg Intramuscular Q3H PRN Max 6/day Jose Antonio Putnam MD     • OLANZapine  5 mg Intramuscular Q8H PRN Rolin Callow, DO      Or   • OLANZapine  7.5 mg Oral Q8H PRN Rolin Callow, DO     • OLANZapine  2.5 mg Oral Q3H PRN Max 8/day Jose Antonio Putnam MD     • ondansetron  4 mg Oral Q8H PRN Obey Springer DO     • polyethylene glycol  17 g Oral Daily PRN Faith Rosenthal MD     • pseudoephedrine  120 mg Oral BID PRN Aditi Garcia MD     • senna-docusate sodium  1 tablet Oral Daily PRN Venita Nissen, MD     • senna-docusate sodium  2 tablet Oral BID Zechariah Rivero MD     • sterile water          • sterile water          • sterile water          • sterile water          • sterile water          • sterile water          • sterile water          • sterile water          • sterile water          • sterile water          • sterile water          • sterile water              Behavioral Health Medications: all current active meds have been reviewed and continue current psychiatric medications. Vital signs in last 24 hours:  Temp:  [98.1 °F (36.7 °C)] 98.1 °F (36.7 °C)  HR:  [113] 113  Resp:  [18] 18  BP: (134)/(80) 134/80    Laboratory results:    I have personally reviewed all pertinent laboratory/tests results.   Most Recent Labs:   Lab Results   Component Value Date    WBC 7.68 10/03/2023    RBC 4.86 10/03/2023    HGB 15.2 10/03/2023    HCT 46.0 10/03/2023     10/03/2023    RDW 12.2 10/03/2023    TOTANEUTABS 4.84 06/08/2023    NEUTROABS 3.92 10/03/2023    SODIUM 139 09/27/2023    K 4.0 09/27/2023     09/27/2023    CO2 30 09/27/2023    BUN 14 09/27/2023    CREATININE 1.23 09/27/2023    GLUC 88 09/27/2023    GLUF 88 09/27/2023    CALCIUM 9.4 09/27/2023    AST 25 09/27/2023    ALT 23 09/27/2023    ALKPHOS 53 09/27/2023    TP 7.4 09/27/2023    ALB 4.2 09/27/2023    TBILI 0.36 09/27/2023    CHOLESTEROL 167 09/02/2023    HDL 53 09/02/2023    TRIG 113 09/02/2023    LDLCALC 91 09/02/2023    NONHDLC 114 09/02/2023    VALPROICTOT 91 06/12/2023    AMMONIA 22 10/09/2022    [id number] 1.841 05/19/2023    FREET4 1.16 10/09/2022    HGBA1C 6.3 (H) 09/27/2023     09/27/2023       Mental Status Evaluation:    Appearance:  disheveled, marginal hygiene, AA male   Behavior:  cooperative, calm, guarded   Speech:  increased latency of response   Mood:  "fine"   Affect:  blunted Thought Process:  goal directed, poverty of thought   Associations: concrete   Thought Content:  no overt delusions   Perceptual Disturbances: no auditory hallucinations, no visual hallucinations, does not appear responding to internal stimuli; but appears distracted   Risk Potential: Suicidal ideation - None  Homicidal ideation - None  Potential for aggression - No   Sensorium:  oriented to person, place and time/date   Memory:  recent and remote memory grossly intact   Consciousness:  alert and awake   Attention/Concentration: attention span and concentration appear shorter than expected for age   Insight:  limited   Judgment: limited   Gait/Station: normal gait/station, in bed   Motor Activity: no abnormal movements     Progress Toward Goals: progressing    Recommended Treatment:   See above for assessment and plan. Risks, benefits and possible side effects of Medications:   Risks, benefits, and possible side effects of medications explained to patient and patient verbalizes understanding. Treatment discussed with nursing staff. This note has been constructed using a voice recognition system. There may be translation, syntax, or grammatical errors. If you have any questions, please contact the dictating provider.     Cristiano Solomon MD   Psychiatry, PGY-4

## 2023-10-09 NOTE — NURSING NOTE
Zofran effective when reassessed at [facility]. Patient visible intermittently throughout the evening. Pleasant upon approach. Denied any unmet needs. HS medication compliant. Melatonin prn requested and received at 2104. Patient observed in bed resting. Melatonin appears to be effective.

## 2023-10-09 NOTE — NURSING NOTE
Pt is isolative to self and is not visible on the unit. Pt refused breakfast and lunch. Pt is medication compliant. Pt denies SI, HI, AH, and VH. Pt denies any needs at this time.

## 2023-10-09 NOTE — PROGRESS NOTES
10/09/23 0830   Team Meeting   Meeting Type Daily Rounds   Team Members Present   Team Members Present Physician;Nurse;   Physician Team Member [id number] Team Member [facility] Management Team Member Dev   Patient/Family Present   Patient Present No   Patient's Family Present No   Episode of emesis in the dayroom yesterday. Clozaril level 335. EAC interview yesterday. Pt went to a group yesterday. Med/meal compliant. DC pending EAC.

## 2023-10-10 LAB
BASOPHILS # BLD AUTO: 0.08 THOUSANDS/ÂΜL (ref 0–0.1)
BASOPHILS NFR BLD AUTO: 1 % (ref 0–1)
EOSINOPHIL # BLD AUTO: 0.64 THOUSAND/ÂΜL (ref 0–0.61)
EOSINOPHIL NFR BLD AUTO: 7 % (ref 0–6)
ERYTHROCYTE [DISTWIDTH] IN BLOOD BY AUTOMATED COUNT: 12.2 % (ref 11.6–15.1)
HCT VFR BLD AUTO: 45.2 % (ref 36.5–49.3)
HGB BLD-MCNC: 15 G/DL (ref 12–17)
IMM GRANULOCYTES # BLD AUTO: 0.06 THOUSAND/UL (ref 0–0.2)
IMM GRANULOCYTES NFR BLD AUTO: 1 % (ref 0–2)
LYMPHOCYTES # BLD AUTO: 4.43 THOUSANDS/ÂΜL (ref 0.6–4.47)
LYMPHOCYTES NFR BLD AUTO: 48 % (ref 14–44)
MCH RBC QN AUTO: 31.3 PG (ref 26.8–34.3)
MCHC RBC AUTO-ENTMCNC: 33.2 G/DL (ref 31.4–37.4)
MCV RBC AUTO: 94 FL (ref 82–98)
MONOCYTES # BLD AUTO: 0.87 THOUSAND/ÂΜL (ref 0.17–1.22)
MONOCYTES NFR BLD AUTO: 9 % (ref 4–12)
NEUTROPHILS # BLD AUTO: 3.15 THOUSANDS/ÂΜL (ref 1.85–7.62)
NEUTS SEG NFR BLD AUTO: 34 % (ref 43–75)
NRBC BLD AUTO-RTO: 0 /100 WBCS
PLATELET # BLD AUTO: 236 THOUSANDS/UL (ref 149–390)
PMV BLD AUTO: 11.4 FL (ref 8.9–12.7)
RBC # BLD AUTO: 4.8 MILLION/UL (ref 3.88–5.62)
WBC # BLD AUTO: 9.23 THOUSAND/UL (ref 4.31–10.16)

## 2023-10-10 PROCEDURE — 99232 SBSQ HOSP IP/OBS MODERATE 35: CPT | Performed by: PSYCHIATRY & NEUROLOGY

## 2023-10-10 PROCEDURE — 85025 COMPLETE CBC W/AUTO DIFF WBC: CPT | Performed by: PSYCHIATRY & NEUROLOGY

## 2023-10-10 RX ADMIN — NICOTINE POLACRILEX 4 MG: 4 GUM, CHEWING BUCCAL at 16:01

## 2023-10-10 RX ADMIN — GLYCOPYRROLATE 1 MG: 1 TABLET ORAL at 08:12

## 2023-10-10 RX ADMIN — METOPROLOL TARTRATE 12.5 MG: 25 TABLET, FILM COATED ORAL at 08:15

## 2023-10-10 RX ADMIN — CLOZAPINE 350 MG: 100 TABLET ORAL at 17:25

## 2023-10-10 RX ADMIN — METFORMIN HYDROCHLORIDE 1000 MG: 500 TABLET, FILM COATED ORAL at 15:58

## 2023-10-10 RX ADMIN — GLYCOPYRROLATE 1 MG: 1 TABLET ORAL at 15:58

## 2023-10-10 RX ADMIN — DIVALPROEX SODIUM 750 MG: 500 TABLET, DELAYED RELEASE ORAL at 19:08

## 2023-10-10 RX ADMIN — SENNOSIDES AND DOCUSATE SODIUM 2 TABLET: 8.6; 5 TABLET ORAL at 17:25

## 2023-10-10 RX ADMIN — METOPROLOL TARTRATE 12.5 MG: 25 TABLET, FILM COATED ORAL at 21:03

## 2023-10-10 RX ADMIN — METFORMIN HYDROCHLORIDE 1000 MG: 500 TABLET, FILM COATED ORAL at 08:12

## 2023-10-10 RX ADMIN — GLYCOPYRROLATE 1 MG: 1 TABLET ORAL at 21:03

## 2023-10-10 RX ADMIN — DIVALPROEX SODIUM 750 MG: 500 TABLET, DELAYED RELEASE ORAL at 08:12

## 2023-10-10 RX ADMIN — SENNOSIDES AND DOCUSATE SODIUM 2 TABLET: 8.6; 5 TABLET ORAL at 08:11

## 2023-10-10 NOTE — NURSING NOTE
Patient cooperative and out of room for vitals and meals. Cooperative with medications denied SI/HI/AH/VH at this time. Patient laughing and talking to self. Currently in patient lounge socializing with peers.

## 2023-10-10 NOTE — NURSING NOTE
Patient remained isolative to room and bed throughout the evening. Upon approach,patient didn't speak and appeared irritable. HS medication  Compliant. Melatonin prn given.

## 2023-10-10 NOTE — NURSING NOTE
Pt encouraged to get up and come out to complete vitals and get Breakfast. Pt denies SI/HI/AH/VH. Medication and meal compliant. Scant/guarded with communication. Isolative to self. Pt appears depressed/withdrawn. Pt joined group and was visible in the dayroom briefly. No further concerns as of present. Plan of care ongoing.

## 2023-10-10 NOTE — PROGRESS NOTES
10/10/23 [id number]   Team Meeting   Meeting Type Daily Rounds   Team Members Present   Team Members Present Physician;Nurse;   Physician Team Member [id number] [facility] Team Member [facility] Management Team Member Dev   Patient/Family Present   Patient Present No   Patient's Family Present No   Isolative to room. Appeared irritable last night and was not speaking. Med/meal compliant. DC pending EAC.

## 2023-10-11 LAB
ATRIAL RATE: 93 BPM
P AXIS: 42 DEGREES
PR INTERVAL: 138 MS
QRS AXIS: 52 DEGREES
QRSD INTERVAL: 92 MS
QT INTERVAL: 346 MS
QTC INTERVAL: 430 MS
T WAVE AXIS: 27 DEGREES
VENTRICULAR RATE: 93 BPM

## 2023-10-11 PROCEDURE — 84484 ASSAY OF TROPONIN QUANT: CPT | Performed by: PSYCHIATRY & NEUROLOGY

## 2023-10-11 PROCEDURE — 99232 SBSQ HOSP IP/OBS MODERATE 35: CPT | Performed by: PSYCHIATRY & NEUROLOGY

## 2023-10-11 PROCEDURE — 86480 TB TEST CELL IMMUN MEASURE: CPT | Performed by: PSYCHIATRY & NEUROLOGY

## 2023-10-11 PROCEDURE — 93010 ELECTROCARDIOGRAM REPORT: CPT | Performed by: INTERNAL MEDICINE

## 2023-10-11 PROCEDURE — 83880 ASSAY OF NATRIURETIC PEPTIDE: CPT | Performed by: PSYCHIATRY & NEUROLOGY

## 2023-10-11 PROCEDURE — 93005 ELECTROCARDIOGRAM TRACING: CPT

## 2023-10-11 PROCEDURE — 80048 BASIC METABOLIC PNL TOTAL CA: CPT | Performed by: PSYCHIATRY & NEUROLOGY

## 2023-10-11 PROCEDURE — 86140 C-REACTIVE PROTEIN: CPT | Performed by: PSYCHIATRY & NEUROLOGY

## 2023-10-11 PROCEDURE — 82550 ASSAY OF CK (CPK): CPT | Performed by: PSYCHIATRY & NEUROLOGY

## 2023-10-11 RX ORDER — DIPHENHYDRAMINE HCL 25 MG
25 TABLET ORAL EVERY 6 HOURS PRN
Status: DISCONTINUED | OUTPATIENT
Start: 2023-10-11 | End: 2023-10-19 | Stop reason: HOSPADM

## 2023-10-11 RX ORDER — POLYETHYLENE GLYCOL 3350 17 G/17G
17 POWDER, FOR SOLUTION ORAL ONCE
Status: DISCONTINUED | OUTPATIENT
Start: 2023-10-11 | End: 2023-10-12

## 2023-10-11 RX ADMIN — METFORMIN HYDROCHLORIDE 1000 MG: 500 TABLET, FILM COATED ORAL at 16:12

## 2023-10-11 RX ADMIN — SENNOSIDES AND DOCUSATE SODIUM 2 TABLET: 8.6; 5 TABLET ORAL at 08:41

## 2023-10-11 RX ADMIN — GLYCOPYRROLATE 1 MG: 1 TABLET ORAL at 21:24

## 2023-10-11 RX ADMIN — SENNOSIDES AND DOCUSATE SODIUM 2 TABLET: 8.6; 5 TABLET ORAL at 18:01

## 2023-10-11 RX ADMIN — GLYCOPYRROLATE 1 MG: 1 TABLET ORAL at 16:12

## 2023-10-11 RX ADMIN — DIVALPROEX SODIUM 750 MG: 500 TABLET, DELAYED RELEASE ORAL at 08:40

## 2023-10-11 RX ADMIN — DIPHENHYDRAMINE HYDROCHLORIDE 25 MG: 25 TABLET ORAL at 18:01

## 2023-10-11 RX ADMIN — METFORMIN HYDROCHLORIDE 1000 MG: 500 TABLET, FILM COATED ORAL at 08:41

## 2023-10-11 RX ADMIN — DIVALPROEX SODIUM 750 MG: 500 TABLET, DELAYED RELEASE ORAL at 18:01

## 2023-10-11 RX ADMIN — GLYCOPYRROLATE 1 MG: 1 TABLET ORAL at 08:41

## 2023-10-11 RX ADMIN — CLOZAPINE 350 MG: 100 TABLET ORAL at 18:01

## 2023-10-11 NOTE — PROGRESS NOTES
Progress Note - Behavioral Health   Gokul Kim 26 y.o. male MRN: [id number]  Unit/Bed#: [facility] 343-01 Encounter: [id number]    Assessment/Plan   Principal Problem:    Schizoaffective disorder ([address])  Active Problems:    Legally blind    Hearing loss    Asthma    Medical clearance for psychiatric admission    Prolonged erection    Intellectual disability    Rib pain on left side      Continue medications as noted below. F/u ECG, CRP, CK, BNP, Troponin I  Continue to promote patient participation in group therapy, milieu therapy, occupational therapy  Continue medical management by primary team.  Discharge disposition:  pending EA placement. [id number] [facility] 151 staff interviewed patient yesterday. Awaiting response. Subjective: The patient was evaluated this morning for continuity of care and no acute distress noted throughout the evaluation. Over the past 24 hours staff noted that patient has been isolative to room, spending time mostly in his room. Has noted to be attending group sessions. Was seen in lounge in the evening socializing with other patients. Laughing and talking to self at times as observed by staff. Patient has been medication adherent. Today on evaluation, Gokul states that he feels "fine" in terms of mood. He denies acute issues at this time. He states that he is looking forward to transitioning to [facility] after discussing with interviewers yesterday. He denies feeling depressed, down, hopeless. Denies feeling anxious and denies uncontrolled anxiety symptoms. He notes that he had BM "a few days ago" when asked initially, and then when asked again he said "I don't know, two days ago?" He denies physical symptoms at this time, including chest pain, SOB, pain elsewhere, paresthesia, headaches, rhinorrhea, sore throat, increased urinary frequency, dysuria. He notes that his appetite is "fine" and energy levels "okay."    In terms of safety, Gokul Denies SI/HI.  Gokul Denies auditory, visual hallucinations, though appears internally preoccupied and distracted. Denies symptomology consistent with overt delusional thought content. Denies symptomology consistent with bina/hypomania.     Psychiatric Review of Systems:  Behavior over the last 24 hours:  unchanged  Sleep: hypersomnia  Appetite: normal  Medication side effects: denies  ROS: no complaints, all other systems are negative    Current Medications:  Current Facility-Administered Medications   Medication Dose Route Frequency Provider Last Rate    acetaminophen  650 mg Oral Q6H PRN Stephen Martínez MD      acetaminophen  650 mg Oral Q4H PRN Stephen Martínez MD      acetaminophen  975 mg Oral Q6H PRN Stephen Martínez MD      albuterol  2 puff Inhalation Q4H PRN Stephen Martínez MD      aluminum-magnesium hydroxide-simethicone  30 mL Oral Q4H PRN Stephen Martínez MD      benztropine  1 mg Intramuscular BID PRN Agustina Dubin, MD      benztropine  1 mg Oral Q4H PRN Max 6/day Stephen Martínez MD      cloZAPine  350 mg Oral Daily Debora Luis MD      divalproex sodium  750 mg Oral BID Nimesh Smith MD      fluticasone  1 spray Nasal Daily PRN Valeria Sandoval PA-C      glycopyrrolate  1 mg Oral TID Burton Howe,       hydrOXYzine HCL  100 mg Oral Q6H PRN Max 4/day Mariia Papo, DO      Or    LORazepam  1 mg Intramuscular Q6H PRN Mariia Wolfes, DO      hydrOXYzine HCL  25 mg Oral Q6H PRN Max 4/day Stephen Martínez MD      hydrOXYzine HCL  50 mg Oral Q6H PRN Max 4/day Stephen Martínez MD      melatonin  3 mg Oral HS PRN Mariia Papo, DO      metFORMIN  1,000 mg Oral BID With Meals ELA Wise      metoprolol tartrate  12.5 mg Oral Q12H [facility] ELA Wise      nicotine polacrilex  4 mg Oral Q2H PRN Stephen Martínez MD      OLANZapine  5 mg Oral Q3H PRN Max 6/day Nimesh Smith MD      Or    OLANZapine  5 mg Intramuscular Q3H PRN Max 6/day Nimesh Smith MD      OLANZapine  5 mg Intramuscular Q8H PRN Mariia Barros, DO      Or    OLANZapine  7.5 mg Oral Q8H PRN Vinay Abrams, DO      OLANZapine  2.5 mg Oral Q3H PRN Max 8/day Adilia Thakkar MD      ondansetron  4 mg Oral Q8H PRN Vinay Abrams, DO      polyethylene glycol  17 g Oral Daily PRN Kailash Rebolledo MD      pseudoephedrine  120 mg Oral BID PRN Kahlil Fleming MD      senna-docusate sodium  1 tablet Oral Daily PRN Kailash Rebolledo MD      senna-docusate sodium  2 tablet Oral BID Adilia Thakkar MD      sterile water           sterile water           sterile water           sterile water           sterile water           sterile water           sterile water           sterile water           sterile water           sterile water           sterile water           sterile water              Behavioral Health Medications: all current active meds have been reviewed and continue current psychiatric medications. Vital signs in last 24 hours:  Temp:  [97.3 °F (36.3 °C)] 97.3 °F (36.3 °C)  HR:  [103-120] 120  Resp:  [16-17] 16  BP: (104-123)/(61-80) 104/61    Patient has had elevated HR on multiple readings. Will f/u cardiac lab work to rule out medication induced myocarditis     Laboratory results:  I have personally reviewed all pertinent laboratory/tests results.   Most Recent Labs:   Lab Results   Component Value Date    WBC 9.23 10/10/2023    RBC 4.80 10/10/2023    HGB 15.0 10/10/2023    HCT 45.2 10/10/2023     10/10/2023    RDW 12.2 10/10/2023    TOTANEUTABS 4.84 06/08/2023    NEUTROABS 3.15 10/10/2023    SODIUM 139 09/27/2023    K 4.0 09/27/2023     09/27/2023    CO2 30 09/27/2023    BUN 14 09/27/2023    CREATININE 1.23 09/27/2023    GLUC 88 09/27/2023    GLUF 88 09/27/2023    CALCIUM 9.4 09/27/2023    AST 25 09/27/2023    ALT 23 09/27/2023    ALKPHOS 53 09/27/2023    TP 7.4 09/27/2023    ALB 4.2 09/27/2023    TBILI 0.36 09/27/2023    CHOLESTEROL 167 09/02/2023    HDL 53 09/02/2023    TRIG 113 09/02/2023    LDLCALC 91 09/02/2023    [facility] 114 09/02/2023    VALPROICTOT 91 06/12/2023    AMMONIA 22 10/09/2022    [id number] 1.841 05/19/2023    FREET4 1.16 10/09/2022    HGBA1C 6.3 (H) 09/27/2023     09/27/2023         Mental Status Evaluation:    Appearance:  disheveled, marginal hygiene, wearing hospital clothes, looks older than stated age   Behavior:  pleasant, cooperative, guarded, limited eye contact   Speech:  increased latency of response, soft   Mood:  "Fine"   Affect:  blunted   Thought Process:  poverty of thought   Associations: concrete associations   Thought Content:  no overt delusions   Perceptual Disturbances: appears distracted, denies auditory or visual hallucinations when asked, but appears preoccupied   Risk Potential: Suicidal ideation - None  Homicidal ideation - None  Potential for aggression - No   Sensorium:  oriented to person and place   Memory:  recent and remote memory grossly intact   Consciousness:  alert and awake   Attention/Concentration: attention span and concentration appear shorter than expected for age   Insight:  limited   Judgment: limited   Gait/Station: in bed   Motor Activity: no abnormal movements     Progress Toward Goals: slow progress    Recommended Treatment:   See above for assessment and plan. Risks, benefits and possible side effects of Medications:   Risks, benefits, and possible side effects of medications explained to patient and patient verbalizes understanding. Treatment discussed with nursing staff. This note has been constructed using a voice recognition system. There may be translation, syntax, or grammatical errors. If you have any questions, please contact the dictating provider.     Gertrudis Weinstein MD  Psychiatry, PGY-4

## 2023-10-11 NOTE — NURSING NOTE
Pt is isolative to room. Pt is refusing to get out of bed and EKG. Pt denies SI, HI, AH,and VH. Pt is medication compliant, but is refusing to get up for meals. Denies any needs at this time.

## 2023-10-11 NOTE — NURSING NOTE
Reassessed pt @1901, swelling has gone done, but pt still reports some numbness in th area. PRN of PO 25mg Benadryl mildly effective.

## 2023-10-11 NOTE — PLAN OF CARE
Problem: Ineffective Coping  Goal: Participates in unit activities  Description: Interventions:  - Provide therapeutic environment   - Provide required programming   - Redirect inappropriate behaviors   Outcome: Not Progressing  Note: Patient isolates in his room and does not attend therapy groups

## 2023-10-11 NOTE — SOCIAL WORK
CM sent email to [facility] to follow up on interview yesterday. CM awaiting response.

## 2023-10-11 NOTE — PROGRESS NOTES
10/11/23 0830   Team Meeting   Meeting Type Daily Rounds   Team Members Present   Team Members Present Physician;Nurse;   Physician Team Member [id number] Team Member [facility] Management Team Member Dev   Patient/Family Present   Patient Present No   Patient's Family Present No     Pt was seen attending groups yesterday. Pt responding to internal stim, but more visible on the unit. Pt tachy, order EKG and cardiac labs. Med/meal compliant. DC pending EAC.

## 2023-10-11 NOTE — NURSING NOTE
Attempted to complete blood work and EKG. Pt was lying in bed and refused to get out from under blankets. Pt then made a fake snoring sound. Attempted to wake Pt again and Pt shook his head no.

## 2023-10-11 NOTE — NURSING NOTE
Pt woke up for dinner and this writer notice that the pt's face appeared swollen on the left side. Pt is reporting "numbness" in the area but denying any pain. Pt does not report trouble breathing or swallowing. Pt reports no injury to the area. Vitals were taken @ 1750 with a blood pressure of 128/94, pulse 119, and SpO2 96. Medical was notified, pt given PRN of Benadryl 25mg PO given @ 1801.

## 2023-10-12 ENCOUNTER — APPOINTMENT (INPATIENT)
Dept: NON INVASIVE DIAGNOSTICS | Facility: HOSPITAL | Age: 23
DRG: 750 | End: 2023-10-12
Payer: COMMERCIAL

## 2023-10-12 PROBLEM — T78.3XXA ANGIOEDEMA: Status: ACTIVE | Noted: 2023-10-12

## 2023-10-12 LAB
ANION GAP SERPL CALCULATED.3IONS-SCNC: 10 MMOL/L
AORTIC ROOT: 2.3 CM
APICAL FOUR CHAMBER EJECTION FRACTION: 50 %
ASCENDING AORTA: 2.6 CM
BNP SERPL-MCNC: 109 PG/ML (ref 0–100)
BUN SERPL-MCNC: 12 MG/DL (ref 5–25)
CALCIUM SERPL-MCNC: 9.5 MG/DL (ref 8.4–10.2)
CARDIAC TROPONIN I PNL SERPL HS: 7 NG/L (ref 8–18)
CHLORIDE SERPL-SCNC: 99 MMOL/L (ref 96–108)
CK SERPL-CCNC: 447 U/L (ref 39–308)
CO2 SERPL-SCNC: 29 MMOL/L (ref 21–32)
CREAT SERPL-MCNC: 1.14 MG/DL (ref 0.6–1.3)
CRP SERPL QL: 31.8 MG/L
FRACTIONAL SHORTENING: 28 (ref 28–44)
GFR SERPL CREATININE-BSD FRML MDRD: 90 ML/MIN/1.73SQ M
GLUCOSE SERPL-MCNC: 88 MG/DL (ref 65–140)
INTERVENTRICULAR SEPTUM IN DIASTOLE (PARASTERNAL SHORT AXIS VIEW): 0.8 CM
INTERVENTRICULAR SEPTUM: 0.8 CM (ref 0.6–1.1)
LAAS-AP2: 13.8 CM2
LAAS-AP4: 15.5 CM2
LEFT ATRIUM SIZE: 3 CM
LEFT ATRIUM VOLUME (MOD BIPLANE): 41 ML
LEFT ATRIUM VOLUME INDEX (MOD BIPLANE): 21.6 ML/M2
LEFT INTERNAL DIMENSION IN SYSTOLE: 2.8 CM (ref 2.1–4)
LEFT VENTRICLE DIASTOLIC VOLUME (MOD BIPLANE): 45 ML
LEFT VENTRICLE SYSTOLIC VOLUME (MOD BIPLANE): 23 ML
LEFT VENTRICULAR INTERNAL DIMENSION IN DIASTOLE: 3.9 CM (ref 3.5–6)
LEFT VENTRICULAR POSTERIOR WALL IN END DIASTOLE: 0.8 CM
LEFT VENTRICULAR STROKE VOLUME: 38 ML
LV EF: 50 %
LVSV (TEICH): 38 ML
MV E'TISSUE VEL-SEP: 8 CM/S
POTASSIUM SERPL-SCNC: 4 MMOL/L (ref 3.5–5.3)
RIGHT ATRIUM AREA SYSTOLE A4C: 9.1 CM2
RIGHT VENTRICLE ID DIMENSION: 3 CM
SL CV LEFT ATRIUM LENGTH A2C: 4.1 CM
SL CV LV EF: 50
SL CV PED ECHO LEFT VENTRICLE DIASTOLIC VOLUME (MOD BIPLANE) 2D: 67 ML
SL CV PED ECHO LEFT VENTRICLE SYSTOLIC VOLUME (MOD BIPLANE) 2D: 29 ML
SODIUM SERPL-SCNC: 138 MMOL/L (ref 135–147)
TRICUSPID ANNULAR PLANE SYSTOLIC EXCURSION: 1.9 CM

## 2023-10-12 PROCEDURE — 93306 TTE W/DOPPLER COMPLETE: CPT | Performed by: INTERNAL MEDICINE

## 2023-10-12 PROCEDURE — 93306 TTE W/DOPPLER COMPLETE: CPT

## 2023-10-12 PROCEDURE — 99231 SBSQ HOSP IP/OBS SF/LOW 25: CPT | Performed by: NURSE PRACTITIONER

## 2023-10-12 PROCEDURE — 99232 SBSQ HOSP IP/OBS MODERATE 35: CPT | Performed by: PSYCHIATRY & NEUROLOGY

## 2023-10-12 RX ORDER — PREDNISONE 20 MG/1
20 TABLET ORAL DAILY
Status: COMPLETED | OUTPATIENT
Start: 2023-10-12 | End: 2023-10-16

## 2023-10-12 RX ORDER — FAMOTIDINE 20 MG/1
10 TABLET, FILM COATED ORAL 2 TIMES DAILY
Status: DISCONTINUED | OUTPATIENT
Start: 2023-10-12 | End: 2023-10-19 | Stop reason: HOSPADM

## 2023-10-12 RX ORDER — ATROPINE SULFATE 10 MG/ML
1 SOLUTION/ DROPS OPHTHALMIC 3 TIMES DAILY PRN
Status: DISCONTINUED | OUTPATIENT
Start: 2023-10-12 | End: 2023-10-19 | Stop reason: HOSPADM

## 2023-10-12 RX ORDER — CHLORHEXIDINE GLUCONATE ORAL RINSE 1.2 MG/ML
15 SOLUTION DENTAL EVERY 12 HOURS SCHEDULED
Status: DISCONTINUED | OUTPATIENT
Start: 2023-10-12 | End: 2023-10-19 | Stop reason: HOSPADM

## 2023-10-12 RX ADMIN — PREDNISONE 20 MG: 20 TABLET ORAL at 08:59

## 2023-10-12 RX ADMIN — METFORMIN HYDROCHLORIDE 1000 MG: 500 TABLET, FILM COATED ORAL at 15:37

## 2023-10-12 RX ADMIN — DIVALPROEX SODIUM 750 MG: 500 TABLET, DELAYED RELEASE ORAL at 08:22

## 2023-10-12 RX ADMIN — NICOTINE POLACRILEX 4 MG: 4 GUM, CHEWING BUCCAL at 18:11

## 2023-10-12 RX ADMIN — METOPROLOL TARTRATE 12.5 MG: 25 TABLET, FILM COATED ORAL at 08:22

## 2023-10-12 RX ADMIN — DIVALPROEX SODIUM 750 MG: 500 TABLET, DELAYED RELEASE ORAL at 18:01

## 2023-10-12 RX ADMIN — CHLORHEXIDINE GLUCONATE 0.12% ORAL RINSE 15 ML: 1.2 LIQUID ORAL at 08:59

## 2023-10-12 RX ADMIN — FAMOTIDINE 10 MG: 20 TABLET, FILM COATED ORAL at 08:59

## 2023-10-12 RX ADMIN — METOPROLOL TARTRATE 25 MG: 25 TABLET, FILM COATED ORAL at 20:19

## 2023-10-12 RX ADMIN — GLYCOPYRROLATE 1 MG: 1 TABLET ORAL at 08:22

## 2023-10-12 RX ADMIN — NICOTINE POLACRILEX 4 MG: 4 GUM, CHEWING BUCCAL at 22:37

## 2023-10-12 RX ADMIN — FAMOTIDINE 10 MG: 20 TABLET, FILM COATED ORAL at 17:06

## 2023-10-12 RX ADMIN — CHLORHEXIDINE GLUCONATE 0.12% ORAL RINSE 15 ML: 1.2 LIQUID ORAL at 20:18

## 2023-10-12 RX ADMIN — METFORMIN HYDROCHLORIDE 1000 MG: 500 TABLET, FILM COATED ORAL at 08:22

## 2023-10-12 RX ADMIN — SENNOSIDES AND DOCUSATE SODIUM 2 TABLET: 8.6; 5 TABLET ORAL at 17:06

## 2023-10-12 RX ADMIN — SENNOSIDES AND DOCUSATE SODIUM 2 TABLET: 8.6; 5 TABLET ORAL at 08:22

## 2023-10-12 RX ADMIN — NICOTINE POLACRILEX 4 MG: 4 GUM, CHEWING BUCCAL at 20:18

## 2023-10-12 NOTE — QUICK NOTE
Patient persistently tachycardic with -132. Patient did not get Metoprolol 12.5 mg BID yesterday, likely due to holding parameters for BP. Will increase Metoprolol to 25 mg BID and change holding parameters.

## 2023-10-12 NOTE — NURSING NOTE
Pt remained isolative to room/bed entire late evening. Pt assessed in bed and denied SI, HI and hallucinations. Pt with poor eye contact and minimal verbals; one word answers. Pt denies pain and unmet needs. Safety checks continue.

## 2023-10-12 NOTE — PROGRESS NOTES
Progress Note - Behavioral Health   Gokul Gann 26 y.o. male MRN: [id number]  Unit/Bed#: [facility] 343-01 Encounter: [id number]    Assessment/Plan   Principal Problem:    Schizoaffective disorder ([address])  Active Problems:    Legally blind    Hearing loss    Asthma    Medical clearance for psychiatric admission    Prolonged erection    Intellectual disability    Rib pain on left side    Angioedema    Hold Clozapine 350 mg daily for the next 24 hours due to concern for side effects. Will consider restarting at lower dose 200 mg tomorrow after speaking to pharmacist  Discontinue glycopyrrolate for sialorrhea  Start Atropine drops PRN for sialorrhea  Continue medications as noted below  Continue to promote patient participation in group therapy, milieu therapy, occupational therapy  Continue medical management by primary team  Started Prednisone 20 mg daily for 5 days, Pepcid 10 mg bid, Benadryl PRN  Discharge disposition:  pending    Subjective: The patient was evaluated this morning for continuity of care and no acute distress noted throughout the evaluation. Over the past 24 hours staff noted to be isolated to his room mostly throughout the day, guarded during evaluations by nursing staff. Yesterday in the meantime, patient was noted to have swelling on the left side as well as his lip, and he was reporting numbness in the area but denying any pain. Patient was not reporting difficulty with breathing or swallowing internal medicine was notified, and was given as needed Benadryl 25 mg at 6 PM, and will on reassessment the swelling has gone down but patient continues to report numbness in the area. Today on evaluation, Gokul states that today he is feeling "okay" in terms of mood. He appears withdrawn but somewhat more alert today. He denies feeling depressed and anxious.  He reports physical issues at this time: denies headaches, dizziness, changes in vision/hearing, denies rhinorrhea, cough, congestion, sore throat, chest pain, SOB, facial numbness, but still has some lip swelling on the upper lip. Denies facial pain. He reports that he has had BM two days ago, but when asked again stated few days ago. In terms of safety, Gokul Denies SI/HI. Gokul Denies auditory, visual hallucinations, but appears internally preoccupied. Denies symptomology consistent with delusional thought content. Denies symptomology consistent with bina/hypomania.     Psychiatric Review of Systems:  Behavior over the last 24 hours:  unchanged  Sleep: hypersomnia  Appetite: on/off  Medication side effects:  denies, but potential angioedema/swelling related to medications    ROS: reports lip swelling, all other systems are negative    Current Medications:  Current Facility-Administered Medications   Medication Dose Route Frequency Provider Last Rate    acetaminophen  650 mg Oral Q6H PRN Johanny Gibson MD      acetaminophen  650 mg Oral Q4H PRN Johanny Gibson MD      acetaminophen  975 mg Oral Q6H PRN Johanny Gibson MD      albuterol  2 puff Inhalation Q4H PRN Johanny Gibson MD      aluminum-magnesium hydroxide-simethicone  30 mL Oral Q4H PRN Johanny Gibson MD      atropine  1 drop Sublingual TID PRN Debora Luis MD      benztropine  1 mg Intramuscular BID PRN Dana Adams MD      benztropine  1 mg Oral Q4H PRN Max 6/day Johanny Gibson MD      chlorhexidine  15 mL Swish & Spit Q12H [address] Eda Pratt, ELA      diphenhydrAMINE  25 mg Oral Q6H PRN ELA Mcbride      divalproex sodium  750 mg Oral BID Micah Bowens MD      famotidine  10 mg Oral BID ELA Mcbride      fluticasone  1 spray Nasal Daily PRN Jennifer Wong PA-C      hydrOXYzine HCL  100 mg Oral Q6H PRN Max 4/day Kishor Dorsey,       Or    LORazepam  1 mg Intramuscular Q6H PRN Kishor Dorsey, DO      hydrOXYzine HCL  25 mg Oral Q6H PRN Max 4/day Johanny Gibson MD      hydrOXYzine HCL  50 mg Oral Q6H PRN Max 4/day Shaw Sue MD      melatonin  3 mg Oral HS PRN Tellis Lacy, DO      metFORMIN  1,000 mg Oral BID With Meals ELA Wise      metoprolol tartrate  12.5 mg Oral Q12H [address] ELA Wise      nicotine polacrilex  4 mg Oral Q2H PRN Shaw Sue MD      OLANZapine  5 mg Oral Q3H PRN Max 6/day Dennis Samayoa MD      Or    OLANZapine  5 mg Intramuscular Q3H PRN Max 6/day Dennis Samayoa MD      OLANZapine  5 mg Intramuscular Q8H PRN Tellis Lacy, DO      Or    OLANZapine  7.5 mg Oral Q8H PRN Tellis Lacy, DO      OLANZapine  2.5 mg Oral Q3H PRN Max 8/day Dennis Samayoa MD      ondansetron  4 mg Oral Q8H PRN Tellis Lacy, DO      polyethylene glycol  17 g Oral Daily PRN Shaw Sue MD      predniSONE  20 mg Oral Daily ELA Britt      pseudoephedrine  120 mg Oral BID PRN Ly Stone MD      senna-docusate sodium  1 tablet Oral Daily PRN Shaw Sue MD      senna-docusate sodium  2 tablet Oral BID Dennis Samayoa MD      sterile water           sterile water           sterile water           sterile water           sterile water           sterile water           sterile water           sterile water           sterile water           sterile water           sterile water           sterile water              Behavioral Health Medications: all current active meds have been reviewed and continue current psychiatric medications. Vital signs in last 24 hours:  Temp:  [97.6 °F (36.4 °C)-98.3 °F (36.8 °C)] 98.3 °F (36.8 °C)  HR:  [112-132] 132  Resp:  [16] 16  BP: ()/(55-94) 139/89    Laboratory results:  I have personally reviewed all pertinent laboratory/tests results.   Most Recent Labs:   Lab Results   Component Value Date    WBC 9.23 10/10/2023    RBC 4.80 10/10/2023    HGB 15.0 10/10/2023    HCT 45.2 10/10/2023     10/10/2023    RDW 12.2 10/10/2023    TOTANEUTABS 4.84 06/08/2023    NEUTROABS 3.15 10/10/2023 SODIUM 138 10/11/2023    K 4.0 10/11/2023    CL 99 10/11/2023    CO2 29 10/11/2023    BUN 12 10/11/2023    CREATININE 1.14 10/11/2023    GLUC 88 10/11/2023    GLUF 88 09/27/2023    CALCIUM 9.5 10/11/2023    AST 25 09/27/2023    ALT 23 09/27/2023    ALKPHOS 53 09/27/2023    TP 7.4 09/27/2023    ALB 4.2 09/27/2023    TBILI 0.36 09/27/2023    CHOLESTEROL 167 09/02/2023    HDL 53 09/02/2023    TRIG 113 09/02/2023    LDLCALC 91 09/02/2023    NONHDLC 114 09/02/2023    VALPROICTOT 91 06/12/2023    AMMONIA 22 10/09/2022    BQF3QPJAVLIV 1.841 05/19/2023    FREET4 1.16 10/09/2022    HGBA1C 6.3 (H) 09/27/2023     09/27/2023       Mental Status Evaluation:    Appearance:  disheveled, marginal hygiene, wearing hospital clothes, looks older than stated age, AA male   Behavior:  pleasant, calm, guarded, psychomotor slowing   Speech:  increased latency of response, soft   Mood:  "Good"   Affect:  blunted   Thought Process:  poverty of thought   Associations: concrete associations   Thought Content:  no overt delusions   Perceptual Disturbances: appears distracted, denies auditory or visual hallucinations when asked, but appears preoccupied   Risk Potential: Suicidal ideation - None  Homicidal ideation - None  Potential for aggression - No   Sensorium:  oriented to person and place   Memory:  recent and remote memory grossly intact   Consciousness:  alert and awake   Attention/Concentration: attention span and concentration appear shorter than expected for age   Insight:  limited   Judgment: limited   Gait/Station: in bed   Motor Activity: no abnormal movements     Progress Toward Goals: slow progress    Recommended Treatment:   See above for assessment and plan. Risks, benefits and possible side effects of Medications:   Risks, benefits, and possible side effects of medications explained to patient and patient verbalizes understanding. Treatment discussed with nursing staff.     This note has been constructed using a voice recognition system. There may be translation, syntax, or grammatical errors. If you have any questions, please contact the dictating provider.     Constantin Burt MD  Psychiatry, PGY-4

## 2023-10-12 NOTE — PROGRESS NOTES
10/12/23 0876   Team Meeting   Meeting Type Daily Rounds   Team Members Present   Team Members Present Physician;Nurse;   Physician Team Member [id number] Team Member [facility] Management Team Member Wendie   Patient/Family Present   Patient Present No   Patient's Family Present No     Pt with facial swelling, this morning pt with lip swelling and numbness, medical consult placed. Pulse ox WNL. Pt to have CBC and echo. Pt had meeting for [facility] earlier this week. Discharge pending EAC.

## 2023-10-12 NOTE — ASSESSMENT & PLAN NOTE
Patient developed upper lip swelling and numbness on 10/11/23  Patient denies difficulty breathing or difficulty swallowing   Oral cavity appears unremarkable  CRP 2 -> 31, possibly from Clozaril   Reviewed patient's medication list  Clozaril, Depakote, and Robinul can potentially cause angioedema   Spoke with psychiatry regarding medication changes  Discontinue Robinul which was started for drooling and patient is not drooling   Hold Clozaril for 24 hours then re-introduce at a lower dose   Start Prednisone 20 mg daily for 5 days, Pepcid 10 mg BID, and Benadryl PRN  Advised patient and nursing to monitor for any shortness of breath or throat swelling/irrigation

## 2023-10-12 NOTE — NURSING NOTE
Swelling of upper lip slightly improved since start of shift. Pt reports the numbness/tingling of upper lip is "a little better". Denies pain. Vitals WNL. Q7 minute safety checks maintained.

## 2023-10-12 NOTE — TREATMENT PLAN
TREATMENT PLAN REVIEW - [address] 26 y.o. 2000 male MRN: [id number]    [facility] FACILITY CAR NON [facility] Room / Bed: [facility] 343/[facility] 114-07 Encounter: [id number]          Admit Date/Time:  5/18/2023  2:45 PM    Treatment Team: Attending Provider: Isabel Fraire MD; Consulting Physician: Mykel Harris MD; : Laurel Gonzalez, PhD; Resident: Guillaume Robles DO; Care Manager: Isacc Kee; Patient Care Assistant: Carolyn Lopez; Patient Care Technician: Giovanni Vela; Licensed Practical Nurse: Kj Buckley LPN; Patient Care Assistant: Jose Manuel Katz; Medical Student: Yasmeen Kee; Medical Student: Kirit Lane;  Advanced Practitioner: ELA Evans; Patient Care Assistant: Hannah Briggs; Resident: Regena Snellen, MD    Diagnosis: Principal Problem:    Schizoaffective disorder [facility])  Active Problems:    Legally blind    Hearing loss    Asthma    Medical clearance for psychiatric admission    Prolonged erection    Intellectual disability    Rib pain on left side    Angioedema    Patient Strengths/Assets: cooperative, compliant with medication, good past treatment response    Patient Barriers/Limitations: difficulty adapting, lack of stable employment, limited education, patient is on an involuntary commitment, poor insight, poor self-care, unable to express basic needs    Short Term Goals: decrease in psychotic symptoms, ability to stay safe on the unit, improvement in ability to express basic needs, improvement in insight, improvement in self care, increase in group attendance, increase in socialization with peers on the unit    Long Term Goals: resolution of psychotic symptoms, improvement in reality testing, improvement in reasoning ability, improved insight, able to express basic needs, acceptance of need for psychiatric treatment, acceptance of need for psychiatric follow up after discharge, adequate self care, adequate sleep, adequate appetite, adequate oral intake, appropriate interaction with peers    Progress Towards Goals: continue psychiatric medications as prescribed, progressing slowly, placement pending    Recommended Treatment: medication management, patient medication education, group therapy, milieu therapy, continued Behavioral Health psychiatric evaluation/assessment process    Treatment Frequency: daily medication monitoring, group and milieu therapy daily, monitoring through interdisciplinary rounds, monitoring through weekly patient care conferences    Expected Discharge Date:  TBD    Discharge Plan:  referral to Extended Acute Care    Treatment Plan Created/Updated By: Brianna Parham MD

## 2023-10-12 NOTE — PROGRESS NOTES
[address] [facility]  Progress Note  Name: Homero Krause  MRN: [id number]  Unit/Bed#: [facility] 343-01 I Date of Admission: 5/18/2023   Date of Service: 10/12/2023 I Hospital Day: 147    Assessment/Plan   Angioedema  Assessment & Plan  Patient developed upper lip swelling and numbness on 10/11/23  Patient denies difficulty breathing or difficulty swallowing   Oral cavity appears unremarkable  CRP 2 -> 31, possibly from Clozaril   Reviewed patient's medication list  Clozaril, Depakote, and Robinul can potentially cause angioedema   Spoke with psychiatry regarding medication changes  Discontinue Robinul which was started for drooling and patient is not drooling   Hold Clozaril for 24 hours then re-introduce at a lower dose   Start Prednisone 20 mg daily for 5 days, Pepcid 10 mg BID, and Benadryl PRN  Advised patient and nursing to monitor for any shortness of breath or throat swelling/irrigation            VTE Pharmacologic Prophylaxis:   Low Risk (Score 0-2) - Encourage Ambulation. Patient Centered Rounds: I performed bedside rounds with nursing staff today. Discussions with Specialists or Other Care Team Provider: nursing, psychiatry     Education and Discussions with Family / Patient:  discussed with patient. Total Time Spent on Date of Encounter in care of patient:  mins. This time was spent on one or more of the following: performing physical exam; counseling and coordination of care; obtaining or reviewing history; documenting in the medical record; reviewing/ordering tests, medications or procedures; communicating with other healthcare professionals and discussing with patient's family/caregivers. Current Length of Stay: 147 day(s)  Current Patient Status: Inpatient Psych   Certification Statement: The patient will continue to require additional inpatient hospital stay due to IPU  Discharge Plan: [facility] is following this patient on consult.  They are not yet medically stable for discharge secondary to angioedema . Code Status: Level 1 - Full Code    Subjective:   Patient seen and examined at bedside. He is resting in bed with blanket over his head. He is cooperative during my examination. He reports upper lip swelling and numbness which started yesterday. He reports difficulty with some facial movements, such as smiling, due to the swelling. He denies any HA, dizziness, other facial swelling, shortness of breath, throat irritation, difficulty swallowing, CP, or vomiting. He was advised to make nursing staff aware of any SOB or throat irritation. Objective:     Vitals:   Temp (24hrs), Av °F (36.7 °C), Min:97.6 °F (36.4 °C), Max:98.3 °F (36.8 °C)    Temp:  [97.6 °F (36.4 °C)-98.3 °F (36.8 °C)] 98.3 °F (36.8 °C)  HR:  [112-132] 132  Resp:  [16] 16  BP: ()/(55-94) 139/89  SpO2:  [96 %-98 %] 98 %  Body mass index is 33.8 kg/m². Input and Output Summary (last 24 hours):   No intake or output data in the 24 hours ending 10/12/23 0942    Physical Exam:   Physical Exam  Vitals and nursing note reviewed. Constitutional:       General: He is not in acute distress. Appearance: He is not toxic-appearing or diaphoretic. HENT:      Head: Normocephalic. Mouth/Throat:      Mouth: Mucous membranes are moist. Angioedema (upper lip swelling) present. No oral lesions. Eyes:      Conjunctiva/sclera: Conjunctivae normal.   Cardiovascular:      Rate and Rhythm: Normal rate. Pulmonary:      Effort: Pulmonary effort is normal.      Breath sounds: Normal breath sounds. Abdominal:      General: Bowel sounds are normal.      Palpations: Abdomen is soft. Musculoskeletal:         General: Normal range of motion. Cervical back: Normal range of motion. Right lower leg: No edema. Left lower leg: No edema. Skin:     General: Skin is warm and dry. Capillary Refill: Capillary refill takes less than 2 seconds.    Neurological:      Mental Status: He is alert and oriented to person, place, and time. Mental status is at baseline. Psychiatric:         Mood and Affect: Mood is depressed. Affect is flat. Speech: Speech normal.         Behavior: Behavior is cooperative. Additional Data:     Labs:  Results from last 7 days   Lab Units 10/10/23  0611   WBC Thousand/uL 9.23   HEMOGLOBIN g/dL 15.0   HEMATOCRIT % 45.2   PLATELETS Thousands/uL 236   NEUTROS PCT % 34*   LYMPHS PCT % 48*   MONOS PCT % 9   EOS PCT % 7*     Results from last 7 days   Lab Units 10/11/23  2314   SODIUM mmol/L 138   POTASSIUM mmol/L 4.0   CHLORIDE mmol/L 99   CO2 mmol/L 29   BUN mg/dL 12   CREATININE mg/dL 1.14   ANION GAP mmol/L 10   CALCIUM mg/dL 9.5   GLUCOSE RANDOM mg/dL 88                       Lines/Drains:  Invasive Devices       None                         Imaging: No pertinent imaging reviewed.     Recent Cultures (last 7 days):         Last 24 Hours Medication List:   Current Facility-Administered Medications   Medication Dose Route Frequency Provider Last Rate    acetaminophen  650 mg Oral Q6H PRN Massimo Cabello MD      acetaminophen  650 mg Oral Q4H PRN Massimo Cabello MD      acetaminophen  975 mg Oral Q6H PRN Massimo Cabello MD      albuterol  2 puff Inhalation Q4H PRN Massimo Cabello MD      aluminum-magnesium hydroxide-simethicone  30 mL Oral Q4H PRN Massimo Cabello MD      benztropine  1 mg Intramuscular BID PRN Annabella Hedrick MD      benztropine  1 mg Oral Q4H PRN Max 6/day Massimo Cabello MD      chlorhexidine  15 mL Swish & Spit Q12H [address] Juan Josefortino Other, CRNP      diphenhydrAMINE  25 mg Oral Q6H PRN Gurdeepqulyn Other, CRNP      divalproex sodium  750 mg Oral BID Pedro Arriola MD      famotidine  10 mg Oral BID Gurdeepqulyfortino Other, CRNP      fluticasone  1 spray Nasal Daily PRN Olayinka Maddox PA-C      hydrOXYzine HCL  100 mg Oral Q6H PRN Max 4/day Grant Baum DO      Or    LORazepam  1 mg Intramuscular Q6H PRN Tasia Newby Yonas Fitch, DO      hydrOXYzine HCL  25 mg Oral Q6H PRN Max 4/day Rivera Johnson MD      hydrOXYzine HCL  50 mg Oral Q6H PRN Max 4/day Rivera Johnson MD      melatonin  3 mg Oral HS PRN Margot Minor, DO      metFORMIN  1,000 mg Oral BID With Meals ELA Wise      metoprolol tartrate  12.5 mg Oral Q12H [address] ELA Wise      nicotine polacrilex  4 mg Oral Q2H PRN Rivera Johnson MD      OLANZapine  5 mg Oral Q3H PRN Max 6/day Barbie Damico MD      Or    OLANZapine  5 mg Intramuscular Q3H PRN Max 6/day Barbie Damico MD      OLANZapine  5 mg Intramuscular Q8H PRN Margot Minor, DO      Or    OLANZapine  7.5 mg Oral Q8H PRN Margot Minor, DO      OLANZapine  2.5 mg Oral Q3H PRN Max 8/day Barbie Damico MD      ondansetron  4 mg Oral Q8H PRN Margot Minor, DO      polyethylene glycol  17 g Oral Daily PRN Rivera Johnson MD      predniSONE  20 mg Oral Daily ELA Kerns      pseudoephedrine  120 mg Oral BID PRN Tracy Cook MD      senna-docusate sodium  1 tablet Oral Daily PRN Rivera Johnson MD      senna-docusate sodium  2 tablet Oral BID Barbie Damico MD      sterile water           sterile water           sterile water           sterile water           sterile water           sterile water           sterile water           sterile water           sterile water           sterile water           sterile water           sterile water               Today, Patient Was Seen By: ELA Kerns    **Please Note: This note may have been constructed using a voice recognition system. **

## 2023-10-12 NOTE — NURSING NOTE
Pt continues to have facial swelling, primarily to top lip. Pt reports area is "numb and tingly". Medical made aware. New order for prednisone, pepcid and peridex in place and administered as ordered. Images obtained with [place]. Pt reports no trouble breathing or swallowing, no injuries. Vitals obtained and WNL other than elevated [id number]. Pt compliant with scheduled medications and was OOB for breakfast with much encouragement. Denies any pain or psych symptoms. Q7 minute safety checks maintained.

## 2023-10-13 LAB
BASOPHILS # BLD AUTO: 0.09 THOUSANDS/ÂΜL (ref 0–0.1)
BASOPHILS NFR BLD AUTO: 1 % (ref 0–1)
BNP SERPL-MCNC: 17 PG/ML (ref 0–100)
CARDIAC TROPONIN I PNL SERPL HS: 7 NG/L (ref 8–18)
CK SERPL-CCNC: 499 U/L (ref 39–308)
CRP SERPL QL: 49.8 MG/L
EOSINOPHIL # BLD AUTO: 0.46 THOUSAND/ÂΜL (ref 0–0.61)
EOSINOPHIL NFR BLD AUTO: 4 % (ref 0–6)
ERYTHROCYTE [DISTWIDTH] IN BLOOD BY AUTOMATED COUNT: 12.3 % (ref 11.6–15.1)
GAMMA INTERFERON BACKGROUND BLD IA-ACNC: 0.01 IU/ML
HCT VFR BLD AUTO: 40.6 % (ref 36.5–49.3)
HGB BLD-MCNC: 13.3 G/DL (ref 12–17)
IMM GRANULOCYTES # BLD AUTO: 0.06 THOUSAND/UL (ref 0–0.2)
IMM GRANULOCYTES NFR BLD AUTO: 1 % (ref 0–2)
LYMPHOCYTES # BLD AUTO: 4.75 THOUSANDS/ÂΜL (ref 0.6–4.47)
LYMPHOCYTES NFR BLD AUTO: 40 % (ref 14–44)
M TB IFN-G BLD-IMP: NEGATIVE
M TB IFN-G CD4+ BCKGRND COR BLD-ACNC: 0.01 IU/ML
M TB IFN-G CD4+ BCKGRND COR BLD-ACNC: 0.01 IU/ML
MCH RBC QN AUTO: 30.4 PG (ref 26.8–34.3)
MCHC RBC AUTO-ENTMCNC: 32.8 G/DL (ref 31.4–37.4)
MCV RBC AUTO: 93 FL (ref 82–98)
MITOGEN IGNF BCKGRD COR BLD-ACNC: 3.02 IU/ML
MONOCYTES # BLD AUTO: 1.32 THOUSAND/ÂΜL (ref 0.17–1.22)
MONOCYTES NFR BLD AUTO: 11 % (ref 4–12)
NEUTROPHILS # BLD AUTO: 5.3 THOUSANDS/ÂΜL (ref 1.85–7.62)
NEUTS SEG NFR BLD AUTO: 43 % (ref 43–75)
NRBC BLD AUTO-RTO: 0 /100 WBCS
PLATELET # BLD AUTO: 201 THOUSANDS/UL (ref 149–390)
PMV BLD AUTO: 11.4 FL (ref 8.9–12.7)
RBC # BLD AUTO: 4.37 MILLION/UL (ref 3.88–5.62)
WBC # BLD AUTO: 11.98 THOUSAND/UL (ref 4.31–10.16)

## 2023-10-13 PROCEDURE — 99232 SBSQ HOSP IP/OBS MODERATE 35: CPT | Performed by: PSYCHIATRY & NEUROLOGY

## 2023-10-13 PROCEDURE — 85025 COMPLETE CBC W/AUTO DIFF WBC: CPT | Performed by: PSYCHIATRY & NEUROLOGY

## 2023-10-13 PROCEDURE — 84484 ASSAY OF TROPONIN QUANT: CPT | Performed by: PSYCHIATRY & NEUROLOGY

## 2023-10-13 PROCEDURE — 82550 ASSAY OF CK (CPK): CPT | Performed by: PSYCHIATRY & NEUROLOGY

## 2023-10-13 PROCEDURE — 86140 C-REACTIVE PROTEIN: CPT | Performed by: PSYCHIATRY & NEUROLOGY

## 2023-10-13 PROCEDURE — 83880 ASSAY OF NATRIURETIC PEPTIDE: CPT | Performed by: PSYCHIATRY & NEUROLOGY

## 2023-10-13 RX ORDER — LACTULOSE 20 G/30ML
20 SOLUTION ORAL AS NEEDED
Status: DISCONTINUED | OUTPATIENT
Start: 2023-10-13 | End: 2023-10-14

## 2023-10-13 RX ORDER — LACTULOSE 20 G/30ML
20 SOLUTION ORAL ONCE
Status: COMPLETED | OUTPATIENT
Start: 2023-10-13 | End: 2023-10-13

## 2023-10-13 RX ORDER — CLOZAPINE 200 MG/1
200 TABLET ORAL DAILY
Status: DISCONTINUED | OUTPATIENT
Start: 2023-10-13 | End: 2023-10-19 | Stop reason: HOSPADM

## 2023-10-13 RX ADMIN — SENNOSIDES AND DOCUSATE SODIUM 2 TABLET: 8.6; 5 TABLET ORAL at 19:00

## 2023-10-13 RX ADMIN — PREDNISONE 20 MG: 20 TABLET ORAL at 08:19

## 2023-10-13 RX ADMIN — LACTULOSE 20 G: 20 SOLUTION ORAL at 10:14

## 2023-10-13 RX ADMIN — CHLORHEXIDINE GLUCONATE 0.12% ORAL RINSE 15 ML: 1.2 LIQUID ORAL at 08:18

## 2023-10-13 RX ADMIN — DIVALPROEX SODIUM 750 MG: 500 TABLET, DELAYED RELEASE ORAL at 08:18

## 2023-10-13 RX ADMIN — METFORMIN HYDROCHLORIDE 1000 MG: 500 TABLET, FILM COATED ORAL at 16:09

## 2023-10-13 RX ADMIN — METOPROLOL TARTRATE 25 MG: 25 TABLET, FILM COATED ORAL at 21:30

## 2023-10-13 RX ADMIN — METOPROLOL TARTRATE 25 MG: 25 TABLET, FILM COATED ORAL at 08:19

## 2023-10-13 RX ADMIN — DIVALPROEX SODIUM 750 MG: 500 TABLET, DELAYED RELEASE ORAL at 19:00

## 2023-10-13 RX ADMIN — CLOZAPINE 200 MG: 200 TABLET ORAL at 19:00

## 2023-10-13 RX ADMIN — FAMOTIDINE 10 MG: 20 TABLET, FILM COATED ORAL at 19:00

## 2023-10-13 RX ADMIN — SENNOSIDES AND DOCUSATE SODIUM 2 TABLET: 8.6; 5 TABLET ORAL at 08:19

## 2023-10-13 RX ADMIN — METFORMIN HYDROCHLORIDE 1000 MG: 500 TABLET, FILM COATED ORAL at 08:18

## 2023-10-13 RX ADMIN — FAMOTIDINE 10 MG: 20 TABLET, FILM COATED ORAL at 08:18

## 2023-10-13 RX ADMIN — POLYETHYLENE GLYCOL 3350 17 G: 17 POWDER, FOR SOLUTION ORAL at 08:59
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
## 2023-10-13 NOTE — PROGRESS NOTES
10/13/23 1313   Team Meeting   Meeting Type Tx Team Meeting   Initial Conference Date 10/13/23   Team Members Present   Team Members Present Physician;Nurse;   Physician Team Member [id number] Member Wendie   Patient/Family Present   Patient Present No   Patient's Family Present No     Tx team attempted to meet with Pt to review tx plan. Pt was asleep in the day room and did not respond to team's attempts to wake him. Pt did not sign tx plan.

## 2023-10-13 NOTE — PROGRESS NOTES
10/13/23 0838   Team Meeting   Meeting Type Daily Rounds   Team Members Present   Team Members Present Physician;Nurse;   Physician Team Member [id number] Team Member [facility] Management Team Member Wendie   Patient/Family Present   Patient Present No   Patient's Family Present No     Pt med/meal compliant, Clozapine was held last night. . Visible at times, mostly seclusive to room. Lip swelling decreased today. Pt's labwork appears WNL. Discharge pending EAC.

## 2023-10-13 NOTE — NURSING NOTE
Pt isolative to room, OOB to dayroom with much encouragement, poor eye contact. Declined breakfast. Angioedema to top lip slightly improved this morning. Pt denies any pain, tingling, numbness to the area today. Vitals WNL. Compliant with scheduled medications. Lab work collected. Pt reporting no BM for several days, PRN Miralax administered at 0859. N.O. Lactulose in place and administered as ordered. No result as of note. Denies SI/HI/AVH at this time. Encouraged shower and group attendance. Q7 minute safety checks maintained.

## 2023-10-13 NOTE — NURSING NOTE
Patient is calm and pleasant upon approach. Patient observed sitting in milieu floor, sleeping. Patient woken up by staff. Patient walked to patient lounge and began to socialize with peers. Patient denies SI/HI/AH/VH and any discomfort.  Patient is compliant with meds and meals

## 2023-10-13 NOTE — PROGRESS NOTES
Progress Note - 1001 Pauline Hopson 26 y.o. male MRN: [id number]   Unit/Bed#: [facility] 343-01 Encounter: [id number]    Patient was seen and evaluated for continuity of care. Per nursing report yesterday morning, patient was noted to have facial swelling and lip swelling and was given an order for prednisone, Pepcid, and Peridex per nursing documentation. He was noted to have elevated heart rate at 132 per nursing report yesterday morning. Per medical documentation, Robinul was discontinued and atropine was started as needed for sialorrhea. Medical was recommending starting prednisone 20 mg daily for 5 days, Pepcid 10 mg twice daily, and Benadryl as needed. Medical recommended increasing patient's metoprolol to 25 mg twice daily. Per nursing report yesterday afternoon, patient's echo was completed which showed "Left Ventricle: Left ventricular cavity size is normal. Wall thickness is normal. The left ventricular ejection fraction is 50%. Systolic function is low normal. No regional wall motion abnormalities. Diastolic function is normal."  Per nighttime nursing report, patient was noted to be responding to internal stimuli on evening shift but was visible and social with staff and peers. During the interview today, patient was drowsy but describes his mood as "fine."  He states that he slept well overnight but was unsure of how many hours he slept altogether. He did not eat breakfast today. He last had a reported bowel movement on 10/11/2023. Will recommend patient receiving MiraLAX as well as lactulose today. Patient denies SI/HI/AVH and denies any plan or intent to harm himself or others. Will recommend retrying the patient on Clozapine at 200 mg tonight for psychosis; if patient continues to have angioedema or resumed lip swelling, plan is to discontinue clozapine.   After discussion with pharmacy, will also recommended trending patient's cardiac markers and BNP, troponin's, CK-MB, and CRP will be trended on 10/14, 10/15, and 10/16.     Sleep: normal  Appetite: decreased  Medication side effects:  constipation, improved lip swelling    ROS: Improved lip swelling, all other organ systems within normal limits    Mental Status Evaluation:    Appearance:  -American male, disheveled, poor hygiene, drowsy, no acute distress, less overall lip swelling as compared to yesterday   Behavior:  Cooperative, guarded, mild psychomotor slowing   Speech:  slow, scant, delayed, soft   Mood:  "Fine"   Affect:  blunted   Thought Process:  concrete, poverty of thought   Associations: concrete associations   Thought Content:  preoccupied   Perceptual Disturbances: no auditory hallucinations, no visual hallucinations, denies auditory hallucinations when asked, does not appear responding to internal stimuli, appears distracted, appears preoccupied   Risk Potential: Suicidal ideation - None  Homicidal ideation - None  Potential for aggression - No   Sensorium:  oriented to person, place, and situation   Memory:  recent and remote memory grossly intact   Consciousness:  alert and awake   Attention/Concentration: attention span and concentration appear shorter than expected for age   Insight:  limited   Judgment: limited   Gait/Station: in bed   Motor Activity: no abnormal movements     Vital signs in last 24 hours:    Temp:  [97.2 °F (36.2 °C)-97.5 °F (36.4 °C)] 97.5 °F (36.4 °C)  HR:  [] 96  Resp:  [15-16] 15  BP: (118-149)/(62-90) 118/63    Laboratory results: I have personally reviewed all pertinent laboratory/tests results    Results from the past 24 hours:   Recent Results (from the past 24 hour(s))   Echo complete w/ contrast if indicated    Collection Time: 10/12/23 12:09 PM   Result Value Ref Range    A4C EF 50 %    LV Diastolic Volume (BP) 45 mL    LV Systolic Volume (BP) 23 mL    EF 50 %    LVIDd 3.90 cm    LVIDS 2.80 cm    IVSd 0.80 cm    LVPWd 0.80 cm    FS 28 28 - 44    MV E' Tissue Velocity Septal 8 cm/s    LA Volume Index (BP) 21.6 mL/m2    RVID d 3.0 cm    Tricuspid annular plane systolic excursion 6.64 cm    LA size 3 cm    LA length (A2C) 4.10 cm    LA volume (BP) 41 mL    RAA A4C 9.1 cm2    Ao root 2.30 cm    Asc Ao 2.6 cm    Left ventricular stroke volume (2D) 38.00 mL    IVS 0.8 cm    LEFT VENTRICLE SYSTOLIC VOLUME (MOD BIPLANE) 2D 29 mL    LV DIASTOLIC VOLUME (MOD BIPLANE) 2D 67 mL    Left Atrium Area-systolic Four Chamber 71.9 cm2    Left Atrium Area-systolic Apical Two Chamber 13.8 cm2    LVSV, 2D 38 mL    LV EF 50    CBC and differential    Collection Time: 10/13/23  8:14 AM   Result Value Ref Range    WBC 11.98 (H) 4.31 - 10.16 Thousand/uL    RBC 4.37 3.88 - 5.62 Million/uL    Hemoglobin 13.3 12.0 - 17.0 g/dL    Hematocrit 40.6 36.5 - 49.3 %    MCV 93 82 - 98 fL    MCH 30.4 26.8 - 34.3 pg    MCHC 32.8 31.4 - 37.4 g/dL    RDW 12.3 11.6 - 15.1 %    MPV 11.4 8.9 - 12.7 fL    Platelets 171 469 - 545 Thousands/uL    nRBC 0 /100 WBCs    Neutrophils Relative 43 43 - 75 %    Immat GRANS % 1 0 - 2 %    Lymphocytes Relative 40 14 - 44 %    Monocytes Relative 11 4 - 12 %    Eosinophils Relative 4 0 - 6 %    Basophils Relative 1 0 - 1 %    Neutrophils Absolute 5.30 1.85 - 7.62 Thousands/µL    Immature Grans Absolute 0.06 0.00 - 0.20 Thousand/uL    Lymphocytes Absolute 4.75 (H) 0.60 - 4.47 Thousands/µL    Monocytes Absolute 1.32 (H) 0.17 - 1.22 Thousand/µL    Eosinophils Absolute 0.46 0.00 - 0.61 Thousand/µL    Basophils Absolute 0.09 0.00 - 0.10 Thousands/µL   B-Type Natriuretic Peptide(BNP)    Collection Time: 10/13/23  8:14 AM   Result Value Ref Range    BNP 17 0 - 100 pg/mL   High Sensitivity Troponin I Random    Collection Time: 10/13/23  8:14 AM   Result Value Ref Range    HS TnI random 7 (L) 8 - 18 ng/L   C-reactive protein    Collection Time: 10/13/23  8:14 AM   Result Value Ref Range    CRP 49.8 (H) <3.0 mg/L   CK    Collection Time: 10/13/23  8:14 AM   Result Value Ref Range    Total  (H) 39 - 308 U/L     Most Recent Labs:   Lab Results   Component Value Date    WBC 11.98 (H) 10/13/2023    RBC 4.37 10/13/2023    HGB 13.3 10/13/2023    HCT 40.6 10/13/2023     10/13/2023    RDW 12.3 10/13/2023    NEUTROABS 5.30 10/13/2023    TOTANEUTABS 4.84 06/08/2023    SODIUM 138 10/11/2023    K 4.0 10/11/2023    CL 99 10/11/2023    CO2 29 10/11/2023    BUN 12 10/11/2023    CREATININE 1.14 10/11/2023    GLUC 88 10/11/2023    CALCIUM 9.5 10/11/2023    AST 25 09/27/2023    ALT 23 09/27/2023    ALKPHOS 53 09/27/2023    TP 7.4 09/27/2023    ALB 4.2 09/27/2023    TBILI 0.36 09/27/2023    CHOLESTEROL 167 09/02/2023    HDL 53 09/02/2023    TRIG 113 09/02/2023    LDLCALC 91 09/02/2023    NONHDLC 114 09/02/2023    VALPROICTOT 91 06/12/2023    AMMONIA 22 10/09/2022    [id number] 1.841 05/19/2023    FREET4 1.16 10/09/2022    HGBA1C 6.3 (H) 09/27/2023     09/27/2023     Progress Toward Goals: progressing slowly, continues to await EAC placement    Assessment/Plan   Principal Problem:    Schizoaffective disorder ([address])  Active Problems:    Legally blind    Hearing loss    Asthma    Medical clearance for psychiatric admission    Prolonged erection    Intellectual disability    Rib pain on left side    Angioedema      Recommended Treatment:     Planned medication and treatment changes: All current active medications have been reviewed  Encourage group therapy, milieu therapy and occupational therapy  Behavioral Health checks every 7 minutes    1) Restart Clozapine at 200mg daily for psychosis (WBC on 10/13/2023 was 11.98 and ANC was 5.30).   Cozapine level on 10/3/2023 was 335; CK trended up from 447 on 10/11/2023 to 499 this morning, troponins were noted to be within normal limits, BNP was noted to have trended down from 109 on 10/11/2023 to 17 on 10/13/2023; if patient experiences angioedema after restarting clozapine, please discontinue; will check cardiac markers on 10/14, 10/15, and 10/16 to monitor and see if labs down trend  2) Continue Depakote 750 mg twice a day for mood stability (Depakote level 91 on 6/12/2023)  3) Continue Senokot 2 tablets twice a day for bowel regimen; patient given a one-time dose of lactulose and MiraLAX today; lactulose added in as needed for no bowel movements in 72 hours  4) Continue encouraging groups  5) Continue encouraging patient to attend groups  6) CM to continue coordinating patient care  7) Continue awaiting placement for EAC    Current Facility-Administered Medications   Medication Dose Route Frequency Provider Last Rate    acetaminophen  650 mg Oral Q6H PRN Chauncey Montero MD      acetaminophen  650 mg Oral Q4H PRN Chauncey Montero MD      acetaminophen  975 mg Oral Q6H PRN Chauncey Montero MD      albuterol  2 puff Inhalation Q4H PRN Chauncey Montero MD      aluminum-magnesium hydroxide-simethicone  30 mL Oral Q4H PRN Chauncey Montero MD      atropine  1 drop Sublingual TID PRN Debora Luis MD      benztropine  1 mg Intramuscular BID PRN Francetta Gitelman, MD      benztropine  1 mg Oral Q4H PRN Max 6/day Chauncey Montero MD      chlorhexidine  15 mL Swish & Spit Q12H [address] [facility] Theodora Harmon, ELA      cloZAPine  200 mg Oral Daily Saira Federico, DO      diphenhydrAMINE  25 mg Oral Q6H PRN [facility], CRNP      divalproex sodium  750 mg Oral BID Andrew Gaytan MD      famotidine  10 mg Oral BID [facility], CRMYA      fluticasone  1 spray Nasal Daily PRN Breezy Cho PA-C      hydrOXYzine HCL  100 mg Oral Q6H PRN Max 4/day Kevin Bers, DO      Or    LORazepam  1 mg Intramuscular Q6H PRN Kevin Bers, DO      hydrOXYzine HCL  25 mg Oral Q6H PRN Max 4/day Chauncey Montero MD      hydrOXYzine HCL  50 mg Oral Q6H PRN Max 4/day Chauncey Montero MD      lactulose  20 g Oral PRN Saira Leiers, DO      melatonin  3 mg Oral HS PRN Kevin Bers, DO      metFORMIN  1,000 mg Oral BID With Meals ELA Wise      metoprolol tartrate  25 mg Oral Q12H [facility] ELA Garcia      nicotine polacrilex  4 mg Oral Q2H PRN Martha Hennessy MD      OLANZapine  5 mg Oral Q3H PRN Max 6/day Mary Alberto MD      Or    OLANZapine  5 mg Intramuscular Q3H PRN Max 6/day Mary Alberto MD      OLANZapine  5 mg Intramuscular Q8H PRN Jeffery Prader, DO      Or    OLANZapine  7.5 mg Oral Q8H PRN Jeffery Graceder, DO      OLANZapine  2.5 mg Oral Q3H PRN Max 8/day Mary Alberto MD      ondansetron  4 mg Oral Q8H PRN Jeffery Prader, DO      polyethylene glycol  17 g Oral Daily PRN Martha Hennessy MD      predniSONE  20 mg Oral Daily ELA Garcia      pseudoephedrine  120 mg Oral BID PRN Jessica Massey MD      senna-docusate sodium  1 tablet Oral Daily PRN Martha Hennessy MD      senna-docusate sodium  2 tablet Oral BID Mary Alberto MD      sterile water           sterile water           sterile water           sterile water           sterile water           sterile water           sterile water           sterile water           sterile water           sterile water           sterile water           sterile water            Risks / Benefits of Treatment:    Risks, benefits, and possible side effects of medications explained to patient. Patient has limited understanding of risks and benefits of treatment at this time, but agrees to take medications as prescribed. Counseling / Coordination of Care: Total floor / unit time spent today 30 minutes. Greater than 50% of total time was spent with the patient and / or family counseling and / or coordination of care. A description of counseling / coordination of care:  Patient's progress discussed with staff in treatment team meeting. Medications, treatment progress and treatment plan reviewed with patient. Importance of medication and treatment compliance reviewed with patient. Reassurance and supportive therapy provided.   Encouraged participation in milieu and group therapy on the unit.     Magdalena Ghotra MD 10/13/23

## 2023-10-13 NOTE — PLAN OF CARE
Problem: Ineffective Coping  Goal: Participates in unit activities  Description: Interventions:  - Provide therapeutic environment   - Provide required programming   - Redirect inappropriate behaviors   Outcome: Not Progressing  Note: Patient isolates to self and does not attend therapy groups

## 2023-10-14 PROBLEM — Z00.8 MEDICAL CLEARANCE FOR PSYCHIATRIC ADMISSION: Status: RESOLVED | Noted: 2023-05-18 | Resolved: 2023-10-14

## 2023-10-14 LAB
BNP SERPL-MCNC: 16 PG/ML (ref 0–100)
CARDIAC TROPONIN I PNL SERPL HS: 6 NG/L (ref 8–18)
CK MB SERPL-MCNC: 1.7 NG/ML (ref 0.6–6.3)
CRP SERPL QL: 20.8 MG/L

## 2023-10-14 PROCEDURE — 83880 ASSAY OF NATRIURETIC PEPTIDE: CPT | Performed by: PSYCHIATRY & NEUROLOGY

## 2023-10-14 PROCEDURE — 84484 ASSAY OF TROPONIN QUANT: CPT | Performed by: PSYCHIATRY & NEUROLOGY

## 2023-10-14 PROCEDURE — 82553 CREATINE MB FRACTION: CPT | Performed by: PSYCHIATRY & NEUROLOGY

## 2023-10-14 PROCEDURE — 86140 C-REACTIVE PROTEIN: CPT | Performed by: PSYCHIATRY & NEUROLOGY

## 2023-10-14 PROCEDURE — 99232 SBSQ HOSP IP/OBS MODERATE 35: CPT | Performed by: PSYCHIATRY & NEUROLOGY

## 2023-10-14 RX ORDER — LACTULOSE 20 G/30ML
20 SOLUTION ORAL AS NEEDED
Status: DISCONTINUED | OUTPATIENT
Start: 2023-10-14 | End: 2023-10-19 | Stop reason: HOSPADM

## 2023-10-14 RX ADMIN — FAMOTIDINE 10 MG: 20 TABLET, FILM COATED ORAL at 09:11

## 2023-10-14 RX ADMIN — METFORMIN HYDROCHLORIDE 1000 MG: 500 TABLET, FILM COATED ORAL at 15:54

## 2023-10-14 RX ADMIN — DIVALPROEX SODIUM 750 MG: 500 TABLET, DELAYED RELEASE ORAL at 09:10

## 2023-10-14 RX ADMIN — SENNOSIDES AND DOCUSATE SODIUM 2 TABLET: 8.6; 5 TABLET ORAL at 18:04

## 2023-10-14 RX ADMIN — CLOZAPINE 200 MG: 200 TABLET ORAL at 18:04

## 2023-10-14 RX ADMIN — METOPROLOL TARTRATE 25 MG: 25 TABLET, FILM COATED ORAL at 21:07

## 2023-10-14 RX ADMIN — CHLORHEXIDINE GLUCONATE 0.12% ORAL RINSE 15 ML: 1.2 LIQUID ORAL at 09:11

## 2023-10-14 RX ADMIN — CHLORHEXIDINE GLUCONATE 0.12% ORAL RINSE 15 ML: 1.2 LIQUID ORAL at 21:08

## 2023-10-14 RX ADMIN — METFORMIN HYDROCHLORIDE 1000 MG: 500 TABLET, FILM COATED ORAL at 09:15

## 2023-10-14 RX ADMIN — METOPROLOL TARTRATE 25 MG: 25 TABLET, FILM COATED ORAL at 09:11

## 2023-10-14 RX ADMIN — PREDNISONE 20 MG: 20 TABLET ORAL at 09:11

## 2023-10-14 RX ADMIN — DIVALPROEX SODIUM 750 MG: 500 TABLET, DELAYED RELEASE ORAL at 18:04

## 2023-10-14 RX ADMIN — SENNOSIDES AND DOCUSATE SODIUM 2 TABLET: 8.6; 5 TABLET ORAL at 09:11

## 2023-10-14 RX ADMIN — FAMOTIDINE 10 MG: 20 TABLET, FILM COATED ORAL at 18:04

## 2023-10-14 NOTE — NURSING NOTE
Patient is in the community and interacting with peers and staff. Patient observed responding at times. Patient reports AH. Patient denied SI at this time. Patient is compliant with scheduled medications. Patient is requesting a podiatry appointment due to his toenails and dry feet. Patient ate dinner with peers. Staff ,maintained continuous rounding for safety and support.

## 2023-10-14 NOTE — NURSING NOTE
Went to patient's room at 2300 to assess for SOB or difficulty swallowing or breathing. He had neither. Asked him to keep his head uncovered - his preferred way to sleep is with his head covered - so that staff can clearly see him on q7min rounds overnight and he agreed. He has since approached staff for a drink of juice which he drank before returning to bed and to sleep.

## 2023-10-14 NOTE — PROGRESS NOTES
Progress Note - Behavioral Health   Gokul Levine 26 y.o. male MRN: [id number]  Unit/Bed#: [facility] 343-01 Encounter: [id number]    Assessment/Plan   Principal Problem:    Schizoaffective disorder ([address])  Active Problems:    Legally blind    Hearing loss    Asthma    Prolonged erection    Intellectual disability    Rib pain on left side    Angioedema      Recommended Treatment:      No psychopharmacologic changes necessary at this time; will continue to assess for further optimization. Continue with current medications:  Clozaril 200 mg daily for psychosis and mood. Patient previously experienced angioedema, dose was initially held then restarted below previous dosing, patient reevaluated for angioedema, some minor swelling noted that was also noted to be previously present, no additional swelling, no chest pain, no shortness of breath or difficulty breathing, no inspiratory stridor. Orofacial and buccal unremarkable on physical exam, trachea and upper airway unremarkable on exam.  Metformin 1000 mg twice daily for cardiometabolic syndrome secondary to Clozaril. Lopressor 25 mg every 12 hours for tachycardia secondary to Clozaril. Senokot 2 tablets twice daily for standing bowel regimen. Prednisone 20 mg daily for angioedema secondary to Clozaril. Depakote  mg twice daily for mood stabilization and agitation. Continue with group therapy, milieu therapy and occupational therapy. Continue frequent safety checks and vitals per unit protocol. Continue with SLIM medical management as indicated  Continue coordinating with case management regarding disposition    Legal Status: 201  Disposition: To be determined, coordinating with case management    Case discussed with treatment team.  Risks, benefits and possible side effects of Medications: Risks, benefits, and possible side effects of medications have previously been explained.  No new medications at this time.    ------------------------------------------------------------    Subjective: Patient's chart was reviewed, and patient's progress and plan was discussed with treatment team. Per nursing report, Gokul has been calm, pleasant, observed sitting on milieu floor, occasionally socializing with peers and denying SI/HI/AVH. He has been cooperative on the unit and compliant with medications. Last night patient was documented to have slept throughout the night. Gokul was evaluated this morning for continuity of care. On examination, Gokul is feeling "good," and has no current complaints and is not in any acute distress. He reports sleeping well with no difficulty falling asleep or staying asleep and his energy level today is adequate. His appetite has been good and he endorses eating breakfast without difficulty. He denies adverse effects from medications and states he feels as though the initial swelling from angioedema has come down significantly, he has no shortness of breath or inspiratory stridor on exam, he is not experiencing any chest pain or difficulty with respiration or globus sensation. He denies suicidal ideation, homicidal ideation, auditory hallucinations, and visual hallucinations. His goals for today are to remain in behavioral control and medication compliant. No questions, comments or concerns at this time. VS: Reviewed, within normal limits    Progress Toward Goals: Slow improvement    Psychiatric Review of Systems:  Behavior over the last 24 hours: improved  Sleep: normal  Appetite: adequate  Medication side effects: none verbalized, as noted above  Medical ROS: Complete review of systems is negative except as noted above.     Vital signs in last 24 hours:  Temp:  [97.4 °F (36.3 °C)] 97.4 °F (36.3 °C)  HR:  [85-94] 85  Resp:  [16] 16  BP: (119-131)/(61-68) 131/67    Mental Status Exam:    Appearance:  alert, good eye contact, appears stated age, casually dressed, marginal grooming/hygiene, overweight, bearded, and slight perioral swelling, noted to be there previously, no new swelling or angioedema   Behavior:  calm, cooperative, and laying in bed   Speech:  spontaneous, clear, slow, soft, scant, and coherent   Mood:  "Good"   Affect:  blunted   Thought Process:  poverty of thought   Associations: concrete associations   Thought Content:  no verbalized delusions or overt paranoia   Perceptual Disturbances: no reported hallucinations and does not appear to be responding to internal stimuli at this time   Risk Potential: Suicidal ideation - None at present  Homicidal ideation - None at present  Potential for aggression - Not at present   Sensorium:  oriented to person, place, and situation   Memory:  recent and remote memory grossly intact   Consciousness:  alert and awake   Attention/Concentration: attention span and concentration appear shorter than expected for age   Insight:  limited   Judgment: limited   Gait/Station: normal gait/station   Motor Activity: no abnormal movements     Current Medications:  Current Facility-Administered Medications   Medication Dose Route Frequency Provider Last Rate    acetaminophen  650 mg Oral Q6H PRN Dmitry Roche MD      acetaminophen  650 mg Oral Q4H PRN Dmitry Roche MD      acetaminophen  975 mg Oral Q6H PRN Dmitry Roche MD      albuterol  2 puff Inhalation Q4H PRN Dmitry Roche MD      aluminum-magnesium hydroxide-simethicone  30 mL Oral Q4H PRN Dmitry Roche MD      atropine  1 drop Sublingual TID PRN Debora Luis MD      benztropine  1 mg Intramuscular BID PRN Nichole Hill MD      benztropine  1 mg Oral Q4H PRN Max 6/day Dmitry Roche MD      chlorhexidine  15 mL Swish & Spit Q12H [address] MauroELA Manning      cloZAPine  200 mg Oral Daily Larita Mortimer, DO      diphenhydrAMINE  25 mg Oral Q6H PRN Hong Karlene CRNP      divalproex sodium  750 mg Oral BID Bryan Johnson MD famotidine  10 mg Oral BID GloryELA Donohue      fluticasone  1 spray Nasal Daily PRN Hellen Hawk PA-C      hydrOXYzine HCL  100 mg Oral Q6H PRN Max 4/day Wylene Locker, DO      Or    LORazepam  1 mg Intramuscular Q6H PRN Wylene Locker, DO      hydrOXYzine HCL  25 mg Oral Q6H PRN Max 4/day Lona Magaña MD      hydrOXYzine HCL  50 mg Oral Q6H PRN Max 4/day Prudfabián Magaña MD      lactulose  20 g Oral PRN Gloriann Balzarine, DO      melatonin  3 mg Oral HS PRN Wylene Locker, DO      metFORMIN  1,000 mg Oral BID With Meals ELA Wise      metoprolol tartrate  25 mg Oral Q12H [address] ELA Lynne      nicotine polacrilex  4 mg Oral Q2H PRN Lona Magaña MD      OLANZapine  5 mg Oral Q3H PRN Max 6/day Sam Covarrubias MD      Or    OLANZapine  5 mg Intramuscular Q3H PRN Max 6/day Sam Covarrubias MD      OLANZapine  5 mg Intramuscular Q8H PRN Wylene Locker, DO      Or    OLANZapine  7.5 mg Oral Q8H PRN Wylene Locker, DO      OLANZapine  2.5 mg Oral Q3H PRN Max 8/day Sam Covarrubias MD      ondansetron  4 mg Oral Q8H PRN Wylene Locker, DO      polyethylene glycol  17 g Oral Daily PRN Lona Magaña MD      predniSONE  20 mg Oral Daily ELA Lynne      pseudoephedrine  120 mg Oral BID PRN Deana Campuzano MD      senammon-docusate sodium  1 tablet Oral Daily PRN MD donis Nova-docusate sodium  2 tablet Oral BID Sam Covarrubias MD      sterile water           sterile water           sterile water           sterile water           sterile water           sterile water           sterile water           sterile water           sterile water           sterile water           sterile water           sterile water              Behavioral Health Medications: all current active meds have been reviewed. Changes as in plan section above. Laboratory results:  I have personally reviewed all pertinent laboratory/tests results. Recent Results (from the past 48 hour(s))   Echo complete w/ contrast if indicated    Collection Time: 10/12/23 12:09 PM   Result Value Ref Range    A4C EF 50 %    LV Diastolic Volume (BP) 45 mL    LV Systolic Volume (BP) 23 mL    EF 50 %    LVIDd 3.90 cm    LVIDS 2.80 cm    IVSd 0.80 cm    LVPWd 0.80 cm    FS 28 28 - 44    MV E' Tissue Velocity Septal 8 cm/s    LA Volume Index (BP) 21.6 mL/m2    RVID d 3.0 cm    Tricuspid annular plane systolic excursion 9.36 cm    LA size 3 cm    LA length (A2C) 4.10 cm    LA volume (BP) 41 mL    RAA A4C 9.1 cm2    Ao root 2.30 cm    Asc Ao 2.6 cm    Left ventricular stroke volume (2D) 38.00 mL    IVS 0.8 cm    LEFT VENTRICLE SYSTOLIC VOLUME (MOD BIPLANE) 2D 29 mL    LV DIASTOLIC VOLUME (MOD BIPLANE) 2D 67 mL    Left Atrium Area-systolic Four Chamber 80.5 cm2    Left Atrium Area-systolic Apical Two Chamber 13.8 cm2    LVSV, 2D 38 mL    LV EF 50    CBC and differential    Collection Time: 10/13/23  8:14 AM   Result Value Ref Range    WBC 11.98 (H) 4.31 - 10.16 Thousand/uL    RBC 4.37 3.88 - 5.62 Million/uL    Hemoglobin 13.3 12.0 - 17.0 g/dL    Hematocrit 40.6 36.5 - 49.3 %    MCV 93 82 - 98 fL    MCH 30.4 26.8 - 34.3 pg    MCHC 32.8 31.4 - 37.4 g/dL    RDW 12.3 11.6 - 15.1 %    MPV 11.4 8.9 - 12.7 fL    Platelets 198 991 - 896 Thousands/uL    nRBC 0 /100 WBCs    Neutrophils Relative 43 43 - 75 %    Immat GRANS % 1 0 - 2 %    Lymphocytes Relative 40 14 - 44 %    Monocytes Relative 11 4 - 12 %    Eosinophils Relative 4 0 - 6 %    Basophils Relative 1 0 - 1 %    Neutrophils Absolute 5.30 1.85 - 7.62 Thousands/µL    Immature Grans Absolute 0.06 0.00 - 0.20 Thousand/uL    Lymphocytes Absolute 4.75 (H) 0.60 - 4.47 Thousands/µL    Monocytes Absolute 1.32 (H) 0.17 - 1.22 Thousand/µL    Eosinophils Absolute 0.46 0.00 - 0.61 Thousand/µL    Basophils Absolute 0.09 0.00 - 0.10 Thousands/µL   B-Type Natriuretic Peptide(BNP)    Collection Time: 10/13/23  8:14 AM   Result Value Ref Range BNP 17 0 - 100 pg/mL   High Sensitivity Troponin I Random    Collection Time: 10/13/23  8:14 AM   Result Value Ref Range    HS TnI random 7 (L) 8 - 18 ng/L   C-reactive protein    Collection Time: 10/13/23  8:14 AM   Result Value Ref Range    CRP 49.8 (H) <3.0 mg/L   CK    Collection Time: 10/13/23  8:14 AM   Result Value Ref Range    Total  (H) 39 - 308 U/L   B-Type Natriuretic Peptide(BNP)    Collection Time: 10/14/23  5:40 AM   Result Value Ref Range    BNP 16 0 - 100 pg/mL   CKMB    Collection Time: 10/14/23  5:40 AM   Result Value Ref Range    CK-MB 1.7 0.6 - 6.3 ng/mL   C-reactive protein    Collection Time: 10/14/23  5:40 AM   Result Value Ref Range    CRP 20.8 (H) <3.0 mg/L   High Sensitivity Troponin I Random    Collection Time: 10/14/23  6:01 AM   Result Value Ref Range    HS TnI random 6 (L) 8 - 18 ng/L        This note has been constructed using a voice recognition system. There may be translation, syntax, or grammatical errors. If you have any questions, please contact the dictating author.     Nissa De La Garza, DO  Psychiatry Residency, PGY-2

## 2023-10-14 NOTE — NURSING NOTE
Pt alert and oriented. Isolative to room, often napping in bed. Medication and meal compliant. Pt denies SI/HI/AH/VH, pain, depression, and anxiety. Pt is able to make needs known. Pt is wearing hospital gown and pants(pt preference). Pt encouraged to come out of room. He is pleasant and cooperative when awake.

## 2023-10-15 VITALS
OXYGEN SATURATION: 96 % | DIASTOLIC BLOOD PRESSURE: 79 MMHG | TEMPERATURE: 97.4 F | HEIGHT: 63 IN | SYSTOLIC BLOOD PRESSURE: 113 MMHG | RESPIRATION RATE: 16 BRPM | WEIGHT: 191.8 LBS | HEART RATE: 110 BPM | BODY MASS INDEX: 33.98 KG/M2

## 2023-10-15 LAB
BNP SERPL-MCNC: 8 PG/ML (ref 0–100)
CARDIAC TROPONIN I PNL SERPL HS: 8 NG/L (ref 8–18)
CK MB SERPL-MCNC: 1.9 NG/ML (ref 0.6–6.3)
CRP SERPL QL: 11.1 MG/L

## 2023-10-15 PROCEDURE — 82553 CREATINE MB FRACTION: CPT | Performed by: PSYCHIATRY & NEUROLOGY

## 2023-10-15 PROCEDURE — 84484 ASSAY OF TROPONIN QUANT: CPT | Performed by: PSYCHIATRY & NEUROLOGY

## 2023-10-15 PROCEDURE — 93005 ELECTROCARDIOGRAM TRACING: CPT

## 2023-10-15 PROCEDURE — 83880 ASSAY OF NATRIURETIC PEPTIDE: CPT | Performed by: PSYCHIATRY & NEUROLOGY

## 2023-10-15 PROCEDURE — 99232 SBSQ HOSP IP/OBS MODERATE 35: CPT | Performed by: PSYCHIATRY & NEUROLOGY

## 2023-10-15 PROCEDURE — 86140 C-REACTIVE PROTEIN: CPT | Performed by: PSYCHIATRY & NEUROLOGY

## 2023-10-15 RX ADMIN — CLOZAPINE 200 MG: 200 TABLET ORAL at 17:50

## 2023-10-15 RX ADMIN — METFORMIN HYDROCHLORIDE 1000 MG: 500 TABLET, FILM COATED ORAL at 17:50

## 2023-10-15 RX ADMIN — CHLORHEXIDINE GLUCONATE 0.12% ORAL RINSE 15 ML: 1.2 LIQUID ORAL at 08:43

## 2023-10-15 RX ADMIN — CHLORHEXIDINE GLUCONATE 0.12% ORAL RINSE 15 ML: 1.2 LIQUID ORAL at 20:18

## 2023-10-15 RX ADMIN — FAMOTIDINE 10 MG: 20 TABLET, FILM COATED ORAL at 08:44

## 2023-10-15 RX ADMIN — SENNOSIDES AND DOCUSATE SODIUM 2 TABLET: 8.6; 5 TABLET ORAL at 17:50

## 2023-10-15 RX ADMIN — METOPROLOL TARTRATE 25 MG: 25 TABLET, FILM COATED ORAL at 20:18

## 2023-10-15 RX ADMIN — METOPROLOL TARTRATE 25 MG: 25 TABLET, FILM COATED ORAL at 08:59

## 2023-10-15 RX ADMIN — FAMOTIDINE 10 MG: 20 TABLET, FILM COATED ORAL at 17:50

## 2023-10-15 RX ADMIN — DIVALPROEX SODIUM 750 MG: 500 TABLET, DELAYED RELEASE ORAL at 08:43

## 2023-10-15 RX ADMIN — SENNOSIDES AND DOCUSATE SODIUM 2 TABLET: 8.6; 5 TABLET ORAL at 08:45

## 2023-10-15 RX ADMIN — DIVALPROEX SODIUM 750 MG: 500 TABLET, DELAYED RELEASE ORAL at 18:00

## 2023-10-15 RX ADMIN — PREDNISONE 20 MG: 20 TABLET ORAL at 08:43

## 2023-10-15 RX ADMIN — METFORMIN HYDROCHLORIDE 1000 MG: 500 TABLET, FILM COATED ORAL at 08:43

## 2023-10-15 NOTE — PROGRESS NOTES
Progress Note - Behavioral Health   Gokul Angeles 26 y.o. male MRN: [id number]  Unit/Bed#: [facility] 343-01 Encounter: [id number]    Assessment/Plan   Principal Problem:    Schizoaffective disorder ([address])  Active Problems:    Legally blind    Hearing loss    Asthma    Prolonged erection    Intellectual disability    Rib pain on left side    Angioedema      Recommended Treatment:      No psychopharmacologic changes necessary at this time; will continue to assess for further optimization. Continue with current medications:  Clozaril 200 mg daily for psychosis and mood. Patient previously experienced angioedema, this appears to have resolved, patient is denying any shortness of breath, chest pain, wheezing, inspiratory stridor on exam or other symptomatology. Metformin 1000 mg twice daily for cardiometabolic syndrome secondary to Clozaril. Lopressor 25 mg every 12 hours for tachycardia secondary to Clozaril. Senokot 2 tablets twice daily for standing bowel regimen. Prednisone 20 mg daily for angioedema secondary to Clozaril. Depakote  mg twice daily for mood stabilization and agitation. Continue with group therapy, milieu therapy and occupational therapy. Continue frequent safety checks and vitals per unit protocol. Continue with SLIM medical management as indicated  Continue coordinating with case management regarding disposition    Legal Status: 201  Disposition: To be determined, coordinating with case management    Case discussed with treatment team.  Risks, benefits and possible side effects of Medications: Risks, benefits, and possible side effects of medications have previously been explained. No new medications at this time.     ------------------------------------------------------------    Subjective: Patient's chart was reviewed, and patient's progress and plan was discussed with treatment team. Per nursing report, Gokul has been isolative to room during the day but out in the community and interacting with peers and staff in the evening, observed responding at times, reporting auditory hallucinations but denying visual hallucinations, suicidal ideation or homicidal ideation and he has ultimately been cooperative on the unit and compliant with medications. Last night patient was documented to have slept throughout the night. Gokul was evaluated this morning for continuity of care. On examination, Gokul is sedated, laying in bed with multiple sheets over his head and initially refusing to cooperate with interview but when prompted for medical examination of face due to angioedema the patient is able to cooperate with exam. He states his mood is " fine." He reports sleeping fine and his energy level today is "fine." His appetite has been good and he denies difficulties with breakfast. He denies adverse effects from medications and is not currently experiencing any angioedema, shortness of breath, wheezing, inspiratory stridor, or chest pain. He denies suicidal ideation, homicidal ideation, auditory hallucinations, and visual hallucinations. His goals for today are to remain in behavioral control and medication compliant. VS: Reviewed, within normal limits    Progress Toward Goals: Slow improvement    Psychiatric Review of Systems:  Behavior over the last 24 hours: unchanged  Sleep: hypersomnia  Appetite: decreased from baseline  Medication side effects: none verbalized  Medical ROS: Complete review of systems is negative except as noted above.     Vital signs in last 24 hours:  Temp:  [97.4 °F (36.3 °C)-97.7 °F (36.5 °C)] 97.4 °F (36.3 °C)  HR:  [] 93  Resp:  [16-19] 16  BP: (104-122)/(58-87) 122/87    Mental Status Exam:    Appearance:  alert, poor eye contact, appears older than stated age, casually dressed, disheveled, overweight, and bearded, laying in bed with multiple sheets over his head and his head pulled inside of his T-shirt   Behavior:  calm, limited cooperativity, and laying in bed   Speech:  spontaneous, clear, normal rate, soft, poverty of content, and coherent   Mood:  "Fine"   Affect:  flat   Thought Process:  poverty of thought   Associations: unable to assess - does not answer   Thought Content:  no verbalized delusions or overt paranoia   Perceptual Disturbances: denies current hallucinations and does not appear to be responding to internal stimuli at this time   Risk Potential: Suicidal ideation - None at present  Homicidal ideation - None at present  Potential for aggression - Not at present   Sensorium:  oriented to person, place, and time/date   Memory:  recent and remote memory: unable to assess due to lack of cooperation   Consciousness:  sedated   Attention/Concentration: attention span and concentration appear shorter than expected for age   Insight:  limited   Judgment: limited   Gait/Station: normal gait/station   Motor Activity: no abnormal movements     Current Medications:  Current Facility-Administered Medications   Medication Dose Route Frequency Provider Last Rate    acetaminophen  650 mg Oral Q6H PRN Martha Hennessy MD      acetaminophen  650 mg Oral Q4H PRN Martha Hennessy MD      acetaminophen  975 mg Oral Q6H PRN Martha Hennessy MD      albuterol  2 puff Inhalation Q4H PRN Martha Hennessy MD      aluminum-magnesium hydroxide-simethicone  30 mL Oral Q4H PRN Martha Hennessy MD      atropine  1 drop Sublingual TID PRN Debora Luis MD      benztropine  1 mg Intramuscular BID PRN Corinne Philippe MD      benztropine  1 mg Oral Q4H PRN Max 6/day Martha Hennessy MD      chlorhexidine  15 mL Swish & Spit Q12H [address] ELA Nino      cloZAPine  200 mg Oral Daily Karolina Chi,       diphenhydrAMINE  25 mg Oral Q6H PRN Isadora Martinez, CRMYA      divalproex sodium  750 mg Oral BID Mary Alberto MD      famotidine  10 mg Oral BID Isadora Martinez, CRMYA      fluticasone  1 spray Nasal Daily PRN Elias MondayJONO hydrOXYzine HCL  100 mg Oral Q6H PRN Max 4/day Bo Mower, DO      Or    LORazepam  1 mg Intramuscular Q6H PRN Bo Mower, DO      hydrOXYzine HCL  25 mg Oral Q6H PRN Max 4/day Nagi Boykin MD      hydrOXYzine HCL  50 mg Oral Q6H PRN Max 4/day Nagi Boykin MD      lactulose  20 g Oral PRN Leah Manuel, DO      melatonin  3 mg Oral HS PRN Bo Mower, DO      metFORMIN  1,000 mg Oral BID With Meals Brianna Petersen, ELA      metoprolol tartrate  25 mg Oral Q12H [address] Barnetta Salts, CRNP      nicotine polacrilex  4 mg Oral Q2H PRN Nagi Boykin MD      OLANZapine  5 mg Oral Q3H PRN Max 6/day Madeleine Palafox MD      Or    OLANZapine  5 mg Intramuscular Q3H PRN Max 6/day Madeleine Palafox MD      OLANZapine  5 mg Intramuscular Q8H PRN Bo Mower, DO      Or    OLANZapine  7.5 mg Oral Q8H PRN Bo Mower, DO      OLANZapine  2.5 mg Oral Q3H PRN Max 8/day Madeleine Palafox MD      ondansetron  4 mg Oral Q8H PRN Bo Mower, DO      polyethylene glycol  17 g Oral Daily PRN Nagi Boykin MD      predniSONE  20 mg Oral Daily Barnetta Salts, CRNP      pseudoephedrine  120 mg Oral BID PRN Kinza Weinstein MD      senna-docusate sodium  1 tablet Oral Daily PRN Nagi Boykin MD      senna-docusate sodium  2 tablet Oral BID Madeleine Palafox MD      sterile water           sterile water           sterile water           sterile water           sterile water           sterile water           sterile water           sterile water           sterile water           sterile water           sterile water           sterile water              Behavioral Health Medications: all current active meds have been reviewed. Changes as in plan section above. Laboratory results:  I have personally reviewed all pertinent laboratory/tests results.    Recent Results (from the past 48 hour(s))   B-Type Natriuretic Peptide(BNP)    Collection Time: 10/14/23  5:40 AM   Result Value Ref Range    BNP 16 0 - 100 pg/mL   CKMB    Collection Time: 10/14/23  5:40 AM   Result Value Ref Range    CK-MB 1.7 0.6 - 6.3 ng/mL   C-reactive protein    Collection Time: 10/14/23  5:40 AM   Result Value Ref Range    CRP 20.8 (H) <3.0 mg/L   High Sensitivity Troponin I Random    Collection Time: 10/14/23  6:01 AM   Result Value Ref Range    HS TnI random 6 (L) 8 - 18 ng/L   B-Type Natriuretic Peptide(BNP)    Collection Time: 10/15/23  5:35 AM   Result Value Ref Range    BNP 8 0 - 100 pg/mL   CKMB    Collection Time: 10/15/23  5:35 AM   Result Value Ref Range    CK-MB 1.9 0.6 - 6.3 ng/mL   C-reactive protein    Collection Time: 10/15/23  5:35 AM   Result Value Ref Range    CRP 11.1 (H) <3.0 mg/L   High Sensitivity Troponin I Random    Collection Time: 10/15/23  9:36 AM   Result Value Ref Range    HS TnI random 8 8 - 18 ng/L        This note has been constructed using a voice recognition system. There may be translation, syntax, or grammatical errors. If you have any questions, please contact the dictating author.     Latesha Stein, DO  Psychiatry Residency, PGY-2

## 2023-10-15 NOTE — NURSING NOTE
Patient remains in room wishing to sleep. Difficult to wake up, medications given with encouragement. Denies SI/HI/AH/VH  at this time. This nurse encouraged patient to shower and come out for breakfast with no success.

## 2023-10-16 LAB
ATRIAL RATE: 80 BPM
ATRIAL RATE: 80 BPM
BNP SERPL-MCNC: 12 PG/ML (ref 0–100)
CARDIAC TROPONIN I PNL SERPL HS: 4 NG/L (ref 8–18)
CK MB SERPL-MCNC: 2 NG/ML (ref 0.6–6.3)
CRP SERPL QL: 6.2 MG/L
P AXIS: 46 DEGREES
P AXIS: 47 DEGREES
PR INTERVAL: 134 MS
PR INTERVAL: 138 MS
QRS AXIS: 58 DEGREES
QRS AXIS: 60 DEGREES
QRSD INTERVAL: 104 MS
QRSD INTERVAL: 106 MS
QT INTERVAL: 338 MS
QT INTERVAL: 356 MS
QTC INTERVAL: 389 MS
QTC INTERVAL: 410 MS
T WAVE AXIS: 38 DEGREES
T WAVE AXIS: 44 DEGREES
VENTRICULAR RATE: 80 BPM
VENTRICULAR RATE: 80 BPM

## 2023-10-16 PROCEDURE — 99232 SBSQ HOSP IP/OBS MODERATE 35: CPT | Performed by: PSYCHIATRY & NEUROLOGY

## 2023-10-16 PROCEDURE — 82553 CREATINE MB FRACTION: CPT | Performed by: PSYCHIATRY & NEUROLOGY

## 2023-10-16 PROCEDURE — 83880 ASSAY OF NATRIURETIC PEPTIDE: CPT | Performed by: PSYCHIATRY & NEUROLOGY

## 2023-10-16 PROCEDURE — 84484 ASSAY OF TROPONIN QUANT: CPT | Performed by: PSYCHIATRY & NEUROLOGY

## 2023-10-16 PROCEDURE — 86140 C-REACTIVE PROTEIN: CPT | Performed by: PSYCHIATRY & NEUROLOGY

## 2023-10-16 PROCEDURE — 93010 ELECTROCARDIOGRAM REPORT: CPT | Performed by: INTERNAL MEDICINE

## 2023-10-16 RX ADMIN — NICOTINE POLACRILEX 4 MG: 4 GUM, CHEWING BUCCAL at 17:22

## 2023-10-16 RX ADMIN — SENNOSIDES AND DOCUSATE SODIUM 2 TABLET: 8.6; 5 TABLET ORAL at 17:22

## 2023-10-16 RX ADMIN — SENNOSIDES AND DOCUSATE SODIUM 2 TABLET: 8.6; 5 TABLET ORAL at 09:49

## 2023-10-16 RX ADMIN — FAMOTIDINE 10 MG: 20 TABLET, FILM COATED ORAL at 09:49

## 2023-10-16 RX ADMIN — DIVALPROEX SODIUM 750 MG: 500 TABLET, DELAYED RELEASE ORAL at 09:48

## 2023-10-16 RX ADMIN — FAMOTIDINE 10 MG: 20 TABLET, FILM COATED ORAL at 17:22

## 2023-10-16 RX ADMIN — NICOTINE POLACRILEX 4 MG: 4 GUM, CHEWING BUCCAL at 23:22

## 2023-10-16 RX ADMIN — METFORMIN HYDROCHLORIDE 1000 MG: 500 TABLET, FILM COATED ORAL at 17:22

## 2023-10-16 RX ADMIN — DIVALPROEX SODIUM 750 MG: 500 TABLET, DELAYED RELEASE ORAL at 20:32

## 2023-10-16 RX ADMIN — METOPROLOL TARTRATE 25 MG: 25 TABLET, FILM COATED ORAL at 20:32

## 2023-10-16 RX ADMIN — METOPROLOL TARTRATE 25 MG: 25 TABLET, FILM COATED ORAL at 09:49

## 2023-10-16 RX ADMIN — CLOZAPINE 200 MG: 200 TABLET ORAL at 17:22

## 2023-10-16 RX ADMIN — PREDNISONE 20 MG: 20 TABLET ORAL at 09:49

## 2023-10-16 RX ADMIN — MELATONIN TAB 3 MG 3 MG: 3 TAB at 23:22

## 2023-10-16 RX ADMIN — METFORMIN HYDROCHLORIDE 1000 MG: 500 TABLET, FILM COATED ORAL at 09:49

## 2023-10-16 NOTE — PROGRESS NOTES
10/16/23 1039   Team Meeting   Meeting Type Daily Rounds   Team Members Present   Team Members Present Physician;Nurse;   Physician Team Member [id number]   Care Management Team Member Dev   Patient/Family Present   Patient Present No   Patient's Family Present No     Saturday- reported AH. Med/meal compliant. Requesting podiatry consult. Seclusive to room. Sunday- Seclusive to room. Labs tomorrow, cardiac markers will be done Wednesday/Thursday. Dc pending EAC.

## 2023-10-16 NOTE — PLAN OF CARE
Problem: Ineffective Coping  Goal: Participates in unit activities  Description: Interventions:  - Provide therapeutic environment   - Provide required programming   - Redirect inappropriate behaviors   Outcome: Not Progressing  Note: Patient does not attend therapy groups

## 2023-10-16 NOTE — PROGRESS NOTES
Progress Note - Behavioral Health   Gokul Purvis 26 y.o. male MRN: [id number]  Unit/Bed#: [facility] 343-01 Encounter: [id number]    Assessment/Plan   Principal Problem:    Schizoaffective disorder [facility])  Active Problems:    Legally blind    Hearing loss    Asthma    Prolonged erection    Intellectual disability    Rib pain on left side    Angioedema    F/u Cardiac Labs this Thursday, CBC tomorrow  Maintain Clozapine at 200 mg daily at this time  Continue medications as noted below. Continue to promote patient participation in group therapy, milieu therapy, occupational therapy  Continue medical management by primary team.  Discharge disposition:  pending EA placement    Subjective: The patient was evaluated this morning for continuity of care and no acute distress noted throughout the evaluation. Over the past 24 hours staff noted that the patient has been seclusive to room, and reported AH on Saturday. On Sunday, remained seclusive to room. Patient has been medication adherent. Today on evaluation, Gokul was seen lying in bed with bedsheets covering him. He notes that he is feeling "fine" in terms of his mood. Denies decompensation of mood and denies feeling depressed and down today. He denies anxiety symptoms. He notes that he tolerated a decrease in Clozapine dose. He denies physical symptoms at this time and states that his facial swelling has gone down. He reports having BM yesterday in the evening/night. Denies acute concerns at this time. Denies CP, SOB, numbness, weakness, diaphoresis, sore throat, nasal congestion, chills, malaise, diarrhea, difficulty with urination. In terms of safety, Gokul Denies SI/HI. Gokul Denies Auditory and visual hallucinations but appears to be internally preoccupied. Denies symptomology consistent with delusional thought content but has poverty of speech and thought. Denies symptomology consistent with bina/hypomania.     Psychiatric Review of Systems:  Behavior over the last 24 hours:  unchanged  Sleep: hypersomnia  Appetite: normal  Medication side effects: No   ROS: no complaints, all other systems are negative    Current Medications:  Current Facility-Administered Medications   Medication Dose Route Frequency Provider Last Rate    acetaminophen  650 mg Oral Q6H PRN Frosty Bee, MD      acetaminophen  650 mg Oral Q4H PRN Frosty Bee, MD      acetaminophen  975 mg Oral Q6H PRN Frosty Bee, MD      albuterol  2 puff Inhalation Q4H PRN Frosty Bee, MD      aluminum-magnesium hydroxide-simethicone  30 mL Oral Q4H PRN Frosty Bee, MD      atropine  1 drop Sublingual TID PRN Debora Luis MD      benztropine  1 mg Intramuscular BID PRN Ángel Mayo MD      benztropine  1 mg Oral Q4H PRN Max 6/day Frosty Bee, MD      chlorhexidine  15 mL Swish & Spit Q12H [address] [facility] [place]note 2[facility] [place] ELA Gar      cloZAPine  200 mg Oral Daily Luly Olivas DO      diphenhydrAMINE  25 mg Oral Q6H PRN ELA Baez      divalproex sodium  750 mg Oral BID Jorje Monroe MD      famotidine  10 mg Oral BID [place]A [place]      fluticasone  1 spray Nasal Daily PRN Dimitri Paiz PA-C      hydrOXYzine HCL  100 mg Oral Q6H PRN Max 4/day Serjio Sahu DO      Or    LORazepam  1 mg Intramuscular Q6H PRN Serjio Sahu DO      hydrOXYzine HCL  25 mg Oral Q6H PRN Max 4/day Frosty MD Bee      hydrOXYzine HCL  50 mg Oral Q6H PRN Max 4/day Frosty MD Bee      lactulose  20 g Oral PRN Luly Olivas DO      melatonin  3 mg Oral HS PRN Serjio Sahu, DO      metFORMIN  1,000 mg Oral BID With Meals ELA Wise      metoprolol tartrate  25 mg Oral Q12H [address] [facility] ELA Hernandez      nicotine polacrilex  4 mg Oral Q2H PRN Frosty MD Bee      OLANZapine  5 mg Oral Q3H PRN Max 6/day Jorje Monroe MD      Or    OLANZapine  5 mg Intramuscular Q3H PRN Max 6/day Jorje Cleverly, MD      OLANZapine  5 mg Intramuscular Q8H PRN Gerald Prader, DO      Or    OLANZapine  7.5 mg Oral Q8H PRN Gerald Prader, DO      OLANZapine  2.5 mg Oral Q3H PRN Max 8/day Mary Alberto MD      ondansetron  4 mg Oral Q8H PRN Gerald Prader, DO      polyethylene glycol  17 g Oral Daily PRN Martha Hennessy MD      pseudoephedrine  120 mg Oral BID PRN MD donis Mtz-docusate sodium  1 tablet Oral Daily PRN Martha Hennessy MD      senna-docusate sodium  2 tablet Oral BID Mary Alberto MD      sterile water           sterile water           sterile water           sterile water           sterile water           sterile water           sterile water           sterile water           sterile water           sterile water           sterile water           sterile water              Behavioral Health Medications: all current active meds have been reviewed and continue current psychiatric medications. Vital signs in last 24 hours:  Temp:  [98.5 °F (36.9 °C)] 98.5 °F (36.9 °C)  HR:  [] 71  Resp:  [16] 16  BP: (113-126)/(62-79) 126/62    Laboratory results:  I have personally reviewed all pertinent laboratory/tests results.   Most Recent Labs:   Lab Results   Component Value Date    WBC 11.98 (H) 10/13/2023    RBC 4.37 10/13/2023    HGB 13.3 10/13/2023    HCT 40.6 10/13/2023     10/13/2023    RDW 12.3 10/13/2023    TOTANEUTABS 4.84 06/08/2023    NEUTROABS 5.30 10/13/2023    SODIUM 138 10/11/2023    K 4.0 10/11/2023    CL 99 10/11/2023    CO2 29 10/11/2023    BUN 12 10/11/2023    CREATININE 1.14 10/11/2023    GLUC 88 10/11/2023    GLUF 88 09/27/2023    CALCIUM 9.5 10/11/2023    AST 25 09/27/2023    ALT 23 09/27/2023    ALKPHOS 53 09/27/2023    TP 7.4 09/27/2023    ALB 4.2 09/27/2023    TBILI 0.36 09/27/2023    CHOLESTEROL 167 09/02/2023    HDL 53 09/02/2023    TRIG 113 09/02/2023    LDLCALC 91 09/02/2023    NONHDLC 114 09/02/2023    VALPROICTOT 91 06/12/2023    AMMONIA 22 10/09/2022    ION8DQOXPSVI 1.841 05/19/2023    FREET4 1.16 10/09/2022    HGBA1C 6.3 (H) 09/27/2023     09/27/2023       Mental Status Evaluation:    Appearance:  disheveled, marginal hygiene, wearing hospital clothes, AA male. Lip/facial swelling appears significantly decreased. Behavior:  pleasant, cooperative, guarded; psychomotor slowing   Speech:  scant, increased latency of response, soft   Mood:  "Fine"   Affect:  blunted   Thought Process:  poverty of thought   Associations: concrete associations   Thought Content:  no overt delusions   Perceptual Disturbances: denies auditory or visual hallucinations when asked, but appears preoccupied   Risk Potential: Suicidal ideation - None  Homicidal ideation - None  Potential for aggression - No   Sensorium:  oriented to person and place   Memory:  recent and remote memory grossly intact   Consciousness:  alert and awake   Attention/Concentration: attention span and concentration appear shorter than expected for age   Insight:  limited   Judgment: limited   Gait/Station: in bed   Motor Activity: no abnormal movements     Progress Toward Goals: gradual progress. Recommended Treatment:   See above for assessment and plan. Risks, benefits and possible side effects of Medications:   Risks, benefits, and possible side effects of medications explained to patient and patient verbalizes understanding. Treatment discussed with nursing staff. This note has been constructed using a voice recognition system. There may be translation, syntax, or grammatical errors. If you have any questions, please contact the dictating provider.     David Garcia MD  Psychiatry, PGY-4

## 2023-10-16 NOTE — NURSING NOTE
Patient has been seclusive to his room the entire shift. Scant, guarded, poor eye contact. Denies symptoms. Encouraged OOB to attend groups as well as to eat meals; declined. Compliant with all morning medications except for Chlorhexidine oral rinse. Waiting EAC.

## 2023-10-17 PROBLEM — T78.3XXA ANGIOEDEMA: Status: RESOLVED | Noted: 2023-10-12 | Resolved: 2023-10-17

## 2023-10-17 PROCEDURE — 99232 SBSQ HOSP IP/OBS MODERATE 35: CPT | Performed by: PSYCHIATRY & NEUROLOGY

## 2023-10-17 RX ADMIN — FAMOTIDINE 10 MG: 20 TABLET, FILM COATED ORAL at 18:08

## 2023-10-17 RX ADMIN — DIVALPROEX SODIUM 750 MG: 500 TABLET, DELAYED RELEASE ORAL at 19:02

## 2023-10-17 RX ADMIN — METFORMIN HYDROCHLORIDE 1000 MG: 500 TABLET, FILM COATED ORAL at 18:08

## 2023-10-17 RX ADMIN — CHLORHEXIDINE GLUCONATE 0.12% ORAL RINSE 15 ML: 1.2 LIQUID ORAL at 20:27

## 2023-10-17 RX ADMIN — FAMOTIDINE 10 MG: 20 TABLET, FILM COATED ORAL at 08:28

## 2023-10-17 RX ADMIN — METFORMIN HYDROCHLORIDE 1000 MG: 500 TABLET, FILM COATED ORAL at 08:28

## 2023-10-17 RX ADMIN — DIVALPROEX SODIUM 750 MG: 500 TABLET, DELAYED RELEASE ORAL at 08:28

## 2023-10-17 RX ADMIN — SENNOSIDES AND DOCUSATE SODIUM 2 TABLET: 8.6; 5 TABLET ORAL at 18:08

## 2023-10-17 RX ADMIN — CLOZAPINE 200 MG: 200 TABLET ORAL at 18:08

## 2023-10-17 RX ADMIN — METOPROLOL TARTRATE 25 MG: 25 TABLET, FILM COATED ORAL at 20:26

## 2023-10-17 RX ADMIN — SENNOSIDES AND DOCUSATE SODIUM 2 TABLET: 8.6; 5 TABLET ORAL at 08:28

## 2023-10-17 RX ADMIN — METOPROLOL TARTRATE 25 MG: 25 TABLET, FILM COATED ORAL at 08:28

## 2023-10-17 NOTE — NURSING NOTE
Patient visible at the start of the shift. Out for breakfast and morning medications. Currently in bed sleeping. Denies symptoms. Observed smiling to self when out in the dayroom. Refused morning blood work and Chlorhexidine oral rinse. Waiting EAC placement.

## 2023-10-17 NOTE — NURSING NOTE
Pt visible on the unit for dinner, seen being social with peers before returning to own room. Pt pleasant and cooperative. Denies SI/HI and hallucinations. Compliant with medications and meals this shift with exception of Chlorhexidine oral rinse. Pt denies any unmet needs. Continuous safety checks maintained.

## 2023-10-17 NOTE — PROGRESS NOTES
10/17/23 0820   Team Meeting   Meeting Type Daily Rounds   Team Members Present   Team Members Present Physician;Nurse;   Physician Team Member [id number] [facility]   Care Management Team Member Dev   Patient/Family Present   Patient Present No   Patient's Family Present No     Pt seclusive to room. Med/meal compliant. [facility] this [facility].

## 2023-10-17 NOTE — PROGRESS NOTES
Progress Note - Behavioral Health   Gokul Hawkins Webster Aguila de jesus male MRN: [id number]  Unit/Bed#: [facility] 343-01 Encounter: [id number]    Assessment/Plan   Principal Problem:    Schizoaffective disorder ([address])  Active Problems:    Legally blind    Hearing loss    Asthma    Prolonged erection    Intellectual disability    Rib pain on left side    Continue medications as noted below. Continue to promote patient participation in group therapy, milieu therapy, occupational therapy  Continue medical management by primary team.  Discharge disposition:  pending EAC placement. Tentative transfer this week. Subjective: The patient was evaluated this morning for continuity of care and no acute distress noted throughout the evaluation. Over the past 24 hours staff noted that patient remains isolated to room most of the day. Melatonin PRN given to patient for sleep, which was effective. Patient has been medication adherent. Today on evaluation, Gokul reports that he is feeling "good" in terms of mood. He notes that he does not have any issues at this time and reports that the swelling of his lip has resolved. He denies feeling depressed; denies hopelessness, helplessness, feelings of guilt. He denies symptoms of anxiety. He states that he is looking forward to transfer to [facility]. Reports that he will go to groups today, and was observed going to morning group after talking to patient. Denies rhinorrhea, congestion, SOB, cough, chest pain, swelling elsewhere. Tolerating medications without issues. He notes that he did not get CBC bloodwork today due to "I was sleeping." Reports BM last night, stating "it was like 3 full cups" when asking to quantify BM. In terms of safety, Gokul Denies SI/HI. Gokul denies auditory and visual hallucinations, however observed mumbling to himself when this writer was leaving and then saying "Shut up." Denies symptomology consistent with overt delusions.  Denies symptomology consistent with bina/hypomania.     Psychiatric Review of Systems:  Behavior over the last 24 hours:  unchanged  Sleep: normal  Appetite: normal, had breakfast but intermittently skips meals  Medication side effects: No   ROS: no complaints, all other systems are negative    Current Medications:  Current Facility-Administered Medications   Medication Dose Route Frequency Provider Last Rate    acetaminophen  650 mg Oral Q6H PRN Yan Hernandez MD      acetaminophen  650 mg Oral Q4H PRN Yan Hernandez MD      acetaminophen  975 mg Oral Q6H PRN Yan Hernandez MD      albuterol  2 puff Inhalation Q4H PRN Yan Hernandez MD      aluminum-magnesium hydroxide-simethicone  30 mL Oral Q4H PRN Yan Hernandez MD      atropine  1 drop Sublingual TID PRN Debora Luis MD      benztropine  1 mg Intramuscular BID PRN Lucy Paul MD      benztropine  1 mg Oral Q4H PRN Max 6/day Yan Hernandez MD      chlorhexidine  15 mL Swish & Spit Q12H [address] [facility] ELA Del Angel      cloZAPine  200 mg Oral Daily Everlena Spikes, DO      diphenhydrAMINE  25 mg Oral Q6H PRN Eulas Umu, ELA      divalproex sodium  750 mg Oral BID Niraj Cruz MD      famotidine  10 mg Oral BID Eulas Umu, ELA      fluticasone  1 spray Nasal Daily PRN Main Duffy PA-C      hydrOXYzine HCL  100 mg Oral Q6H PRN Max 4/day Wendy Venice, DO      Or    LORazepam  1 mg Intramuscular Q6H PRN Wendy Ludy, DO      hydrOXYzine HCL  25 mg Oral Q6H PRN Max 4/day Yan Hernandez MD      hydrOXYzine HCL  50 mg Oral Q6H PRN Max 4/day Yan Hernandez MD      lactulose  20 g Oral PRN Everlena Spikes, DO      melatonin  3 mg Oral HS PRN Wendy Ludy, DO      metFORMIN  1,000 mg Oral BID With Meals ELA Wise      metoprolol tartrate  25 mg Oral Q12H [address] EuELA García      nicotine polacrilex  4 mg Oral Q2H PRN Yan Hernandez MD      OLANZapine  5 mg Oral Q3H PRN Max 6/day Nirajrazia Cruz MD      Or    OLANZapine  5 mg Intramuscular Q3H PRN Max 6/day Magdalena Ghotra MD      OLANZapine  5 mg Intramuscular Q8H PRN Spicer Ralphs, DO      Or    OLANZapine  7.5 mg Oral Q8H PRN Nayan Ralphs, DO      OLANZapine  2.5 mg Oral Q3H PRN Max 8/day Magdalena Ghotra MD      ondansetron  4 mg Oral Q8H PRN Nayan Machucaphs, DO      polyethylene glycol  17 g Oral Daily PRN Gem Cabral MD      pseudoephedrine  120 mg Oral BID PRN Pam Kidd MD      senna-docusate sodium  1 tablet Oral Daily PRN Gem Cabral MD      senna-docusate sodium  2 tablet Oral BID Magdalena Ghotra MD      sterile water           sterile water           sterile water           sterile water           sterile water           sterile water           sterile water           sterile water           sterile water           sterile water           sterile water           sterile water              Behavioral Health Medications: all current active meds have been reviewed and continue current psychiatric medications. Vital signs in last 24 hours:  Temp:  [97.1 °F (36.2 °C)] 97.1 °F (36.2 °C)  HR:  [] 89  Resp:  [18] 18  BP: (121-139)/(74-80) 121/74    Laboratory results:  I have personally reviewed all pertinent laboratory/tests results.   Most Recent Labs:   Lab Results   Component Value Date    WBC 11.98 (H) 10/13/2023    RBC 4.37 10/13/2023    HGB 13.3 10/13/2023    HCT 40.6 10/13/2023     10/13/2023    RDW 12.3 10/13/2023    TOTANEUTABS 4.84 06/08/2023    NEUTROABS 5.30 10/13/2023    SODIUM 138 10/11/2023    K 4.0 10/11/2023    CL 99 10/11/2023    CO2 29 10/11/2023    BUN 12 10/11/2023    CREATININE 1.14 10/11/2023    GLUC 88 10/11/2023    GLUF 88 09/27/2023    CALCIUM 9.5 10/11/2023    AST 25 09/27/2023    ALT 23 09/27/2023    ALKPHOS 53 09/27/2023    TP 7.4 09/27/2023    ALB 4.2 09/27/2023    TBILI 0.36 09/27/2023    CHOLESTEROL 167 09/02/2023    HDL 53 09/02/2023    TRIG 113 09/02/2023    LDLCALC 91 09/02/2023    [address] [facility] 114 09/02/2023    VALPROICTOT 91 06/12/2023    AMMONIA 22 10/09/2022    [id number] 1.841 05/19/2023    FREET4 1.16 10/09/2022    HGBA1C 6.3 (H) 09/27/2023     09/27/2023         Mental Status Evaluation:    Appearance:  disheveled, marginal hygiene, wearing hospital clothes, -American male. Appears older than stated age. Behavior:  cooperative, calm, guarded, psychomotor slowing   Speech:  increased latency of response, soft   Mood:  "I'm good"   Affect:  blunted   Thought Process:  decreased rate of thoughts, poverty of thought   Associations: concrete associations   Thought Content:  no overt delusions   Perceptual Disturbances: no auditory hallucinations, no visual hallucinations, denies auditory or visual hallucinations when asked, but appears responding to internal stimuli   Risk Potential: Suicidal ideation - None  Homicidal ideation - None  Potential for aggression - No   Sensorium:  oriented to person and place   Memory:  recent and remote memory grossly intact   Consciousness:  alert and awake   Attention/Concentration: attention span and concentration appear shorter than expected for age   Insight:  limited   Judgment: limited   Gait/Station: normal gait/station, in bed   Motor Activity: no abnormal movements     Progress Toward Goals: slow progression    Recommended Treatment:   See above for assessment and plan. Risks, benefits and possible side effects of Medications:   Risks, benefits, and possible side effects of medications explained to patient and patient verbalizes understanding. Treatment discussed with nursing staff. This note has been constructed using a voice recognition system. There may be translation, syntax, or grammatical errors. If you have any questions, please contact the dictating provider.     Valarie Armas MD  Psychiatry, PGY-4

## 2023-10-17 NOTE — PLAN OF CARE
Problem: SUBSTANCE USE/ABUSE  Goal: Will have no detox symptoms and will verbalize plan for changing substance-related behavior  Description: INTERVENTIONS:  - Monitor physical status and assess for symptoms of withdrawal  - Administer medication as ordered  - Provide emotional support with 1 on 1 interaction with staff  - Encourage recovery focused program/ addiction education  - Assess for verbalization of changing behaviors related to substance abuse  - Initiate consults and referrals as appropriate (Case Management, Spiritual Care, etc.)  10/17/2023 0249 by Froy Yusuf RN  Outcome: Progressing  10/17/2023 0249 by Froy Yusuf RN  Outcome: Progressing  Goal: By discharge, will develop insight into their chemical dependency and sustain motivation to continue in recovery  Description: INTERVENTIONS:  - Attends all daily group sessions and scheduled AA groups  - Actively practices coping skills through participation in the therapeutic community and adherence to program rules  - Reviews and completes assignments from individual treatment plan  - Assist patient development of understanding of their personal cycle of addiction and relapse triggers  10/17/2023 0249 by Froy Yusuf RN  Outcome: Progressing  10/17/2023 0249 by Froy Yusuf RN  Outcome: Progressing  Goal: By discharge, patient will have ongoing treatment plan addressing chemical dependency  Description: INTERVENTIONS:  - Assist patient with resources and/or appointments for ongoing recovery based living  10/17/2023 0249 by Froy Yusuf RN  Outcome: Progressing  10/17/2023 0249 by Froy Yusuf RN  Outcome: Progressing     Problem: ANXIETY  Goal: Will report anxiety at manageable levels  Description: INTERVENTIONS:  - Administer medication as ordered  - Teach and encourage coping skills  - Provide emotional support  - Assess patient/family for anxiety and ability to cope  10/17/2023 0249 by Froy Yusuf RN  Outcome: Progressing  10/17/2023 7499 by Adrianna Sandoval RN  Outcome: Progressing  Goal: By discharge: Patient will verbalize 2 strategies to deal with anxiety  Description: Interventions:  - Identify any obvious source/trigger to anxiety  - Staff will assist patient in applying identified coping technique/skills  - Encourage attendance of scheduled groups and activities  10/17/2023 0249 by Adrianna Sandoval RN  Outcome: Progressing  10/17/2023 0249 by Adrianna Sandoval RN  Outcome: Progressing     Problem: DISCHARGE PLANNING  Goal: Discharge to home or other facility with appropriate resources  Description: INTERVENTIONS:  - Identify barriers to discharge w/patient and caregiver  - Arrange for needed discharge resources and transportation as appropriate  - Identify discharge learning needs (meds, wound care, etc.)  - Arrange for interpretive services to assist at discharge as needed  - Refer to Case Management Department for coordinating discharge planning if the patient needs post-hospital services based on physician/advanced practitioner order or complex needs related to functional status, cognitive ability, or social support system  10/17/2023 0249 by Adrianna Sandoval RN  Outcome: Progressing  10/17/2023 0249 by Adrianna Sandoval RN  Outcome: Progressing

## 2023-10-17 NOTE — NURSING NOTE
Received patient at 1500. Patient was lying in bed and refused to weak up for dinner. @ 1900, with significant encouragement, the patient agreed to get up and shower, and also had dinner . The patient denies having any unmeet needs. Patient remain Q 7 min check

## 2023-10-17 NOTE — NURSING NOTE
Patient awake. Melatonin prn given at 2322. At [address], patient observed resting in bed with eyes closed. Melatonin appears effective.

## 2023-10-18 PROBLEM — R68.2 DRY MOUTH: Status: RESOLVED | Noted: 2023-10-18 | Resolved: 2023-10-18

## 2023-10-18 PROBLEM — R07.81 RIB PAIN ON LEFT SIDE: Status: RESOLVED | Noted: 2023-08-03 | Resolved: 2023-10-18

## 2023-10-18 PROBLEM — K59.00 CONSTIPATION: Status: RESOLVED | Noted: 2023-10-18 | Resolved: 2023-10-18

## 2023-10-18 PROBLEM — R68.2 DRY MOUTH: Status: ACTIVE | Noted: 2023-10-18

## 2023-10-18 PROBLEM — K59.00 CONSTIPATION: Status: ACTIVE | Noted: 2023-10-18

## 2023-10-18 PROBLEM — I10 HYPERTENSION: Status: ACTIVE | Noted: 2023-10-18

## 2023-10-18 PROBLEM — K11.7 SIALORRHEA: Status: ACTIVE | Noted: 2023-10-18

## 2023-10-18 PROBLEM — N48.30: Status: RESOLVED | Noted: 2023-05-26 | Resolved: 2023-10-18

## 2023-10-18 PROBLEM — K11.7 SIALORRHEA: Status: RESOLVED | Noted: 2023-10-18 | Resolved: 2023-10-18

## 2023-10-18 LAB
BASOPHILS # BLD AUTO: 0.09 THOUSANDS/ÂΜL (ref 0–0.1)
BASOPHILS NFR BLD AUTO: 1 % (ref 0–1)
EOSINOPHIL # BLD AUTO: 0.53 THOUSAND/ÂΜL (ref 0–0.61)
EOSINOPHIL NFR BLD AUTO: 5 % (ref 0–6)
ERYTHROCYTE [DISTWIDTH] IN BLOOD BY AUTOMATED COUNT: 12.4 % (ref 11.6–15.1)
HCT VFR BLD AUTO: 40.1 % (ref 36.5–49.3)
HGB BLD-MCNC: 13.2 G/DL (ref 12–17)
IMM GRANULOCYTES # BLD AUTO: 0.09 THOUSAND/UL (ref 0–0.2)
IMM GRANULOCYTES NFR BLD AUTO: 1 % (ref 0–2)
LYMPHOCYTES # BLD AUTO: 5.83 THOUSANDS/ÂΜL (ref 0.6–4.47)
LYMPHOCYTES NFR BLD AUTO: 53 % (ref 14–44)
MCH RBC QN AUTO: 31.1 PG (ref 26.8–34.3)
MCHC RBC AUTO-ENTMCNC: 32.9 G/DL (ref 31.4–37.4)
MCV RBC AUTO: 94 FL (ref 82–98)
MONOCYTES # BLD AUTO: 0.98 THOUSAND/ÂΜL (ref 0.17–1.22)
MONOCYTES NFR BLD AUTO: 9 % (ref 4–12)
NEUTROPHILS # BLD AUTO: 3.42 THOUSANDS/ÂΜL (ref 1.85–7.62)
NEUTS SEG NFR BLD AUTO: 31 % (ref 43–75)
NRBC BLD AUTO-RTO: 0 /100 WBCS
PLATELET # BLD AUTO: 237 THOUSANDS/UL (ref 149–390)
PMV BLD AUTO: 11.2 FL (ref 8.9–12.7)
RBC # BLD AUTO: 4.25 MILLION/UL (ref 3.88–5.62)
SARS-COV-2 RNA RESP QL NAA+PROBE: NEGATIVE
WBC # BLD AUTO: 10.94 THOUSAND/UL (ref 4.31–10.16)

## 2023-10-18 PROCEDURE — 85025 COMPLETE CBC W/AUTO DIFF WBC: CPT | Performed by: PSYCHIATRY & NEUROLOGY

## 2023-10-18 PROCEDURE — 87635 SARS-COV-2 COVID-19 AMP PRB: CPT | Performed by: PSYCHIATRY & NEUROLOGY

## 2023-10-18 PROCEDURE — 99232 SBSQ HOSP IP/OBS MODERATE 35: CPT | Performed by: PSYCHIATRY & NEUROLOGY

## 2023-10-18 RX ORDER — AMOXICILLIN 250 MG
2 CAPSULE ORAL 2 TIMES DAILY
Qty: 28 TABLET | Refills: 0 | Status: SHIPPED | OUTPATIENT
Start: 2023-10-18 | End: 2023-10-19 | Stop reason: SDUPTHER

## 2023-10-18 RX ORDER — FAMOTIDINE 10 MG
10 TABLET ORAL 2 TIMES DAILY
Qty: 14 TABLET | Refills: 0 | Status: SHIPPED | OUTPATIENT
Start: 2023-10-18 | End: 2023-10-18 | Stop reason: SDUPTHER

## 2023-10-18 RX ORDER — CLOZAPINE 200 MG/1
200 TABLET ORAL DAILY
Qty: 7 TABLET | Refills: 0 | Status: SHIPPED | OUTPATIENT
Start: 2023-10-18 | End: 2023-10-19 | Stop reason: SDUPTHER

## 2023-10-18 RX ORDER — ALBUTEROL SULFATE 90 UG/1
2 AEROSOL, METERED RESPIRATORY (INHALATION) EVERY 4 HOURS PRN
Qty: 8 G | Refills: 0 | Status: SHIPPED | OUTPATIENT
Start: 2023-10-18 | End: 2023-10-19

## 2023-10-18 RX ORDER — FAMOTIDINE 10 MG
10 TABLET ORAL 2 TIMES DAILY
Qty: 14 TABLET | Refills: 0 | Status: SHIPPED | OUTPATIENT
Start: 2023-10-18 | End: 2023-10-19 | Stop reason: SDUPTHER

## 2023-10-18 RX ORDER — AMOXICILLIN 250 MG
2 CAPSULE ORAL 2 TIMES DAILY
Qty: 28 TABLET | Refills: 0 | Status: SHIPPED | OUTPATIENT
Start: 2023-10-18 | End: 2023-10-18 | Stop reason: SDUPTHER

## 2023-10-18 RX ORDER — DIVALPROEX SODIUM 250 MG/1
750 TABLET, DELAYED RELEASE ORAL 2 TIMES DAILY
Qty: 42 TABLET | Refills: 0 | Status: SHIPPED | OUTPATIENT
Start: 2023-10-18 | End: 2023-10-18 | Stop reason: SDUPTHER

## 2023-10-18 RX ORDER — ATROPINE SULFATE 10 MG/ML
1 SOLUTION/ DROPS OPHTHALMIC 3 TIMES DAILY PRN
Qty: 5 ML | Refills: 0 | Status: SHIPPED | OUTPATIENT
Start: 2023-10-18 | End: 2023-10-19

## 2023-10-18 RX ORDER — CLOZAPINE 200 MG/1
200 TABLET ORAL DAILY
Qty: 7 TABLET | Refills: 0 | Status: SHIPPED | OUTPATIENT
Start: 2023-10-18 | End: 2023-10-18 | Stop reason: SDUPTHER

## 2023-10-18 RX ORDER — CHLORHEXIDINE GLUCONATE ORAL RINSE 1.2 MG/ML
15 SOLUTION DENTAL EVERY 12 HOURS SCHEDULED
Qty: 120 ML | Refills: 0 | Status: SHIPPED | OUTPATIENT
Start: 2023-10-18 | End: 2023-10-19 | Stop reason: SDUPTHER

## 2023-10-18 RX ORDER — FLUTICASONE PROPIONATE 50 MCG
1 SPRAY, SUSPENSION (ML) NASAL DAILY
Qty: 11.1 ML | Refills: 0 | Status: SHIPPED | OUTPATIENT
Start: 2023-10-18 | End: 2023-10-19

## 2023-10-18 RX ORDER — ATROPINE SULFATE 10 MG/ML
1 SOLUTION/ DROPS OPHTHALMIC 3 TIMES DAILY PRN
Qty: 5 ML | Refills: 0 | Status: SHIPPED | OUTPATIENT
Start: 2023-10-18 | End: 2023-10-18 | Stop reason: SDUPTHER

## 2023-10-18 RX ORDER — DIVALPROEX SODIUM 250 MG/1
750 TABLET, DELAYED RELEASE ORAL 2 TIMES DAILY
Qty: 42 TABLET | Refills: 0 | Status: SHIPPED | OUTPATIENT
Start: 2023-10-18 | End: 2023-10-19 | Stop reason: SDUPTHER

## 2023-10-18 RX ADMIN — CLOZAPINE 200 MG: 200 TABLET ORAL at 17:16

## 2023-10-18 RX ADMIN — METOPROLOL TARTRATE 25 MG: 25 TABLET, FILM COATED ORAL at 10:01

## 2023-10-18 RX ADMIN — FAMOTIDINE 10 MG: 20 TABLET, FILM COATED ORAL at 17:16

## 2023-10-18 RX ADMIN — SENNOSIDES AND DOCUSATE SODIUM 2 TABLET: 8.6; 5 TABLET ORAL at 10:01

## 2023-10-18 RX ADMIN — METFORMIN HYDROCHLORIDE 1000 MG: 500 TABLET, FILM COATED ORAL at 17:16

## 2023-10-18 RX ADMIN — CHLORHEXIDINE GLUCONATE 0.12% ORAL RINSE 15 ML: 1.2 LIQUID ORAL at 20:55

## 2023-10-18 RX ADMIN — DIVALPROEX SODIUM 750 MG: 500 TABLET, DELAYED RELEASE ORAL at 10:01

## 2023-10-18 RX ADMIN — NICOTINE POLACRILEX 4 MG: 4 GUM, CHEWING BUCCAL at 23:32

## 2023-10-18 RX ADMIN — METFORMIN HYDROCHLORIDE 1000 MG: 500 TABLET, FILM COATED ORAL at 10:01

## 2023-10-18 RX ADMIN — FAMOTIDINE 10 MG: 20 TABLET, FILM COATED ORAL at 10:01

## 2023-10-18 RX ADMIN — DIVALPROEX SODIUM 750 MG: 500 TABLET, DELAYED RELEASE ORAL at 20:55

## 2023-10-18 RX ADMIN — METOPROLOL TARTRATE 25 MG: 25 TABLET, FILM COATED ORAL at 20:55

## 2023-10-18 RX ADMIN — SENNOSIDES AND DOCUSATE SODIUM 2 TABLET: 8.6; 5 TABLET ORAL at 17:15

## 2023-10-18 NOTE — PLAN OF CARE
Negative covid results sent to [facility]. Transport to be arranged and meds to be filled.

## 2023-10-18 NOTE — PROGRESS NOTES
Progress Note - Behavioral Health   Gokul Hawkins 26 y.o. male MRN: [id number]  Unit/Bed#: [facility] 343-01 Encounter: [id number]    Assessment/Plan   Principal Problem:    Schizoaffective disorder ([address])  Active Problems:    Legally blind    Hearing loss    Asthma    Intellectual disability    Hypertension    Continue medications as noted below. Continue to promote patient participation in group therapy, milieu therapy, occupational therapy  Continue medical management by primary team.  Dylanough Swab today prior to discharge  Discharge disposition:  tentative discharge to [facility] tomorrow. Subjective: The patient was evaluated this morning for continuity of care and no acute distress noted throughout the evaluation. Over the past 24 hours staff noted that the patient was visible more yesterday, but still spends majority of the time in his room. Patient has been medication adherent. Today on evaluation, Gokul reports that he is feeling "good "in terms of his mood. He denies any acute issues at this time. Denies feeling depressed, anxious. Denies feelings of irritability, anger, mood swings. Reports he had a bowel movement yesterday at night, "a little."  Denies medication side effects. Denies ongoing facial swelling. Reports that he did not have breakfast today, but reported "yes" to dinner and lunch yesterday. In terms of safety, Gokul Denies SI/HI. Gokul Denies perceptual disturbances such as auditory and visual hallucinations, but appears internally preoccupied.      Psychiatric Review of Systems:  Behavior over the last 24 hours:  unchanged  Sleep: hypersomnia  Appetite: normal  Medication side effects: No   ROS: no complaints, all other systems are negative    Current Medications:  Current Facility-Administered Medications   Medication Dose Route Frequency Provider Last Rate    acetaminophen  650 mg Oral Q6H PRN Nagi Boykin MD      acetaminophen  650 mg Oral Q4H PRN Nagi Boykin MD acetaminophen  975 mg Oral Q6H PRN Nelson ReddisMD gloria      albuterol  2 puff Inhalation Q4H PRN Nelson Redzulema, MD      aluminum-magnesium hydroxide-simethicone  30 mL Oral Q4H PRN Nelson Redzulema, MD      atropine  1 drop Sublingual TID PRN Debora Luis MD      benztropine  1 mg Intramuscular BID PRN Heriberto MD Taz      benztropine  1 mg Oral Q4H PRN Max 6/day Nelson Wilman, MD      chlorhexidine  15 mL Swish & Spit Q12H [facility] Sandy Estradada Kulwant, CRNP      cloZAPine  200 mg Oral Daily Crystal Ink, DO      diphenhydrAMINE  25 mg Oral Q6H PRN Marinette Bailey, ELA      divalproex sodium  750 mg Oral BID Zeke Granado MD      famotidine  10 mg Oral BID isango!, ELA      fluticasone  1 spray Nasal Daily PRN Bradley Horton PA-C      hydrOXYzine HCL  100 mg Oral Q6H PRN Max 4/day Ulysses Cisneros, DO      Or    LORazepam  1 mg Intramuscular Q6H PRN Ulysses Cisneros, DO      hydrOXYzine HCL  25 mg Oral Q6H PRN Max 4/day Nelson MD Wilman      hydrOXYzine HCL  50 mg Oral Q6H PRN Max 4/day Nelson Wilman, MD      lactulose  20 g Oral PRN Crystal Ink, DO      melatonin  3 mg Oral HS PRN Ulysses Cisneros, DO      metFORMIN  1,000 mg Oral BID With Meals ELA Wise      metoprolol tartrate  25 mg Oral Q12H [facility] Marinette BaileyELA      nicotine polacrilex  4 mg Oral Q2H PRN Nelson Stovall MD      OLANZapine  5 mg Oral Q3H PRN Max 6/day Zeke Granado MD      Or    OLANZapine  5 mg Intramuscular Q3H PRN Max 6/day Zeke Granado MD      OLANZapine  5 mg Intramuscular Q8H PRN Ulysses Cisneros, DO      Or    OLANZapine  7.5 mg Oral Q8H PRN Ulysses Cisneros, DO      OLANZapine  2.5 mg Oral Q3H PRN Max 8/day Zeke Granado MD      ondansetron  4 mg Oral Q8H PRN Ulysses Cisneros, DO      polyethylene glycol  17 g Oral Daily PRN Nelson Stovall MD      pseudoephedrine  120 mg Oral BID PRN Kel Ladd MD      senna-docusate sodium  1 tablet Oral Daily PRN Massimo Cabello MD      senna-docusate sodium  2 tablet Oral BID Pedro Arriola MD      sterile water           sterile water           sterile water           sterile water           sterile water           sterile water           sterile water           sterile water           sterile water           sterile water           sterile water           sterile water              Behavioral Health Medications: all current active meds have been reviewed and continue current psychiatric medications. Vital signs in last 24 hours:  Temp:  [97.6 °F (36.4 °C)-98 °F (36.7 °C)] 97.6 °F (36.4 °C)  HR:  [] 80  Resp:  [16] 16  BP: (105-116)/(56-65) 105/58    Laboratory results:  I have personally reviewed all pertinent laboratory/tests results.   Most Recent Labs:   Lab Results   Component Value Date    WBC 10.94 (H) 10/18/2023    RBC 4.25 10/18/2023    HGB 13.2 10/18/2023    HCT 40.1 10/18/2023     10/18/2023    RDW 12.4 10/18/2023    TOTANEUTABS 4.84 06/08/2023    NEUTROABS 3.42 10/18/2023    SODIUM 138 10/11/2023    K 4.0 10/11/2023    CL 99 10/11/2023    CO2 29 10/11/2023    BUN 12 10/11/2023    CREATININE 1.14 10/11/2023    GLUC 88 10/11/2023    GLUF 88 09/27/2023    CALCIUM 9.5 10/11/2023    AST 25 09/27/2023    ALT 23 09/27/2023    ALKPHOS 53 09/27/2023    TP 7.4 09/27/2023    ALB 4.2 09/27/2023    TBILI 0.36 09/27/2023    CHOLESTEROL 167 09/02/2023    HDL 53 09/02/2023    TRIG 113 09/02/2023    LDLCALC 91 09/02/2023    NONHDLC 114 09/02/2023    VALPROICTOT 91 06/12/2023    AMMONIA 22 10/09/2022    [id number] 1.841 05/19/2023    FREET4 1.16 10/09/2022    HGBA1C 6.3 (H) 09/27/2023     09/27/2023         Mental Status Evaluation:    Appearance:  disheveled, marginal hygiene, wearing hospital clothes, AA male   Behavior:  pleasant, cooperative, guarded   Speech:  increased latency of response, soft   Mood:  "Good"   Affect:  blunted   Thought Process:  decreased rate of thoughts, slowing of thoughts, poverty of thought   Associations: concrete associations   Thought Content:  no overt delusions   Perceptual Disturbances: denies auditory or visual hallucinations when asked, but appears preoccupied   Risk Potential: Suicidal ideation - None  Homicidal ideation - None  Potential for aggression - No   Sensorium:  oriented to person and place   Memory:  recent and remote memory grossly intact   Consciousness:  alert and awake   Attention/Concentration: attention span and concentration appear shorter than expected for age   Insight:  limited   Judgment: limited   Gait/Station: normal gait/station, in bed   Motor Activity: no abnormal movements     Progress Toward Goals: slow progress    Recommended Treatment:   See above for assessment and plan. Risks, benefits and possible side effects of Medications:   Risks, benefits, and possible side effects of medications explained to patient and patient verbalizes understanding. Treatment discussed with nursing staff. This note has been constructed using a voice recognition system. There may be translation, syntax, or grammatical errors. If you have any questions, please contact the dictating provider.     Enrique Mustafa MD  Psychiatry, PGY-4

## 2023-10-18 NOTE — PLAN OF CARE
Problem: Ineffective Coping  Goal: Participates in unit activities  Description: Interventions:  - Provide therapeutic environment   - Provide required programming   - Redirect inappropriate behaviors   Outcome: Progressing     Problem: SELF HARM/SUICIDALITY  Goal: Will have no self-injury during hospital stay  Description: INTERVENTIONS:  - Q 15 MINUTES: Routine safety checks  - Q WAKING SHIFT & PRN: Assess risk to determine if routine checks are adequate to maintain patient safety  - Encourage patient to participate actively in care by formulating a plan to combat response to suicidal ideation, identify supports and resources  Outcome: Progressing

## 2023-10-18 NOTE — PROGRESS NOTES
10/18/23 2005   Team Meeting   Meeting Type Daily Rounds   Team Members Present   Team Members Present Physician;Nurse;   Physician Team Member [id number] Dev   Patient/Family Present   Patient Present No   Patient's Family Present No     Blood work completed yesterday. Pt a bit more visible yesterday. COVID test to be completed today. Med/meal compliant. DC to [facility] tomorrow.

## 2023-10-18 NOTE — PLAN OF CARE
Problem: Ineffective Coping  Goal: Participates in unit activities  Description: Interventions:  - Provide therapeutic environment   - Provide required programming   - Redirect inappropriate behaviors   Outcome: Progressing  Note: Patient has attended a few therapy groups but more improvement is needed.

## 2023-10-18 NOTE — PLAN OF CARE
CTS scheduled for 1200 tomorrow. CM left VM for pt's grandmother to notify of dc to [facility] tomorrow.

## 2023-10-18 NOTE — NURSING NOTE
Patient has been seclusive to his room. Refused breakfast and lunch. Compliant with medications. Scant and guarded. Encouraged OOB. Refused.

## 2023-10-18 NOTE — NURSING NOTE
Patient has been awake and visible out in the milieu. The patient had an incident, urinating in his be. He approached the staff to express anxiety about his upcoming discharge tomorrow. Patient received his schedule medication at this time to help with anxiety. The patient grandmother called to request the address of the facility where the patient will be discharged to, She informed to call tomorrow morning since the writer do not has the exact address.

## 2023-10-19 VITALS
TEMPERATURE: 97.8 F | HEART RATE: 94 BPM | OXYGEN SATURATION: 96 % | BODY MASS INDEX: 33.98 KG/M2 | RESPIRATION RATE: 16 BRPM | HEIGHT: 63 IN | DIASTOLIC BLOOD PRESSURE: 82 MMHG | WEIGHT: 191.8 LBS | SYSTOLIC BLOOD PRESSURE: 122 MMHG

## 2023-10-19 LAB
BNP SERPL-MCNC: 235 PG/ML (ref 0–100)
CARDIAC TROPONIN I PNL SERPL HS: <2 NG/L (ref 8–18)
CK MB SERPL-MCNC: 2.3 NG/ML (ref 0.6–6.3)
CRP SERPL QL: 4.7 MG/L

## 2023-10-19 PROCEDURE — 83880 ASSAY OF NATRIURETIC PEPTIDE: CPT | Performed by: PSYCHIATRY & NEUROLOGY

## 2023-10-19 PROCEDURE — 84484 ASSAY OF TROPONIN QUANT: CPT | Performed by: PSYCHIATRY & NEUROLOGY

## 2023-10-19 PROCEDURE — 86140 C-REACTIVE PROTEIN: CPT | Performed by: PSYCHIATRY & NEUROLOGY

## 2023-10-19 PROCEDURE — 82553 CREATINE MB FRACTION: CPT | Performed by: PSYCHIATRY & NEUROLOGY

## 2023-10-19 PROCEDURE — 99239 HOSP IP/OBS DSCHRG MGMT >30: CPT | Performed by: PSYCHIATRY & NEUROLOGY

## 2023-10-19 RX ORDER — AMOXICILLIN 250 MG
2 CAPSULE ORAL 2 TIMES DAILY
Qty: 28 TABLET | Refills: 0 | Status: SHIPPED | OUTPATIENT
Start: 2023-10-26 | End: 2023-10-19 | Stop reason: SDUPTHER

## 2023-10-19 RX ORDER — FAMOTIDINE 10 MG
10 TABLET ORAL 2 TIMES DAILY
Qty: 14 TABLET | Refills: 0 | Status: SHIPPED | OUTPATIENT
Start: 2023-10-26 | End: 2023-10-19 | Stop reason: SDUPTHER

## 2023-10-19 RX ORDER — FAMOTIDINE 10 MG
10 TABLET ORAL 2 TIMES DAILY
Qty: 14 TABLET | Refills: 0 | Status: SHIPPED | OUTPATIENT
Start: 2023-10-19 | End: 2023-10-26

## 2023-10-19 RX ORDER — DIVALPROEX SODIUM 250 MG/1
750 TABLET, DELAYED RELEASE ORAL 2 TIMES DAILY
Qty: 42 TABLET | Refills: 0 | Status: SHIPPED | OUTPATIENT
Start: 2023-10-19 | End: 2023-10-19 | Stop reason: SDUPTHER

## 2023-10-19 RX ORDER — AMOXICILLIN 250 MG
2 CAPSULE ORAL 2 TIMES DAILY
Qty: 28 TABLET | Refills: 0 | Status: SHIPPED | OUTPATIENT
Start: 2023-10-19 | End: 2023-10-19 | Stop reason: SDUPTHER

## 2023-10-19 RX ORDER — CLOZAPINE 200 MG/1
200 TABLET ORAL DAILY
Qty: 7 TABLET | Refills: 0 | Status: SHIPPED | OUTPATIENT
Start: 2023-10-19 | End: 2023-10-26

## 2023-10-19 RX ORDER — CLOZAPINE 200 MG/1
200 TABLET ORAL DAILY
Qty: 7 TABLET | Refills: 0 | Status: SHIPPED | OUTPATIENT
Start: 2023-10-19 | End: 2023-10-19 | Stop reason: SDUPTHER

## 2023-10-19 RX ORDER — CLOZAPINE 200 MG/1
200 TABLET ORAL DAILY
Qty: 7 TABLET | Refills: 0 | Status: SHIPPED | OUTPATIENT
Start: 2023-10-26 | End: 2023-10-19 | Stop reason: SDUPTHER

## 2023-10-19 RX ORDER — AMOXICILLIN 250 MG
2 CAPSULE ORAL 2 TIMES DAILY
Qty: 28 TABLET | Refills: 0 | Status: SHIPPED | OUTPATIENT
Start: 2023-10-19 | End: 2023-10-26

## 2023-10-19 RX ORDER — DIVALPROEX SODIUM 250 MG/1
750 TABLET, DELAYED RELEASE ORAL 2 TIMES DAILY
Qty: 42 TABLET | Refills: 0 | Status: SHIPPED | OUTPATIENT
Start: 2023-10-19 | End: 2023-10-26

## 2023-10-19 RX ORDER — FAMOTIDINE 10 MG
10 TABLET ORAL 2 TIMES DAILY
Qty: 14 TABLET | Refills: 0 | Status: SHIPPED | OUTPATIENT
Start: 2023-10-19 | End: 2023-10-19 | Stop reason: SDUPTHER

## 2023-10-19 RX ORDER — CLOZAPINE 200 MG/1
200 TABLET ORAL DAILY
Qty: 7 TABLET | Refills: 0 | Status: CANCELLED | OUTPATIENT
Start: 2023-10-26 | End: 2023-11-02

## 2023-10-19 RX ORDER — CHLORHEXIDINE GLUCONATE ORAL RINSE 1.2 MG/ML
15 SOLUTION DENTAL EVERY 12 HOURS SCHEDULED
Qty: 120 ML | Refills: 0 | Status: SHIPPED | OUTPATIENT
Start: 2023-10-19 | End: 2023-10-19 | Stop reason: SDUPTHER

## 2023-10-19 RX ORDER — DIVALPROEX SODIUM 250 MG/1
750 TABLET, DELAYED RELEASE ORAL 2 TIMES DAILY
Qty: 42 TABLET | Refills: 0 | Status: SHIPPED | OUTPATIENT
Start: 2023-10-26 | End: 2023-10-19 | Stop reason: SDUPTHER

## 2023-10-19 RX ORDER — CHLORHEXIDINE GLUCONATE ORAL RINSE 1.2 MG/ML
15 SOLUTION DENTAL EVERY 12 HOURS SCHEDULED
Qty: 210 ML | Refills: 0 | Status: SHIPPED | OUTPATIENT
Start: 2023-10-19 | End: 2023-10-26

## 2023-10-19 RX ADMIN — METFORMIN HYDROCHLORIDE 1000 MG: 500 TABLET, FILM COATED ORAL at 08:25

## 2023-10-19 RX ADMIN — FAMOTIDINE 10 MG: 20 TABLET, FILM COATED ORAL at 08:24

## 2023-10-19 RX ADMIN — DIVALPROEX SODIUM 750 MG: 500 TABLET, DELAYED RELEASE ORAL at 08:24

## 2023-10-19 RX ADMIN — METOPROLOL TARTRATE 25 MG: 25 TABLET, FILM COATED ORAL at 08:25

## 2023-10-19 RX ADMIN — SENNOSIDES AND DOCUSATE SODIUM 2 TABLET: 8.6; 5 TABLET ORAL at 08:25

## 2023-10-19 NOTE — DISCHARGE SUMMARY
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    Associated Diagnoses: Schizoaffective disorder, unspecified type (HCC)      clozapine (CLOZARIL) 200 MG tablet Take 1 tablet (200 mg total) by mouth in the morning for 7 days  Qty: 7 tablet, Refills: 0    Associated Diagnoses: Schizoaffective disorder, unspecified type (HCC)      divalproex sodium (DEPAKOTE) 250 mg DR tablet Take 3 tablets (750 mg total) by mouth 2 (two) times a day for 7 days  Qty: 42 tablet, Refills: 0    Associated Diagnoses: Schizoaffective disorder, unspecified type (HCC)      famotidine (PEPCID) 10 mg tablet Take 1 tablet (10 mg total) by mouth 2 (two) times a day for 7 days  Qty: 14 tablet, Refills: 0    Associated Diagnoses: Schizoaffective disorder, unspecified type (HCC)      metFORMIN (GLUCOPHAGE) 1000 MG tablet Take 1 tablet (1,000 mg total) by mouth 2 (two) times a day with meals for 7 days  Qty: 14 tablet, Refills: 0    Associated Diagnoses: Schizoaffective disorder, unspecified type (HCC)      metoprolol tartrate (LOPRESSOR) 25 mg tablet Take 1 tablet (25 mg total) by mouth every 12 (twelve) hours for 7 days  Qty: 14 tablet, Refills: 0    Associated Diagnoses: Hypertension, unspecified type      senna-docusate sodium (SENOKOT S) 8.6-50 mg per tablet Take 2 tablets by mouth 2 (two) times a day for 7 days  Qty: 28 tablet, Refills: 0    Associated Diagnoses: Constipation, unspecified constipation type             STOP taking these medications       paliperidone palmitate (INVEGA) 234 MG/1.5ML im injection Comments:   Reason for Stopping:         risperiDONE (RisperDAL M-TAB) 2 mg disintegrating tablet Comments:   Reason for Stopping:         risperiDONE (RisperDAL) 2 mg tablet Comments:   Reason for Stopping:         albuterol (PROVENTIL HFA,VENTOLIN HFA) 90 mcg/act inhaler Comments:   Reason for Stopping:         fluticasone (FLONASE) 50 mcg/act nasal spray Comments:   Reason for Stopping:                See after visit summary for all reconciled discharge medications provided to patient and family. Discharge instructions/Information to patient and family:     See after visit summary for information provided to patient and family. Provisions for Follow-Up Care:    See after visit summary for information related to follow-up care and any pertinent home health orders. Discharge Statement:    I spent 45 minutes discharging the patient. This time was spent on the day of discharge. I had direct contact with the patient on the day of discharge. Additional documentation is required if more than 30 minutes were spent on discharge:    I discussed the medication regimen and possible side effects of the medications with Gokul prior to discharge. At the time of discharge he was tolerating psychiatric medications. Gokul agreed to abstain from drug and alcohol use after discharge.   Gokul was transferred to an Extended Acute Care unit for a long term inpatient psychiatric treatment    Discharge on Two Antipsychotic Medications : Zaida Daily MD 10/19/23

## 2023-10-19 NOTE — PROGRESS NOTES
Discussed with patient: AUDIT score of 18 UDS/Identified Substance(s) used: Crack cocaine, THC, alcohol  Risks discussed included: physical and mental health risks, legal risks, financial risks, relationship conflict  Recommendations discussed: Inpatient vs outpatient  Patient's response: Pt will be [facility]'d to [facility] for further [facility] care. D/a services to be recommended following EAC.

## 2023-10-19 NOTE — NURSING NOTE
Medications delivered to unit from [address]. Patient did not have any questions regarding his transfer to [facility].

## 2023-10-19 NOTE — PROGRESS NOTES
10/19/23 0827   Team Meeting   Meeting Type Daily Rounds   Team Members Present   Team Members Present Physician;Nurse;   Physician Team Member [id number] Member Dev   Patient/Family Present   Patient Present No   Patient's Family Present No     DC to [facility] at noon.

## 2023-10-19 NOTE — DISCHARGE INSTR - LAB
If you smoke, use tobacco or nicotine, and/or are exposed to second hand smoke, you are encouraged to stop to improve your health.   If you need help quitting, please talk to your health care provider or call:  [facility] ([phone])  [place] ([phone])   Marquis Miranda ([phone])

## 2023-10-19 NOTE — NURSING NOTE
Patient has been visible in the milieu. Interacting with select peers. Out for breakfast and medications. Did refuse the Chlorhexidine oral rinse. Denies symptoms. Looking forward to discharge to [facility] today.

## 2023-10-19 NOTE — PLAN OF CARE
Problem: Ineffective Coping  Goal: Participates in unit activities  Description: Interventions:  - Provide therapeutic environment   - Provide required programming   - Redirect inappropriate behaviors   Outcome: Completed     Problem: SELF HARM/SUICIDALITY  Goal: Will have no self-injury during hospital stay  Description: INTERVENTIONS:  - Q 15 MINUTES: Routine safety checks  - Q WAKING SHIFT & PRN: Assess risk to determine if routine checks are adequate to maintain patient safety  - Encourage patient to participate actively in care by formulating a plan to combat response to suicidal ideation, identify supports and resources  Outcome: Completed     Problem: ANXIETY  Goal: Will report anxiety at manageable levels  Description: INTERVENTIONS:  - Administer medication as ordered  - Teach and encourage coping skills  - Provide emotional support  - Assess patient/family for anxiety and ability to cope  Outcome: Completed  Goal: By discharge: Patient will verbalize 2 strategies to deal with anxiety  Description: Interventions:  - Identify any obvious source/trigger to anxiety  - Staff will assist patient in applying identified coping technique/skills  - Encourage attendance of scheduled groups and activities  Outcome: Completed     Problem: SUBSTANCE USE/ABUSE  Goal: Will have no detox symptoms and will verbalize plan for changing substance-related behavior  Description: INTERVENTIONS:  - Monitor physical status and assess for symptoms of withdrawal  - Administer medication as ordered  - Provide emotional support with 1 on 1 interaction with staff  - Encourage recovery focused program/ addiction education  - Assess for verbalization of changing behaviors related to substance abuse  - Initiate consults and referrals as appropriate (Case Management, Spiritual Care, etc.)  Outcome: Completed  Goal: By discharge, will develop insight into their chemical dependency and sustain motivation to continue in recovery  Description: INTERVENTIONS:  - Attends all daily group sessions and scheduled AA groups  - Actively practices coping skills through participation in the therapeutic community and adherence to program rules  - Reviews and completes assignments from individual treatment plan  - Assist patient development of understanding of their personal cycle of addiction and relapse triggers  Outcome: Completed  Goal: By discharge, patient will have ongoing treatment plan addressing chemical dependency  Description: INTERVENTIONS:  - Assist patient with resources and/or appointments for ongoing recovery based living  Outcome: Completed     Problem: DISCHARGE PLANNING  Goal: Discharge to home or other facility with appropriate resources  Description: INTERVENTIONS:  - Identify barriers to discharge w/patient and caregiver  - Arrange for needed discharge resources and transportation as appropriate  - Identify discharge learning needs (meds, wound care, etc.)  - Arrange for interpretive services to assist at discharge as needed  - Refer to Case Management Department for coordinating discharge planning if the patient needs post-hospital services based on physician/advanced practitioner order or complex needs related to functional status, cognitive ability, or social support system  Outcome: Completed

## 2023-10-19 NOTE — BH TRANSITION RECORD
Contact Information: If you have any questions, concerns, pended studies, tests and/or procedures, or emergencies regarding your inpatient behavioral health visit. Please contact [address] behavioral [facility] unit [facility] [phone]  and ask to speak to a , nurse or physician. A contact is available 24 hours/ 7 days a week at this number. Summary of Procedures Performed During your Stay:  Below is a list of major procedures performed during your hospital stay and a summary of results:    ECG-12 on 10/15/23:    Results for orders placed or performed during the hospital encounter of 05/18/23 (from the past 1000 hour(s))   ECG 12 lead    Collection Time: 10/15/23  9:44 AM   Result Value    Ventricular Rate 80    Atrial Rate 80    MA Interval 138    QRSD Interval 106    QT Interval 356    QTC Interval 410    P Axis 46    QRS Axis 58    T Wave Axis 38    Narrative    Normal sinus rhythm  T wave abnormality, consider anterior ischemia  Abnormal ECG  When compared with ECG of 15-OCT-2023 09:43, (unconfirmed)  No significant change was found  Confirmed by Chadd Florentino ([id number]) on 10/16/2023 7:23:11 AM     *Note: Due to a large number of results and/or encounters for the requested time period, some results have not been displayed. A complete set of results can be found in Results Review. --------------------------------------------------------------------------------------------------  Echocardiogram on 10/12/23:  Interpretation Summary: " Left Ventricle: Left ventricular cavity size is normal. Wall thickness is normal. The left ventricular ejection fraction is 50%. Systolic function is low normal. No regional wall motion abnormalities.  Diastolic function is normal."  --------------------------------------------------------------------------------------------------      Pending Studies (From admission, onward)       Start     Ordered    10/10/23 0600  CBC and differential  Every Tuesday      Order ID Start Status   [id number] 10/17/23 0600 Needs to be Collected    10/24/23 0600 Scheduled    10/31/23 0600 Scheduled    11/07/23 0600 Scheduled       10/09/23 1125                  Please follow up on the above pending studies with your PCP and/or referring provider.

## 2023-10-19 NOTE — PROGRESS NOTES
Met with Gokul completed his relapse prevention. He identified his symptoms, warning signs and coping skills along with his support people. We reviewed the community supports. He is looking forward to going to [facility].

## 2023-10-19 NOTE — DISCHARGE INSTR - OTHER ORDERS
CRISIS INFORMATION  If you are experiencing a mental health emergency, you may call the [facility] 24 hours a day, 7 days per week at [phone]. In [place], call [phone]. Tavo Guzman is a confidential 24/7 telephone support service manned by trained mental health consumers. [facility] provides support, a listening ear and can provide information about available services. [facility] specializes in the concerns of mental health consumers, their families and friends. However, we are also here for anyone who has a mental health concern, is confused about or just doesn't know anything about mental health or where to get information. To reach Tavo Guzman, call [phone]. HOW TO GET SUBSTANCE ABUSE HELP:  If you or someone you know has a drug or alcohol problem, there is help:  Epifanio Iraheta,[address]Th Floor: [address]: [phone]  An assessment is the first step. In addition to those listed there are other programs available in the area but assessment is best to determine an appropriate level of care. If you DO NOT have Medical Assistance ([place]) or [employer], an assessment can be scheduled at one of these providers:  [address]  [address] [place] [address], [address] [place] [address]  [phone]   [facility]  [address] [facility],2 And 3 S Floors., [address]  [address] [facility].  [facility], [address]  [address]  [address] Jose LEON [facility]   Step by 112 [address]  [address] [facility].[address]  [address] [facility]., [place], [address] [place] [address]  [phone]     If you [address], an assessment can be scheduled at one of these providers:  [place] [facility]  [facility]., [facility], [address] [facility]  [address], 350 [facility]  [address] [facility] D&A Intake Unit  [address] 111[address] [facility]., 1st Floor, Bindu YEPEZ  [phone]  [facility], [facility]), [address] [facility]  [address],2 [address] 3 S Floors., [facility], 350 [facility]  [address] [facility]. LUCHO, [address] [facility] [address]  [phone]   NET ([address])  [address] 1801 [facility], Bindu YEPEZ  [address]  [address]   Step by [address] [facility], [address] [facility]  [address] [facility]., [facility], [address] [place] [address]  1002 [facility] [address] KingsHospital of the University [facility] PennsylvaniaDAVEYAbrazo West CampusDREAD, [address] [place] [address]  [phone]     If you [address] [facility], an assessment can be scheduled at one of these providers. Please contact these Providers to determine if they are in your network plan:  [facility] D[facility] Int[facility]  [address] 1113 [facility], 1st Floor, Bindu YEPEZ  [facility]  17[address] [facility],2 And 3 S Floors., [facility], 350 [facility]  [phone]   [address] [facility]  Jonathandaniela. LUCHO, [address] [facility] [address]  [phone]   NET ([address])  [address]  1801 [facility], Bindu YEPEZ  [address]  [address] [facility] [address] [facility][1]., KingsestDAVEY pedrazaAbrazo West CampusDREAD, [address]

## 2023-10-19 NOTE — DISCHARGE INSTR - APPOINTMENTS
Anali Miranda or Melania, our [facility], will be calling you after your discharge, on the phone number that you provided. They will be available as an additional support, if needed. If you wish to speak with one of them, you may contact Anali Miranda at [phone] or Krista Camejo at [phone].

## 2023-10-19 NOTE — PLAN OF CARE
Pt to dc today. Pt oriented x3. Pt to dc to [facility] for further psychiatric care. Pt to be transported by CTS. Meds filled sent with CTS and pt.  left for pt's grandmother to discuss dc.     Dc address: [address] [place] [address]  ([facility])  P: [phone] ([facility])

## 2023-12-19 NOTE — PLAN OF CARE
Error       Problem: Ineffective Coping  Goal: Participates in unit activities  Description: Interventions:  - Provide therapeutic environment   - Provide required programming   - Redirect inappropriate behaviors   Outcome: Progressing     Problem: SELF HARM/SUICIDALITY  Goal: Will have no self-injury during hospital stay  Description: INTERVENTIONS:  - Q 15 MINUTES: Routine safety checks  - Q WAKING SHIFT & PRN: Assess risk to determine if routine checks are adequate to maintain patient safety  - Encourage patient to participate actively in care by formulating a plan to combat response to suicidal ideation, identify supports and resources  Outcome: Progressing     Problem: ANXIETY  Goal: Will report anxiety at manageable levels  Description: INTERVENTIONS:  - Administer medication as ordered  - Teach and encourage coping skills  - Provide emotional support  - Assess patient/family for anxiety and ability to cope  Outcome: Progressing  Goal: By discharge: Patient will verbalize 2 strategies to deal with anxiety  Description: Interventions:  - Identify any obvious source/trigger to anxiety  - Staff will assist patient in applying identified coping technique/skills  - Encourage attendance of scheduled groups and activities  Outcome: Progressing     Problem: SUBSTANCE USE/ABUSE  Goal: Will have no detox symptoms and will verbalize plan for changing substance-related behavior  Description: INTERVENTIONS:  - Monitor physical status and assess for symptoms of withdrawal  - Administer medication as ordered  - Provide emotional support with 1 on 1 interaction with staff  - Encourage recovery focused program/ addiction education  - Assess for verbalization of changing behaviors related to substance abuse  - Initiate consults and referrals as appropriate (Case Management, Spiritual Care, etc.)  Outcome: Progressing  Goal: By discharge, will develop insight into their chemical dependency and sustain motivation to continue in recovery  Description: INTERVENTIONS:  - Attends all daily group sessions and scheduled AA groups  - Actively practices coping skills through participation in the therapeutic community and adherence to program rules  - Reviews and completes assignments from individual treatment plan  - Assist patient development of understanding of their personal cycle of addiction and relapse triggers  Outcome: Progressing  Goal: By discharge, patient will have ongoing treatment plan addressing chemical dependency  Description: INTERVENTIONS:  - Assist patient with resources and/or appointments for ongoing recovery based living  Outcome: Progressing     Problem: DISCHARGE PLANNING  Goal: Discharge to home or other facility with appropriate resources  Description: INTERVENTIONS:  - Identify barriers to discharge w/patient and caregiver  - Arrange for needed discharge resources and transportation as appropriate  - Identify discharge learning needs (meds, wound care, etc.)  - Arrange for interpretive services to assist at discharge as needed  - Refer to Case Management Department for coordinating discharge planning if the patient needs post-hospital services based on physician/advanced practitioner order or complex needs related to functional status, cognitive ability, or social support system  Outcome: Progressing

## 2024-02-04 NOTE — NURSING NOTE
Pt is calm and pleasant with a blunted affect, scant in speech. Denies SI/HI/AVH, depression or anxiety. Out for breakfast, then back to room and now sleeping. No requests at this time. no

## 2024-02-15 NOTE — PROGRESS NOTES
Progress Note - Behavioral Health   Gokul Rubio 26 y.o. male MRN: [id number]  Unit/Bed#: U 343-01 Encounter: [id number]    Assessment/Plan   Principal Problem:    Schizoaffective disorder ([address])  Active Problems:    Legally blind    Hearing loss    Asthma    Medical clearance for psychiatric admission    Prolonged erection    Intellectual disability    Rib pain on left side    • Continue medications as noted below. • Continue to promote patient participation in group therapy, milieu therapy, occupational therapy. Patient was seen attending group sessions today. • Continue medical management by primary team.  • Discharge disposition:  pending EAC placement    Subjective: The patient was evaluated this morning for continuity of care and no acute distress noted throughout the evaluation. Over the past 24 hours staff noted the patient has been isolative to her room, appeared to be irritable last night. Patient has been medication adherent. Today on evaluation, Gokul states that he feels "fine" in terms of his mood. Denies any acute issues at this time. Denies feeling depressed. Denies feeling anxious. When asked why he is staying in bed with a blanket over his head, he states that the unit is cold. He reports feeling somewhat tired in the morning but says that it improves later. Reports BM yesterday. Discussed group sessions attendance, and he states that he will attend groups today. In terms of safety, Gokul Denies SI/HI. Gokul denies auditory, visual hallucinations. Denies symptomology consistent with delusional thought content, though appears to be distracted and internally preoccupied. Denies symptomology consistent with bina/hypomania.     Psychiatric Review of Systems:  Behavior over the last 24 hours:  unchanged  Sleep: hypersomnia  Appetite: fair  Medication side effects: No   ROS: no complaints, all other systems are negative    Current Medications:  Current Facility-Administered Medications   Medication Dose Route Frequency Provider Last Rate   • acetaminophen  650 mg Oral Q6H PRN Baron Corina MD     • acetaminophen  650 mg Oral Q4H PRN Baron Corina MD     • acetaminophen  975 mg Oral Q6H PRN Baron Corina MD     • albuterol  2 puff Inhalation Q4H PRN Baron Corina MD     • aluminum-magnesium hydroxide-simethicone  30 mL Oral Q4H PRN Baron Corina MD     • benztropine  1 mg Intramuscular BID PRN Marylou Villatoro MD     • benztropine  1 mg Oral Q4H PRN Max 6/day Baron Corina MD     • cloZAPine  350 mg Oral Daily Debora Luis MD     • divalproex sodium  750 mg Oral BID Mary Ann Jarrell MD     • fluticasone  1 spray Nasal Daily PRN Michaela Burnett PA-C     • glycopyrrolate  1 mg Oral TID Tamsen Sera, DO     • hydrOXYzine HCL  100 mg Oral Q6H PRN Max 4/day Tollie March, DO      Or   • LORazepam  1 mg Intramuscular Q6H PRN Tollie March, DO     • hydrOXYzine HCL  25 mg Oral Q6H PRN Max 4/day Baron Corina MD     • hydrOXYzine HCL  50 mg Oral Q6H PRN Max 4/day Baron Corina MD     • melatonin  3 mg Oral HS PRN Tollie March, DO     • metFORMIN  1,000 mg Oral BID With Meals ELA Wise     • metoprolol tartrate  12.5 mg Oral Q12H [facility] ELA Wise     • nicotine polacrilex  4 mg Oral Q2H PRN Baron Corina MD     • OLANZapine  5 mg Oral Q3H PRN Max 6/day Mary Ann Jarrell MD      Or   • OLANZapine  5 mg Intramuscular Q3H PRN Max 6/day Mary Ann Jarrell MD     • OLANZapine  5 mg Intramuscular Q8H PRN Tollie March, DO      Or   • OLANZapine  7.5 mg Oral Q8H PRN Tollie March, DO     • OLANZapine  2.5 mg Oral Q3H PRN Max 8/day Mary Ann Jarrell MD     • ondansetron  4 mg Oral Q8H PRN Tollie March, DO     • polyethylene glycol  17 g Oral Daily PRN Baron Corina MD     • pseudoephedrine  120 mg Oral BID PRN Nimesh Johnson MD     • senna-docusate sodium  1 tablet Oral Daily PRN Baron Corina MD     • senna-docusate sodium  2 tablet Oral BID Amber Vera MD     • sterile water          • sterile water          • sterile water          • sterile water          • sterile water          • sterile water          • sterile water          • sterile water          • sterile water          • sterile water          • sterile water          • sterile water              Behavioral Health Medications: all current active meds have been reviewed and continue current psychiatric medications. Vital signs in last 24 hours:  Temp:  [97.6 °F (36.4 °C)] 97.6 °F (36.4 °C)  HR:  [] 108  Resp:  [16] 16  BP: (117-135)/(65-73) 135/73    Laboratory results:    I have personally reviewed all pertinent laboratory/tests results.   Most Recent Labs:   Lab Results   Component Value Date    WBC 9.23 10/10/2023    RBC 4.80 10/10/2023    HGB 15.0 10/10/2023    HCT 45.2 10/10/2023     10/10/2023    RDW 12.2 10/10/2023    TOTANEUTABS 4.84 06/08/2023    NEUTROABS 3.15 10/10/2023    SODIUM 139 09/27/2023    K 4.0 09/27/2023     09/27/2023    CO2 30 09/27/2023    BUN 14 09/27/2023    CREATININE 1.23 09/27/2023    GLUC 88 09/27/2023    GLUF 88 09/27/2023    CALCIUM 9.4 09/27/2023    AST 25 09/27/2023    ALT 23 09/27/2023    ALKPHOS 53 09/27/2023    TP 7.4 09/27/2023    ALB 4.2 09/27/2023    TBILI 0.36 09/27/2023    CHOLESTEROL 167 09/02/2023    HDL 53 09/02/2023    TRIG 113 09/02/2023    LDLCALC 91 09/02/2023    NONHDLC 114 09/02/2023    VALPROICTOT 91 06/12/2023    AMMONIA 22 10/09/2022    PAX5HJLPBDND 1.841 05/19/2023    FREET4 1.16 10/09/2022    HGBA1C 6.3 (H) 09/27/2023     09/27/2023         Mental Status Evaluation:    Appearance:  disheveled, marginal hygiene, dressed in hospital attire   Behavior:  pleasant, cooperative, guarded; intermittent eye contact   Speech:  increased latency of response, soft   Mood:  "fine:   Affect:  blunted   Thought Process:  goal directed, poverty of thought   Associations: concrete associations   Thought Content:  no overt delusions   Perceptual Disturbances: denies auditory or visual hallucinations when asked, but appears preoccupied   Risk Potential: Suicidal ideation - None  Homicidal ideation - None  Potential for aggression - No   Sensorium:  oriented to person, place and time/date   Memory:  recent and remote memory grossly intact   Consciousness:  alert and awake   Attention/Concentration: attention span and concentration appear shorter than expected for age   Insight:  limited   Judgment: limited   Gait/Station: normal gait/station   Motor Activity: no abnormal movements     Progress Toward Goals: progressing. Seen going to group session today. Recommended Treatment:   See above for assessment and plan. Risks, benefits and possible side effects of Medications:   Risks, benefits, and possible side effects of medications explained to patient and patient verbalizes understanding. Treatment discussed with nursing staff. This note has been constructed using a voice recognition system. There may be translation, syntax, or grammatical errors. If you have any questions, please contact the dictating provider.     Manuela Hunter MD  Psychiatry, PGY-4 Alert and oriented to person, place and time

## 2024-10-02 ENCOUNTER — APPOINTMENT (OUTPATIENT)
Dept: LAB | Facility: CLINIC | Age: 24
End: 2024-10-02
Payer: COMMERCIAL

## 2024-10-02 ENCOUNTER — OFFICE VISIT (OUTPATIENT)
Dept: INTERNAL MEDICINE CLINIC | Facility: CLINIC | Age: 24
End: 2024-10-02
Payer: COMMERCIAL

## 2024-10-02 VITALS
BODY MASS INDEX: 33.49 KG/M2 | OXYGEN SATURATION: 98 % | DIASTOLIC BLOOD PRESSURE: 76 MMHG | HEART RATE: 82 BPM | HEIGHT: 64 IN | SYSTOLIC BLOOD PRESSURE: 122 MMHG | WEIGHT: 196.2 LBS

## 2024-10-02 DIAGNOSIS — Z79.899 NEED FOR PROPHYLACTIC CHEMOTHERAPY: ICD-10-CM

## 2024-10-02 DIAGNOSIS — R73.03 PREDIABETES: ICD-10-CM

## 2024-10-02 DIAGNOSIS — F17.200 TOBACCO USE DISORDER: ICD-10-CM

## 2024-10-02 DIAGNOSIS — L21.9 SEBORRHEIC DERMATITIS: ICD-10-CM

## 2024-10-02 DIAGNOSIS — I10 HYPERTENSION, UNSPECIFIED TYPE: ICD-10-CM

## 2024-10-02 DIAGNOSIS — F25.9 SCHIZOAFFECTIVE DISORDER, UNSPECIFIED TYPE (HCC): Primary | ICD-10-CM

## 2024-10-02 DIAGNOSIS — F10.20 ALCOHOL USE DISORDER, SEVERE, DEPENDENCE (HCC): ICD-10-CM

## 2024-10-02 PROBLEM — R45.850 HOMICIDAL IDEATION: Status: RESOLVED | Noted: 2023-05-17 | Resolved: 2024-10-02

## 2024-10-02 PROBLEM — R45.851 SUICIDAL IDEATION: Status: RESOLVED | Noted: 2022-09-20 | Resolved: 2024-10-02

## 2024-10-02 LAB
25(OH)D3 SERPL-MCNC: 46.3 NG/ML (ref 30–100)
ALBUMIN SERPL BCG-MCNC: 4.3 G/DL (ref 3.5–5)
ALP SERPL-CCNC: 34 U/L (ref 34–104)
ALT SERPL W P-5'-P-CCNC: 24 U/L (ref 7–52)
ANION GAP SERPL CALCULATED.3IONS-SCNC: 6 MMOL/L (ref 4–13)
AST SERPL W P-5'-P-CCNC: 38 U/L (ref 13–39)
ATRIAL RATE: 76 BPM
BASOPHILS # BLD AUTO: 0.04 THOUSANDS/ΜL (ref 0–0.1)
BASOPHILS NFR BLD AUTO: 1 % (ref 0–1)
BILIRUB SERPL-MCNC: 0.47 MG/DL (ref 0.2–1)
BUN SERPL-MCNC: 12 MG/DL (ref 5–25)
CALCIUM SERPL-MCNC: 9.6 MG/DL (ref 8.4–10.2)
CHLORIDE SERPL-SCNC: 103 MMOL/L (ref 96–108)
CHOLEST SERPL-MCNC: 158 MG/DL
CO2 SERPL-SCNC: 28 MMOL/L (ref 21–32)
CREAT SERPL-MCNC: 1.22 MG/DL (ref 0.6–1.3)
EOSINOPHIL # BLD AUTO: 0.07 THOUSAND/ΜL (ref 0–0.61)
EOSINOPHIL NFR BLD AUTO: 1 % (ref 0–6)
ERYTHROCYTE [DISTWIDTH] IN BLOOD BY AUTOMATED COUNT: 14.6 % (ref 11.6–15.1)
EST. AVERAGE GLUCOSE BLD GHB EST-MCNC: 117 MG/DL
GFR SERPL CREATININE-BSD FRML MDRD: 83 ML/MIN/1.73SQ M
GLUCOSE P FAST SERPL-MCNC: 85 MG/DL (ref 65–99)
HBA1C MFR BLD: 5.7 %
HCT VFR BLD AUTO: 48.3 % (ref 36.5–49.3)
HDLC SERPL-MCNC: 54 MG/DL
HGB BLD-MCNC: 15.8 G/DL (ref 12–17)
IMM GRANULOCYTES # BLD AUTO: 0.02 THOUSAND/UL (ref 0–0.2)
IMM GRANULOCYTES NFR BLD AUTO: 0 % (ref 0–2)
LDLC SERPL CALC-MCNC: 83 MG/DL (ref 0–100)
LYMPHOCYTES # BLD AUTO: 3.4 THOUSANDS/ΜL (ref 0.6–4.47)
LYMPHOCYTES NFR BLD AUTO: 42 % (ref 14–44)
MCH RBC QN AUTO: 32 PG (ref 26.8–34.3)
MCHC RBC AUTO-ENTMCNC: 32.7 G/DL (ref 31.4–37.4)
MCV RBC AUTO: 98 FL (ref 82–98)
MONOCYTES # BLD AUTO: 0.77 THOUSAND/ΜL (ref 0.17–1.22)
MONOCYTES NFR BLD AUTO: 10 % (ref 4–12)
NEUTROPHILS # BLD AUTO: 3.76 THOUSANDS/ΜL (ref 1.85–7.62)
NEUTS SEG NFR BLD AUTO: 46 % (ref 43–75)
NONHDLC SERPL-MCNC: 104 MG/DL
NRBC BLD AUTO-RTO: 0 /100 WBCS
P AXIS: 65 DEGREES
PLATELET # BLD AUTO: 223 THOUSANDS/UL (ref 149–390)
PMV BLD AUTO: 11.7 FL (ref 8.9–12.7)
POTASSIUM SERPL-SCNC: 4.5 MMOL/L (ref 3.5–5.3)
PR INTERVAL: 130 MS
PROT SERPL-MCNC: 7.8 G/DL (ref 6.4–8.4)
QRS AXIS: 50 DEGREES
QRSD INTERVAL: 80 MS
QT INTERVAL: 392 MS
QTC INTERVAL: 441 MS
RBC # BLD AUTO: 4.94 MILLION/UL (ref 3.88–5.62)
SODIUM SERPL-SCNC: 137 MMOL/L (ref 135–147)
T WAVE AXIS: 30 DEGREES
T4 SERPL-MCNC: 7.98 UG/DL (ref 6.09–12.23)
TRIGL SERPL-MCNC: 106 MG/DL
TSH SERPL DL<=0.05 MIU/L-ACNC: 1.36 UIU/ML (ref 0.45–4.5)
VALPROATE SERPL-MCNC: 64 UG/ML (ref 50–100)
VENTRICULAR RATE: 76 BPM
WBC # BLD AUTO: 8.06 THOUSAND/UL (ref 4.31–10.16)

## 2024-10-02 PROCEDURE — 93010 ELECTROCARDIOGRAM REPORT: CPT | Performed by: INTERNAL MEDICINE

## 2024-10-02 PROCEDURE — 83036 HEMOGLOBIN GLYCOSYLATED A1C: CPT | Performed by: INTERNAL MEDICINE

## 2024-10-02 PROCEDURE — 36415 COLL VENOUS BLD VENIPUNCTURE: CPT

## 2024-10-02 PROCEDURE — 80164 ASSAY DIPROPYLACETIC ACD TOT: CPT

## 2024-10-02 PROCEDURE — 99214 OFFICE O/P EST MOD 30 MIN: CPT | Performed by: INTERNAL MEDICINE

## 2024-10-02 PROCEDURE — 85025 COMPLETE CBC W/AUTO DIFF WBC: CPT

## 2024-10-02 PROCEDURE — 84436 ASSAY OF TOTAL THYROXINE: CPT

## 2024-10-02 PROCEDURE — 80307 DRUG TEST PRSMV CHEM ANLYZR: CPT

## 2024-10-02 PROCEDURE — 80053 COMPREHEN METABOLIC PANEL: CPT

## 2024-10-02 PROCEDURE — 82306 VITAMIN D 25 HYDROXY: CPT

## 2024-10-02 PROCEDURE — 84443 ASSAY THYROID STIM HORMONE: CPT

## 2024-10-02 PROCEDURE — 80061 LIPID PANEL: CPT | Performed by: INTERNAL MEDICINE

## 2024-10-02 RX ORDER — OLANZAPINE 20 MG/1
20 TABLET ORAL
COMMUNITY
Start: 2024-08-29 | End: 2024-10-02

## 2024-10-02 RX ORDER — PALIPERIDONE 6 MG/1
6 TABLET, EXTENDED RELEASE ORAL EVERY MORNING
Start: 2024-10-02

## 2024-10-02 RX ORDER — FLUTICASONE PROPIONATE 50 MCG
2 SPRAY, SUSPENSION (ML) NASAL DAILY
COMMUNITY
Start: 2024-09-25

## 2024-10-02 RX ORDER — NICOTINE 21 MG/24HR
1 PATCH, TRANSDERMAL 24 HOURS TRANSDERMAL EVERY 24 HOURS
COMMUNITY
Start: 2024-09-25 | End: 2024-10-25

## 2024-10-02 RX ORDER — TRAZODONE HYDROCHLORIDE 100 MG/1
150 TABLET ORAL
COMMUNITY

## 2024-10-02 RX ORDER — KETOCONAZOLE 20 MG/ML
1 SHAMPOO TOPICAL 2 TIMES WEEKLY
Qty: 120 ML | Refills: 1 | Status: SHIPPED | OUTPATIENT
Start: 2024-10-03

## 2024-10-02 RX ORDER — HYDROXYZINE HYDROCHLORIDE 25 MG/1
25 TABLET, FILM COATED ORAL EVERY 6 HOURS PRN
Qty: 90 TABLET | Refills: 0 | Status: SHIPPED | OUTPATIENT
Start: 2024-10-02

## 2024-10-02 RX ORDER — ALBUTEROL SULFATE 90 UG/1
2 INHALANT RESPIRATORY (INHALATION) EVERY 4 HOURS PRN
COMMUNITY
Start: 2024-09-25

## 2024-10-02 RX ORDER — PHENOL 1.4 %
10 AEROSOL, SPRAY (ML) MUCOUS MEMBRANE
Start: 2024-10-02

## 2024-10-02 RX ORDER — CETIRIZINE HCL 1 MG/ML
10 SOLUTION, ORAL ORAL DAILY
COMMUNITY
Start: 2024-09-25 | End: 2025-09-25

## 2024-10-02 RX ORDER — PALIPERIDONE PALMITATE 234 MG/1.5ML
234 INJECTION INTRAMUSCULAR
COMMUNITY

## 2024-10-02 RX ORDER — DIVALPROEX SODIUM 250 MG/1
TABLET, DELAYED RELEASE ORAL
Start: 2024-10-02

## 2024-10-02 NOTE — PROGRESS NOTES
Ambulatory Visit  Name: Gokul Cárdenas      : 2000      MRN: [id number]  Encounter Provider: Lea Reyes, MD  Encounter Date: 10/2/2024   Encounter department: [facility]    Assessment & Plan  Schizoaffective disorder, unspecified type (HCC)  He is seeing psychiatry at [facility]  He has been on Invega injections and waiting to receive it from [facility]  He was supposed to take the pills in the meantime but it was sent to the wrong pharmacy with his other meds  I refilled the hydroxyzine for now  Kacey plans to go to [facility] and have scripts sent to [facility] or [facility]  She was told by [facility] pharmacist that they can administer the INvega  Orders:    divalproex sodium (DEPAKOTE) 250 mg DR tablet; 500mg in am 1000mg at night    Melatonin 10 MG TABS; Take 1 tablet (10 mg total) by mouth daily at bedtime    hydrOXYzine HCL (ATARAX) 25 mg tablet; Take 1 tablet (25 mg total) by mouth every 6 (six) hours as needed for itching    paliperidone (INVEGA) 6 MG 24 hr tablet; Take 1 tablet (6 mg total) by mouth every morning    Hypertension, unspecified type  He is not on any meds at this time for this  He used to take metoprolol  BP is controlled off medications  Orders:    Hemoglobin A1C    Lipid panel    Prediabetes  Obtain A1C  Orders:    metFORMIN (GLUCOPHAGE) 1000 MG tablet; Take 0.5 tablets (500 mg total) by mouth daily with breakfast    Hemoglobin A1C    Lipid panel    Seborrheic dermatitis    Orders:    ketoconazole (NIZORAL) 2 % shampoo; Apply 1 Application topically 2 (two) times a week    Tobacco use disorder  Smoking up to 2 ppd  He will go on either the patch or gum       Alcohol use disorder, severe, dependence (HCC)  Has not had alcohol in 2 weeks, since last ER visit for intoxication               History of Present Illness     Here to reestablish care with his grandmother  Family doctor is leaving the practice. He just saw her a few days ago  Schizoaffective disorder and  saw psychiatrist at [facility] yesterday  Zyprexa discontinued 3 weeks ago due to priapism  He is waiting to get the Invega injection. The last one was a month ago in the ER  Also waiting to get Invega pills but sent to the wrong pharmacy with other meds  Lives with grandmother and 3 grand uncles      Review of Systems   Respiratory:  Positive for cough. Negative for shortness of breath.    Cardiovascular:  Negative for chest pain and palpitations.   Gastrointestinal:  Negative for abdominal pain, constipation and diarrhea.     Past Medical History:   Diagnosis Date    Asthma     Hearing impaired     HI 05/17/2023    Legally blind     Schizophrenia (HCC)     Suicidal ideation 09/20/2022     Past Surgical History:   Procedure Laterality Date    HERNIA REPAIR      TEAR DUCT SURGERY Bilateral     TONSILLECTOMY       Family History   Problem Relation Age of Onset    Schizophrenia Mother     Mental illness Mother     Abnormal EKG Sister     Mental illness Sister      Social History     Tobacco Use    Smoking status: Every Day     Current packs/day: 1.00     Average packs/day: 1 pack/day for 3.0 years (3.0 ttl pk-yrs)     Types: Cigarettes    Smokeless tobacco: Never   Vaping Use    Vaping status: Never Used   Substance and Sexual Activity    Alcohol use: Yes     Comment: (3) 40 oz. beers per day    Drug use: Not Currently     Types: Cocaine, Marijuana, Methamphetamines     Comment: patient denies drug use today    Sexual activity: Not Currently     Partners: Female     Current Outpatient Medications on File Prior to Visit   Medication Sig    albuterol (PROVENTIL HFA,VENTOLIN HFA) 90 mcg/act inhaler Inhale 2 puffs every 4 (four) hours as needed    famotidine (PEPCID) 10 mg tablet Take 1 tablet (10 mg total) by mouth 2 (two) times a day for 7 days    fluticasone (FLONASE) 50 mcg/act nasal spray 2 sprays into each nostril daily    nicotine (NICODERM CQ) 21 mg/24 hr TD 24 hr patch Place 1 patch on the skin every 24 hours     "nicotine polacrilex (NICORETTE) 4 mg gum Chew 4 mg    traZODone (DESYREL) 100 mg tablet Take 150 mg by mouth daily at bedtime    ZyrTEC Allergy 10 MG tablet Take 10 mg by mouth daily    [DISCONTINUED] metFORMIN (GLUCOPHAGE) 1000 MG tablet Take 1 tablet (1,000 mg total) by mouth 2 (two) times a day with meals for 7 days    metoprolol tartrate (LOPRESSOR) 25 mg tablet Take 1 tablet (25 mg total) by mouth every 12 (twelve) hours for 7 days    paliperidone palmitate ER (Invega Sustenna) 234 mg/1.5 mL IM injection Inject 234 mg into a muscle every 28 days    senna-docusate sodium (SENOKOT S) 8.6-50 mg per tablet Take 2 tablets by mouth 2 (two) times a day for 7 days    [DISCONTINUED] clozapine (CLOZARIL) 200 MG tablet Take 1 tablet (200 mg total) by mouth in the morning for 7 days    [DISCONTINUED] divalproex sodium (DEPAKOTE) 250 mg DR tablet Take 3 tablets (750 mg total) by mouth 2 (two) times a day for 7 days    [DISCONTINUED] OLANZapine (ZyPREXA) 20 MG tablet Take 20 mg by mouth     No Known Allergies  Immunization History   Administered Date(s) Administered    COVID-19 PFIZER VACCINE 0.3 ML IM 05/08/2021, 05/29/2021    COVID-19 Pfizer mRNA vacc PF maribell-sucrose 12 yr and older (Comirnaty) 04/12/2024, 09/25/2024    DTaP 03/09/2002, 08/19/2002, 11/26/2002, 03/03/2003, 04/03/2006    HPV Quadrivalent 07/02/2013    HPV9 04/12/2024    Hep B, Adolescent or Pediatric 03/09/2002, 08/19/2002, 11/26/2002    Hepatitis A 01/17/2008, 09/24/2008    HiB 03/09/2002, 08/19/2002, 11/26/2002, 03/03/2003    INFLUENZA 10/08/2004, 12/09/2008    IPV 03/09/2002, 08/19/2002, 03/03/2003, 04/03/2006    Influenza, seasonal, injectable, preservative free 09/25/2024    MMR 08/19/2002, 04/03/2006    Meningococcal MCV4, Unspecified 07/02/2013    Tdap 04/12/2024    Varicella 03/09/2002, 01/17/2008     Objective     /76 (BP Location: Right arm, Patient Position: Sitting, Cuff Size: Large)   Pulse 82   Ht 5' 4\" (1.626 m)   Wt 89 kg (196 lb 3.2 " oz)   SpO2 98%   BMI 33.68 kg/m²     Physical Exam  Constitutional:       General: He is not in acute distress.  Cardiovascular:      Rate and Rhythm: Regular rhythm.      Heart sounds: Normal heart sounds.   Pulmonary:      Breath sounds: Normal breath sounds.   Abdominal:      Palpations: Abdomen is soft.      Tenderness: There is no abdominal tenderness.   Psychiatric:         Mood and Affect: Mood is not depressed.         Thought Content: Thought content does not include suicidal ideation.

## 2024-10-02 NOTE — ASSESSMENT & PLAN NOTE
He is seeing psychiatry at [facility]  He has been on Invega injections and waiting to receive it from [facility]  He was supposed to take the pills in the meantime but it was sent to the wrong pharmacy with his other meds  I refilled the hydroxyzine for now  Kacey plans to go to [facility] and have [facility]  She was told by [facility] pharmacist that they can administer the INvega  Orders:    divalproex sodium (DEPAKOTE) 250 mg DR tablet; 500mg in am 1000mg at night    Melatonin 10 MG TABS; Take 1 tablet (10 mg total) by mouth daily at bedtime    hydrOXYzine HCL (ATARAX) 25 mg tablet; Take 1 tablet (25 mg total) by mouth every 6 (six) hours as needed for itching    paliperidone (INVEGA) 6 MG 24 hr tablet; Take 1 tablet (6 mg total) by mouth every morning

## 2024-10-02 NOTE — ASSESSMENT & PLAN NOTE
He is not on any meds at this time for this  He used to take metoprolol  BP is controlled off medications  Orders:    Hemoglobin A1C    Lipid panel

## 2024-10-05 LAB
6MAM UR QL SCN: NEGATIVE NG/ML
ACCEPTABLE CREAT UR QL: 187 MG/DL
AMPHET UR QL SCN: NEGATIVE NG/ML
BARBITURATES UR QL SCN: NEGATIVE NG/ML
BENZODIAZ UR QL SCN: NEGATIVE NG/ML
BUPRENORPHINE UR QL CFM: NEGATIVE NG/ML
CANNABINOIDS UR QL SCN: NEGATIVE NG/ML
CARISOPRODOL UR QL: NEGATIVE NG/ML
COCAINE+BZE UR QL SCN: NEGATIVE NG/ML
ETHYL GLUCURONIDE UR QL SCN: NEGATIVE NG/ML
FENTANYL UR QL SCN: NEGATIVE NG/ML
GABAPENTIN SERPLBLD QL SCN: NEGATIVE UG/ML
METHADONE UR QL SCN: NEGATIVE NG/ML
NITRITE UR QL STRIP: NEGATIVE UG/ML
OPIATES UR QL SCN: NEGATIVE NG/ML
OXYCODONE+OXYMORPHONE UR QL SCN: NEGATIVE NG/ML
PCP UR QL SCN: NEGATIVE NG/ML
PROPOXYPH UR QL SCN: NEGATIVE NG/ML
SPECIMEN PH ACCEPTABLE UR: 6.4 (ref 4.5–8.9)
TAPENTADOL UR QL SCN: NEGATIVE NG/ML
TRAMADOL UR QL SCN: NEGATIVE NG/ML

## 2024-10-15 NOTE — PROGRESS NOTES
10/02/23 0827   Team Meeting   Meeting Type Daily Rounds   Team Members Present   Team Members Present Physician;Nurse;   Physician Team Member [id number]   Care Management Team Member Dev   Patient/Family Present   Patient Present No   Patient's Family Present No   Pt malodorous. Blood pressures elevated. Pt seclusive to room and largely in bed most of the day. Pt scant with poor eye contact. Pt had AH in the middle of the night Saturday night, PRN given-effective. Clozapine at 350mg. Labs tomorrow. PROCEDURES:  Left total knee arthroplasty 30-Sep-2024 08:29:56  Esme Odell

## 2024-11-29 DIAGNOSIS — L21.9 SEBORRHEIC DERMATITIS: ICD-10-CM

## 2024-11-29 RX ORDER — KETOCONAZOLE 20 MG/ML
SHAMPOO, SUSPENSION TOPICAL
Qty: 120 ML | Refills: 1 | Status: SHIPPED | OUTPATIENT
Start: 2024-11-29

## 2024-12-11 ENCOUNTER — VBI (OUTPATIENT)
Dept: ADMINISTRATIVE | Facility: OTHER | Age: 24
End: 2024-12-11

## 2024-12-11 NOTE — TELEPHONE ENCOUNTER
12/11/24 3:07 PM     Chart reviewed for Hemoglobin A1c was/were submitted to the patient's insurance.     Renetta Alfaro   [facility]

## 2025-01-02 NOTE — ED PROVIDER NOTES
History  Chief Complaint   Patient presents with   • Medical Problem     Patient arrives to ED from [place] unit with priapism     Patient is a 50-year-old male, history of asthma, hearing impairment, legal blindness, schizophrenia  Patient is brought down from the behavioral unit for concerns of priapism  I spoke to the nurse practitioner taking care of him, states that somewhere [phone] today they became aware of the patient having a priapism that would not resolve itself  Patient states he has had multiple episodes for years since childhood  Usually he places ice on his groin and it goes away  He is unsure exactly when this episode starts  He states he could be going on for a few days  Per the nurse practitioner, patient received 120 mg dose of sudafed in the behavioral health unit prior to arrival in ED  Currently in ED he is without complaint  Prior to Admission Medications   Prescriptions Last Dose Informant Patient Reported? Taking?    albuterol (PROVENTIL HFA,VENTOLIN HFA) 90 mcg/act inhaler   No No   Sig: Inhale 2 puffs every 4 (four) hours as needed for wheezing or shortness of breath   fluticasone (FLONASE) 50 mcg/act nasal spray   No No   Si spray into each nostril daily   paliperidone palmitate (INVEGA) 234 MG/1 5ML im injection   Yes No   Sig: Inject 234 mg into a muscle every 28 days   risperiDONE (RisperDAL M-TAB) 2 mg disintegrating tablet   No No   Sig: Take 1 tablet (2 mg total) by mouth daily at bedtime   risperiDONE (RisperDAL) 2 mg tablet  Self Yes No   Sig: Take 2 mg by mouth daily      Facility-Administered Medications: None       Past Medical History:   Diagnosis Date   • Asthma    • Hearing impaired    • Legally blind    • Schizophrenia ([facility] [address] )        Past Surgical History:   Procedure Laterality Date   • HERNIA REPAIR     • TEAR DUCT SURGERY Bilateral    • TONSILLECTOMY         Family History   Problem Relation Age of Onset   • Schizophrenia Mother    • Mental illness Mother    • Abnormal EKG Sister    • Mental illness Sister      I have reviewed and agree with the history as documented  E-Cigarette/Vaping   • E-Cigarette Use Never User      E-Cigarette/Vaping Substances     Social History     Tobacco Use   • Smoking status: Every Day     Packs/day: 1 00     Years: 3 00     Total pack years: 3 00     Types: Cigarettes   • Smokeless tobacco: Never   Vaping Use   • Vaping Use: Never used   Substance Use Topics   • Alcohol use: Yes     Comment: (3) 40 oz  beers per day   • Drug use: Yes     Types: Cocaine, Marijuana, Methamphetamines     Comment: patient denies drug use today       Review of Systems   Constitutional: Negative  Negative for chills and fever  HENT: Negative  Negative for ear pain, rhinorrhea, sore throat, trouble swallowing and voice change  Eyes: Negative  Negative for pain and visual disturbance  Respiratory: Negative  Negative for cough, shortness of breath and wheezing  Cardiovascular: Negative  Negative for chest pain and palpitations  Gastrointestinal: Negative for abdominal pain, diarrhea, nausea and vomiting  Genitourinary: Positive for penile pain  Negative for dysuria, frequency, hematuria, penile discharge, penile swelling and testicular pain  Musculoskeletal: Negative  Negative for arthralgias, back pain, neck pain and neck stiffness  Skin: Negative  Negative for color change and rash  Neurological: Negative  Negative for dizziness, seizures, syncope, speech difficulty, weakness, light-headedness and numbness  All other systems reviewed and are negative  Physical Exam  Physical Exam  Vitals and nursing note reviewed  Exam conducted with a chaperone present (Nurse Linda Lancaster)  Constitutional:       General: He is not in acute distress  Appearance: He is well-developed  HENT:      Head: Normocephalic and atraumatic     Eyes:      Conjunctiva/sclera: Conjunctivae normal       Pupils: Pupils are equal, round, and n/a reactive to light  Neck:      Trachea: No tracheal deviation  Cardiovascular:      Rate and Rhythm: Normal rate and regular rhythm  Pulmonary:      Effort: Pulmonary effort is normal  No respiratory distress  Breath sounds: Normal breath sounds  No wheezing or rales  Abdominal:      General: Bowel sounds are normal  There is no distension  Palpations: Abdomen is soft  Tenderness: There is no abdominal tenderness  There is no guarding or rebound  Genitourinary:     Pubic Area: No rash or pubic lice  Penis: No phimosis, paraphimosis or tenderness  Testes: Normal       Comments: Priapism present  Musculoskeletal:         General: No tenderness or deformity  Normal range of motion  Cervical back: Normal range of motion and neck supple  Skin:     General: Skin is warm and dry  Capillary Refill: Capillary refill takes less than 2 seconds  Findings: No rash  Neurological:      Mental Status: He is alert and oriented to person, place, and time     Psychiatric:         Behavior: Behavior normal          Vital Signs  ED Triage Vitals [05/26/23 1559]   Temperature Pulse Respirations Blood Pressure SpO2   98 5 °F (36 9 °C) (!) 113 20 133/64 99 %      Temp Source Heart Rate Source Patient Position - Orthostatic VS BP Location FiO2 (%)   Tympanic Monitor Sitting Left arm --      Pain Score       --           Vitals:    05/26/23 1559   BP: 133/64   Pulse: (!) 113   Patient Position - Orthostatic VS: Sitting         Visual Acuity      ED Medications  Medications   bupivacaine (PF) (MARCAINE) 0 5 % injection 20 mL (20 mL Injection Given 5/26/23 1606)       Diagnostic Studies  Results Reviewed     Procedure Component Value Units Date/Time    Comprehensive metabolic panel [[id number]]  (Abnormal) Collected: 05/26/23 1645    Lab Status: Final result Specimen: Blood from Arm, Right Updated: 05/26/23 1711     Sodium 137 mmol/L      Potassium 4 7 mmol/L      Chloride 102 mmol/L CO2 28 mmol/L      ANION GAP 7 mmol/L      BUN 13 mg/dL      Creatinine 1 34 mg/dL      Glucose 116 mg/dL      Calcium 9 4 mg/dL      AST 40 U/L      ALT 31 U/L      Alkaline Phosphatase 51 U/L      Total Protein 6 8 g/dL      Albumin 3 8 g/dL      Total Bilirubin 0 51 mg/dL      eGFR 74 ml/min/1 73sq m     Narrative:      National Kidney Disease Foundation guidelines for Chronic Kidney Disease (CKD):   •  Stage 1 with normal or high GFR (GFR > 90 mL/min/1 73 square meters)  •  Stage 2 Mild CKD (GFR = 60-89 mL/min/1 73 square meters)  •  Stage 3A Moderate CKD (GFR = 45-59 mL/min/1 73 square meters)  •  Stage 3B Moderate CKD (GFR = 30-44 mL/min/1 73 square meters)  •  Stage 4 Severe CKD (GFR = 15-29 mL/min/1 73 square meters)  •  Stage 5 End Stage CKD (GFR <15 mL/min/1 73 square meters)  Note: GFR calculation is accurate only with a steady state creatinine    Protime-INR [[id number]]  (Abnormal) Collected: 05/26/23 1645    Lab Status: Final result Specimen: Blood from Arm, Right Updated: 05/26/23 1704     Protime 11 7 seconds      INR 0 83    CBC and differential [[id number]] Collected: 05/26/23 1645    Lab Status: Final result Specimen: Blood from Arm, Right Updated: 05/26/23 1653     WBC 9 14 Thousand/uL      RBC 4 99 Million/uL      Hemoglobin 15 7 g/dL      Hematocrit 46 6 %      MCV 93 fL      MCH 31 5 pg      MCHC 33 7 g/dL      RDW 12 6 %      MPV 11 2 fL      Platelets 365 Thousands/uL      nRBC 0 /100 WBCs      Neutrophils Relative 62 %      Immat GRANS % 0 %      Lymphocytes Relative 25 %      Monocytes Relative 11 %      Eosinophils Relative 1 %      Basophils Relative 1 %      Neutrophils Absolute 5 64 Thousands/µL      Immature Grans Absolute 0 03 Thousand/uL      Lymphocytes Absolute 2 30 Thousands/µL      Monocytes Absolute 0 98 Thousand/µL      Eosinophils Absolute 0 11 Thousand/µL      Basophils Absolute 0 08 Thousands/µL     UA w Reflex to Microscopic w Reflex to Culture [[id number]]     Lab Status: No result Specimen: Urine     Rapid drug screen, urine [[id number]]     Lab Status: No result Specimen: Urine     Blood gas, venous [[id number]]  (Abnormal) Collected: 05/26/23 1626    Lab Status: Final result Specimen: Blood from Vein Updated: 05/26/23 1637     pH, Micah 6 921     pCO2, Imcah 87 9 mm Hg      pO2, Micah 33 5 mm Hg      HCO3, Micah 17 7 mmol/L      Base Excess, Micah -17 4 mmol/L      O2 Content, Micah 13 8 ml/dL      O2 HGB, VENOUS 52 7 %                  No orders to display              Procedures  Nerve block    Date/Time: 5/26/2023 5:28 PM    Performed by: oMjgan Washington DO  Authorized by: Mojgan Washington DO    Patient location:  ED  [place] Protocol:  Consent: Verbal consent obtained  Risks and benefits: risks, benefits and alternatives were discussed  Consent given by: patient  Patient understanding: patient states understanding of the procedure being performed  Patient identity confirmed: verbally with patient and arm band      Indications:     Indications:  Pain relief and procedural anesthesia  Location:     Body area:  Trunk (dorsal nerves)  Pre-procedure details:     Skin preparation:  Povidone-iodine    Preparation: Patient was prepped and draped in usual sterile fashion    Skin anesthesia (see MAR for exact dosages):     Skin anesthesia method:  Local infiltration    Local anesthetic:  Bupivacaine 0 5% w/o epi  Procedure details (see MAR for exact dosages): Block needle gauge:  27 G    Anesthetic injected:  Bupivacaine 0 5% w/o epi    Steroid injected:  None    Additive injected:  None    Injection procedure:  Anatomic landmarks identified, incremental injection, negative aspiration for blood, anatomic landmarks palpated and introduced needle  Post-procedure details:     Dressing:  None    Outcome:  Anesthesia achieved    Patient tolerance of procedure:   Tolerated well, no immediate complications  General Procedure    Date/Time: 5/26/2023 6:09 PM    Performed by: Mojgan Washington   Authorized by: Christina Fermin DO    Patient location:  ED  Assisting Provider(s): No    Consent:     Consent obtained:  Verbal    Consent given by:  Patient    Risks discussed:  Bleeding, pain, nerve damage and incomplete drainage    Alternatives discussed:  No treatment, delayed treatment and referral  Indications:     Indications:  Priapism  Pre-procedure details:     Skin preparation:  Betadine  Anesthesia (see MAR for exact dosages): Anesthesia method:  Nerve block  Procedure Detail:     Equipment used:  21G Butterfly need    Procedure note (site, laterality, method, findings):  Bilateral, butterfly needle was inserted into right and left corpus cavernosum after dorsal nerve block  Approximately 50 cc cc of dark blood and was removed from each corpus cavernosum  100 mcg of phenylephrine was injected into each corpus cavernosum  Patient's priapism detumesced, pain resolved  Post-procedure details:     Patient tolerance of procedure:   Tolerated well, no immediate complications  CriticalCare Time    Date/Time: 5/26/2023 6:18 PM    Performed by: Christina Fermin DO  Authorized by: Christina Fermin DO    Critical care provider statement:     Critical care time (minutes):  30    Critical care time was exclusive of:  Separately billable procedures and treating other patients and teaching time    Critical care was necessary to treat or prevent imminent or life-threatening deterioration of the following conditions:  Circulatory failure and metabolic crisis    Critical care was time spent personally by me on the following activities:  Blood draw for specimens, obtaining history from patient or surrogate, development of treatment plan with patient or surrogate, evaluation of patient's response to treatment, examination of patient, ordering and performing treatments and interventions, ordering and review of laboratory studies, ordering and review of radiographic studies, re-evaluation of patient's condition and review of old charts  Comments:      Upon my evaluation, this patient has a high probability of imminent or life-threatening deterioration due to ischemic priapism risking patient's ability to have sexual reproduction in the future  Which required my direct attention, intervention, and personal management      I have personally provided 30 minutes of critical care time, exclusive of procedures, teaching, and any prior time recorded by providers other than myself  Time includes review of laboratory data, radiology results, discussion with consultants, and monitoring for potential decompensation  ED Course  ED Course as of 05/26/23 1816   Fri May 26, 2023   1704 Patient priapism resovled  SBIRT 22yo+    Flowsheet Row Most Recent Value   Initial Alcohol Screen: US AUDIT-C     1  How often do you have a drink containing alcohol? 0 Filed at: 05/26/2023 1557   2  How many drinks containing alcohol do you have on a typical day you are drinking? 0 Filed at: 05/26/2023 1557   3a  Male UNDER 65: How often do you have five or more drinks on one occasion? 0 Filed at: 05/26/2023 1557   3b  FEMALE Any Age, or MALE 65+: How often do you have 4 or more drinks on one occassion? 0 Filed at: 05/26/2023 1557   Audit-C Score 0 Filed at: 05/26/2023 1557   LATA: How many times in the past year have you    Used an illegal drug or used a prescription medication for non-medical reasons? 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  See procedure note for dorsal nerve block, aspiration of venous blood from the corpus cavernosum's bilaterally as well as injection of phenylephrine  Patient's priapism resolved  He is pain-free  I discussed the case test results and disposition with the on-call urologist, nurse practitioner Fransico Herrera  States patient does not need to be transferred for observation  Recommended patient's trazodone to be stopped as it is likely the inciting event for this particular episode of priapism  Patient was discharged back to the behavioral health unit  I have reviewed the patient's visit and any testing done in the emergency department  They have verbalized their understanding of any testing done today and have no further questions or concerns regarding their care in the emergency room  They will follow up with their primary care physician as well as with any specialist in their discharge instructions  Strict return precautions were discussed  Amount and/or Complexity of Data Reviewed  Labs: ordered  Risk  Prescription drug management  Disposition  Final diagnoses:   Priapism, unspecified     Time reflects when diagnosis was documented in both MDM as applicable and the Disposition within this note     Time User Action Codes Description Comment    5/26/2023  5:21 PM Raúl Hines Add [N48 30] Priapism, unspecified       ED Disposition     ED Disposition   Discharge    Condition   Stable    Date/Time   Fri May 26, 2023  5:21 PM    Comment   Gokul Guzman discharge to behavorial health unit                 Follow-up Information    None         Discharge Medication List as of 5/26/2023  5:22 PM      CONTINUE these medications which have NOT CHANGED    Details   albuterol (PROVENTIL HFA,VENTOLIN HFA) 90 mcg/act inhaler Inhale 2 puffs every 4 (four) hours as needed for wheezing or shortness of breath, Starting Tue 3/22/2022, Normal      fluticasone (FLONASE) 50 mcg/act nasal spray 1 spray into each nostril daily, Starting Wed 3/23/2022, No Print      paliperidone palmitate (INVEGA) 234 MG/1 5ML im injection Inject 234 mg into a muscle every 28 days, Historical Med      risperiDONE (RisperDAL M-TAB) 2 mg disintegrating tablet Take 1 tablet (2 mg total) by mouth daily at bedtime, Starting Wed 5/17/2023, Until Fri 6/16/2023, Normal      risperiDONE (RisperDAL) 2 mg tablet Take 2 mg by mouth daily, Historical Med             No discharge procedures on file      PDMP Review     None          ED Provider  Electronically Signed by           Debbie Paul, DO  05/26/23 1200 [facility], DO  05/26/23 1298

## 2025-01-08 ENCOUNTER — TELEPHONE (OUTPATIENT)
Age: 25
End: 2025-01-08

## 2025-01-09 DIAGNOSIS — I10 HYPERTENSION, UNSPECIFIED TYPE: ICD-10-CM

## 2025-01-09 RX ORDER — METOPROLOL TARTRATE 25 MG/1
25 TABLET, FILM COATED ORAL DAILY
Qty: 30 TABLET | Refills: 2 | Status: SHIPPED | OUTPATIENT
Start: 2025-01-09 | End: 2025-01-09

## 2025-01-09 RX ORDER — METOPROLOL TARTRATE 25 MG/1
25 TABLET, FILM COATED ORAL DAILY
Qty: 30 TABLET | Refills: 2 | Status: SHIPPED | OUTPATIENT
Start: 2025-01-09

## 2025-01-09 NOTE — TELEPHONE ENCOUNTER
Spoke with Grandmother she reports he takes metoprolol tartrate 25MG 1 tablet daily. She asked if this refill can be sent to [facility] because he uses pill pack.

## 2025-01-09 NOTE — TELEPHONE ENCOUNTER
Advise Kacey that it was sent to [facility].  I initially mistakenly sent it to [facility] so she can ignore that.

## 2025-03-10 DIAGNOSIS — I10 HYPERTENSION, UNSPECIFIED TYPE: ICD-10-CM

## 2025-03-11 RX ORDER — METOPROLOL TARTRATE 25 MG/1
25 TABLET, FILM COATED ORAL DAILY
Qty: 30 TABLET | Refills: 1 | Status: SHIPPED | OUTPATIENT
Start: 2025-03-11

## 2025-04-29 NOTE — ASSESSMENT & PLAN NOTE
Confidentiality Statement  We discussed that my routine practice for all teen/young adults is to have a one-on-one interview at every visit. Reviewed the limits of confidentiality and reasons that may need to be breached, but, that in general this information is only released with the patient's permission.     Home: feels safe  Eating: no concerns with body image, no restricting/binging/purging behaviors  Education: no issues with school/cyber bullying  Drugs/alcohol: denies smoking tobacco/marijuana, vaping, other illicit drug use, alcohol use. Does have friends who use - does not get pressured. Does not get into cars with people who have been doing drugs/drinking alcohol.  Sexuality: identifies as female, attracted to both, not currently in relationship (just got broken up with by boyfriend), has never been sexually active - discussed condom use  Suicide/Depression: see other note for PHQ9/ASQ results    Carrie Dotson MD   · Pt with a history of chronic alcohol use, reports drinking 3 40-oz beers daily x2 years  · Currently denies any signs/symptoms of alcohol withdrawal  · Discontinue SEWS protocol  · Patient has not developed evidence of alcohol withdrawal throughout this hospitalization, has not required any phenobarbital  · Received PO thiamine, folic acid, and MVI supplementation  · Patient is medically cleared for inpatient psych hospitalization

## 2025-04-30 ENCOUNTER — OFFICE VISIT (OUTPATIENT)
Dept: INTERNAL MEDICINE CLINIC | Facility: CLINIC | Age: 25
End: 2025-04-30
Payer: COMMERCIAL

## 2025-04-30 VITALS
TEMPERATURE: 97.1 F | SYSTOLIC BLOOD PRESSURE: 120 MMHG | OXYGEN SATURATION: 98 % | HEIGHT: 64 IN | WEIGHT: 184 LBS | HEART RATE: 88 BPM | DIASTOLIC BLOOD PRESSURE: 60 MMHG | BODY MASS INDEX: 31.41 KG/M2

## 2025-04-30 DIAGNOSIS — Z23 ENCOUNTER FOR IMMUNIZATION: ICD-10-CM

## 2025-04-30 DIAGNOSIS — F17.200 TOBACCO USE DISORDER: ICD-10-CM

## 2025-04-30 DIAGNOSIS — F25.0 SCHIZOAFFECTIVE DISORDER, BIPOLAR TYPE (HCC): Primary | ICD-10-CM

## 2025-04-30 DIAGNOSIS — J30.2 SEASONAL ALLERGIES: ICD-10-CM

## 2025-04-30 PROBLEM — F10.21 ALCOHOL DEPENDENCE IN REMISSION (HCC): Status: ACTIVE | Noted: 2022-03-18

## 2025-04-30 PROCEDURE — 90471 IMMUNIZATION ADMIN: CPT

## 2025-04-30 PROCEDURE — 90677 PCV20 VACCINE IM: CPT

## 2025-04-30 PROCEDURE — 99214 OFFICE O/P EST MOD 30 MIN: CPT | Performed by: INTERNAL MEDICINE

## 2025-04-30 RX ORDER — FLUTICASONE PROPIONATE 50 MCG
2 SPRAY, SUSPENSION (ML) NASAL DAILY
Qty: 16 G | Refills: 11 | Status: SHIPPED | OUTPATIENT
Start: 2025-04-30

## 2025-04-30 RX ORDER — CETIRIZINE HCL 1 MG/ML
10 SOLUTION, ORAL ORAL DAILY
Qty: 30 TABLET | Refills: 11 | Status: SHIPPED | OUTPATIENT
Start: 2025-04-30 | End: 2026-04-30

## 2025-04-30 RX ORDER — POLYETHYLENE GLYCOL 3350 17 G
2 POWDER IN PACKET (EA) ORAL AS NEEDED
Start: 2025-04-30

## 2025-04-30 RX ORDER — FLUPHENAZINE HYDROCHLORIDE 2.5 MG/1
7.5 TABLET ORAL 2 TIMES DAILY
Start: 2025-04-30

## 2025-04-30 RX ORDER — NICOTINE 21 MG/24HR
1 PATCH, TRANSDERMAL 24 HOURS TRANSDERMAL EVERY 24 HOURS
Start: 2025-04-30

## 2025-04-30 NOTE — ASSESSMENT & PLAN NOTE
Remains difficult to manage  Already on nicotine patch and nicotine lozenges  Orders:  •  nicotine polacrilex (COMMIT) 2 MG lozenge; Apply 1 lozenge (2 mg total) to the mouth or throat as needed for smoking cessation  •  nicotine (NICODERM CQ) 21 mg/24 hr TD 24 hr patch; Place 1 patch on the skin over 24 hours every 24 hours

## 2025-04-30 NOTE — PROGRESS NOTES
Name: Gokul Cárdenas      : 2000      MRN: [id number]  Encounter Provider: Lea Reyes, MD  Encounter Date: 2025   Encounter department: [facility]  Schizoaffective disorder, bipolar type (HCC)  Symptoms remain uncontrolled  He continues to see psychiatry and talk to a therapist  Agitation mainly triggered by tobacco abuse.  He gets extremely agitated when he has limited access to cigarettes  About to start a day program    Orders:  •  sertraline (ZOLOFT) 50 mg tablet; Take 1 tablet (50 mg total) by mouth daily  •  fluPHENAZine (PROLIXIN) 2.5 mg tablet; Take 3 tablets (7.5 mg total) by mouth 2 (two) times a day    Tobacco use disorder  Remains difficult to manage  Already on nicotine patch and nicotine lozenges  Orders:  •  nicotine polacrilex (COMMIT) 2 MG lozenge; Apply 1 lozenge (2 mg total) to the mouth or throat as needed for smoking cessation  •  nicotine (NICODERM CQ) 21 mg/24 hr TD 24 hr patch; Place 1 patch on the skin over 24 hours every 24 hours    Encounter for immunization    Orders:  •  Pneumococcal Conjugate Vaccine 20-valent (Pcv20)    Seasonal allergies    Orders:  •  fluticasone (FLONASE) 50 mcg/act nasal spray; 2 sprays into each nostril daily  •  ZyrTEC Allergy 10 MG tablet; Take 1 tablet (10 mg total) by mouth daily         History of Present Illness     Here for a follow-up with his grandmother and uncle  Most days gets agitated.  The main trigger is when he is not allowed to smoke or not provided access to cigarettes  Smoking almost 2 packs a day  He is already on a nicotine patch and lozenge  Schizoaffective disorder and sees psychiatry  About to start a day program  Sleeps most of the day and awake at night  ER visits for various symptoms.  Appears to be get belligerent and violent even in the emergency room  Dealing with seasonal allergies and is in need of refills of Zyrtec and Flonase          Review of Systems    Psychiatric/Behavioral:  Positive for agitation, hallucinations and sleep disturbance.      Past Medical History:   Diagnosis Date   • Asthma    • Hearing impaired    • HI 05/17/2023   • Legally blind    • Schizophrenia (HCC)    • Suicidal ideation 09/20/2022     Past Surgical History:   Procedure Laterality Date   • HERNIA REPAIR     • TEAR DUCT SURGERY Bilateral    • TONSILLECTOMY       Family History   Problem Relation Age of Onset   • Schizophrenia Mother    • Mental illness Mother    • Abnormal EKG Sister    • Mental illness Sister      Social History     Tobacco Use   • Smoking status: Every Day     Current packs/day: 1.00     Average packs/day: 1 pack/day for 3.0 years (3.0 ttl pk-yrs)     Types: Cigarettes   • Smokeless tobacco: Never   Vaping Use   • Vaping status: Never Used   Substance and Sexual Activity   • Alcohol use: Yes     Comment: (3) 40 oz. beers per day   • Drug use: Not Currently     Types: Cocaine, Marijuana, Methamphetamines     Comment: patient denies drug use today   • Sexual activity: Not Currently     Partners: Female     Current Outpatient Medications on File Prior to Visit   Medication Sig   • Melatonin 10 MG TABS Take 1 tablet (10 mg total) by mouth daily at bedtime   • metFORMIN (GLUCOPHAGE) 1000 MG tablet Take 0.5 tablets (500 mg total) by mouth daily with breakfast   • metoprolol tartrate (LOPRESSOR) 25 mg tablet TAKE 1 TABLET (25 MG TOTAL) BY MOUTH DAILY   • paliperidone (INVEGA) 6 MG 24 hr tablet Take 1 tablet (6 mg total) by mouth every morning   • paliperidone palmitate ER (Invega Sustenna) 234 mg/1.5 mL IM injection Inject 234 mg into a muscle every 28 days   • albuterol (PROVENTIL HFA,VENTOLIN HFA) 90 mcg/act inhaler Inhale 2 puffs every 4 (four) hours as needed (Patient not taking: Reported on 4/30/2025)   • famotidine (PEPCID) 10 mg tablet Take 1 tablet (10 mg total) by mouth 2 (two) times a day for 7 days (Patient not taking: Reported on 4/30/2025)   • hydrOXYzine HCL  (ATARAX) 25 mg tablet Take 1 tablet (25 mg total) by mouth every 6 (six) hours as needed for itching (Patient not taking: Reported on 4/30/2025)   • ketoconazole (NIZORAL) 2 % shampoo APPLY TWICE WEEKLY. (Patient not taking: Reported on 4/30/2025)   • nicotine (NICODERM CQ) 21 mg/24 hr TD 24 hr patch Place 1 patch on the skin every 24 hours   • nicotine polacrilex (NICORETTE) 4 mg gum Chew 4 mg   • senna-docusate sodium (SENOKOT S) 8.6-50 mg per tablet Take 2 tablets by mouth 2 (two) times a day for 7 days (Patient not taking: Reported on 4/30/2025)   • [DISCONTINUED] divalproex sodium (DEPAKOTE) 250 mg DR tablet 500mg in am 1000mg at night (Patient not taking: Reported on 4/30/2025)   • [DISCONTINUED] fluticasone (FLONASE) 50 mcg/act nasal spray 2 sprays into each nostril daily (Patient not taking: Reported on 4/30/2025)   • [DISCONTINUED] traZODone (DESYREL) 100 mg tablet Take 150 mg by mouth daily at bedtime (Patient not taking: Reported on 4/30/2025)   • [DISCONTINUED] ZyrTEC Allergy 10 MG tablet Take 10 mg by mouth daily (Patient not taking: Reported on 4/30/2025)     Allergies   Allergen Reactions   • Trazodone Other (See Comments)     Priapism   • Pollen Extract Sneezing     Immunization History   Administered Date(s) Administered   • COVID-19 PFIZER VACCINE 0.3 ML IM 05/08/2021, 05/29/2021   • COVID-19 Pfizer mRNA vacc PF maribell-sucrose 12 yr and older (Comirnaty) 04/12/2024, 09/25/2024   • DTaP 03/09/2002, 08/19/2002, 11/26/2002, 03/03/2003, 04/03/2006   • HPV Quadrivalent 07/02/2013   • HPV9 04/12/2024   • Hep B, Adolescent or Pediatric 03/09/2002, 08/19/2002, 11/26/2002   • Hepatitis A 01/17/2008, 09/24/2008   • HiB 03/09/2002, 08/19/2002, 11/26/2002, 03/03/2003   • INFLUENZA 10/08/2004, 12/09/2008, 12/03/2019   • IPV 03/09/2002, 08/19/2002, 03/03/2003, 04/03/2006   • Influenza, seasonal, injectable, preservative free 09/25/2024   • MMR 08/19/2002, 04/03/2006   • Meningococcal MCV4, Unspecified 07/02/2013  "  • Pneumococcal Conjugate Vaccine 20-valent (Pcv20), Polysace 04/30/2025   • Tdap 04/12/2024   • Varicella 03/09/2002, 01/17/2008     Objective   /60 (BP Location: Left arm, Patient Position: Sitting, Cuff Size: Large)   Pulse 88   Temp (!) 97.1 °F (36.2 °C) (Tympanic)   Ht 5' 4\" (1.626 m)   Wt 83.5 kg (184 lb)   SpO2 98%   BMI 31.58 kg/m²     Physical Exam  Constitutional:       Appearance: He is not ill-appearing.   Cardiovascular:      Rate and Rhythm: Regular rhythm.      Heart sounds: Normal heart sounds.   Pulmonary:      Breath sounds: Normal breath sounds.   Psychiatric:         Attention and Perception: He is inattentive.         Behavior: Behavior is agitated.         "

## 2025-04-30 NOTE — ASSESSMENT & PLAN NOTE
Symptoms remain uncontrolled  He continues to see psychiatry and talk to a therapist  Agitation mainly triggered by tobacco abuse.  He gets extremely agitated when he has limited access to cigarettes  About to start a day program    Orders:  •  sertraline (ZOLOFT) 50 mg tablet; Take 1 tablet (50 mg total) by mouth daily  •  fluPHENAZine (PROLIXIN) 2.5 mg tablet; Take 3 tablets (7.5 mg total) by mouth 2 (two) times a day

## 2025-05-01 ENCOUNTER — TELEPHONE (OUTPATIENT)
Age: 25
End: 2025-05-01

## 2025-05-01 NOTE — TELEPHONE ENCOUNTER
PA for zyrtec 10mg SUBMITTED to [facility]    via    [x]CMM-KEY: [id number]  []Surescripts-Case ID #   []Availity-Auth ID # NDC #   []Faxed to plan   []Other website   []Phone call Case ID #     [x]PA sent as URGENT    All office notes, labs and other pertaining documents and studies sent. Clinical questions answered. Awaiting determination from insurance company.     Turnaround time for your insurance to make a decision on your Prior Authorization can take 7-21 business days.

## 2025-05-02 NOTE — TELEPHONE ENCOUNTER
ALEXANDER diazc 10mg  DENIED    Reason:(Screenshot if applicable)        Message sent to office clinical pool Yes    Denial letter scanned into Media Yes    We can gladly do an appeal but the process can take about 30-60 days to provide determination. Please Schedule a Peer to Peer at phone [phone] . If an appeal is truly warranted please have Provider send clinical documentation to the PA department to support the appeal.     **Please follow up with your patient regarding denial and next steps**

## 2025-05-08 DIAGNOSIS — I10 HYPERTENSION, UNSPECIFIED TYPE: ICD-10-CM

## 2025-05-09 RX ORDER — METOPROLOL TARTRATE 25 MG/1
25 TABLET, FILM COATED ORAL DAILY
Qty: 30 TABLET | Refills: 5 | Status: SHIPPED | OUTPATIENT
Start: 2025-05-09

## 2025-05-21 ENCOUNTER — OFFICE VISIT (OUTPATIENT)
Dept: INTERNAL MEDICINE CLINIC | Facility: CLINIC | Age: 25
End: 2025-05-21

## 2025-05-21 ENCOUNTER — TELEPHONE (OUTPATIENT)
Dept: INTERNAL MEDICINE CLINIC | Facility: CLINIC | Age: 25
End: 2025-05-21

## 2025-05-21 VITALS
BODY MASS INDEX: 31.41 KG/M2 | OXYGEN SATURATION: 100 % | SYSTOLIC BLOOD PRESSURE: 110 MMHG | HEART RATE: 85 BPM | DIASTOLIC BLOOD PRESSURE: 60 MMHG | WEIGHT: 183 LBS

## 2025-05-21 DIAGNOSIS — R12 HEART BURN: ICD-10-CM

## 2025-05-21 DIAGNOSIS — R11.2 NAUSEA AND VOMITING, UNSPECIFIED VOMITING TYPE: Primary | ICD-10-CM

## 2025-05-21 DIAGNOSIS — R05.9 COUGH, UNSPECIFIED TYPE: ICD-10-CM

## 2025-05-21 DIAGNOSIS — R73.03 PREDIABETES: ICD-10-CM

## 2025-05-21 RX ORDER — OMEPRAZOLE 20 MG/1
20 CAPSULE, DELAYED RELEASE ORAL DAILY
Qty: 30 CAPSULE | Refills: 1 | Status: SHIPPED | OUTPATIENT
Start: 2025-05-21

## 2025-05-21 RX ORDER — ONDANSETRON 4 MG/1
4 TABLET, FILM COATED ORAL EVERY 8 HOURS PRN
Qty: 20 TABLET | Refills: 0 | Status: SHIPPED | OUTPATIENT
Start: 2025-05-21

## 2025-05-21 NOTE — PROGRESS NOTES
Name: Gokul Cárdenas      : 2000      MRN: [id number]  Encounter Provider: Taylor Mendieta MD  Encounter Date: 2025   Encounter department: [facility]  :  Assessment & Plan  Nausea and vomiting, unspecified vomiting type  Unclear etiology.  GI exam is benign.  Denies marijuana use.  Denies alcohol use.  Will obtain blood works.  Send Zofran as needed.  Start PPI.  Close follow-up in 2 weeks.  Consider further evaluation including abdominal ultrasound or GI referral if symptoms continue.  Orders:    Comprehensive metabolic panel; Future    ondansetron (ZOFRAN) 4 mg tablet; Take 1 tablet (4 mg total) by mouth every 8 (eight) hours as needed for nausea or vomiting    Prediabetes    Orders:    Hemoglobin A1C; Future    Cough, unspecified type  Etiology unclear at this point.  Physical exam on his ENT oropharynx cervical LAD and along are unremarkable  No cough noticed during encounter  Possible causes include URI, smoking-related, cough variant asthma, acid reflux.  Grandmother brought up concern tuberculosis.  There is no known exposure.  There is unintentional weight loss and questionable night sweat.  There is no hemoptysis.  Suspicion is low.  I will get chest x-ray and TB blood test.  Also advised patient to use albuterol.  Orders:    XR chest pa and lateral; Future    Quantiferon TB Gold Plus Assay; Future    Heart burn  Suspect some component of acid reflux.  Cough is worse at night.  Review of system positive for heartburn.  Empirically start omeprazole 20 mg daily.    Orders:    omeprazole (PriLOSEC) 20 mg delayed release capsule; Take 1 capsule (20 mg total) by mouth daily           History of Present Illness   HPI  This is my first encounter with this patient  Accompanied by grandmother  Nausea vomiting and a cough over the past month  He vomits almost every day.  There is no clear trigger but patient said it might be a little worse after eating.  He is still able to eat and  drink.  Denies bloody emesis.  Denies abdominal pain constipation diarrhea.    Cough for over a month.  Worse at night.  Patient has not coughed during encounter with me.  Productive with white mucus sometimes tan looking mucus.  Denies hemoptysis.  Cough is worse when he smokes.  Heavy cigarette smoker.  Used to smoke 2 packs a day.  Recently his grandmother cut it down to 10 cigarettes a day.  Denies wheezing.  Feels a little out of breath when coughing.  Little chest tenderness when coughing.    There was initial leg concern of bloody emesis or hemoptysis but after clarification with patient and grandmother there were none.    Reports last week 1 episode of bloody stool.  He uses MiraLAX.  Denies pain with defecation.  No fever    His grandmother also has concern of ruling out TB  No known exposure to TB patient  Grandmother had history of positive PPD.  Patient lives with grandmother and 6 other family members  He goes to day program.  He had inpatient psychiatry admission.  Questionable night sweat.  Grandmother says patient usually goes to bed with clothes on but in the morning he is usually noticed naked.  She is not sure if patient had night sweat.  Patient unable to provide consistent history he reports my have been having night sweats.  Also noticed weight loss.  196 pounds in October.  183 pounds today            Review of Systems    Objective   /60 (BP Location: Left arm, Patient Position: Sitting, Cuff Size: Standard)   Pulse 85   Wt 83 kg (183 lb)   SpO2 100%   BMI 31.41 kg/m²      Physical Exam

## 2025-05-21 NOTE — TELEPHONE ENCOUNTER
Keke stopped in office. Concerned about a cough that Gokul has. When ever he smoke, which she tries to limit, he has a bad cough and proceeds to vomit with the coughing. She is asking for a Tb test.  I did schedule a same day appt with Dr Mendieta for today, 5/20 to be seen and evaluated.   Please advise if this is appropriate or have other advise. Keke is concerned he has TB so she is isolating him as much as she can and wearing a mask.

## 2025-05-21 NOTE — TELEPHONE ENCOUNTER
Murali  Spoke with Daija.  Keep the appointment with Dr. Mendieta.  Expect that he will have a chest x-ray and TB screening with skin test or blood test.  He is a heavy smoker so suspect that the coughing is all related to smoking.

## 2025-05-21 NOTE — PATIENT INSTRUCTIONS
Get chest xray done  Get labs done  Use your inhaler albuterol every 6hr, see if it helps with cough  Try a course of anti-acid mediciation, omeprazole, 20mg once a day.   Also sent anti-nausea medication to be used as needed.

## 2025-05-22 ENCOUNTER — HOSPITAL ENCOUNTER (OUTPATIENT)
Dept: RADIOLOGY | Facility: HOSPITAL | Age: 25
Discharge: HOME | End: 2025-05-22
Payer: COMMERCIAL

## 2025-05-22 ENCOUNTER — APPOINTMENT (OUTPATIENT)
Dept: LAB | Facility: CLINIC | Age: 25
End: 2025-05-22
Payer: COMMERCIAL

## 2025-05-22 DIAGNOSIS — R05.9 COUGH, UNSPECIFIED TYPE: ICD-10-CM

## 2025-05-22 DIAGNOSIS — R11.2 NAUSEA AND VOMITING, UNSPECIFIED VOMITING TYPE: ICD-10-CM

## 2025-05-22 DIAGNOSIS — R73.03 PREDIABETES: ICD-10-CM

## 2025-05-22 PROBLEM — R12 HEART BURN: Status: ACTIVE | Noted: 2025-05-22

## 2025-05-22 LAB
ALBUMIN SERPL BCG-MCNC: 4.3 G/DL (ref 3.5–5)
ALP SERPL-CCNC: 55 U/L (ref 34–104)
ALT SERPL W P-5'-P-CCNC: 28 U/L (ref 7–52)
ANION GAP SERPL CALCULATED.3IONS-SCNC: 10 MMOL/L (ref 4–13)
AST SERPL W P-5'-P-CCNC: 33 U/L (ref 13–39)
BILIRUB SERPL-MCNC: 0.59 MG/DL (ref 0.2–1)
BUN SERPL-MCNC: 11 MG/DL (ref 5–25)
CALCIUM SERPL-MCNC: 9.6 MG/DL (ref 8.4–10.2)
CHLORIDE SERPL-SCNC: 104 MMOL/L (ref 96–108)
CO2 SERPL-SCNC: 25 MMOL/L (ref 21–32)
CREAT SERPL-MCNC: 1 MG/DL (ref 0.6–1.3)
EST. AVERAGE GLUCOSE BLD GHB EST-MCNC: 117 MG/DL
GFR SERPL CREATININE-BSD FRML MDRD: 104 ML/MIN/1.73SQ M
GLUCOSE SERPL-MCNC: 85 MG/DL (ref 65–140)
HBA1C MFR BLD: 5.7 %
POTASSIUM SERPL-SCNC: 3.9 MMOL/L (ref 3.5–5.3)
PROT SERPL-MCNC: 7.3 G/DL (ref 6.4–8.4)
SODIUM SERPL-SCNC: 139 MMOL/L (ref 135–147)

## 2025-05-22 PROCEDURE — 86480 TB TEST CELL IMMUN MEASURE: CPT

## 2025-05-22 PROCEDURE — 36415 COLL VENOUS BLD VENIPUNCTURE: CPT

## 2025-05-22 PROCEDURE — 80053 COMPREHEN METABOLIC PANEL: CPT

## 2025-05-22 PROCEDURE — 83036 HEMOGLOBIN GLYCOSYLATED A1C: CPT

## 2025-05-22 PROCEDURE — 71046 X-RAY EXAM CHEST 2 VIEWS: CPT

## 2025-05-22 NOTE — ASSESSMENT & PLAN NOTE
Etiology unclear at this point.  Physical exam on his ENT oropharynx cervical LAD and along are unremarkable  No cough noticed during encounter  Possible causes include URI, smoking-related, cough variant asthma, acid reflux.  Grandmother brought up concern tuberculosis.  There is no known exposure.  There is unintentional weight loss and questionable night sweat.  There is no hemoptysis.  Suspicion is low.  I will get chest x-ray and TB blood test.  Also advised patient to use albuterol.  Orders:    XR chest pa and lateral; Future    Quantiferon TB Gold Plus Assay; Future

## 2025-05-22 NOTE — ASSESSMENT & PLAN NOTE
Unclear etiology.  GI exam is benign.  Denies marijuana use.  Denies alcohol use.  Will obtain blood works.  Send Zofran as needed.  Start PPI.  Close follow-up in 2 weeks.  Consider further evaluation including abdominal ultrasound or GI referral if symptoms continue.  Orders:    Comprehensive metabolic panel; Future    ondansetron (ZOFRAN) 4 mg tablet; Take 1 tablet (4 mg total) by mouth every 8 (eight) hours as needed for nausea or vomiting

## 2025-05-22 NOTE — ASSESSMENT & PLAN NOTE
Suspect some component of acid reflux.  Cough is worse at night.  Review of system positive for heartburn.  Empirically start omeprazole 20 mg daily.    Orders:    omeprazole (PriLOSEC) 20 mg delayed release capsule; Take 1 capsule (20 mg total) by mouth daily

## 2025-05-23 ENCOUNTER — TELEPHONE (OUTPATIENT)
Dept: INTERNAL MEDICINE CLINIC | Facility: CLINIC | Age: 25
End: 2025-05-23

## 2025-05-23 ENCOUNTER — RESULTS FOLLOW-UP (OUTPATIENT)
Dept: INTERNAL MEDICINE CLINIC | Facility: CLINIC | Age: 25
End: 2025-05-23

## 2025-05-23 LAB
GAMMA INTERFERON BACKGROUND BLD IA-ACNC: 0.02 IU/ML
M TB IFN-G BLD-IMP: NEGATIVE
M TB IFN-G CD4+ BCKGRND COR BLD-ACNC: 0.01 IU/ML
M TB IFN-G CD4+ BCKGRND COR BLD-ACNC: 0.02 IU/ML
MITOGEN IGNF BCKGRD COR BLD-ACNC: 9.98 IU/ML

## 2025-05-23 NOTE — TELEPHONE ENCOUNTER
----- Message from Taylor Mendieta MD sent at 5/23/2025 11:26 AM EDT -----  Please let patient or his family know that chest xray did not show any acute changes in his lung.   However there is a metal ring-like structure noticed in his abdomen.   Please ask pt if he swallowed any foreign body recently.   It might be causing his recent nausea and vomiting.   I advice pt to go to ER to be evaluated.  There is possibility this might come out with his stool, but if it's something sharp or erosive it might need to be taken out.             ----- Message -----  From: Interface, Radiology Results In  Sent: 5/23/2025  10:34 AM EDT  To: Taylor Mendieta MD

## 2025-06-21 PROBLEM — R05.9 COUGH: Status: RESOLVED | Noted: 2025-05-22 | Resolved: 2025-06-21
